# Patient Record
Sex: FEMALE | Race: WHITE | NOT HISPANIC OR LATINO | Employment: OTHER | URBAN - METROPOLITAN AREA
[De-identification: names, ages, dates, MRNs, and addresses within clinical notes are randomized per-mention and may not be internally consistent; named-entity substitution may affect disease eponyms.]

---

## 2017-02-20 ENCOUNTER — ALLSCRIPTS OFFICE VISIT (OUTPATIENT)
Dept: OTHER | Facility: OTHER | Age: 82
End: 2017-02-20

## 2017-02-20 DIAGNOSIS — Z13.820 ENCOUNTER FOR SCREENING FOR OSTEOPOROSIS: ICD-10-CM

## 2017-05-05 ENCOUNTER — GENERIC CONVERSION - ENCOUNTER (OUTPATIENT)
Dept: OTHER | Facility: OTHER | Age: 82
End: 2017-05-05

## 2017-05-17 ENCOUNTER — GENERIC CONVERSION - ENCOUNTER (OUTPATIENT)
Dept: OTHER | Facility: OTHER | Age: 82
End: 2017-05-17

## 2017-05-24 ENCOUNTER — GENERIC CONVERSION - ENCOUNTER (OUTPATIENT)
Dept: OTHER | Facility: OTHER | Age: 82
End: 2017-05-24

## 2017-06-21 ENCOUNTER — GENERIC CONVERSION - ENCOUNTER (OUTPATIENT)
Dept: OTHER | Facility: OTHER | Age: 82
End: 2017-06-21

## 2017-06-28 ENCOUNTER — ALLSCRIPTS OFFICE VISIT (OUTPATIENT)
Dept: OTHER | Facility: OTHER | Age: 82
End: 2017-06-28

## 2017-06-28 DIAGNOSIS — Z13.820 ENCOUNTER FOR SCREENING FOR OSTEOPOROSIS: ICD-10-CM

## 2017-07-07 ENCOUNTER — GENERIC CONVERSION - ENCOUNTER (OUTPATIENT)
Dept: OTHER | Facility: OTHER | Age: 82
End: 2017-07-07

## 2017-08-24 ENCOUNTER — GENERIC CONVERSION - ENCOUNTER (OUTPATIENT)
Dept: OTHER | Facility: OTHER | Age: 82
End: 2017-08-24

## 2017-10-05 ENCOUNTER — GENERIC CONVERSION - ENCOUNTER (OUTPATIENT)
Dept: OTHER | Facility: OTHER | Age: 82
End: 2017-10-05

## 2017-10-24 ENCOUNTER — HOSPITAL ENCOUNTER (EMERGENCY)
Facility: HOSPITAL | Age: 82
Discharge: HOME | End: 2017-10-24
Attending: EMERGENCY MEDICINE
Payer: MEDICARE

## 2017-10-24 VITALS
RESPIRATION RATE: 14 BRPM | BODY MASS INDEX: 28.35 KG/M2 | TEMPERATURE: 98.7 F | HEART RATE: 78 BPM | WEIGHT: 160 LBS | SYSTOLIC BLOOD PRESSURE: 199 MMHG | DIASTOLIC BLOOD PRESSURE: 83 MMHG | OXYGEN SATURATION: 95 % | HEIGHT: 63 IN

## 2017-10-24 DIAGNOSIS — R10.2 PELVIC PAIN: ICD-10-CM

## 2017-10-24 DIAGNOSIS — N30.00 ACUTE CYSTITIS: Primary | ICD-10-CM

## 2017-10-24 LAB
BACTERIA UR QL AUTO: ABNORMAL /HPF
BILIRUB UR QL STRIP: NEGATIVE
CLARITY UR: ABNORMAL
CLARITY, POC: ABNORMAL
COLOR UR: YELLOW
COLOR, POC: YELLOW
EXT BILIRUBIN, UA: ABNORMAL
EXT BLOOD URINE: ABNORMAL
EXT GLUCOSE, UA: ABNORMAL
EXT KETONES: ABNORMAL
EXT NITRITE, UA: ABNORMAL
EXT PH, UA: 6
EXT PROTEIN, UA: 100
EXT SPECIFIC GRAVITY, UA: 1.02
EXT UROBILINOGEN: 0.2
GLUCOSE UR STRIP-MCNC: NEGATIVE MG/DL
HGB UR QL STRIP.AUTO: ABNORMAL
KETONES UR STRIP-MCNC: NEGATIVE MG/DL
LEUKOCYTE ESTERASE UR QL STRIP: ABNORMAL
NITRITE UR QL STRIP: NEGATIVE
NON-SQ EPI CELLS URNS QL MICRO: ABNORMAL /HPF
PH UR STRIP.AUTO: 5.5 [PH] (ref 4.5–8)
PROT UR STRIP-MCNC: ABNORMAL MG/DL
RBC #/AREA URNS AUTO: ABNORMAL /HPF
SP GR UR STRIP.AUTO: 1.02 (ref 1–1.03)
UROBILINOGEN UR QL STRIP.AUTO: 0.2 E.U./DL
WBC # BLD EST: ABNORMAL 10*3/UL
WBC #/AREA URNS AUTO: ABNORMAL /HPF

## 2017-10-24 PROCEDURE — 99283 EMERGENCY DEPT VISIT LOW MDM: CPT

## 2017-10-24 PROCEDURE — 81001 URINALYSIS AUTO W/SCOPE: CPT | Performed by: EMERGENCY MEDICINE

## 2017-10-24 PROCEDURE — 81002 URINALYSIS NONAUTO W/O SCOPE: CPT | Performed by: EMERGENCY MEDICINE

## 2017-10-24 RX ORDER — PHENAZOPYRIDINE HYDROCHLORIDE 200 MG/1
200 TABLET, FILM COATED ORAL 3 TIMES DAILY
Qty: 6 TABLET | Refills: 0 | Status: SHIPPED | OUTPATIENT
Start: 2017-10-24 | End: 2018-05-21 | Stop reason: ALTCHOICE

## 2017-10-24 RX ORDER — OMEPRAZOLE 20 MG/1
20 CAPSULE, DELAYED RELEASE ORAL DAILY
COMMUNITY
End: 2018-03-05 | Stop reason: SDUPTHER

## 2017-10-24 RX ORDER — PHENAZOPYRIDINE HYDROCHLORIDE 100 MG/1
200 TABLET, FILM COATED ORAL ONCE
Status: COMPLETED | OUTPATIENT
Start: 2017-10-24 | End: 2017-10-24

## 2017-10-24 RX ORDER — HYDROCHLOROTHIAZIDE 12.5 MG/1
12.5 TABLET ORAL DAILY
COMMUNITY
End: 2018-03-05 | Stop reason: SDUPTHER

## 2017-10-24 RX ORDER — BIOTIN 1 MG
1000 TABLET ORAL DAILY
COMMUNITY

## 2017-10-24 RX ORDER — GABAPENTIN 800 MG/1
800 TABLET ORAL 3 TIMES DAILY
COMMUNITY
End: 2018-03-05 | Stop reason: SDUPTHER

## 2017-10-24 RX ORDER — PRAVASTATIN SODIUM 20 MG
20 TABLET ORAL DAILY
COMMUNITY
End: 2018-03-05 | Stop reason: SDUPTHER

## 2017-10-24 RX ORDER — CIPROFLOXACIN 500 MG/1
500 TABLET, FILM COATED ORAL 2 TIMES DAILY
Qty: 12 TABLET | Refills: 0 | Status: SHIPPED | OUTPATIENT
Start: 2017-10-24 | End: 2017-10-30

## 2017-10-24 RX ADMIN — PHENAZOPYRIDINE HYDROCHLORIDE 200 MG: 100 TABLET ORAL at 05:11

## 2017-10-24 NOTE — DISCHARGE INSTRUCTIONS
Pelvic Pain   WHAT YOU NEED TO KNOW:   Pelvic pain may be caused by a number of conditions, such as irritable bowel syndrome, appendicitis, or constipation  A urinary tract infection, prostate inflammation, menstrual cramps, or kidney stones can also cause pelvic pain  You may have pain on one or both sides of your pelvis  Pelvic pain can develop if you have trauma to your pelvis or if you sit or stand for a long time  DISCHARGE INSTRUCTIONS:   Medicines: You may need any of the following:  · NSAIDs , such as ibuprofen, help decrease swelling, pain, and fever  This medicine is available with or without a doctor's order  NSAIDs can cause stomach bleeding or kidney problems in certain people  If you take blood thinner medicine, always ask your healthcare provider if NSAIDs are safe for you  Always read the medicine label and follow directions  · Prescription pain medicine  may be given  Ask how to take this medicine safely  · Birth control medicines  may help decrease pain in women  · Take your medicine as directed  Contact your healthcare provider if you think your medicine is not helping or if you have side effects  Tell him or her if you are allergic to any medicine  Keep a list of the medicines, vitamins, and herbs you take  Include the amounts, and when and why you take them  Bring the list or the pill bottles to follow-up visits  Carry your medicine list with you in case of an emergency  Call 911 for any of the following:   · You have severe chest pain and sudden trouble breathing  Contact your healthcare provider if:   · You have a fever  · You have nausea, vomiting, or diarrhea  · Your pain does not go away, or it gets worse, even after treatment  · You have numbness in your legs or toes  · You have heavy or unusual vaginal bleeding  · You have questions or concerns about your condition or care  Manage your pelvic pain:   · Keep a pain record    Write down when your pain happens and how severe it is  Include any other symptoms you have with your pain  A record will help you keep track of pain cycles  Bring the record with you to your follow-up visits  It may also help your healthcare provider find out what is causing your pain  · Learn ways to relax  Deep breathing, meditation, and relaxation techniques can help decrease your pain  When you are tense, your pain may increase  · Treat or prevent constipation  Drink liquids as directed  You may need to drink more liquid than usual  Ask your healthcare provider how much liquid to drink each day  Eat high-fiber foods  High-fiber foods include fruit, vegetables, whole-grain breads and cereals, and beans  You may need to change the foods you eat if you have irritable bowel syndrome  Follow up with your healthcare provider as directed: You may need physical therapy  You may need to see an orthopedic specialist  Write down your questions so you remember to ask them during your visits  © 2017 [address] Information is for End User's use only and may not be sold, redistributed or otherwise used for commercial purposes  All illustrations and images included in CareNotes® are the copyrighted property of [employer]  or [employer]rence [employer]  The above information is an  only  It is not intended as medical advice for individual conditions or treatments  Talk to your doctor, nurse or pharmacist before following any medical regimen to see if it is safe and effective for you  Urinary Traction Infection in Older Adults   WHAT YOU NEED TO KNOW:   A urinary tract infection (UTI) is caused by bacteria that get inside your urinary tract  Your urinary tract includes your kidneys, ureters, bladder, and urethra  Urine is made in your kidneys, and it flows from the ureters to the bladder  Urine leaves the bladder through the urethra   A UTI is more common in your lower urinary tract, which includes your bladder and urethra  DISCHARGE INSTRUCTIONS:   Return to the emergency department if:   · You are urinating very little or not at all  · You are vomiting  · You have a high fever with shaking chills  · You have side or back pain that gets worse  Contact your healthcare provider if:   · You have a fever  · You are a woman and you have increased white or yellow discharge from your vagina  · You do not feel better after 2 days of taking antibiotics  · You have questions or concerns about your condition or care  Medicines:   · Medicines  help treat the bacterial infection or decrease pain and burning when you urinate  You may also need medicines to decrease the urge to urinate often  Your healthcare provider may recommend cranberry juice or cranberry supplements to help decrease your symptoms  · Take your medicine as directed  Contact your healthcare provider if you think your medicine is not helping or if you have side effects  Tell him or her if you are allergic to any medicine  Keep a list of the medicines, vitamins, and herbs you take  Include the amounts, and when and why you take them  Bring the list or the pill bottles to follow-up visits  Carry your medicine list with you in case of an emergency  Self-care:   · Urinate when you feel the urge  Do not hold your urine because bacteria can grow in the bladder if urine stays in the bladder too long  It may be helpful to urinate at least every 3 to 4 hours  · Drink liquids as directed  Liquids can help flush bacteria from your urinary tract  Ask how much liquid to drink each day and which liquids are best for you  You may need to drink more liquids than usual to help flush out the bacteria  Do not drink alcohol, caffeine, and citrus juices  These can irritate your bladder and increase your symptoms  · Apply heat  on your abdomen for 20 to 30 minutes every 2 hours for as many days as directed   Heat helps decrease discomfort and pressure in your bladder  Prevent a UTI:   · Women should wipe front to back  after urinating or having a bowel movement  This may prevent germs from getting into the urinary tract  · Urinate after you have sex  to flush away bacteria that can enter your urinary tract during sex  · Wear cotton underwear and clothes that fit loose  Tight pants and nylon underwear can trap moisture and cause bacteria to grow  Follow up with your healthcare provider as directed:  Write down your questions so you remember to ask them during your visits  © 2017 [facility] Information is for End User's use only and may not be sold, redistributed or otherwise used for commercial purposes  All illustrations and images included in CareNotes® are the copyrighted property of [employer]  or [employer] Sant[employer]  The above information is an  only  It is not intended as medical advice for individual conditions or treatments  Talk to your doctor, nurse or pharmacist before following any medical regimen to see if it is safe and effective for you

## 2017-10-24 NOTE — ED PROVIDER NOTES
History  Chief Complaint   Patient presents with    Pain     Pt to ED with increasing pain in her vaginal/perineum since yesterday  Burning sensation during urination, history of frequent urinary tract infections  This 59-year-old female who is status post remote total abdominal hysterectomy complains of pelvic pain  She states this started approximately 2 days ago and has progressively worsened  She also has dysuria but this does not feel like her typical UTI  Patient denies abnormal vaginal bleeding or discharge  She denies recent fever illness or injury  She has been having normal bowel movements  Prior to Admission Medications   Prescriptions Last Dose Informant Patient Reported? Taking? cholecalciferol (VITAMIN D3) 400 units tablet   Yes Yes   Sig: Take 400 Units by mouth daily   cyanocobalamin 100 MCG tablet   Yes Yes   Sig: Take 100 mcg by mouth daily   gabapentin (NEURONTIN) 800 mg tablet   Yes Yes   Sig: Take 800 mg by mouth 3 (three) times a day   hydrochlorothiazide (HYDRODIURIL) 12 5 mg tablet   Yes Yes   Sig: Take 12 5 mg by mouth daily   omeprazole (PriLOSEC) 20 mg delayed release capsule   Yes Yes   Sig: Take 20 mg by mouth daily   pravastatin (PRAVACHOL) 20 mg tablet   Yes Yes   Sig: Take 20 mg by mouth daily      Facility-Administered Medications: None       Past Medical History:   Diagnosis Date    Balance disorder     Hard of hearing     Tinnitus        Past Surgical History:   Procedure Laterality Date    EYE SURGERY      HYSTERECTOMY         History reviewed  No pertinent family history  I have reviewed and agree with the history as documented  Social History   Substance Use Topics    Smoking status: Never Smoker    Smokeless tobacco: Never Used    Alcohol use Yes      Comment: rarely        Review of Systems   Constitutional: Negative  HENT: Negative  Eyes: Negative  Respiratory: Negative  Cardiovascular: Negative      Gastrointestinal: Negative  Endocrine: Negative  Genitourinary: Positive for dysuria and pelvic pain  Negative for decreased urine volume, difficulty urinating, flank pain, hematuria, menstrual problem, urgency, vaginal bleeding and vaginal discharge  Musculoskeletal: Negative  Skin: Negative  Allergic/Immunologic: Negative  Neurological: Negative  Hematological: Negative  Psychiatric/Behavioral: Negative  All other systems reviewed and are negative  Physical Exam  ED Triage Vitals [10/24/17 0414]   Temperature Pulse Respirations Blood Pressure SpO2   98 7 °F (37 1 °C) 78 14 (!) 199/83 95 %      Temp Source Heart Rate Source Patient Position - Orthostatic VS BP Location FiO2 (%)   Tympanic Monitor Lying Left arm --      Pain Score       --           Physical Exam   Constitutional: She is oriented to person, place, and time  She appears well-developed and well-nourished  No distress  HENT:   Head: Normocephalic and atraumatic  Eyes: Conjunctivae and EOM are normal  Pupils are equal, round, and reactive to light  Neck: Normal range of motion  Neck supple  Cardiovascular: Normal rate and regular rhythm  No murmur heard  Pulmonary/Chest: Effort normal and breath sounds normal    Abdominal: Soft  Bowel sounds are normal    Genitourinary: No vaginal discharge found  Genitourinary Comments: There is no external vaginal lesion  No internal vaginal lesion  No cervix noted  Tenderness in vagina diffusely  No edema  No bleeding or signs of trauma  No drainage  Rectal exam was unremarkable  Musculoskeletal: Normal range of motion  She exhibits no edema  No pelvic bone tenderness or instability   Neurological: She is alert and oriented to person, place, and time  She has normal reflexes  No cranial nerve deficit  Coordination normal    Skin: Skin is warm and dry  No rash noted  She is not diaphoretic  Psychiatric: She has a normal mood and affect     Nursing note and vitals reviewed        ED Medications  Medications   phenazopyridine (PYRIDIUM) tablet 200 mg (200 mg Oral Given 10/24/17 0511)       Diagnostic Studies  Labs Reviewed   URINALYSIS WITH REFLEX TO MICROSCOPIC - Abnormal        Result Value Ref Range Status    Leukocytes, UA Large (*) Negative Final    Protein, UA 30 (1+) (*) Negative mg/dl Final    Blood, UA Moderate (*) Negative Final    Color, UA Yellow   Final    Clarity, UA Turbid   Final    Specific Gravity, UA 1 020  1 003 - 1 030 Final    pH, UA 5 5  4 5 - 8 0 Final    Nitrite, UA Negative  Negative Final    Glucose, UA Negative  Negative mg/dl Final    Ketones, UA Negative  Negative mg/dl Final    Urobilinogen, UA 0 2  0 2, 1 0 E U /dl E U /dl Final    Bilirubin, UA Negative  Negative Final   URINE MICROSCOPIC - Abnormal     RBC, UA Field obscured, unable to enumerate (*) None Seen, 0-5 /hpf Final    WBC, UA Innumerable (*) None Seen, 0-5, 5-55, 5-65 /hpf Final    Epithelial Cells Field obscured, unable to enumerate (*) None Seen, Occasional /hpf Final    Bacteria, UA Field obscured, unable to enumerate (*) None Seen, Occasional /hpf Final   POCT URINALYSIS DIPSTICK - Abnormal     Color, UA yellow   Final    Clarity, UA turbid   Final    EXT Glucose, UA neg   Final    EXT Bilirubin, UA (Ref: Negative) neg   Final    EXT Ketones, UA (Ref: Negative) neg   Final    EXT Spec Grav, UA 1 020   Final    EXT Blood, UA (Ref: Negative) moderate   Final    EXT pH, UA 6 0   Final    EXT Protein, UA (Ref: Negative) 100   Final    EXT Urobilinogen, UA (Ref: 0 2- 1 0) 0 2   Final    EXT Leukocytes, UA (Ref: Negative) moderate   Final    EXT Nitrite, UA (Ref: Negative) neg   Final       US pelvis complete non OB    (Results Pending)       Procedures  Procedures      Phone Contacts  ED Phone Contact    ED Course  ED Course                                MDM  Number of Diagnoses or Management Options  Acute cystitis: new and requires workup  Pelvic pain: new and requires workup Amount and/or Complexity of Data Reviewed  Clinical lab tests: ordered and reviewed      CritCare Time    Disposition  Final diagnoses:   Acute cystitis   Pelvic pain     ED Disposition     ED Disposition Condition Comment    Discharge  Pranay Meyer discharge to home/self care  Condition at discharge: Stable        Follow-up Information     Follow up With Specialties Details Why Contact Isiah Keene MD Obstetrics and Gynecology Call today to evaluate your pelvic pain [address]  [phone]          Discharge Medication List as of 10/24/2017  7:12 AM      START taking these medications    Details   ciprofloxacin (CIPRO) 500 mg tablet Take 1 tablet by mouth 2 (two) times a day for 6 days, Starting Tue 10/24/2017, Until Mon 10/30/2017, Print      phenazopyridine (PYRIDIUM) 200 mg tablet Take 1 tablet by mouth 3 (three) times a day, Starting Tue 10/24/2017, Print         CONTINUE these medications which have NOT CHANGED    Details   cholecalciferol (VITAMIN D3) 400 units tablet Take 400 Units by mouth daily, Historical Med      cyanocobalamin 100 MCG tablet Take 100 mcg by mouth daily, Historical Med      gabapentin (NEURONTIN) 800 mg tablet Take 800 mg by mouth 3 (three) times a day, Historical Med      hydrochlorothiazide (HYDRODIURIL) 12 5 mg tablet Take 12 5 mg by mouth daily, Historical Med      omeprazole (PriLOSEC) 20 mg delayed release capsule Take 20 mg by mouth daily, Historical Med      pravastatin (PRAVACHOL) 20 mg tablet Take 20 mg by mouth daily, Historical Med             Outpatient Discharge Orders  US pelvis complete non OB   Standing Status: Future  Standing Exp   Date: 10/24/21         ED Provider  Electronically Signed by       Raffy Granados DO  10/24/17 1361

## 2017-11-11 ENCOUNTER — HOSPITAL ENCOUNTER (OUTPATIENT)
Dept: ULTRASOUND IMAGING | Facility: HOSPITAL | Age: 82
Discharge: HOME | End: 2017-11-11
Attending: EMERGENCY MEDICINE
Payer: MEDICARE

## 2017-11-11 DIAGNOSIS — R10.2 PELVIC PAIN: ICD-10-CM

## 2017-11-11 PROCEDURE — 76856 US EXAM PELVIC COMPLETE: CPT

## 2017-11-11 PROCEDURE — 76830 TRANSVAGINAL US NON-OB: CPT

## 2018-01-13 NOTE — RESULT NOTES
Verified Results  (1) COMPREHENSIVE METABOLIC PANEL [id number] 27:15QK [employer]     Test Name Result Flag Reference   GLUCOSE 90 mg/dL  65-99   Fasting reference interval   UREA NITROGEN (BUN) 16 mg/dL  7-25   CREATININE 0 91 mg/dL H 0 60-0 88   For patients >52years of age, the reference limit  for Creatinine is approximately 13% higher for people  identified as -American  eGFR NON-AFR  AMERICAN 57 mL/min/1 73m2 L > OR = 60   eGFR AFRICAN AMERICAN 66 mL/min/1 73m2  > OR = 60   BUN/CREATININE RATIO 18 (calc)  6-22   SODIUM 142 mmol/L  135-146   POTASSIUM 3 7 mmol/L  3 5-5 3   CHLORIDE 100 mmol/L     CARBON DIOXIDE 32 mmol/L H 20-31   CALCIUM 9 8 mg/dL  8 6-10 4   PROTEIN, TOTAL 6 9 g/dL  6 1-8 1   ALBUMIN 4 2 g/dL  3 6-5 1   GLOBULIN 2 7 g/dL (calc)  1 9-3 7   ALBUMIN/GLOBULIN RATIO 1 6 (calc)  1 0-2 5   BILIRUBIN, TOTAL 0 4 mg/dL  0 2-1 2   ALKALINE PHOSPHATASE 56 U/L     AST 15 U/L  10-35   ALT 10 U/L  6-29     (1) VITAMIN B12 [id number] 10:35AM [employer]   REPORT COMMENT:  FASTING:YES     Test Name Result Flag Reference   VITAMIN B12 984 pg/mL  200-1100     (Q) LIPID PANEL WITH REFLEX TO DIRECT LDL [id number] 10:35AM [employer]     Test Name Result Flag Reference   CHOLESTEROL, TOTAL 193 mg/dL  125-200   HDL CHOLESTEROL 56 mg/dL  > OR = 46   TRIGLICERIDES 153 mg/dL H <150   LDL-CHOLESTEROL 99 mg/dL (calc)  <130   Desirable range <100 mg/dL for patients with CHD or  diabetes and <70 mg/dL for diabetic patients with  known heart disease  CHOL/HDLC RATIO 3 4 (calc)  < OR = 5 0   NON HDL CHOLESTEROL 137 mg/dL (calc)     Target for non-HDL cholesterol is 30 mg/dL higher than   LDL cholesterol target

## 2018-01-14 VITALS
WEIGHT: 165 LBS | DIASTOLIC BLOOD PRESSURE: 70 MMHG | OXYGEN SATURATION: 95 % | RESPIRATION RATE: 18 BRPM | BODY MASS INDEX: 28.17 KG/M2 | HEART RATE: 74 BPM | SYSTOLIC BLOOD PRESSURE: 140 MMHG | HEIGHT: 64 IN

## 2018-01-14 VITALS
DIASTOLIC BLOOD PRESSURE: 82 MMHG | BODY MASS INDEX: 27.66 KG/M2 | WEIGHT: 162 LBS | SYSTOLIC BLOOD PRESSURE: 126 MMHG | HEIGHT: 64 IN | RESPIRATION RATE: 16 BRPM | OXYGEN SATURATION: 97 % | HEART RATE: 77 BPM

## 2018-03-05 DIAGNOSIS — E78.2 MIXED HYPERLIPIDEMIA: ICD-10-CM

## 2018-03-05 DIAGNOSIS — M12.9 ARTHROPATHY: Primary | ICD-10-CM

## 2018-03-05 DIAGNOSIS — K21.9 GASTROESOPHAGEAL REFLUX DISEASE, ESOPHAGITIS PRESENCE NOT SPECIFIED: ICD-10-CM

## 2018-03-05 DIAGNOSIS — I10 HYPERTENSION, UNSPECIFIED TYPE: ICD-10-CM

## 2018-03-05 RX ORDER — OMEPRAZOLE 20 MG/1
20 CAPSULE, DELAYED RELEASE ORAL DAILY
Qty: 90 CAPSULE | Refills: 1 | Status: SHIPPED | OUTPATIENT
Start: 2018-03-05 | End: 2018-06-27 | Stop reason: SDUPTHER

## 2018-03-05 RX ORDER — GABAPENTIN 800 MG/1
800 TABLET ORAL 2 TIMES DAILY
Qty: 180 TABLET | Refills: 1 | Status: SHIPPED | OUTPATIENT
Start: 2018-03-05 | End: 2018-06-27 | Stop reason: SDUPTHER

## 2018-03-05 RX ORDER — PRAVASTATIN SODIUM 20 MG
20 TABLET ORAL DAILY
Qty: 90 TABLET | Refills: 1 | Status: SHIPPED | OUTPATIENT
Start: 2018-03-05 | End: 2018-06-27 | Stop reason: SDUPTHER

## 2018-03-05 RX ORDER — HYDROCHLOROTHIAZIDE 12.5 MG/1
12.5 TABLET ORAL DAILY
Qty: 90 TABLET | Refills: 1 | Status: SHIPPED | OUTPATIENT
Start: 2018-03-05 | End: 2018-06-27 | Stop reason: SDUPTHER

## 2018-05-21 ENCOUNTER — APPOINTMENT (EMERGENCY)
Dept: RADIOLOGY | Facility: HOSPITAL | Age: 83
DRG: 871 | End: 2018-05-21
Payer: MEDICARE

## 2018-05-21 ENCOUNTER — APPOINTMENT (OUTPATIENT)
Dept: CT IMAGING | Facility: HOSPITAL | Age: 83
DRG: 871 | End: 2018-05-21
Payer: MEDICARE

## 2018-05-21 ENCOUNTER — HOSPITAL ENCOUNTER (INPATIENT)
Facility: HOSPITAL | Age: 83
LOS: 5 days | Discharge: NURSING, REHAB OR LONG-TERM CARE | DRG: 871 | End: 2018-05-27
Attending: EMERGENCY MEDICINE | Admitting: INTERNAL MEDICINE
Payer: MEDICARE

## 2018-05-21 ENCOUNTER — HOSPITAL ENCOUNTER (EMERGENCY)
Facility: HOSPITAL | Age: 83
Discharge: HOME | DRG: 871 | End: 2018-05-21
Attending: EMERGENCY MEDICINE | Admitting: EMERGENCY MEDICINE
Payer: MEDICARE

## 2018-05-21 VITALS
HEART RATE: 104 BPM | OXYGEN SATURATION: 91 % | SYSTOLIC BLOOD PRESSURE: 151 MMHG | WEIGHT: 165 LBS | HEIGHT: 63 IN | DIASTOLIC BLOOD PRESSURE: 66 MMHG | BODY MASS INDEX: 29.23 KG/M2 | TEMPERATURE: 100.2 F

## 2018-05-21 DIAGNOSIS — I10 ESSENTIAL HYPERTENSION: ICD-10-CM

## 2018-05-21 DIAGNOSIS — J20.9 ACUTE BRONCHITIS: Primary | ICD-10-CM

## 2018-05-21 DIAGNOSIS — R41.82 ALTERED MENTAL STATUS: ICD-10-CM

## 2018-05-21 DIAGNOSIS — J18.9 LINGULAR PNEUMONIA: ICD-10-CM

## 2018-05-21 DIAGNOSIS — A41.9 SEPSIS (HCC): ICD-10-CM

## 2018-05-21 DIAGNOSIS — J40 BRONCHITIS: Primary | ICD-10-CM

## 2018-05-21 DIAGNOSIS — H60.91 RIGHT OTITIS EXTERNA: ICD-10-CM

## 2018-05-21 LAB
ALBUMIN SERPL BCP-MCNC: 3.5 G/DL (ref 3.5–5)
ALBUMIN SERPL BCP-MCNC: 3.5 G/DL (ref 3.5–5)
ALP SERPL-CCNC: 51 U/L (ref 46–116)
ALP SERPL-CCNC: 59 U/L (ref 46–116)
ALT SERPL W P-5'-P-CCNC: 16 U/L (ref 12–78)
ALT SERPL W P-5'-P-CCNC: 17 U/L (ref 12–78)
ANION GAP SERPL CALCULATED.3IONS-SCNC: 6 MMOL/L (ref 4–13)
ANION GAP SERPL CALCULATED.3IONS-SCNC: 9 MMOL/L (ref 4–13)
APTT PPP: 43 SECONDS (ref 24–36)
APTT PPP: 46 SECONDS (ref 24–36)
AST SERPL W P-5'-P-CCNC: 17 U/L (ref 5–45)
AST SERPL W P-5'-P-CCNC: 22 U/L (ref 5–45)
BACTERIA UR QL AUTO: ABNORMAL /HPF
BASOPHILS # BLD AUTO: 0.01 THOUSANDS/ΜL (ref 0–0.1)
BASOPHILS # BLD AUTO: 0.01 THOUSANDS/ΜL (ref 0–0.1)
BASOPHILS NFR BLD AUTO: 0 % (ref 0–1)
BASOPHILS NFR BLD AUTO: 0 % (ref 0–1)
BILIRUB SERPL-MCNC: 0.3 MG/DL (ref 0.2–1)
BILIRUB SERPL-MCNC: 0.6 MG/DL (ref 0.2–1)
BILIRUB UR QL STRIP: NEGATIVE
BUN SERPL-MCNC: 16 MG/DL (ref 5–25)
BUN SERPL-MCNC: 16 MG/DL (ref 5–25)
CALCIUM SERPL-MCNC: 9.5 MG/DL (ref 8.3–10.1)
CALCIUM SERPL-MCNC: 9.6 MG/DL (ref 8.3–10.1)
CHLORIDE SERPL-SCNC: 100 MMOL/L (ref 100–108)
CHLORIDE SERPL-SCNC: 99 MMOL/L (ref 100–108)
CLARITY UR: CLEAR
CO2 SERPL-SCNC: 31 MMOL/L (ref 21–32)
CO2 SERPL-SCNC: 34 MMOL/L (ref 21–32)
COLOR UR: YELLOW
CREAT SERPL-MCNC: 0.75 MG/DL (ref 0.6–1.3)
CREAT SERPL-MCNC: 0.91 MG/DL (ref 0.6–1.3)
EOSINOPHIL # BLD AUTO: 0.01 THOUSAND/ΜL (ref 0–0.61)
EOSINOPHIL # BLD AUTO: 0.07 THOUSAND/ΜL (ref 0–0.61)
EOSINOPHIL NFR BLD AUTO: 0 % (ref 0–6)
EOSINOPHIL NFR BLD AUTO: 1 % (ref 0–6)
ERYTHROCYTE [DISTWIDTH] IN BLOOD BY AUTOMATED COUNT: 14.1 % (ref 11.6–15.1)
ERYTHROCYTE [DISTWIDTH] IN BLOOD BY AUTOMATED COUNT: 14.3 % (ref 11.6–15.1)
GFR SERPL CREATININE-BSD FRML MDRD: 57 ML/MIN/1.73SQ M
GFR SERPL CREATININE-BSD FRML MDRD: 71 ML/MIN/1.73SQ M
GLUCOSE SERPL-MCNC: 138 MG/DL (ref 65–140)
GLUCOSE SERPL-MCNC: 97 MG/DL (ref 65–140)
GLUCOSE UR STRIP-MCNC: NEGATIVE MG/DL
HCT VFR BLD AUTO: 39.2 % (ref 34.8–46.1)
HCT VFR BLD AUTO: 39.4 % (ref 34.8–46.1)
HGB BLD-MCNC: 12.5 G/DL (ref 11.5–15.4)
HGB BLD-MCNC: 12.7 G/DL (ref 11.5–15.4)
HGB UR QL STRIP.AUTO: ABNORMAL
INR PPP: 1.12 (ref 0.86–1.17)
INR PPP: 1.23 (ref 0.86–1.17)
KETONES UR STRIP-MCNC: NEGATIVE MG/DL
LACTATE SERPL-SCNC: 1.4 MMOL/L (ref 0.5–2)
LACTATE SERPL-SCNC: 1.4 MMOL/L (ref 0.5–2)
LEUKOCYTE ESTERASE UR QL STRIP: NEGATIVE
LYMPHOCYTES # BLD AUTO: 1 THOUSANDS/ΜL (ref 0.6–4.47)
LYMPHOCYTES # BLD AUTO: 1.91 THOUSANDS/ΜL (ref 0.6–4.47)
LYMPHOCYTES NFR BLD AUTO: 13 % (ref 14–44)
LYMPHOCYTES NFR BLD AUTO: 6 % (ref 14–44)
MCH RBC QN AUTO: 28.2 PG (ref 26.8–34.3)
MCH RBC QN AUTO: 28.7 PG (ref 26.8–34.3)
MCHC RBC AUTO-ENTMCNC: 31.9 G/DL (ref 31.4–37.4)
MCHC RBC AUTO-ENTMCNC: 32.2 G/DL (ref 31.4–37.4)
MCV RBC AUTO: 88 FL (ref 82–98)
MCV RBC AUTO: 89 FL (ref 82–98)
MONOCYTES # BLD AUTO: 1.27 THOUSAND/ΜL (ref 0.17–1.22)
MONOCYTES # BLD AUTO: 1.51 THOUSAND/ΜL (ref 0.17–1.22)
MONOCYTES NFR BLD AUTO: 11 % (ref 4–12)
MONOCYTES NFR BLD AUTO: 8 % (ref 4–12)
MUCOUS THREADS UR QL AUTO: ABNORMAL
NEUTROPHILS # BLD AUTO: 10.74 THOUSANDS/ΜL (ref 1.85–7.62)
NEUTROPHILS # BLD AUTO: 14.42 THOUSANDS/ΜL (ref 1.85–7.62)
NEUTS SEG NFR BLD AUTO: 75 % (ref 43–75)
NEUTS SEG NFR BLD AUTO: 86 % (ref 43–75)
NITRITE UR QL STRIP: NEGATIVE
NON-SQ EPI CELLS URNS QL MICRO: ABNORMAL /HPF
NT-PROBNP SERPL-MCNC: 321 PG/ML
PH UR STRIP.AUTO: 6 [PH] (ref 4.5–8)
PLATELET # BLD AUTO: 217 THOUSANDS/UL (ref 149–390)
PLATELET # BLD AUTO: 236 THOUSANDS/UL (ref 149–390)
PMV BLD AUTO: 9.2 FL (ref 8.9–12.7)
PMV BLD AUTO: 9.5 FL (ref 8.9–12.7)
POTASSIUM SERPL-SCNC: 3.8 MMOL/L (ref 3.5–5.3)
POTASSIUM SERPL-SCNC: 3.8 MMOL/L (ref 3.5–5.3)
PROT SERPL-MCNC: 7.7 G/DL (ref 6.4–8.2)
PROT SERPL-MCNC: 7.9 G/DL (ref 6.4–8.2)
PROT UR STRIP-MCNC: NEGATIVE MG/DL
PROTHROMBIN TIME: 13.8 SECONDS (ref 11.8–14.2)
PROTHROMBIN TIME: 14.8 SECONDS (ref 11.8–14.2)
RBC # BLD AUTO: 4.42 MILLION/UL (ref 3.81–5.12)
RBC # BLD AUTO: 4.44 MILLION/UL (ref 3.81–5.12)
RBC #/AREA URNS AUTO: ABNORMAL /HPF
SODIUM SERPL-SCNC: 139 MMOL/L (ref 136–145)
SODIUM SERPL-SCNC: 140 MMOL/L (ref 136–145)
SP GR UR STRIP.AUTO: 1.02 (ref 1–1.03)
TROPONIN I SERPL-MCNC: <0.02 NG/ML
UROBILINOGEN UR QL STRIP.AUTO: 0.2 E.U./DL
WBC # BLD AUTO: 14.24 THOUSAND/UL (ref 4.31–10.16)
WBC # BLD AUTO: 16.71 THOUSAND/UL (ref 4.31–10.16)
WBC #/AREA URNS AUTO: ABNORMAL /HPF

## 2018-05-21 PROCEDURE — 94640 AIRWAY INHALATION TREATMENT: CPT

## 2018-05-21 PROCEDURE — 85610 PROTHROMBIN TIME: CPT | Performed by: EMERGENCY MEDICINE

## 2018-05-21 PROCEDURE — 85025 COMPLETE CBC W/AUTO DIFF WBC: CPT | Performed by: EMERGENCY MEDICINE

## 2018-05-21 PROCEDURE — 85730 THROMBOPLASTIN TIME PARTIAL: CPT | Performed by: EMERGENCY MEDICINE

## 2018-05-21 PROCEDURE — 83605 ASSAY OF LACTIC ACID: CPT | Performed by: EMERGENCY MEDICINE

## 2018-05-21 PROCEDURE — 83880 ASSAY OF NATRIURETIC PEPTIDE: CPT | Performed by: EMERGENCY MEDICINE

## 2018-05-21 PROCEDURE — 81001 URINALYSIS AUTO W/SCOPE: CPT | Performed by: EMERGENCY MEDICINE

## 2018-05-21 PROCEDURE — 36415 COLL VENOUS BLD VENIPUNCTURE: CPT | Performed by: EMERGENCY MEDICINE

## 2018-05-21 PROCEDURE — 80053 COMPREHEN METABOLIC PANEL: CPT | Performed by: EMERGENCY MEDICINE

## 2018-05-21 PROCEDURE — 87040 BLOOD CULTURE FOR BACTERIA: CPT | Performed by: EMERGENCY MEDICINE

## 2018-05-21 PROCEDURE — 84484 ASSAY OF TROPONIN QUANT: CPT | Performed by: EMERGENCY MEDICINE

## 2018-05-21 PROCEDURE — 70450 CT HEAD/BRAIN W/O DYE: CPT

## 2018-05-21 PROCEDURE — 99285 EMERGENCY DEPT VISIT HI MDM: CPT

## 2018-05-21 PROCEDURE — 96360 HYDRATION IV INFUSION INIT: CPT

## 2018-05-21 PROCEDURE — 71046 X-RAY EXAM CHEST 2 VIEWS: CPT

## 2018-05-21 PROCEDURE — 93005 ELECTROCARDIOGRAM TRACING: CPT

## 2018-05-21 PROCEDURE — 87449 NOS EACH ORGANISM AG IA: CPT | Performed by: PHYSICIAN ASSISTANT

## 2018-05-21 PROCEDURE — 99219 PR INITIAL OBSERVATION CARE/DAY 50 MINUTES: CPT | Performed by: PHYSICIAN ASSISTANT

## 2018-05-21 RX ORDER — AZITHROMYCIN 250 MG/1
TABLET, FILM COATED ORAL
Qty: 6 TABLET | Refills: 0 | Status: SHIPPED | OUTPATIENT
Start: 2018-05-21 | End: 2018-05-27 | Stop reason: HOSPADM

## 2018-05-21 RX ORDER — AZITHROMYCIN 200 MG/5ML
500 POWDER, FOR SUSPENSION ORAL ONCE
Status: COMPLETED | OUTPATIENT
Start: 2018-05-21 | End: 2018-05-21

## 2018-05-21 RX ORDER — IPRATROPIUM BROMIDE AND ALBUTEROL SULFATE 2.5; .5 MG/3ML; MG/3ML
3 SOLUTION RESPIRATORY (INHALATION) ONCE
Status: COMPLETED | OUTPATIENT
Start: 2018-05-21 | End: 2018-05-21

## 2018-05-21 RX ORDER — ACETAMINOPHEN 325 MG/1
650 TABLET ORAL ONCE
Status: COMPLETED | OUTPATIENT
Start: 2018-05-21 | End: 2018-05-21

## 2018-05-21 RX ADMIN — AZITHROMYCIN 500 MG: 200 POWDER, FOR SUSPENSION ORAL at 15:46

## 2018-05-21 RX ADMIN — SODIUM CHLORIDE 1000 ML: 0.9 INJECTION, SOLUTION INTRAVENOUS at 14:06

## 2018-05-21 RX ADMIN — IPRATROPIUM BROMIDE AND ALBUTEROL SULFATE 3 ML: 2.5; .5 SOLUTION RESPIRATORY (INHALATION) at 14:02

## 2018-05-21 RX ADMIN — SODIUM CHLORIDE 500 ML: 0.9 INJECTION, SOLUTION INTRAVENOUS at 23:23

## 2018-05-21 RX ADMIN — SODIUM CHLORIDE 500 ML: 0.9 INJECTION, SOLUTION INTRAVENOUS at 21:12

## 2018-05-21 RX ADMIN — ACETAMINOPHEN 650 MG: 325 TABLET, FILM COATED ORAL at 22:03

## 2018-05-21 NOTE — DISCHARGE INSTRUCTIONS
Acute Bronchitis   WHAT YOU NEED TO KNOW:   Acute bronchitis is swelling and irritation in the air passages of your lungs  This irritation may cause you to cough or have other breathing problems  Acute bronchitis often starts because of another illness, such as a cold or the flu  The illness spreads from your nose and throat to your windpipe and airways  Bronchitis is often called a chest cold  Acute bronchitis lasts about 3 to 6 weeks and is usually not a serious illness  Your cough can last for several weeks  DISCHARGE INSTRUCTIONS:   Return to the emergency department if:   · You cough up blood  · Your lips or fingernails turn blue  · You feel like you are not getting enough air when you breathe  Contact your healthcare provider if:   · You have a fever  · Your breathing problems do not go away or get worse  · Your cough does not get better within 4 weeks  · You have questions or concerns about your condition or care  Self-care:   · Get more rest   Rest helps your body to heal  Slowly start to do more each day  Rest when you feel it is needed  · Avoid irritants in the air  Avoid chemicals, fumes, and dust  Wear a face mask if you must work around dust or fumes  Stay inside on days when air pollution levels are high  If you have allergies, stay inside when pollen counts are high  Do not use aerosol products, such as spray-on deodorant, bug spray, and hair spray  · Do not smoke or be around others who smoke  Nicotine and other chemicals in cigarettes and cigars damages the cilia that move mucus out of your lungs  Ask your healthcare provider for information if you currently smoke and need help to quit  E-cigarettes or smokeless tobacco still contain nicotine  Talk to your healthcare provider before you use these products  · Drink liquids as directed  Liquids help keep your air passages moist and help you cough up mucus   You may need to drink more liquids when you have acute bronchitis  Ask how much liquid to drink each day and which liquids are best for you  · Use a humidifier or vaporizer  Use a cool mist humidifier or a vaporizer to increase air moisture in your home  This may make it easier for you to breathe and help decrease your cough  Decrease risk for acute bronchitis:   · Get the vaccinations you need  Ask your healthcare provider if you should get vaccinated against the flu or pneumonia  · Prevent the spread of germs  You can decrease your risk of acute bronchitis and other illnesses by doing the following:     [facility] your hands often with soap and water  Carry germ-killing hand lotion or gel with you  You can use the lotion or gel to clean your hands when soap and water are not available  ¨ Do not touch your eyes, nose, or mouth unless you have washed your hands first     ¨ Always cover your mouth when you cough to prevent the spread of germs  It is best to cough into a tissue or your shirt sleeve instead of into your hand  Ask those around you cover their mouths when they cough  ¨ Try to avoid people who have a cold or the flu  If you are sick, stay away from others as much as possible  Medicines: Your healthcare provider may  give you any of the following:  · Ibuprofen or acetaminophen  are medicines that help lower your fever  They are available without a doctor's order  Ask your healthcare provider which medicine is right for you  Ask how much to take and how often to take it  Follow directions  These medicines can cause stomach bleeding if not taken correctly  Ibuprofen can cause kidney damage  Do not take ibuprofen if you have kidney disease, an ulcer, or allergies to aspirin  Acetaminophen can cause liver damage  Do not take more than 4,000 milligrams in 24 hours  · Decongestants  help loosen mucus in your lungs and make it easier to cough up  This can help you breathe easier  · Cough suppressants  decrease your urge to cough   If your cough produces mucus, do not take a cough suppressant unless your healthcare provider tells you to  Your healthcare provider may suggest that you take a cough suppressant at night so you can rest     · Inhalers  may be given  Your healthcare provider may give you one or more inhalers to help you breathe easier and cough less  An inhaler gives your medicine to open your airways  Ask your healthcare provider to show you how to use your inhaler correctly  · Take your medicine as directed  Contact your healthcare provider if you think your medicine is not helping or if you have side effects  Tell him of her if you are allergic to any medicine  Keep a list of the medicines, vitamins, and herbs you take  Include the amounts, and when and why you take them  Bring the list or the pill bottles to follow-up visits  Carry your medicine list with you in case of an emergency  Follow up with your healthcare provider as directed:  Write down questions you have so you will remember to ask them during your follow-up visits  © 2017 [facility] Information is for End User's use only and may not be sold, redistributed or otherwise used for commercial purposes  All illustrations and images included in CareNotes® are the copyrighted property of [employer]  or Terrence Sant[employer]  The above information is an  only  It is not intended as medical advice for individual conditions or treatments  Talk to your doctor, nurse or pharmacist before following any medical regimen to see if it is safe and effective for you  Otitis Externa   AMBULATORY CARE:   Otitis externa , or swimmer's ear, is an infection in the outer ear canal  This canal goes from the outside of the ear to the eardrum          Common signs and symptoms include the following:   · Ear pain    · Outer ear canal is red and swollen    · Clear fluid or pus is leaking out of your ear    · Outer ear canal is itchy and you see a rash    · Trouble hearing because your ear is plugged    · Feel a bump in your ear canal, called a polyp    · Flakes of skin fall from your ear  Seek care immediately if:   · You have severe ear pain  · You are suddenly unable to hear at all  · You have new swelling in your face, behind your ears, or in your neck  · You suddenly cannot move part of your face  · Your face suddenly feels numb  Contact your healthcare provider if:   · You have a fever  · Your signs and symptoms do not get better after 2 days of treatment  · Your signs and symptoms go away for a time, but then come back  · You have questions or concerns about your condition or care  Treatment for otitis externa  may include any of the following:  · NSAIDs , such as ibuprofen, help decrease swelling, pain, and fever  This medicine is available with or without a doctor's order  NSAIDs can cause stomach bleeding or kidney problems in certain people  If you take blood thinner medicine, always ask if NSAIDs are safe for you  Always read the medicine label and follow directions  Do not give these medicines to children under 10months of age without direction from your child's healthcare provider  · Acetaminophen  decreases pain and fever  It is available without a doctor's order  Ask how much to take and how often to take it  Follow directions  Acetaminophen can cause liver damage if not taken correctly  · Ear drops  that contain an antibiotic may be given  The antibiotic helps treat a bacterial infection  You may also be given steroid medicine  The steroid helps decrease redness, swelling, and pain  · Ear wicking  removes fluid or wax from your outer ear canal  Healthcare providers may insert a small tube, called a wick, into your ear to help drain fluid  A wick also may be used to put medicine into your ear canal if the canal is blocked    Follow the steps below to use eardrops:   · Lie down on your side with your infected ear facing up  · Carefully drip the correct number of eardrops into your ear  Have another person help you if possible  · Gently move the outside part of your ear back and forth to help the medicine reach your ear canal      · Stay lying down in the same position (with your ear facing up) for 3 to 5 minutes  Prevent otitis externa:   · Do not put cotton swabs or foreign objects in your ears  · Wrap a clean moist washcloth around your finger, and use it to clean your outer ear and remove extra ear wax  · Use ear plugs when you swim  Dry your outer ears completely after you swim or bathe  Follow up with your healthcare provider as directed:  Write down your questions so you remember to ask them during your visits  © 2017 [address] [employer] Information is for End User's use only and may not be sold, redistributed or otherwise used for commercial purposes  All illustrations and images included in CareNotes® are the copyrighted property of [employer]  or Terrence Santana  The above information is an  only  It is not intended as medical advice for individual conditions or treatments  Talk to your doctor, nurse or pharmacist before following any medical regimen to see if it is safe and effective for you

## 2018-05-22 ENCOUNTER — APPOINTMENT (INPATIENT)
Dept: RADIOLOGY | Facility: HOSPITAL | Age: 83
DRG: 871 | End: 2018-05-22
Payer: MEDICARE

## 2018-05-22 PROBLEM — G93.41 ACUTE METABOLIC ENCEPHALOPATHY: Status: ACTIVE | Noted: 2018-05-22

## 2018-05-22 PROBLEM — A41.9 SEPSIS (HCC): Status: ACTIVE | Noted: 2018-05-22

## 2018-05-22 PROBLEM — J40 BRONCHITIS: Status: ACTIVE | Noted: 2018-05-22

## 2018-05-22 LAB
AMMONIA PLAS-SCNC: <10 UMOL/L (ref 11–35)
ANION GAP SERPL CALCULATED.3IONS-SCNC: 12 MMOL/L (ref 4–13)
ATRIAL RATE: 90 BPM
BASOPHILS # BLD AUTO: 0.01 THOUSANDS/ΜL (ref 0–0.1)
BASOPHILS NFR BLD AUTO: 0 % (ref 0–1)
BUN SERPL-MCNC: 16 MG/DL (ref 5–25)
CALCIUM SERPL-MCNC: 8.8 MG/DL (ref 8.3–10.1)
CHLORIDE SERPL-SCNC: 99 MMOL/L (ref 100–108)
CO2 SERPL-SCNC: 28 MMOL/L (ref 21–32)
CREAT SERPL-MCNC: 0.91 MG/DL (ref 0.6–1.3)
EOSINOPHIL # BLD AUTO: 0 THOUSAND/ΜL (ref 0–0.61)
EOSINOPHIL NFR BLD AUTO: 0 % (ref 0–6)
ERYTHROCYTE [DISTWIDTH] IN BLOOD BY AUTOMATED COUNT: 14.3 % (ref 11.6–15.1)
GFR SERPL CREATININE-BSD FRML MDRD: 57 ML/MIN/1.73SQ M
GLUCOSE P FAST SERPL-MCNC: 117 MG/DL (ref 65–99)
GLUCOSE SERPL-MCNC: 117 MG/DL (ref 65–140)
HCT VFR BLD AUTO: 37.4 % (ref 34.8–46.1)
HGB BLD-MCNC: 12.1 G/DL (ref 11.5–15.4)
L PNEUMO1 AG UR QL IA.RAPID: NEGATIVE
LYMPHOCYTES # BLD AUTO: 1.81 THOUSANDS/ΜL (ref 0.6–4.47)
LYMPHOCYTES NFR BLD AUTO: 8 % (ref 14–44)
MCH RBC QN AUTO: 28.4 PG (ref 26.8–34.3)
MCHC RBC AUTO-ENTMCNC: 32.4 G/DL (ref 31.4–37.4)
MCV RBC AUTO: 88 FL (ref 82–98)
MONOCYTES # BLD AUTO: 1.91 THOUSAND/ΜL (ref 0.17–1.22)
MONOCYTES NFR BLD AUTO: 9 % (ref 4–12)
NEUTROPHILS # BLD AUTO: 18.17 THOUSANDS/ΜL (ref 1.85–7.62)
NEUTS SEG NFR BLD AUTO: 83 % (ref 43–75)
P AXIS: 59 DEGREES
PLATELET # BLD AUTO: 246 THOUSANDS/UL (ref 149–390)
PMV BLD AUTO: 9.5 FL (ref 8.9–12.7)
POTASSIUM SERPL-SCNC: 3.9 MMOL/L (ref 3.5–5.3)
PR INTERVAL: 176 MS
PROCALCITONIN SERPL-MCNC: 0.36 NG/ML
QRS AXIS: 69 DEGREES
QRSD INTERVAL: 134 MS
QT INTERVAL: 370 MS
QTC INTERVAL: 452 MS
RBC # BLD AUTO: 4.26 MILLION/UL (ref 3.81–5.12)
S PNEUM AG UR QL: NEGATIVE
SODIUM SERPL-SCNC: 139 MMOL/L (ref 136–145)
T WAVE AXIS: -46 DEGREES
VENTRICULAR RATE: 90 BPM
WBC # BLD AUTO: 21.9 THOUSAND/UL (ref 4.31–10.16)

## 2018-05-22 PROCEDURE — 99285 EMERGENCY DEPT VISIT HI MDM: CPT

## 2018-05-22 PROCEDURE — 71046 X-RAY EXAM CHEST 2 VIEWS: CPT

## 2018-05-22 PROCEDURE — 94760 N-INVAS EAR/PLS OXIMETRY 1: CPT

## 2018-05-22 PROCEDURE — 80048 BASIC METABOLIC PNL TOTAL CA: CPT | Performed by: PHYSICIAN ASSISTANT

## 2018-05-22 PROCEDURE — 84145 PROCALCITONIN (PCT): CPT | Performed by: PHYSICIAN ASSISTANT

## 2018-05-22 PROCEDURE — 85025 COMPLETE CBC W/AUTO DIFF WBC: CPT | Performed by: PHYSICIAN ASSISTANT

## 2018-05-22 PROCEDURE — 99233 SBSQ HOSP IP/OBS HIGH 50: CPT | Performed by: INTERNAL MEDICINE

## 2018-05-22 PROCEDURE — 82140 ASSAY OF AMMONIA: CPT | Performed by: PHYSICIAN ASSISTANT

## 2018-05-22 PROCEDURE — 93010 ELECTROCARDIOGRAM REPORT: CPT | Performed by: INTERNAL MEDICINE

## 2018-05-22 RX ORDER — VANCOMYCIN HYDROCHLORIDE 1 G/200ML
1000 INJECTION, SOLUTION INTRAVENOUS EVERY 24 HOURS
Status: DISCONTINUED | OUTPATIENT
Start: 2018-05-22 | End: 2018-05-23

## 2018-05-22 RX ORDER — GABAPENTIN 400 MG/1
800 CAPSULE ORAL 2 TIMES DAILY
Status: DISCONTINUED | OUTPATIENT
Start: 2018-05-22 | End: 2018-05-27 | Stop reason: HOSPADM

## 2018-05-22 RX ORDER — ONDANSETRON 2 MG/ML
4 INJECTION INTRAMUSCULAR; INTRAVENOUS EVERY 6 HOURS PRN
Status: DISCONTINUED | OUTPATIENT
Start: 2018-05-22 | End: 2018-05-27 | Stop reason: HOSPADM

## 2018-05-22 RX ORDER — ACETAMINOPHEN 325 MG/1
650 TABLET ORAL EVERY 6 HOURS PRN
Status: DISCONTINUED | OUTPATIENT
Start: 2018-05-22 | End: 2018-05-27 | Stop reason: HOSPADM

## 2018-05-22 RX ORDER — AZITHROMYCIN 250 MG/1
500 TABLET, FILM COATED ORAL EVERY 24 HOURS
Status: DISCONTINUED | OUTPATIENT
Start: 2018-05-23 | End: 2018-05-22

## 2018-05-22 RX ORDER — HYDROCHLOROTHIAZIDE 25 MG/1
12.5 TABLET ORAL DAILY
Status: DISCONTINUED | OUTPATIENT
Start: 2018-05-22 | End: 2018-05-22

## 2018-05-22 RX ORDER — ALBUTEROL SULFATE 2.5 MG/3ML
2.5 SOLUTION RESPIRATORY (INHALATION) EVERY 4 HOURS PRN
Status: DISCONTINUED | OUTPATIENT
Start: 2018-05-22 | End: 2018-05-27 | Stop reason: HOSPADM

## 2018-05-22 RX ORDER — PRAVASTATIN SODIUM 20 MG
20 TABLET ORAL
Status: DISCONTINUED | OUTPATIENT
Start: 2018-05-22 | End: 2018-05-27 | Stop reason: HOSPADM

## 2018-05-22 RX ORDER — SODIUM CHLORIDE 9 MG/ML
75 INJECTION, SOLUTION INTRAVENOUS CONTINUOUS
Status: DISCONTINUED | OUTPATIENT
Start: 2018-05-22 | End: 2018-05-23

## 2018-05-22 RX ORDER — OMEGA-3S/DHA/EPA/FISH OIL/D3 300MG-1000
400 CAPSULE ORAL DAILY
Status: DISCONTINUED | OUTPATIENT
Start: 2018-05-22 | End: 2018-05-27 | Stop reason: HOSPADM

## 2018-05-22 RX ORDER — SENNOSIDES 8.6 MG
1 TABLET ORAL DAILY
Status: DISCONTINUED | OUTPATIENT
Start: 2018-05-22 | End: 2018-05-27 | Stop reason: HOSPADM

## 2018-05-22 RX ORDER — CIPROFLOXACIN AND DEXAMETHASONE 3; 1 MG/ML; MG/ML
4 SUSPENSION/ DROPS AURICULAR (OTIC) 2 TIMES DAILY
Status: DISCONTINUED | OUTPATIENT
Start: 2018-05-22 | End: 2018-05-25 | Stop reason: SDUPTHER

## 2018-05-22 RX ORDER — PANTOPRAZOLE SODIUM 40 MG/1
40 TABLET, DELAYED RELEASE ORAL
Status: DISCONTINUED | OUTPATIENT
Start: 2018-05-22 | End: 2018-05-27 | Stop reason: HOSPADM

## 2018-05-22 RX ORDER — AZITHROMYCIN 250 MG/1
500 TABLET, FILM COATED ORAL EVERY 24 HOURS
Status: DISCONTINUED | OUTPATIENT
Start: 2018-05-23 | End: 2018-05-23

## 2018-05-22 RX ORDER — UBIDECARENONE 75 MG
100 CAPSULE ORAL DAILY
Status: DISCONTINUED | OUTPATIENT
Start: 2018-05-22 | End: 2018-05-27 | Stop reason: HOSPADM

## 2018-05-22 RX ADMIN — VITAMIN B12 0.1 MG ORAL TABLET 100 MCG: 0.1 TABLET ORAL at 08:22

## 2018-05-22 RX ADMIN — SODIUM CHLORIDE 75 ML/HR: 0.9 INJECTION, SOLUTION INTRAVENOUS at 11:51

## 2018-05-22 RX ADMIN — SENNOSIDES 8.6 MG: 8.6 TABLET, FILM COATED ORAL at 08:22

## 2018-05-22 RX ADMIN — CIPROFLOXACIN AND DEXAMETHASONE 4 DROP: 3; 1 SUSPENSION/ DROPS AURICULAR (OTIC) at 18:40

## 2018-05-22 RX ADMIN — CHOLECALCIFEROL TAB 10 MCG (400 UNIT) 400 UNITS: 10 TAB at 08:22

## 2018-05-22 RX ADMIN — ACETAMINOPHEN 650 MG: 325 TABLET ORAL at 16:49

## 2018-05-22 RX ADMIN — ACETAMINOPHEN 650 MG: 325 TABLET ORAL at 05:27

## 2018-05-22 RX ADMIN — ONDANSETRON 4 MG: 2 INJECTION INTRAMUSCULAR; INTRAVENOUS at 05:26

## 2018-05-22 RX ADMIN — PRAVASTATIN SODIUM 20 MG: 20 TABLET ORAL at 18:40

## 2018-05-22 RX ADMIN — CEFTRIAXONE 1000 MG: 1 INJECTION, SOLUTION INTRAVENOUS at 01:48

## 2018-05-22 RX ADMIN — HYDROCHLOROTHIAZIDE 12.5 MG: 25 TABLET ORAL at 08:22

## 2018-05-22 RX ADMIN — PANTOPRAZOLE SODIUM 40 MG: 40 TABLET, DELAYED RELEASE ORAL at 05:31

## 2018-05-22 RX ADMIN — GABAPENTIN 800 MG: 400 CAPSULE ORAL at 18:40

## 2018-05-22 RX ADMIN — CEFTRIAXONE 2000 MG: 2 INJECTION, SOLUTION INTRAVENOUS at 15:02

## 2018-05-22 RX ADMIN — CIPROFLOXACIN AND DEXAMETHASONE 4 DROP: 3; 1 SUSPENSION/ DROPS AURICULAR (OTIC) at 08:22

## 2018-05-22 RX ADMIN — GABAPENTIN 800 MG: 400 CAPSULE ORAL at 08:22

## 2018-05-22 RX ADMIN — VANCOMYCIN HYDROCHLORIDE 1000 MG: 1 INJECTION, SOLUTION INTRAVENOUS at 13:28

## 2018-05-22 NOTE — ED NOTES
Daughter at the bedside   Daughter and pt updated on plan of care     Hershel Gilford, RN  05/21/18 1596

## 2018-05-22 NOTE — ASSESSMENT & PLAN NOTE
· Patient diagnosed bronchitis today in ED and discharged on azithromycin  · Will continue azithromycin and add Rocephin, empirically treat for CAP  · Azithromycin 500 mg p o , day 1  · Rocephin 1000 mg IV, day 1  · Sputum culture Gram stain  · Strep pneumonia urine, Legionella urine antigen  · Procalcitonin

## 2018-05-22 NOTE — ASSESSMENT & PLAN NOTE
This sounds more like encephalopathy due to infection    Doubt meningitis or encephalitis or seizure

## 2018-05-22 NOTE — ASSESSMENT & PLAN NOTE
Patient is a cough for a week with phlegm production    Will repeat chest x-ray tomorrow to see if any new infiltrates appear

## 2018-05-22 NOTE — ASSESSMENT & PLAN NOTE
Patient meets criteria for sepsis as evidenced by temperature 1 more than 102, heart rate more than 100, leukocytosis of 16,000 with left shift  I suspect a pulmonary source as patient has increasing cough for a week  I did not see otitis externa on physical examination is patient has ear wax in the external canal   I doubt meningitis or encephalitis  Patient has no UTI no abdominal complaints  Patient will require more than 2 midnights hospital stay I am switching her to inpatient status  Will continue IV Rocephin and azithromycin for presumed respiratory infection  Will continue hydration with normal saline solution    Will check patient's neurological status every 4 hours and will ask ID to see the patient    The case was discussed in detail with patient's daughter at the bedside

## 2018-05-22 NOTE — CASE MANAGEMENT
Initial Clinical Review  Admission: Date/Time/Statement:   OBSERVATION 5/21/18 @ 2222, CONVERTED TO INPATIENT ADMISSION 5/22/18 @ 451 [facility]  05/22/18 1119 Release Ace Patel MD (auto-released) From Order: [id number]   05/22/18 1119 Complete Ace Patel MD    Inpatient Admission   Admitting Physician PAOLA CELESTIN    Level of Care Med Surg    Estimated length of stay More than 2 Midnights    Certification I certify that inpatient services are medically necessary for this patient for a duration of greater than two midnights  See H&P and MD Progress Notes for additional information about the patient's course of treatment  ED: Date/Time/Mode of Arrival:   ED Arrival Information     Expected Arrival Acuity Means of Arrival Escorted By Service Admission Type    - 5/21/2018 20:26 Emergent [facility]ulance [facility] 380 Emergency    Arrival Complaint    altered mental status      Chief Complaint:   Chief Complaint   Patient presents with    Altered Mental Status     Patient presents to ED via EMS with confusion since being discharged earlier from ED with acute bronchitis  Patient daughter reports, "shes just so confused she forgot how to walk shes not answering right"  History of Illness:   63-year-old old female has had a cough for approximately 1 week  Earlier today she also complained of right ear pain and seemed more short of breath  She was evaluated here and started on Cipro and topical ear drops for diagnosis of acute bronchitis and otitis externa  Daughter states that over last few hours she has progressively become more confused and weak  She is unable to get up from a chair and ambulate  She was very disoriented  She had dry heaves but did not vomit  Currently the patient is in no acute distress and answers some questions appropriately although when asked other questions such as who is the president she is unsure    When asked where she is she verbalized letters of the alphabet  She denies headache chest pain palpitations abdominal pain and focal neurologic changes  Right external auditory canal is edematous and has white exudate   Scattered expiratory rhonchi, more pronounced on the left side   ED Vital Signs:   ED Triage Vitals   Temperature Pulse Respirations Blood Pressure SpO2   05/21/18 2040 05/21/18 2040 05/21/18 2040 05/21/18 2040 05/21/18 2040   (!) 103 3 °F (39 6 °C) 105 20 150/67 93 %      Temp Source Heart Rate Source Patient Position - Orthostatic VS BP Location FiO2 (%)   05/21/18 2040 05/21/18 2040 05/21/18 2040 05/21/18 2040 --   Tympanic Monitor Lying Right arm       Pain Score       05/22/18 0441       2        Wt Readings from Last 1 Encounters:   05/22/18 78 8 kg (173 lb 11 6 oz)   Vital Signs (abnormal):   T 102 9  Abnormal Labs/Diagnostic Test Results:   WBC 21 90 GLUC 117 GFR 57 AMMONIA <10  U/A+BLOOD  CXR=No acute cardiopulmonary disease  CT HEAD=No acute intracranial abnormality  ED Treatment:   Medication Administration from 05/21/2018 2026 to 05/22/2018 0026       Date/Time Order Dose Route Action Action by Comments     05/21/2018 2203 sodium chloride 0 9 % bolus 500 mL 0 mL Intravenous Stopped Vbiha Hernandez RN      05/21/2018 2112 sodium chloride 0 9 % bolus 500 mL 500 mL Intravenous New Bag Vibha Hernandez RN      05/21/2018 2203 acetaminophen (TYLENOL) tablet 650 mg 650 mg Oral Given Vibha Hernandez RN given for fever     05/21/2018 2323 sodium chloride 0 9 % bolus 500 mL 500 mL Intravenous New Bag Vibha Hernandez RN       Past Medical/Surgical History:    Active Ambulatory Problems     Diagnosis Date Noted    Idiopathic peripheral neuropathy 09/09/2016    Esophageal reflux 11/06/2015    Vitamin B 12 deficiency 09/09/2016     Resolved Ambulatory Problems     Diagnosis Date Noted    No Resolved Ambulatory Problems     Past Medical History:   Diagnosis Date    Balance disorder     GERD (gastroesophageal reflux disease)     Hard of hearing     Hyperlipidemia     Hypertension     Neuropathy     Tinnitus    Admitting Diagnosis: Acute bronchitis [J20 9]  Altered mental status [R41 82]  [facility] [A41 9]  Age/Sex: 80 y o  female  Assessment/Plan:   Altered mental status   Assessment & Plan     · CT:  Negative for intracranial abnormalities  · UA:  Negative, patient has reported history of altered mental status with UTIs per daughter  · Lactic acid normal, glucose WNL  · Check ammonia level  · WBC:  16 71  · Possibly secondary to fever? ? Tylenol  · Will continue to monitor for improvement        Bronchitis   Assessment & Plan     · Patient diagnosed bronchitis today in ED and discharged on azithromycin  · Will continue azithromycin and add Rocephin, empirically treat for CAP  ? Azithromycin 500 mg p o , day 1  ? Rocephin 1000 mg IV, day 1  · Sputum culture Gram stain  · Strep pneumonia urine, Legionella urine antigen  · Procalcitonin       Hypertension   Assessment & Plan     · BP stable, continue to monitor  · Continue home medications:  Hydrochlorothiazide 12 5 p o  daily   Admission Orders:  MED SURG  ASPIRATION PRECAUTIONS  ELEVATE HOB 30 DEGREES  INCENTIVE SPIROMETRY    PER MD NOTE 5/22  Sepsis [facility])   Assessment & Plan     Patient meets criteria for sepsis as evidenced by temperature 1 more than 102, heart rate more than 100, leukocytosis of 16,000 with left shift  I suspect a pulmonary source as patient has increasing cough for a week  I did not see otitis externa on physical examination is patient has ear wax in the external canal   I doubt meningitis or encephalitis  Patient has no UTI no abdominal complaints      Patient will require more than 2 midnights hospital stay I am switching her to inpatient status      Will continue IV Rocephin and azithromycin for presumed respiratory infection  Will continue hydration with normal saline solution    Will check patient's neurological status every 4 hours and will ask ID to see the patient       Scheduled Meds:   Current Facility-Administered Medications:  acetaminophen 650 mg Oral Q6H PRN Keya Matthew PA-C    albuterol 2 5 mg Nebulization Q4H PRN Adonis Jimenez MD    cefTRIAXone 1,000 mg Intravenous Q24H Keya Matthew PA-C Last Rate: 1,000 mg (05/22/18 0148)   And        [START ON 5/23/2018] azithromycin 500 mg Oral Q24H Keya Matthew PA-C    cholecalciferol 400 Units Oral Daily Keya Matthew PA-C    ciprofloxacin-dexamethasone 4 drop Right Ear BID Keya Matthew PA-C    cyanocobalamin 100 mcg Oral Daily Keya Matthew PA-C    enoxaparin 40 mg Subcutaneous Daily Keya Matthew PA-C    gabapentin 800 mg Oral BID Keya Matthew PA-C    hydrochlorothiazide 12 5 mg Oral Daily Keya Matthew PA-C    influenza vaccine 0 5 mL Intramuscular Prior to discharge Keya Matthew PA-C    ondansetron 4 mg Intravenous Q6H PRN Keya Matthew PA-C    pantoprazole 40 mg Oral Early Morning Keya Matthew PA-C    pneumococcal 13-valent conjugate vaccine 0 5 mL Intramuscular Prior to discharge Keya Matthew PA-C    pravastatin 20 mg Oral After Bobby Owusu PA-C    senna 1 tablet Oral Daily Keya Matthew PA-C      Continuous Infusions:    PRN Meds:   acetaminophen    albuterol    influenza vaccine    ondansetron    pneumococcal 13-valent conjugate vaccine

## 2018-05-22 NOTE — RESPIRATORY THERAPY NOTE
RT Protocol Note  Ajay Flores 80 y o  female MRN: [id number]  Unit/Bed#: [address] [place] [address] [id number] Encounter: [id number]    Assessment    Principal Problem:    Altered mental status  Active Problems:    Hypertension    Bronchitis      Home Pulmonary Medications:  None       Past Medical History:   Diagnosis Date    Balance disorder     GERD (gastroesophageal reflux disease)     Hard of hearing     Hyperlipidemia     Hypertension     Neuropathy     Tinnitus      Social History     Social History    Marital status:      Spouse name: N/A    Number of children: N/A    Years of education: N/A     Social History Main Topics    Smoking status: Never Smoker    Smokeless tobacco: Never Used    Alcohol use Yes      Comment: rarely    Drug use: No    Sexual activity: Not Asked     Other Topics Concern    None     Social History Narrative    None       Subjective         Objective    Physical Exam:   Assessment Type: (P) Assess only  General Appearance: (P) Awake, Drowsy  Respiratory Pattern: (P) Normal  Chest Assessment: (P) Chest expansion symmetrical, Trachea midline  Bilateral Breath Sounds: (P) Scattered, Rhonchi  Cough: (P) Productive    Vitals:  Blood pressure 129/59, pulse (P) 101, temperature 98 3 °F (36 8 °C), temperature source Oral, resp  rate (P) 18, height 5' 3" (1 6 m), weight 78 8 kg (173 lb 11 6 oz), SpO2 (P) 92 %  Imaging and other studies: I have personally reviewed pertinent reports  Plan    Respiratory Plan: (P) No distress/Pulmonary history  Pt diagnosed earlier in day with bronchitis and started on antibiotics  Scattered rhonchi; cough occasionally productive for tan sputum  Ordered prn nebs with Albuterol

## 2018-05-22 NOTE — SOCIAL WORK
Met with patient  Explained role of care management  Patient lives in a two story home with her daughter and son  She is independent adls', uses a cane/walker to ambulate, provides own meals, son transports  DME - RW, cane  Past services - [facility], [facility]  Pharmacy of choice is [facility] in [facility]  She plans on returning home at discharge and is agreeable to [facility]  Given freedom of choice  Referral via [facility] to [facility]  Will follow

## 2018-05-22 NOTE — ED NOTES
Omar at the bedside  Repositioned pt   Provided hygiene care and changed pt brief     Jorden Cavazos RN  05/21/18 1544

## 2018-05-22 NOTE — ED PROVIDER NOTES
History  Chief Complaint   Patient presents with    Altered Mental Status     Patient presents to ED via EMS with confusion since being discharged earlier from ED with acute bronchitis  Patient daughter reports, "shes just so confused she forgot how to walk shes not answering right"  This 59-year-old old female has had a cough for approximately 1 week  Earlier today she also complained of right ear pain and seemed more short of breath  She was evaluated here and started on Cipro and topical ear drops for diagnosis of acute bronchitis and otitis externa  Daughter states that over last few hours she has progressively become more confused and weak  She is unable to get up from a chair and ambulate  She was very disoriented  She had dry heaves but did not vomit  Currently the patient is in no acute distress and answers some questions appropriately although when asked other questions such as who is the president she is unsure  When asked where she is she verbalized letters of the alphabet  She denies headache chest pain palpitations abdominal pain and focal neurologic changes            Prior to Admission Medications   Prescriptions Last Dose Informant Patient Reported? Taking?    azithromycin (ZITHROMAX) 250 mg tablet   No No   Sig: Take 2 tablets today then 1 tablet daily x 4 days   cholecalciferol (VITAMIN D3) 400 units tablet   Yes No   Sig: Take 400 Units by mouth daily   ciprofloxacin-hydrocortisone (CIPRO HC OTIC) otic suspension   No No   Sig: Administer 3 drops to the right ear 2 (two) times a day for 7 days   cyanocobalamin 100 MCG tablet   Yes No   Sig: Take 100 mcg by mouth daily   gabapentin (NEURONTIN) 800 mg tablet   No No   Sig: Take 1 tablet (800 mg total) by mouth 2 (two) times a day   hydrochlorothiazide (HYDRODIURIL) 12 5 mg tablet   No No   Sig: Take 1 tablet (12 5 mg total) by mouth daily   omeprazole (PriLOSEC) 20 mg delayed release capsule   No No   Sig: Take 1 capsule (20 mg total) by mouth daily   pravastatin (PRAVACHOL) 20 mg tablet   No No   Sig: Take 1 tablet (20 mg total) by mouth daily      Facility-Administered Medications: None       Past Medical History:   Diagnosis Date    Balance disorder     GERD (gastroesophageal reflux disease)     Hard of hearing     Hyperlipidemia     Hypertension     Neuropathy     Tinnitus        Past Surgical History:   Procedure Laterality Date    EYE SURGERY      HYSTERECTOMY         History reviewed  No pertinent family history  I have reviewed and agree with the history as documented  Social History   Substance Use Topics    Smoking status: Never Smoker    Smokeless tobacco: Never Used    Alcohol use Yes      Comment: rarely        Review of Systems   Unable to perform ROS: Mental status change   Constitutional: Positive for fatigue and fever  HENT: Positive for ear pain  Respiratory: Positive for cough and shortness of breath  Physical Exam  Physical Exam   Constitutional: She is oriented to person, place, and time  She appears well-developed and well-nourished  No distress  HENT:   Head: Normocephalic and atraumatic  Left Ear: External ear normal    Right external auditory canal is edematous and has white exudate   Eyes: Conjunctivae and EOM are normal  Pupils are equal, round, and reactive to light  Neck: Normal range of motion  Neck supple  Cardiovascular: Normal rate and regular rhythm  No murmur heard  Pulmonary/Chest: Effort normal    Scattered expiratory rhonchi, more pronounced on the left side   Abdominal: Soft  Bowel sounds are normal  She exhibits no distension and no mass  There is no tenderness  There is no guarding  Musculoskeletal: Normal range of motion  She exhibits no edema or tenderness  Neurological: She is alert and oriented to person, place, and time  She has normal reflexes  No cranial nerve deficit  Coordination normal    Skin: Skin is warm and dry   Capillary refill takes less than 2 seconds  No rash noted  She is not diaphoretic  Psychiatric: She has a normal mood and affect  Nursing note and vitals reviewed        Vital Signs  ED Triage Vitals [05/21/18 2040]   Temperature Pulse Respirations Blood Pressure SpO2   (!) 103 3 °F (39 6 °C) 105 20 150/67 93 %      Temp Source Heart Rate Source Patient Position - Orthostatic VS BP Location FiO2 (%)   Tympanic Monitor Lying Right arm --      Pain Score       --           Vitals:    05/21/18 2230 05/21/18 2245 05/21/18 2300 05/21/18 2330   BP: 142/66 143/66 131/58 116/55   Pulse: (!) 108 (!) 109 101 99   Patient Position - Orthostatic VS:           Visual Acuity  Visual Acuity      Most Recent Value   L Pupil Size (mm)  3   R Pupil Size (mm)  3          ED Medications  Medications   sodium chloride 0 9 % bolus 500 mL (500 mL Intravenous New Bag 5/21/18 2323)   sodium chloride 0 9 % bolus 500 mL (0 mL Intravenous Stopped 5/21/18 2203)   acetaminophen (TYLENOL) tablet 650 mg (650 mg Oral Given 5/21/18 2203)       Diagnostic Studies  Results Reviewed     Procedure Component Value Units Date/Time    Urine Microscopic [[id number]]  (Abnormal) Collected:  05/21/18 2237    Lab Status:  Final result Specimen:  Urine from Urine, Straight Cath Updated:  05/21/18 2308     RBC, UA 2-4 (A) /hpf      WBC, UA 0-1 (A) /hpf      Epithelial Cells Occasional /hpf      Bacteria, UA Occasional /hpf      MUCOUS THREADS Occasional (A)    UA w Reflex to Microscopic w Reflex to Culture [[id number]]  (Abnormal) Collected:  05/21/18 2237    Lab Status:  Final result Specimen:  Urine from Urine, Straight Cath Updated:  05/21/18 2300     Color, UA Yellow     Clarity, UA Clear     Specific [place], UA 1 020     pH, UA 6 0     Leukocytes, UA Negative     Nitrite, UA Negative     Protein, UA Negative mg/dl      Glucose, UA Negative mg/dl      Ketones, UA Negative mg/dl      Urobilinogen, UA 0 2 E U /dl      Bilirubin, UA Negative     Blood, UA Trace-Intact (A)    Lactic Acid x2 [[id number]]  (Normal) Collected:  05/21/18 2115    Lab Status:  Final result Specimen:  Blood from Arm, Left Updated:  05/21/18 2201     LACTIC ACID 1 4 mmol/L     Narrative:         Result may be elevated if tourniquet was used during collection  Comprehensive metabolic panel [[id number]]  (Abnormal) Collected:  05/21/18 2116    Lab Status:  Final result Specimen:  Blood from Arm, Right Updated:  05/21/18 2200     Sodium 139 mmol/L      Potassium 3 8 mmol/L      Chloride 99 (L) mmol/L      CO2 31 mmol/L      Anion Gap 9 mmol/L      BUN 16 mg/dL      Creatinine 0 91 mg/dL      Glucose 138 mg/dL      Calcium 9 5 mg/dL      AST 17 U/L      ALT 16 U/L      Alkaline Phosphatase 59 U/L      Total Protein 7 7 g/dL      Albumin 3 5 g/dL      Total Bilirubin 0 60 mg/dL      eGFR 57 ml/min/1 73sq m     Narrative:         National Kidney Disease Education Program recommendations are as follows:  GFR calculation is accurate only with a steady state creatinine  Chronic Kidney disease less than 60 ml/min/1 73 sq  meters  Kidney failure less than 15 ml/min/1 73 sq  meters      Protime-INR [[id number]]  (Abnormal) Collected:  05/21/18 2116    Lab Status:  Final result Specimen:  Blood from Arm, Right Updated:  05/21/18 2158     Protime 14 8 (H) seconds      INR 1 23 (H)    APTT [[id number]]  (Abnormal) Collected:  05/21/18 2116    Lab Status:  Final result Specimen:  Blood from Arm, Right Updated:  05/21/18 2158     PTT 46 (H) seconds     CBC and differential [[id number]]  (Abnormal) Collected:  05/21/18 2115    Lab Status:  Final result Specimen:  Blood from Arm, Right Updated:  05/21/18 2143     WBC 16 71 (H) Thousand/uL      RBC 4 42 Million/uL      Hemoglobin 12 7 g/dL      Hematocrit 39 4 %      MCV 89 fL      MCH 28 7 pg      MCHC 32 2 g/dL      RDW 14 3 %      MPV 9 5 fL      Platelets 841 Thousands/uL      Neutrophils Relative 86 (H) %      Lymphocytes Relative 6 (L) %      Monocytes Relative 8 %      Eosinophils Relative 0 % Basophils Relative 0 %      Neutrophils Absolute 14 42 (H) Thousands/µL      Lymphocytes Absolute 1 00 Thousands/µL      Monocytes Absolute 1 27 (H) Thousand/µL      Eosinophils Absolute 0 01 Thousand/µL      Basophils Absolute 0 01 Thousands/µL     Blood culture #2 [[id number]] Collected:  05/21/18 2115    Lab Status: In process Specimen:  Blood from Arm, Left Updated:  05/21/18 2139    Blood culture #1 [[id number]] Collected:  05/21/18 2116    Lab Status: In process Specimen:  Blood from Arm, Right Updated:  05/21/18 2139                 CT head without contrast   Final Result by Felix Jeffries MD (05/21 2328)      No acute intracranial abnormality  Workstation performed: [id number]                    Procedures  Procedures       Phone Contacts  ED Phone Contact    ED Course  ED Course as of May 21 2336   Mon May 21, 2018   2212   Patient is still confused and weak  She is unsure if she was a urine specimen  She is to weak to ambulate  Will do a straight cath to ensure a good specimen    2304  Patient still having some difficulty with orientation and with speech  We will do head CT  Initially this was felt to be secondary to fever and new antibiotic    We will do CT for completeness    2304 Color, UA: Yellow                               MDM  Number of Diagnoses or Management Options  Acute bronchitis: established and worsening  Altered mental status: new and requires workup  Sepsis [facility]): new and requires workup     Amount and/or Complexity of Data Reviewed  Clinical lab tests: ordered and reviewed  Tests in the radiology section of CPT®: reviewed  Review and summarize past medical records: yes  Discuss the patient with other providers: yes  Independent visualization of images, tracings, or specimens: yes      CritCare Time    Disposition  Final diagnoses:   Acute bronchitis   Sepsis ([facility] [address] )   Altered mental status     Time reflects when diagnosis was documented in both MDM as applicable and the Disposition within this note     Time User Action Codes Description Comment    5/21/2018 10:19 PM Terri Ewings Add [J20 9] Acute bronchitis     5/21/2018 10:20 PM Terri Ewings Add [A41 9] Sepsis ([address] )     5/21/2018 10:20 PM Terri Ewings Add [R41 82] Altered mental status       ED Disposition     ED Disposition Condition Comment    Admit  Case was discussed with PA and the patient's admission status was agreed to be Admission Status: observation status to the service of Dr Peggy Matthew   Follow-up Information    None         Patient's Medications   Discharge Prescriptions    No medications on file     No discharge procedures on file      ED Provider  Electronically Signed by           Keeley Devries, DO  05/21/18 [address], DO  05/21/18 2819

## 2018-05-22 NOTE — PROGRESS NOTES
Progress Note - Ajay Flores 1/28/1930, 80 y o  female MRN: [id number]    Unit/Bed#: [address] [place] [address] Encounter: [id number]    Primary Care Provider: Chiqui Russell MD   Date and time admitted to hospital: 5/21/2018  8:43 PM        Sepsis [facility])   Assessment & Plan    Patient meets criteria for sepsis as evidenced by temperature 1 more than 102, heart rate more than 100, leukocytosis of 16,000 with left shift  I suspect a pulmonary source as patient has increasing cough for a week  I did not see otitis externa on physical examination is patient has ear wax in the external canal   I doubt meningitis or encephalitis  Patient has no UTI no abdominal complaints  Patient will require more than 2 midnights hospital stay I am switching her to inpatient status  Will continue IV Rocephin and azithromycin for presumed respiratory infection  Will continue hydration with normal saline solution  Will check patient's neurological status every 4 hours and will ask ID to see the patient    The case was discussed in detail with patient's daughter at the bedside        Acute metabolic encephalopathy   Assessment & Plan    This sounds more like encephalopathy due to infection  Doubt meningitis or encephalitis or seizure        Bronchitis   Assessment & Plan    Patient is a cough for a week with phlegm production  Will repeat chest x-ray tomorrow to see if any new infiltrates appear        Essential hypertension   Assessment & Plan    I will hold patient's hydrochlorothiazide during hydration            VTE Prophylaxis: in place    Patient Centered Rounds: I rounded with patient's nurse    Current Length of Stay: 0 day(s)    Current Patient Status: Inpatient    Certification Statement: Pt requires additional inpatient hospital stay due to: see assessment and plan        Subjective:   Patient has daughter reports that patient has had a cough for about a week with some phlegm production    She started complaining of right ear pain yesterday that is when they brought her to the ER where she was diagnosed with otitis externa and was given a drop  When going home patient developed a confusion was weaned but had no loss of consciousness no seizure activity  She was subsequently brought back to the ER  Patient denies any current headache, photophobia, sore throat, chest pain, pleurisy, shortness of breath, nausea, abdominal pain, rash, joint swelling, joint stiffness or rash  She does not recall why she is in the hospital does not recall any past medical history or past medications that she takes    All other ROS are negative    Objective:     Vitals:   Temp (24hrs), Av 2 °F (38 4 °C), Min:98 2 °F (36 8 °C), Max:103 5 °F (39 7 °C)    HR:  [] 80  Resp:  [0-23] 18  BP: (116-199)/(55-83) 141/61  SpO2:  [90 %-100 %] 92 %  Body mass index is 31 24 kg/m²  Input and Output Summary (last 24 hours): Intake/Output Summary (Last 24 hours) at 18 1125  Last data filed at 18 0400   Gross per 24 hour   Intake             1500 ml   Output              250 ml   Net             1250 ml       Physical Exam:     Physical Exam   Constitutional: She appears well-developed  No distress  HENT:   Head: Normocephalic  Mouth/Throat: Oropharynx is clear and moist    Cerumen is seeing on the right external canal   There is no parotid gland enlargement there is no cervical or submandibular lymphadenopathy that I could palpate   Eyes: Conjunctivae are normal    Neck: Neck supple  Cardiovascular: Normal rate and regular rhythm  Pulmonary/Chest: Effort normal  No respiratory distress  She has no wheezes  She has no rales  Scant rhonchi are heard bilaterally   Abdominal: Soft  Bowel sounds are normal  She exhibits no distension  There is no tenderness  Musculoskeletal: She exhibits no tenderness  Lymphadenopathy:     She has no cervical adenopathy  Neurological: She is alert  No cranial nerve deficit     Pleasantly confused   Skin: Skin is warm and dry  No rash noted  Psychiatric: She has a normal mood and affect  Vitals reviewed  I personally reviewed labs and imaging reports for today  Last 24 Hours Medication List:     Current Facility-Administered Medications:  acetaminophen 650 mg Oral Q6H PRN Jacob Hernandez PA-C    albuterol 2 5 mg Nebulization Q4H PRN Jacinto Salinas MD    cefTRIAXone 1,000 mg Intravenous Q24H Jacob Hernandez PA-C Last Rate: 1,000 mg (05/22/18 0148)   And        [START ON 5/23/2018] azithromycin 500 mg Oral Q24H Jacob Hernandez PA-C    cholecalciferol 400 Units Oral Daily Jacob Hernandez PA-C    ciprofloxacin-dexamethasone 4 drop Right Ear [facility]ID [facility]    cyanocobalamin 100 mcg Oral Daily [facility]    gabapentin 800 mg Oral BID Jacob Hernandez PA-C    influenza vaccine 0 5 mL Intramuscular Prior to discharge Jacob Hernandez PA-C    ondansetron 4 mg Intravenous Q6H PRN Jacob Hernandez PA-C    pantoprazole 40 mg Oral Early Morning MARIA A [facility]    pneumococcal 13-valent conjugate vaccine 0 5 mL Intramuscular Prior to discharge Jacob Hernandez PA-C    pravastatin 20 mg Oral After Alexandra Zimmerman PA-C    senna 1 tablet Oral Daily Jacob Hernandez PA-C    sodium chloride 75 mL/hr Intravenous Continuous Jacinto Salinas MD           Today, Patient Was Seen By: Jacinto Salinas MD    ** Please Note: Dictation voice to text software may have been used in the creation of this document   **

## 2018-05-22 NOTE — ASSESSMENT & PLAN NOTE
· CT:  Negative for intracranial abnormalities  · UA:  Negative, patient has reported history of altered mental status with UTIs per daughter  · Lactic acid normal, glucose WNL  · Check ammonia level  · WBC:  16 71  · Possibly secondary to fever?   · Tylenol  · Will continue to monitor for improvement

## 2018-05-22 NOTE — ED NOTES
Reviewed blood work with pt and daughter at the [address] [place] [address], [facility]  05/21/18 4337

## 2018-05-22 NOTE — PROGRESS NOTES
I discussed the case with Dr Pallavi Turcios, ID:  Will ask IR for lumbar puncture to evaluate for meningitis causing patient's fever and encephalopathy

## 2018-05-23 ENCOUNTER — APPOINTMENT (INPATIENT)
Dept: RADIOLOGY | Facility: HOSPITAL | Age: 83
DRG: 871 | End: 2018-05-23
Attending: INTERNAL MEDICINE
Payer: MEDICARE

## 2018-05-23 PROBLEM — J18.9 LINGULAR PNEUMONIA: Status: ACTIVE | Noted: 2018-05-23

## 2018-05-23 PROBLEM — A41.9 SEPSIS (HCC): Status: RESOLVED | Noted: 2018-05-22 | Resolved: 2018-05-23

## 2018-05-23 PROBLEM — J40 BRONCHITIS: Status: RESOLVED | Noted: 2018-05-22 | Resolved: 2018-05-23

## 2018-05-23 PROBLEM — G93.41 ACUTE METABOLIC ENCEPHALOPATHY: Status: RESOLVED | Noted: 2018-05-22 | Resolved: 2018-05-23

## 2018-05-23 LAB
ANION GAP SERPL CALCULATED.3IONS-SCNC: 7 MMOL/L (ref 4–13)
APPEARANCE CSF: CLEAR
BASOPHILS # BLD AUTO: 0.01 THOUSANDS/ΜL (ref 0–0.1)
BASOPHILS NFR BLD AUTO: 0 % (ref 0–1)
BUN SERPL-MCNC: 18 MG/DL (ref 5–25)
CALCIUM SERPL-MCNC: 8.9 MG/DL (ref 8.3–10.1)
CHLORIDE SERPL-SCNC: 102 MMOL/L (ref 100–108)
CO2 SERPL-SCNC: 31 MMOL/L (ref 21–32)
CREAT SERPL-MCNC: 0.86 MG/DL (ref 0.6–1.3)
EOSINOPHIL # BLD AUTO: 0.02 THOUSAND/ΜL (ref 0–0.61)
EOSINOPHIL NFR BLD AUTO: 0 % (ref 0–6)
ERYTHROCYTE [DISTWIDTH] IN BLOOD BY AUTOMATED COUNT: 14.5 % (ref 11.6–15.1)
GFR SERPL CREATININE-BSD FRML MDRD: 61 ML/MIN/1.73SQ M
GLUCOSE CSF-MCNC: 59 MG/DL (ref 50–80)
GLUCOSE SERPL-MCNC: 104 MG/DL (ref 65–140)
GRAM STN SPEC: NORMAL
GRAM STN SPEC: NORMAL
HCT VFR BLD AUTO: 33.6 % (ref 34.8–46.1)
HGB BLD-MCNC: 10.6 G/DL (ref 11.5–15.4)
LYMPHOCYTES # BLD AUTO: 1.83 THOUSANDS/ΜL (ref 0.6–4.47)
LYMPHOCYTES NFR BLD AUTO: 10 % (ref 14–44)
LYMPHOCYTES NFR CSF MANUAL: 38 %
MCH RBC QN AUTO: 28.1 PG (ref 26.8–34.3)
MCHC RBC AUTO-ENTMCNC: 31.5 G/DL (ref 31.4–37.4)
MCV RBC AUTO: 89 FL (ref 82–98)
MONOCYTES # BLD AUTO: 1.42 THOUSAND/ΜL (ref 0.17–1.22)
MONOCYTES NFR BLD AUTO: 8 % (ref 4–12)
MONOS+MACROS CSF MANUAL: 63 %
NEUTROPHILS # BLD AUTO: 15.7 THOUSANDS/ΜL (ref 1.85–7.62)
NEUTS SEG NFR BLD AUTO: 82 % (ref 43–75)
PLATELET # BLD AUTO: 197 THOUSANDS/UL (ref 149–390)
PMV BLD AUTO: 9.4 FL (ref 8.9–12.7)
POTASSIUM SERPL-SCNC: 4 MMOL/L (ref 3.5–5.3)
PROCALCITONIN SERPL-MCNC: 0.67 NG/ML
PROT CSF-MCNC: 53 MG/DL (ref 15–45)
RBC # BLD AUTO: 3.77 MILLION/UL (ref 3.81–5.12)
SODIUM SERPL-SCNC: 140 MMOL/L (ref 136–145)
TOTAL CELLS COUNTED BLD: NO
TOTAL CELLS COUNTED SPEC: 8
TUBE # CSF: 4
WBC # BLD AUTO: 18.98 THOUSAND/UL (ref 4.31–10.16)
WBC # CSF AUTO: 1 /UL (ref 0–5)

## 2018-05-23 PROCEDURE — 97163 PT EVAL HIGH COMPLEX 45 MIN: CPT

## 2018-05-23 PROCEDURE — G8979 MOBILITY GOAL STATUS: HCPCS

## 2018-05-23 PROCEDURE — 009U3ZX DRAINAGE OF SPINAL CANAL, PERCUTANEOUS APPROACH, DIAGNOSTIC: ICD-10-PCS | Performed by: RADIOLOGY

## 2018-05-23 PROCEDURE — 85025 COMPLETE CBC W/AUTO DIFF WBC: CPT | Performed by: INTERNAL MEDICINE

## 2018-05-23 PROCEDURE — 77003 FLUOROGUIDE FOR SPINE INJECT: CPT

## 2018-05-23 PROCEDURE — 87070 CULTURE OTHR SPECIMN AEROBIC: CPT | Performed by: INTERNAL MEDICINE

## 2018-05-23 PROCEDURE — 87070 CULTURE OTHR SPECIMN AEROBIC: CPT | Performed by: PHYSICIAN ASSISTANT

## 2018-05-23 PROCEDURE — 80048 BASIC METABOLIC PNL TOTAL CA: CPT | Performed by: INTERNAL MEDICINE

## 2018-05-23 PROCEDURE — 87529 HSV DNA AMP PROBE: CPT | Performed by: INTERNAL MEDICINE

## 2018-05-23 PROCEDURE — 87205 SMEAR GRAM STAIN: CPT | Performed by: PHYSICIAN ASSISTANT

## 2018-05-23 PROCEDURE — 99232 SBSQ HOSP IP/OBS MODERATE 35: CPT | Performed by: INTERNAL MEDICINE

## 2018-05-23 PROCEDURE — 87077 CULTURE AEROBIC IDENTIFY: CPT | Performed by: PHYSICIAN ASSISTANT

## 2018-05-23 PROCEDURE — 82945 GLUCOSE OTHER FLUID: CPT | Performed by: INTERNAL MEDICINE

## 2018-05-23 PROCEDURE — 77003 FLUOROGUIDE FOR SPINE INJECT: CPT | Performed by: RADIOLOGY

## 2018-05-23 PROCEDURE — 87186 SC STD MICRODIL/AGAR DIL: CPT | Performed by: PHYSICIAN ASSISTANT

## 2018-05-23 PROCEDURE — 89051 BODY FLUID CELL COUNT: CPT | Performed by: INTERNAL MEDICINE

## 2018-05-23 PROCEDURE — 62270 DX LMBR SPI PNXR: CPT

## 2018-05-23 PROCEDURE — 84157 ASSAY OF PROTEIN OTHER: CPT | Performed by: INTERNAL MEDICINE

## 2018-05-23 PROCEDURE — 62270 DX LMBR SPI PNXR: CPT | Performed by: RADIOLOGY

## 2018-05-23 PROCEDURE — 84145 PROCALCITONIN (PCT): CPT | Performed by: INTERNAL MEDICINE

## 2018-05-23 PROCEDURE — G8978 MOBILITY CURRENT STATUS: HCPCS

## 2018-05-23 RX ORDER — LIDOCAINE HYDROCHLORIDE 10 MG/ML
10 INJECTION, SOLUTION EPIDURAL; INFILTRATION; INTRACAUDAL; PERINEURAL ONCE
Status: COMPLETED | OUTPATIENT
Start: 2018-05-23 | End: 2018-05-23

## 2018-05-23 RX ORDER — AZITHROMYCIN 250 MG/1
500 TABLET, FILM COATED ORAL EVERY 24 HOURS
Status: DISCONTINUED | OUTPATIENT
Start: 2018-05-23 | End: 2018-05-24

## 2018-05-23 RX ADMIN — LIDOCAINE HYDROCHLORIDE 10 ML: 10 INJECTION, SOLUTION EPIDURAL; INFILTRATION; INTRACAUDAL; PERINEURAL at 09:00

## 2018-05-23 RX ADMIN — PRAVASTATIN SODIUM 20 MG: 20 TABLET ORAL at 18:24

## 2018-05-23 RX ADMIN — CEFTRIAXONE 2000 MG: 2 INJECTION, SOLUTION INTRAVENOUS at 01:57

## 2018-05-23 RX ADMIN — CIPROFLOXACIN AND DEXAMETHASONE 4 DROP: 3; 1 SUSPENSION/ DROPS AURICULAR (OTIC) at 08:25

## 2018-05-23 RX ADMIN — SENNOSIDES 8.6 MG: 8.6 TABLET, FILM COATED ORAL at 08:25

## 2018-05-23 RX ADMIN — ACETAMINOPHEN 650 MG: 325 TABLET ORAL at 01:56

## 2018-05-23 RX ADMIN — GABAPENTIN 800 MG: 400 CAPSULE ORAL at 08:25

## 2018-05-23 RX ADMIN — SODIUM CHLORIDE 75 ML/HR: 0.9 INJECTION, SOLUTION INTRAVENOUS at 01:58

## 2018-05-23 RX ADMIN — AZITHROMYCIN 500 MG: 250 TABLET, FILM COATED ORAL at 14:21

## 2018-05-23 RX ADMIN — GABAPENTIN 800 MG: 400 CAPSULE ORAL at 18:24

## 2018-05-23 RX ADMIN — VITAMIN B12 0.1 MG ORAL TABLET 100 MCG: 0.1 TABLET ORAL at 08:25

## 2018-05-23 RX ADMIN — CHOLECALCIFEROL TAB 10 MCG (400 UNIT) 400 UNITS: 10 TAB at 08:25

## 2018-05-23 RX ADMIN — CIPROFLOXACIN AND DEXAMETHASONE 4 DROP: 3; 1 SUSPENSION/ DROPS AURICULAR (OTIC) at 18:27

## 2018-05-23 RX ADMIN — PANTOPRAZOLE SODIUM 40 MG: 40 TABLET, DELAYED RELEASE ORAL at 05:19

## 2018-05-23 RX ADMIN — ACETAMINOPHEN 650 MG: 325 TABLET ORAL at 15:09

## 2018-05-23 RX ADMIN — VANCOMYCIN HYDROCHLORIDE 1000 MG: 1 INJECTION, SOLUTION INTRAVENOUS at 14:17

## 2018-05-23 NOTE — PROGRESS NOTES
Progress Note - Kenney Tsang 1/28/1930, 80 y o  female MRN: [id number]    Unit/Bed#: [address] [place], [place] [id number] Encounter: [id number]    Primary Care Provider: Taco Egan MD   Date and time admitted to hospital: 5/21/2018  8:43 PM        Essential hypertension   Assessment & Plan    I will hold patient's hydrochlorothiazide during hydration        * Lingular pneumonia   Assessment & Plan    Continue antibiotics I think we can stop vancomycin when CSF white count is back          Her encephalopathy appears to have resolved   I discussed with her daughter  Subjective:   Patient tolerated the lumbar puncture  Objective:     Vitals: Blood pressure 122/58, pulse 72, temperature 98 1 °F (36 7 °C), temperature source Oral, resp  rate 13, height 5' 3" (1 6 m), weight 81 5 kg (179 lb 10 8 oz), SpO2 91 %  ,Body mass index is 31 83 kg/m²  Intake/Output Summary (Last 24 hours) at 05/23/18 1049  Last data filed at 05/23/18 0830   Gross per 24 hour   Intake          1378 75 ml   Output              600 ml   Net           778 75 ml       Physical Exam:    Alert and awake in no acute distress  Head normocephalic, oral membranes are moist   Neck supple, no lymphadenopathy  Lungs few coarse breath sounds bilaterally  Heart regular rate and rythm, normal heart sounds  Extremities: no cyanosis, clubbing or edema  Neuro: alert, no facial asymmetry,  strength 5/5 bilateral and equal, speech is normal no nuchal rigidity        Invasive Devices     Peripheral Intravenous Line            Peripheral IV 05/22/18 Left Antecubital less than 1 day                            Lab, Imaging and other studies: I have personally reviewed pertinent reports         Results from last 7 days  Lab Units 05/23/18  0528 05/22/18  0527 05/21/18  2115   WBC Thousand/uL 18 98* 21 90* 16 71*   HEMOGLOBIN g/dL 10 6* 12 1 12 7   HEMATOCRIT % 33 6* 37 4 39 4   PLATELETS Thousands/uL 197 246 217   NEUTROS PCT % 82* 83* 86*   LYMPHS PCT % 10* 8* 6*   MONOS PCT % 8 9 8   EOS PCT % 0 0 0       Results from last 7 days  Lab Units 05/23/18  0528 05/22/18  0527 05/21/18 2116 05/21/18  1353   SODIUM mmol/L 140 139 139 140   POTASSIUM mmol/L 4 0 3 9 3 8 3 8   CHLORIDE mmol/L 102 99* 99* 100   CO2 mmol/L 31 28 31 34*   BUN mg/dL 18 16 16 16   CREATININE mg/dL 0 86 0 91 0 91 0 75   CALCIUM mg/dL 8 9 8 8 9 5 9 6   TOTAL PROTEIN g/dL  --   --  7 7 7 9   BILIRUBIN TOTAL mg/dL  --   --  0 60 0 30   ALK PHOS U/L  --   --  59 51   ALT U/L  --   --  16 17   AST U/L  --   --  17 22   GLUCOSE RANDOM mg/dL 104 117 138 97     Lab Results   Component Value Date    TROPONINI <0 02 05/21/2018    CKTOTAL 112 01/26/2014       Results from last 7 days  Lab Units 05/21/18 2116 05/21/18  1353   INR  1 23* 1 12     Lab Results   Component Value Date    BLOODCX No Growth at 24 hrs  05/21/2018    BLOODCX No Growth at 24 hrs  05/21/2018    BLOODCX No Growth at 24 hrs  05/21/2018    URINECX >100,000 cfu/ml Escherichia coli 07/28/2016       Imaging:    Results for orders placed during the hospital encounter of 05/21/18   XR chest pa & lateral    Narrative CHEST     INDICATION:   SOB     COMPARISON:  Chest x-ray performed the previous day  EXAM PERFORMED/VIEWS:  XR CHEST PA & LATERAL  The frontal view was performed utilizing dual energy radiographic technique  FINDINGS:    Cardiomediastinal silhouette appears unremarkable  Interval development of a left lingular consolidation extending into the hilum  This may represent evolving pneumonia versus atelectasis  Osseous structures appear within normal limits for patient age  Impression New lingular consolidation        Workstation performed: [id number]         PATIENT CENTERED ROUNDS: I have performed rounds with the nursing staff  This note has been constructed using a voice recognition system      Tania Carroll MD

## 2018-05-23 NOTE — CONSULTS
Consultation - Infectious Disease   Rosemary Ha 80 y o  female MRN: [id number]  Unit/Bed#: [address] [place] [address] [id number] Encounter: [id number]      Assessment/Plan   1  Pneumonia/Fever/Leukocytosis/Encephalopathy: Pt presented with cough, SOB, and confusion  She had fever and elevated WBC count  Initial CXR did not show any pulmonary infilts  She was started on Rocephin and Azithromycin for tx of possible pneumonia  Vanco was added on 5/22/18 until meningitis was excluded as the etiology for her fever and confusion  Repeat CXR showed development of lingular infiltrate c/w pneumonia  Urine Legionella Ag and urine Pneumococcal Ag are neg  Pt underwent LP today and CSF studies are not c/w meningitis  WBC count is starting to decrease  No evidence of UTI  Her MS is much better today  A  Cont  Rocephin and Azithromycin for tx of community acquired pneumonia  B  Can stop Vanco since LP results are not c/w bacterial meningitis - Pt can come out of isolation  C  Awaiting final cx results  D  Will follow WBC count    History of Present Illness   Physician Requesting Consult: Anastasiia Gore MD  Reason for Consult / Principal Problem: Pneumonia    HPI: Rosemary Ha is a 80y o  year old female with H/O GERD, [place], HTN, and neuropathy who was admitted on 5/21/18 with c/o cough and increasing SOB x 1 week and right ear pain x 1 day  Pt was initially tx'd in the ER with Cipro otic drops for presumed OE but became progressively weak with confusion and was admitted for IV abx tx  She was placed on Rocephin and Azithromycin for possible pneumonia although intial CXR was neg  She continued to have fever and WBC count increased to 5/22/18  Vanco was added and IR was consulted for LP  Repeat CXR showed development of lingular consolidation c/w pneumonia  Pt underwent LP today and cell count showed only 1 WBC with protein of 53 and glucose of 59 which was not c/w meningitis  WBC count has decreased to 18K today and MS is better   Bld cx's are neg so far  No evidence of UTI  Urine Legionella Ag and Strep pneumo Ag were neg  Pt denied H/A, CP, N/V/D, abd pain, or dysuria    Inpatient consult to Infectious Diseases  Consult performed by: ZULEMA ORNELAS  Consult ordered by: Kristen Frye          ROS: 12 systems reviewed, remainder is neg      Historical Information   Past Medical History:   Diagnosis Date    Balance disorder     GERD (gastroesophageal reflux disease)     Hard of hearing     Hyperlipidemia     Hypertension     Neuropathy     Tinnitus      Past Surgical History:   Procedure Laterality Date    EYE SURGERY      HYSTERECTOMY       Social History   History   Alcohol Use    Yes     Comment: rarely     History   Drug Use No     History   Smoking Status    Never Smoker   Smokeless Tobacco    Never Used     Family History   Problem Relation Age of Onset    Stroke Mother     Stroke Father     Heart disease Daughter        Meds/Allergies   MEDS:  Vanco: #2  Rocephin: #3  Azithromycin: #3      Current Facility-Administered Medications:     acetaminophen (TYLENOL) tablet 650 mg, 650 mg, Oral, Q6H PRN, Terri Remedies, PA-C, 650 mg at 05/23/18 0156    albuterol inhalation solution 2 5 mg, 2 5 mg, Nebulization, Q4H PRN, MD Aminah Mary  [START ON 5/24/2018] cefTRIAXone (ROCEPHIN) IVPB (premix) 1,000 mg, 1,000 mg, Intravenous, Q24H **AND** azithromycin (ZITHROMAX) tablet 500 mg, 500 mg, Oral, Q24H, Blue Lora MD    cholecalciferol (VITAMIN D3) tablet 400 Units, 400 Units, Oral, Daily, Terri Remedies, PA-C, 400 Units at 05/23/18 0825    ciprofloxacin-dexamethasone (CIPRODEX) 0 3-0 1 % otic suspension 4 drop, 4 drop, Right Ear, BID, Terri Remedies, PA-C, 4 drop at 05/23/18 0825    cyanocobalamin (VITAMIN B-12) tablet 100 mcg, 100 mcg, Oral, Daily, Terri Remedies, PA-C, 100 mcg at 05/23/18 0825    gabapentin (NEURONTIN) capsule 800 mg, 800 mg, Oral, BID, Terri Remedies, PA-C, 800 mg at 05/23/18 0825   influenza inactivated quadrivalent vaccine (FLULAVAL) IM injection 0 5 mL, 0 5 mL, Intramuscular, Prior to discharge, MARIA A Arrieta-C    ondansetron [facility]) injection 4 mg, 4 mg, Intravenous, Q6H PRN, Eduarda Barbour, PA-C, 4 mg at 05/22/18 0526    pantoprazole (PROTONIX) EC tablet 40 mg, 40 mg, Oral, Early Morning, Eduarda Angle, PA-C, 40 mg at 05/23/18 4085    pneumococcal 13-valent conjugate vaccine (PREVNAR-13) IM injection 0 5 mL, 0 5 mL, Intramuscular, Prior to discharge, Eduarda Barbour PA-C    pravastatin (PRAVACHOL) tablet 20 mg, 20 mg, Oral, After Dinner, Eduarda Angle, PA-C, 20 mg at 05/22/18 1840    senna (SENOKOT) tablet 8 6 mg, 1 tablet, Oral, Daily, MARIA A Arrieta-C, 8 6 mg at 05/23/18 0825    vancomycin (VANCOCIN) IVPB (premix) 1,000 mg, 1,000 mg, Intravenous, Q24H, Patricia Jackson MD, Last Rate: 200 mL/hr at 05/22/18 1328, 1,000 mg at 05/22/18 1328    No Known Allergies      Intake/Output Summary (Last 24 hours) at 05/23/18 1209  Last data filed at 05/23/18 0830   Gross per 24 hour   Intake          1378 75 ml   Output              600 ml   Net           778 75 ml       PE:  WD, WN, WF in NAD  VSS, Tmax: 98 7  HEENT:  No scleral icterus, pharynx clear  NECK: Supple  CARDIAC:  RRR, nml S1, S2  LUNGS:  +Crackles at the left base  ABDOMEN: +BS, soft, nontender  EXTREMITIES:  No calf tenderness  SKIN: No rash  NEURO: Grossly nonfocal    Invasive Devices:   Peripheral IV 05/22/18 Left Antecubital (Active)   Site Assessment Clean;Dry; Intact 5/23/2018  8:00 AM   Dressing Type Transparent 5/23/2018  8:00 AM   Line Status Infusing 5/23/2018  8:00 AM   Dressing Status Clean;Dry; Intact 5/23/2018  8:00 AM           Lab Results:   Admission on 05/21/2018   Component Date Value    WBC 05/21/2018 16 71*    RBC 05/21/2018 4 42     Hemoglobin 05/21/2018 12 7     Hematocrit 05/21/2018 39 4     MCV 05/21/2018 89     MCH 05/21/2018 28 7     MCHC 05/21/2018 32 2  RDW 05/21/2018 14 3     MPV 05/21/2018 9 5     Platelets 73/26/7351 217     Neutrophils Relative 05/21/2018 86*    Lymphocytes Relative 05/21/2018 6*    Monocytes Relative 05/21/2018 8     Eosinophils Relative 05/21/2018 0     Basophils Relative 05/21/2018 0     Neutrophils Absolute 05/21/2018 14 42*    Lymphocytes Absolute 05/21/2018 1 00     Monocytes Absolute 05/21/2018 1 27*    Eosinophils Absolute 05/21/2018 0 01     Basophils Absolute 05/21/2018 0 01     Sodium 05/21/2018 139     Potassium 05/21/2018 3 8     Chloride 05/21/2018 99*    CO2 05/21/2018 31     Anion Gap 05/21/2018 9     BUN 05/21/2018 16     Creatinine 05/21/2018 0 91     Glucose 05/21/2018 138     Calcium 05/21/2018 9 5     AST 05/21/2018 17     ALT 05/21/2018 16     Alkaline Phosphatase 05/21/2018 59     Total Protein 05/21/2018 7 7     Albumin 05/21/2018 3 5     Total Bilirubin 05/21/2018 0 60     eGFR 05/21/2018 57     LACTIC ACID 05/21/2018 1 4     Protime 05/21/2018 14 8*    INR 05/21/2018 1 23*    PTT 05/21/2018 46*    Color, UA 05/21/2018 Yellow     Clarity, UA 05/21/2018 Clear     Specific Gravity, UA 05/21/2018 1 020     pH, UA 05/21/2018 6 0     Leukocytes, UA 05/21/2018 Negative     Nitrite, UA 05/21/2018 Negative     Protein, UA 05/21/2018 Negative     Glucose, UA 05/21/2018 Negative     Ketones, UA 05/21/2018 Negative     Urobilinogen, UA 05/21/2018 0 2     Bilirubin, UA 05/21/2018 Negative     Blood, UA 05/21/2018 Trace-Intact*    Blood Culture 05/21/2018 No Growth at 24 hrs   Blood Culture 05/21/2018 No Growth at 24 hrs       RBC, UA 05/21/2018 2-4*    WBC, UA 05/21/2018 0-1*    Epithelial Cells 05/21/2018 Occasional     Bacteria, UA 05/21/2018 Occasional     MUCOUS THREADS 05/21/2018 Occasional*    Strep pneumoniae antigen* 05/21/2018 Negative     Legionella Urinary Antig* 05/21/2018 Negative     Procalcitonin 05/22/2018 0 36*    Ammonia 05/22/2018 <10*    Sodium 05/22/2018 139     Potassium 05/22/2018 3 9     Chloride 05/22/2018 99*    CO2 05/22/2018 28     Anion Gap 05/22/2018 12     BUN 05/22/2018 16     Creatinine 05/22/2018 0 91     Glucose 05/22/2018 117     Glucose, Fasting 05/22/2018 117*    Calcium 05/22/2018 8 8     eGFR 05/22/2018 57     WBC 05/22/2018 21 90*    RBC 05/22/2018 4 26     Hemoglobin 05/22/2018 12 1     Hematocrit 05/22/2018 37 4     MCV 05/22/2018 88     MCH 05/22/2018 28 4     MCHC 05/22/2018 32 4     RDW 05/22/2018 14 3     MPV 05/22/2018 9 5     Platelets 66/49/5561 246     Neutrophils Relative 05/22/2018 83*    Lymphocytes Relative 05/22/2018 8*    Monocytes Relative 05/22/2018 9     Eosinophils Relative 05/22/2018 0     Basophils Relative 05/22/2018 0     Neutrophils Absolute 05/22/2018 18 17*    Lymphocytes Absolute 05/22/2018 1 81     Monocytes Absolute 05/22/2018 1 91*    Eosinophils Absolute 05/22/2018 0 00     Basophils Absolute 05/22/2018 0 01     Appearance, CSF 05/23/2018 clear     Tube Number, CSF 05/23/2018 4     WBC, CSF 05/23/2018 1     Xanthochromia 05/23/2018 No     Glucose, CSF 05/23/2018 59     Protein, CSF 05/23/2018 53*    WBC 05/23/2018 18 98*    RBC 05/23/2018 3 77*    Hemoglobin 05/23/2018 10 6*    Hematocrit 05/23/2018 33 6*    MCV 05/23/2018 89     MCH 05/23/2018 28 1     MCHC 05/23/2018 31 5     RDW 05/23/2018 14 5     MPV 05/23/2018 9 4     Platelets 73/49/8456 197     Neutrophils Relative 05/23/2018 82*    Lymphocytes Relative 05/23/2018 10*    Monocytes Relative 05/23/2018 8     Eosinophils Relative 05/23/2018 0     Basophils Relative 05/23/2018 0     Neutrophils Absolute 05/23/2018 15 70*    Lymphocytes Absolute 05/23/2018 1 83     Monocytes Absolute 05/23/2018 1 42*    Eosinophils Absolute 05/23/2018 0 02     Basophils Absolute 05/23/2018 0 01     Sodium 05/23/2018 140     Potassium 05/23/2018 4 0     Chloride 05/23/2018 102     CO2 05/23/2018 31     Anion Gap 05/23/2018 7     BUN 05/23/2018 18     Creatinine 05/23/2018 0 86     Glucose 05/23/2018 104     Calcium 05/23/2018 8 9     eGFR 05/23/2018 61     Procalcitonin 05/23/2018 0 67*    Gram Stain Result 05/23/2018 Rare Mononuclear Cells     Gram Stain Result 05/23/2018 No Polys or Bacteria seen     Total Counted 05/23/2018 8     Lymphs % CSF 05/23/2018 38     Monocytes % (CSF) 05/23/2018 63      Imaging Studies: I have personally reviewed pertinent reports  EKG, Pathology, and Other Studies: I have personally reviewed pertinent reports        Culture  Lab Results   Component Value Date    BLOODCX No Growth at 24 hrs  05/21/2018    BLOODCX No Growth at 24 hrs  05/21/2018    BLOODCX No Growth at 24 hrs  05/21/2018    BLOODCX No Growth at 24 hrs  05/21/2018     No results found for: [facility]   Lab Results   Component Value Date    URINECX >100,000 cfu/ml Escherichia coli 07/28/2016     No results found for: SPUTUMCULTUR    Principal Problem:    Lingular pneumonia  Active Problems:    Essential hypertension

## 2018-05-23 NOTE — SEDATION DOCUMENTATION
LP completed in IR by Dr Vannessa Ruiz without complication  Opening pressure 18   16ml clear, colorless CSF removed and sent for labs as ordered

## 2018-05-23 NOTE — PHYSICAL THERAPY NOTE
PT eval   05/23/18 8502   Note Type   Note type Eval only   Pain Assessment   Pain Assessment No/denies pain   Pain Score No Pain   Pain Orientation (just very tired and weak)   Home Living   [address];Walker  (mainly uses cane)   Additional Comments Does light housework and some cooking   Prior Function   Level of [place] Independent with ADLs and functional mobility   [facility]   ADL Assistance Independent   IADLs Needs assistance   Falls in the last 6 months 0   Vocational Retired   Restrictions/Precautions   [place] Bearing Precautions Per Order No   General   Additional Pertinent History adm with weakness fever, cough, dx incl lingular pneumonia, Further testing incl lumbar puncture(-)   Cognition   Overall Cognitive Status WFL   Arousal/Participation Responsive   Orientation Level Oriented X4   Memory Within functional limits   Following Commands Follows one step commands without difficulty   Comments sleepy/tired   RUE Assessment   RUE Assessment (RPOM WFL strength 2-/5 shldrs others 2/5)   LUE Assessment   LUE Assessment (shoudler 2-/5 others 2+/5)   RLE Assessment   RLE Assessment (arom wfl stregnth 3/5)   LLE Assessment   LLE Assessment (arom wfl strength 3/5)   Coordination   Movements are Fluid and Coordinated (no decreased effort)   Bed Mobility   Rolling R 6  Modified independent   Additional items Bedrails;HOB elevated   Rolling L 6  Modified independent   Additional items HOB elevated; Bedrails   Supine to Sit 5  Supervision   Sit to Supine 5  Supervision   Transfers   Sit to Stand 4  Minimal assistance   Additional items Assist x 1   Stand to Sit 4  Minimal assistance   Additional items Assist x 1;Bedrails; Increased time required;Verbal cues   Stand pivot 4  Minimal assistance   Additional items Assist x 1;HOB elevated; Bedrails; Increased time required  (rw)   Ambulation/Elevation   Gait pattern (few steps along edge of bed able to clear B feet)   Gait Assistance 4  Minimal assist   Additional items Assist x 1   Assistive Device Rolling walker   Balance   Static Sitting Good   Dynamic Sitting Fair +   Static Standing Fair   Dynamic Standing Fair   Ambulatory Fair -   Endurance Deficit   Endurance Deficit Yes   Endurance Deficit Description very fatigued   Activity Tolerance   Activity Tolerance Patient limited by fatigue   Nurse Made Aware yes   Assessment   Prognosis Good   Problem List Decreased strength;Decreased endurance; Impaired balance;Decreased mobility; Decreased coordination   Assessment PT presetns with generalized weakness adn impaired activity tolerance  Still undergoing some w/u adn testing but can benefit from skilled PT servcesto improve strength balacne, transfers and gait PT below normal level of function , on 02 and requires assist for mobility  Will follow see goals  Hope to progress to home d/c    Barriers to Discharge Decreased caregiver support   Goals   Patient Goals get better   STG Expiration Date 06/02/18   Short Term Goal #1 1)improve strength 1/2 grade 2) safe ind trnasfers 3) safe ind amb with rw vs cane 150' levle adn up/down 5 steps   Plan   Treatment/Interventions ADL retraining;Functional transfer training;LE strengthening/ROM; Elevations; Therapeutic exercise; Endurance training;Patient/family training;Equipment eval/education; Bed mobility;Gait training;Spoke to nursing   PT Frequency 5x/wk   Recommendation   Recommendation Home PT; Home with family support   Equipment Recommended Walker   PT - OK to Discharge No   Modified [place] Scale   Modified [place] Scale 4   Barthel Index   Feeding 10   Bathing 0   Grooming Score 5   Dressing Score 5   Bladder Score 10   Bowels Score 5   Toilet Use Score 10   Transfers (Bed/Chair) Score 10   Mobility (Level Surface) Score 0   Stairs Score 0   Barthel Index Score 55   Radha Logan, PT

## 2018-05-23 NOTE — BRIEF OP NOTE (RAD/CATH)
Lumbar Puncture Procedure Note    PATIENT NAME: Debbie Mtz  : 1930  MRN: [id number]     Pre-op Diagnosis:   1  Acute bronchitis    2  Sepsis ([facility] )    3  Altered mental status      Post-op Diagnosis:   1  Acute bronchitis    2  Sepsis ([facility] )    3  Altered mental status        Surgeon:   Jacquie Bruce DO  Assistants:     No qualified resident was available  Estimated Blood Loss: None  Findings: Opening pressure of 18 cmH20  Clear CSF  L4-5 space targeted, needle advanced below surgical scar defect on midline back  Specimens: 16 mL clear CSF  Complications:  None      Anesthesia: Local    Jacquie Bruce DO     Date: 2018  Time: 9:52 AM

## 2018-05-23 NOTE — PHYSICIAN ADVISOR
Current patient class: Inpatient  The patient is currently on Hospital Day: 2    This patient was originally admitted to the hospital under observation status  After admission the patient was reevaluated and determined to require further hospitalization  The patient is now documented to require at least a 2nd midnight in the hospital  As such the patient is now expected to satisfy the 2 midnight benchmark and is therefore eligible for inpatient admission  After review of the relevant documentation, labs, vital signs and test results, the patient is appropriate for INPATIENT ADMISSION  Admission to the hospital as an inpatient is a complex decision making process which requires the practitioner to consider the patients presenting complaint, history and physical examination and all relevant testing  With this in mind, in this case, the patient was deemed appropriate for INPATIENT ADMISSION  After review of the documentation and testing available at the time of the admission I concur with this clinical determination of medical necessity  Rationale is as follows:    Patient is an 49-year-old female presented to the emergency room with complaints of a cough and low-grade fevers yesterday  She was diagnosed with bronchitis and started on oral antibiotics  The same day she developed confusion and was admitted to the hospital for observation  She was found to have leukocytosis with white blood cell count of 63 54 with uncertain etiology  She was started on intravenous antibiotics  Patient developed fevers with maximal temperature of 103 5  Her white blood cell count natividad to over 20,000  Interventional radiology's consulted for lumbar puncture  Intravenous vancomycin was added to intravenous Rocephin regimen  Patient is also receiving intravenous fluids  Inpatient hospitalization is warranted for this patient and she is expected to remain hospitalized for over 2 midnights      The patients vitals on arrival were ED Triage Vitals   Temperature Pulse Respirations Blood Pressure SpO2   05/21/18 2040 05/21/18 2040 05/21/18 2040 05/21/18 2040 05/21/18 2040   (!) 103 3 °F (39 6 °C) 105 20 150/67 93 %      Temp Source Heart Rate Source Patient Position - Orthostatic VS BP Location FiO2 (%)   05/21/18 2040 05/21/18 2040 05/21/18 2040 05/21/18 2040 --   Tympanic Monitor Lying Right arm       Pain Score       05/22/18 0441       2           Past Medical History:   Diagnosis Date    Balance disorder     GERD (gastroesophageal reflux disease)     Hard of hearing     Hyperlipidemia     Hypertension     Neuropathy     Tinnitus      Past Surgical History:   Procedure Laterality Date    EYE SURGERY      HYSTERECTOMY         The patient was admitted to the hospital at 1119 on 5/22/18 for the following diagnosis:  Acute bronchitis [J20 9]  Altered mental status [R41 82]  Sepsis ([facility] ) [A41 9]    Consults have been placed to:   IP CONSULT TO INFECTIOUS DISEASES    Vitals:    05/22/18 0520 05/22/18 0844 05/22/18 0946 05/22/18 1447   BP:  141/61  119/57   BP Location:  Right arm  Left arm   Pulse:  80  75   Resp:  18  20   Temp: (!) 102 9 °F (39 4 °C) 98 2 °F (36 8 °C)  97 7 °F (36 5 °C)   TempSrc: Oral Oral  Oral   SpO2:  92% 92% 96%   Weight:  80 kg (176 lb 5 9 oz)     Height:           Most recent labs:    Recent Labs      05/21/18   1353   05/21/18   2116  05/22/18   0527   WBC  14 24*   < >   --   21 90*   HGB  12 5   < >   --   12 1   HCT  39 2   < >   --   37 4   PLT  236   < >   --   246   K  3 8   --   3 8  3 9   NA  140   --   139  139   CALCIUM  9 6   --   9 5  8 8   BUN  16   --   16  16   CREATININE  0 75   --   0 91  0 91   INR  1 12   --   1 23*   --    TROPONINI  <0 02   --    --    --    AST  22   --   17   --    ALT  17   --   16   --    ALKPHOS  51   --   59   --    BILITOT  0 30   --   0 60   --     < > = values in this interval not displayed         Scheduled Meds:  Current Facility-Administered Medications:  acetaminophen 650 mg Oral Q6H PRN Elvie Minion, IJEOMA    albuterol 2 5 mg Nebulization Q4H PRN Black Meyer MD    cefTRIAXone 2,000 mg Intravenous Q12H Conor Nixon MD Last Rate: 2,000 mg (05/22/18 1502)   And        [START ON 5/23/2018] azithromycin 500 mg Oral Q24H Conor Nixon MD    cholecalciferol 400 Units Oral Daily Elvie Minion, PA-UMA    ciprofloxacin-dexamethasone 4 drop Right Ear BID Elvie Minion, PA-C    cyanocobalamin 100 mcg Oral Daily Elvie Minion, PA-C    gabapentin 800 mg Oral BID Elvie Minion, PA-C    influenza vaccine 0 5 mL Intramuscular Prior to discharge Elvie Minion, PA-UMA    ondansetron 4 mg Intravenous Q6H PRN Elvie Minion, PA-C    pantoprazole 40 mg Oral Early Morning Elvie Minion, PA-C    pneumococcal 13-valent conjugate vaccine 0 5 mL Intramuscular Prior to discharge Elvie Minion, PA-C    pravastatin 20 mg Oral After Raquel Debi, PA-C    senna 1 tablet Oral Daily Elvie Minion, PA-C    sodium chloride 75 mL/hr Intravenous Continuous Black Meyer MD Last Rate: 75 mL/hr (05/22/18 1151)   vancomycin 1,000 mg Intravenous Q24H Conor Nixon MD Last Rate: 1,000 mg (05/22/18 1328)     Continuous Infusions:  sodium chloride 75 mL/hr Last Rate: 75 mL/hr (05/22/18 1151)     PRN Meds:   acetaminophen    albuterol    influenza vaccine    ondansetron    pneumococcal 13-valent conjugate vaccine    Surgical procedures (if appropriate):

## 2018-05-23 NOTE — PLAN OF CARE
Problem: PHYSICAL THERAPY ADULT  Goal: Performs mobility at highest level of function for planned discharge setting  See evaluation for individualized goals  Treatment/Interventions: ADL retraining, Functional transfer training, LE strengthening/ROM, Elevations, Therapeutic exercise, Endurance training, Patient/family training, Equipment eval/education, Bed mobility, Gait training, Spoke to nursing  Equipment Recommended: Olena Redder       See flowsheet documentation for full assessment, interventions and recommendations  Outcome: Progressing  Prognosis: Good  Problem List: Decreased strength, Decreased endurance, Impaired balance, Decreased mobility, Decreased coordination  Assessment: PT presetns with generalized weakness adn impaired activity tolerance  Still undergoing some w/u adn testing but can benefit from skilled PT servcesto improve strength balacne, transfers and gait PT below normal level of function , on 02 and requires assist for mobility  Will follow see goals  Hope to progress to home d/c   Barriers to Discharge: Decreased caregiver support     Recommendation: Home PT, Home with family support     PT - OK to Discharge: No    See flowsheet documentation for full assessment

## 2018-05-24 ENCOUNTER — APPOINTMENT (INPATIENT)
Dept: RADIOLOGY | Facility: HOSPITAL | Age: 83
DRG: 871 | End: 2018-05-24
Payer: MEDICARE

## 2018-05-24 LAB
BASOPHILS # BLD AUTO: 0.02 THOUSANDS/ΜL (ref 0–0.1)
BASOPHILS NFR BLD AUTO: 0 % (ref 0–1)
EOSINOPHIL # BLD AUTO: 0.04 THOUSAND/ΜL (ref 0–0.61)
EOSINOPHIL NFR BLD AUTO: 0 % (ref 0–6)
ERYTHROCYTE [DISTWIDTH] IN BLOOD BY AUTOMATED COUNT: 14.4 % (ref 11.6–15.1)
HCT VFR BLD AUTO: 31.5 % (ref 34.8–46.1)
HGB BLD-MCNC: 9.7 G/DL (ref 11.5–15.4)
LYMPHOCYTES # BLD AUTO: 1.68 THOUSANDS/ΜL (ref 0.6–4.47)
LYMPHOCYTES NFR BLD AUTO: 10 % (ref 14–44)
MCH RBC QN AUTO: 27.6 PG (ref 26.8–34.3)
MCHC RBC AUTO-ENTMCNC: 30.8 G/DL (ref 31.4–37.4)
MCV RBC AUTO: 90 FL (ref 82–98)
MONOCYTES # BLD AUTO: 1.31 THOUSAND/ΜL (ref 0.17–1.22)
MONOCYTES NFR BLD AUTO: 8 % (ref 4–12)
NEUTROPHILS # BLD AUTO: 13.43 THOUSANDS/ΜL (ref 1.85–7.62)
NEUTS SEG NFR BLD AUTO: 82 % (ref 43–75)
PLATELET # BLD AUTO: 189 THOUSANDS/UL (ref 149–390)
PMV BLD AUTO: 9.7 FL (ref 8.9–12.7)
RBC # BLD AUTO: 3.51 MILLION/UL (ref 3.81–5.12)
WBC # BLD AUTO: 16.48 THOUSAND/UL (ref 4.31–10.16)

## 2018-05-24 PROCEDURE — 97530 THERAPEUTIC ACTIVITIES: CPT

## 2018-05-24 PROCEDURE — 71045 X-RAY EXAM CHEST 1 VIEW: CPT

## 2018-05-24 PROCEDURE — 99232 SBSQ HOSP IP/OBS MODERATE 35: CPT | Performed by: INTERNAL MEDICINE

## 2018-05-24 PROCEDURE — 94640 AIRWAY INHALATION TREATMENT: CPT

## 2018-05-24 PROCEDURE — 97116 GAIT TRAINING THERAPY: CPT

## 2018-05-24 PROCEDURE — 85025 COMPLETE CBC W/AUTO DIFF WBC: CPT | Performed by: INTERNAL MEDICINE

## 2018-05-24 PROCEDURE — 94760 N-INVAS EAR/PLS OXIMETRY 1: CPT

## 2018-05-24 PROCEDURE — 97110 THERAPEUTIC EXERCISES: CPT

## 2018-05-24 RX ORDER — GUAIFENESIN/DEXTROMETHORPHAN 100-10MG/5
SYRUP ORAL
Status: COMPLETED
Start: 2018-05-24 | End: 2018-05-24

## 2018-05-24 RX ORDER — AZITHROMYCIN 250 MG/1
500 TABLET, FILM COATED ORAL ONCE
Status: COMPLETED | OUTPATIENT
Start: 2018-05-24 | End: 2018-05-24

## 2018-05-24 RX ORDER — GUAIFENESIN/DEXTROMETHORPHAN 100-10MG/5
10 SYRUP ORAL EVERY 4 HOURS PRN
Status: DISCONTINUED | OUTPATIENT
Start: 2018-05-24 | End: 2018-05-27 | Stop reason: HOSPADM

## 2018-05-24 RX ORDER — AZITHROMYCIN 250 MG/1
250 TABLET, FILM COATED ORAL EVERY 24 HOURS
Status: DISCONTINUED | OUTPATIENT
Start: 2018-05-24 | End: 2018-05-24

## 2018-05-24 RX ADMIN — CIPROFLOXACIN AND DEXAMETHASONE 4 DROP: 3; 1 SUSPENSION/ DROPS AURICULAR (OTIC) at 08:54

## 2018-05-24 RX ADMIN — ALBUTEROL SULFATE 2.5 MG: 2.5 SOLUTION RESPIRATORY (INHALATION) at 21:04

## 2018-05-24 RX ADMIN — GUAIFENESIN AND DEXTROMETHORPHAN 10 ML: 100; 10 SYRUP ORAL at 05:57

## 2018-05-24 RX ADMIN — CEFTRIAXONE 1000 MG: 1 INJECTION, SOLUTION INTRAVENOUS at 01:00

## 2018-05-24 RX ADMIN — GABAPENTIN 800 MG: 400 CAPSULE ORAL at 08:54

## 2018-05-24 RX ADMIN — SENNOSIDES 8.6 MG: 8.6 TABLET, FILM COATED ORAL at 08:54

## 2018-05-24 RX ADMIN — ACETAMINOPHEN 650 MG: 325 TABLET ORAL at 11:01

## 2018-05-24 RX ADMIN — VITAMIN B12 0.1 MG ORAL TABLET 100 MCG: 0.1 TABLET ORAL at 09:05

## 2018-05-24 RX ADMIN — PRAVASTATIN SODIUM 20 MG: 20 TABLET ORAL at 18:56

## 2018-05-24 RX ADMIN — PANTOPRAZOLE SODIUM 40 MG: 40 TABLET, DELAYED RELEASE ORAL at 05:55

## 2018-05-24 RX ADMIN — GUAIFENESIN AND DEXTROMETHORPHAN 5 ML: 100; 10 SYRUP ORAL at 01:00

## 2018-05-24 RX ADMIN — ALBUTEROL SULFATE 2.5 MG: 2.5 SOLUTION RESPIRATORY (INHALATION) at 06:20

## 2018-05-24 RX ADMIN — GUAIFENESIN AND DEXTROMETHORPHAN 10 ML: 100; 10 SYRUP ORAL at 20:37

## 2018-05-24 RX ADMIN — ACETAMINOPHEN 650 MG: 325 TABLET ORAL at 00:24

## 2018-05-24 RX ADMIN — CHOLECALCIFEROL TAB 10 MCG (400 UNIT) 400 UNITS: 10 TAB at 08:54

## 2018-05-24 RX ADMIN — GUAIFENESIN AND DEXTROMETHORPHAN 10 ML: 100; 10 SYRUP ORAL at 11:01

## 2018-05-24 RX ADMIN — ACETAMINOPHEN 650 MG: 325 TABLET ORAL at 20:35

## 2018-05-24 RX ADMIN — AZITHROMYCIN 500 MG: 250 TABLET, FILM COATED ORAL at 13:26

## 2018-05-24 RX ADMIN — ENOXAPARIN SODIUM 40 MG: 40 INJECTION SUBCUTANEOUS at 08:54

## 2018-05-24 RX ADMIN — GABAPENTIN 800 MG: 400 CAPSULE ORAL at 18:56

## 2018-05-24 NOTE — SOCIAL WORK
Continuing to follow patient  Patient seen by PT and felt she could return home to her 2 story home with her son and daughter  She will be followed by Al BOUDREAUX

## 2018-05-24 NOTE — PHYSICAL THERAPY NOTE
PHYSICAL THERAPY TREATMENT     05/24/18 1884   Pain Assessment   Pain Assessment No/denies pain   Pain Score No Pain   Restrictions/Precautions   Weight Bearing Precautions Per Order No   Other Precautions Visual impairment;Hard of hearing; Fall Risk;O2;Telemetry;Multiple lines;Aspiration; Bed Alarm; Chair Alarm;Cognitive   General   Chart Reviewed Yes   Additional Pertinent History daughter reporting increased confusion and weakness    Response to Previous Treatment Patient unable to report, no changes reported from family or staff  (pt more confused- needign constant cues and direction )   Family/Caregiver Present Yes  (son/ daughter )   Cognition   Overall Cognitive Status Impaired   Arousal/Participation Alert;Poorly responsive   Attention Attends with cues to redirect   Orientation Level Oriented to person;Disoriented to situation;Disoriented to time   Memory Decreased recall of precautions;Decreased recall of recent events;Decreased short term memory;Decreased recall of biographical information   Following Commands Follows one step commands with increased time or repetition   Comments lethargic; confused-    Subjective   Subjective "am I in the bed?; Where am I?"    Bed Mobility   Supine to Sit 3  Moderate assistance   Additional items Increased time required;Verbal cues;LE management   Sit to Supine 3  Moderate assistance   Additional items HOB elevated; Bedrails; Increased time required;Verbal cues   Transfers   Sit to Stand 3  Moderate assistance   Additional items Increased time required;Verbal cues   Stand to Sit 3  Moderate assistance   Additional items Increased time required;Verbal cues   Ambulation/Elevation   Gait pattern Short stride; Foward flexed; Improper Weight shift;R Knee Ermelinda;L Knee Ermelinda;Decreased R stance;Decreased L stance;Decreased foot clearance; Forward Flexion   Gait Assistance 3  Moderate assist   Additional items Assist x 1  (RECMMEND CHAIR FOLLOW AX2 FOR PROGRESSION )   Assistive Device Rolling walker   Distance weightshift in standing at EOB x 10 reps w/ manual facilitation; then <2' sidestepping to [facility] w/ modA and A for RW negotiaion (+) mild  knee buckling on completion 2 fatigue-    Balance   Static Sitting Poor +   Dynamic Sitting Poor +   Static Standing Poor   Dynamic Standing Poor   Ambulatory Poor -   Endurance Deficit   Endurance Deficit Yes   Endurance Deficit Description significant fatigue and weakness -    Activity Tolerance   Activity Tolerance Patient limited by fatigue   Nurse Made Aware yes- updated    Exercises   Heelslides Supine;15 reps;AAROM;AROM; Right;Left   Hip Abduction Supine;15 reps;AAROM;AROM; Right;Left   Knee AROM Long Arc Quad Sitting;10 reps;AROM; Right;Left  (EOB)   Ankle Pumps Supine;25 reps;AAROM;AROM; Right;Left   Balance training  standing weightshift/ pre-gait; balance activities    Assessment   Prognosis Fair   Problem List Decreased strength;Decreased range of motion;Decreased endurance; Impaired balance;Decreased mobility; Decreased coordination;Decreased cognition; Impaired judgement;Decreased safety awareness; Impaired vision; Impaired hearing;Obesity   Assessment Pt was seen for skilled PT session w/ son and daughter present  Pt remains very confused and per daughter is "getting worse" in comparison to yesterday  Pt required increased time and repeated verbal as well as tactile instructions to perform bed level therex prior to mobilization; pt then required modA for supine to sit w/ poor initiation of movement ; seated balance at EOB fluctuated from modA>SBA once positioned and pt repeatedly reported "so weak" throughout seated therex 9see flowsheet) - pt was agreeable to attempt standing at EOB and required modA w/ max cues for forward weight shifting as well as hand placement on bed- pt was able to tolerate standing weight shift x 10 reps; seated rest then an additional stand w/ sidesteps to [facility] w/ modA; and A for RW negotiation   Pt was unable to progress to functional forward ambulation 2* knee buckling as well as ongoing weakness and confusion  Pt was returned to bed w/ modA for LE management  and placed w/ bed in chair position for upright posture  Pt w/ intermittent wet sounding coughing through PT session- but cough was nonproductive  Bed alarm activated post session- all needs in reach and daughter and son at bedside  PT had discussion w/ family who both report pt is far from her functional baseline and they will be unable to care for her at [address] [place] [address] in agreement w/ same and d/c recommendation based on decline in function is changed to inpt rehab- to maximize function and safety prior to d/c home  Will cont to follow  Barriers to Discharge Decreased caregiver support   Goals   Patient Goals pt unable to express on tx 2* cognition   STG Expiration Date 06/02/18   Treatment Day 1   Plan   Treatment/Interventions Functional transfer training;LE strengthening/ROM; Therapeutic exercise; Endurance training;Cognitive reorientation;Patient/family training;Equipment eval/education; Bed mobility;Gait training;Spoke to nursing;Family   Progress Slow progress, decreased activity tolerance   PT Frequency (3-5x/week)   Recommendation   Recommendation Short-term skilled PT  (changed from IE 2* decline in status and discussion w/ famil)   Equipment Recommended Shlomo Osorio; Wheelchair   PT - OK to Discharge No       Wendy Escalante PT

## 2018-05-24 NOTE — PLAN OF CARE
Problem: PHYSICAL THERAPY ADULT  Goal: Performs mobility at highest level of function for planned discharge setting  See evaluation for individualized goals  Treatment/Interventions: ADL retraining, Functional transfer training, LE strengthening/ROM, Elevations, Therapeutic exercise, Endurance training, Patient/family training, Equipment eval/education, Bed mobility, Gait training, Spoke to nursing  Equipment Recommended: Caryl Salomon       See flowsheet documentation for full assessment, interventions and recommendations  Outcome: Not Progressing  Prognosis: Fair  Problem List: Decreased strength, Decreased range of motion, Decreased endurance, Impaired balance, Decreased mobility, Decreased coordination, Decreased cognition, Impaired judgement, Decreased safety awareness, Impaired vision, Impaired hearing, Obesity  Assessment: Pt was seen for skilled PT session w/ son and daughter present  Pt remains very confused and per daughter is "getting worse" in comparison to yesterday  Pt required increased time and repeated verbal as well as tactile instructions to perform bed level therex prior to mobilization; pt then required modA for supine to sit w/ poor initiation of movement ; seated balance at EOB fluctuated from modA>SBA once positioned and pt repeatedly reported "so weak" throughout seated therex 9see flowsheet) - pt was agreeable to attempt standing at EOB and required modA w/ max cues for forward weight shifting as well as hand placement on bed- pt was able to tolerate standing weight shift x 10 reps; seated rest then an additional stand w/ sidesteps to [facility] w/ modA; and A for RW negotiation  Pt was unable to progress to functional forward ambulation 2* knee buckling as well as ongoing weakness and confusion  Pt was returned to bed w/ modA for LE management  and placed w/ bed in chair position for upright posture  Pt w/ intermittent wet sounding coughing through PT session- but cough was nonproductive   Bed alarm activated post session- all needs in reach and daughter and son at bedside  PT had discussion w/ family who both report pt is far from her functional baseline and they will be unable to care for her at [address] in agreement w/ same and d/c recommendation based on decline in function is changed to inpt rehab- to maximize function and safety prior to d/c home  Will cont to follow  Barriers to Discharge: Decreased caregiver support     Recommendation: (S) Short-term skilled PT (changed from IE 2* decline in status and discussion w/ famil)     PT - OK to Discharge: No    See flowsheet documentation for full assessment

## 2018-05-24 NOTE — ASSESSMENT & PLAN NOTE
Patient still feels weak and had more coughing and wheezing this morning  Her oxygen saturations 91 92% on room air  She has been afebrile for 24 hr   Leukocytosis is decreasing  Will continue IV Rocephin and azithromycin for treatment of left-sided pneumonia  Will continue Physical Occupational therapy and ambulation to get her stronger before discharging home in the next 24-48hrs    I discussed the case with patient's daughter on the phone in detail      Chest x-ray shows no CHF this morning

## 2018-05-24 NOTE — PROGRESS NOTES
Progress Note - Cale Basilio 1930, 80 y o  female MRN: [id number]    Unit/Bed#: [address] [place] [address] Encounter: [id number]    Primary Care Provider: Hermes Powers MD   Date and time admitted to hospital: 2018  8:43 PM        * Lingular pneumonia   Assessment & Plan    Patient still feels weak and had more coughing and wheezing this morning  Her oxygen saturations 91 92% on room air  She has been afebrile for 24 hr   Leukocytosis is decreasing  Will continue IV Rocephin and azithromycin for treatment of left-sided pneumonia  Will continue Physical Occupational therapy and ambulation to get her stronger before discharging home in the next 24-48hrs    I discussed the case with patient's daughter on the phone in detail  Chest x-ray shows no CHF this morning        Sepsis [facility])   Assessment & Plan    Patient meets criteria for sepsis as evidenced by temperature 1 more than 102, heart rate more than 100, leukocytosis of 16,000 with left shift  This is due to left-sided pneumonia  Blood culture showing no growth  Sepsis resolved         Acute metabolic encephalopathy   Assessment & Plan    Patient's acute metabolic encephalopathy is due to the pneumonia  CSF studies shows no meningitis            VTE Prophylaxis: in place    Patient Centered Rounds: I rounded with patient's nurse    Current Length of Stay: 2 day(s)    Current Patient Status: Inpatient    Certification Statement: Pt requires additional inpatient hospital stay due to: see assessment and plan        Subjective:   Patient's nurse reports that patient was weak and wheezy when she returned from bathroom this morning  Patient denies any chest pain, nausea, abdominal pain  Complains of cough with phlegm production    Denies diarrhea    All other ROS are negative    Objective:     Vitals:   Temp (24hrs), Av 7 °F (37 1 °C), Min:97 7 °F (36 5 °C), Max:100 2 °F (37 9 °C)    HR:  [68-93] 83  Resp:  [13-26] 18  BP: (122-156)/(58-85) 134/61  SpO2: [91 %-98 %] 91 %  Body mass index is 31 01 kg/m²  Input and Output Summary (last 24 hours): Intake/Output Summary (Last 24 hours) at 05/24/18 0827  Last data filed at 05/24/18 0044   Gross per 24 hour   Intake              840 ml   Output              525 ml   Net              315 ml       Physical Exam:     Physical Exam   Constitutional: She appears well-developed  No distress  HENT:   Head: Normocephalic  Mouth/Throat: Oropharynx is clear and moist    Eyes: Conjunctivae are normal    Neck: Neck supple  Cardiovascular: Normal rate and regular rhythm  Pulmonary/Chest: Effort normal  No respiratory distress  She has no wheezes  She has no rales  I heard no crackles wheezing or rhonchi on physical examination this morning  She did received a nebulizer treatment earlier this morning   Abdominal: Soft  Bowel sounds are normal  She exhibits no distension  There is no tenderness  Musculoskeletal: She exhibits no tenderness  Lymphadenopathy:     She has no cervical adenopathy  Neurological: She is alert  No cranial nerve deficit  Skin: Skin is warm and dry  No rash noted  Psychiatric: She has a normal mood and affect  Vitals reviewed  I personally reviewed labs and imaging reports for today        Last 24 Hours Medication List:     Current Facility-Administered Medications:  acetaminophen 650 mg Oral Q6H PRN Jorgito Mace PA-C    albuterol 2 5 mg Nebulization Q4H PRN Elias Grove MD    cefTRIAXone 1,000 mg Intravenous Q24H Benita Fiore MD Last Rate: 1,000 mg (05/24/18 0100)   And        azithromycin 500 mg Oral Q24H Benita Fiore MD    cholecalciferol 400 Units Oral Daily Jorgito Mace PA-C    ciprofloxacin-dexamethasone 4 drop Right Ear BID Jorgito Mace PA-C    cyanocobalamin 100 mcg Oral Daily Jorgito Mace PA-C    dextromethorphan-guaiFENesin 10 mL Oral Q4H PRN REAL Ricks    enoxaparin 40 mg Subcutaneous Q24H 1000 Lisa Dempsey MD gabapentin 800 mg Oral BID Elvie Briscoe PA-C    influenza vaccine 0 5 mL Intramuscular Prior to discharge Elvie Briscoe PA-C    ondansetron 4 mg Intravenous Q6H PRN Elvie Briscoe PA-C    pantoprazole 40 mg Oral Early Morning Elvie Briscoe PA-C    pneumococcal 13-valent conjugate vaccine 0 5 mL Intramuscular Prior to discharge Elvie Briscoe PA-C    pravastatin 20 [facility]    senna 1 tablet Oral Daily Elvie Briscoe PA-C           Today, Patient Was Seen By: Black Meyer MD    ** Please Note: Dictation voice to text software may have been used in the creation of this document   **

## 2018-05-24 NOTE — ASSESSMENT & PLAN NOTE
Patient meets criteria for sepsis as evidenced by temperature 1 more than 102, heart rate more than 100, leukocytosis of 16,000 with left shift  This is due to left-sided pneumonia  Blood culture showing no growth    Sepsis resolved

## 2018-05-24 NOTE — PROGRESS NOTES
Tutu Farrar  80 y o   female  1/28/1930  mrn [id number]    Assessment/Plan:    1  Pneumonia/Fever/Leukocytosis/Encephalopathy: Still has low grade fever but temps have decreased since her admission  WBC count is down to 16K  She still remains weak  Today's CXR showed increased lingular consolidation but I think her CXR findings are probably lagging behind her clinical response to abx tx  Urine Legionella Ag and urine Pneumococcal Ag are neg  Bld cx's are neg  Sputum cx is pending  Pt presented with cough, SOB, and confusion  She had fever and elevated WBC count  Initial CXR did not show any pulmonary infilts  She was started on Rocephin and Azithromycin for tx of possible pneumonia  Pt had repeat CXR which showed the development of left lingular consolidation c/w pneumonia  Vanco was added on 5/22/18 until meningitis was excluded as the etiology for her fever and confusion  Pt underwent LP  and CSF studies were not c/w meningitis so Vanco was discontinued  No evidence of UTI  Her MS has improved      A  Cont  Rocephin and Azithromycin for tx of community acquired pneumonia - Pt will receive last dose of Azithromycin today  B  Awaiting final cx results  C  Will follow WBC count     Subjective: Still doesn't feel well  Remains weak with occas   cough    Objective:  Tmax: 100 2  Lungs: +Crackles at the left base  Abd: +BS, soft, nontender    Labs:  CBC w/diff  Recent Labs      05/24/18   0520   WBC  16 48*   HGB  9 7*   HCT  31 5*   PLT  189   NEUTOPHILPCT  82*   LYMPHOPCT  10*   MONOPCT  8   EOSPCT  0     BMP  Recent Labs      05/23/18   0528   NA  140   K  4 0   CL  102   CO2  31   BUN  18   CREATININE  0 86   GLUCOSE  104   CALCIUM  8 9     CMP  Recent Labs      05/21/18   2116   05/23/18   0528   NA  139   < >  140   K  3 8   < >  4 0   CL  99*   < >  102   CO2  31   < >  31   BUN  16   < >  18   CREATININE  0 91   < >  0 86   CALCIUM  9 5   < >  8 9   PROT  7 7   --    --    BILITOT  0 60   --    --    [facility] 59   --    --    ALT  16   --    --    AST  17   --    --    GLUCOSE  138   < >  104    < > = values in this interval not displayed  [facility]    Cultures:  Lab Results   Component Value Date    BLOODCX No Growth at 48 hrs  05/21/2018    BLOODCX No Growth at 48 hrs  05/21/2018    BLOODCX No Growth at 48 hrs  05/21/2018    BLOODCX No Growth at 48 hrs  05/21/2018     No results found for: [facility]   Lab Results   Component Value Date    URINECX >100,000 cfu/ml Escherichia coli 07/28/2016     No results found for: SPUTUMCULTUR    MED:  Rocephin: #4  Azithromycin: #3      Current Facility-Administered Medications:     acetaminophen (TYLENOL) tablet 650 mg, 650 mg, Oral, Q6H PRN, Calixto Collado PA-C, 650 mg at 05/24/18 1101    albuterol inhalation solution 2 5 mg, 2 5 mg, Nebulization, Q4H PRN, Liudmila Kurtz MD, 2 5 mg at 05/24/18 0620    [START ON 5/25/2018] cefTRIAXone (ROCEPHIN) IVPB (premix) 1,000 mg, 1,000 mg, Intravenous, Q24H **AND** azithromycin (ZITHROMAX) tablet 500 mg, 500 mg, Oral, Once, Marisa Chavez MD    cholecalciferol (VITAMIN D3) tablet 400 Units, 400 Units, Oral, Daily, Calixto Collado PA-C, 400 Units at 05/24/18 0854    ciprofloxacin-dexamethasone (CIPRODEX) 0 3-0 1 % otic suspension 4 drop, 4 drop, Right Ear, BID, Calixto Collado PA-C, 4 drop at 05/24/18 8690    cyanocobalamin (VITAMIN B-12) tablet 100 mcg, 100 mcg, Oral, Daily, Calixto Collado PA-C, 100 mcg at 05/24/18 0905    dextromethorphan-guaiFENesin (ROBITUSSIN DM)  mg/5 mL oral syrup 10 mL, 10 mL, Oral, Q4H PRN, REAL James, 10 mL at 05/24/18 1101    enoxaparin (LOVENOX) subcutaneous injection 40 mg, 40 mg, Subcutaneous, Q24H [facility], Liudmila Kurtz MD, 40 mg at 05/24/18 0854    gabapentin (NEURONTIN) capsule 800 mg, 800 mg, Oral, BID, Calixto Collado PA-C, 800 mg at 05/24/18 5966    influenza inactivated quadrivalent vaccine (FLULAVAL) IM injection 0 5 mL, 0 5 mL, Intramuscular, Prior to discharge, Diogo Oscar PA-C    ondansetron [facility]) injection 4 mg, 4 mg, Intravenous, Q6H PRN, Diogo Oscar PA-C, 4 mg at 05/22/18 5924    pantoprazole (PROTONIX) EC tablet 40 mg, 40 mg, Oral, Early Morning, Diogo Oscar PA-C, 40 mg at 05/24/18 0555    pneumococcal 13-valent conjugate vaccine (PREVNAR-13) IM injection 0 5 mL, 0 5 mL, Intramuscular, Prior to discharge, Diogo Oscar PA-C    pravastatin (PRAVACHOL) tablet 20 mg, 20 mg, Oral, After Dinner, Diogo Oscar PA-C, 20 mg at 05/23/18 1824    senna (SENOKOT) tablet 8 6 mg, 1 tablet, Oral, Daily, Diogo Oscar PA-C, 8 6 mg at 05/24/18 9701    Principal Problem:    Lingular pneumonia  Active Problems:    Essential hypertension    Sepsis ([address] )    Acute metabolic encephalopathy      Jose Roberto Guerrero MD

## 2018-05-25 PROBLEM — D64.9 ANEMIA: Status: ACTIVE | Noted: 2018-05-25

## 2018-05-25 LAB
ANION GAP SERPL CALCULATED.3IONS-SCNC: 7 MMOL/L (ref 4–13)
BUN SERPL-MCNC: 11 MG/DL (ref 5–25)
CALCIUM SERPL-MCNC: 8.7 MG/DL (ref 8.3–10.1)
CHLORIDE SERPL-SCNC: 103 MMOL/L (ref 100–108)
CO2 SERPL-SCNC: 32 MMOL/L (ref 21–32)
CREAT SERPL-MCNC: 0.73 MG/DL (ref 0.6–1.3)
ERYTHROCYTE [DISTWIDTH] IN BLOOD BY AUTOMATED COUNT: 14.4 % (ref 11.6–15.1)
GFR SERPL CREATININE-BSD FRML MDRD: 74 ML/MIN/1.73SQ M
GLUCOSE SERPL-MCNC: 107 MG/DL (ref 65–140)
HCT VFR BLD AUTO: 32.3 % (ref 34.8–46.1)
HGB BLD-MCNC: 9.9 G/DL (ref 11.5–15.4)
IRON SATN MFR SERPL: 8 %
IRON SERPL-MCNC: 19 UG/DL (ref 50–170)
MCH RBC QN AUTO: 27.3 PG (ref 26.8–34.3)
MCHC RBC AUTO-ENTMCNC: 30.7 G/DL (ref 31.4–37.4)
MCV RBC AUTO: 89 FL (ref 82–98)
PLATELET # BLD AUTO: 223 THOUSANDS/UL (ref 149–390)
PMV BLD AUTO: 9.5 FL (ref 8.9–12.7)
POTASSIUM SERPL-SCNC: 3.5 MMOL/L (ref 3.5–5.3)
PROCALCITONIN SERPL-MCNC: 0.33 NG/ML
RBC # BLD AUTO: 3.62 MILLION/UL (ref 3.81–5.12)
SODIUM SERPL-SCNC: 142 MMOL/L (ref 136–145)
TIBC SERPL-MCNC: 226 UG/DL (ref 250–450)
VIT B12 SERPL-MCNC: 838 PG/ML (ref 100–900)
WBC # BLD AUTO: 13.52 THOUSAND/UL (ref 4.31–10.16)

## 2018-05-25 PROCEDURE — 99232 SBSQ HOSP IP/OBS MODERATE 35: CPT | Performed by: INTERNAL MEDICINE

## 2018-05-25 PROCEDURE — G8987 SELF CARE CURRENT STATUS: HCPCS

## 2018-05-25 PROCEDURE — 82607 VITAMIN B-12: CPT | Performed by: INTERNAL MEDICINE

## 2018-05-25 PROCEDURE — 94760 N-INVAS EAR/PLS OXIMETRY 1: CPT

## 2018-05-25 PROCEDURE — 80048 BASIC METABOLIC PNL TOTAL CA: CPT | Performed by: INTERNAL MEDICINE

## 2018-05-25 PROCEDURE — 97166 OT EVAL MOD COMPLEX 45 MIN: CPT

## 2018-05-25 PROCEDURE — 84165 PROTEIN E-PHORESIS SERUM: CPT | Performed by: INTERNAL MEDICINE

## 2018-05-25 PROCEDURE — 84165 PROTEIN E-PHORESIS SERUM: CPT | Performed by: PATHOLOGY

## 2018-05-25 PROCEDURE — 85027 COMPLETE CBC AUTOMATED: CPT | Performed by: INTERNAL MEDICINE

## 2018-05-25 PROCEDURE — 83540 ASSAY OF IRON: CPT | Performed by: INTERNAL MEDICINE

## 2018-05-25 PROCEDURE — G8988 SELF CARE GOAL STATUS: HCPCS

## 2018-05-25 PROCEDURE — 84145 PROCALCITONIN (PCT): CPT | Performed by: INTERNAL MEDICINE

## 2018-05-25 PROCEDURE — 83550 IRON BINDING TEST: CPT | Performed by: INTERNAL MEDICINE

## 2018-05-25 RX ORDER — CIPROFLOXACIN AND DEXAMETHASONE 3; 1 MG/ML; MG/ML
4 SUSPENSION/ DROPS AURICULAR (OTIC) 2 TIMES DAILY
Status: DISCONTINUED | OUTPATIENT
Start: 2018-05-25 | End: 2018-05-27 | Stop reason: HOSPADM

## 2018-05-25 RX ORDER — PREDNISONE 20 MG/1
20 TABLET ORAL 2 TIMES DAILY WITH MEALS
Status: DISCONTINUED | OUTPATIENT
Start: 2018-05-25 | End: 2018-05-27 | Stop reason: HOSPADM

## 2018-05-25 RX ADMIN — CEFTRIAXONE 1000 MG: 1 INJECTION, SOLUTION INTRAVENOUS at 03:11

## 2018-05-25 RX ADMIN — GUAIFENESIN AND DEXTROMETHORPHAN 10 ML: 100; 10 SYRUP ORAL at 22:13

## 2018-05-25 RX ADMIN — CHOLECALCIFEROL TAB 10 MCG (400 UNIT) 400 UNITS: 10 TAB at 08:37

## 2018-05-25 RX ADMIN — CIPROFLOXACIN AND DEXAMETHASONE 4 DROP: 3; 1 SUSPENSION/ DROPS AURICULAR (OTIC) at 17:22

## 2018-05-25 RX ADMIN — PREDNISONE 20 MG: 20 TABLET ORAL at 09:10

## 2018-05-25 RX ADMIN — GABAPENTIN 800 MG: 400 CAPSULE ORAL at 21:56

## 2018-05-25 RX ADMIN — ENOXAPARIN SODIUM 40 MG: 40 INJECTION SUBCUTANEOUS at 08:37

## 2018-05-25 RX ADMIN — VITAMIN B12 0.1 MG ORAL TABLET 100 MCG: 0.1 TABLET ORAL at 08:37

## 2018-05-25 RX ADMIN — GABAPENTIN 800 MG: 400 CAPSULE ORAL at 08:37

## 2018-05-25 RX ADMIN — PRAVASTATIN SODIUM 20 MG: 20 TABLET ORAL at 17:21

## 2018-05-25 RX ADMIN — SENNOSIDES 8.6 MG: 8.6 TABLET, FILM COATED ORAL at 08:37

## 2018-05-25 RX ADMIN — PREDNISONE 20 MG: 20 TABLET ORAL at 17:21

## 2018-05-25 RX ADMIN — CEFEPIME HYDROCHLORIDE 1000 MG: 1 INJECTION, SOLUTION INTRAVENOUS at 13:39

## 2018-05-25 RX ADMIN — PANTOPRAZOLE SODIUM 40 MG: 40 TABLET, DELAYED RELEASE ORAL at 05:26

## 2018-05-25 RX ADMIN — ACETAMINOPHEN 650 MG: 325 TABLET ORAL at 22:01

## 2018-05-25 NOTE — OCCUPATIONAL THERAPY NOTE
Occupational Therapy Evaluation      Pasha Burnett    5/25/2018    Patient Active Problem List   Diagnosis    Essential hypertension    Idiopathic peripheral neuropathy    Esophageal reflux    Vitamin B 12 deficiency    Sepsis ([address] )    Acute metabolic encephalopathy    Lingular pneumonia    Anemia       Past Medical History:   Diagnosis Date    Balance disorder     GERD (gastroesophageal reflux disease)     Hard of hearing     Hyperlipidemia     Hypertension     Neuropathy     Tinnitus        Past Surgical History:   Procedure Laterality Date    EYE SURGERY      HYSTERECTOMY        05/25/18 7295   Note Type   Note type Eval only   Restrictions/Precautions   Other Precautions Fall Risk   Pain Assessment   Pain Assessment No/denies pain   Pain Score No Pain   Home Living   Type of Home House   Home Layout Multi-level   Bathroom Shower/Tub Tub/shower unit   Home Equipment Cane;Walker   Prior Function   Level of [place] Independent with ADLs and functional mobility   Lives With Daughter   Receives Help From Family   ADL Assistance Independent   IADLs Independent   Falls in the last 6 months 0   Vocational Retired   Comments resides with daughter but is fully I with ADLs and IADLs PTA, does not drive  Lifestyle   Autonomy Patient resides with daughter who works during the day  Patient is home alone during the day with a dog, reports sometims her grandson is home    Patient reports she helps around the house, does dishes, makes meals, does lt homemaking    Reciprocal Relationships Patient resides with daughter who is supportive    Service to Others Retired, however still likes to help her daughter around the house    Intrinsic Gratification enjoys spending time with the dog during the day, likes TV, likes to do [employer]    Psychosocial   Psychosocial (WDL) WDL   Subjective   Subjective "I did everything before this, I was cleaning the house and making supper"   ADL   Eating Assistance 7 Independent   Eating Deficit Setup   Grooming Assistance 5  Supervision/Setup   Grooming Deficit Setup  (seated in chair )   UB Bathing Assistance 4  Minimal Assistance   UB Bathing Deficit Setup  (seated in chair, fatigues easily )   LB Bathing Assistance 4  Minimal Assistance   LB Bathing Deficit Right lower leg including foot; Left lower leg including foot   UB Dressing Assistance 4  Minimal Assistance   LB Dressing Assistance 3  Moderate Assistance   LB Dressing Deficit Thread LLE into pants; Thread RLE into pants;Pull up over hips  (difficulty with reaching lower legs and feet, dec balance )   Toileting Assistance  4  Minimal Assistance   Toileting Deficit Clothing management down;Clothing management up;Perineal hygiene   Transfers   Sit to Stand 4  Minimal assistance   Additional items Assist x 1   Stand to Sit 4  Minimal assistance   Additional items Assist x 1   Stand pivot 4  Minimal assistance   Functional Mobility   Functional Mobility 4  Minimal assistance   Additional items Rolling walker   Balance   Static Sitting Good   Dynamic Sitting Fair +   Static Standing Fair   Dynamic Standing Fair -   Activity Tolerance   Activity Tolerance Patient tolerated treatment well   Medical Staff Made Aware OT spoke with Yonatan Holley, PT    RUE Assessment   RUE Assessment WFL  (shoulder 3/5, elbow 3+/5 , wrist 3+/5, grasp 3/5 )   LUE Assessment   LUE Assessment WFL  (shoulder 3/5, elbow 3+/5 , wrist 3+/5, grasp 3/5 )   Hand Function   Gross Motor Coordination Functional   Fine Motor Coordination Functional   Sensation   Light Touch No apparent deficits   Proprioception   Proprioception No apparent deficits   Vision - Complex Assessment   Ocular Range of Motion WFL   Cognition   Overall Cognitive Status WFL   Arousal/Participation Alert; Responsive   Attention Within functional limits   Orientation Level Oriented X4   Memory Within functional limits   Following Commands Follows all commands and directions without difficulty Assessment   Limitation Decreased ADL status; Decreased UE strength   Prognosis Good   Assessment Patient is an 79 y/o female admitted with c/o productive cough, low grade fever and SOB x 1 week  Patient diagnosed with Lingular Pneumonia and Acute Metabolic Encephalopathy, referred to OT for functional assessment of ADL and IADL ability for discharge recommendatons  Patient is alert and semi-oriented this AM however became more orientated with conversation and engagement in activity  Patient currently presents with decreased activity tolerance, decreased standing balance and gross muscle weakness impacting occupational peformance in areas of bathing, dressing, toileing, ADL transfers and functional mobility  From OT standpoint, do not feel patient is functionng at level where she is safe to be home alone while daughter works, strongly recommend in-patient rehab prior to discharge home  Will continue to follow for acute care OT services to progress ADL skills to highest level of indepence and safety with ADLS with least amount of caregiver assistance via rehabilitation strategies to improve strength, balance and endurance for ADL/IADL function  Goals   Patient Goals " I think I need rehab, I am very weak and washed out"   LTG Time Frame 3-7   Long Term Goal #1 1  Patient will increase activity tolerance for grooming and UB bathing at sink x 10 min ; 2   Patient will increase LB bathing and dressing skills to SBA level with F+ dynamic balance for bending/reaching LB, pulling up pants and managing clothing; 3  Patient will increase all aspects of toileting to Mod I level 4  Patient will increase ADL transfers to SBA with G safety awareness    Plan   Treatment Interventions ADL retraining;Functional transfer training;UE strengthening/ROM; Endurance training;Patient/family training   Goal Expiration Date 06/01/18   OT Frequency 3-5x/wk   Recommendation   Recommendation PT consult   OT Discharge Recommendation Short Term Rehab   OT - OK to Discharge Yes  (When medically stable )   Barthel Index   Feeding 5   Bathing 0   Grooming Score 0   Dressing Score 5   Bladder Score 10   Bowels Score 10   Toilet Use Score 5   Transfers (Bed/Chair) Score 10   Mobility (Level Surface) Score 10   Stairs Score 0   Barthel Index Score 55   Modified Capri Scale   Modified Arroyo Scale 4   Malika Max, OT

## 2018-05-25 NOTE — SOCIAL WORK
Continuing to follow patient,  Patient is approved for admission at [facility], family's first choice and a bed is available  As per Dr Ash Servant patient is a tentative discharge for Saturday  Spoke with patient's daughter Lulu Smith and she is requesting a WC van to transport  [facility] to transport at 1 pm   Notified patient, her daughter , Fay Salcedo of [facility] and Campos Fernandes, her nurse

## 2018-05-25 NOTE — PLAN OF CARE
Problem: OCCUPATIONAL THERAPY ADULT  Goal: Performs self-care activities at highest level of function for planned discharge setting  See evaluation for individualized goals  Treatment Interventions: ADL retraining, Functional transfer training, UE strengthening/ROM, Endurance training, Patient/family training          See flowsheet documentation for full assessment, interventions and recommendations  Limitation: Decreased ADL status, Decreased UE strength  Prognosis: Good  Assessment: Patient is an 79 y/o female admitted with c/o productive cough, low grade fever and SOB x 1 week  Patient diagnosed with Lingular Pneumonia and Acute Metabolic Encephalopathy, referred to OT for functional assessment of ADL and IADL ability for discharge recommendatons  Patient is alert and semi-oriented this AM however became more orientated with conversation and engagement in activity  Patient currently presents with decreased activity tolerance, decreased standing balance and gross muscle weakness impacting occupational peformance in areas of bathing, dressing, toileing, ADL transfers and functional mobility  From OT standpoint, do not feel patient is functionng at level where she is safe to be home alone while daughter works, strongly recommend in-patient rehab prior to discharge home  Will continue to follow for acute care OT services to progress ADL skills to highest level of indepence and safety with ADLS with least amount of caregiver assistance via rehabilitation strategies to improve strength, balance and endurance for ADL/IADL function      Recommendation: PT consult  OT Discharge Recommendation: Short Term Rehab  OT - OK to Discharge: Yes (When medically stable )

## 2018-05-25 NOTE — PROGRESS NOTES
Progress Note - Kenney Tsang 80 y o  female MRN: [id number]    Unit/Bed#: [address] [place] [address] [id number] Encounter: [id number]      Assessment:    Principal Problem:    Lingular pneumonia  Active Problems:    Essential hypertension    Sepsis ([address] )    Acute metabolic encephalopathy    Anemia  Resolved Problems:    * No resolved hospital problems  *      Plan:  Evaluate anemia  This is more than likely a chronic process  Add low-dose steroids to improve outcome of her pneumonia  Patient is going to be evaluated for possible short-term rehab placement as per PT yesterday  Check O2 sat room air  Patient is done with azithromycin    Subjective:   Patient very slow to get around still coughing somewhat lethargic    Objective:     Vitals: Blood pressure 161/67, pulse 70, temperature 98 °F (36 7 °C), temperature source Oral, resp  rate 18, height 5' 3" (1 6 m), weight 79 4 kg (175 lb 0 7 oz), SpO2 97 %  ,Body mass index is 31 01 kg/m²  Intake/Output Summary (Last 24 hours) at 05/25/18 0852  Last data filed at 05/25/18 5939   Gross per 24 hour   Intake              100 ml   Output              500 ml   Net             -400 ml       Physical Exam:    Alert and awake in no acute distress  Lungs rales left lung field  Heart regular rate and rythm, normal heart sounds  Abdomen soft, active bowel sounds, non-tender, non-distended  Extremities: no cyanosis, clubbing or edema          Invasive Devices     Peripheral Intravenous Line            Peripheral IV 05/22/18 Left Antecubital 2 days                            Lab, Imaging and other studies: I have personally reviewed pertinent reports         Results from last 7 days  Lab Units 05/25/18  0513 05/24/18  0520 05/23/18  0528 05/22/18  0527   WBC Thousand/uL 13 52* 16 48* 18 98* 21 90*   HEMOGLOBIN g/dL 9 9* 9 7* 10 6* 12 1   HEMATOCRIT % 32 3* 31 5* 33 6* 37 4   PLATELETS Thousands/uL 223 189 197 246   NEUTROS PCT %  --  82* 82* 83*   LYMPHS PCT %  --  10* 10* 8*   MONOS PCT %  -- 8 8 9   EOS PCT %  --  0 0 0       Results from last 7 days  Lab Units 05/25/18  0513 05/23/18  0528 05/22/18  0527 05/21/18 2116 05/21/18  1353   SODIUM mmol/L 142 140 139 139 140   POTASSIUM mmol/L 3 5 4 0 3 9 3 8 3 8   CHLORIDE mmol/L 103 102 99* 99* 100   CO2 mmol/L 32 31 28 31 34*   BUN mg/dL 11 18 16 16 16   CREATININE mg/dL 0 73 0 86 0 91 0 91 0 75   CALCIUM mg/dL 8 7 8 9 8 8 9 5 9 6   TOTAL PROTEIN g/dL  --   --   --  7 7 7 9   BILIRUBIN TOTAL mg/dL  --   --   --  0 60 0 30   ALK PHOS U/L  --   --   --  59 51   ALT U/L  --   --   --  16 17   AST U/L  --   --   --  17 22   GLUCOSE RANDOM mg/dL 107 104 117 138 97     Lab Results   Component Value Date    TROPONINI <0 02 05/21/2018    CKTOTAL 112 01/26/2014       Results from last 7 days  Lab Units 05/21/18 2116 05/21/18  1353   INR  1 23* 1 12     Lab Results   Component Value Date    BLOODCX No Growth at 72 hrs  05/21/2018    BLOODCX No Growth at 72 hrs  05/21/2018    BLOODCX No Growth at 72 hrs  05/21/2018    URINECX >100,000 cfu/ml Escherichia coli 07/28/2016    SPUTUMCULTUR Culture results to follow  05/23/2018       Imaging:  Results for orders placed during the hospital encounter of 05/21/18   XR chest portable    Narrative CHEST     INDICATION:   Shortness of breath  COMPARISON:  May 22, 2018  EXAM PERFORMED/VIEWS:  XR CHEST PORTABLE      FINDINGS:    Heart shadow is enlarged but unchanged from prior exam     Increased size and area of dense airspace opacity in the left perihilar region consistent with pneumonia  Right lung is clear  No pneumothorax or pleural effusion  Scoliotic deformity and multilevel thoracic degenerative change  Impression Worsening left perihilar pneumonia  Workstation performed: [id number]       Results for orders placed during the hospital encounter of 05/21/18   XR chest pa & lateral    Narrative CHEST     INDICATION:   SOB     COMPARISON:  Chest x-ray performed the previous day      EXAM PERFORMED/VIEWS:  XR CHEST PA & LATERAL  The frontal view was performed utilizing dual energy radiographic technique  FINDINGS:    Cardiomediastinal silhouette appears unremarkable  Interval development of a left lingular consolidation extending into the hilum  This may represent evolving pneumonia versus atelectasis  Osseous structures appear within normal limits for patient age  Impression New lingular consolidation        Workstation performed: [id number]         PATIENT CENTERED ROUNDS: I have performed rounds with the nursing staff  This note has been constructed using a voice recognition system      Salud Kraft MD

## 2018-05-25 NOTE — PROGRESS NOTES
Family asking about her chest xray results  I explained that I am not able to give that information but will contact the nurse practitioner  Family was polite and understanding and in agreement  Yuli Stoll was asked to inform family regarding results  Vic Sauer spoke with the family

## 2018-05-25 NOTE — SOCIAL WORK
As per Fransisco Lala, PT patient has had a decline and now could benefit from SNF rehab  Spoke with Jai Osman, patient's daughter and she is aware that patient is weaker and is agreeable to SNF rehab  [place] [facility] given and she is requesting referral to [facility] via [place]  Await bed availability

## 2018-05-26 PROBLEM — A41.9 SEPSIS (HCC): Status: RESOLVED | Noted: 2018-05-22 | Resolved: 2018-05-26

## 2018-05-26 LAB
ANION GAP SERPL CALCULATED.3IONS-SCNC: 6 MMOL/L (ref 4–13)
BACTERIA BLD CULT: NORMAL
BACTERIA BLD CULT: NORMAL
BACTERIA CSF CULT: NO GROWTH
BACTERIA SPT RESP CULT: ABNORMAL
BACTERIA SPT RESP CULT: ABNORMAL
BASOPHILS # BLD AUTO: 0.01 THOUSANDS/ΜL (ref 0–0.1)
BASOPHILS NFR BLD AUTO: 0 % (ref 0–1)
BUN SERPL-MCNC: 16 MG/DL (ref 5–25)
CALCIUM SERPL-MCNC: 9.6 MG/DL (ref 8.3–10.1)
CHLORIDE SERPL-SCNC: 103 MMOL/L (ref 100–108)
CO2 SERPL-SCNC: 30 MMOL/L (ref 21–32)
CREAT SERPL-MCNC: 0.59 MG/DL (ref 0.6–1.3)
EOSINOPHIL # BLD AUTO: 0 THOUSAND/ΜL (ref 0–0.61)
EOSINOPHIL NFR BLD AUTO: 0 % (ref 0–6)
ERYTHROCYTE [DISTWIDTH] IN BLOOD BY AUTOMATED COUNT: 13.9 % (ref 11.6–15.1)
GFR SERPL CREATININE-BSD FRML MDRD: 82 ML/MIN/1.73SQ M
GLUCOSE SERPL-MCNC: 122 MG/DL (ref 65–140)
GRAM STN SPEC: ABNORMAL
HCT VFR BLD AUTO: 34.6 % (ref 34.8–46.1)
HGB BLD-MCNC: 10.9 G/DL (ref 11.5–15.4)
HSV1 DNA SPEC QL NAA+PROBE: NEGATIVE
HSV2 DNA SPEC QL NAA+PROBE: NEGATIVE
LYMPHOCYTES # BLD AUTO: 1.11 THOUSANDS/ΜL (ref 0.6–4.47)
LYMPHOCYTES NFR BLD AUTO: 8 % (ref 14–44)
MCH RBC QN AUTO: 27.9 PG (ref 26.8–34.3)
MCHC RBC AUTO-ENTMCNC: 31.5 G/DL (ref 31.4–37.4)
MCV RBC AUTO: 89 FL (ref 82–98)
MONOCYTES # BLD AUTO: 0.81 THOUSAND/ΜL (ref 0.17–1.22)
MONOCYTES NFR BLD AUTO: 6 % (ref 4–12)
NEUTROPHILS # BLD AUTO: 12.75 THOUSANDS/ΜL (ref 1.85–7.62)
NEUTS SEG NFR BLD AUTO: 86 % (ref 43–75)
PLATELET # BLD AUTO: 259 THOUSANDS/UL (ref 149–390)
PMV BLD AUTO: 9.7 FL (ref 8.9–12.7)
POTASSIUM SERPL-SCNC: 4 MMOL/L (ref 3.5–5.3)
POTASSIUM SERPL-SCNC: 5.7 MMOL/L (ref 3.5–5.3)
RBC # BLD AUTO: 3.91 MILLION/UL (ref 3.81–5.12)
SODIUM SERPL-SCNC: 139 MMOL/L (ref 136–145)
WBC # BLD AUTO: 14.68 THOUSAND/UL (ref 4.31–10.16)

## 2018-05-26 PROCEDURE — 84132 ASSAY OF SERUM POTASSIUM: CPT | Performed by: INTERNAL MEDICINE

## 2018-05-26 PROCEDURE — 94760 N-INVAS EAR/PLS OXIMETRY 1: CPT

## 2018-05-26 PROCEDURE — 80048 BASIC METABOLIC PNL TOTAL CA: CPT | Performed by: INTERNAL MEDICINE

## 2018-05-26 PROCEDURE — 85025 COMPLETE CBC W/AUTO DIFF WBC: CPT | Performed by: INTERNAL MEDICINE

## 2018-05-26 PROCEDURE — 99233 SBSQ HOSP IP/OBS HIGH 50: CPT | Performed by: INTERNAL MEDICINE

## 2018-05-26 RX ORDER — AMLODIPINE BESYLATE 5 MG/1
5 TABLET ORAL DAILY
Status: DISCONTINUED | OUTPATIENT
Start: 2018-05-26 | End: 2018-05-27 | Stop reason: HOSPADM

## 2018-05-26 RX ADMIN — CHOLECALCIFEROL TAB 10 MCG (400 UNIT) 400 UNITS: 10 TAB at 08:57

## 2018-05-26 RX ADMIN — ENOXAPARIN SODIUM 40 MG: 40 INJECTION SUBCUTANEOUS at 08:56

## 2018-05-26 RX ADMIN — PREDNISONE 20 MG: 20 TABLET ORAL at 08:57

## 2018-05-26 RX ADMIN — PREDNISONE 20 MG: 20 TABLET ORAL at 17:21

## 2018-05-26 RX ADMIN — VITAMIN B12 0.1 MG ORAL TABLET 100 MCG: 0.1 TABLET ORAL at 08:58

## 2018-05-26 RX ADMIN — AMLODIPINE BESYLATE 5 MG: 5 TABLET ORAL at 14:27

## 2018-05-26 RX ADMIN — SENNOSIDES 8.6 MG: 8.6 TABLET, FILM COATED ORAL at 08:57

## 2018-05-26 RX ADMIN — PRAVASTATIN SODIUM 20 MG: 20 TABLET ORAL at 17:21

## 2018-05-26 RX ADMIN — GABAPENTIN 800 MG: 400 CAPSULE ORAL at 08:56

## 2018-05-26 RX ADMIN — CIPROFLOXACIN AND DEXAMETHASONE 4 DROP: 3; 1 SUSPENSION/ DROPS AURICULAR (OTIC) at 17:21

## 2018-05-26 RX ADMIN — CEFEPIME HYDROCHLORIDE 1000 MG: 1 INJECTION, SOLUTION INTRAVENOUS at 14:27

## 2018-05-26 RX ADMIN — PANTOPRAZOLE SODIUM 40 MG: 40 TABLET, DELAYED RELEASE ORAL at 05:50

## 2018-05-26 RX ADMIN — CIPROFLOXACIN AND DEXAMETHASONE 4 DROP: 3; 1 SUSPENSION/ DROPS AURICULAR (OTIC) at 08:58

## 2018-05-26 RX ADMIN — GABAPENTIN 800 MG: 400 CAPSULE ORAL at 21:17

## 2018-05-26 RX ADMIN — CEFEPIME HYDROCHLORIDE 1000 MG: 1 INJECTION, SOLUTION INTRAVENOUS at 03:49

## 2018-05-26 RX ADMIN — ACETAMINOPHEN 650 MG: 325 TABLET ORAL at 21:21

## 2018-05-26 NOTE — PROGRESS NOTES
Yo Otto  80 y o   female  1/28/1930  mrn [id number]    Assessment/Plan:  1  Pneumonia/Fever/Leukocytosis/Encephalopathy: No further fever bu  WBC count increased slightly to 14K - probably due to steroids that were started yesterday  She still remains weak  Last CXR showed increased lingular consolidation but I think her CXR findings were probably lagging behind her clinical response to abx tx since her WBC count was decreasing and her temps had decreased  Urine Legionella Ag and urine Pneumococcal Ag were neg  Bld cx's are neg  Sputum cx was + for Pseudomonas so Rocephin was changed to Cefepime  No evidence of CNS infxn - CSF studies are neg      Pt presented with cough, SOB, and confusion  She had fever and elevated WBC count  Initial CXR did not show any pulmonary infilts  She was started on Rocephin and Azithromycin for tx of possible pneumonia  Repeat CXR showed development of left lingular consolidation c/w pneumonia  Vanco was added on 5/22/18 until meningitis was excluded as the etiology for her fever and confusion  Pt underwent LP and CSF studies were not c/w meningitis so Vanco was discontinued  No evidence of UTI  Her MS has improved      A  Cont  Cefepime for tx of pneumonia Pt has completed 3 day course of  Azithromycin  B  Will repeat CXR in AM to re-assess her pulmonary infilts  C  Will follow WBC count but may stay elevated due to steroid tx    Subjective: Remains weak   Pt c/o cough but breathing maybe better    Objective:  Tmax: 98  Lungs: +Few rhonchi anteriorly  Abd: +BS, soft, nontender    Labs:  CBC w/diff  Recent Labs      05/26/18   0547   WBC  14 68*   HGB  10 9*   HCT  34 6*   PLT  259   NEUTOPHILPCT  86*   LYMPHOPCT  8*   MONOPCT  6   EOSPCT  0     BMP  Recent Labs      05/26/18   0547   NA  139   K  5 7*   CL  103   CO2  30   BUN  16   CREATININE  0 59*   GLUCOSE  122   CALCIUM  9 6     CMP  Recent Labs      05/26/18   0547   NA  139   K  5 7*   CL  103   CO2  30   BUN  16 CREATININE  0 59*   CALCIUM  9 6   GLUCOSE  122         labrc    Cultures:  Lab Results   Component Value Date    BLOODCX No Growth After 4 Days  05/21/2018    BLOODCX No Growth After 4 Days  05/21/2018    BLOODCX No Growth After 4 Days  05/21/2018    BLOODCX No Growth After 4 Days   05/21/2018     No results found for: [facility]   Lab Results   Component Value Date    URINECX >100,000 cfu/ml Escherichia coli 07/28/2016     Lab Results   Component Value Date    SPUTUMCULTUR 1+ Growth of Pseudomonas aeruginosa (A) 05/23/2018    SPUTUMCULTUR 1+ Growth of  05/23/2018       MED:  Cefepime: #2  Azithromycin: x 3 doses  Abx: #6         Current Facility-Administered Medications:     acetaminophen (TYLENOL) tablet 650 mg, 650 mg, Oral, Q6H PRN, Eduarda Barbour PA-C, 650 mg at 05/25/18 2201    albuterol inhalation solution 2 5 mg, 2 5 mg, Nebulization, Q4H PRN, Otilia Ellis MD, 2 5 mg at 05/24/18 2104    cefepime (MAXIPIME) IVPB (premix) 1,000 mg, 1,000 mg, Intravenous, Q12H, Patricia Jackson MD, Last Rate: 100 mL/hr at 05/26/18 0349, 1,000 mg at 05/26/18 0349    cholecalciferol (VITAMIN D3) tablet 400 Units, 400 Units, Oral, Daily, Eduarda Barbour PA-C, 400 Units at 05/26/18 0857    ciprofloxacin-dexamethasone (CIPRODEX) 0 3-0 1 % otic suspension 4 drop, 4 drop, Right Ear, BID, Eduarda Barbour PA-C, 4 drop at 05/26/18 3887    cyanocobalamin (VITAMIN B-12) tablet 100 mcg, 100 mcg, Oral, Daily, Eduarda Barbour PA-C, 100 mcg at 05/26/18 0858    dextromethorphan-guaiFENesin (ROBITUSSIN DM)  mg/5 mL oral syrup 10 mL, 10 mL, Oral, Q4H PRN, REAL De Jesus, 10 mL at 05/25/18 2213    enoxaparin (LOVENOX) subcutaneous injection 40 mg, 40 mg, Subcutaneous, Q24H [facility], Otilia Ellis MD, 40 mg at 05/26/18 0856    gabapentin (NEURONTIN) capsule 800 mg, 800 mg, Oral, BID, Eduarda Barbour PA-C, 800 mg at 05/26/18 7276    influenza inactivated quadrivalent vaccine (FLULAVAL) IM injection 0 5 mL, 0 5 mL, Intramuscular, Prior to discharge, Rosalinda Torres PA-C    ondansetron [facility]) injection 4 mg, 4 mg, Intravenous, Q6H PRN, Rosalinda Torres PA-C, 4 mg at 05/22/18 6933    pantoprazole (PROTONIX) EC tablet 40 mg, 40 mg, Oral, Early Morning, Rosalinda Torres PA-C, 40 mg at 05/26/18 0550    pneumococcal 13-valent conjugate vaccine (PREVNAR-13) IM injection 0 5 mL, 0 5 mL, Intramuscular, Prior to discharge, Rosalinda Torres PA-C    pravastatin (PRAVACHOL) tablet 20 mg, 20 mg, Oral, After Dinner, Rosalinda Torres PA-C, 20 mg at 05/25/18 1721    predniSONE tablet 20 mg, 20 mg, Oral, BID With Meals, Tania Carroll MD, 20 mg at 05/26/18 0857    senna (SENOKOT) tablet 8 6 mg, 1 tablet, Oral, Daily, Rosalinda Torres PA-C, 8 6 mg at 05/26/18 1523    Principal Problem:    Lingular pneumonia  Active Problems:    Essential hypertension    Sepsis ([facility] [address] )    Acute metabolic encephalopathy    Anemia      Jared Louis MD

## 2018-05-26 NOTE — CASE MANAGEMENT
Continued Stay Review    Date: 05/26/2018    Vital Signs: BP (!) 171/71 (BP Location: Right arm)   Pulse 64   Temp (!) 97 3 °F (36 3 °C) (Oral)   Resp 16   Ht 5' 3" (1 6 m)   Wt 79 2 kg (174 lb 9 7 oz)   SpO2 97%   BMI 30 93 kg/m²     Medications:   Scheduled Meds:   Current Facility-Administered Medications:  acetaminophen 650 mg Oral Q6H PRN MARIA A Sousa-UMA    albuterol 2 5 mg Nebulization Q4H PRN Liana Conde MD    amLODIPine 5 mg Oral Daily Rohan Gallo MD    cefepime 1,000 mg Intravenous Q12H Kavin Diaz MD Last Rate: 1,000 mg (05/26/18 1427)   cholecalciferol 400 Units Oral Daily Sunita Matthews, PA-UMA    ciprofloxacin-dexamethasone 4 drop Right Ear BID Sunita Matthews, PA-C    cyanocobalamin 100 mcg Oral Daily Sunita Matthews, PA-C    dextromethorphan-guaiFENesin 10 mL Oral Q4H PRN REAL Tamayo    enoxaparin 40 mg Subcutaneous Q24H [facility] 12, MD    gabapentin 800 mg Oral BID Sunita Matthews, PA-UMA    influenza vaccine 0 5 mL Intramuscular Prior to discharge MARIA A Sousa-UMA    ondansetron 4 mg Intravenous Q6H PRN Sunita Matthews, PA-C    pantoprazole 40 mg Oral Early Morning Sunita Matthews, PA-C    pneumococcal 13-valent conjugate vaccine 0 5 mL Intramuscular Prior to discharge MARIA A Sousa-UMA    pravastatin 20 mg Oral After Doneldlico Kob, PA-C    predniSONE 20 mg Oral BID With Meals Rohan Gallo MD    senna 1 tablet Oral Daily Sunita Matthews PA-UMA        Abnormal Labs/Diagnostic Results:     Age/Sex: 80 y o  female     Assessment/Plan:   Lingular pneumonia  Active Problems:    Essential hypertension    Acute metabolic encephalopathy    Anemia  Resolved Problems:    Sepsis ([facility] [address] )        Plan:  As per Infectious Disease, antibiotics have been changed  Continue steroids    Patient has slowed improved        Repeat serum potassium I think this is spurious     Check repeat chest x-ray tomorrow     Blood pressure needs work    Discharge Plan: [facility]

## 2018-05-26 NOTE — PROGRESS NOTES
Progress Note - Ajay Flores 80 y o  female MRN: [id number]    Unit/Bed#: [address] [place] [address] [id number] Encounter: [id number]      Assessment:    Principal Problem:    Lingular pneumonia  Active Problems:    Essential hypertension    Acute metabolic encephalopathy    Anemia  Resolved Problems:    Sepsis ([address] )      Plan:  As per Infectious Disease, antibiotics have been changed  Continue steroids  Patient has slowed improved  Repeat serum potassium I think this is spurious    Check repeat chest x-ray tomorrow    Blood pressure needs work    Subjective:   Frail but less confused    Objective:     Vitals: Blood pressure (!) 171/71, pulse 64, temperature (!) 97 3 °F (36 3 °C), temperature source Oral, resp  rate 16, height 5' 3" (1 6 m), weight 79 2 kg (174 lb 9 7 oz), SpO2 97 %  ,Body mass index is 30 93 kg/m²  Intake/Output Summary (Last 24 hours) at 05/26/18 1222  Last data filed at 05/26/18 0327   Gross per 24 hour   Intake              120 ml   Output              300 ml   Net             -180 ml       Physical Exam:    Alert and awake in no acute distress  Lungs few rhonchi and wheezes  Heart regular rate and rythm, normal heart sounds  Abdomen soft, active bowel sounds, non-tender, non-distended  Extremities: no cyanosis, clubbing or edema  Skin:  Some erythema around the upper chest which I need to keep an eye on  Neuro: alert,   Discussed with daughter at bedside    Invasive Devices     Peripheral Intravenous Line            Peripheral IV 05/26/18 Right Forearm less than 1 day                            Lab, Imaging and other studies: I have personally reviewed pertinent reports         Results from last 7 days  Lab Units 05/26/18  0547 05/25/18  0513 05/24/18  0520 05/23/18  0528   WBC Thousand/uL 14 68* 13 52* 16 48* 18 98*   HEMOGLOBIN g/dL 10 9* 9 9* 9 7* 10 6*   HEMATOCRIT % 34 6* 32 3* 31 5* 33 6*   PLATELETS Thousands/uL 259 223 189 197   NEUTROS PCT % 86*  --  82* 82*   LYMPHS PCT % 8*  --  10* 10*   MONOS PCT % 6  --  8 8   EOS PCT % 0  --  0 0       Results from last 7 days  Lab Units 05/26/18  0547 05/25/18  0513 05/23/18  0528  05/21/18 2116 05/21/18  1353   SODIUM mmol/L 139 142 140  < > 139 140   POTASSIUM mmol/L 5 7* 3 5 4 0  < > 3 8 3 8   CHLORIDE mmol/L 103 103 102  < > 99* 100   CO2 mmol/L 30 32 31  < > 31 34*   BUN mg/dL 16 11 18  < > 16 16   CREATININE mg/dL 0 59* 0 73 0 86  < > 0 91 0 75   CALCIUM mg/dL 9 6 8 7 8 9  < > 9 5 9 6   TOTAL PROTEIN g/dL  --   --   --   --  7 7 7 9   BILIRUBIN TOTAL mg/dL  --   --   --   --  0 60 0 30   ALK PHOS U/L  --   --   --   --  59 51   ALT U/L  --   --   --   --  16 17   AST U/L  --   --   --   --  17 22   GLUCOSE RANDOM mg/dL 122 107 104  < > 138 97   < > = values in this interval not displayed  Lab Results   Component Value Date    TROPONINI <0 02 05/21/2018    CKTOTAL 112 01/26/2014       Results from last 7 days  Lab Units 05/21/18 2116 05/21/18  1353   INR  1 23* 1 12     Lab Results   Component Value Date    BLOODCX No Growth After 4 Days  05/21/2018    BLOODCX No Growth After 4 Days  05/21/2018    BLOODCX No Growth After 4 Days  05/21/2018    URINECX >100,000 cfu/ml Escherichia coli 07/28/2016    SPUTUMCULTUR 1+ Growth of Pseudomonas aeruginosa (A) 05/23/2018    SPUTUMCULTUR 1+ Growth of  05/23/2018       Imaging:  Results for orders placed during the hospital encounter of 05/21/18   XR chest portable    Narrative CHEST     INDICATION:   Shortness of breath  COMPARISON:  May 22, 2018  EXAM PERFORMED/VIEWS:  XR CHEST PORTABLE      FINDINGS:    Heart shadow is enlarged but unchanged from prior exam     Increased size and area of dense airspace opacity in the left perihilar region consistent with pneumonia  Right lung is clear  No pneumothorax or pleural effusion  Scoliotic deformity and multilevel thoracic degenerative change  Impression Worsening left perihilar pneumonia          Workstation performed: [id number]       Results for orders placed during the hospital encounter of 05/21/18   XR chest pa & lateral    Narrative CHEST     INDICATION:   SOB     COMPARISON:  Chest x-ray performed the previous day  EXAM PERFORMED/VIEWS:  XR CHEST PA & LATERAL  The frontal view was performed utilizing dual energy radiographic technique  FINDINGS:    Cardiomediastinal silhouette appears unremarkable  Interval development of a left lingular consolidation extending into the hilum  This may represent evolving pneumonia versus atelectasis  Osseous structures appear within normal limits for patient age  Impression New lingular consolidation        Workstation performed: [id number]         PATIENT CENTERED ROUNDS: I have performed rounds with the nursing staff  This note has been constructed using a voice recognition system      Caridad Carpenter MD

## 2018-05-27 ENCOUNTER — APPOINTMENT (INPATIENT)
Dept: RADIOLOGY | Facility: HOSPITAL | Age: 83
DRG: 871 | End: 2018-05-27
Payer: MEDICARE

## 2018-05-27 VITALS
TEMPERATURE: 98 F | RESPIRATION RATE: 18 BRPM | DIASTOLIC BLOOD PRESSURE: 74 MMHG | OXYGEN SATURATION: 97 % | WEIGHT: 174.38 LBS | HEART RATE: 60 BPM | BODY MASS INDEX: 30.9 KG/M2 | SYSTOLIC BLOOD PRESSURE: 173 MMHG | HEIGHT: 63 IN

## 2018-05-27 PROBLEM — D64.9 ANEMIA: Status: RESOLVED | Noted: 2018-05-25 | Resolved: 2018-05-27

## 2018-05-27 LAB
ANION GAP SERPL CALCULATED.3IONS-SCNC: 6 MMOL/L (ref 4–13)
BACTERIA BLD CULT: NORMAL
BACTERIA BLD CULT: NORMAL
BASOPHILS # BLD MANUAL: 0 THOUSAND/UL (ref 0–0.1)
BASOPHILS NFR MAR MANUAL: 0 % (ref 0–1)
BUN SERPL-MCNC: 23 MG/DL (ref 5–25)
CALCIUM SERPL-MCNC: 9.5 MG/DL (ref 8.3–10.1)
CHLORIDE SERPL-SCNC: 102 MMOL/L (ref 100–108)
CO2 SERPL-SCNC: 33 MMOL/L (ref 21–32)
CREAT SERPL-MCNC: 0.79 MG/DL (ref 0.6–1.3)
EOSINOPHIL # BLD MANUAL: 0 THOUSAND/UL (ref 0–0.4)
EOSINOPHIL NFR BLD MANUAL: 0 % (ref 0–6)
ERYTHROCYTE [DISTWIDTH] IN BLOOD BY AUTOMATED COUNT: 14.2 % (ref 11.6–15.1)
GFR SERPL CREATININE-BSD FRML MDRD: 67 ML/MIN/1.73SQ M
GLUCOSE SERPL-MCNC: 130 MG/DL (ref 65–140)
HCT VFR BLD AUTO: 38.8 % (ref 34.8–46.1)
HGB BLD-MCNC: 12.1 G/DL (ref 11.5–15.4)
LYMPHOCYTES # BLD AUTO: 1 THOUSAND/UL (ref 0.6–4.47)
LYMPHOCYTES # BLD AUTO: 6 % (ref 14–44)
MCH RBC QN AUTO: 27.7 PG (ref 26.8–34.3)
MCHC RBC AUTO-ENTMCNC: 31.2 G/DL (ref 31.4–37.4)
MCV RBC AUTO: 89 FL (ref 82–98)
MONOCYTES # BLD AUTO: 0.84 THOUSAND/UL (ref 0–1.22)
MONOCYTES NFR BLD: 5 % (ref 4–12)
NEUTROPHILS # BLD MANUAL: 14.86 THOUSAND/UL (ref 1.85–7.62)
NEUTS SEG NFR BLD AUTO: 89 % (ref 43–75)
PLATELET # BLD AUTO: 311 THOUSANDS/UL (ref 149–390)
PLATELET BLD QL SMEAR: ADEQUATE
PMV BLD AUTO: 9.3 FL (ref 8.9–12.7)
POTASSIUM SERPL-SCNC: 4.1 MMOL/L (ref 3.5–5.3)
RBC # BLD AUTO: 4.37 MILLION/UL (ref 3.81–5.12)
RBC MORPH BLD: NORMAL
SODIUM SERPL-SCNC: 141 MMOL/L (ref 136–145)
TOTAL CELLS COUNTED SPEC: 100
WBC # BLD AUTO: 16.7 THOUSAND/UL (ref 4.31–10.16)

## 2018-05-27 PROCEDURE — 85007 BL SMEAR W/DIFF WBC COUNT: CPT | Performed by: INTERNAL MEDICINE

## 2018-05-27 PROCEDURE — 85027 COMPLETE CBC AUTOMATED: CPT | Performed by: INTERNAL MEDICINE

## 2018-05-27 PROCEDURE — 94760 N-INVAS EAR/PLS OXIMETRY 1: CPT

## 2018-05-27 PROCEDURE — 80048 BASIC METABOLIC PNL TOTAL CA: CPT | Performed by: PHYSICIAN ASSISTANT

## 2018-05-27 PROCEDURE — 99238 HOSP IP/OBS DSCHRG MGMT 30/<: CPT | Performed by: INTERNAL MEDICINE

## 2018-05-27 PROCEDURE — 71046 X-RAY EXAM CHEST 2 VIEWS: CPT

## 2018-05-27 RX ORDER — CEFUROXIME AXETIL 250 MG/1
250 TABLET ORAL EVERY 12 HOURS SCHEDULED
Qty: 4 TABLET | Refills: 0 | Status: SHIPPED | OUTPATIENT
Start: 2018-05-27 | End: 2018-05-29

## 2018-05-27 RX ORDER — PREDNISONE 20 MG/1
10 TABLET ORAL 2 TIMES DAILY WITH MEALS
Refills: 0 | Status: ON HOLD
Start: 2018-05-27 | End: 2018-11-26 | Stop reason: ALTCHOICE

## 2018-05-27 RX ORDER — AMLODIPINE BESYLATE 5 MG/1
5 TABLET ORAL DAILY
Refills: 0
Start: 2018-05-28 | End: 2018-06-27 | Stop reason: SDUPTHER

## 2018-05-27 RX ORDER — ALBUTEROL SULFATE 2.5 MG/3ML
2.5 SOLUTION RESPIRATORY (INHALATION) EVERY 4 HOURS PRN
Qty: 75 ML | Refills: 0 | Status: SHIPPED | OUTPATIENT
Start: 2018-05-27 | End: 2018-06-27

## 2018-05-27 RX ORDER — GUAIFENESIN/DEXTROMETHORPHAN 100-10MG/5
10 SYRUP ORAL EVERY 4 HOURS PRN
Qty: 118 ML | Refills: 0 | Status: SHIPPED | OUTPATIENT
Start: 2018-05-27 | End: 2018-06-27

## 2018-05-27 RX ADMIN — GABAPENTIN 800 MG: 400 CAPSULE ORAL at 08:42

## 2018-05-27 RX ADMIN — CEFEPIME HYDROCHLORIDE 1000 MG: 1 INJECTION, SOLUTION INTRAVENOUS at 01:37

## 2018-05-27 RX ADMIN — SENNOSIDES 8.6 MG: 8.6 TABLET, FILM COATED ORAL at 08:42

## 2018-05-27 RX ADMIN — CIPROFLOXACIN AND DEXAMETHASONE 4 DROP: 3; 1 SUSPENSION/ DROPS AURICULAR (OTIC) at 08:44

## 2018-05-27 RX ADMIN — PREDNISONE 20 MG: 20 TABLET ORAL at 08:42

## 2018-05-27 RX ADMIN — ENOXAPARIN SODIUM 40 MG: 40 INJECTION SUBCUTANEOUS at 08:43

## 2018-05-27 RX ADMIN — VITAMIN B12 0.1 MG ORAL TABLET 100 MCG: 0.1 TABLET ORAL at 08:44

## 2018-05-27 RX ADMIN — CHOLECALCIFEROL TAB 10 MCG (400 UNIT) 400 UNITS: 10 TAB at 08:42

## 2018-05-27 RX ADMIN — PANTOPRAZOLE SODIUM 40 MG: 40 TABLET, DELAYED RELEASE ORAL at 05:16

## 2018-05-27 RX ADMIN — AMLODIPINE BESYLATE 5 MG: 5 TABLET ORAL at 08:42

## 2018-05-27 NOTE — DISCHARGE SUMMARY
Discharge Summary  Debbie Mtz 80 y o  female MRN: [id number]  Unit/Bed#: [address] [place], [place] [id number] Encounter: [id number]    Admission Date:   Admission Orders     Ordered        05/22/18 1119  Inpatient Admission  Once         05/21/18 2222  Place in Observation (expected length of stay for this patient is less than two midnights)  Once               Discharge Date: 05/27/18    Admitting Diagnosis: Acute bronchitis [J20 9]  Altered mental status [R41 82]  Sepsis ([facility] [address] ) [A41 9]    HPI:  See history and physical    Procedures Performed:   Orders Placed This Encounter   Procedures    Straight cath for urine       Hospital Course:  Patient admitted the hospital with pneumonia  This was in the lingula  She had a finding of sepsis initially  Patient was very slow to improve and fact required oxygen even at the time of discharge  She was placed on some low-dose steroids but did improve over the next 48 hours  She will have 2 more days of oral antibiotics she needs PT and OT  Of note because of her change in mental status she actually underwent a lumbar puncture which was unremarkable  Significant Findings, Care, Treatment and Services Provided:  None    Discharge Diagnosis: Principal Problem:    Lingular pneumonia  Active Problems:    Essential hypertension    Acute metabolic encephalopathy  Resolved Problems:    Sepsis ([facility] )    Anemia        Condition at Discharge: stable     Discharge instructions/Information to patient and family:   See after visit summary for information provided to patient and family  Provisions for Follow-Up Care:  See after visit summary for information related to follow-up care and any pertinent home health orders  Disposition: Short-term rehab at [facility]    Discharge Medications:  See after visit summary for reconciled discharge medications provided to patient and family  This note has been constructed using a voice recognition system         Maynor Armas MD

## 2018-05-29 NOTE — PLAN OF CARE

## 2018-05-30 LAB
ALBUMIN SERPL ELPH-MCNC: 3.02 G/DL (ref 3.5–5)
ALBUMIN SERPL ELPH-MCNC: 48 % (ref 52–65)
ALPHA1 GLOB SERPL ELPH-MCNC: 0.6 G/DL (ref 0.1–0.4)
ALPHA1 GLOB SERPL ELPH-MCNC: 9.6 % (ref 2.5–5)
ALPHA2 GLOB SERPL ELPH-MCNC: 1.17 G/DL (ref 0.4–1.2)
ALPHA2 GLOB SERPL ELPH-MCNC: 18.6 % (ref 7–13)
BETA GLOB ABNORMAL SERPL ELPH-MCNC: 0.44 G/DL (ref 0.4–0.8)
BETA1 GLOB SERPL ELPH-MCNC: 7 % (ref 5–13)
BETA2 GLOB SERPL ELPH-MCNC: 7.5 % (ref 2–8)
BETA2+GAMMA GLOB SERPL ELPH-MCNC: 0.47 G/DL (ref 0.2–0.5)
GAMMA GLOB ABNORMAL SERPL ELPH-MCNC: 0.59 G/DL (ref 0.5–1.6)
GAMMA GLOB SERPL ELPH-MCNC: 9.3 % (ref 12–22)
IGG/ALB SER: 0.92 {RATIO} (ref 1.1–1.8)
PROT PATTERN SERPL ELPH-IMP: ABNORMAL
PROT SERPL-MCNC: 6.3 G/DL (ref 6.4–8.2)

## 2018-06-20 ENCOUNTER — TRANSITIONAL CARE MANAGEMENT (OUTPATIENT)
Dept: FAMILY MEDICINE CLINIC | Facility: CLINIC | Age: 83
End: 2018-06-20

## 2018-06-20 ENCOUNTER — TELEPHONE (OUTPATIENT)
Dept: FAMILY MEDICINE CLINIC | Facility: CLINIC | Age: 83
End: 2018-06-20

## 2018-06-20 NOTE — TELEPHONE ENCOUNTER
JULIÁN FROM [facility] CALLED [phone]    PT IS TAKING ASPRIN 81 MG EVERYDAY IS THAT OK

## 2018-06-27 ENCOUNTER — OFFICE VISIT (OUTPATIENT)
Dept: FAMILY MEDICINE CLINIC | Facility: CLINIC | Age: 83
End: 2018-06-27
Payer: MEDICARE

## 2018-06-27 VITALS
OXYGEN SATURATION: 97 % | HEART RATE: 78 BPM | HEIGHT: 63 IN | WEIGHT: 156 LBS | DIASTOLIC BLOOD PRESSURE: 82 MMHG | SYSTOLIC BLOOD PRESSURE: 124 MMHG | BODY MASS INDEX: 27.64 KG/M2

## 2018-06-27 DIAGNOSIS — J18.9 PNEUMONIA DUE TO INFECTIOUS ORGANISM, UNSPECIFIED LATERALITY, UNSPECIFIED PART OF LUNG: ICD-10-CM

## 2018-06-27 DIAGNOSIS — M12.9 ARTHROPATHY: ICD-10-CM

## 2018-06-27 DIAGNOSIS — R30.0 DYSURIA: ICD-10-CM

## 2018-06-27 DIAGNOSIS — R30.0 DYSURIA: Primary | ICD-10-CM

## 2018-06-27 DIAGNOSIS — G60.9 IDIOPATHIC PERIPHERAL NEUROPATHY: ICD-10-CM

## 2018-06-27 DIAGNOSIS — I10 HYPERTENSION, UNSPECIFIED TYPE: ICD-10-CM

## 2018-06-27 DIAGNOSIS — E78.2 MIXED HYPERLIPIDEMIA: ICD-10-CM

## 2018-06-27 DIAGNOSIS — E53.8 VITAMIN B 12 DEFICIENCY: ICD-10-CM

## 2018-06-27 DIAGNOSIS — Z00.00 MEDICARE ANNUAL WELLNESS VISIT, SUBSEQUENT: ICD-10-CM

## 2018-06-27 DIAGNOSIS — I10 ESSENTIAL HYPERTENSION: Primary | ICD-10-CM

## 2018-06-27 DIAGNOSIS — K21.9 GASTROESOPHAGEAL REFLUX DISEASE WITHOUT ESOPHAGITIS: ICD-10-CM

## 2018-06-27 DIAGNOSIS — K21.9 GASTROESOPHAGEAL REFLUX DISEASE, ESOPHAGITIS PRESENCE NOT SPECIFIED: ICD-10-CM

## 2018-06-27 PROBLEM — G93.41 ACUTE METABOLIC ENCEPHALOPATHY: Status: RESOLVED | Noted: 2018-05-22 | Resolved: 2018-06-27

## 2018-06-27 LAB
SL AMB  POCT GLUCOSE, UA: ABNORMAL
SL AMB LEUKOCYTE ESTERASE,UA: ABNORMAL
SL AMB POCT BILIRUBIN,UA: ABNORMAL
SL AMB POCT BLOOD,UA: ABNORMAL
SL AMB POCT CLARITY,UA: ABNORMAL
SL AMB POCT COLOR,UA: YELLOW
SL AMB POCT KETONES,UA: ABNORMAL
SL AMB POCT NITRITE,UA: ABNORMAL
SL AMB POCT PH,UA: 7
SL AMB POCT SPECIFIC GRAVITY,UA: 1.01
SL AMB POCT URINE PROTEIN: ABNORMAL
SL AMB POCT UROBILINOGEN: 0.2

## 2018-06-27 PROCEDURE — 81002 URINALYSIS NONAUTO W/O SCOPE: CPT

## 2018-06-27 PROCEDURE — 99495 TRANSJ CARE MGMT MOD F2F 14D: CPT | Performed by: FAMILY MEDICINE

## 2018-06-27 PROCEDURE — G0439 PPPS, SUBSEQ VISIT: HCPCS | Performed by: FAMILY MEDICINE

## 2018-06-27 RX ORDER — GABAPENTIN 800 MG/1
800 TABLET ORAL 2 TIMES DAILY
Qty: 180 TABLET | Refills: 1 | Status: SHIPPED | OUTPATIENT
Start: 2018-06-27 | End: 2018-07-05 | Stop reason: SDUPTHER

## 2018-06-27 RX ORDER — SULFAMETHOXAZOLE AND TRIMETHOPRIM 800; 160 MG/1; MG/1
1 TABLET ORAL EVERY 12 HOURS SCHEDULED
Qty: 10 TABLET | Refills: 0 | Status: SHIPPED | OUTPATIENT
Start: 2018-06-27 | End: 2018-07-02

## 2018-06-27 RX ORDER — OMEPRAZOLE 20 MG/1
20 CAPSULE, DELAYED RELEASE ORAL DAILY
Qty: 90 CAPSULE | Refills: 1 | Status: SHIPPED | OUTPATIENT
Start: 2018-06-27 | End: 2018-12-11 | Stop reason: SDUPTHER

## 2018-06-27 RX ORDER — PRAVASTATIN SODIUM 20 MG
20 TABLET ORAL DAILY
Qty: 90 TABLET | Refills: 1 | Status: SHIPPED | OUTPATIENT
Start: 2018-06-27 | End: 2018-12-11 | Stop reason: SDUPTHER

## 2018-06-27 RX ORDER — HYDROCHLOROTHIAZIDE 25 MG/1
12.5 TABLET ORAL DAILY
Qty: 90 TABLET | Refills: 1 | Status: ON HOLD | OUTPATIENT
Start: 2018-06-27 | End: 2018-11-26

## 2018-06-27 RX ORDER — AMLODIPINE BESYLATE 5 MG/1
5 TABLET ORAL DAILY
Qty: 90 TABLET | Refills: 1 | Status: ON HOLD
Start: 2018-06-27 | End: 2018-11-26 | Stop reason: ALTCHOICE

## 2018-06-27 NOTE — PATIENT INSTRUCTIONS
Medical management would continue current medications  Should recheck in approximately 3 months  Right ear there appears to be some small amount of fluid present  This should resolve over the next 2-3 weeks  You may also use over-the-counter Flonase 2 sprays to the right nostril once a day, this may help open up the ear  I would encourage you to use your walker until your strength is back up and you are steady or on her feet  We wish to avoid falls  Health maintenance-advanced directives discussed forms were provided  Other screens are current  Vaccines are up-to-date as far as pneumonia vaccines, you could consider getting the new shingles vaccine at the pharmacy  Health maintenance-advanced directives discussed forms were provided  Other screens are current  Vaccines are up-to-date as far as pneumonia vaccines, you could consider getting the new shingles vaccine at the pharmacy

## 2018-06-27 NOTE — PROGRESS NOTES
Assessment and Plan:  Problem List Items Addressed This Visit     Essential hypertension - Primary    Relevant Medications    amLODIPine (NORVASC) 5 mg tablet    hydrochlorothiazide (HYDRODIURIL) 25 mg tablet    Idiopathic peripheral neuropathy    Relevant Medications    gabapentin (NEURONTIN) 800 mg tablet    Esophageal reflux    Relevant Medications    omeprazole (PriLOSEC) 20 mg delayed release capsule    Vitamin B 12 deficiency      Other Visit Diagnoses     Arthropathy        Hypertension, unspecified type        Relevant Medications    amLODIPine (NORVASC) 5 mg tablet    hydrochlorothiazide (HYDRODIURIL) 25 mg tablet    Mixed hyperlipidemia        Relevant Medications    pravastatin (PRAVACHOL) 20 mg tablet    Dysuria        Pneumonia due to infectious organism, unspecified laterality, unspecified part of lung            Health Maintenance Due   Topic Date Due    Depression Screening PHQ-9  01/28/1930    DTaP,Tdap,and Td Vaccines (1 - Tdap) 01/28/1951    Fall Risk  01/28/1995    Urinary Incontinence Screening  01/28/1995    GLAUCOMA SCREENING 67+ YR  01/28/1997         HPI:  Ajay Flores is a 80 y o  female here for her Subsequent Wellness Visit      Patient Active Problem List   Diagnosis    Essential hypertension    Idiopathic peripheral neuropathy    Esophageal reflux    Vitamin B 12 deficiency     Past Medical History:   Diagnosis Date    Balance disorder     GERD (gastroesophageal reflux disease)     Hard of hearing     Hyperlipidemia     Hypertension     Neuropathy     Tinnitus      Past Surgical History:   Procedure Laterality Date    EYE SURGERY      HYSTERECTOMY       Family History   Problem Relation Age of Onset    Stroke Mother     Stroke Father     Heart disease Daughter      History   Smoking Status    Never Smoker   Smokeless Tobacco    Never Used     History   Alcohol Use    Yes     Comment: rarely      History   Drug Use No         Current Outpatient Prescriptions Medication Sig Dispense Refill    cholecalciferol (VITAMIN D3) 400 units tablet Take 400 Units by mouth daily      cyanocobalamin 100 MCG tablet Take 100 mcg by mouth daily      predniSONE 20 mg tablet Take 0 5 tablets (10 mg total) by mouth 2 (two) times a day with meals  0    amLODIPine (NORVASC) 5 mg tablet Take 1 tablet (5 mg total) by mouth daily 90 tablet 1    gabapentin (NEURONTIN) 800 mg tablet Take 1 tablet (800 mg total) by mouth 2 (two) times a day 180 tablet 1    hydrochlorothiazide (HYDRODIURIL) 25 mg tablet Take 0 5 tablets (12 5 mg total) by mouth daily 90 tablet 1    omeprazole (PriLOSEC) 20 mg delayed release capsule Take 1 capsule (20 mg total) by mouth daily 90 capsule 1    pravastatin (PRAVACHOL) 20 mg tablet Take 1 tablet (20 mg total) by mouth daily 90 tablet 1     No current facility-administered medications for this visit        No Known Allergies  Immunization History   Administered Date(s) Administered    Influenza Split High Dose Preservative Free IM 09/09/2016    Pneumococcal Conjugate 13-Valent 09/09/2016    Pneumococcal Polysaccharide PPV23 04/23/2013       Patient Care Team:  Williams Lobato MD as PCP - General    Medicare Screening Tests and Risk Assessments:  AWV Clinical     ISAR:       Once in a Lifetime Medicare Screening:       Medicare Screening Tests and Risk Assessment:   AAA Risk Assessment    Osteoporosis Risk Assessment    HIV Risk Assessment        Drug and Alcohol Use:   Tobacco use    Tobacco use duration    Tobacco Cessation Readiness    Alcohol use    Alcohol Treatment Readiness   Illicit Drug Use        Diet & Exercise:   Diet   How many servings a day of the following:   Exercise        Cognitive Impairment Screening:   Cognitive Impairment Screening        Functional Ability/Level of Safety:   Hearing    Hearing Impairment Assessment    Current Activities    Help needed with the folllowing:    ADL    Fall Risk   Injury History       Home Safety:   Home Safety Risk Factors       Advanced Directives:   Advanced Directives    Patient's End of Life Decisions        Urinary Incontinence:       Glaucoma:            Provider Screening    No data filed        No exam data present    Physical Exam :  Respiratory ROS: positive for - cough  Physical Exam- see TCM note    Health maintenance-advanced directives discussed forms were provided  Other screens are current  Vaccines are up-to-date as far as pneumonia vaccines, you could consider getting the new shingles vaccine at the pharmacy

## 2018-06-27 NOTE — PROGRESS NOTES
Assessment/Plan:    No problem-specific Assessment & Plan notes found for this encounter  Diagnoses and all orders for this visit:    Essential hypertension  -     amLODIPine (NORVASC) 5 mg tablet; Take 1 tablet (5 mg total) by mouth daily  -     hydrochlorothiazide (HYDRODIURIL) 25 mg tablet; Take 0 5 tablets (12 5 mg total) by mouth daily    Idiopathic peripheral neuropathy  -     gabapentin (NEURONTIN) 800 mg tablet; Take 1 tablet (800 mg total) by mouth 2 (two) times a day    Vitamin B 12 deficiency    Gastroesophageal reflux disease without esophagitis    Arthropathy    Hypertension, unspecified type    Gastroesophageal reflux disease, esophagitis presence not specified  -     omeprazole (PriLOSEC) 20 mg delayed release capsule; Take 1 capsule (20 mg total) by mouth daily    Mixed hyperlipidemia  -     pravastatin (PRAVACHOL) 20 mg tablet; Take 1 tablet (20 mg total) by mouth daily    Dysuria    Pneumonia due to infectious organism, unspecified laterality, unspecified part of lung        Medical management would continue current medications  Should recheck in approximately 3 months  Right ear there appears to be some small amount of fluid present  This should resolve over the next 2-3 weeks  You may also use over-the-counter Flonase 2 sprays to the right nostril once a day, this may help open up the ear  I would encourage you to use your walker until your strength is back up and you are steady or on her feet  We wish to avoid falls  patient returned with urine sample which did show white cells and blood  Will treat for probable UTI  Urine culture sent to laboratory  Rx written for Bactrim DS 1 tablet twice daily for 5 days  Subjective:   Date and time hospital follow up call was made:  6/20/2018  9:14 AM  Patient was hopsitalized at:   Other (comment)  Date of admission:  5/27/18  Date of discharge:  6/16/18  Diagnosis:  pneumonia   Disposition:  Home  Were the patients medicaitons reviewed and updated:  No  Current symptoms:  Weakness  Weakness severity:  Mild  Post hospital issues:  None  Should patient be enrolled in anticoag monitoring?:  No  Scheduled for follow up?:  Yes  Referrals needed:  no  Living Arrangements:  Family members  Support System:  Children, Home care staff  The type of support provided:  Physical, Emotional, Financial  Are you recieving outpatient services:  Yes  What type(s) of services:  visiting nurse            Patient ID: Carlita Calvin is a 80 y o  female  Patient returns after hospitalization for pneumonia  She then spent 3 weeks out at [facility] for rehabilitation  Medications reviewed and reconciled  She is feeling well today, although still has a mild cough  She does have a mild popping sensation in her right ear  New symptom is mild burning with urination over the last 24 hr  Unable to give sample here  The following portions of the patient's history were reviewed and updated as appropriate: allergies, current medications, past family history, past medical history, past social history, past surgical history and problem list     Review of Systems   Constitutional: Negative for activity change, appetite change, diaphoresis and fatigue  Respiratory: Positive for cough  Negative for chest tightness, shortness of breath and wheezing  Cardiovascular: Positive for leg swelling  Negative for chest pain and palpitations  Fast or slow heart rate   Gastrointestinal: Negative for abdominal pain, blood in stool, constipation, diarrhea, nausea and vomiting  Genitourinary: Positive for dysuria  Negative for difficulty urinating, frequency, hematuria and urgency  Musculoskeletal: Negative for arthralgias, gait problem, joint swelling and myalgias  Neurological: Negative for dizziness, weakness, light-headedness, numbness and headaches  Psychiatric/Behavioral: Negative for agitation, dysphoric mood and sleep disturbance   The patient is not nervous/anxious  Objective:      /82   Pulse 78   Ht 5' 3" (1 6 m)   Wt 70 8 kg (156 lb)   SpO2 97%   BMI 27 63 kg/m²          Physical Exam   Constitutional: She is oriented to person, place, and time  She appears well-developed and well-nourished  No distress  HENT:   Head: Normocephalic and atraumatic  Right Ear: Tympanic membrane, external ear and ear canal normal    Left Ear: Tympanic membrane, external ear and ear canal normal    Nose: No mucosal edema or rhinorrhea  Right sinus exhibits no maxillary sinus tenderness  Left sinus exhibits no maxillary sinus tenderness  Mouth/Throat: Uvula is midline  Mucous membranes are not pale and not dry  Normal dentition  No oropharyngeal exudate  Eyes: EOM are normal  Pupils are equal, round, and reactive to light  Right eye exhibits no discharge  Left eye exhibits no discharge  Neck: Normal range of motion  Neck supple  No thyromegaly present  Cardiovascular: Normal rate, regular rhythm, normal heart sounds and intact distal pulses  No murmur heard  Pulmonary/Chest: Effort normal and breath sounds normal  No respiratory distress  She has no wheezes  She has no rales  Musculoskeletal: Normal range of motion  She exhibits no edema or tenderness  Lymphadenopathy:     She has no cervical adenopathy  Neurological: She is alert and oriented to person, place, and time  No cranial nerve deficit  Skin: She is not diaphoretic  Psychiatric: She has a normal mood and affect   Her behavior is normal

## 2018-07-01 LAB
APPEARANCE UR: ABNORMAL
BACTERIA UR CULT: NORMAL
BACTERIA UR QL AUTO: ABNORMAL /HPF
BILIRUB UR QL STRIP: NEGATIVE
CASTS #/AREA URNS LPF: ABNORMAL /LPF
COLOR UR: YELLOW
GLUCOSE UR QL STRIP: NEGATIVE
HGB UR QL STRIP: ABNORMAL
HYALINE CASTS #/AREA URNS LPF: ABNORMAL /LPF
KETONES UR QL STRIP: NEGATIVE
LEUKOCYTE ESTERASE UR QL STRIP: ABNORMAL
NITRITE UR QL STRIP: NEGATIVE
PH UR STRIP: 6.5 [PH] (ref 5–8)
PROT UR QL STRIP: ABNORMAL
RBC #/AREA URNS HPF: ABNORMAL /HPF
SERVICE CMNT-IMP: ABNORMAL
SP GR UR STRIP: 1.02 (ref 1–1.03)
SQUAMOUS #/AREA URNS HPF: ABNORMAL /HPF
WBC #/AREA URNS HPF: ABNORMAL /HPF

## 2018-07-02 NOTE — PROGRESS NOTES
Call patient with lab result-Culure psoitive- R to [facility]pro and bactrim- RX nitrofurantoin 100mg BID X 7 days

## 2018-07-03 DIAGNOSIS — R31.9 URINARY TRACT INFECTION WITH HEMATURIA, SITE UNSPECIFIED: Primary | ICD-10-CM

## 2018-07-03 DIAGNOSIS — N39.0 URINARY TRACT INFECTION WITH HEMATURIA, SITE UNSPECIFIED: Primary | ICD-10-CM

## 2018-07-03 RX ORDER — NITROFURANTOIN 25; 75 MG/1; MG/1
100 CAPSULE ORAL 2 TIMES DAILY
Qty: 14 CAPSULE | Refills: 0 | Status: SHIPPED | OUTPATIENT
Start: 2018-07-03 | End: 2018-07-10 | Stop reason: ALTCHOICE

## 2018-07-03 RX ORDER — NITROFURANTOIN MACROCRYSTALS 100 MG/1
100 CAPSULE ORAL 2 TIMES DAILY
Qty: 14 CAPSULE | Refills: 0 | Status: CANCELLED | OUTPATIENT
Start: 2018-07-03

## 2018-07-03 NOTE — TELEPHONE ENCOUNTER
Message left for daughter that abx needs to be changed and will order at [facility]  Requested that she call office to confirm that she got this message        ----- Message from Guero Henley MD sent at 7/2/2018  8:39 AM EDT -----  Call patient with lab result-Culure positive- R to Cpro and bactrim- RX nitrofurantoin 100mg BID X 7 days      +++ while I was working on this chart [facility] ordered the Rx +++

## 2018-07-05 DIAGNOSIS — G60.9 IDIOPATHIC PERIPHERAL NEUROPATHY: ICD-10-CM

## 2018-07-05 RX ORDER — GABAPENTIN 800 MG/1
800 TABLET ORAL 2 TIMES DAILY
Qty: 180 TABLET | Refills: 0 | Status: SHIPPED | OUTPATIENT
Start: 2018-07-05 | End: 2018-10-17 | Stop reason: SDUPTHER

## 2018-07-05 NOTE — TELEPHONE ENCOUNTER
Patient called and stated that Dr Brigida Wilder did NOT fill her Gabapentin last week when she was in for her office visit  She states she went to [facility] and other 3 RX were there  Review of chart appears that Dr Brigida Wilder printed out the Gabapentin Rx, but, she states she did NOT get an Rx  There is a receipt by pharmacy for:  Omeprazole, Pravastatin, HCTZ    Amlodipine also shows it was Not Sent or printed  She did not request an Rx for this

## 2018-07-16 ENCOUNTER — TELEPHONE (OUTPATIENT)
Dept: FAMILY MEDICINE CLINIC | Facility: CLINIC | Age: 83
End: 2018-07-16

## 2018-10-17 DIAGNOSIS — G60.9 IDIOPATHIC PERIPHERAL NEUROPATHY: ICD-10-CM

## 2018-10-17 RX ORDER — GABAPENTIN 800 MG/1
800 TABLET ORAL 2 TIMES DAILY
Qty: 180 TABLET | Refills: 0 | Status: ON HOLD | OUTPATIENT
Start: 2018-10-17 | End: 2018-11-26

## 2018-11-26 ENCOUNTER — APPOINTMENT (EMERGENCY)
Dept: CT IMAGING | Facility: HOSPITAL | Age: 83
DRG: 871 | End: 2018-11-26
Payer: MEDICARE

## 2018-11-26 ENCOUNTER — HOSPITAL ENCOUNTER (INPATIENT)
Facility: HOSPITAL | Age: 83
LOS: 4 days | Discharge: HOME HEALTH | DRG: 871 | End: 2018-11-30
Attending: EMERGENCY MEDICINE | Admitting: HOSPITALIST
Payer: MEDICARE

## 2018-11-26 ENCOUNTER — APPOINTMENT (EMERGENCY)
Dept: RADIOLOGY | Facility: HOSPITAL | Age: 83
DRG: 871 | End: 2018-11-26
Payer: MEDICARE

## 2018-11-26 ENCOUNTER — APPOINTMENT (EMERGENCY)
Dept: NON INVASIVE DIAGNOSTICS | Facility: HOSPITAL | Age: 83
DRG: 871 | End: 2018-11-26
Payer: MEDICARE
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
## 2018-11-26 DIAGNOSIS — B96.29 UTI DUE TO EXTENDED-SPECTRUM BETA LACTAMASE (ESBL) PRODUCING ESCHERICHIA COLI: ICD-10-CM

## 2018-11-26 DIAGNOSIS — J18.9 MULTIFOCAL PNEUMONIA: ICD-10-CM

## 2018-11-26 DIAGNOSIS — R65.20 SEVERE SEPSIS (HCC): ICD-10-CM

## 2018-11-26 DIAGNOSIS — Z16.12 UTI DUE TO EXTENDED-SPECTRUM BETA LACTAMASE (ESBL) PRODUCING ESCHERICHIA COLI: ICD-10-CM

## 2018-11-26 DIAGNOSIS — A41.9 SEVERE SEPSIS (HCC): ICD-10-CM

## 2018-11-26 DIAGNOSIS — N39.0 UTI DUE TO EXTENDED-SPECTRUM BETA LACTAMASE (ESBL) PRODUCING ESCHERICHIA COLI: ICD-10-CM

## 2018-11-26 DIAGNOSIS — M79.609 PAIN IN LIMB: ICD-10-CM

## 2018-11-26 LAB
ALBUMIN SERPL BCP-MCNC: 3.6 G/DL (ref 3.5–5)
ALP SERPL-CCNC: 56 U/L (ref 46–116)
ALT SERPL W P-5'-P-CCNC: 20 U/L (ref 12–78)
ANION GAP SERPL CALCULATED.3IONS-SCNC: 10 MMOL/L (ref 4–13)
APTT PPP: 37 SECONDS (ref 26–38)
AST SERPL W P-5'-P-CCNC: 18 U/L (ref 5–45)
ATRIAL RATE: 107 BPM
BACTERIA UR QL AUTO: ABNORMAL /HPF
BASOPHILS # BLD AUTO: 0.02 THOUSANDS/ΜL (ref 0–0.1)
BASOPHILS NFR BLD AUTO: 0 % (ref 0–1)
BILIRUB SERPL-MCNC: 0.4 MG/DL (ref 0.2–1)
BILIRUB UR QL STRIP: NEGATIVE
BUN SERPL-MCNC: 22 MG/DL (ref 5–25)
CALCIUM SERPL-MCNC: 9.6 MG/DL (ref 8.3–10.1)
CHLORIDE SERPL-SCNC: 102 MMOL/L (ref 100–108)
CLARITY UR: ABNORMAL
CO2 SERPL-SCNC: 33 MMOL/L (ref 21–32)
COLOR UR: YELLOW
CREAT SERPL-MCNC: 1.1 MG/DL (ref 0.6–1.3)
EOSINOPHIL # BLD AUTO: 0.09 THOUSAND/ΜL (ref 0–0.61)
EOSINOPHIL NFR BLD AUTO: 1 % (ref 0–6)
ERYTHROCYTE [DISTWIDTH] IN BLOOD BY AUTOMATED COUNT: 14.8 % (ref 11.6–15.1)
GFR SERPL CREATININE-BSD FRML MDRD: 45 ML/MIN/1.73SQ M
GLUCOSE SERPL-MCNC: 112 MG/DL (ref 65–140)
GLUCOSE UR STRIP-MCNC: NEGATIVE MG/DL
HCT VFR BLD AUTO: 40.4 % (ref 34.8–46.1)
HGB BLD-MCNC: 12.5 G/DL (ref 11.5–15.4)
HGB UR QL STRIP.AUTO: ABNORMAL
IMM GRANULOCYTES # BLD AUTO: 0.07 THOUSAND/UL (ref 0–0.2)
IMM GRANULOCYTES NFR BLD AUTO: 1 % (ref 0–2)
INR PPP: 1.15 (ref 0.86–1.17)
KETONES UR STRIP-MCNC: NEGATIVE MG/DL
L PNEUMO1 AG UR QL IA.RAPID: NEGATIVE
LACTATE SERPL-SCNC: 2 MMOL/L (ref 0.5–2)
LACTATE SERPL-SCNC: 2.8 MMOL/L (ref 0.5–2)
LEUKOCYTE ESTERASE UR QL STRIP: ABNORMAL
LYMPHOCYTES # BLD AUTO: 0.85 THOUSANDS/ΜL (ref 0.6–4.47)
LYMPHOCYTES NFR BLD AUTO: 7 % (ref 14–44)
MCH RBC QN AUTO: 27.7 PG (ref 26.8–34.3)
MCHC RBC AUTO-ENTMCNC: 30.9 G/DL (ref 31.4–37.4)
MCV RBC AUTO: 90 FL (ref 82–98)
MONOCYTES # BLD AUTO: 0.39 THOUSAND/ΜL (ref 0.17–1.22)
MONOCYTES NFR BLD AUTO: 3 % (ref 4–12)
NEUTROPHILS # BLD AUTO: 11.44 THOUSANDS/ΜL (ref 1.85–7.62)
NEUTS SEG NFR BLD AUTO: 88 % (ref 43–75)
NITRITE UR QL STRIP: NEGATIVE
NON-SQ EPI CELLS URNS QL MICRO: ABNORMAL /HPF
NRBC BLD AUTO-RTO: 0 /100 WBCS
P AXIS: 64 DEGREES
PH UR STRIP.AUTO: 6 [PH] (ref 4.5–8)
PLATELET # BLD AUTO: 207 THOUSANDS/UL (ref 149–390)
PMV BLD AUTO: 9.3 FL (ref 8.9–12.7)
POTASSIUM SERPL-SCNC: 3.5 MMOL/L (ref 3.5–5.3)
PR INTERVAL: 184 MS
PROCALCITONIN SERPL-MCNC: 3.66 NG/ML
PROT SERPL-MCNC: 7.2 G/DL (ref 6.4–8.2)
PROT UR STRIP-MCNC: NEGATIVE MG/DL
PROTHROMBIN TIME: 14 SECONDS (ref 11.8–14.2)
QRS AXIS: 54 DEGREES
QRSD INTERVAL: 128 MS
QT INTERVAL: 364 MS
QTC INTERVAL: 485 MS
RBC # BLD AUTO: 4.51 MILLION/UL (ref 3.81–5.12)
RBC #/AREA URNS AUTO: ABNORMAL /HPF
S PNEUM AG UR QL: NEGATIVE
SODIUM SERPL-SCNC: 145 MMOL/L (ref 136–145)
SP GR UR STRIP.AUTO: 1.02 (ref 1–1.03)
T WAVE AXIS: 239 DEGREES
TROPONIN I SERPL-MCNC: 0.02 NG/ML
UROBILINOGEN UR QL STRIP.AUTO: 0.2 E.U./DL
VENTRICULAR RATE: 107 BPM
WBC # BLD AUTO: 12.86 THOUSAND/UL (ref 4.31–10.16)
WBC #/AREA URNS AUTO: ABNORMAL /HPF

## 2018-11-26 PROCEDURE — 87449 NOS EACH ORGANISM AG IA: CPT | Performed by: INTERNAL MEDICINE

## 2018-11-26 PROCEDURE — 96374 THER/PROPH/DIAG INJ IV PUSH: CPT

## 2018-11-26 PROCEDURE — 84484 ASSAY OF TROPONIN QUANT: CPT | Performed by: EMERGENCY MEDICINE

## 2018-11-26 PROCEDURE — 93005 ELECTROCARDIOGRAM TRACING: CPT

## 2018-11-26 PROCEDURE — 93010 ELECTROCARDIOGRAM REPORT: CPT | Performed by: INTERNAL MEDICINE

## 2018-11-26 PROCEDURE — 85610 PROTHROMBIN TIME: CPT | Performed by: EMERGENCY MEDICINE

## 2018-11-26 PROCEDURE — 87186 SC STD MICRODIL/AGAR DIL: CPT | Performed by: EMERGENCY MEDICINE

## 2018-11-26 PROCEDURE — 87086 URINE CULTURE/COLONY COUNT: CPT | Performed by: EMERGENCY MEDICINE

## 2018-11-26 PROCEDURE — 96361 HYDRATE IV INFUSION ADD-ON: CPT

## 2018-11-26 PROCEDURE — 94664 DEMO&/EVAL PT USE INHALER: CPT

## 2018-11-26 PROCEDURE — 71260 CT THORAX DX C+: CPT

## 2018-11-26 PROCEDURE — 87081 CULTURE SCREEN ONLY: CPT | Performed by: INTERNAL MEDICINE

## 2018-11-26 PROCEDURE — 80053 COMPREHEN METABOLIC PANEL: CPT | Performed by: EMERGENCY MEDICINE

## 2018-11-26 PROCEDURE — 99285 EMERGENCY DEPT VISIT HI MDM: CPT

## 2018-11-26 PROCEDURE — 93971 EXTREMITY STUDY: CPT | Performed by: SURGERY

## 2018-11-26 PROCEDURE — 83605 ASSAY OF LACTIC ACID: CPT | Performed by: EMERGENCY MEDICINE

## 2018-11-26 PROCEDURE — 85025 COMPLETE CBC W/AUTO DIFF WBC: CPT | Performed by: EMERGENCY MEDICINE

## 2018-11-26 PROCEDURE — 99223 1ST HOSP IP/OBS HIGH 75: CPT | Performed by: INTERNAL MEDICINE

## 2018-11-26 PROCEDURE — 36415 COLL VENOUS BLD VENIPUNCTURE: CPT | Performed by: EMERGENCY MEDICINE

## 2018-11-26 PROCEDURE — 84145 PROCALCITONIN (PCT): CPT | Performed by: EMERGENCY MEDICINE

## 2018-11-26 PROCEDURE — 81001 URINALYSIS AUTO W/SCOPE: CPT | Performed by: EMERGENCY MEDICINE

## 2018-11-26 PROCEDURE — 85730 THROMBOPLASTIN TIME PARTIAL: CPT | Performed by: EMERGENCY MEDICINE

## 2018-11-26 PROCEDURE — 93971 EXTREMITY STUDY: CPT

## 2018-11-26 PROCEDURE — 74177 CT ABD & PELVIS W/CONTRAST: CPT

## 2018-11-26 PROCEDURE — 70450 CT HEAD/BRAIN W/O DYE: CPT

## 2018-11-26 PROCEDURE — 73564 X-RAY EXAM KNEE 4 OR MORE: CPT

## 2018-11-26 PROCEDURE — 1124F ACP DISCUSS-NO DSCNMKR DOCD: CPT | Performed by: INTERNAL MEDICINE

## 2018-11-26 PROCEDURE — 87077 CULTURE AEROBIC IDENTIFY: CPT | Performed by: EMERGENCY MEDICINE

## 2018-11-26 PROCEDURE — 71045 X-RAY EXAM CHEST 1 VIEW: CPT

## 2018-11-26 PROCEDURE — 87040 BLOOD CULTURE FOR BACTERIA: CPT | Performed by: EMERGENCY MEDICINE

## 2018-11-26 PROCEDURE — 72125 CT NECK SPINE W/O DYE: CPT

## 2018-11-26 RX ORDER — ONDANSETRON 2 MG/ML
4 INJECTION INTRAMUSCULAR; INTRAVENOUS ONCE
Status: COMPLETED | OUTPATIENT
Start: 2018-11-26 | End: 2018-11-26

## 2018-11-26 RX ORDER — ACETAMINOPHEN 325 MG/1
650 TABLET ORAL EVERY 6 HOURS PRN
Status: DISCONTINUED | OUTPATIENT
Start: 2018-11-26 | End: 2018-11-28

## 2018-11-26 RX ORDER — HYDROCHLOROTHIAZIDE 12.5 MG/1
12.5 TABLET ORAL DAILY
COMMUNITY
End: 2018-12-11 | Stop reason: SDUPTHER

## 2018-11-26 RX ORDER — HEPARIN SODIUM 5000 [USP'U]/ML
5000 INJECTION, SOLUTION INTRAVENOUS; SUBCUTANEOUS EVERY 8 HOURS SCHEDULED
Status: DISCONTINUED | OUTPATIENT
Start: 2018-11-26 | End: 2018-11-30 | Stop reason: HOSPADM

## 2018-11-26 RX ORDER — GABAPENTIN 600 MG/1
300 TABLET ORAL 3 TIMES DAILY
COMMUNITY
End: 2018-12-11 | Stop reason: SDUPTHER

## 2018-11-26 RX ORDER — GABAPENTIN 300 MG/1
300 CAPSULE ORAL 2 TIMES DAILY
Status: DISCONTINUED | OUTPATIENT
Start: 2018-11-26 | End: 2018-11-28

## 2018-11-26 RX ORDER — OMEGA-3S/DHA/EPA/FISH OIL/D3 300MG-1000
400 CAPSULE ORAL DAILY
Status: DISCONTINUED | OUTPATIENT
Start: 2018-11-26 | End: 2018-11-30 | Stop reason: HOSPADM

## 2018-11-26 RX ORDER — UBIDECARENONE 75 MG
100 CAPSULE ORAL DAILY
Status: DISCONTINUED | OUTPATIENT
Start: 2018-11-26 | End: 2018-11-30 | Stop reason: HOSPADM

## 2018-11-26 RX ORDER — ACETAMINOPHEN 325 MG/1
975 TABLET ORAL ONCE
Status: COMPLETED | OUTPATIENT
Start: 2018-11-26 | End: 2018-11-26

## 2018-11-26 RX ORDER — ONDANSETRON 2 MG/ML
4 INJECTION INTRAMUSCULAR; INTRAVENOUS EVERY 6 HOURS PRN
Status: DISCONTINUED | OUTPATIENT
Start: 2018-11-26 | End: 2018-11-30 | Stop reason: HOSPADM

## 2018-11-26 RX ORDER — ALBUTEROL SULFATE 2.5 MG/3ML
2.5 SOLUTION RESPIRATORY (INHALATION) EVERY 4 HOURS PRN
Status: DISCONTINUED | OUTPATIENT
Start: 2018-11-26 | End: 2018-11-30 | Stop reason: HOSPADM

## 2018-11-26 RX ORDER — 0.9 % SODIUM CHLORIDE 0.9 %
3 VIAL (ML) INJECTION AS NEEDED
Status: DISCONTINUED | OUTPATIENT
Start: 2018-11-26 | End: 2018-11-30 | Stop reason: HOSPADM

## 2018-11-26 RX ORDER — PRAVASTATIN SODIUM 20 MG
20 TABLET ORAL DAILY
Status: DISCONTINUED | OUTPATIENT
Start: 2018-11-26 | End: 2018-11-30 | Stop reason: HOSPADM

## 2018-11-26 RX ORDER — PANTOPRAZOLE SODIUM 40 MG/1
40 TABLET, DELAYED RELEASE ORAL
Status: DISCONTINUED | OUTPATIENT
Start: 2018-11-27 | End: 2018-11-30 | Stop reason: HOSPADM

## 2018-11-26 RX ORDER — HYDROCHLOROTHIAZIDE 25 MG/1
12.5 TABLET ORAL DAILY
Status: DISCONTINUED | OUTPATIENT
Start: 2018-11-26 | End: 2018-11-30 | Stop reason: HOSPADM

## 2018-11-26 RX ORDER — SODIUM CHLORIDE 9 MG/ML
75 INJECTION, SOLUTION INTRAVENOUS CONTINUOUS
Status: DISCONTINUED | OUTPATIENT
Start: 2018-11-26 | End: 2018-11-27

## 2018-11-26 RX ADMIN — SODIUM CHLORIDE 75 ML/HR: 0.9 INJECTION, SOLUTION INTRAVENOUS at 15:50

## 2018-11-26 RX ADMIN — IOHEXOL 100 ML: 350 INJECTION, SOLUTION INTRAVENOUS at 08:06

## 2018-11-26 RX ADMIN — HYDROCHLOROTHIAZIDE 12.5 MG: 25 TABLET ORAL at 15:48

## 2018-11-26 RX ADMIN — SODIUM CHLORIDE 1000 ML: 0.9 INJECTION, SOLUTION INTRAVENOUS at 08:41

## 2018-11-26 RX ADMIN — CHOLECALCIFEROL TAB 10 MCG (400 UNIT) 400 UNITS: 10 TAB at 15:49

## 2018-11-26 RX ADMIN — CEFEPIME HYDROCHLORIDE 1000 MG: 1 INJECTION, SOLUTION INTRAVENOUS at 22:15

## 2018-11-26 RX ADMIN — PRAVASTATIN SODIUM 20 MG: 20 TABLET ORAL at 15:49

## 2018-11-26 RX ADMIN — HEPARIN SODIUM 5000 UNITS: 5000 INJECTION INTRAVENOUS; SUBCUTANEOUS at 22:15

## 2018-11-26 RX ADMIN — AZITHROMYCIN MONOHYDRATE 500 MG: 500 INJECTION, POWDER, LYOPHILIZED, FOR SOLUTION INTRAVENOUS at 16:06

## 2018-11-26 RX ADMIN — ACETAMINOPHEN 650 MG: 325 TABLET, FILM COATED ORAL at 22:21

## 2018-11-26 RX ADMIN — VITAMIN B12 0.1 MG ORAL TABLET 100 MCG: 0.1 TABLET ORAL at 16:06

## 2018-11-26 RX ADMIN — HEPARIN SODIUM 5000 UNITS: 5000 INJECTION INTRAVENOUS; SUBCUTANEOUS at 15:49

## 2018-11-26 RX ADMIN — SODIUM CHLORIDE 1000 ML: 0.9 INJECTION, SOLUTION INTRAVENOUS at 09:03

## 2018-11-26 RX ADMIN — ONDANSETRON 4 MG: 2 INJECTION, SOLUTION INTRAMUSCULAR; INTRAVENOUS at 08:47

## 2018-11-26 RX ADMIN — GABAPENTIN 300 MG: 300 CAPSULE ORAL at 17:37

## 2018-11-26 RX ADMIN — CEFEPIME HYDROCHLORIDE 2000 MG: 2 INJECTION, SOLUTION INTRAVENOUS at 09:22

## 2018-11-26 RX ADMIN — ACETAMINOPHEN 975 MG: 325 TABLET, FILM COATED ORAL at 08:46

## 2018-11-26 NOTE — ED PROVIDER NOTES
History  Chief Complaint   Patient presents with    Syncope     brought in via EMS, slid off toilet, increased confusion, weakness  no LOC, did not hit head  hx of UTI     Patient brought in by ambulance for slip off bathroom toilet  Daughter heard a bang and found her wedged between toilet and bathtub  Patient just remembers feeling nausea and weak, balance off last couple of days  She admits to bilateral knee pain/ stiff last couple days  Daughter states that she is more confused than normal             Prior to Admission Medications   Prescriptions Last Dose Informant Patient Reported? Taking? cholecalciferol (VITAMIN D3) 400 units tablet   Yes No   Sig: Take 400 Units by mouth daily   cyanocobalamin 100 MCG tablet   Yes No   Sig: Take 100 mcg by mouth daily   gabapentin (NEURONTIN) 600 MG tablet   Yes Yes   Sig: Take 300 mg by mouth 3 (three) times a day   hydrochlorothiazide (HYDRODIURIL) 12 5 mg tablet   Yes Yes   Sig: Take 12 5 mg by mouth daily   omeprazole (PriLOSEC) 20 mg delayed release capsule   No No   Sig: Take 1 capsule (20 mg total) by mouth daily   pravastatin (PRAVACHOL) 20 mg tablet   No No   Sig: Take 1 tablet (20 mg total) by mouth daily      Facility-Administered Medications: None       Past Medical History:   Diagnosis Date    Balance disorder     GERD (gastroesophageal reflux disease)     Hard of hearing     Hyperlipidemia     Hypertension     Neuropathy     Tinnitus     UTI (urinary tract infection)        Past Surgical History:   Procedure Laterality Date    EYE SURGERY      HYSTERECTOMY         Family History   Problem Relation Age of Onset    Stroke Mother     Stroke Father     Heart disease Daughter      I have reviewed and agree with the history as documented      Social History   Substance Use Topics    Smoking status: Never Smoker    Smokeless tobacco: Never Used    Alcohol use Yes      Comment: rarely        Review of Systems   Unable to perform ROS: Acuity of condition   Constitutional: Negative for chills and fever  HENT: Negative for rhinorrhea and sore throat  Respiratory: Negative for shortness of breath  Cardiovascular: Negative for chest pain  Gastrointestinal: Negative for constipation, diarrhea, nausea and vomiting  Genitourinary: Negative for dysuria and frequency  Skin: Negative for rash  Neurological: Positive for weakness  All other systems reviewed and are negative  Physical Exam  Physical Exam   Constitutional: She appears well-developed  HENT:   Mouth/Throat: Mucous membranes are dry  Eyes: Pupils are equal, round, and reactive to light  Conjunctivae and EOM are normal    Neck: Normal range of motion  Neck supple  No spinous process tenderness present  Cardiovascular: Normal rate, regular rhythm, normal heart sounds and intact distal pulses  Pulmonary/Chest: Effort normal  No respiratory distress  She has decreased breath sounds  She has no wheezes  Abdominal: Soft  Bowel sounds are normal  She exhibits no distension  There is no tenderness  Musculoskeletal: Normal range of motion  She exhibits no tenderness  Legs:  Generalized weakness   Neurological: She is alert  She has normal strength  No sensory deficit  GCS eye subscore is 4  GCS verbal subscore is 5  GCS motor subscore is 6  Skin: Skin is warm  No rash noted  She is diaphoretic  Psychiatric: She has a normal mood and affect  Nursing note and vitals reviewed        Vital Signs  ED Triage Vitals   Temperature Pulse Respirations Blood Pressure SpO2   11/26/18 0718 11/26/18 0718 11/26/18 0718 11/26/18 0718 11/26/18 0845   (!) 101 8 °F (38 8 °C) (!) 109 20 114/56 99 %      Temp Source Heart Rate Source Patient Position - Orthostatic VS BP Location FiO2 (%)   11/26/18 1257 11/26/18 1524 11/26/18 1257 11/26/18 1257 11/26/18 0730   Oral Monitor Lying Left arm 92      Pain Score       11/26/18 0846       6           Vitals:    11/26/18 1130 11/26/18 1200 11/26/18 1257 11/26/18 1524   BP: (!) 96/47 102/50 102/51 120/56   Pulse: 78 75 83 78   Patient Position - Orthostatic VS:   Lying Lying       Visual Acuity  Visual Acuity      Most Recent Value   L Pupil Size (mm)  2   R Pupil Size (mm)  2          ED Medications  Medications   sodium chloride (PF) 0 9 % injection 3 mL (not administered)   influenza vaccine, high-dose (FLUZONE HIGH-DOSE) IM injection FELIBERTO 0 5 mL (not administered)   cholecalciferol (VITAMIN D3) tablet 400 Units (400 Units Oral Given 11/26/18 1549)   cyanocobalamin (VITAMIN B-12) tablet 100 mcg (100 mcg Oral Given 11/26/18 1606)   gabapentin (NEURONTIN) capsule 300 mg (300 mg Oral Given 11/26/18 1737)   hydrochlorothiazide (HYDRODIURIL) tablet 12 5 mg (12 5 mg Oral Given 11/26/18 1548)   pantoprazole (PROTONIX) EC tablet 40 mg (not administered)   pravastatin (PRAVACHOL) tablet 20 mg (20 mg Oral Given 11/26/18 1549)   sodium chloride 0 9 % infusion (75 mL/hr Intravenous New Bag 11/26/18 1550)   ondansetron (ZOFRAN) injection 4 mg (not administered)   heparin (porcine) subcutaneous injection 5,000 Units (5,000 Units Subcutaneous Given 11/26/18 1549)   acetaminophen (TYLENOL) tablet 650 mg (not administered)   cefepime (MAXIPIME) 1 g/50 mL dextrose IVPB (not administered)   azithromycin (ZITHROMAX) 500 mg in sodium chloride 0 9 % 250 mL IVPB (500 mg Intravenous New Bag 11/26/18 1606)   albuterol inhalation solution 2 5 mg (not administered)   sodium chloride 0 9 % bolus 1,000 mL (0 mL Intravenous Stopped 11/26/18 1104)   acetaminophen (TYLENOL) tablet 975 mg (975 mg Oral Given 11/26/18 0846)   ondansetron (ZOFRAN) injection 4 mg (4 mg Intravenous Given 11/26/18 0847)   iohexol (OMNIPAQUE) 350 MG/ML injection (MULTI-DOSE) 100 mL (100 mL Intravenous Given 11/26/18 0806)   sodium chloride 0 9 % bolus 1,100 mL (1,000 mL Intravenous New Bag 11/26/18 5633)   cefepime (MAXIPIME) 2 g/50 mL dextrose IVPB (0 mg Intravenous Stopped 11/26/18 1002) Diagnostic Studies  Results Reviewed     Procedure Component Value Units Date/Time    Strep Pneumoniae, Urine [[id number]] Collected:  11/26/18 0901    Lab Status: In process Specimen:  Urine from Urine, Clean Catch Updated:  11/26/18 1501    Legionella antigen, urine [[id number]] Collected:  11/26/18 0901    Lab Status: In process Specimen:  Urine Updated:  11/26/18 1455    Procalcitonin [[id number]]  (Abnormal) Collected:  11/26/18 0733    Lab Status:  Final result Specimen:  Blood from Arm, Right Updated:  11/26/18 1055     Procalcitonin 3 66 (H) ng/ml     Lactic Acid x2 [[id number]]  (Normal) Collected:  11/26/18 0920    Lab Status:  Final result Specimen:  Blood from Arm, Left Updated:  11/26/18 0951     LACTIC ACID 2 0 mmol/L     Narrative:         Result may be elevated if tourniquet was used during collection  Blood culture #1 [[id number]] Collected:  11/26/18 0920    Lab Status: In process Specimen:  Blood from Arm, Left Updated:  11/26/18 0930    Urine Microscopic [[id number]]  (Abnormal) Collected:  11/26/18 0901    Lab Status:  Final result Specimen:  Urine from Urine, Straight Cath Updated:  11/26/18 0928     RBC, UA 4-10 (A) /hpf      WBC, UA Innumerable (A) /hpf      Epithelial Cells None Seen /hpf      Bacteria, UA Innumerable (A) /hpf     Urine culture [[id number]] Collected:  11/26/18 0901    Lab Status:   In process Specimen:  Urine from Urine, Straight Cath Updated:  11/26/18 0928    UA w Reflex to Microscopic w Reflex to Culture [[id number]]  (Abnormal) Collected:  11/26/18 0901    Lab Status:  Final result Specimen:  Urine from Urine, Straight Cath Updated:  11/26/18 0917     Color, UA Yellow     Clarity, UA Cloudy     Specific Gravity, UA 1 025     pH, UA 6 0     Leukocytes, UA Large (A)     Nitrite, UA Negative     Protein, UA Negative mg/dl      Glucose, UA Negative mg/dl      Ketones, UA Negative mg/dl      Urobilinogen, UA 0 2 E U /dl      Bilirubin, UA Negative     Blood, UA Small (A) Lactic Acid x2 [[id number]]  (Abnormal) Collected:  11/26/18 0733    Lab Status:  Final result Specimen:  Blood from Arm, Right Updated:  11/26/18 0808     LACTIC ACID 2 8 (HH) mmol/L     Narrative:         Result may be elevated if tourniquet was used during collection  Troponin I [[id number]]  (Normal) Collected:  11/26/18 0733    Lab Status:  Final result Specimen:  Blood from Arm, Right Updated:  11/26/18 0806     Troponin I 0 02 ng/mL     Comprehensive metabolic panel [[id number]]  (Abnormal) Collected:  11/26/18 0733    Lab Status:  Final result Specimen:  Blood from Arm, Right Updated:  11/26/18 0804     Sodium 145 mmol/L      Potassium 3 5 mmol/L      Chloride 102 mmol/L      CO2 33 (H) mmol/L      ANION GAP 10 mmol/L      BUN 22 mg/dL      Creatinine 1 10 mg/dL      Glucose 112 mg/dL      Calcium 9 6 mg/dL      AST 18 U/L      ALT 20 U/L      Alkaline Phosphatase 56 U/L      Total Protein 7 2 g/dL      Albumin 3 6 g/dL      Total Bilirubin 0 40 mg/dL      eGFR 45 ml/min/1 73sq m     Narrative:         National Kid[employer] Disease Education Program recommendations are as follows:  GFR calculation is accurate only with a steady state creatinine  Chronic Kidney disease less than 60 ml/min/1 73 sq  meters  Kidney failure less than 15 ml/min/1 73 sq  meters      Protime-INR [[id number]]  (Normal) Collected:  11/26/18 0733    Lab Status:  Final result Specimen:  Blood from Arm, Right Updated:  11/26/18 0758     Protime 14 0 seconds      INR 1 15    APTT [[id number]]  (Normal) Collected:  11/26/18 0733    Lab Status:  Final result Specimen:  Blood from Arm, Right Updated:  11/26/18 0758     PTT 37 seconds     CBC and differential [[id number]]  (Abnormal) Collected:  11/26/18 0733    Lab Status:  Final result Specimen:  Blood from Arm, Right Updated:  11/26/18 0746     WBC 12 86 (H) Thousand/uL      RBC 4 51 Million/uL      Hemoglobin 12 5 g/dL      Hematocrit 40 4 %      MCV 90 fL      MCH 27 7 pg      MCHC 30 9 (L) g/dL      RDW 14 8 %      MPV 9 3 fL      Platelets 112 Thousands/uL      nRBC 0 /100 WBCs      Neutrophils Relative 88 (H) %      Immat GRANS % 1 %      Lymphocytes Relative 7 (L) %      Monocytes Relative 3 (L) %      Eosinophils Relative 1 %      Basophils Relative 0 %      Neutrophils Absolute 11 44 (H) Thousands/µL      Immature Grans Absolute 0 07 Thousand/uL      Lymphocytes Absolute 0 85 Thousands/µL      Monocytes Absolute 0 39 Thousand/µL      Eosinophils Absolute 0 09 Thousand/µL      Basophils Absolute 0 02 Thousands/µL     Blood culture #2 [[id number]] Collected:  11/26/18 0733    Lab Status: In process Specimen:  Blood from Arm, Right Updated:  11/26/18 0744                 VAS lower limb venous duplex study, unilateral/limited   Final Result by Sandra Bassett DO (11/26 1013)      XR chest 1 view portable   ED Interpretation by Leilani Molina DO (11/26 1708)   Multifocal opacities bilateral      Final Result by Margoth Hayward MD (11/26 0839)      Left multi lobar pneumonia  Workstation performed: [id number]         XR knee 4+ vw right injury   ED Interpretation by Leilani Molina DO (11/26 0847)   arthritis      Final Result by Margoth Hayward MD (11/26 6144)      No acute osseous abnormality  Mild osteoarthritis  Workstation performed: [id number]         CT chest abdomen pelvis w contrast   Final Result by Hallie Sanchez MD (11/26 4945)      1  Scattered pulmonary consolidations and nodular opacities consistent with multifocal pneumonia, greatest in the lingula and left lower lobe  2   No acute abdominopelvic findings  3   Chronic abdominopelvic findings including large hiatal hernia and colonic diverticulosis  Workstation performed: [id number]         CT spine cervical without contrast   Final Result by Hallie Sanchez MD (11/26 5330)      No cervical spine fracture or traumatic malalignment                     Workstation performed: [id number] CT head without contrast   Final Result by Merlyn Buchanan MD (11/26 2055)      No acute intracranial abnormality or significant change from prior                    Workstation performed: [id number]                    Procedures  ECG 12 Lead Documentation  Date/Time: 11/26/2018 7:34 AM  Performed by: Shawn Masters by: Waleska Valerio     Indications / Diagnosis:  Confusion  ECG reviewed by me, the ED Provider: yes    Patient location:  ED  Previous ECG:     Previous ECG:  Compared to current    Comparison ECG info:  5-18    Similarity:  Changes noted  Interpretation:     Interpretation: abnormal    Rate:     ECG rate:  107    ECG rate assessment: tachycardic    Rhythm:     Rhythm: sinus tachycardia    Ectopy:     Ectopy: none    QRS:     QRS axis:  Normal    QRS intervals:  Normal  Conduction:     Conduction: abnormal      Abnormal conduction: complete LBBB    ST segments:     ST segments:  Normal  T waves:     T waves: normal      CriticalCare Time  Performed by: Shawn Masters by: Waleska Valerio     Critical care provider statement:     Critical care time (minutes):  45    Critical care time was exclusive of:  Separately billable procedures and treating other patients and teaching time    Critical care was necessary to treat or prevent imminent or life-threatening deterioration of the following conditions:  Sepsis    Critical care was time spent personally by me on the following activities:  Obtaining history from patient or surrogate, development of treatment plan with patient or surrogate, discussions with consultants, evaluation of patient's response to treatment, examination of patient, review of old charts, re-evaluation of patient's condition, ordering and review of radiographic studies, ordering and review of laboratory studies and ordering and performing treatments and interventions           Phone Contacts  ED Phone Contact    ED Course  ED Course as of Nov 26 2136 Mon Nov 26, 2018   9431 Sent tiger text to Adi Mendoza    0910 Left message on cell phone for Dr Bhagat Wallarmer tech checking to confirm if Adi Mendoza is on call                          Initial Sepsis Screening     [address] Name 11/26/18 1259                Is the patient's history suggestive of a new or worsening infection? (!)  Yes (Proceed)  -MEGHAN        Suspected source of infection pneumonia  -MEGHAN        Are two or more of the following signs & symptoms of infection both present and new to the patient? (!)  Yes (Proceed)  -MEGHAN        Indicate SIRS criteria Altered mental status; Tachycardia > 90 bpm  -MEGHAN        If the answer is yes to both questions, suspicion of sepsis is present          If severe sepsis is present AND tissue hypoperfusion perists in the hour after fluid resuscitation or lactate > 4, the patient meets criteria for SEPTIC SHOCK          Are any of the following organ dysfunction criteria present within 6 hours of suspected infection and SIRS criteria that are NOT considered to be chronic conditions? (!)  Yes  -MEGHAN        Organ dysfunction Lactate > 2 0 mmol/L  -MEGHAN        Date of presentation of severe sepsis 11/26/18  -MEGHAN        Time of presentation of severe sepsis 0918  -MEGHAN        Tissue hypoperfusion persists in the hour after crystalloid fluid administration, evidenced, by either:          Was hypotension present within one hour of the conclusion of crystalloid fluid administration?  No  -MEGHAN        Date of presentation of septic shock          Time of presentation of septic shock            User Key  (r) = Recorded By, (t) = Taken By, (c) = Cosigned By    Initials Name Provider Type    [address], DO Physician           Default Flowsheet Data (last 720 hours)      Sepsis Reassess     Row Name 11/26/18 0919                   Repeat Volume Status and Tissue Perfusion Assessment Performed    Repeat Volume Status and Tissue Perfusion Assessment Performed Yes  -MEGHAN           Volume Status and Tissue Perfusion Post Fluid Resuscitation- Must Document 5 of the Following 7:    Vital Signs Reviewed (HR, RR, BP, T) Yes  -MEGHAN        Arterial Oxygen Saturation Reviewed (POx, SaO2 or SpO2) Yes (comment %)   98% 2 liters  -MEGHAN        Cardio (!)  Normal S1/S2; Tachycardia   hear rate 100  -MEGHAN        Pulmonary Normal effort  -MEGHAN        Capillary Refill Brisk  -MEGHAN        Peripheral Pulses Radial  -MEGHAN        Peripheral Pulse +2  -MEGHAN        Skin Warm  -MEGHAN        Urine output assessed Adequate  -MEGHAN           *OR*   Intensive Monitoring- Must Document One of the Following Four *:    Vital Signs Reviewed          * Central Venous Pressure (CVP or RAP)          * Central Venous Oxygen (SVO2, ScvO2 or Oxygen saturation via central catheter)          * Bedside Cardiovascular US in IVC diameter and % collapse          * Passive Leg Raise OR Crystalloid Challenge            User Key  (r) = Recorded By, (t) = Taken By, (c) = Cosigned By    Initials Name Provider Type    [address], DO Physician                MDM  Number of Diagnoses or Management Options  Fall, initial encounter: new and requires workup  Multifocal pneumonia: new and requires workup  Severe sepsis [facility]): new and requires workup     Amount and/or Complexity of Data Reviewed  Clinical lab tests: ordered and reviewed  Tests in the radiology section of CPT®: ordered and reviewed  Obtain history from someone other than the patient: yes  Discuss the patient with other providers: yes    Patient Progress  Patient progress: improved    The patient presented with a condition in which there was a high probability of imminent or life-threatening deterioration, and critical care services (excluding separately billable procedures) totalled 30-74 minutes          Disposition  Final diagnoses:   Fall, initial encounter   Severe sepsis ([facility] [address] )   Multifocal pneumonia - acute with hypoxia     Time reflects when diagnosis was documented in both MDM as applicable and the Disposition within this note     Time User Action Codes Description Comment    11/26/2018  8:50 AM Ciara Venessa Add [Z47  HQIX] Fall, initial encounter     11/26/2018  8:52 AM Ciara Dudleye Add [A41 9,  R65 20] Severe sepsis ([facility] [address] )     11/26/2018  8:52 AM Ciara Fare Add [J18 9] Multifocal pneumonia     11/26/2018  8:52 AM Ciara Dudleye Modify [J18 9] Multifocal pneumonia acute with hypoxia    11/26/2018 10:00 AM Lisy Connelly Add [M79 609] Pain in limb     11/26/2018  2:31 PM [facility] Modify [N34  ILXR] Fall, initial encounter     11/26/2018  2:31 PM [address] Modify [A41 9,  R65 20] Severe sepsis ([facility] [address] )     11/26/2018  2:31 PM [address] Modify [J18 9] Multifocal pneumonia acute with hypoxia      ED Disposition     ED Disposition Condition Comment    Admit  Case was discussed with Esperanza Orantes and the patient's admission status was agreed to be Admission Status: inpatient status to the service of Dr Merton Gitelman   Follow-up Information    None         Current Discharge Medication List      CONTINUE these medications which have NOT CHANGED    Details   gabapentin (NEURONTIN) 600 MG tablet Take 300 mg by mouth 3 (three) times a day      hydrochlorothiazide (HYDRODIURIL) 12 5 mg tablet Take 12 5 mg by mouth daily      cholecalciferol (VITAMIN D3) 400 units tablet Take 400 Units by mouth daily      cyanocobalamin 100 MCG tablet Take 100 mcg by mouth daily      omeprazole (PriLOSEC) 20 mg delayed release capsule Take 1 capsule (20 mg total) by mouth daily  Qty: 90 capsule, Refills: 1    Associated Diagnoses: Gastroesophageal reflux disease, esophagitis presence not specified      pravastatin (PRAVACHOL) 20 mg tablet Take 1 tablet (20 mg total) by mouth daily  Qty: 90 tablet, Refills: 1    Associated Diagnoses: Mixed hyperlipidemia           No discharge procedures on file      ED Provider  Electronically Signed by           Precious Jarquin DO  11/26/18 0794

## 2018-11-26 NOTE — ED NOTES
Patient resting in stretcher  Family at bedside  Denies pain/ discomfort   Water given per request       Raghu Gonsalves RN  11/26/18 0046

## 2018-11-26 NOTE — SEPSIS NOTE
Sepsis Note   Yaniv Marlow 80 y o  female MRN: [id number]  Unit/Bed#: ED 05 Encounter: [id number]            Initial Sepsis Screening     Row Name 11/26/18 7076                Is the patient's history suggestive of a new or worsening infection? (!)  Yes (Proceed)  -MEGHAN        Suspected source of infection pneumonia  -MEGHAN        Are two or more of the following signs & symptoms of infection both present and new to the patient? (!)  Yes (Proceed)  -MEGHAN        Indicate SIRS criteria Altered mental status; Tachycardia > 90 bpm  -MEGHAN        If the answer is yes to both questions, suspicion of sepsis is present          If severe sepsis is present AND tissue hypoperfusion perists in the hour after fluid resuscitation or lactate > 4, the patient meets criteria for SEPTIC SHOCK          Are any of the following organ dysfunction criteria present within 6 hours of suspected infection and SIRS criteria that are NOT considered to be chronic conditions? (!)  Yes  -MEGHAN        Organ dysfunction Lactate > 2 0 mmol/L  -MEGHAN        Date of presentation of severe sepsis 11/26/18  -MEGHAN        Time of presentation of severe sepsis 0918  -MEGHAN        Tissue hypoperfusion persists in the hour after crystalloid fluid administration, evidenced, by either:          Was hypotension present within one hour of the conclusion of crystalloid fluid administration?  No  -MEGHAN        Date of presentation of septic shock          Time of presentation of septic shock            User Key  (r) = Recorded By, (t) = Taken By, (c) = Cosigned By    [address] Name Provider Type    Kristin Manzano DO Physician               Default Flowsheet Data (last 720 hours)      Sepsis Reassess     Row Name 11/26/18 0919                   Repeat Volume Status and Tissue Perfusion Assessment Performed    Repeat Volume Status and Tissue Perfusion Assessment Performed Yes  -MEGHAN           Volume Status and Tissue Perfusion Post Fluid Resuscitation- Must Document 5 of the Following 7:    Vital Signs Reviewed (HR, RR, BP, T) Yes  -MEGHAN        Arterial Oxygen Saturation Reviewed (POx, SaO2 or SpO2) Yes (comment %)   98% 2 liters  -MEGHAN        Cardio (!)  Normal S1/S2; Tachycardia   hear rate 100  -MEGHAN        Pulmonary Normal effort  -MEGHAN        Capillary Refill Brisk  -MEGHAN        Peripheral Pulses Radial  -MEGHAN        Peripheral Pulse +2  -MEGHAN        Skin Warm  -MEGHAN        Urine output assessed Adequate  -MEGHAN           *OR*   Intensive Monitoring- Must Document One of the Following Four *:    Vital Signs Reviewed          * Central Venous Pressure (CVP or RAP)          * Central Venous Oxygen (SVO2, ScvO2 or Oxygen saturation via central catheter)          * Bedside Cardiovascular US in IVC diameter and % collapse          * Passive Leg Raise OR Crystalloid Challenge            User Key  (r) = Recorded By, (t) = Taken By, (c) = Cosigned By    Initials Name Provider Type    [address], DO Physician

## 2018-11-26 NOTE — H&P
History and Physical  Abiel Johnson 80 y o  female MRN: [id number]  Unit/Bed#: [address] [place] [address] 201-01 Encounter: [id number]    Admitting Diagnosis:   Principal Problem:    Severe sepsis ([address] )  Active Problems:    Hypertension    GERD (gastroesophageal reflux disease)    Multifocal pneumonia    Fall    UTI (urinary tract infection)  Resolved Problems:    * No resolved hospital problems  *      Plan:  Admit to [facility] surgical floor as inpatient status  · Severe sepsis secondary to multifocal pneumonia/UTI  Continue on cefepime and Zithromax  Oxygen supplementation and bronchodilators p r n  Follow-up blood and urine culture results  Will order sputum culture, urine for Legionella and Streptococcus  Pulmonary consult  · Mechanical fall secondary to above  No obvious injuries  CT and X-rays are negative  · Hypertension-continue on hydrochlorothiazide  · Hyperlipidemia-on statin  · GERD-on Protonix  · DVT prophylaxis  · Discussed with patient and daughter in detail  Anticipated length of stay greater than 2 midnights  Chief Complaint   Patient presents with    Syncope     brought in via EMS, slid off toilet, increased confusion, weakness  no LOC, did not hit head  hx of UTI        HPI:  Abiel Johnson is a 80 y o  female who presented to the emergency department after she fell in bathroom  Patient is accompanied to the ER with her daughter  As per daughter she heard a loud Bang this morning and found her mother in the bathroom wedged between toilet and bathtub  As per patient she has been having generalized weakness for last couple of days  She has nonproductive cough  She also complains of having some urinary frequency  She has been experiencing right knee pain for past couple of days which is 6/10 intensity, sharp, constant, nonradiating and worse with movement  She denies noticing any fever, chills, chest pain, dizziness, palpitations prior to the fall    No obvious injuries/head injuries/abrasions or lacerations noted  Daughter did state that she noticed her mom was more confused than normal in morning when she found her in the bathroom  In ER patient was found to have pneumonia and UTI and was started on IV antibiotics  Historical Information   Past Medical History:   Diagnosis Date    Balance disorder     GERD (gastroesophageal reflux disease)     Hard of hearing     Hyperlipidemia     Hypertension     Neuropathy     Tinnitus     UTI (urinary tract infection)      Past Surgical History:   Procedure Laterality Date    EYE SURGERY      HYSTERECTOMY       Social History   History   Alcohol Use    Yes     Comment: rarely     History   Drug Use No     History   Smoking Status    Never Smoker   Smokeless Tobacco    Never Used     Family History   Problem Relation Age of Onset    Stroke Mother     Stroke Father     Heart disease Daughter        Meds/Allergies   No Known Allergies    Meds:    Current Facility-Administered Medications:     influenza vaccine, high-dose (FLUZONE HIGH-DOSE) IM injection FELIBERTO 0 5 mL, 0 5 mL, Intramuscular, Prior to discharge, Tiffanie Hutchison MD    sodium chloride (PF) 0 9 % injection 3 mL, 3 mL, Intravenous, PRN, Claribel Slipper, DO    Prescriptions Prior to Admission   Medication    gabapentin (NEURONTIN) 600 MG tablet    hydrochlorothiazide (HYDRODIURIL) 12 5 mg tablet    cholecalciferol (VITAMIN D3) 400 units tablet    cyanocobalamin 100 MCG tablet    omeprazole (PriLOSEC) 20 mg delayed release capsule    pravastatin (PRAVACHOL) 20 mg tablet         Review of Systems   Constitutional: Positive for activity change and fever  HENT: Negative  Eyes: Negative  Respiratory: Positive for cough and shortness of breath  Cardiovascular: Negative  Gastrointestinal: Positive for nausea  Endocrine: Negative  Genitourinary: Positive for frequency  Musculoskeletal: Negative  Skin: Negative  Allergic/Immunologic: Negative  Neurological: Negative  Hematological: Negative  Psychiatric/Behavioral: Negative  Current Vitals:   Blood Pressure: 102/51 (11/26/18 1257)  Pulse: 83 (11/26/18 1257)  Temperature: 98 1 °F (36 7 °C) (11/26/18 1257)  Temp Source: Oral (11/26/18 1257)  Respirations: 18 (11/26/18 1257)  Height: 5' (152 4 cm) (11/26/18 1257)  Weight - Scale: 76 2 kg (167 lb 15 9 oz) (11/26/18 1257)  SpO2: 94 % (11/26/18 1325)  SPO2 RA Rest      ED to Hosp-Admission (Current) from 11/26/2018 in [facility] Surg Unit   SpO2  94 %   SpO2 Activity  At Rest   O2 Device  Nasal cannula   O2 Flow Rate  2 L/min        No intake or output data in the 24 hours ending 11/26/18 1429  Body mass index is 32 81 kg/m²  Physical Exam   Constitutional: She is oriented to person, place, and time  She appears well-developed and well-nourished  No distress  HENT:   Head: Normocephalic and atraumatic  Nose: Nose normal    Mouth/Throat: Oropharynx is clear and moist    Eyes: Pupils are equal, round, and reactive to light  Conjunctivae and EOM are normal    Neck: Normal range of motion  Neck supple  No JVD present  No tracheal deviation present  Cardiovascular: Normal rate, regular rhythm, normal heart sounds and intact distal pulses  Pulmonary/Chest: Effort normal and breath sounds normal  No respiratory distress  She has no wheezes  She has no rales  Bibasilar rhonchi present   Abdominal: Soft  Bowel sounds are normal  There is no tenderness  There is no rebound and no guarding  Musculoskeletal: Normal range of motion  She exhibits no edema, tenderness or deformity  Neurological: She is alert and oriented to person, place, and time  No cranial nerve deficit  No focal deficits   Skin: Skin is warm  No rash noted  No erythema  Psychiatric: She has a normal mood and affect  Judgment and thought content normal    Nursing note and vitals reviewed        Lab Results:   CBC:   Lab Results   Component Value Date    WBC 12 86 (H) 11/26/2018    HGB 12 5 11/26/2018    HCT 40 4 11/26/2018    MCV 90 11/26/2018     11/26/2018    MCH 27 7 11/26/2018    MCHC 30 9 (L) 11/26/2018    RDW 14 8 11/26/2018    MPV 9 3 11/26/2018    NRBC 0 11/26/2018     CMP:  Lab Results   Component Value Date     12/20/2016     11/26/2018     12/20/2016    CO2 33 (H) 11/26/2018    CO2 32 (H) 12/20/2016    ANIONGAP 7 05/18/2015    BUN 22 11/26/2018    BUN 16 12/20/2016    CREATININE 1 10 11/26/2018    CREATININE 0 91 (H) 12/20/2016    GLUCOSE 93 05/18/2015    CALCIUM 9 6 11/26/2018    CALCIUM 9 8 12/20/2016    AST 18 11/26/2018    AST 15 12/20/2016    ALT 20 11/26/2018    ALT 10 12/20/2016    ALKPHOS 56 11/26/2018    ALKPHOS 56 12/20/2016    PROT 6 9 12/20/2016    BILITOT 0 4 12/20/2016    EGFR 45 11/26/2018     Lab Results   Component Value Date    TROPONINI 0 02 11/26/2018    CKTOTAL 112 01/26/2014     Coagulation:   Lab Results   Component Value Date    INR 1 15 11/26/2018    Urinalysis:  Lab Results   Component Value Date    COLORU Yellow 11/26/2018    CLARITYU Cloudy 11/26/2018    SPECGRAV 1 025 11/26/2018    PHUR 6 0 11/26/2018    LEUKOCYTESUR Large (A) 11/26/2018    NITRITE Negative 11/26/2018    GLUCOSEU Negative 11/26/2018    KETONESU Negative 11/26/2018    BILIRUBINUR Negative 11/26/2018    BLOODU Small (A) 11/26/2018      Amylase: No results found for: AMYLASE  Lipase: No results found for: LIPASE     Imaging: Xr Chest 1 View Portable    Result Date: 11/26/2018  Narrative: CHEST INDICATION:   sepsis  COMPARISON:  5/27/2018  EXAM PERFORMED/VIEWS:  XR CHEST PORTABLE FINDINGS: Cardiomediastinal silhouette appears unremarkable  There is consolidation in the left upper and lower lobes compatible with pneumonia  No pneumothorax or pleural effusion  Osseous structures appear within normal limits for patient age  Impression: Left multi lobar pneumonia   Workstation performed: [id number]     Xr Knee 4+ Vw Right Injury    Result Date: 11/26/2018  Narrative: RIGHT KNEE INDICATION:   Right knee pain and swelling status post fall  COMPARISON:  None VIEWS:  XR KNEE 4+ VW RIGHT INJURY FINDINGS: There is no acute fracture or dislocation  There is no joint effusion  Mild osteoarthritis with narrowing of the medial tibiofemoral joint and small osteophytes seen  No lytic or blastic lesions are seen  Soft tissues are unremarkable  Impression: No acute osseous abnormality  Mild osteoarthritis  Workstation performed: [id number]     Ct Head Without Contrast    Result Date: 11/26/2018  Narrative: CT BRAIN - WITHOUT CONTRAST INDICATION:   Head trauma, mental status change  COMPARISON:  None  TECHNIQUE:  CT examination of the brain was performed  In addition to axial images, coronal 2D reformatted images were created and submitted for interpretation  Radiation dose length product (DLP) for this visit:  885 mGy-cm   This examination, like all CT scans performed in the [place], was performed utilizing techniques to minimize radiation dose exposure, including the use of iterative reconstruction and automated exposure control  IMAGE QUALITY:  Diagnostic  FINDINGS: PARENCHYMA:  Unchanged small, chronic hypodensity in the right external capsule  Background of mild periventricular and subcortical microangiopathic white matter changes  No acute infarct, parenchymal hemorrhage, or mass  VENTRICLES AND EXTRA-AXIAL SPACES:  Normal for the patient's age  VISUALIZED ORBITS AND PARANASAL SINUSES:  Unremarkable  CALVARIUM AND EXTRACRANIAL SOFT TISSUES:  Normal      Impression: No acute intracranial abnormality or significant change from prior  Workstation performed: [id number]     Ct Spine Cervical Without Contrast    Result Date: 11/26/2018  Narrative: CT CERVICAL SPINE - WITHOUT CONTRAST INDICATION:   C-spine trauma, NEXUS/CCR positive, low risk  COMPARISON:  None   TECHNIQUE:  CT examination of the cervical spine was performed without intravenous contrast   Contiguous axial images were obtained  Sagittal and coronal reconstructions were performed  Radiation dose length product (DLP) for this visit:  459 mGy-cm   This examination, like all CT scans performed in the [facility], was performed utilizing techniques to minimize radiation dose exposure, including the use of iterative reconstruction and automated exposure control  IMAGE QUALITY:  Diagnostic  FINDINGS: ALIGNMENT:  Sub-2 mm anterior subluxation of C3 on C4 and C4 on C5  Ankylosis across the facet joints suggests that this is a fixed alignment  No acute subluxation  Normal atlantooccipital alignment  VERTEBRAL BODIES:  Severe rheumatoid erosions at the dens  No acute fracture or focal lytic/blastic lesions  DEGENERATIVE CHANGES:  Mild facet-predominant multilevel cervical degenerative changes are noted without critical central canal stenosis  PREVERTEBRAL AND PARASPINAL SOFT TISSUES:  Unremarkable  THORACIC INLET:  Normal      Impression: No cervical spine fracture or traumatic malalignment  Workstation performed: [id number]     Ct Chest Abdomen Pelvis W Contrast    Result Date: 11/26/2018  Narrative: CT CHEST, ABDOMEN AND PELVIS WITH IV CONTRAST INDICATION:   Sepsis  COMPARISON:  None  TECHNIQUE: CT examination of the chest, abdomen and pelvis was performed  Axial, sagittal, and coronal 2D reformatted images were created from the source data and submitted for interpretation  Radiation dose length product (DLP) for this visit:  449 mGy-cm   This examination, like all CT scans performed in the [facility], was performed utilizing techniques to minimize radiation dose exposure, including the use of iterative reconstruction and automated exposure control  IV Contrast:  100 mL of iohexol (OMNIPAQUE) Enteric Contrast: Enteric contrast was administered   Note: Image numbers reported for findings (if any) are taken from axial series 2 and 3 unless otherwise indicated  FINDINGS: CHEST LUNGS:  Fluffy consolidations throughout the lingula and left lower lobe  No right-sided consolidation  No interstitial thickening  No endobronchial lesions  Nodular left upper lobe opacity on image 18 measuring 4 mm  Right middle lobe nodules measuring up to 6 mm on image 30  Right lower lobe nodules measuring up to 4 mm on image 28  PLEURA:  Unremarkable  HEART/GREAT VESSELS:  Mild anterior mass effect in the heart from patient's hiatal hernia  Heart size is normal   Coronary calcifications  Aortic arch atherosclerosis without aneurysm  No pericardial effusion  MEDIASTINUM AND ROYAL:  See discussion of hiatal hernia below  No lymphadenopathy or soft tissue mass  CHEST WALL AND LOWER NECK:   Small hypoenhancing thyroid nodules  None are large left to warrant dedicated imaging  ABDOMEN LIVER/BILIARY TREE:  Unremarkable  GALLBLADDER:  No calcified gallstones  No pericholecystic inflammatory change  SPLEEN:  14 mm fluid density rounded lesion in the superior spleen most likely representing epidermoid cyst  PANCREAS:  Fatty involution without mass or pancreatic ductal dilatation  ADRENAL GLANDS:  Unremarkable  KIDNEYS/URETERS:  Unremarkable  No hydronephrosis  STOMACH AND BOWEL:  Large hiatal hernia containing the majority of stomach  No mesenteroaxial or organoaxial volvulus  No gastric wall thickening when accounting for level distention and distortion from hernia  No perigastric fat stranding  No dilated or inflamed loops of bowel  Colonic diverticulosis without diverticulitis  APPENDIX:  A normal appendix was visualized  ABDOMINOPELVIC CAVITY:  No ascites or free intraperitoneal air  No lymphadenopathy  VESSELS:  Unremarkable for patient's age  PELVIS REPRODUCTIVE ORGANS:  Patient is status post hysterectomy  URINARY BLADDER:  Unremarkable  ABDOMINAL WALL/INGUINAL REGIONS:  Postsurgical changes from prior laparotomy  No incisional hernia   OSSEOUS STRUCTURES: Grade 1 anterolisthesis of L4 on L5 secondary to bulky facet osteophytes  No acute fracture or destructive osseous lesion  Impression: 1  Scattered pulmonary consolidations and nodular opacities consistent with multifocal pneumonia, greatest in the lingula and left lower lobe  2   No acute abdominopelvic findings  3   Chronic abdominopelvic findings including large hiatal hernia and colonic diverticulosis  Workstation performed: [id number]     Vas Lower Limb Venous Duplex Study, Unilateral/limited    Result Date: 11/26/2018  Narrative:  [facility] REPORT CLINICAL: Indications: Patient presents with right lower extremity pain, mostly behind the knee, x 2 days  FINDINGS:  Right    Impression       CFV      Normal (Patent)     CONCLUSION:  Impression: RIGHT LOWER LIMB No evidence of acute or chronic deep vein thrombosis  No evidence of superficial thrombophlebitis noted  Doppler evaluation shows a normal response to augmentation maneuvers  Popliteal, posterior tibial and anterior tibial arterial Doppler waveforms are triphasic/biphasic  LEFT LOWER LIMB LIMITED Evaluation shows no evidence of thrombus in the common femoral vein  Doppler evaluation shows a normal response to augmentation maneuvers  SIGNATURE: Electronically Signed by: Alexander Dhillon on 2018-11-26 10:13:21 AM    EKG, Pathology, and Other Studies: I have personally reviewed the results  VTE Pharmacologic Prophylaxis: Heparin  VTE Mechanical Prophylaxis: sequential compression device    Code Status: Level 3 - DNAR and DNI    Counseling / Coordination of Care  Total floor / unit time spent today 72 minutes  Greater than 50% of total time was spent with the patient and / or family counseling and / or coordination of care       "This note has been constructed using a voice recognition system"      Margret Leone MD  11/26/2018, 2:29 PM

## 2018-11-27 LAB
ALBUMIN SERPL BCP-MCNC: 2.8 G/DL (ref 3.5–5)
ALP SERPL-CCNC: 46 U/L (ref 46–116)
ALT SERPL W P-5'-P-CCNC: 25 U/L (ref 12–78)
ANION GAP SERPL CALCULATED.3IONS-SCNC: 6 MMOL/L (ref 4–13)
AST SERPL W P-5'-P-CCNC: 44 U/L (ref 5–45)
BASOPHILS # BLD AUTO: 0.04 THOUSANDS/ΜL (ref 0–0.1)
BASOPHILS NFR BLD AUTO: 0 % (ref 0–1)
BILIRUB SERPL-MCNC: 0.5 MG/DL (ref 0.2–1)
BUN SERPL-MCNC: 24 MG/DL (ref 5–25)
CALCIUM SERPL-MCNC: 8.5 MG/DL (ref 8.3–10.1)
CHLORIDE SERPL-SCNC: 106 MMOL/L (ref 100–108)
CO2 SERPL-SCNC: 31 MMOL/L (ref 21–32)
CREAT SERPL-MCNC: 0.93 MG/DL (ref 0.6–1.3)
EOSINOPHIL # BLD AUTO: 0.12 THOUSAND/ΜL (ref 0–0.61)
EOSINOPHIL NFR BLD AUTO: 1 % (ref 0–6)
ERYTHROCYTE [DISTWIDTH] IN BLOOD BY AUTOMATED COUNT: 15.6 % (ref 11.6–15.1)
GFR SERPL CREATININE-BSD FRML MDRD: 55 ML/MIN/1.73SQ M
GLUCOSE SERPL-MCNC: 94 MG/DL (ref 65–140)
HCT VFR BLD AUTO: 33.7 % (ref 34.8–46.1)
HGB BLD-MCNC: 10.4 G/DL (ref 11.5–15.4)
IMM GRANULOCYTES # BLD AUTO: 0.24 THOUSAND/UL (ref 0–0.2)
IMM GRANULOCYTES NFR BLD AUTO: 1 % (ref 0–2)
LYMPHOCYTES # BLD AUTO: 1.95 THOUSANDS/ΜL (ref 0.6–4.47)
LYMPHOCYTES NFR BLD AUTO: 10 % (ref 14–44)
MAGNESIUM SERPL-MCNC: 1.6 MG/DL (ref 1.6–2.6)
MCH RBC QN AUTO: 28.1 PG (ref 26.8–34.3)
MCHC RBC AUTO-ENTMCNC: 30.9 G/DL (ref 31.4–37.4)
MCV RBC AUTO: 91 FL (ref 82–98)
MONOCYTES # BLD AUTO: 1.28 THOUSAND/ΜL (ref 0.17–1.22)
MONOCYTES NFR BLD AUTO: 7 % (ref 4–12)
NEUTROPHILS # BLD AUTO: 15.44 THOUSANDS/ΜL (ref 1.85–7.62)
NEUTS SEG NFR BLD AUTO: 81 % (ref 43–75)
NRBC BLD AUTO-RTO: 0 /100 WBCS
PHOSPHATE SERPL-MCNC: 2.8 MG/DL (ref 2.3–4.1)
PLATELET # BLD AUTO: 179 THOUSANDS/UL (ref 149–390)
PMV BLD AUTO: 9.9 FL (ref 8.9–12.7)
POTASSIUM SERPL-SCNC: 3.8 MMOL/L (ref 3.5–5.3)
PROT SERPL-MCNC: 6.2 G/DL (ref 6.4–8.2)
RBC # BLD AUTO: 3.7 MILLION/UL (ref 3.81–5.12)
SODIUM SERPL-SCNC: 143 MMOL/L (ref 136–145)
WBC # BLD AUTO: 19.07 THOUSAND/UL (ref 4.31–10.16)

## 2018-11-27 PROCEDURE — 85025 COMPLETE CBC W/AUTO DIFF WBC: CPT | Performed by: INTERNAL MEDICINE

## 2018-11-27 PROCEDURE — G8979 MOBILITY GOAL STATUS: HCPCS

## 2018-11-27 PROCEDURE — G8996 SWALLOW CURRENT STATUS: HCPCS

## 2018-11-27 PROCEDURE — 80053 COMPREHEN METABOLIC PANEL: CPT | Performed by: INTERNAL MEDICINE

## 2018-11-27 PROCEDURE — G8998 SWALLOW D/C STATUS: HCPCS

## 2018-11-27 PROCEDURE — G0008 ADMIN INFLUENZA VIRUS VAC: HCPCS | Performed by: HOSPITALIST

## 2018-11-27 PROCEDURE — 83735 ASSAY OF MAGNESIUM: CPT | Performed by: INTERNAL MEDICINE

## 2018-11-27 PROCEDURE — G8997 SWALLOW GOAL STATUS: HCPCS

## 2018-11-27 PROCEDURE — 99232 SBSQ HOSP IP/OBS MODERATE 35: CPT | Performed by: INTERNAL MEDICINE

## 2018-11-27 PROCEDURE — G8978 MOBILITY CURRENT STATUS: HCPCS

## 2018-11-27 PROCEDURE — 90662 IIV NO PRSV INCREASED AG IM: CPT | Performed by: HOSPITALIST

## 2018-11-27 PROCEDURE — 97163 PT EVAL HIGH COMPLEX 45 MIN: CPT

## 2018-11-27 PROCEDURE — 92610 EVALUATE SWALLOWING FUNCTION: CPT

## 2018-11-27 PROCEDURE — 84100 ASSAY OF PHOSPHORUS: CPT | Performed by: INTERNAL MEDICINE

## 2018-11-27 PROCEDURE — 99222 1ST HOSP IP/OBS MODERATE 55: CPT | Performed by: INTERNAL MEDICINE

## 2018-11-27 RX ADMIN — GABAPENTIN 300 MG: 300 CAPSULE ORAL at 08:22

## 2018-11-27 RX ADMIN — HEPARIN SODIUM 5000 UNITS: 5000 INJECTION INTRAVENOUS; SUBCUTANEOUS at 21:04

## 2018-11-27 RX ADMIN — ACETAMINOPHEN 650 MG: 325 TABLET, FILM COATED ORAL at 21:05

## 2018-11-27 RX ADMIN — HEPARIN SODIUM 5000 UNITS: 5000 INJECTION INTRAVENOUS; SUBCUTANEOUS at 13:41

## 2018-11-27 RX ADMIN — GABAPENTIN 300 MG: 300 CAPSULE ORAL at 17:17

## 2018-11-27 RX ADMIN — HEPARIN SODIUM 5000 UNITS: 5000 INJECTION INTRAVENOUS; SUBCUTANEOUS at 05:00

## 2018-11-27 RX ADMIN — CHOLECALCIFEROL TAB 10 MCG (400 UNIT) 400 UNITS: 10 TAB at 08:22

## 2018-11-27 RX ADMIN — SODIUM CHLORIDE 1.5 G: 900 INJECTION INTRAVENOUS at 13:41

## 2018-11-27 RX ADMIN — SODIUM CHLORIDE 1.5 G: 900 INJECTION INTRAVENOUS at 18:27

## 2018-11-27 RX ADMIN — PRAVASTATIN SODIUM 20 MG: 20 TABLET ORAL at 17:17

## 2018-11-27 RX ADMIN — INFLUENZA A VIRUS A/MICHIGAN/45/2015 X-275 (H1N1) ANTIGEN (FORMALDEHYDE INACTIVATED), INFLUENZA A VIRUS A/SINGAPORE/INFIMH-16-0019/2016 IVR-186 (H3N2) ANTIGEN (FORMALDEHYDE INACTIVATED), AND INFLUENZA B VIRUS B/MARYLAND/15/2016 BX-69A (A B/COLORADO/6/2017-LIKE VIRUS) ANTIGEN (FORMALDEHYDE INACTIVATED) 0.5 ML: 60; 60; 60 INJECTION, SUSPENSION INTRAMUSCULAR at 08:41

## 2018-11-27 RX ADMIN — HYDROCHLOROTHIAZIDE 12.5 MG: 25 TABLET ORAL at 08:22

## 2018-11-27 RX ADMIN — VITAMIN B12 0.1 MG ORAL TABLET 100 MCG: 0.1 TABLET ORAL at 08:24

## 2018-11-27 RX ADMIN — AZITHROMYCIN MONOHYDRATE 500 MG: 500 INJECTION, POWDER, LYOPHILIZED, FOR SOLUTION INTRAVENOUS at 14:27

## 2018-11-27 RX ADMIN — CEFEPIME HYDROCHLORIDE 1000 MG: 1 INJECTION, SOLUTION INTRAVENOUS at 08:20

## 2018-11-27 RX ADMIN — PANTOPRAZOLE SODIUM 40 MG: 40 TABLET, DELAYED RELEASE ORAL at 05:00

## 2018-11-27 NOTE — SPEECH THERAPY NOTE
Speech-Language Pathology Bedside Swallow Evaluation      Patient Name: Raf Jacobs    [id number] Date: 11/27/2018     Problem List  Patient Active Problem List   Diagnosis    Hypertension    Idiopathic peripheral neuropathy    GERD (gastroesophageal reflux disease)    Vitamin B 12 deficiency    Severe sepsis (HCC)    Multifocal pneumonia    Fall    UTI (urinary tract infection)       Past Medical History  Past Medical History:   Diagnosis Date    Balance disorder     GERD (gastroesophageal reflux disease)     Hard of hearing     Hyperlipidemia     Hypertension     Neuropathy     Tinnitus     UTI (urinary tract infection)        Past Surgical History  Past Surgical History:   Procedure Laterality Date    EYE SURGERY      HYSTERECTOMY         Summary   Pt presented with functional appearing oral and pharyngeal stage swallowing skills with materials administered today  Risk for Aspiration: Absent     Recommendations: regular diet and thin liquids     Recommended Form of Meds: whole with liquid     Aspiration precautions and compensatory swallowing strategies: upright posture      Current Medical Status  Pt is a 80 y o  female who presented to [facility] s/p fall at home  Patient has been having generalized weakness for a few days with a non productive cough and some urinary frequency  Patient diagnosed with pneumonia and UTI  Past medical history:  HTN and GERD  Please see H&P for details    Special Studies:  Chest xray 11/26: Left multi lobar pneumonia      Social/Education/Vocational Hx:  Pt lives with family      Swallow Information   Current Risks for Dysphagia & Aspiration: AMS     Current Symptoms/Concerns: none     Current Diet: regular diet and thin liquids      Baseline Diet: regular diet and thin liquids      Baseline Assessment   Behavior/Cognition: alert    Speech/Language Status: able to participate in conversation and able to follow commands    Patient Positioning: upright in chair    Pain Status/Interventions/Response to Interventions: No report of or nonverbal indications of pain  Swallow Mechanism Exam   Oral-motor structures and function are WNL for symmetry, strength, ROM & coordination  Facial: symmetrical  Labial: WFL  Lingual: WFL  Velum: symmetrical  Mandible: adequate ROM  Dentition: adequate  Vocal quality:clear/adequate     Consistencies Assessed and Performance   Consistencies Administered: thin liquids and hard solids  Specific materials administered included miguel cracker and thin liquids     Oral Stage: WFL  Mastication was adequate with the materials administered today  Bolus formation and transfer were functional with no significant oral residue noted  No overt s/s reduced oral control  Pharyngeal Stage: WFL    Swallow Mechanics:  Swallowing initiation appeared prompt  Laryngeal rise was palpated and judged to be within functional limits  No coughing, throat clearing, change in vocal quality or respiratory status noted today  Esophageal Concerns: none reported    Summary and Recommendations (see above)    Results Reviewed with: patient     Treatment Recommended: No dysphagia therapy appears warranted at this time  Please re-consult with concerns

## 2018-11-27 NOTE — ASSESSMENT & PLAN NOTE
· Gram-negative ori present in urine culture    · Will follow up on final as well as sensitivities  · Continue cefepime for now

## 2018-11-27 NOTE — ASSESSMENT & PLAN NOTE
· Present on admission and likely secondary to multi focal pneumonia as well as gram-negative ori UTI  · Clinically improving  · Hemodynamically stable  · Follow up on blood cultures  · Continue antibiotics as outlined  · Follow up on procalcitonin

## 2018-11-27 NOTE — PHYSICAL THERAPY NOTE
PT eval   11/27/18 1455   Pain Assessment   Pain Assessment No/denies pain   Pain Score No Pain   Home Living   Type of [address]   Prior Function   Level of [place] Independent with ADLs and functional mobility   Lives With Daughter   Receives Help From Family   ADL Assistance Independent   IADLs Needs assistance   Falls in the last 6 months 1 to 4   Vocational Retired   Comments fell in [employer] adn wedged between commode and tub   Restrictions/Precautions   [place] Bearing Precautions Per Order No   Other Precautions Contact/isolation   General   Additional Pertinent History adm with sepsis  pneumonia uti   Family/Caregiver Present Yes   Cognition   Overall Cognitive Status WFL   Arousal/Participation Alert   Orientation Level Oriented to person;Oriented to place   Following Commands Follows one step commands with increased time or repetition   Comments requests to go back to bed after using commode   RUE Assessment   RUE Assessment WFL   LUE Assessment   LUE Assessment WFL   RLE Assessment   RLE Assessment WFL  (strength 3+/5)   LLE Assessment   LLE Assessment WFL  (srength 3+/5)   Coordination   Movements are Fluid and Coordinated 1   Bed Mobility   Rolling R 7  Independent   Rolling L 7  Independent   Sit to Supine 4  Minimal assistance   Additional items Assist x 1   Transfers   Sit to Stand 4  Minimal assistance   Additional items Assist x 1   Stand to Sit 4  Minimal assistance   Additional items Assist x 1;Verbal cues; Increased time required   Stand pivot 4  Minimal assistance   Additional items Assist x 1   Ambulation/Elevation   Gait pattern Forward Flexion;Narrow TITA;Shuffling; Inconsistent audrey   Gait Assistance 4  Minimal assist   Additional items Assist x 1;Verbal cues   Assistive Device (armhold assist)   Distance 5'x2   Balance   Static Sitting Good   Dynamic Sitting Fair +   Static Standing Fair   Dynamic Standing Fair -   Ambulatory Fair -   Endurance Deficit Endurance Deficit Yes   Endurance Deficit Description tires quickly up in chair but request BTB,02 sat room air 91%   Activity Tolerance   Activity Tolerance Patient limited by fatigue   Nurse Made Aware yes   Assessment   Prognosis Good   Problem List Decreased strength;Decreased endurance; Impaired balance;Decreased mobility; Decreased safety awareness   Assessment Pt presents s/p fall at home with pneumonia and uti  Feels better today but requires asssit for all mobility  Will benefit from skilled TP servicse ot regain safe idn funcitonal mobility with RW  May require in-pt rehab dependent on progress  Will follow see goals   Barriers to Discharge (medical status)   Goals   Patient Goals get better   STG Expiration Date 12/05/18   Short Term Goal #1 1) improve strength 1/2 grade 2) safe ind tnrasfers with rw 3) safe ind abm wih rw ' level no sob 4) up/down 9 steps with rail   Plan   Treatment/Interventions ADL retraining;Functional transfer training;LE strengthening/ROM; Elevations; Therapeutic exercise; Endurance training;Patient/family training;Equipment eval/education;Gait training;Spoke to nursing;Spoke to case management   PT Frequency 5x/wk   Recommendation   Recommendation Short-term skilled PT; Home with family support;Home PT  (dep on progress)   Equipment Recommended Walker   PT - OK to Discharge No   Barthel Index   Feeding 10   Bathing 0   Grooming Score 5   Dressing Score 5   Bladder Score 10   Bowels Score 10   Toilet Use Score 5   Transfers (Bed/Chair) Score 10   Mobility (Level Surface) Score 0   Stairs Score 0   Barthel Index Score 55   Radha Logan, PT

## 2018-11-27 NOTE — PROGRESS NOTES
Progress Note - Stef April 1/28/1930, 80 y o  female MRN: [id number]    Unit/Bed#: [address] [place] [address] [id number] Encounter: [id number]    Primary Care Provider: Titi Gonsalez MD   Date and time admitted to hospital: 11/26/2018  7:12 AM        * Severe sepsis [facility])   Assessment & Plan    · Present on admission and likely secondary to multi focal pneumonia as well as gram-negative ori UTI  · Clinically improving  · Hemodynamically stable  · Follow up on blood cultures  · Continue antibiotics as outlined  · Follow up on procalcitonin     Multifocal pneumonia   Assessment & Plan    · Encourage out of bed  · Day 2 of IV antibiotics  · Will need of repeat chest x-ray to ensure resolution in 4 weeks  · Incentive spirometry     UTI (urinary tract infection)   Assessment & Plan    · Gram-negative ori present in urine culture  · Will follow up on final as well as sensitivities  · Continue cefepime for now         VTE Pharmacologic Prophylaxis:   Pharmacologic: Heparin  Mechanical VTE Prophylaxis in Place: Yes    Patient Centered Rounds: I have performed bedside rounds with nursing staff today  Discussions with Specialists or Other Care Team Provider:  Primary RN    Education and Discussions with Family / Patient:  Left message for patient's daughter Manjinder Doran     Time Spent for Care: 30 minutes  More than 50% of total time spent on counseling and coordination of care as described above  Current Length of Stay: 1 day(s)    Current Patient Status: Inpatient   Certification Statement: The patient will continue to require additional inpatient hospital stay due to Sepsis secondary to pneumonia  Discharge Plan / Estimated Discharge Date:  Not medically stable for discharge at today      Code Status: Level 3 - DNAR and DNI      Subjective:   Feels much better today  Does not really remember yesterday  Does not get anxious over too many things  No cough today  No fever or chills  No nausea vomiting diarrhea constipation      Objective: Vitals:   Temp (24hrs), Av °F (36 7 °C), Min:97 5 °F (36 4 °C), Max:98 6 °F (37 °C)    Temp:  [97 5 °F (36 4 °C)-98 6 °F (37 °C)] 98 6 °F (37 °C)  HR:  [62-83] 70  Resp:  [16-19] 18  BP: ()/(50-61) 124/61  SpO2:  [93 %-97 %] 93 %  Body mass index is 29 76 kg/m²  Input and Output Summary (last 24 hours): Intake/Output Summary (Last 24 hours) at 18 1156  Last data filed at 18 0820   Gross per 24 hour   Intake                0 ml   Output              650 ml   Net             -650 ml       Physical Exam:     Physical Exam   Constitutional: She is oriented to person, place, and time  She appears well-nourished  No distress  HENT:   Head: Normocephalic  Eyes: Pupils are equal, round, and reactive to light  Neck: Normal range of motion  Neck supple  Cardiovascular: Normal rate, regular rhythm and normal heart sounds  Pulmonary/Chest: Effort normal and breath sounds normal    Left lobe crackles   Abdominal: Soft  Bowel sounds are normal  She exhibits no distension  Musculoskeletal: Normal range of motion  Neurological: She is alert and oriented to person, place, and time  Skin: Skin is warm and dry  Psychiatric: She has a normal mood and affect  Her behavior is normal  Thought content normal    Nursing note and vitals reviewed  Additional Data:     Labs:      Results from last 7 days  Lab Units 18  0448   WBC Thousand/uL 19 07*   HEMOGLOBIN g/dL 10 4*   HEMATOCRIT % 33 7*   PLATELETS Thousands/uL 179   NEUTROS PCT % 81*   LYMPHS PCT % 10*   MONOS PCT % 7   EOS PCT % 1       Results from last 7 days  Lab Units 18  0448   POTASSIUM mmol/L 3 8   CHLORIDE mmol/L 106   CO2 mmol/L 31   BUN mg/dL 24   CREATININE mg/dL 0 93   CALCIUM mg/dL 8 5   ALK PHOS U/L 46   ALT U/L 25   AST U/L 44       Results from last 7 days  Lab Units 18  0733   INR  1 15       * I Have Reviewed All Lab Data Listed Above        Imaging:    Imaging Reports Reviewed Today Include: left multilobar pneumonia       Recent Cultures (last 7 days):       Results from last 7 days  Lab Units 11/26/18  0901 11/26/18  0733   BLOOD CULTURE   --  No Growth at 24 hrs  URINE CULTURE  >100,000 cfu/ml Gram Negative Jesus*  --    LEGIONELLA URINARY ANTIGEN  Negative  --        Last 24 Hours Medication List:     Current Facility-Administered Medications:  acetaminophen 650 mg Oral Q6H PRN Kristina Alvarez MD    albuterol 2 5 mg Nebulization Q4H PRN Roseannegaston Cannon MD    azithromycin 500 mg Intravenous Q24H Kristina Alvarez MD Last Rate: 500 mg (11/26/18 1606)   cefepime 1,000 mg Intravenous Q12H Kristina Alvarez MD Last Rate: 1,000 mg (11/27/18 0820)   cholecalciferol 400 Units Oral Daily Kristina Alvarez MD    cyanocobalamin 100 mcg Oral Daily Kristina Alvarez MD    gabapentin 300 mg Oral BID Kristina Alvarez MD    heparin (porcine) 5,000 Units Subcutaneous Q8H [address] Kristina Alvarez MD    hydrochlorothiazide 12 5 mg Oral Daily Kristina Alvarez MD    ondansetron 4 mg Intravenous Q6H PRN Kristina Alvarez MD    pantoprazole 40 mg Oral Early Morning Kristina Alvarez MD    pravastatin 20 mg Oral Daily Kristina Alvarez MD    sodium chloride (PF) 3 mL Intravenous PRN Kei Damico DO    sodium chloride 75 mL/hr Intravenous Continuous Kristina Alvarez MD Last Rate: 75 mL/hr (11/26/18 1550)        Today, Patient Was Seen By: REAL Lan    ** Please Note: Dragon 360 Dictation voice to text software may have been used in the creation of this document   **

## 2018-11-27 NOTE — ASSESSMENT & PLAN NOTE
· Encourage out of bed  · Day 2 of IV antibiotics  · Will need of repeat chest x-ray to ensure resolution in 4 weeks  · Incentive spirometry

## 2018-11-27 NOTE — PLAN OF CARE
Problem: PHYSICAL THERAPY ADULT  Goal: Performs mobility at highest level of function for planned discharge setting  See evaluation for individualized goals  Treatment/Interventions: ADL retraining, Functional transfer training, LE strengthening/ROM, Elevations, Therapeutic exercise, Endurance training, Patient/family training, Equipment eval/education, Gait training, Spoke to nursing, Spoke to case management  Equipment Recommended: Evita Lassiter       See flowsheet documentation for full assessment, interventions and recommendations  Outcome: Progressing  Prognosis: Good  Problem List: Decreased strength, Decreased endurance, Impaired balance, Decreased mobility, Decreased safety awareness  Assessment: Pt presents s/p fall at home with pneumonia and uti  Feels better today but requires asssit for all mobility  Will benefit from skilled TP servicse ot regain safe idn funcitonal mobility with RW  May require in-pt rehab dependent on progress  Will follow see goals  Barriers to Discharge:  (medical status)     Recommendation: Short-term skilled PT, Home with family support, Home PT (dep on progress)     PT - OK to Discharge: No    See flowsheet documentation for full assessment

## 2018-11-27 NOTE — SPEECH THERAPY NOTE
Consult received  Records briefly reviewed  CT of chest/abd/pelvis pf 11/26 revealed:   1  Scattered pulmonary consolidations and nodular opacities consistent with multifocal pneumonia, greatest in the lingula and left lower lobe  2   No acute abdominopelvic findings  3   Chronic abdominopelvic findings including large hiatal hernia and colonic diverticulosis  Per RN report, pt c no gross overt s/s "top-down"/anterograde aspiration with limited materials taken since admitted  Plan:  Evaluate swallowing within 24 hrs

## 2018-11-27 NOTE — CONSULTS
Pulmonary Consultation   Marycruz Velázquez 80 y o  female MRN: [id number]  Unit/Bed#: [address] [place] [address] [id number] Encounter: [id number]      Reason for consultation:  Pneumonia    Requesting physician:  Dr Crooks Sersusan    Impressions/Plans:    1  Left lung multi lobar, patchy infiltrate, asymptomatic, may represtent aspiration pneumonitis/pneumonia that occurred after fall in bathroom while patient states she was nauseous and vomiting  She does have an element of elevated procalcitonin however in light of what appears to be a UTI, I am unsure of the significance  · Will change antibiotics to unasyn and azithromycin to cover for anaerobic bacteria that may be a result of aspiration  · Repeat chest x-ray 11/28/2018  · Will need repeat chest x-ray in 4-6 weeks to document complete resolution of infiltrates still present on tomorrow's x-ray    2  Large hiatal hernia with likely reflux/aspiration  · Would likely benefit from eating smaller meals in an upright position to avoid reflux    3  E-coli UTI  · Continue empiric antibiotics and follow-up cultures      History of Present Illness   HPI:  Marycruz Velázquez is a 80 y o  female who presents after falling in the bathroom  She tells me that over that previous couple days she had been having some weakness/pain in her knees  She got up to go to the bathroom because she felt that she needed to vomit  While in the [place] she fell  She is unsure exactly what happened but she is assuming that she tried to stand up and got dizzy  She is unsure if she actually vomited  Her daughter had to help her up as she was on her side next to the toilet  Upon evaluation the emergency room she was found to have a left sided infiltrate  She denies any cough, chest pain, wheezing or shortness of breath  She denied any fevers or chills  She intermittently has some abdominal pain with gas but denies any suprapubic pain, urinary frequency or urinary burning      At baseline she is a lifelong nonsmoker without asthma or  D  She had bronchitis in May but states other than that she has no significant respiratory issues  She tells me that she felt much worse in May with bronchitis than she does on this admission  She has otherwise been living at home with her daughter over the past 6 months  She denies any lower extremity edema  She has bilateral lower extremity neuropathy  Review of systems:  12 point review of systems was completed and was otherwise negative except as listed in HPI  Historical Information   Past Medical History:   Diagnosis Date    Balance disorder     GERD (gastroesophageal reflux disease)     Hard of hearing     Hyperlipidemia     Hypertension     Neuropathy     Tinnitus     UTI (urinary tract infection)      Past Surgical History:   Procedure Laterality Date    EYE SURGERY      HYSTERECTOMY       Family History   Problem Relation Age of Onset    Stroke Mother     Stroke Father     Heart disease Daughter        Occupational History:   Worked as a  in till age 72    Social History:  History   Smoking Status    Never Smoker   Smokeless Tobacco    Never Used     History   Alcohol Use    Yes     Comment: rarely     History   Drug Use No     Marital Status:        Meds/Allergies   Current Facility-Administered Medications   Medication Dose Route Frequency    acetaminophen (TYLENOL) tablet 650 mg  650 mg Oral Q6H PRN    albuterol inhalation solution 2 5 mg  2 5 mg Nebulization Q4H PRN    azithromycin (ZITHROMAX) 500 mg in sodium chloride 0 9 % 250 mL IVPB  500 mg Intravenous Q24H    cefepime (MAXIPIME) 1 g/50 mL dextrose IVPB  1,000 mg Intravenous Q12H    cholecalciferol (VITAMIN D3) tablet 400 Units  400 Units Oral Daily    cyanocobalamin (VITAMIN B-12) tablet 100 mcg  100 mcg Oral Daily    gabapentin (NEURONTIN) capsule 300 mg  300 mg Oral BID    heparin (porcine) subcutaneous injection 5,000 Units  5,000 Units Subcutaneous Q8H [facility]    hydrochlorothiazide (HYDRODIURIL) tablet 12 5 mg  12 5 mg Oral Daily    ondansetron (ZOFRAN) injection 4 mg  4 mg Intravenous Q6H PRN    pantoprazole (PROTONIX) EC tablet 40 mg  40 mg Oral Early Morning    pravastatin (PRAVACHOL) tablet 20 mg  20 mg Oral Daily    sodium chloride (PF) 0 9 % injection 3 mL  3 mL Intravenous PRN    sodium chloride 0 9 % infusion  75 mL/hr Intravenous Continuous     Prescriptions Prior to Admission   Medication    gabapentin (NEURONTIN) 600 MG tablet    hydrochlorothiazide (HYDRODIURIL) 12 5 mg tablet    cholecalciferol (VITAMIN D3) 400 units tablet    cyanocobalamin 100 MCG tablet    omeprazole (PriLOSEC) 20 mg delayed release capsule    pravastatin (PRAVACHOL) 20 mg tablet     No Known Allergies    Vitals: Blood pressure 124/61, pulse 70, temperature 98 6 °F (37 °C), temperature source Oral, resp  rate 18, height 5' 3" (1 6 m), weight 76 2 kg (167 lb 15 9 oz), SpO2 93 % , room air, Body mass index is 29 76 kg/m²        Intake/Output Summary (Last 24 hours) at 11/27/18 1111  Last data filed at 11/27/18 0820   Gross per 24 hour   Intake                0 ml   Output              650 ml   Net             -650 ml       Physical exam:    General Appearance:    Alert, cooperative, no conversational dyspnea or accessory     muscle use       Head/eyes:    Normocephalic, without obvious abnormality, atraumatic,         PERRL, sclera non-injected, nonicteric    Nose:   Nares normal, mucosa normal, no drainage or sinus tenderness   Mouth:   Moist mucous membranes, no thrush, normal dentition   Neck:   Supple, trachea midline, no adenopathy; JVD not elevated   Lungs:     Clear to auscultation bilateral without wheeze, rales or rhonchi   Chest Wall:    No tenderness or deformity    Heart:    Regular rate and rhythm, S1 and S2 normal, no murmur, rub   or gallop   Abdomen:     Soft, non-tender, bowel sounds active all four quadrants,     no masses, no organomegaly   Extremities: Extremities normal, atraumatic, no cyanosis or edema   Skin:   Warm, dry, turgor normal, no rashes or lesions   Lymph nodes:   No Cervical or supraclavicular lymphadenopathy   Neurologic:   CNII-XII grossly intact, normal strength, non-focal         Labs:   CBC:   Lab Results   Component Value Date    WBC 19 07 (H) 11/27/2018    HGB 10 4 (L) 11/27/2018    HCT 33 7 (L) 11/27/2018    MCV 91 11/27/2018     11/27/2018    MCH 28 1 11/27/2018    MCHC 30 9 (L) 11/27/2018    RDW 15 6 (H) 11/27/2018    MPV 9 9 11/27/2018    NRBC 0 11/27/2018   , CMP:   Lab Results   Component Value Date    SODIUM 143 11/27/2018    K 3 8 11/27/2018     11/27/2018    CO2 31 11/27/2018    BUN 24 11/27/2018    CREATININE 0 93 11/27/2018    CALCIUM 8 5 11/27/2018    AST 44 11/27/2018    ALT 25 11/27/2018    ALKPHOS 46 11/27/2018    EGFR 55 11/27/2018       Imaging and other studies: I have personally reviewed pertinent films in PACS  Chest x-ray 11/26/2018:  Left lung infiltrate  CT chest 11/26/2018:  Patchy left lung infiltrate in both upper and lower lobe    Pulmonary function testing:   None available      Seferino Narayan DO      Portions of the record may have been created with voice recognition software  Occasional wrong word or "sound a like" substitutions may have occurred due to the inherent limitations of voice recognition software  Read the chart carefully and recognize, using context, where substitutions have occurred

## 2018-11-27 NOTE — UTILIZATION REVIEW
Initial Clinical Review    Admission: Date/Time/Statement: 11/26/18 @ 0918     Orders Placed This Encounter   Procedures    Inpatient Admission (expected length of stay for this patient is greater than two midnights)     Standing Status:   Standing     Number of Occurrences:   1     Order Specific Question:   Admitting Physician     Answer:   Vicki Gore [[id number]]     Order Specific Question:   Level of Care     Answer:   Med Surg [[id number]]     Order Specific Question:   Estimated length of stay     Answer:   More than 2 Midnights     Order Specific Question:   Certification     Answer:   I certify that inpatient services are medically necessary for this patient for a duration of greater than two midnights  See H&P and MD Progress Notes for additional information about the patient's course of treatment  ED: Date/Time/Mode of Arrival:   ED Arrival Information     Expected Arrival Acuity Means of Arrival Escorted By Service Admission Type    - 11/26/2018 07:11 [facility]gent [facility]) General Medicine Urgent    Arrival Complaint    fall          Chief Complaint:   Chief Complaint   Patient presents with    Syncope     brought in via EMS, slid off toilet, increased confusion, weakness  no LOC, did not hit head  hx of UTI       History of Illness: 80 y o  female who presented to the emergency department after she fell in bathroom  As per daughter she heard a loud Bang this morning and found her mother in the bathroom wedged between toilet and bathtub  As per patient she has been having generalized weakness for last couple of days  She has nonproductive cough  She also complains of having some urinary frequency  She has been experiencing right knee pain for past couple of days which is 6/10 intensity, sharp, constant, nonradiating and worse with movement        ED Vital Signs:   ED Triage Vitals   Temperature Pulse Respirations Blood Pressure SpO2   11/26/18 0718 11/26/18 0718 11/26/18 0718 11/26/18 7303 11/26/18 0845   (!) 101 8 °F (38 8 °C) (!) 109 20 114/56 99 %      Temp Source Heart Rate Source Patient Position - Orthostatic VS BP Location FiO2 (%)   11/26/18 1257 11/26/18 1524 11/26/18 1257 11/26/18 1257 11/26/18 0730   Oral Monitor Lying Left arm 92      Pain Score       11/26/18 0846       6        Wt Readings from Last 1 Encounters:   11/26/18 76 2 kg (167 lb 15 9 oz)       Vital Signs (abnormal):   Date and Time Temp Pulse SpO2 Resp BP   11/26/18 1200 -- 75 96 % 19 102/50   11/26/18 1130 -- 78 97 % 19  96/47   11/26/18 1100 -- 85 97 % 20 102/50   11/26/18 1015 -- 87 97 % 19  98/47   11/26/18 1000 -- 93 96 % 18  97/47   11/26/18 0900 -- 103 98 % -- 126/60       Abnormal Labs/Diagnostic Test Results: Procalcitonin 3 66, Lactic Acid 2 8, WBC 12 86, Neutro 88%    CXR: Left multi lobar pneumonia     CT of Chest/Abd/Pelvis:   1  Scattered pulmonary consolidations and nodular opacities consistent with multifocal pneumonia, greatest in the lingula and left lower lobe  2   No acute abdominopelvic findings      3   Chronic abdominopelvic findings including large hiatal hernia and colonic diverticulosis     EKG: Sinus Tachycardia, LBBB    UA w Reflex to Microscopic w Reflex to Culture [[id number]] (Abnormal)   Lab Status: Final result   11/26/2018 Specimen: Urine from Urine, Straight Cath    Color, UA   Yellow    Clarity, UA  Cloudy    Specific [place], UA 1 025 1 003 - 1 030    pH, UA 6 0 4 5 - 8 0    Leukocytes, UA Large (A) Negative    Nitrite, UA Negative Negative    Protein, UA Negative Negative mg/dl    Glucose, UA Negative Negative mg/dl    Ketones, UA Negative Negative mg/dl    Urobilinogen, UA 0 2 0 2, 1 0 E U /dl E U /dl    Bilirubin, UA Negative Negative    Blood, UA Small (A) Negative   Urine Microscopic [[id number]] (Abnormal)   Lab Status: Final result Specimen: Urine from Urine, Straight Cath    RBC, UA 4-10  None Seen, 0-5 /hpf    WBC, UA Innumerable  None Seen, 0-5, 5-55, 5-65 /hpf    Epithelial Cells None Seen None Seen, Occasional /hpf    Bacteria, UA Innumerable None Seen         ED Treatment:   Medication Administration from 11/26/2018 0711 to 11/26/2018 1246       Date/Time Order Dose Route Action Action by Comments     11/26/2018 0841 sodium chloride 0 9 % bolus 1,000 mL 1,000 mL Intravenous [id number] [facility]      11/26/2018 6514 acetaminophen (TYLENOL) tablet 975 mg 975 mg Oral Given Kristi Tabares RN      11/26/2018 0847 ondansetron (ZOFRAN) injection 4 mg 4 mg Intravenous Given Kristi Tabares RN      11/26/2018 6393 iohexol (OMNIPAQUE) 350 MG/ML injection (MULTI-DOSE) 100 mL 100 mL Intravenous Given Carolyne Parker      11/26/2018 0903 sodium chloride 0 9 % bolus 1,100 mL 1,000 mL Intravenous [id number] [facility]      11/26/2018 7956 cefepime (MAXIPIME) 2 g/50 mL dextrose IVPB 2,000 mg Intravenous New Bag Kristi Tabares RN           Past Medical/Surgical History:   Diagnosis    Balance disorder    GERD (gastroesophageal reflux disease)    Hard of hearing    Hyperlipidemia    Hypertension    Neuropathy    Tinnitus    UTI (urinary tract infection)       Admitting Diagnosis: Pain in limb [M79 609]  Syncope [R55]  Severe sepsis ([address] ) [A41 9, R65 20]  Multifocal pneumonia [J18 9]    Age/Sex: 80 y o  female    Assessment/Plan:     Principal Problem:    Severe sepsis ([facility] )  Active Problems:    Hypertension    GERD (gastroesophageal reflux disease)    Multifocal pneumonia    Fall    UTI (urinary tract infection)    Plan:  Admit to med surgical floor as inpatient status  · Severe sepsis secondary to multifocal pneumonia/UTI  Continue on cefepime and Zithromax  Oxygen supplementation and bronchodilators p r n  Follow-up blood and urine culture results  Will order sputum culture, urine for Legionella and Streptococcus  Pulmonary consult  · Mechanical fall secondary to above  No obvious injuries    CT and X-rays are negative  · Hypertension-continue on hydrochlorothiazide  · Hyperlipidemia-on statin  · GERD-on Protonix  · DVT prophylaxis  · Discussed with patient and daughter in detail    Anticipated length of stay greater than 2 midnights        Admission Orders:  Inpatient/MedSurg  Consult Pulmonary r/e multifocal pneumonia, severe sepsis  Bilateral Sequential Compression Device  OT/PT Evaluations  Blood/Sputum/Urine Cultures Pending  Dysphagia Evaluation  Nasal O2 @ 2L  NSS @ 75    Scheduled Meds:   Current Facility-Administered Medications:  azithromycin 500 mg Intravenous Q24H   cefepime 1,000 mg Intravenous Q12H   cholecalciferol 400 Units Oral Daily   cyanocobalamin 100 mcg Oral Daily   gabapentin 300 mg Oral BID   heparin (porcine) 5,000 Units Subcutaneous Q8H [address]   hydrochlorothiazide 12 5 mg Oral Daily   pantoprazole 40 mg Oral Early Morning   pravastatin 20 mg Oral Daily     PRN Meds:   acetaminophen    albuterol    ondansetron    sodium chloride (PF)

## 2018-11-28 ENCOUNTER — APPOINTMENT (INPATIENT)
Dept: RADIOLOGY | Facility: HOSPITAL | Age: 83
DRG: 871 | End: 2018-11-28
Payer: MEDICARE

## 2018-11-28 LAB
ANION GAP SERPL CALCULATED.3IONS-SCNC: 7 MMOL/L (ref 4–13)
BACTERIA UR CULT: ABNORMAL
BUN SERPL-MCNC: 16 MG/DL (ref 5–25)
CALCIUM SERPL-MCNC: 8.4 MG/DL (ref 8.3–10.1)
CHLORIDE SERPL-SCNC: 103 MMOL/L (ref 100–108)
CO2 SERPL-SCNC: 31 MMOL/L (ref 21–32)
CREAT SERPL-MCNC: 0.87 MG/DL (ref 0.6–1.3)
ERYTHROCYTE [DISTWIDTH] IN BLOOD BY AUTOMATED COUNT: 15.2 % (ref 11.6–15.1)
GFR SERPL CREATININE-BSD FRML MDRD: 60 ML/MIN/1.73SQ M
GLUCOSE SERPL-MCNC: 92 MG/DL (ref 65–140)
HCT VFR BLD AUTO: 31.2 % (ref 34.8–46.1)
HGB BLD-MCNC: 9.6 G/DL (ref 11.5–15.4)
MCH RBC QN AUTO: 27.7 PG (ref 26.8–34.3)
MCHC RBC AUTO-ENTMCNC: 30.8 G/DL (ref 31.4–37.4)
MCV RBC AUTO: 90 FL (ref 82–98)
MRSA NOSE QL CULT: NORMAL
PLATELET # BLD AUTO: 190 THOUSANDS/UL (ref 149–390)
PMV BLD AUTO: 9.6 FL (ref 8.9–12.7)
POTASSIUM SERPL-SCNC: 3.7 MMOL/L (ref 3.5–5.3)
PROCALCITONIN SERPL-MCNC: 24.82 NG/ML
RBC # BLD AUTO: 3.46 MILLION/UL (ref 3.81–5.12)
SODIUM SERPL-SCNC: 141 MMOL/L (ref 136–145)
WBC # BLD AUTO: 14.41 THOUSAND/UL (ref 4.31–10.16)

## 2018-11-28 PROCEDURE — 80048 BASIC METABOLIC PNL TOTAL CA: CPT | Performed by: NURSE PRACTITIONER

## 2018-11-28 PROCEDURE — 97535 SELF CARE MNGMENT TRAINING: CPT

## 2018-11-28 PROCEDURE — G8987 SELF CARE CURRENT STATUS: HCPCS

## 2018-11-28 PROCEDURE — 99232 SBSQ HOSP IP/OBS MODERATE 35: CPT | Performed by: INTERNAL MEDICINE

## 2018-11-28 PROCEDURE — 97166 OT EVAL MOD COMPLEX 45 MIN: CPT

## 2018-11-28 PROCEDURE — 97530 THERAPEUTIC ACTIVITIES: CPT

## 2018-11-28 PROCEDURE — 97116 GAIT TRAINING THERAPY: CPT

## 2018-11-28 PROCEDURE — 71046 X-RAY EXAM CHEST 2 VIEWS: CPT

## 2018-11-28 PROCEDURE — 85027 COMPLETE CBC AUTOMATED: CPT | Performed by: NURSE PRACTITIONER

## 2018-11-28 PROCEDURE — G8988 SELF CARE GOAL STATUS: HCPCS

## 2018-11-28 PROCEDURE — 84145 PROCALCITONIN (PCT): CPT | Performed by: NURSE PRACTITIONER

## 2018-11-28 RX ORDER — GABAPENTIN 400 MG/1
800 CAPSULE ORAL 2 TIMES DAILY
Status: DISCONTINUED | OUTPATIENT
Start: 2018-11-28 | End: 2018-11-30 | Stop reason: HOSPADM

## 2018-11-28 RX ORDER — GABAPENTIN 400 MG/1
400 CAPSULE ORAL ONCE
Status: COMPLETED | OUTPATIENT
Start: 2018-11-28 | End: 2018-11-28

## 2018-11-28 RX ORDER — GABAPENTIN 400 MG/1
400 CAPSULE ORAL 2 TIMES DAILY
Status: DISCONTINUED | OUTPATIENT
Start: 2018-11-28 | End: 2018-11-28

## 2018-11-28 RX ORDER — ACETAMINOPHEN 325 MG/1
650 TABLET ORAL EVERY 6 HOURS PRN
Status: DISCONTINUED | OUTPATIENT
Start: 2018-11-28 | End: 2018-11-30 | Stop reason: HOSPADM

## 2018-11-28 RX ADMIN — GABAPENTIN 800 MG: 400 CAPSULE ORAL at 21:23

## 2018-11-28 RX ADMIN — HYDROCHLOROTHIAZIDE 12.5 MG: 25 TABLET ORAL at 09:53

## 2018-11-28 RX ADMIN — AZITHROMYCIN MONOHYDRATE 500 MG: 500 INJECTION, POWDER, LYOPHILIZED, FOR SOLUTION INTRAVENOUS at 14:25

## 2018-11-28 RX ADMIN — ACETAMINOPHEN 650 MG: 325 TABLET, FILM COATED ORAL at 21:24

## 2018-11-28 RX ADMIN — CHOLECALCIFEROL TAB 10 MCG (400 UNIT) 400 UNITS: 10 TAB at 09:53

## 2018-11-28 RX ADMIN — SODIUM CHLORIDE 1.5 G: 900 INJECTION INTRAVENOUS at 00:08

## 2018-11-28 RX ADMIN — PANTOPRAZOLE SODIUM 40 MG: 40 TABLET, DELAYED RELEASE ORAL at 05:08

## 2018-11-28 RX ADMIN — PRAVASTATIN SODIUM 20 MG: 20 TABLET ORAL at 17:02

## 2018-11-28 RX ADMIN — HEPARIN SODIUM 5000 UNITS: 5000 INJECTION INTRAVENOUS; SUBCUTANEOUS at 05:08

## 2018-11-28 RX ADMIN — VITAMIN B12 0.1 MG ORAL TABLET 100 MCG: 0.1 TABLET ORAL at 09:53

## 2018-11-28 RX ADMIN — HEPARIN SODIUM 5000 UNITS: 5000 INJECTION INTRAVENOUS; SUBCUTANEOUS at 21:23

## 2018-11-28 RX ADMIN — SODIUM CHLORIDE 1.5 G: 900 INJECTION INTRAVENOUS at 06:11

## 2018-11-28 RX ADMIN — GABAPENTIN 400 MG: 400 CAPSULE ORAL at 00:12

## 2018-11-28 RX ADMIN — HEPARIN SODIUM 5000 UNITS: 5000 INJECTION INTRAVENOUS; SUBCUTANEOUS at 14:25

## 2018-11-28 RX ADMIN — GABAPENTIN 800 MG: 400 CAPSULE ORAL at 09:52

## 2018-11-28 RX ADMIN — ERTAPENEM SODIUM 1000 MG: 1 INJECTION, POWDER, LYOPHILIZED, FOR SOLUTION INTRAMUSCULAR; INTRAVENOUS at 11:45

## 2018-11-28 NOTE — SOCIAL WORK
Continuing to follow patient, as per Carlota Feliz of Physical therapy, patient is stronger than yesterday and now will be able to return home with home Care  Met with patient and discussed her need for Home Care and she is agreeable, Freedom of Choice given and she wants [facility] to follow as they had followed her in the past   Referral to [facility] made via [place]

## 2018-11-28 NOTE — PROGRESS NOTES
Pulmonary Progress Note  Latricia Serrato 80 y o  female MRN: [id number]  Unit/Bed#: [address] [place] [address] [id number] Encounter: [id number]      Assesment and Recommendations:    1  Left lung multi lobar, probable aspiration pneumonia/pneumonitis after vomiting with fall in bathroom  She has had significant radiographic improvement in 24 hours  · Zosyn for UTI will cover community-acquired aspiration pneumonia  · Repeat chest x-ray in 4-6 weeks to document complete resolution  Can follow up with primary care physician for repeat x-ray      2  Large hiatal hernia with likely reflux/aspiration  · Small meals to avoid reflux     3  E-coli ESBL UTI  · High-dose Zosyn per primary team    No further pulmonary recommendations at this time  Will sign off, please call with questions    Chief Complaint:  Overall doing well    Subjective: The patient states that she feels she is back to her baseline  She has been up walking around her room without any significant shortness of breath, cough, wheezing or chest pain  Vitals: Blood pressure 161/69, pulse 74, temperature 98 2 °F (36 8 °C), temperature source Oral, resp  rate 18, height 5' 3" (1 6 m), weight 77 4 kg (170 lb 10 2 oz), SpO2 92 % , room air, Body mass index is 30 23 kg/m²        Intake/Output Summary (Last 24 hours) at 11/28/18 1023  Last data filed at 11/28/18 0514   Gross per 24 hour   Intake                0 ml   Output              700 ml   Net             -700 ml       Physical exam:  General:  Patient is awake, alert, non-toxic and in no acute respiratory distress  Neck: No JVD  CV:  Regular, +S1 and S2, No murmurs, gallops or rubs appreciated  Lungs:   Scattered rhonchi left base, no wheezes appreciated  Abdomen: Soft, +BS, Non-tender, non-distended  Extremities: No clubbing, cyanosis or edema  Neuro: No focal deficits    Labs:   CBC:   Lab Results   Component Value Date    WBC 14 41 (H) 11/28/2018    HGB 9 6 (L) 11/28/2018    HCT 31 2 (L) 11/28/2018    MCV 90 11/28/2018  11/28/2018    MCH 27 7 11/28/2018    MCHC 30 8 (L) 11/28/2018    RDW 15 2 (H) 11/28/2018    MPV 9 6 11/28/2018   , CMP:   Lab Results   Component Value Date    SODIUM 141 11/28/2018    K 3 7 11/28/2018     11/28/2018    CO2 31 11/28/2018    BUN 16 11/28/2018    CREATININE 0 87 11/28/2018    CALCIUM 8 4 11/28/2018    EGFR 60 11/28/2018       Imaging and other studies: I have personally reviewed pertinent films in PACS  Chest x-ray 11/28:  Improved left lung infiltrate        Kimberlyn ,       Portions of the record may have been created with voice recognition software  Occasional wrong word or "sound a like" substitutions may have occurred due to the inherent limitations of voice recognition software  Read the chart carefully and recognize, using context, where substitutions have occurred

## 2018-11-28 NOTE — PROGRESS NOTES
Progress Note - Epifanio Hickey 80 y o  female MRN: [id number]  Unit/Bed#: [address] [place] [address] [id number] Encounter: [id number]    Assessment:  Principal Problem:    Severe sepsis ([address] )  Active Problems:    Hypertension    GERD (gastroesophageal reflux disease)    Multifocal pneumonia    Fall    UTI (urinary tract infection)  Resolved Problems:    * No resolved hospital problems  *      Plan:  · Severe sepsis secondary to multifocal pneumonia and UTI  Patient urine culture is growing ESBL E coli  Will switch antibiotics to Zosyn and Zithromax  Follow-up culture results  Pulmonology on board  Oxygen supplementation and bronchodilators p r n  Out of bed to chair  P T /OT  · Hypertension-hydrochlorothiazide  · Hyperlipidemia-on statin  · DVT prophylaxis  · Discussed with patient in detail  Subjective:   Patient is seen and examined at bedside  Denies any new complaints  Feels better  Afebrile  All other ROS are negative  Objective:   Vitals: Blood pressure 161/69, pulse 74, temperature 98 2 °F (36 8 °C), temperature source Oral, resp  rate 18, height 5' 3" (1 6 m), weight 77 4 kg (170 lb 10 2 oz), SpO2 92 %  ,Body mass index is 30 23 kg/m²  SPO2 RA Rest      ED to Hosp-Admission (Current) from 11/26/2018 in [facility]   SpO2  92 %   SpO2 Activity  At Rest   O2 Device  None (Room air)   O2 Flow Rate  2 L/min        I&O:   Intake/Output Summary (Last 24 hours) at 11/28/18 1019  Last data filed at 11/28/18 0514   Gross per 24 hour   Intake                0 ml   Output              700 ml   Net             -700 ml       Physical Exam:    General- Alert, lying comfortably in bed  Not in any acute distress  HEENT- NIKOLAI, EOM intact  Neck- Supple, No JVD  CVS- regular, S1 and S2 normal   Chest- Bilateral Air entry, No rhochi, crackles or wheezing present    Abdomen- soft, nontender, not distended, no guarding or rigidity, BS+  Extremities-  No pedal edema, No calf tenderness  CNS-   No focal deficits present      Invasive Devices     Peripheral Intravenous Line            Peripheral IV 11/27/18 Left Arm 1 day                      Social History  reviewed  Family History   Problem Relation Age of Onset    Stroke Mother     Stroke Father     Heart disease Daughter     reviewed    Meds:  Current Facility-Administered Medications   Medication Dose Route Frequency Provider Last Rate Last Dose    acetaminophen (TYLENOL) tablet 650 mg  650 mg Oral Q6H PRN Faraz Hughes MD   650 mg at 11/27/18 2105    albuterol inhalation solution 2 5 mg  2 5 mg Nebulization Q4H PRN Pio Zaragoza MD        ampicillin-sulbactam (UNASYN) 1 5 g in sodium chloride 0 9 % 50 mL IVPB  1 5 g Intravenous Q6H Samina Been  mL/hr at 11/28/18 0611 1 5 g at 11/28/18 0611    azithromycin (ZITHROMAX) 500 mg in sodium chloride 0 9 % 250 mL IVPB  500 mg Intravenous Q24H Faraz Hughes  mL/hr at 11/27/18 1427 500 mg at 11/27/18 1427    cholecalciferol (VITAMIN D3) tablet 400 Units  400 Units Oral Daily Faraz Hughes MD   400 Units at 11/28/18 0953    cyanocobalamin (VITAMIN B-12) tablet 100 mcg  100 mcg Oral Daily Faraz Hughes MD   100 mcg at 11/28/18 0953    gabapentin (NEURONTIN) capsule 800 mg  800 mg Oral BID REAL Rodriguez   800 mg at 11/28/18 1723    heparin (porcine) subcutaneous injection 5,000 Units  5,000 Units Subcutaneous [facility] Faraz Hughes MD   5,000 Units at 11/28/18 0508    hydrochlorothiazide (HYDRODIURIL) tablet 12 5 mg  12 5 mg Oral Daily Faraz Hughes MD   12 5 mg at 11/28/18 0953    ondansetron (ZOFRAN) injection 4 mg  4 mg Intravenous Q6H PRN Faraz Hughes MD        pantoprazole (PROTONIX) EC tablet 40 mg  40 mg Oral Early Morning Faraz Hughes MD   40 mg at 11/28/18 0508    pravastatin (PRAVACHOL) tablet 20 mg  20 mg Oral Daily Faraz Hughes MD   20 mg at 11/27/18 1717    sodium chloride (PF) 0 9 % injection 3 mL  3 mL Intravenous PRN Denisha Gómez Joe Route, DO          Prescriptions Prior to Admission   Medication    gabapentin (NEURONTIN) 600 MG tablet    hydrochlorothiazide (HYDRODIURIL) 12 5 mg tablet    cholecalciferol (VITAMIN D3) 400 units tablet    cyanocobalamin 100 MCG tablet    omeprazole (PriLOSEC) 20 mg delayed release capsule    pravastatin (PRAVACHOL) 20 mg tablet       Labs:    Results from last 7 days  Lab Units 11/28/18  0502 11/27/18  0448 11/26/18  0733   WBC Thousand/uL 14 41* 19 07* 12 86*   HEMOGLOBIN g/dL 9 6* 10 4* 12 5   HEMATOCRIT % 31 2* 33 7* 40 4   PLATELETS Thousands/uL 190 179 207   NEUTROS PCT %  --  81* 88*   LYMPHS PCT %  --  10* 7*   MONOS PCT %  --  7 3*   EOS PCT %  --  1 1       Results from last 7 days  Lab Units 11/28/18  0502 11/27/18  0448 11/26/18  0733   POTASSIUM mmol/L 3 7 3 8 3 5   CHLORIDE mmol/L 103 106 102   CO2 mmol/L 31 31 33*   BUN mg/dL 16 24 22   CREATININE mg/dL 0 87 0 93 1 10   CALCIUM mg/dL 8 4 8 5 9 6   ALK PHOS U/L  --  46 56   ALT U/L  --  25 20   AST U/L  --  44 18     Lab Results   Component Value Date    TROPONINI 0 02 11/26/2018    TROPONINI <0 02 05/21/2018    CKTOTAL 112 01/26/2014       Results from last 7 days  Lab Units 11/26/18  0733   INR  1 15     Lab Results   Component Value Date    BLOODCX No Growth at 24 hrs  11/26/2018    BLOODCX No Growth at 24 hrs  11/26/2018    BLOODCX No Growth After 5 Days  05/21/2018    URINECX >100,000 cfu/ml Escherichia coli ESBL (A) 11/26/2018    URINECX >100,000 cfu/ml Escherichia coli 07/28/2016    SPUTUMCULTUR 1+ Growth of Pseudomonas aeruginosa (A) 05/23/2018    SPUTUMCULTUR 1+ Growth of  05/23/2018         Imaging:  Results for orders placed during the hospital encounter of 11/26/18   XR chest 1 view portable    Narrative CHEST     INDICATION:   sepsis  COMPARISON:  5/27/2018  EXAM PERFORMED/VIEWS:  XR CHEST PORTABLE      FINDINGS:    Cardiomediastinal silhouette appears unremarkable      There is consolidation in the left upper and lower lobes compatible with pneumonia  No pneumothorax or pleural effusion  Osseous structures appear within normal limits for patient age  Impression Left multi lobar pneumonia  Workstation performed: [id number]       Results for orders placed during the hospital encounter of 05/21/18   XR chest pa & lateral    Narrative CHEST     INDICATION:   Follow-up pneumonia  COMPARISON:  5/22/2018    EXAM PERFORMED/VIEWS:  XR CHEST PA & LATERAL      FINDINGS:    Cardiomediastinal silhouette is stable, with a retrocardiac vital hernia again identified  Left perihilar consolidation is slightly decreased  No new infiltrates  There has been interval development of a small left pleural effusion  Osseous structures appear within normal limits for patient age  Impression Improved left lung consolidation  Recommend continued follow-up to ensure resolution  New small left pleural effusion  Workstation performed: [id number]         Labs & Imaging: I have personally reviewed pertinent reports        VTE Pharmacologic Prophylaxis: Heparin  VTE Mechanical Prophylaxis: sequential compression device    Code Status:   Level 3 - DNAR and DNI      "This note has been constructed using a voice recognition system"      Cesar Crowder MD  11/28/2018,10:19 AM

## 2018-11-28 NOTE — OCCUPATIONAL THERAPY NOTE
[address]  Evaluation     Patient Name: Timmy Schmidt  ZEXTG'T Date: 11/28/2018  Problem List  Patient Active Problem List   Diagnosis    Hypertension    Idiopathic peripheral neuropathy    GERD (gastroesophageal reflux disease)    Vitamin B 12 deficiency    Severe sepsis (HCC)    Multifocal pneumonia    Fall    UTI (urinary tract infection)     Past Medical History  Past Medical History:   Diagnosis Date    Balance disorder     GERD (gastroesophageal reflux disease)     Hard of hearing     Hyperlipidemia     Hypertension     Neuropathy     Tinnitus     UTI (urinary tract infection)      Past Surgical History  Past Surgical History:   Procedure Laterality Date    EYE SURGERY      HYSTERECTOMY        11/28/18 0910   Note Type   Note type Eval/Treat   Restrictions/Precautions   Weight Bearing Precautions Per Order No   Other Precautions Contact/isolation; Fall Risk   Pain Assessment   Pain Assessment 0-10   Pain Score No Pain   Home Living   Type of Home House   Home Layout Two level;1/2 bath on main level;Bed/bath upstairs;Stairs to enter with rails   Bathroom Shower/Tub Tub/shower unit  (+ shower stall with grab bars, no seat)   Bathroom Toilet Standard   Bathroom Equipment Grab bars in [address]   Additional Comments Pt uses shower in tub but has option to use dtrs shower stall  Prior Function   Level of [place] Independent with ADLs and functional mobility; Needs assistance with IADLs   Lives With Daughter   Receives Help From Family   ADL Assistance Independent   IADLs Needs assistance   Falls in the last 6 months 1 to 4   Vocational Retired   Comments Pt fell while in bathroom, does not remember circumstances prior   Lifestyle   Autonomy Independent in ADLs and functional mobility with a RW   Reciprocal Relationships Lives with daughter and son who assist with IADLs   Intrinsic Gratification Likes to cook, read on brandy, do word puzzles   Psychosocial   Psychosocial (WDL) WDL   Patient Behaviors/Mood Cooperative   Subjective   Subjective "So are you the ones who get to decide whether or no I can go home?"    ADL   Where Assessed Standing at sink   Eating Assistance 7  Independent   Grooming Assistance 5  Supervision/Setup   Grooming Deficit Setup;Steadying;Supervision/safety; Wash/dry hands; Wash/dry face; Teeth care   UB Bathing Assistance 5  Supervision/Setup   UB Bathing Deficit Chest;Right arm;Left arm;Perineal area   LB Bathing Assistance 4  Minimal Assistance   LB Bathing Deficit Steadying   UB Dressing Assistance 4  Minimal Assistance   UB Dressing Deficit Thread RUE; Thread LUE;Setup   LB Dressing Assistance 4  Minimal Assistance   LB Dressing Deficit Thread RLE into pants; Thread LLE into pants   Toileting Assistance  5  Supervision/Setup   Toileting Deficit Supervison/safety   Bed Mobility   Rolling R 7  Independent   Rolling L 7  Independent   Sit to Supine 4  Minimal assistance   Additional items Assist x 1   Transfers   Sit to Stand 4  Minimal assistance   Additional items Assist x 1   Stand to Sit 4  Minimal assistance   Additional items Assist x 1   Stand pivot 5  Supervision   Balance   Static Sitting Good   Dynamic Sitting Fair +   Static Standing Fair   Dynamic Standing Fair -   Ambulatory Fair -   Activity Tolerance   Activity Tolerance Patient limited by fatigue   Nurse [address] [place] [address] to see per RN   RUE Assessment   RUE Assessment WFL   LUE Assessment   LUE Assessment WFL   Hand Function   Gross Motor Coordination Functional   Fine Motor Coordination Functional   Cognition   Overall Cognitive Status WFL   Arousal/Participation Alert; Cooperative   Attention Within functional limits   Orientation Level Oriented X4   Following Commands Follows one step commands with increased time or repetition   Comments Difficulty using incentive spirometer after repeated instruction      Assessment   Limitation Decreased ADL status; Decreased endurance;Decreased high-level ADLs; Decreased self-care trans   Prognosis Good   Assessment Pt is a 80 y o  female seen for OT evaluation s/p admit to [facility] on 11/26/2018 w/ Severe sepsis ([address] )  Comorbidities affecting pt's functional performance at time of assessment include: hypertension, idiopathic peripheral neuropathy, GERD, pneumonia, fall, UTI  Personal factors affecting pt at time of IE include:steps to enter environment, difficulty performing ADLS, difficulty performing IADLS  and health management   Prior to admission, pt was independent in ADLs, assisted with ADLs (including driving) and modified independent in functional mobility with the use of a RW  Upon evaluation: Pt requires supervision to min A for ADLs and functional mobility 2* the following deficits impacting occupational performance: weakness, decreased strength and decreased tolerance  Pt to benefit from continued skilled OT tx while in the hospital to address deficits as defined above and maximize level of functional independence w ADL's and functional mobility  Occupational Performance areas to address include: grooming, bathing/shower, toilet hygiene, dressing, functional mobility and community mobility  From OT standpoint, recommendation at time of d/c would be home with family support vs Home OT  Goals   Patient Goals To go home   Long Term Goal See below   Plan   Treatment Interventions ADL retraining;Functional transfer training; Endurance training; Compensatory technique education; Energy conservation   Goal Expiration Date 12/08/18   Treatment Day 1   Additional Treatment Session   Start Time 0855   End Time 0910   Treatment Assessment Pt completes toileting with min A, ambulates to sink with rolling walker and washes up at sink with supervision and set up  Pt requires min A to doff/don gown and CGA at times to maintain standing balance while washing perineal area  Pt transfers back to B/S chair with min A   Pt dons brief and pants with min A once in chair, and is provided with education in energy conservation and breathing techniques      Recommendation   OT Discharge Recommendation Home with family support   Barthel Index   Feeding 10   Bathing 0   Grooming Score 5   Dressing Score 5   Bladder Score 10   Bowels Score 10   Toilet Use Score 5   Transfers (Bed/Chair) Score 10   Mobility (Level Surface) Score 0   Stairs Score 0   Barthel Index Score 55   Modified Sharkey Scale   Modified Sharkey Scale 3       GOALS    1) Pt will improve activity tolerance to G for min 30 min txment sessions    2) Pt will complete UB/LB dressing/self care w/ mod I using adaptive device and DME as needed    3) Pt will complete bathing w/ Mod I w/ use of AE and DME as needed    4) Pt will complete toileting w/ mod I w/ G hygiene/thoroughness using DME as needed    5) Pt will improve functional transfers to Mod I on/off all surfaces using DME as needed w/ G balance/safety     6) Pt will improve functional mobility during ADL/IADL/leisure tasks to Mod I using DME as needed w/ G balance/safety     7) Pt will participate in simulated IADL management task to increase independence to Mod I w/ G safety and endurance    8) Pt will demonstrate G attention for 10 minutes during ongoing cognitive assessment to assist w/ safe d/c planning/recommendations    9) Pt will demonstrate G carryover of pt/caregiver education and training as appropriate w/ mod I w/o cues w/ good tolerance    10) Pt will demonstrate 100% carryover of energy conservation techniques w/ mod I t/o functional I/ADL/leisure tasks w/o cues s/p skilled education       Isa, MOT, OTR/L

## 2018-11-28 NOTE — PLAN OF CARE
Problem: OCCUPATIONAL THERAPY ADULT  Goal: Performs self-care activities at highest level of function for planned discharge setting  See evaluation for individualized goals  Treatment Interventions: ADL retraining, Functional transfer training, Endurance training, Compensatory technique education, Energy conservation          See flowsheet documentation for full assessment, interventions and recommendations  Outcome: Progressing  Limitation: Decreased ADL status, Decreased endurance, Decreased high-level ADLs, Decreased self-care trans  Prognosis: Good  Assessment: Pt is a 80 y o  female seen for OT evaluation s/p admit to [facility] on 11/26/2018 w/ Severe sepsis ([address] )  Comorbidities affecting pt's functional performance at time of assessment include: hypertension, idiopathic peripheral neuropathy, GERD, pneumonia, fall, UTI  Personal factors affecting pt at time of IE include:steps to enter environment, difficulty performing ADLS, difficulty performing IADLS  and health management   Prior to admission, pt was independent in ADLs, assisted with ADLs (including driving) and modified independent in functional mobility with the use of a RW  Upon evaluation: Pt requires supervision to min A for ADLs and functional mobility 2* the following deficits impacting occupational performance: weakness, decreased strength and decreased tolerance  Pt to benefit from continued skilled OT tx while in the hospital to address deficits as defined above and maximize level of functional independence w ADL's and functional mobility  Occupational Performance areas to address include: grooming, bathing/shower, toilet hygiene, dressing, functional mobility and community mobility  From OT standpoint, recommendation at time of d/c would be home with family support vs Home OT         OT Discharge Recommendation: Home with family support

## 2018-11-28 NOTE — PHYSICAL THERAPY NOTE
PT tx     11/28/18 0917   Pain Assessment   Pain Assessment No/denies pain   Pain Score No Pain   Pain Orientation (neuropathy B feet)   Restrictions/Precautions   Weight Bearing Precautions Per Order No   Other Precautions Contact/isolation   General   Chart Reviewed Yes   Additional Pertinent History + for esbl urine   Cognition   Overall Cognitive Status WFL   Arousal/Participation Alert   Attention Within functional limits   Orientation Level Oriented X4   Memory Within functional limits   Following Commands Follows all commands and directions without difficulty   Subjective   Subjective feeks ok anxious to move around   Transfers   Sit to Stand 6  Modified independent   Stand to Sit 6  Modified independent   Stand pivot 6  Modified independent   Ambulation/Elevation   Gait pattern WNL   Gait Assistance 5  Supervision   Additional items Verbal cues   Assistive Device Rolling walker   Distance 60'x2   Stair Management Assistance 5  Supervision   Additional items Verbal cues   Stair Management Technique One rail R;Foreward; Alternating pattern   Number of Stairs 2   Balance   Static Sitting Normal   Dynamic Sitting Normal   Static Standing Good   Dynamic Standing Good   Ambulatory Good   Endurance Deficit   Endurance Deficit Yes   Endurance Deficit Description slight sob after session 02 sat on room air 88-91%   Activity Tolerance   Activity Tolerance Patient tolerated treatment well   Assessment   Prognosis Good   Problem List Decreased endurance   Assessment Pt does well with rw for mobility  instr in IS and deep breathing ti reciver after amb  Encouraged to walk with staff during the day and perform IS hourly  should be able to progress to home d/c once medically cleared  Con't  as ferdinand   Barriers to Discharge (medical status)   Goals   Patient Goals get better  go home   Plan   Treatment/Interventions ADL retraining;Functional transfer training; Endurance training;Patient/family training;Equipment eval/education;Gait training;Spoke to case management;OT   Progress Progressing toward goals   PT Frequency 5x/wk   Recommendation   Recommendation Home with family support;Home PT   Equipment Recommended Walker  (has at home)   Penny Escudero, PT

## 2018-11-29 LAB
BASOPHILS # BLD AUTO: 0.04 THOUSANDS/ΜL (ref 0–0.1)
BASOPHILS NFR BLD AUTO: 0 % (ref 0–1)
EOSINOPHIL # BLD AUTO: 0.32 THOUSAND/ΜL (ref 0–0.61)
EOSINOPHIL NFR BLD AUTO: 3 % (ref 0–6)
ERYTHROCYTE [DISTWIDTH] IN BLOOD BY AUTOMATED COUNT: 15.1 % (ref 11.6–15.1)
HCT VFR BLD AUTO: 31.3 % (ref 34.8–46.1)
HGB BLD-MCNC: 9.8 G/DL (ref 11.5–15.4)
IMM GRANULOCYTES # BLD AUTO: 0.11 THOUSAND/UL (ref 0–0.2)
IMM GRANULOCYTES NFR BLD AUTO: 1 % (ref 0–2)
LYMPHOCYTES # BLD AUTO: 1.55 THOUSANDS/ΜL (ref 0.6–4.47)
LYMPHOCYTES NFR BLD AUTO: 15 % (ref 14–44)
MCH RBC QN AUTO: 28 PG (ref 26.8–34.3)
MCHC RBC AUTO-ENTMCNC: 31.3 G/DL (ref 31.4–37.4)
MCV RBC AUTO: 89 FL (ref 82–98)
MONOCYTES # BLD AUTO: 0.87 THOUSAND/ΜL (ref 0.17–1.22)
MONOCYTES NFR BLD AUTO: 8 % (ref 4–12)
NEUTROPHILS # BLD AUTO: 7.78 THOUSANDS/ΜL (ref 1.85–7.62)
NEUTS SEG NFR BLD AUTO: 73 % (ref 43–75)
NRBC BLD AUTO-RTO: 0 /100 WBCS
PLATELET # BLD AUTO: 212 THOUSANDS/UL (ref 149–390)
PMV BLD AUTO: 9.9 FL (ref 8.9–12.7)
RBC # BLD AUTO: 3.5 MILLION/UL (ref 3.81–5.12)
WBC # BLD AUTO: 10.67 THOUSAND/UL (ref 4.31–10.16)

## 2018-11-29 PROCEDURE — 99232 SBSQ HOSP IP/OBS MODERATE 35: CPT | Performed by: INTERNAL MEDICINE

## 2018-11-29 PROCEDURE — 85025 COMPLETE CBC W/AUTO DIFF WBC: CPT | Performed by: INTERNAL MEDICINE

## 2018-11-29 RX ADMIN — PRAVASTATIN SODIUM 20 MG: 20 TABLET ORAL at 18:01

## 2018-11-29 RX ADMIN — HYDROCHLOROTHIAZIDE 12.5 MG: 25 TABLET ORAL at 09:41

## 2018-11-29 RX ADMIN — PANTOPRAZOLE SODIUM 40 MG: 40 TABLET, DELAYED RELEASE ORAL at 06:24

## 2018-11-29 RX ADMIN — HEPARIN SODIUM 5000 UNITS: 5000 INJECTION INTRAVENOUS; SUBCUTANEOUS at 06:24

## 2018-11-29 RX ADMIN — ACETAMINOPHEN 650 MG: 325 TABLET, FILM COATED ORAL at 21:40

## 2018-11-29 RX ADMIN — GABAPENTIN 800 MG: 400 CAPSULE ORAL at 09:40

## 2018-11-29 RX ADMIN — VITAMIN B12 0.1 MG ORAL TABLET 100 MCG: 0.1 TABLET ORAL at 09:40

## 2018-11-29 RX ADMIN — GABAPENTIN 800 MG: 400 CAPSULE ORAL at 21:34

## 2018-11-29 RX ADMIN — HEPARIN SODIUM 5000 UNITS: 5000 INJECTION INTRAVENOUS; SUBCUTANEOUS at 15:03

## 2018-11-29 RX ADMIN — CHOLECALCIFEROL TAB 10 MCG (400 UNIT) 400 UNITS: 10 TAB at 09:40

## 2018-11-29 RX ADMIN — ERTAPENEM SODIUM 1000 MG: 1 INJECTION, POWDER, LYOPHILIZED, FOR SOLUTION INTRAMUSCULAR; INTRAVENOUS at 12:35

## 2018-11-29 RX ADMIN — HEPARIN SODIUM 5000 UNITS: 5000 INJECTION INTRAVENOUS; SUBCUTANEOUS at 21:40

## 2018-11-29 NOTE — CONSULTS
Consultation - Infectious Disease   Dallas Reddy 80 y o  female MRN: [id number]  Unit/Bed#: [address] [place] [address] [id number] Encounter: [id number]      Assessment/Plan   1  KASIA/LLL pneumonia/ESBL+E coli UTI/Fever/Leukocytosis/Encephalopathy: Pt presented after she fell off the toilet  She had fever and elevated WBC count  She had some confusion c/w encephalopathy  CXR and Chest showed presence of KASIA and LLL infilts c/w pneumonia  UA showed pyuria and urine cx is positive for ESBL+ E coli - ?presence of UTI  Pt was improving prior to the start of Zosyn or Ertapenem with decrease in her WBC count  Pt does not give H/O significant urinary sx's except for urinary frequency Bld cx's are neg  Urine Legionella and Strep pneumo Ag's are neg  Abd CT was neg for hydro  A  Cont Ertapenem - planning to give 3 doses for possibility of cystitis - this abx will also tx community acquired pneumonia  B  Pt does not need any further doses of Azithromycin - she received 3 doses  C  If Pt conts to improve, could D/C Pt tomorrow, after she receives her dose of Ertapenem, on Augmentin 875 mg po BID and cont through 12/3/18    History of Present Illness   Physician Requesting Consult: Ramsey Pardo MD  Reason for Consult / Principal Problem: Pneumonia and ? UTI    HPI: Dallas Reddy is a 80y o  year old female with H/O GERD, Qagan Tayagungin, HTN, and neuropathy who was admitted on 11/26/18 after falling off the toilet  Pt states that she had been weak with some nausea for several days  On admission, she was febrile and had elevated WBC count  She had some confusion according to her daughter  CXR showed consolidation in the KASIA and LLL  Chest CT was neg for PE but showed fluffy consolidations in the lingula and LLL  UA showed pyuria  Pt reports urinary frequency but no dysuria  Pt was placed on Cefepime and Azithromycin for tx of pneumonia and possible UTI  Abx regimen was changed to Unasyn and Azithromycin by Pulmonary o 11/27/18  Bld cx's are neg   Urine cx is positive for ESBL+ E coli and abx regimen was changed to Zosyn and Azithromyin in the AM of 11/28/18 but Zosyn was changed to Ertapenem later last night  WBC count has already started to decrease before Zosyn or Ertapenem were started  Pt states that she is feeling better and WBC count has decreased to 10K  Repeat CXR shows some improvement of her pulmonary infilts  Urine for Legionella and Strep pneumo Ag's were neg  Abd CT is neg for hydro  Pt noted occas cough but denied SOB, CP, diarrhea, or abd pain  Inpatient consult to Infectious Diseases  Consult performed by: Man Coronel ordered by: Cherie Pulliam          ROS: 12 systems reviewed, remainder is neg      Historical Information   Past Medical History:   Diagnosis Date    Balance disorder     GERD (gastroesophageal reflux disease)     Hard of hearing     Hyperlipidemia     Hypertension     Neuropathy     Tinnitus     UTI (urinary tract infection)      Past Surgical History:   Procedure Laterality Date    EYE SURGERY      HYSTERECTOMY       Social History   History   Alcohol Use    Yes     Comment: rarely     History   Drug Use No     History   Smoking Status    Never Smoker   Smokeless Tobacco    Never Used     Family History   Problem Relation Age of Onset    Stroke Mother     Stroke Father     Heart disease Daughter        Meds/Allergies   MEDS:  Ertapenem: #2  Azithromycin x doses  Abx: #4      Current Facility-Administered Medications:     acetaminophen (TYLENOL) tablet 650 mg, 650 mg, Oral, Q6H PRN, Lacy Deleon PA-C, 650 mg at 11/28/18 2124    albuterol inhalation solution 2 5 mg, 2 5 mg, Nebulization, Q4H PRN, Fran Roque MD    cholecalciferol (VITAMIN D3) tablet 400 Units, 400 Units, Oral, Daily, Elijah Harrell MD, 400 Units at 11/29/18 0940    cyanocobalamin (VITAMIN B-12) tablet 100 mcg, 100 mcg, Oral, Daily, Elijah Harrell MD, 100 mcg at 11/29/18 0940    ertapenem (INVanz) 1,000 mg in sodium chloride 0 9 % 50 mL IVPB, 1,000 mg, Intravenous, Q24H, Oscar Mays MD, Last Rate: 100 mL/hr at 11/29/18 1235, 1,000 mg at 11/29/18 1235    gabapentin (NEURONTIN) capsule 800 mg, 800 mg, Oral, BID, REAL Rodriguez, 800 mg at 11/29/18 0940    heparin (porcine) subcutaneous injection 5,000 Units, 5,000 Units, Subcutaneous, Q8H [address], 5,000 Units at 11/29/18 0624 **AND** [CANCELED] Platelet count, , , Once, Kristina Alvarez MD    hydrochlorothiazide (HYDRODIURIL) tablet 12 5 mg, 12 5 mg, Oral, Daily, Kristina Alvarez MD, 12 5 mg at 11/29/18 0941    ondansetron (ZOFRAN) injection 4 mg, 4 mg, Intravenous, Q6H PRN, Kristina Alvarez MD    pantoprazole (PROTONIX) EC tablet 40 mg, 40 mg, Oral, Early Morning, Kristina Alvarez MD, 40 mg at 11/29/18 4651    pravastatin (PRAVACHOL) tablet 20 mg, 20 mg, Oral, Daily, Kristina Alvarez MD, 20 mg at 11/28/18 1702    sodium chloride (PF) 0 9 % injection 3 mL, 3 mL, Intravenous, PRN, Kei Damico DO    No Known Allergies      Intake/Output Summary (Last 24 hours) at 11/29/18 1314  Last data filed at 11/29/18 0258   Gross per 24 hour   Intake                0 ml   Output              450 ml   Net             -450 ml       PE:  WD, WN, WF in NAD   VSS, Tmax: 99  HEENT:  No scleral icterus, pharynx clear  NECK: Supple  CARDIAC:  RRR, nml S1, S2  LUNGS:  +Few crackles at the left base  ABDOMEN: +BS, soft, nontender  EXTREMITIES:  No calf tenderness  SKIN: No rash  NEURO: Grossly nonfocal    Invasive Devices:   Peripheral IV 11/27/18 Left Arm (Active)   Site Assessment Clean; Intact;Dry 11/29/2018  3:18 AM   Dressing Type Transparent;Securing device 11/29/2018  3:18 AM   Line Status Flushed;Capped 11/29/2018  3:18 AM   Dressing Status Clean;Dry; Intact 11/29/2018  3:18 AM           Lab Results:   Admission on 11/26/2018   Component Date Value    WBC 11/26/2018 12 86*    RBC 11/26/2018 4 51     Hemoglobin 11/26/2018 12 5     Hematocrit 11/26/2018 40 4     MCV 11/26/2018 90     MCH 11/26/2018 27 7     MCHC 11/26/2018 30 9*    RDW 11/26/2018 14 8     MPV 11/26/2018 9 3     Platelets 99/95/4841 207     nRBC 11/26/2018 0     Neutrophils Relative 11/26/2018 88*    Immat GRANS % 11/26/2018 1     Lymphocytes Relative 11/26/2018 7*    Monocytes Relative 11/26/2018 3*    Eosinophils Relative 11/26/2018 1     Basophils Relative 11/26/2018 0     Neutrophils Absolute 11/26/2018 11 44*    Immature Grans Absolute 11/26/2018 0 07     Lymphocytes Absolute 11/26/2018 0 85     Monocytes Absolute 11/26/2018 0 39     Eosinophils Absolute 11/26/2018 0 09     Basophils Absolute 11/26/2018 0 02     Sodium 11/26/2018 145     Potassium 11/26/2018 3 5     Chloride 11/26/2018 102     CO2 11/26/2018 33*    ANION GAP 11/26/2018 10     BUN 11/26/2018 22     Creatinine 11/26/2018 1 10     Glucose 11/26/2018 112     Calcium 11/26/2018 9 6     AST 11/26/2018 18     ALT 11/26/2018 20     Alkaline Phosphatase 11/26/2018 56     Total Protein 11/26/2018 7 2     Albumin 11/26/2018 3 6     Total Bilirubin 11/26/2018 0 40     eGFR 11/26/2018 45     LACTIC ACID 11/26/2018 2 0     LACTIC ACID 11/26/2018 2 8*    Protime 11/26/2018 14 0     INR 11/26/2018 1 15     PTT 11/26/2018 37     Procalcitonin 11/26/2018 3 66*    Color, UA 11/26/2018 Yellow     Clarity, UA 11/26/2018 Cloudy     Specific Gravity, UA 11/26/2018 1 025     pH, UA 11/26/2018 6 0     Leukocytes, UA 11/26/2018 Large*    Nitrite, UA 11/26/2018 Negative     Protein, UA 11/26/2018 Negative     Glucose, UA 11/26/2018 Negative     Ketones, UA 11/26/2018 Negative     Urobilinogen, UA 11/26/2018 0 2     Bilirubin, UA 11/26/2018 Negative     Blood, UA 11/26/2018 Small*    Blood Culture 11/26/2018 No Growth at 72 hrs   Blood Culture 11/26/2018 No Growth at 72 hrs       Troponin I 11/26/2018 0 02     RBC, UA 11/26/2018 4-10*    WBC, UA 11/26/2018 Innumerable*    Epithelial Cells 11/26/2018 None Seen  Bacteria, UA 11/26/2018 Innumerable*    Urine Culture 11/26/2018 >100,000 cfu/ml Escherichia coli ESBL*    Ventricular Rate 11/26/2018 107     Atrial Rate 11/26/2018 107     CO Interval 11/26/2018 184     QRSD Interval 11/26/2018 128     QT Interval 11/26/2018 364     QTC Interval 11/26/2018 485     P Axis 11/26/2018 64     QRS Axis 11/26/2018 54     T Wave Axis 11/26/2018 239     Strep pneumoniae antigen* 11/26/2018 Negative     Legionella Urinary Antig* 11/26/2018 Negative     MRSA Culture Only 11/26/2018 No Methicillin Resistant Staphlyococcus aureus (MRSA) isolated     Sodium 11/27/2018 143     Potassium 11/27/2018 3 8     Chloride 11/27/2018 106     CO2 11/27/2018 31     ANION GAP 11/27/2018 6     BUN 11/27/2018 24     Creatinine 11/27/2018 0 93     Glucose 11/27/2018 94     Calcium 11/27/2018 8 5     AST 11/27/2018 44     ALT 11/27/2018 25     Alkaline Phosphatase 11/27/2018 46     Total Protein 11/27/2018 6 2*    Albumin 11/27/2018 2 8*    Total Bilirubin 11/27/2018 0 50     eGFR 11/27/2018 55     Magnesium 11/27/2018 1 6     Phosphorus 11/27/2018 2 8     WBC 11/27/2018 19 07*    RBC 11/27/2018 3 70*    Hemoglobin 11/27/2018 10 4*    Hematocrit 11/27/2018 33 7*    MCV 11/27/2018 91     MCH 11/27/2018 28 1     MCHC 11/27/2018 30 9*    RDW 11/27/2018 15 6*    MPV 11/27/2018 9 9     Platelets 10/87/2188 179     nRBC 11/27/2018 0     Neutrophils Relative 11/27/2018 81*    Immat GRANS % 11/27/2018 1     Lymphocytes Relative 11/27/2018 10*    Monocytes Relative 11/27/2018 7     Eosinophils Relative 11/27/2018 1     Basophils Relative 11/27/2018 0     Neutrophils Absolute 11/27/2018 15 44*    Immature Grans Absolute 11/27/2018 0 24*    Lymphocytes Absolute 11/27/2018 1 95     Monocytes Absolute 11/27/2018 1 28*    Eosinophils Absolute 11/27/2018 0 12     Basophils Absolute 11/27/2018 0 04     WBC 11/28/2018 14 41*    RBC 11/28/2018 3 46*    Hemoglobin 11/28/2018 9 6*    Hematocrit 11/28/2018 31 2*    MCV 11/28/2018 90     MCH 11/28/2018 27 7     MCHC 11/28/2018 30 8*    RDW 11/28/2018 15 2*    Platelets 17/75/7195 190     MPV 11/28/2018 9 6     Procalcitonin 11/28/2018 24 82*    Sodium 11/28/2018 141     Potassium 11/28/2018 3 7     Chloride 11/28/2018 103     CO2 11/28/2018 31     ANION GAP 11/28/2018 7     BUN 11/28/2018 16     Creatinine 11/28/2018 0 87     Glucose 11/28/2018 92     Calcium 11/28/2018 8 4     eGFR 11/28/2018 60     WBC 11/29/2018 10 67*    RBC 11/29/2018 3 50*    Hemoglobin 11/29/2018 9 8*    Hematocrit 11/29/2018 31 3*    MCV 11/29/2018 89     MCH 11/29/2018 28 0     MCHC 11/29/2018 31 3*    RDW 11/29/2018 15 1     MPV 11/29/2018 9 9     Platelets 64/38/1236 212     nRBC 11/29/2018 0     Neutrophils Relative 11/29/2018 73     Immat GRANS % 11/29/2018 1     Lymphocytes Relative 11/29/2018 15     Monocytes Relative 11/29/2018 8     Eosinophils Relative 11/29/2018 3     Basophils Relative 11/29/2018 0     Neutrophils Absolute 11/29/2018 7 78*    Immature Grans Absolute 11/29/2018 0 11     Lymphocytes Absolute 11/29/2018 1 55     Monocytes Absolute 11/29/2018 0 87     Eosinophils Absolute 11/29/2018 0 32     Basophils Absolute 11/29/2018 0 04      Imaging Studies: I have personally reviewed pertinent reports  EKG, Pathology, and Other Studies: I have personally reviewed pertinent reports  Culture  Lab Results   Component Value Date    BLOODCX No Growth at 72 hrs  11/26/2018    BLOODCX No Growth at 72 hrs  11/26/2018    BLOODCX No Growth After 5 Days  05/21/2018    BLOODCX No Growth After 5 Days  05/21/2018    BLOODCX No Growth After 5 Days  05/21/2018    BLOODCX No Growth After 5 Days   05/21/2018     No results found for: [facility]   Lab Results   Component Value Date    URINECX >100,000 cfu/ml Escherichia coli ESBL (A) 11/26/2018    URINECX >100,000 cfu/ml Escherichia coli 07/28/2016     Lab Results   Component Value Date    SPUTUMCULTUR 1+ Growth of Pseudomonas aeruginosa (A) 05/23/2018    SPUTUMCULTUR 1+ Growth of  05/23/2018       Principal Problem:    Severe sepsis [facility])  Active Problems:    Hypertension    GERD (gastroesophageal reflux disease)    Multifocal pneumonia    Fall    UTI (urinary tract infection)

## 2018-11-29 NOTE — PROGRESS NOTES
Progress Note - Raf Jacobs 80 y o  female MRN: [id number]  Unit/Bed#: [address] [place] [address] [id number] Encounter: [id number]    Assessment:  Principal Problem:    Severe sepsis ([address] )  Active Problems:    Hypertension    GERD (gastroesophageal reflux disease)    Multifocal pneumonia    Fall    UTI (urinary tract infection)  Resolved Problems:    * No resolved hospital problems  *      Plan:  · Severe sepsis secondary to multifocal pneumonia and ESBL E coli UTI  Continue on Invanz and Zithromax  Follow-up culture results  Oxygen supplementation and bronchodilator p r n  Infectious Disease input  · Hypertension-on hydrochlorothiazide  · Hyperlipidemia-on statin  · DVT prophylaxis  · Discussed with patient in detail  Subjective:   Patient is seen and examined at bedside  Afebrile  No new complaints  All other ROS are negative  Objective:   Vitals: Blood pressure 143/64, pulse 77, temperature 98 2 °F (36 8 °C), temperature source Oral, resp  rate 18, height 5' 3" (1 6 m), weight 77 4 kg (170 lb 10 2 oz), SpO2 90 %  ,Body mass index is 30 23 kg/m²  SPO2 RA Rest      ED to Hosp-Admission (Current) from 11/26/2018 in [facility]   SpO2  90 %   SpO2 Activity  At Rest   O2 Device  None (Room air)   O2 Flow Rate  2 L/min        I&O:   Intake/Output Summary (Last 24 hours) at 11/29/18 0750  Last data filed at 11/29/18 0258   Gross per 24 hour   Intake                0 ml   Output              450 ml   Net             -450 ml       Physical Exam:    General- Alert, lying comfortably in bed  Not in any acute distress  HEENT- NIKOLAI, EOM intact  Neck- Supple, No JVD  CVS- regular, S1 and S2 normal   Chest- Bilateral Air entry, scattered rhonchi in left base  Abdomen- soft, nontender, not distended, no guarding or rigidity, BS+  Extremities-  No pedal edema, No calf tenderness  CNS-    No focal deficits present      Invasive Devices     Peripheral Intravenous Line            Peripheral IV 11/27/18 Left Arm 1 day                      Social History  reviewed  Family History   Problem Relation Age of Onset    Stroke Mother     Stroke Father     Heart disease Daughter     reviewed    Meds:  Current Facility-Administered Medications   Medication Dose Route Frequency Provider Last Rate Last Dose    acetaminophen (TYLENOL) tablet 650 mg  650 mg Oral Q6H PRN Sonia Barbosa PA-C   650 mg at 11/28/18 2124    albuterol inhalation solution 2 5 mg  2 5 mg Nebulization Q4H PRN Elodia Cornell MD        cholecalciferol (VITAMIN D3) tablet 400 Units  400 Units Oral Daily Daquan Vega MD   400 Units at 11/28/18 7640    cyanocobalamin (VITAMIN B-12) tablet 100 mcg  100 mcg Oral Daily Daquan Vega MD   100 mcg at 11/28/18 0953    ertapenem (INVanz) 1,000 mg in sodium chloride 0 9 % 50 mL IVPB  1,000 mg Intravenous Q24H Chucho Randhawa  mL/hr at 11/28/18 1145 1,000 mg at 11/28/18 1145    gabapentin (NEURONTIN) capsule 800 mg  800 mg Oral BID REAL Rodriguez   800 mg at 11/28/18 2123    heparin (porcine) subcutaneous injection 5,000 Units  5,000 Units Subcutaneous [facility] Daquan Vega MD   5,000 Units at 11/29/18 0624    hydrochlorothiazide (HYDRODIURIL) tablet 12 5 mg  12 5 mg Oral Daily Daquan Vega MD   12 5 mg at 11/28/18 0953    ondansetron (ZOFRAN) injection 4 mg  4 mg Intravenous Q6H PRN Daquan Vega MD        pantoprazole (PROTONIX) EC tablet 40 mg  40 mg Oral Early Morning Daquan Vega MD   40 mg at 11/29/18 9096    pravastatin (PRAVACHOL) tablet 20 mg  20 mg Oral Daily Daquan Vega MD   20 mg at 11/28/18 1702    sodium chloride (PF) 0 9 % injection 3 mL  3 mL Intravenous PRN Jackie Cerda DO          Prescriptions Prior to Admission   Medication    gabapentin (NEURONTIN) 600 MG tablet    hydrochlorothiazide (HYDRODIURIL) 12 5 mg tablet    cholecalciferol (VITAMIN D3) 400 units tablet    cyanocobalamin 100 MCG tablet    omeprazole (PriLOSEC) 20 mg delayed release capsule    pravastatin (PRAVACHOL) 20 mg tablet       Labs:    Results from last 7 days  Lab Units 11/29/18  0511 11/28/18  0502 11/27/18  0448 11/26/18  0733   WBC Thousand/uL 10 67* 14 41* 19 07* 12 86*   HEMOGLOBIN g/dL 9 8* 9 6* 10 4* 12 5   HEMATOCRIT % 31 3* 31 2* 33 7* 40 4   PLATELETS Thousands/uL 212 190 179 207   NEUTROS PCT % 73  --  81* 88*   LYMPHS PCT % 15  --  10* 7*   MONOS PCT % 8  --  7 3*   EOS PCT % 3  --  1 1       Results from last 7 days  Lab Units 11/28/18  0502 11/27/18  0448 11/26/18  0733   POTASSIUM mmol/L 3 7 3 8 3 5   CHLORIDE mmol/L 103 106 102   CO2 mmol/L 31 31 33*   BUN mg/dL 16 24 22   CREATININE mg/dL 0 87 0 93 1 10   CALCIUM mg/dL 8 4 8 5 9 6   ALK PHOS U/L  --  46 56   ALT U/L  --  25 20   AST U/L  --  44 18     Lab Results   Component Value Date    TROPONINI 0 02 11/26/2018    TROPONINI <0 02 05/21/2018    CKTOTAL 112 01/26/2014       Results from last 7 days  Lab Units 11/26/18  0733   INR  1 15     Lab Results   Component Value Date    BLOODCX No Growth at 48 hrs  11/26/2018    BLOODCX No Growth at 48 hrs  11/26/2018    BLOODCX No Growth After 5 Days  05/21/2018    URINECX >100,000 cfu/ml Escherichia coli ESBL (A) 11/26/2018    URINECX >100,000 cfu/ml Escherichia coli 07/28/2016    SPUTUMCULTUR 1+ Growth of Pseudomonas aeruginosa (A) 05/23/2018    SPUTUMCULTUR 1+ Growth of  05/23/2018         Imaging:  Results for orders placed during the hospital encounter of 11/26/18   XR chest 1 view portable    Narrative CHEST     INDICATION:   sepsis  COMPARISON:  5/27/2018  EXAM PERFORMED/VIEWS:  XR CHEST PORTABLE      FINDINGS:    Cardiomediastinal silhouette appears unremarkable  There is consolidation in the left upper and lower lobes compatible with pneumonia  No pneumothorax or pleural effusion  Osseous structures appear within normal limits for patient age  Impression Left multi lobar pneumonia          Workstation performed: [id number] Results for orders placed during the hospital encounter of 11/26/18   XR chest pa & lateral    Narrative CHEST     INDICATION:   f/up pneumonia  COMPARISON:  11/26/2018    EXAM PERFORMED/VIEWS:  XR CHEST PA & LATERAL  The frontal view was performed utilizing dual energy radiographic technique  FINDINGS:    Cardiomediastinal silhouette appears unremarkable  Again seen are patchy opacities in the left lower lung, improved  No pneumothorax  No large pleural effusion  Osseous structures appear within normal limits for patient age  Impression Improving left airspace opacities        Workstation performed: [id number]         Labs & Imaging: I have personally reviewed pertinent reports        VTE Pharmacologic Prophylaxis: Heparin  VTE Mechanical Prophylaxis: sequential compression device    Code Status:   Level 3 - DNAR and DNI      "This note has been constructed using a voice recognition system"      Bishop Ximena MD  11/29/2018,7:50 AM

## 2018-11-29 NOTE — NURSING NOTE
Patient seen at the bedside  Patient noted AOX4  Currently on room air without signs of SOB  Family visiting and currently at the bedside  Denies any pain and/or complaints  Bedside table and call bell within reach  Reviewed and discussed need to utilize call bell system to request staff assistance to promote safety  Patient verbalized understanding  Will continue to monitor   SD 11/28/2018 2020

## 2018-11-30 ENCOUNTER — TRANSITIONAL CARE MANAGEMENT (OUTPATIENT)
Dept: FAMILY MEDICINE CLINIC | Facility: CLINIC | Age: 83
End: 2018-11-30

## 2018-11-30 VITALS
HEART RATE: 70 BPM | BODY MASS INDEX: 30.7 KG/M2 | WEIGHT: 173.28 LBS | SYSTOLIC BLOOD PRESSURE: 160 MMHG | HEIGHT: 63 IN | OXYGEN SATURATION: 95 % | DIASTOLIC BLOOD PRESSURE: 70 MMHG | RESPIRATION RATE: 18 BRPM | TEMPERATURE: 97.6 F

## 2018-11-30 PROBLEM — R65.20 SEVERE SEPSIS (HCC): Status: RESOLVED | Noted: 2018-05-22 | Resolved: 2018-11-30

## 2018-11-30 PROBLEM — J18.9 MULTIFOCAL PNEUMONIA: Status: RESOLVED | Noted: 2018-11-26 | Resolved: 2018-11-30

## 2018-11-30 PROBLEM — A41.9 SEVERE SEPSIS (HCC): Status: RESOLVED | Noted: 2018-05-22 | Resolved: 2018-11-30

## 2018-11-30 LAB
BASOPHILS # BLD AUTO: 0.01 THOUSANDS/ΜL (ref 0–0.1)
BASOPHILS NFR BLD AUTO: 0 % (ref 0–1)
EOSINOPHIL # BLD AUTO: 0.38 THOUSAND/ΜL (ref 0–0.61)
EOSINOPHIL NFR BLD AUTO: 4 % (ref 0–6)
ERYTHROCYTE [DISTWIDTH] IN BLOOD BY AUTOMATED COUNT: 15.5 % (ref 11.6–15.1)
HCT VFR BLD AUTO: 34.4 % (ref 34.8–46.1)
HGB BLD-MCNC: 10.8 G/DL (ref 11.5–15.4)
IMM GRANULOCYTES # BLD AUTO: 0.09 THOUSAND/UL (ref 0–0.2)
IMM GRANULOCYTES NFR BLD AUTO: 1 % (ref 0–2)
LYMPHOCYTES # BLD AUTO: 1.84 THOUSANDS/ΜL (ref 0.6–4.47)
LYMPHOCYTES NFR BLD AUTO: 20 % (ref 14–44)
MCH RBC QN AUTO: 27.8 PG (ref 26.8–34.3)
MCHC RBC AUTO-ENTMCNC: 31.4 G/DL (ref 31.4–37.4)
MCV RBC AUTO: 89 FL (ref 82–98)
MONOCYTES # BLD AUTO: 0.92 THOUSAND/ΜL (ref 0.17–1.22)
MONOCYTES NFR BLD AUTO: 10 % (ref 4–12)
NEUTROPHILS # BLD AUTO: 6.09 THOUSANDS/ΜL (ref 1.85–7.62)
NEUTS SEG NFR BLD AUTO: 65 % (ref 43–75)
PLATELET # BLD AUTO: 246 THOUSANDS/UL (ref 149–390)
PMV BLD AUTO: 9.5 FL (ref 8.9–12.7)
RBC # BLD AUTO: 3.88 MILLION/UL (ref 3.81–5.12)
WBC # BLD AUTO: 9.33 THOUSAND/UL (ref 4.31–10.16)

## 2018-11-30 PROCEDURE — 99239 HOSP IP/OBS DSCHRG MGMT >30: CPT | Performed by: INTERNAL MEDICINE

## 2018-11-30 PROCEDURE — 85025 COMPLETE CBC W/AUTO DIFF WBC: CPT | Performed by: INTERNAL MEDICINE

## 2018-11-30 RX ORDER — AMOXICILLIN AND CLAVULANATE POTASSIUM 875; 125 MG/1; MG/1
1 TABLET, FILM COATED ORAL 2 TIMES DAILY
Qty: 6 TABLET | Refills: 0 | Status: SHIPPED | OUTPATIENT
Start: 2018-11-30 | End: 2018-12-03

## 2018-11-30 RX ADMIN — HEPARIN SODIUM 5000 UNITS: 5000 INJECTION INTRAVENOUS; SUBCUTANEOUS at 05:35

## 2018-11-30 RX ADMIN — GABAPENTIN 800 MG: 400 CAPSULE ORAL at 08:41

## 2018-11-30 RX ADMIN — ERTAPENEM SODIUM 1000 MG: 1 INJECTION, POWDER, LYOPHILIZED, FOR SOLUTION INTRAMUSCULAR; INTRAVENOUS at 11:29

## 2018-11-30 RX ADMIN — VITAMIN B12 0.1 MG ORAL TABLET 100 MCG: 0.1 TABLET ORAL at 08:42

## 2018-11-30 RX ADMIN — PANTOPRAZOLE SODIUM 40 MG: 40 TABLET, DELAYED RELEASE ORAL at 05:35

## 2018-11-30 RX ADMIN — HYDROCHLOROTHIAZIDE 12.5 MG: 25 TABLET ORAL at 08:42

## 2018-11-30 RX ADMIN — CHOLECALCIFEROL TAB 10 MCG (400 UNIT) 400 UNITS: 10 TAB at 08:42

## 2018-11-30 NOTE — DISCHARGE INSTRUCTIONS
Follow-up with PCP in 1 week  Complete the course of antibiotic as instructed  Repeat chest x-ray in 4-6 weeks  Return to ER with any worsening fever, chills, shortness of breath or any other alarming symptoms

## 2018-11-30 NOTE — DISCHARGE SUMMARY
Discharge Summary - Allie Bardales 80 y o  female MRN: [id number]  Unit/Bed#: [address] [place] [address] Encounter: [id number]    Admission Date:    11/26/2018   Discharge Date:   11/30/18   Admitting Diagnosis:   Pain in limb [M79 609]  Syncope [R55]  Severe sepsis ([address] ) [A41 9, R65 20]  Multifocal pneumonia [J18 9]  Admitting Provider:   Og Jennings MD  Discharge Provider:   Jaswinder Rowell MD     Primary Care Physician at Discharge:   CHELSEY Enriquez,[phone]    HPI:   51-year-old female who presented to emergency department after she fell in bathroom  Patient complained of nonproductive cough and increased urinary frequency  For a detailed HPI please refer to the admission note  Procedures Performed:   Orders Placed This Encounter   Procedures   [address] ED ECG Documentation Only       Hospital Course:   Patient was admitted with severe sepsis secondary to multifocal pneumonia/UTI  Patient was started on cefepime and Zithromax  Patient was also found to have UTI which grew E coli  Patient was seen by pulmonologist who switched antibiotics to Unasyn and Zithromax recommended repeat chest x-ray  Patient urine culture grew ESBL E coli and antibiotics which was switched to Invanz  Infectious disease physician saw the patient  Patient continued to improve significantly  Pulmonology recommended to complete the course of antibiotic for possible aspiration pneumonia  The also recommended repeat chest x-ray in 4-6 weeks to document complete resolution  Infectious Disease recommended that patient completed 3 doses of Invanz and suggested to discharge the patient on Augmentin to complete through December 3, 2018  Patient is hemodynamically stable and feels back to baseline  Patient was seen by Physical therapy and  and will be discharged with home care services  Follow-up with PCP in 1 week  Complete the course of antibiotic as instructed  Repeat chest x-ray in 4-6 weeks    Return to ER with any worsening fever, chills, shortness of breath or any other alarming symptoms  Consulting Providers   Pulmonary and Infectious Disease    Complications:  None    Labs:   Lab Results   Component Value Date    WBC 9 33 11/30/2018    WBC 11 50 (H) 01/26/2014    RBC 3 88 11/30/2018    RBC 4 31 01/26/2014    HGB 10 8 (L) 11/30/2018    HGB 12 8 01/26/2014    HCT 34 4 (L) 11/30/2018    HCT 38 6 01/26/2014    MCV 89 11/30/2018    MCV 90 01/26/2014    MCH 27 8 11/30/2018    MCH 29 7 01/26/2014    RDW 15 5 (H) 11/30/2018    RDW 12 3 01/26/2014     11/30/2018     01/26/2014     Lab Results   Component Value Date    CREATININE 0 87 11/28/2018    CREATININE 0 91 (H) 12/20/2016    BUN 16 11/28/2018    BUN 16 12/20/2016     12/20/2016    K 3 7 11/28/2018    K 3 7 12/20/2016     11/28/2018     12/20/2016    CO2 31 11/28/2018    CO2 32 (H) 12/20/2016    GLUCOSE 93 05/18/2015    PROT 6 9 12/20/2016    ALKPHOS 46 11/27/2018    ALKPHOS 56 12/20/2016    ALT 25 11/27/2018    ALT 10 12/20/2016    AST 44 11/27/2018    AST 15 12/20/2016       Treatments:  Unasyn, Zithromax, Invanz, vitamin D3, vitamin B12, Neurontin, hydrochlorothiazide, Protonix and statin  Discharge Diagnosis:   Principal Problem:    Severe sepsis (HCC)  Active Problems:    Hypertension    GERD (gastroesophageal reflux disease)    Multifocal pneumonia    Fall    UTI (urinary tract infection)  Resolved Problems:    * No resolved hospital problems  *      Condition at Discharge:   Good     Code Status: Level 3 - DNAR and DNI  Advance Directive and Living Will: <no information>  Power of :    POLST:      Discharge instructions/Information to patient and family:   See after visit summary for information provided to patient and family  Provisions for Follow-Up Care:  See after visit summary for information related to follow-up care and any pertinent home health orders        Disposition:   Home with home care services    Planned Readmission:   No    Discharge Statement   I spent 35 minutes discharging the patient  This time was spent on the day of discharge  I had direct contact with the patient on the day of discharge  Greater than 50% of the total time was spent examining patient, answering all patient questions, arranging and discussing plan of care with patient as well as directly providing post-discharge instructions  Additional time then spent on discharge activities  Discharge Medications:  See after visit summary for reconciled discharge medications provided to patient and family        "This note has been constructed using a voice recognition system"    Donaldo Villa MD  11/30/2018,12:16 PM

## 2018-11-30 NOTE — SOCIAL WORK
Patient is for discharge to home today  She is agreeable to [facility]  Referral was sent to [facility]  Son will transport home

## 2018-12-01 LAB
BACTERIA BLD CULT: NORMAL
BACTERIA BLD CULT: NORMAL

## 2018-12-03 ENCOUNTER — PATIENT OUTREACH (OUTPATIENT)
Dept: OTHER | Facility: HOSPITAL | Age: 83
End: 2018-12-03

## 2018-12-11 ENCOUNTER — OFFICE VISIT (OUTPATIENT)
Dept: FAMILY MEDICINE CLINIC | Facility: CLINIC | Age: 83
End: 2018-12-11
Payer: MEDICARE

## 2018-12-11 VITALS
BODY MASS INDEX: 28.88 KG/M2 | HEART RATE: 66 BPM | DIASTOLIC BLOOD PRESSURE: 86 MMHG | HEIGHT: 63 IN | SYSTOLIC BLOOD PRESSURE: 126 MMHG | WEIGHT: 163 LBS | OXYGEN SATURATION: 98 %

## 2018-12-11 DIAGNOSIS — J18.9 COMMUNITY ACQUIRED PNEUMONIA OF LEFT LOWER LOBE OF LUNG: Primary | ICD-10-CM

## 2018-12-11 DIAGNOSIS — K21.9 GASTROESOPHAGEAL REFLUX DISEASE, ESOPHAGITIS PRESENCE NOT SPECIFIED: ICD-10-CM

## 2018-12-11 DIAGNOSIS — I10 ESSENTIAL HYPERTENSION: ICD-10-CM

## 2018-12-11 DIAGNOSIS — G60.9 IDIOPATHIC PERIPHERAL NEUROPATHY: ICD-10-CM

## 2018-12-11 DIAGNOSIS — E78.2 MIXED HYPERLIPIDEMIA: ICD-10-CM

## 2018-12-11 DIAGNOSIS — N30.00 ACUTE CYSTITIS WITHOUT HEMATURIA: ICD-10-CM

## 2018-12-11 PROCEDURE — 99495 TRANSJ CARE MGMT MOD F2F 14D: CPT | Performed by: FAMILY MEDICINE

## 2018-12-11 RX ORDER — PRAVASTATIN SODIUM 20 MG
20 TABLET ORAL DAILY
Qty: 90 TABLET | Refills: 1 | Status: SHIPPED | OUTPATIENT
Start: 2018-12-11 | End: 2019-07-01 | Stop reason: SDUPTHER

## 2018-12-11 RX ORDER — HYDROCHLOROTHIAZIDE 12.5 MG/1
6.25 TABLET ORAL DAILY
Qty: 45 TABLET | Refills: 1 | Status: SHIPPED | OUTPATIENT
Start: 2018-12-11 | End: 2019-07-01 | Stop reason: SDUPTHER

## 2018-12-11 RX ORDER — GABAPENTIN 800 MG/1
800 TABLET ORAL 2 TIMES DAILY
Qty: 180 TABLET | Refills: 1 | Status: SHIPPED | OUTPATIENT
Start: 2018-12-11 | End: 2019-07-01 | Stop reason: SDUPTHER

## 2018-12-11 RX ORDER — OMEPRAZOLE 20 MG/1
20 CAPSULE, DELAYED RELEASE ORAL DAILY
Qty: 90 CAPSULE | Refills: 1 | Status: SHIPPED | OUTPATIENT
Start: 2018-12-11 | End: 2019-07-01 | Stop reason: SDUPTHER

## 2018-12-11 RX ORDER — ASPIRIN 81 MG/1
81 TABLET ORAL DAILY
Refills: 0 | COMMUNITY
Start: 2018-12-11

## 2018-12-11 NOTE — PATIENT INSTRUCTIONS
Repeat chest x-ray another 10-12 days  This is to further document clearance of her pneumonia  You may use over-the-counter Robitussin or Mucinex as an expectorant  As far as her urinary tract infections, generally increase her fluids to promote more dilute and larger volume of urine  Empty the bladder frequently  You could also try cranberry extract which may help prevent urinary infections

## 2018-12-19 DIAGNOSIS — N39.0 URINARY TRACT INFECTION WITHOUT HEMATURIA, SITE UNSPECIFIED: Primary | ICD-10-CM

## 2018-12-19 NOTE — TELEPHONE ENCOUNTER
pc pt states burning with urination started yesterday--was in hospital in November for one & was told to call pcp as soon possible when she has sxs  Not here Thursday --please send to office pool   No allergies

## 2018-12-20 ENCOUNTER — PATIENT OUTREACH (OUTPATIENT)
Dept: OTHER | Facility: HOSPITAL | Age: 83
End: 2018-12-20

## 2018-12-20 RX ORDER — SULFAMETHOXAZOLE AND TRIMETHOPRIM 800; 160 MG/1; MG/1
1 TABLET ORAL EVERY 12 HOURS SCHEDULED
Qty: 10 TABLET | Refills: 0 | Status: SHIPPED | OUTPATIENT
Start: 2018-12-20 | End: 2018-12-25

## 2018-12-20 NOTE — TELEPHONE ENCOUNTER
As per Dr Sea Mejia, chart reviewed -- recent labs 11/28/18, no Kidney issues and chart is marked as NKA which patients son also reported yesterday      Message left for patients son that Rx is being ordered

## 2018-12-20 NOTE — PROGRESS NOTES
Per Gerhard cui, from [facility], patient was discharged from [facility] services on 12/18/18  Will hand off to Outpatient Care Manager to follow up with patient

## 2018-12-26 ENCOUNTER — PATIENT OUTREACH (OUTPATIENT)
Dept: FAMILY MEDICINE CLINIC | Facility: CLINIC | Age: 83
End: 2018-12-26

## 2018-12-26 NOTE — PROGRESS NOTES
Outpatient Care Management Note:    Care manager called to introduce myself and the [address] program   Voice mail message left, with my contact information, requesting a call back  Care manager called to introduce myself and the [address] program   Voice mail message left, with my contact information, requesting a call back  (2nd attempt)    Care manager called to introduce myself and [facility] program   Voice mail message left on cell phone, with my contact information, requesting a call back  (3rd attempt)  Unable to reach letter mailed to patient

## 2018-12-26 NOTE — LETTER
Date: 01/02/19    Dear Alton Laboy,   My name is Hannah Monzon  I am a registered nurse care manager working with Dr Mariana [facility] office  I have not been able to reach you and would like to set a time that I can talk with you over the phone  My work is to help patients that have complex medical conditions get the care they need  This includes patients who may have been in the hospital or emergency room  I have enclosed my business card for you  Please call me with any questions you may have  I look forward to speaking with you    Sincerely,  [facility]  [phone]

## 2019-02-15 DIAGNOSIS — R39.9 UTI SYMPTOMS: Primary | ICD-10-CM

## 2019-02-15 RX ORDER — SULFAMETHOXAZOLE AND TRIMETHOPRIM 800; 160 MG/1; MG/1
1 TABLET ORAL EVERY 12 HOURS SCHEDULED
Qty: 14 TABLET | Refills: 0 | Status: SHIPPED | OUTPATIENT
Start: 2019-02-15 | End: 2019-02-22

## 2019-02-27 ENCOUNTER — PATIENT OUTREACH (OUTPATIENT)
Dept: FAMILY MEDICINE CLINIC | Facility: CLINIC | Age: 84
End: 2019-02-27

## 2019-03-04 ENCOUNTER — OFFICE VISIT (OUTPATIENT)
Dept: FAMILY MEDICINE CLINIC | Facility: CLINIC | Age: 84
End: 2019-03-04
Payer: MEDICARE

## 2019-03-04 VITALS
OXYGEN SATURATION: 92 % | HEIGHT: 63 IN | DIASTOLIC BLOOD PRESSURE: 82 MMHG | BODY MASS INDEX: 28.7 KG/M2 | RESPIRATION RATE: 16 BRPM | TEMPERATURE: 97.4 F | SYSTOLIC BLOOD PRESSURE: 120 MMHG | HEART RATE: 70 BPM | WEIGHT: 162 LBS

## 2019-03-04 DIAGNOSIS — R39.9 UTI SYMPTOMS: ICD-10-CM

## 2019-03-04 DIAGNOSIS — N30.01 ACUTE CYSTITIS WITH HEMATURIA: Primary | ICD-10-CM

## 2019-03-04 PROBLEM — H43.11 VITREOUS HEMORRHAGE, RIGHT (HCC): Status: ACTIVE | Noted: 2017-06-28

## 2019-03-04 PROBLEM — H43.11 VITREOUS HEMORRHAGE, RIGHT (HCC): Status: RESOLVED | Noted: 2017-06-28 | Resolved: 2019-03-04

## 2019-03-04 LAB
SL AMB  POCT GLUCOSE, UA: ABNORMAL
SL AMB LEUKOCYTE ESTERASE,UA: ABNORMAL
SL AMB POCT BILIRUBIN,UA: ABNORMAL
SL AMB POCT BLOOD,UA: ABNORMAL
SL AMB POCT KETONES,UA: ABNORMAL
SL AMB POCT SPECIFIC GRAVITY,UA: 1.02
SL AMB POCT UROBILINOGEN: 0.2

## 2019-03-04 PROCEDURE — 99213 OFFICE O/P EST LOW 20 MIN: CPT | Performed by: FAMILY MEDICINE

## 2019-03-04 PROCEDURE — 87077 CULTURE AEROBIC IDENTIFY: CPT | Performed by: FAMILY MEDICINE

## 2019-03-04 PROCEDURE — 87086 URINE CULTURE/COLONY COUNT: CPT | Performed by: FAMILY MEDICINE

## 2019-03-04 PROCEDURE — 81002 URINALYSIS NONAUTO W/O SCOPE: CPT | Performed by: FAMILY MEDICINE

## 2019-03-04 PROCEDURE — 87186 SC STD MICRODIL/AGAR DIL: CPT | Performed by: FAMILY MEDICINE

## 2019-03-04 RX ORDER — NITROFURANTOIN 25; 75 MG/1; MG/1
100 CAPSULE ORAL 2 TIMES DAILY
Qty: 14 CAPSULE | Refills: 0 | Status: SHIPPED | OUTPATIENT
Start: 2019-03-04 | End: 2019-04-08

## 2019-03-04 NOTE — PATIENT INSTRUCTIONS
Macrodantin twice daily for 7 days  Send a urine culture as last visit there was lack of effect with Bactrim  Due to blood in the urine I would check a repeat urinalysis 7-10 days after completing treatment  You may wish to consider urologic consultation due to repeated bladder infections and recent loss of bladder control

## 2019-03-04 NOTE — PROGRESS NOTES
[address]        NAME: Calderon Randolph is a 80 y o  female  : 1930    MRN: [id number]  DATE: 2019  TIME: 11:48 AM    Assessment and Plan   UTI symptoms [R39 9]  1  UTI symptoms  POCT urine dip       No problem-specific Assessment & Plan notes found for this encounter  Patient Instructions     There are no Patient Instructions on file for this visit  Chief Complaint     Chief Complaint   Patient presents with    Urinary Tract Infection     uti symp         History of Present Illness       UTI symptoms over the last 2-3 days ago  Had taking peridium however symptoms have persisted  She has pressure, urgency, and increased frequency  She has not noted any blood  There are no fevers or back pain  This is now the 3rd UTI over the last 3-4 months  Review of Systems   Review of Systems   Respiratory: Negative for cough, chest tightness and shortness of breath  Cardiovascular: Negative for chest pain, palpitations and leg swelling  Gastrointestinal: Negative for abdominal pain, blood in stool, diarrhea and nausea  Genitourinary: Positive for dysuria, frequency and urgency  Negative for difficulty urinating, flank pain, hematuria, vaginal bleeding and vaginal discharge           Current Medications       Current Outpatient Medications:     aspirin (ECOTRIN LOW STRENGTH) 81 mg EC tablet, Take 1 tablet (81 mg total) by mouth daily, Disp: , Rfl: 0    cholecalciferol (VITAMIN D3) 400 units tablet, Take 400 Units by mouth daily, Disp: , Rfl:     cyanocobalamin 100 MCG tablet, Take 100 mcg by mouth daily, Disp: , Rfl:     gabapentin (NEURONTIN) 800 mg tablet, Take 1 tablet (800 mg total) by mouth 2 (two) times a day, Disp: 180 tablet, Rfl: 1    hydrochlorothiazide (HYDRODIURIL) 12 5 mg tablet, Take 0 5 tablets (6 25 mg total) by mouth daily, Disp: 45 tablet, Rfl: 1    omeprazole (PriLOSEC) 20 mg delayed release capsule, Take 1 capsule (20 mg total) by mouth daily, Disp: 90 capsule, Rfl: 1    pravastatin (PRAVACHOL) 20 mg tablet, Take 1 tablet (20 mg total) by mouth daily, Disp: 90 tablet, Rfl: 1    Current Allergies     Allergies as of 03/04/2019    (No Known Allergies)            The following portions of the patient's history were reviewed and updated as appropriate: allergies, current medications, past family history, past medical history, past social history, past surgical history and problem list      Past Medical History:   Diagnosis Date    Balance disorder     GERD (gastroesophageal reflux disease)     Hard of hearing     Hyperlipidemia     Hypertension     Neuropathy     Pneumonia     Tinnitus     UTI (urinary tract infection)        Past Surgical History:   Procedure Laterality Date    EYE SURGERY      HYSTERECTOMY         Family History   Problem Relation Age of Onset    Stroke Mother     Stroke Father     Heart disease Daughter     Substance Abuse Neg Hx     Mental illness Neg Hx          Medications have been verified  Objective   /82 (BP Location: Right arm, Patient Position: Sitting, Cuff Size: Standard)   Pulse 70   Temp (!) 97 4 °F (36 3 °C) (Oral)   Resp 16   Ht 5' 3" (1 6 m)   Wt 73 5 kg (162 lb)   SpO2 92%   BMI 28 70 kg/m²        Physical Exam     Physical Exam   Constitutional: She appears well-developed and well-nourished  She does not appear ill  No distress  Eyes: Pupils are equal, round, and reactive to light  Conjunctivae and EOM are normal    Neck: Normal range of motion  Neck supple  No thyromegaly present  Cardiovascular: Normal rate, regular rhythm and normal heart sounds  No murmur heard  Pulmonary/Chest: Effort normal and breath sounds normal  No respiratory distress  Abdominal: Soft  Bowel sounds are normal  She exhibits no distension and no mass  There is no splenomegaly  There is no tenderness  There is no guarding and no CVA tenderness

## 2019-03-06 ENCOUNTER — TELEPHONE (OUTPATIENT)
Dept: FAMILY MEDICINE CLINIC | Facility: CLINIC | Age: 84
End: 2019-03-06

## 2019-03-06 LAB — BACTERIA UR CULT: ABNORMAL

## 2019-03-06 NOTE — TELEPHONE ENCOUNTER
Spoke to patient and advised as per Dr Sheryn Libman  To call office if any recurrence of symptoms        ----- Message from Isi Meneses MD sent at 3/6/2019  1:37 PM EST -----  Call patient with lab result-finish full course Macrodantin, should be sensitive

## 2019-03-26 ENCOUNTER — TELEPHONE (OUTPATIENT)
Dept: FAMILY MEDICINE CLINIC | Facility: CLINIC | Age: 84
End: 2019-03-26

## 2019-03-26 DIAGNOSIS — N30.01 ACUTE CYSTITIS WITH HEMATURIA: Primary | ICD-10-CM

## 2019-03-26 LAB
SL AMB  POCT GLUCOSE, UA: NORMAL
SL AMB LEUKOCYTE ESTERASE,UA: NORMAL
SL AMB POCT BILIRUBIN,UA: NORMAL
SL AMB POCT BLOOD,UA: NORMAL
SL AMB POCT CLARITY,UA: NORMAL
SL AMB POCT COLOR,UA: CLEAR
SL AMB POCT KETONES,UA: NORMAL
SL AMB POCT NITRITE,UA: NORMAL
SL AMB POCT PH,UA: 5
SL AMB POCT SPECIFIC GRAVITY,UA: 1
SL AMB POCT URINE PROTEIN: NORMAL
SL AMB POCT UROBILINOGEN: 0.2

## 2019-03-26 PROCEDURE — 81002 URINALYSIS NONAUTO W/O SCOPE: CPT

## 2019-03-26 NOTE — TELEPHONE ENCOUNTER
No blood seen on dip, this was my main concern  Patient is asymptomatic probably more vaginal contamination  Would not do anything further  Please record in chart

## 2019-03-26 NOTE — TELEPHONE ENCOUNTER
PT'S SON "PARKER" [phone] STOPPED BY WITH PT'S URINE TO BE CHECKED AFTER FINISHING HER ABX SINCE 3/4/19 OV-   PT C/O NO SXS, YET URINE STILL POSITIVE FOR LUEKS AND NITRATES  TASK TO PM TO ADVISE

## 2019-03-26 NOTE — PROGRESS NOTES
potc urine dip recorded- msg left for son- no concerns since pt has no sxs- any problems/issues to cb- let son know that PM's main concern was blood in urine - no further treatment needed

## 2019-03-27 ENCOUNTER — TELEPHONE (OUTPATIENT)
Dept: FAMILY MEDICINE CLINIC | Facility: CLINIC | Age: 84
End: 2019-03-27

## 2019-03-27 DIAGNOSIS — R39.9 UTI SYMPTOMS: Primary | ICD-10-CM

## 2019-03-27 RX ORDER — AMOXICILLIN AND CLAVULANATE POTASSIUM 875; 125 MG/1; MG/1
1 TABLET, FILM COATED ORAL EVERY 12 HOURS SCHEDULED
Qty: 14 TABLET | Refills: 0 | Status: SHIPPED | OUTPATIENT
Start: 2019-03-27 | End: 2019-04-03

## 2019-03-27 NOTE — TELEPHONE ENCOUNTER
PT C/O NOW FREQUENT URINATION, BURNING/PRESSURE, FEELS WEAK-- WANTS ANTIBIOTIC    BRYN FERNANDEZ    TASK TO PM

## 2019-03-29 ENCOUNTER — OFFICE VISIT (OUTPATIENT)
Dept: URGENT CARE | Facility: CLINIC | Age: 84
End: 2019-03-29
Payer: MEDICARE

## 2019-03-29 ENCOUNTER — TELEPHONE (OUTPATIENT)
Dept: OTHER | Facility: OTHER | Age: 84
End: 2019-03-29

## 2019-03-29 VITALS
DIASTOLIC BLOOD PRESSURE: 69 MMHG | RESPIRATION RATE: 20 BRPM | SYSTOLIC BLOOD PRESSURE: 140 MMHG | BODY MASS INDEX: 28.35 KG/M2 | HEART RATE: 88 BPM | TEMPERATURE: 99.7 F | WEIGHT: 160 LBS | HEIGHT: 63 IN

## 2019-03-29 DIAGNOSIS — T50.905A MEDICATION REACTION, INITIAL ENCOUNTER: Primary | ICD-10-CM

## 2019-03-29 DIAGNOSIS — N39.0 URINARY TRACT INFECTION WITHOUT HEMATURIA, SITE UNSPECIFIED: ICD-10-CM

## 2019-03-29 PROCEDURE — G0463 HOSPITAL OUTPT CLINIC VISIT: HCPCS | Performed by: NURSE PRACTITIONER

## 2019-03-29 PROCEDURE — 99213 OFFICE O/P EST LOW 20 MIN: CPT | Performed by: NURSE PRACTITIONER

## 2019-03-29 RX ORDER — SULFAMETHOXAZOLE AND TRIMETHOPRIM 800; 160 MG/1; MG/1
1 TABLET ORAL EVERY 12 HOURS SCHEDULED
Qty: 10 TABLET | Refills: 0 | Status: SHIPPED | OUTPATIENT
Start: 2019-03-29 | End: 2019-04-03

## 2019-03-29 NOTE — TELEPHONE ENCOUNTER
Health Call  Standard Call Report  Health Call  Patient Name: Calderon Randolph  Gender: Female  : 1930  Age: 80 Y 2 M 1 D  Return Phone  Number: [phone] (Cell)  Address: [address] [place] [address]  City/State/Zip: [facility] [address]  Practice Name: [address]  Practice Charged:  Physician:  Caller Name: Luis Miguel Stewart  Relationship To  Patient: Son  Return Phone Number: [phone] (Cell)  Presenting Problem: "My mother has been having diarrhea,  vomiting and stomach craps "  Service Type: Triage  Charged Service 1: N/A  Pharmacy Name and  Number:  Nurse Assessment  Nurse: Lilli Poole RN, Samantha Watt Date/Time: 3/29/2019 5:14:31 PM  Type of assessment required:  ---General (Adult or Child)  Duration of Current S/S  ---24 hours  Location/Radiation  ---GI  Temperature (F) and route:  ---"She feels warm to the touch " No thermometer available  Symptom Specific Meds (Dose/Time):  ---Augmentin 875mg LD 0800  Other S/S  ---Vomit x 10 episodes, diarrhea x 8 episodes  Denies hematoschezia or hematemesis  Intermittent abdominal pain  Denies dizziness, weakness or headache  Pain Scale on scale of 1-10, 10 being the worst:  ---0  Symptom progression:  ---worse  Intake and Output  ---Intake - decreased solids and fluids, less than 1 5 c fluids today UO at [address] 1930    Call Id: [id number]  Nurse Assessment  Last Exam/Treatment:  ---see record  Protocols  Protocol Title Nurse Date/Time  Diarrhea TimeKATY chris, Samantha Watt 3/29/2019 5:21:20 PM  Question Caller Affirmed  Disp  Time Disposition Final User  3/29/2019 5:23:53 PM See Physician within 4 Hours (or PCP  triage)  Lilli Poole RN, Suszanne Europe  3/29/2019 5:26:04 PM RN Triaged Yes KATY Bernal, [facility] Advice Given Per Protocol  SEE PHYSICIAN WITHIN 4 HOURS (or PCP triage): * IF OFFICE WILL BE OPEN: You need to be seen within the next 3 or 4  hours  Call your doctor's office now or as soon as it opens  CLEAR FLUIDS: * Drink more fluids   * Sip water or a half-strength sports  drink (Gatorade or Powerade; mix half and half with water) * Other options: oral rehydration solution (Pedialyte or Rehydralyte)   CALL  BACK IF: * You become worse  CARE ADVICE given per Diarrhea (Adult) guideline    Caller Understands: Yes  Caller Disagree/Comply: Comply  PreDisposition: Unsure

## 2019-03-29 NOTE — PROGRESS NOTES
Assessment/Plan    Medication reaction, initial encounter [F13 159K]  1  Medication reaction, initial encounter     2  Urinary tract infection without hematuria, site unspecified  sulfamethoxazole-trimethoprim (BACTRIM DS) 800-160 mg per tablet         Subjective:     Patient ID: Asif Arevalo is a 80 y o  female  Reason For Visit / Chief Complaint  Chief Complaint   Patient presents with    Medication Reaction     Pt states she started taking  Augmentin for UTI  She states that after the 2nd day of medication she vomitted and today has had diarrhea  Pt continues to take the medication  Pt also states she feel weak  This is a 51-year-old female who presents to the urgent care today  Patient was placed on Augmentin per her family physician for UTI  Patient vomited x5 last night and patient had 5 episodes of diarrhea today  Her family doctor asked her to come to the Urgent Care to get checked  Her vomiting has since resolved  Patient's most recent diarrhea was this afternoon prior to arrival   Patient denies fever or chills  Patient denies abdominal pain, chest pain or shortness of breath  She has no known allergies to medications  Patient was treated earlier this month for UTI as well with Macrobid  Past Medical History:   Diagnosis Date    Balance disorder     GERD (gastroesophageal reflux disease)     Hard of hearing     Hyperlipidemia     Hypertension     Neuropathy     Pneumonia     Tinnitus     UTI (urinary tract infection)        Past Surgical History:   Procedure Laterality Date    EYE SURGERY      HYSTERECTOMY         Family History   Problem Relation Age of Onset    Stroke Mother     Stroke Father     Heart disease Daughter     Substance Abuse Neg Hx     Mental illness Neg Hx        Review of Systems   Constitutional: Negative for chills and fever  Respiratory: Negative for shortness of breath  Cardiovascular: Negative for chest pain     Gastrointestinal: Positive for diarrhea and vomiting  Negative for abdominal pain and nausea  Neurological: Negative for dizziness, weakness, light-headedness and headaches  Objective:    /69 (BP Location: Left arm, Patient Position: Sitting, Cuff Size: Standard)   Pulse 88   Temp 99 7 °F (37 6 °C) (Tympanic)   Resp 20   Ht 5' 3" (1 6 m)   Wt 72 6 kg (160 lb)   BMI 28 34 kg/m²     Physical Exam   Constitutional: Vital signs are normal  She appears well-developed and well-nourished  HENT:   Head: Normocephalic and atraumatic  Mouth/Throat: Uvula is midline and oropharynx is clear and moist    Cardiovascular: Normal rate, regular rhythm and normal heart sounds  Exam reveals no gallop and no friction rub  No murmur heard  Pulmonary/Chest: Effort normal and breath sounds normal  No stridor  No respiratory distress  She has no decreased breath sounds  She has no wheezes  She has no rhonchi  She has no rales  She exhibits no tenderness  Abdominal: Soft  Bowel sounds are normal  She exhibits no distension and no mass  There is no tenderness  Musculoskeletal: Normal range of motion  Skin: Skin is warm and dry  Capillary refill takes less than 2 seconds  Psychiatric: She has a normal mood and affect

## 2019-03-29 NOTE — PATIENT INSTRUCTIONS
Discontinue amoxicillin  Drink plenty of fluids  If you are unable to eat or take in fluids, go to the ER  Take new antibiotic as prescribed and until completed

## 2019-04-08 ENCOUNTER — HOSPITAL ENCOUNTER (EMERGENCY)
Facility: HOSPITAL | Age: 84
Discharge: HOME | End: 2019-04-08
Attending: EMERGENCY MEDICINE | Admitting: EMERGENCY MEDICINE
Payer: MEDICARE

## 2019-04-08 ENCOUNTER — TELEPHONE (OUTPATIENT)
Dept: FAMILY MEDICINE CLINIC | Facility: CLINIC | Age: 84
End: 2019-04-08

## 2019-04-08 VITALS
HEIGHT: 64 IN | WEIGHT: 170 LBS | OXYGEN SATURATION: 94 % | BODY MASS INDEX: 29.02 KG/M2 | TEMPERATURE: 98.1 F | RESPIRATION RATE: 18 BRPM | HEART RATE: 77 BPM | SYSTOLIC BLOOD PRESSURE: 169 MMHG | DIASTOLIC BLOOD PRESSURE: 79 MMHG

## 2019-04-08 DIAGNOSIS — N39.0 UTI (URINARY TRACT INFECTION): Primary | ICD-10-CM

## 2019-04-08 LAB
ANION GAP BLD CALC-SCNC: 14 MMOL/L (ref 4–13)
BACTERIA UR QL AUTO: ABNORMAL /HPF
BILIRUB UR QL STRIP: NEGATIVE
BUN BLD-MCNC: 17 MG/DL (ref 5–25)
CA-I BLD-SCNC: 1.18 MMOL/L (ref 1.12–1.32)
CHLORIDE BLD-SCNC: 102 MMOL/L (ref 100–108)
CLARITY UR: ABNORMAL
COLOR UR: ABNORMAL
CREAT BLD-MCNC: 0.8 MG/DL (ref 0.6–1.3)
GFR SERPL CREATININE-BSD FRML MDRD: 66 ML/MIN/1.73SQ M
GLUCOSE SERPL-MCNC: 95 MG/DL (ref 65–140)
GLUCOSE UR STRIP-MCNC: NEGATIVE MG/DL
HCT VFR BLD CALC: 38 % (ref 34.8–46.1)
HGB BLDA-MCNC: 12.9 G/DL (ref 11.5–15.4)
HGB UR QL STRIP.AUTO: ABNORMAL
KETONES UR STRIP-MCNC: NEGATIVE MG/DL
LEUKOCYTE ESTERASE UR QL STRIP: ABNORMAL
NITRITE UR QL STRIP: POSITIVE
NON-SQ EPI CELLS URNS QL MICRO: ABNORMAL /HPF
OTHER STN SPEC: ABNORMAL
PCO2 BLD: 31 MMOL/L (ref 21–32)
PH UR STRIP.AUTO: 6 [PH]
POTASSIUM BLD-SCNC: 4 MMOL/L (ref 3.5–5.3)
PROT UR STRIP-MCNC: ABNORMAL MG/DL
RBC #/AREA URNS AUTO: ABNORMAL /HPF
SODIUM BLD-SCNC: 142 MMOL/L (ref 136–145)
SP GR UR STRIP.AUTO: 1.02 (ref 1–1.03)
SPECIMEN SOURCE: ABNORMAL
URINE COMMENT: ABNORMAL
UROBILINOGEN UR QL STRIP.AUTO: 0.2 E.U./DL
WBC #/AREA URNS AUTO: ABNORMAL /HPF

## 2019-04-08 PROCEDURE — 87186 SC STD MICRODIL/AGAR DIL: CPT | Performed by: EMERGENCY MEDICINE

## 2019-04-08 PROCEDURE — 87077 CULTURE AEROBIC IDENTIFY: CPT | Performed by: EMERGENCY MEDICINE

## 2019-04-08 PROCEDURE — 85014 HEMATOCRIT: CPT

## 2019-04-08 PROCEDURE — 81001 URINALYSIS AUTO W/SCOPE: CPT | Performed by: EMERGENCY MEDICINE

## 2019-04-08 PROCEDURE — 99283 EMERGENCY DEPT VISIT LOW MDM: CPT

## 2019-04-08 PROCEDURE — 99283 EMERGENCY DEPT VISIT LOW MDM: CPT | Performed by: PHYSICIAN ASSISTANT

## 2019-04-08 PROCEDURE — 80047 BASIC METABLC PNL IONIZED CA: CPT

## 2019-04-08 PROCEDURE — 87086 URINE CULTURE/COLONY COUNT: CPT | Performed by: EMERGENCY MEDICINE

## 2019-04-08 RX ORDER — NITROFURANTOIN 25; 75 MG/1; MG/1
100 CAPSULE ORAL 2 TIMES DAILY
Qty: 20 CAPSULE | Refills: 0 | Status: SHIPPED | OUTPATIENT
Start: 2019-04-08 | End: 2019-04-15 | Stop reason: SDUPTHER

## 2019-04-10 LAB
BACTERIA UR CULT: ABNORMAL
BACTERIA UR CULT: ABNORMAL

## 2019-04-11 ENCOUNTER — VBI (OUTPATIENT)
Dept: ADMINISTRATIVE | Facility: OTHER | Age: 84
End: 2019-04-11

## 2019-04-15 ENCOUNTER — OFFICE VISIT (OUTPATIENT)
Dept: FAMILY MEDICINE CLINIC | Facility: CLINIC | Age: 84
End: 2019-04-15
Payer: MEDICARE

## 2019-04-15 VITALS
BODY MASS INDEX: 27.66 KG/M2 | OXYGEN SATURATION: 98 % | WEIGHT: 162 LBS | SYSTOLIC BLOOD PRESSURE: 126 MMHG | DIASTOLIC BLOOD PRESSURE: 82 MMHG | HEIGHT: 64 IN | HEART RATE: 75 BPM

## 2019-04-15 DIAGNOSIS — N39.0 RECURRENT UTI (URINARY TRACT INFECTION): ICD-10-CM

## 2019-04-15 DIAGNOSIS — N39.0 UTI (URINARY TRACT INFECTION): Primary | ICD-10-CM

## 2019-04-15 PROCEDURE — 99213 OFFICE O/P EST LOW 20 MIN: CPT | Performed by: FAMILY MEDICINE

## 2019-04-15 RX ORDER — NITROFURANTOIN 25; 75 MG/1; MG/1
100 CAPSULE ORAL DAILY
Qty: 10 CAPSULE | Refills: 0 | Status: SHIPPED | OUTPATIENT
Start: 2019-04-15 | End: 2019-04-25 | Stop reason: ALTCHOICE

## 2019-04-16 ENCOUNTER — TELEPHONE (OUTPATIENT)
Dept: FAMILY MEDICINE CLINIC | Facility: CLINIC | Age: 84
End: 2019-04-16

## 2019-04-17 ENCOUNTER — OFFICE VISIT (OUTPATIENT)
Dept: UROLOGY | Facility: HOSPITAL | Age: 84
End: 2019-04-17
Payer: MEDICARE

## 2019-04-17 VITALS
BODY MASS INDEX: 28 KG/M2 | HEART RATE: 70 BPM | DIASTOLIC BLOOD PRESSURE: 76 MMHG | SYSTOLIC BLOOD PRESSURE: 114 MMHG | WEIGHT: 164 LBS | HEIGHT: 64 IN

## 2019-04-17 DIAGNOSIS — N39.0 RECURRENT UTI (URINARY TRACT INFECTION): ICD-10-CM

## 2019-04-17 DIAGNOSIS — N95.2 VAGINAL ATROPHY: Primary | ICD-10-CM

## 2019-04-17 DIAGNOSIS — N39.0 UTI (URINARY TRACT INFECTION): ICD-10-CM

## 2019-04-17 PROCEDURE — 99214 OFFICE O/P EST MOD 30 MIN: CPT | Performed by: NURSE PRACTITIONER

## 2019-04-17 RX ORDER — ESTRADIOL 0.1 MG/G
1 CREAM VAGINAL 3 TIMES WEEKLY
Qty: 42.5 G | Refills: 1 | Status: SHIPPED | OUTPATIENT
Start: 2019-04-17 | End: 2019-07-01

## 2019-04-19 ENCOUNTER — TELEPHONE (OUTPATIENT)
Dept: UROLOGY | Facility: MEDICAL CENTER | Age: 84
End: 2019-04-19

## 2019-04-22 ENCOUNTER — HOSPITAL ENCOUNTER (OUTPATIENT)
Dept: ULTRASOUND IMAGING | Facility: HOSPITAL | Age: 84
Discharge: HOME | End: 2019-04-22
Payer: MEDICARE

## 2019-04-22 DIAGNOSIS — N39.0 RECURRENT UTI (URINARY TRACT INFECTION): ICD-10-CM

## 2019-04-22 PROCEDURE — 76770 US EXAM ABDO BACK WALL COMP: CPT

## 2019-04-26 ENCOUNTER — TELEPHONE (OUTPATIENT)
Dept: INFECTIOUS DISEASES | Facility: CLINIC | Age: 84
End: 2019-04-26

## 2019-04-26 ENCOUNTER — TELEPHONE (OUTPATIENT)
Dept: UROLOGY | Facility: MEDICAL CENTER | Age: 84
End: 2019-04-26

## 2019-05-29 ENCOUNTER — OFFICE VISIT (OUTPATIENT)
Dept: UROLOGY | Facility: HOSPITAL | Age: 84
End: 2019-05-29
Payer: MEDICARE

## 2019-05-29 VITALS
SYSTOLIC BLOOD PRESSURE: 146 MMHG | HEIGHT: 64 IN | DIASTOLIC BLOOD PRESSURE: 70 MMHG | HEART RATE: 72 BPM | BODY MASS INDEX: 27.83 KG/M2 | WEIGHT: 163 LBS

## 2019-05-29 DIAGNOSIS — N39.0 RECURRENT UTI: Primary | ICD-10-CM

## 2019-05-29 PROCEDURE — 99213 OFFICE O/P EST LOW 20 MIN: CPT | Performed by: NURSE PRACTITIONER

## 2019-06-15 ENCOUNTER — APPOINTMENT (EMERGENCY)
Dept: RADIOLOGY | Facility: HOSPITAL | Age: 84
End: 2019-06-15
Payer: MEDICARE

## 2019-06-15 ENCOUNTER — HOSPITAL ENCOUNTER (EMERGENCY)
Facility: HOSPITAL | Age: 84
Discharge: HOME | End: 2019-06-15
Attending: EMERGENCY MEDICINE | Admitting: EMERGENCY MEDICINE
Payer: MEDICARE

## 2019-06-15 ENCOUNTER — APPOINTMENT (EMERGENCY)
Dept: CT IMAGING | Facility: HOSPITAL | Age: 84
End: 2019-06-15
Payer: MEDICARE

## 2019-06-15 VITALS
RESPIRATION RATE: 20 BRPM | BODY MASS INDEX: 30.12 KG/M2 | TEMPERATURE: 97.5 F | SYSTOLIC BLOOD PRESSURE: 165 MMHG | HEART RATE: 66 BPM | WEIGHT: 170 LBS | OXYGEN SATURATION: 97 % | HEIGHT: 63 IN | DIASTOLIC BLOOD PRESSURE: 72 MMHG

## 2019-06-15 DIAGNOSIS — F41.9 ANXIETY: ICD-10-CM

## 2019-06-15 DIAGNOSIS — N30.00 ACUTE CYSTITIS WITHOUT HEMATURIA: Primary | ICD-10-CM

## 2019-06-15 LAB
ALBUMIN SERPL BCP-MCNC: 4 G/DL (ref 3.5–5)
ALP SERPL-CCNC: 62 U/L (ref 46–116)
ALT SERPL W P-5'-P-CCNC: 21 U/L (ref 12–78)
ANION GAP SERPL CALCULATED.3IONS-SCNC: 6 MMOL/L (ref 4–13)
AST SERPL W P-5'-P-CCNC: 17 U/L (ref 5–45)
BACTERIA UR QL AUTO: ABNORMAL /HPF
BASOPHILS # BLD AUTO: 0.04 THOUSANDS/ΜL (ref 0–0.1)
BASOPHILS NFR BLD AUTO: 0 % (ref 0–1)
BILIRUB SERPL-MCNC: 0.3 MG/DL (ref 0.2–1)
BILIRUB UR QL STRIP: NEGATIVE
BUN SERPL-MCNC: 16 MG/DL (ref 5–25)
CALCIUM SERPL-MCNC: 9.8 MG/DL (ref 8.3–10.1)
CHLORIDE SERPL-SCNC: 102 MMOL/L (ref 100–108)
CLARITY UR: ABNORMAL
CO2 SERPL-SCNC: 35 MMOL/L (ref 21–32)
COLOR UR: YELLOW
CREAT SERPL-MCNC: 0.76 MG/DL (ref 0.6–1.3)
EOSINOPHIL # BLD AUTO: 0.17 THOUSAND/ΜL (ref 0–0.61)
EOSINOPHIL NFR BLD AUTO: 2 % (ref 0–6)
ERYTHROCYTE [DISTWIDTH] IN BLOOD BY AUTOMATED COUNT: 12.6 % (ref 11.6–15.1)
GFR SERPL CREATININE-BSD FRML MDRD: 70 ML/MIN/1.73SQ M
GLUCOSE SERPL-MCNC: 91 MG/DL (ref 65–140)
GLUCOSE UR STRIP-MCNC: NEGATIVE MG/DL
HCT VFR BLD AUTO: 43.3 % (ref 34.8–46.1)
HGB BLD-MCNC: 13.5 G/DL (ref 11.5–15.4)
HGB UR QL STRIP.AUTO: ABNORMAL
IMM GRANULOCYTES # BLD AUTO: 0.02 THOUSAND/UL (ref 0–0.2)
IMM GRANULOCYTES NFR BLD AUTO: 0 % (ref 0–2)
KETONES UR STRIP-MCNC: NEGATIVE MG/DL
LEUKOCYTE ESTERASE UR QL STRIP: ABNORMAL
LYMPHOCYTES # BLD AUTO: 2.39 THOUSANDS/ΜL (ref 0.6–4.47)
LYMPHOCYTES NFR BLD AUTO: 27 % (ref 14–44)
MCH RBC QN AUTO: 28 PG (ref 26.8–34.3)
MCHC RBC AUTO-ENTMCNC: 31.2 G/DL (ref 31.4–37.4)
MCV RBC AUTO: 90 FL (ref 82–98)
MONOCYTES # BLD AUTO: 0.61 THOUSAND/ΜL (ref 0.17–1.22)
MONOCYTES NFR BLD AUTO: 7 % (ref 4–12)
NEUTROPHILS # BLD AUTO: 5.78 THOUSANDS/ΜL (ref 1.85–7.62)
NEUTS SEG NFR BLD AUTO: 64 % (ref 43–75)
NITRITE UR QL STRIP: NEGATIVE
NON-SQ EPI CELLS URNS QL MICRO: ABNORMAL /HPF
NRBC BLD AUTO-RTO: 0 /100 WBCS
PH UR STRIP.AUTO: 6.5 [PH]
PLATELET # BLD AUTO: 201 THOUSANDS/UL (ref 149–390)
PMV BLD AUTO: 9.7 FL (ref 8.9–12.7)
POTASSIUM SERPL-SCNC: 4.5 MMOL/L (ref 3.5–5.3)
PROT SERPL-MCNC: 8 G/DL (ref 6.4–8.2)
PROT UR STRIP-MCNC: NEGATIVE MG/DL
RBC # BLD AUTO: 4.83 MILLION/UL (ref 3.81–5.12)
RBC #/AREA URNS AUTO: ABNORMAL /HPF
SODIUM SERPL-SCNC: 143 MMOL/L (ref 136–145)
SP GR UR STRIP.AUTO: 1.01 (ref 1–1.03)
TROPONIN I SERPL-MCNC: 0.02 NG/ML
UROBILINOGEN UR QL STRIP.AUTO: 0.2 E.U./DL
WBC # BLD AUTO: 9.01 THOUSAND/UL (ref 4.31–10.16)
WBC #/AREA URNS AUTO: ABNORMAL /HPF

## 2019-06-15 PROCEDURE — 84484 ASSAY OF TROPONIN QUANT: CPT | Performed by: PHYSICIAN ASSISTANT

## 2019-06-15 PROCEDURE — 80053 COMPREHEN METABOLIC PANEL: CPT | Performed by: PHYSICIAN ASSISTANT

## 2019-06-15 PROCEDURE — 81001 URINALYSIS AUTO W/SCOPE: CPT | Performed by: PHYSICIAN ASSISTANT

## 2019-06-15 PROCEDURE — 85025 COMPLETE CBC W/AUTO DIFF WBC: CPT | Performed by: PHYSICIAN ASSISTANT

## 2019-06-15 PROCEDURE — 99283 EMERGENCY DEPT VISIT LOW MDM: CPT | Performed by: PHYSICIAN ASSISTANT

## 2019-06-15 PROCEDURE — 87186 SC STD MICRODIL/AGAR DIL: CPT | Performed by: PHYSICIAN ASSISTANT

## 2019-06-15 PROCEDURE — 87086 URINE CULTURE/COLONY COUNT: CPT | Performed by: PHYSICIAN ASSISTANT

## 2019-06-15 PROCEDURE — 71046 X-RAY EXAM CHEST 2 VIEWS: CPT

## 2019-06-15 PROCEDURE — 99285 EMERGENCY DEPT VISIT HI MDM: CPT

## 2019-06-15 PROCEDURE — 70450 CT HEAD/BRAIN W/O DYE: CPT

## 2019-06-15 PROCEDURE — 93005 ELECTROCARDIOGRAM TRACING: CPT

## 2019-06-15 PROCEDURE — 36415 COLL VENOUS BLD VENIPUNCTURE: CPT | Performed by: PHYSICIAN ASSISTANT

## 2019-06-15 PROCEDURE — 87077 CULTURE AEROBIC IDENTIFY: CPT | Performed by: PHYSICIAN ASSISTANT

## 2019-06-15 RX ORDER — NITROFURANTOIN 25; 75 MG/1; MG/1
100 CAPSULE ORAL 2 TIMES DAILY
Qty: 10 CAPSULE | Refills: 0 | Status: SHIPPED | OUTPATIENT
Start: 2019-06-15 | End: 2019-06-20 | Stop reason: ALTCHOICE

## 2019-06-17 LAB
ATRIAL RATE: 74 BPM
BACTERIA UR CULT: ABNORMAL
BACTERIA UR CULT: ABNORMAL
P AXIS: 81 DEGREES
PR INTERVAL: 190 MS
QRS AXIS: 70 DEGREES
QRSD INTERVAL: 132 MS
QT INTERVAL: 418 MS
QTC INTERVAL: 463 MS
T WAVE AXIS: -42 DEGREES
VENTRICULAR RATE: 74 BPM

## 2019-06-17 PROCEDURE — 93010 ELECTROCARDIOGRAM REPORT: CPT | Performed by: INTERNAL MEDICINE

## 2019-06-20 ENCOUNTER — VBI (OUTPATIENT)
Dept: ADMINISTRATIVE | Facility: OTHER | Age: 84
End: 2019-06-20

## 2019-07-01 ENCOUNTER — OFFICE VISIT (OUTPATIENT)
Dept: FAMILY MEDICINE CLINIC | Facility: CLINIC | Age: 84
End: 2019-07-01
Payer: MEDICARE

## 2019-07-01 VITALS
HEART RATE: 77 BPM | TEMPERATURE: 98.7 F | WEIGHT: 164 LBS | HEIGHT: 63 IN | SYSTOLIC BLOOD PRESSURE: 126 MMHG | OXYGEN SATURATION: 98 % | DIASTOLIC BLOOD PRESSURE: 84 MMHG | BODY MASS INDEX: 29.06 KG/M2

## 2019-07-01 DIAGNOSIS — Z00.00 MEDICARE ANNUAL WELLNESS VISIT, SUBSEQUENT: Primary | ICD-10-CM

## 2019-07-01 DIAGNOSIS — G60.9 IDIOPATHIC PERIPHERAL NEUROPATHY: ICD-10-CM

## 2019-07-01 DIAGNOSIS — Z13.31 POSITIVE DEPRESSION SCREENING: ICD-10-CM

## 2019-07-01 DIAGNOSIS — I87.2 VENOUS INSUFFICIENCY: ICD-10-CM

## 2019-07-01 DIAGNOSIS — N30.00 ACUTE CYSTITIS WITHOUT HEMATURIA: ICD-10-CM

## 2019-07-01 DIAGNOSIS — I10 ESSENTIAL HYPERTENSION: ICD-10-CM

## 2019-07-01 DIAGNOSIS — R30.0 BURNING WITH URINATION: ICD-10-CM

## 2019-07-01 DIAGNOSIS — K21.9 GASTROESOPHAGEAL REFLUX DISEASE, ESOPHAGITIS PRESENCE NOT SPECIFIED: ICD-10-CM

## 2019-07-01 DIAGNOSIS — E78.2 MIXED HYPERLIPIDEMIA: ICD-10-CM

## 2019-07-01 PROBLEM — H35.3132 INTERMEDIATE STAGE NONEXUDATIVE AGE-RELATED MACULAR DEGENERATION OF BOTH EYES: Status: ACTIVE | Noted: 2019-07-01

## 2019-07-01 PROBLEM — H35.3211 EXUDATIVE AGE-RELATED MACULAR DEGENERATION OF RIGHT EYE WITH ACTIVE CHOROIDAL NEOVASCULARIZATION (HCC): Status: ACTIVE | Noted: 2019-07-01

## 2019-07-01 LAB
SL AMB  POCT GLUCOSE, UA: NORMAL
SL AMB LEUKOCYTE ESTERASE,UA: NORMAL
SL AMB POCT BILIRUBIN,UA: NORMAL
SL AMB POCT BLOOD,UA: NORMAL
SL AMB POCT COLOR,UA: YELLOW
SL AMB POCT KETONES,UA: NORMAL
SL AMB POCT NITRITE,UA: NORMAL
SL AMB POCT PH,UA: 6
SL AMB POCT SPECIFIC GRAVITY,UA: 1.01
SL AMB POCT URINE PROTEIN: NORMAL
SL AMB POCT UROBILINOGEN: NORMAL

## 2019-07-01 PROCEDURE — G0439 PPPS, SUBSEQ VISIT: HCPCS | Performed by: FAMILY MEDICINE

## 2019-07-01 PROCEDURE — 99214 OFFICE O/P EST MOD 30 MIN: CPT | Performed by: FAMILY MEDICINE

## 2019-07-01 RX ORDER — SERTRALINE HYDROCHLORIDE 25 MG/1
25 TABLET, FILM COATED ORAL DAILY
Qty: 30 TABLET | Refills: 5 | Status: SHIPPED | OUTPATIENT
Start: 2019-07-01 | End: 2019-08-05

## 2019-07-01 RX ORDER — OMEPRAZOLE 20 MG/1
20 CAPSULE, DELAYED RELEASE ORAL DAILY
Qty: 90 CAPSULE | Refills: 1 | Status: SHIPPED | OUTPATIENT
Start: 2019-07-01 | End: 2019-12-30 | Stop reason: SDUPTHER

## 2019-07-01 RX ORDER — GABAPENTIN 600 MG/1
600 TABLET ORAL 2 TIMES DAILY
Qty: 180 TABLET | Refills: 1 | Status: SHIPPED | OUTPATIENT
Start: 2019-07-01 | End: 2019-12-30 | Stop reason: SDUPTHER

## 2019-07-01 RX ORDER — PRAVASTATIN SODIUM 20 MG
20 TABLET ORAL DAILY
Qty: 90 TABLET | Refills: 1 | Status: SHIPPED | OUTPATIENT
Start: 2019-07-01 | End: 2019-12-30 | Stop reason: SDUPTHER

## 2019-07-01 RX ORDER — HYDROCHLOROTHIAZIDE 12.5 MG/1
12.5 TABLET ORAL DAILY
Qty: 90 TABLET | Refills: 1 | Status: SHIPPED | OUTPATIENT
Start: 2019-07-01 | End: 2019-12-30 | Stop reason: SDUPTHER

## 2019-07-01 NOTE — PROGRESS NOTES
Assessment and Plan:     Problem List Items Addressed This Visit     None      Visit Diagnoses     Medicare annual wellness visit, subsequent    -  Primary         History of Present Illness:     Patient presents for Medicare Annual Wellness visit    Patient Care Team:  Marika Bailey MD as PCP - General     Problem List:     Patient Active Problem List   Diagnosis    Essential hypertension    Idiopathic peripheral neuropathy    GERD (gastroesophageal reflux disease)    Vitamin B 12 deficiency    Fall    UTI (urinary tract infection)    Arthropathy    Mixed hyperlipidemia    Venous insufficiency      Past Medical and Surgical History:     Past Medical History:   Diagnosis Date    Balance disorder     GERD (gastroesophageal reflux disease)     Hard of hearing     Hyperlipidemia     Hypertension     Neuropathy     Pneumonia     Tinnitus     UTI (urinary tract infection)      Past Surgical History:   Procedure Laterality Date    EYE SURGERY      HYSTERECTOMY        Family History:     Family History   Problem Relation Age of Onset    Stroke Mother     Stroke Father     Heart disease Daughter     Substance Abuse Neg Hx     Mental illness Neg Hx       Social History:     Social History     Tobacco Use   Smoking Status Never Smoker   Smokeless Tobacco Never Used     Social History     Substance and Sexual Activity   Alcohol Use Yes    Comment: rarely     Social History     Substance and Sexual Activity   Drug Use No      Medications and Allergies:     Current Outpatient Medications   Medication Sig Dispense Refill    aspirin (ECOTRIN LOW STRENGTH) 81 mg EC tablet Take 1 tablet (81 mg total) by mouth daily  0    Cholecalciferol (VITAMIN D3) 1000 units CAPS Take 1,000 Units by mouth daily       Cyanocobalamin (VITAMIN B12 PO) Take by mouth      estradiol (ESTRACE) 0 1 mg/g vaginal cream Insert 1 g into the vagina 3 (three) times a week 42 5 g 1    gabapentin (NEURONTIN) 800 mg tablet Take 1 tablet (800 mg total) by mouth 2 (two) times a day 180 tablet 1    hydrochlorothiazide (HYDRODIURIL) 12 5 mg tablet Take 0 5 tablets (6 25 mg total) by mouth daily 45 tablet 1    Multiple Vitamins-Minerals (PRESERVISION AREDS PO) Take by mouth 2 (two) times a day      omeprazole (PriLOSEC) 20 mg delayed release capsule Take 1 capsule (20 mg total) by mouth daily 90 capsule 1    pravastatin (PRAVACHOL) 20 mg tablet Take 1 tablet (20 mg total) by mouth daily 90 tablet 1     No current facility-administered medications for this visit  Allergies   Allergen Reactions    Augmentin [Amoxicillin-Pot Clavulanate] GI Intolerance      Immunizations:     Immunization History   Administered Date(s) Administered    Influenza Split High Dose Preservative Free IM 09/09/2016    Influenza, high dose seasonal 0 5 mL 11/27/2018    Pneumococcal Conjugate 13-Valent 09/09/2016    Pneumococcal Polysaccharide PPV23 04/23/2013      Medicare Screening Tests and Risk Assessments:     Thaddeus Macdonald is here for her Subsequent Wellness visit  Last Medicare Wellness visit information reviewed, patient interviewed and updates made to the record today  Preventative Screening/Counseling:      Cardiovascular:      General: Screening Current          Diabetes:      General: Screening Current          Colorectal Cancer:      General: Screening Not Indicated          Breast Cancer:      General: Screening Not Indicated          Cervical Cancer:      General: Screening Not Indicated          Osteoporosis:      General: Risks and Benefits Discussed and Patient Declines          AAA:      General: Screening Current          Glaucoma:      General: Screening Current      Comments: Due exam        Hepatitis C:      General: Screening Not Indicated        Advanced Directives:   Patient has no living will for healthcare, does not have durable POA for healthcare, patient does not have an advanced directive  5 wishes given       Immunizations: Influenza: Influenza UTD This Year and Influenza Recommended Annually      Pneumococcal: Lifetime Vaccine Completed      Shingrix: Risks & Benefits Discussed      TDAP: Risks & Benefits Discussed      Other Preventative Counseling (Non-Medicare):   Fall Prevention, Car/seat belt/driving safety reviewed and Weight reduction discussed      Referrals:  Referral(s) to: Urology

## 2019-07-01 NOTE — PATIENT INSTRUCTIONS
PHQ-9 score is 10  patient willing to give trial of low-dose antidepressant  Within see you back in 4-6 weeks  Other laboratory studies are basically normal    Urinalysis normal    Continue other medications  Obesity   AMBULATORY CARE:   Obesity  is when your body mass index (BMI) is greater than 30  Your healthcare provider will use your height and weight to measure your BMI  The risks of obesity include  many health problems, such as injuries or physical disability  You may need tests to check for the following:  · Diabetes     · High blood pressure or high cholesterol     · Heart disease     · Gallbladder or liver disease     · Cancer of the colon, breast, prostate, liver, or kidney     · Sleep apnea     · Arthritis or gout  Seek care immediately if:   · You have a severe headache, confusion, or difficulty speaking  · You have weakness on one side of your body  · You have chest pain, sweating, or shortness of breath  Contact your healthcare provider if:   · You have symptoms of gallbladder or liver disease, such as pain in your upper abdomen  · You have knee or hip pain and discomfort while walking  · You have symptoms of diabetes, such as intense hunger and thirst, and frequent urination  · You have symptoms of sleep apnea, such as snoring or daytime sleepiness  · You have questions or concerns about your condition or care  Treatment for obesity  focuses on helping you lose weight to improve your health  Even a small decrease in BMI can reduce the risk for many health problems  Your healthcare provider will help you set a weight-loss goal   · Lifestyle changes  are the first step in treating obesity  These include making healthy food choices and getting regular physical activity  Your healthcare provider may suggest a weight-loss program that involves coaching, education, and therapy  · Medicine  may help you lose weight when it is used with a healthy diet and physical activity  · Surgery  can help you lose weight if you are very obese and have other health problems  There are several types of weight-loss surgery  Ask your healthcare provider for more information  Be successful losing weight:   · Set small, realistic goals  An example of a small goal is to walk for 20 minutes 5 days a week  Anther goal is to lose 5% of your body weight  · Tell friends, family members, and coworkers about your goals  and ask for their support  Ask a friend to lose weight with you, or join a weight-loss support group  · Identify foods or triggers that may cause you to overeat , and find ways to avoid them  Remove tempting high-calorie foods from your home and workplace  Place a bowl of fresh fruit on your kitchen counter  If stress causes you to eat, then find other ways to cope with stress  · Keep a diary to track what you eat and drink  Also write down how many minutes of physical activity you do each day  Weigh yourself once a week and record it in your diary  Eating changes: You will need to eat 500 to 1,000 fewer calories each day than you currently eat to lose 1 to 2 pounds a week  The following changes will help you cut calories:  · Eat smaller portions  Use small plates, no larger than 9 inches in diameter  Fill your plate half full of fruits and vegetables  Measure your food using measuring cups until you know what a serving size looks like  · Eat 3 meals and 1 or 2 snacks each day  Plan your meals in advance  [place] and eat at home most of the time  Eat slowly  · Eat fruits and vegetables at every meal   They are low in calories and high in fiber, which makes you feel full  Do not add butter, margarine, or cream sauce to vegetables  Use herbs to season steamed vegetables  · Eat less fat and fewer fried foods  Eat more baked or grilled chicken and fish  These protein sources are lower in calories and fat than red meat  Limit fast food   Dress your salads with olive oil and vinegar instead of bottled dressing  · Limit the amount of sugar you eat  Do not drink sugary beverages  Limit alcohol  Activity changes:  Physical activity is good for your body in many ways  It helps you burn calories and build strong muscles  It decreases stress and depression, and improves your mood  It can also help you sleep better  Talk to your healthcare provider before you begin an exercise program   · Exercise for at least 30 minutes 5 days a week  Start slowly  Set aside time each day for physical activity that you enjoy and that is convenient for you  It is best to do both weight training and an activity that increases your heart rate, such as walking, bicycling, or swimming  · Find ways to be more active  Do yard work and housecleaning  Walk up the stairs instead of using elevators  Spend your leisure time going to events that require walking, such as outdoor festivals or fairs  This extra physical activity can help you lose weight and keep it off  Follow up with your healthcare provider as directed: You may need to meet with a dietitian  Write down your questions so you remember to ask them during your visits  © 2017 [facility] Information is for End User's use only and may not be sold, redistributed or otherwise used for commercial purposes  All illustrations and images included in CareNotes® are the copyrighted property of A D A M [employer]  or Terrence Santana  The above information is an  only  It is not intended as medical advice for individual conditions or treatments  Talk to your doctor, nurse or pharmacist before following any medical regimen to see if it is safe and effective for you  Urinary Incontinence   WHAT YOU NEED TO KNOW:   What is urinary incontinence? Urinary incontinence (UI) is when you lose control of your bladder  What causes UI? UI occurs because your bladder cannot store or empty urine properly   The following are the most common types of UI:  · Stress incontinence  is when you leak urine due to increased bladder pressure  This may happen when you cough, sneeze, or exercise  · Urge incontinence  is when you feel the need to urinate right away and leak urine accidentally  · Mixed incontinence  is when you have both stress and urge UI  What are the signs and symptoms of UI?   · You feel like your bladder does not empty completely when you urinate  · You urinate often and need to urinate immediately  · You leak urine when you sleep, or you wake up with the urge to urinate  · You leak urine when you cough, sneeze, exercise, or laugh  How is UI diagnosed? Your healthcare provider will ask how often you leak urine and whether you have stress or urge symptoms  Tell him which medicines you take, how often you urinate, and how much liquid you drink each day  You may need any of the following tests:  · Urine tests  may show infection or kidney function  · A pelvic exam  may be done to check for blockages  A pelvic exam will also show if your bladder, uterus, or other organs have moved out of place  · An x-ray, ultrasound, or CT  may show problems with parts of your urinary system  You may be given contrast liquid to help your organs show up better in the pictures  Tell the healthcare provider if you have ever had an allergic reaction to contrast liquid  Do not enter the MRI room with anything metal  Metal can cause serious injury  Tell the healthcare provider if you have any metal in or on your body  · A bladder scan  will show how much urine is left in your bladder after you urinate  You will be asked to urinate and then healthcare providers will use a small ultrasound machine to check the urine left in your bladder  · Cystometry  is used to check the function of your urinary system  Your healthcare provider checks the pressure in your bladder while filling it with fluid   Your bladder pressure may also be tested when your bladder is full and while you urinate  How is UI treated? · Medicines  can help strengthen your bladder control  · Electrical stimulation  is used to send a small amount of electrical energy to your pelvic floor muscles  This helps control your bladder function  Electrodes may be placed outside your body or in your rectum  For women, the electrodes may be placed in the vagina  · A bulking agent  may be injected into the wall of your urethra to make it thicker  This helps keep your urethra closed and decreases urine leakage  · Devices  such as a clamp, pessary, or tampon may help stop urine leaks  Ask your healthcare provider for more information about these and other devices  · Surgery  may be needed if other treatments do not work  Several types of surgery can help improve your bladder control  Ask your healthcare provider for more information about the surgery you may need  How can I manage my symptoms? · Do pelvic muscle exercises often  Your pelvic muscles help you stop urinating  Squeeze these muscles tight for 5 seconds, then relax for 5 seconds  Gradually work up to squeezing for 10 seconds  Do 3 sets of 15 repetitions a day, or as directed  This will help strengthen your pelvic muscles and improve bladder control  · A catheter  may be used to help empty your bladder  A catheter is a tiny, plastic tube that is put into your bladder to drain your urine  Your healthcare provider may tell you to use a catheter to prevent your bladder from getting too full and leaking urine  · Keep a UI record  Write down how often you leak urine and how much you leak  Make a note of what you were doing when you leaked urine  · Train your bladder  Go to the bathroom at set times, such as every 2 hours, even if you do not feel the urge to go  You can also try to hold your urine when you feel the urge to go  For example, hold your urine for 5 minutes when you feel the urge to go  As that becomes easier, hold your urine for 10 minutes  · Drink liquids as directed  Ask your healthcare provider how much liquid to drink each day and which liquids are best for you  You may need to limit the amount of liquid you drink to help control your urine leakage  Limit or do not have drinks that contain caffeine or alcohol  Do not drink any liquid right before you go to bed  · Prevent constipation  Eat a variety of high-fiber foods  Good examples are high-fiber cereals, beans, vegetables, and whole-grain breads  Prune juice may help make your bowel movement softer  Walking is the best way to trigger your intestines to have a bowel movement  · Exercise regularly and maintain a healthy weight  Ask your healthcare provider how much you should weigh and about the best exercise plan for you  Weight loss and exercise will decrease pressure on your bladder and help you control your leakage  Ask him to help you create a weight loss plan if you are overweight  When should I seek immediate care? · You have severe pain  · You are confused or cannot think clearly  When should I contact my healthcare provider? · You have a fever  · You see blood in your urine  · You have pain when you urinate  · You have new or worse pain, even after treatment  · Your mouth feels dry or you have vision changes  · Your urine is cloudy or smells bad  · You have questions or concerns about your condition or care  CARE AGREEMENT:   You have the right to help plan your care  Learn about your health condition and how it may be treated  Discuss treatment options with your caregivers to decide what care you want to receive  You always have the right to refuse treatment  The above information is an  only  It is not intended as medical advice for individual conditions or treatments   Talk to your doctor, nurse or pharmacist before following any medical regimen to see if it is safe and effective for you  © 2017 [address] [employer]  Information is for End User's use only and may not be sold, redistributed or otherwise used for commercial purposes  All illustrations and images included in CareNotes® are the copyrighted property of A D A M , Inc  or Terrence Santana  Cigarette Smoking and Your Health   AMBULATORY CARE:   Risks to your health if you smoke:  Nicotine and other chemicals found in tobacco damage every cell in your body  Even if you are a light smoker, you have an increased risk for cancer, heart disease, and lung disease  If you are pregnant or have diabetes, smoking increases your risk for complications  Benefits to your health if you stop smoking:   · You decrease respiratory symptoms such as coughing, wheezing, and shortness of breath  · You reduce your risk for cancers of the lung, mouth, throat, kidney, bladder, pancreas, stomach, and cervix  If you already have cancer, you increase the benefits of chemotherapy  You also reduce your risk for cancer returning or a second cancer from developing  · You reduce your risk for heart disease, blood clots, heart attack, and stroke  · You reduce your risk for lung infections, and diseases such as pneumonia, asthma, chronic bronchitis, and emphysema  · Your circulation improves  More oxygen can be delivered to your body  If you have diabetes, you lower your risk for complications, such as kidney, artery, and eye diseases  You also lower your risk for nerve damage  Nerve damage can lead to amputations, poor vision, and blindness  · You improve your body's ability to heal and to fight infections  Benefits to the health of others if you stop smoking:  Tobacco is harmful to nonsmokers who breathe in your secondhand smoke  The following are ways the health of others around you may improve when you stop smoking:  · You lower the risks for lung cancer and heart disease in nonsmoking adults       · If you are pregnant, you lower the risk for miscarriage, early delivery, low birth weight, and stillbirth  You also lower your baby's risk for SIDS, obesity, developmental delay, and neurobehavioral problems, such as ADHD  · If you have children, you lower their risk for ear infections, colds, pneumonia, bronchitis, and asthma  For more information and support to stop smoking:   · Smokefree  gov  Phone: 3- 611 - 161-7280  Web Address: [employer]  Follow up with your healthcare provider as directed:  Write down your questions so you remember to ask them during your visits  © 2017 [facility] Information is for End User's use only and may not be sold, redistributed or otherwise used for commercial purposes  All illustrations and images included in CareNotes® are the copyrighted property of A [employer] A M , [employer]  or Terrence Santana  The above information is an  only  It is not intended as medical advice for individual conditions or treatments  Talk to your doctor, nurse or pharmacist before following any medical regimen to see if it is safe and effective for you  Fall Prevention   AMBULATORY CARE:   Fall prevention  includes ways to make your home and other areas safer  It also includes ways you can move more carefully to prevent a fall  Health conditions that cause changes in your blood pressure, vision, or muscle strength and coordination may increase your risk for falls  Medicines may also increase your risk for falls if they make you dizzy, weak, or sleepy  Call 911 or have someone else call if:   · You have fallen and are unconscious  · You have fallen and cannot move part of your body  Contact your healthcare provider if:   · You have fallen and have pain or a headache  · You have questions or concerns about your condition or care  Fall prevention tips:   · Stand or sit up slowly  This may help you keep your balance and prevent falls      · Use assistive devices as directed  Your healthcare provider may suggest that you use a cane or walker to help you keep your balance  You may need to have grab bars put in your bathroom near the toilet or in the shower  · Wear shoes that fit well and have soles that   Wear shoes both inside and outside  Use slippers with good   Do not wear shoes with high heels  · Wear a personal alarm  This is a device that allows you to call 911 if you fall and need help  Ask your healthcare provider for more information  · Stay active  Exercise can help strengthen your muscles and improve your balance  Your healthcare provider may recommend water aerobics or walking  He or she may also recommend physical therapy to improve your coordination  Never start an exercise program without talking to your healthcare provider first      · Manage your medical conditions  Keep all appointments with your healthcare providers  Visit your eye doctor as directed  Home safety tips:   · Add items to prevent falls in the bathroom  Put nonslip strips on your bath or shower floor to prevent you from slipping  Use a bath mat if you do not have carpet in the bathroom  This will prevent you from falling when you step out of the bath or shower  Use a shower seat so you do not need to stand while you shower  Sit on the toilet or a chair in your bathroom to dry yourself and put on clothing  This will prevent you from losing your balance from drying or dressing yourself while you are standing  · Keep paths clear  Remove books, shoes, and other objects from walkways and stairs  Place cords for telephones and lamps out of the way so that you do not need to walk over them  Tape them down if you cannot move them  Remove small rugs  If you cannot remove a rug, secure it with double-sided tape  This will prevent you from tripping  · Install bright lights in your home  Use night lights to help light paths to the bathroom or kitchen   Always turn on the light before you start walking  · Keep items you use often on shelves within reach  Do not use a step stool to help you reach an item  · Paint or place reflective tape on the edges of your stairs  This will help you see the stairs better  Follow up with your healthcare provider as directed:  Write down your questions so you remember to ask them during your visits  © 2017 [address] [employer] [place] Information is for End User's use only and may not be sold, redistributed or otherwise used for commercial purposes  All illustrations and images included in CareNotes® are the copyrighted property of A [employer] A M , Inc  or Terrence Santana  The above information is an  only  It is not intended as medical advice for individual conditions or treatments  Talk to your doctor, nurse or pharmacist before following any medical regimen to see if it is safe and effective for you  Advance Directives   WHAT YOU NEED TO KNOW:   What are advance directives? Advance directives are legal documents that state your wishes and plans for medical care  These plans are made ahead of time in case you lose your ability to make decisions for yourself  Advance directives can apply to any medical decision, such as the treatments you want, and if you want to donate organs  What are the types of advance directives? There are many types of advance directives, and each state has rules about how to use them  You may choose a combination of any of the following:  · Living will: This is a written record of the treatment you want  You can also choose which treatments you do not want, which to limit, and which to stop at a certain time  This includes surgery, medicine, IV fluid, and tube feedings  · Durable power of  for healthcare [facility]): This is a written record that states who you want to make healthcare choices for you when you are unable to make them for yourself   This person, called a proxy, is usually a family member or a friend  You may choose more than 1 proxy  · Do not resuscitate (DNR) order:  A DNR order is used in case your heart stops beating or you stop breathing  It is a request not to have certain forms of treatment, such as CPR  A DNR order may be included in other types of advance directives  · Medical directive: This covers the care that you want if you are in a coma, near death, or unable to make decisions for yourself  You can list the treatments you want for each condition  Treatment may include pain medicine, surgery, blood transfusions, dialysis, IV or tube feedings, and a ventilator (breathing machine)  · Values history: This document has questions about your views, beliefs, and how you feel and think about life  This information can help others choose the care that you would choose  Why are advance directives important? An advance directive helps you control your care  Although spoken wishes may be used, it is better to have your wishes written down  Spoken wishes can be misunderstood, or not followed  Treatments may be given even if you do not want them  An advance directive may make it easier for your family to make difficult choices about your care  How do I decide what to put in my advance directives? · Make informed decisions:  Make sure you fully understand treatments or care you may receive  Think about the benefits and problems your decisions could cause for you or your family  Talk to healthcare providers if you have concerns or questions before you write down your wishes  You may also want to talk with your Jain or , or a   Check your state laws to make sure that what you put in your advance directive is legal      · Sign all forms:  Sign and date your advance directive when you have finished  You may also need 2 witnesses to sign the forms   Witnesses cannot be your doctor or his staff, your spouse, heirs or beneficiaries, people you owe money to, or your chosen proxy  Talk to your family, proxy, and healthcare providers about your advance directive  Give each person a copy, and keep one for yourself in a place you can get to easily  Do not keep it hidden or locked away  · Review and revise your plans: You can revise your advance directive at any time, as long as you are able to make decisions  Review your plan every year, and when there are changes in your life, or your health  When you make changes, let your family, proxy, and healthcare providers know  Give each a new copy  Where can I find more information? · [employer]  [address] [place] , [address]  Phone: 7- [phone] - [phone]  Phone: 9- 241 - [phone]  Web Address: http://www  aafp org  · [address] [facility])  [address] [address] [facility], [address]  Phone: [phone] [phone]  Phone: [phone]  Web Address: Nacho gonzales  CARE AGREEMENT:   You have the right to help plan your care  To help with this plan, you must learn about your health condition and treatment options  You must also learn about advance directives and how they are used  Work with your healthcare providers to decide what care will be used to treat you  You always have the right to refuse treatment  The above information is an  only  It is not intended as medical advice for individual conditions or treatments  Talk to your doctor, nurse or pharmacist before following any medical regimen to see if it is safe and effective for you  © 2017 [employer]  Information is for End User's use only and may not be sold, redistributed or otherwise used for commercial purposes  All illustrations and images included in CareNotes® are the copyrighted property of A D A M , [employer]  or Terrence Santana

## 2019-07-01 NOTE — PROGRESS NOTES
[id number]         NAME: Norman Hernández is a 80 y o  female  : 1930    MRN: [id number]  DATE: 2019  TIME: 7:28 PM    Assessment and Plan   Medicare annual wellness visit, subsequent [Z00 00]  1  Medicare annual wellness visit, subsequent     2  Acute cystitis without hematuria     3  Essential hypertension  hydrochlorothiazide (HYDRODIURIL) 12 5 mg tablet   4  Idiopathic peripheral neuropathy  gabapentin (NEURONTIN) 600 MG tablet   5  Venous insufficiency     6  Positive depression screening  sertraline (ZOLOFT) 25 mg tablet   7  Mixed hyperlipidemia  pravastatin (PRAVACHOL) 20 mg tablet   8  Gastroesophageal reflux disease, esophagitis presence not specified  omeprazole (PriLOSEC) 20 mg delayed release capsule   9  Burning with urination  POCT urine dip       No problem-specific Assessment & Plan notes found for this encounter  Patient Instructions     Patient Instructions     PHQ-9 score is 10  patient willing to give trial of low-dose antidepressant  Within see you back in 4-6 weeks  Other laboratory studies are basically normal    Urinalysis normal    Continue other medications  Obesity   AMBULATORY CARE:   Obesity  is when your body mass index (BMI) is greater than 30  Your healthcare provider will use your height and weight to measure your BMI  The risks of obesity include  many health problems, such as injuries or physical disability  You may need tests to check for the following:  · Diabetes     · High blood pressure or high cholesterol     · Heart disease     · Gallbladder or liver disease     · Cancer of the colon, breast, prostate, liver, or kidney     · Sleep apnea     · Arthritis or gout  Seek care immediately if:   · You have a severe headache, confusion, or difficulty speaking  · You have weakness on one side of your body  · You have chest pain, sweating, or shortness of breath    Contact your healthcare provider if:   · You have symptoms of gallbladder or liver disease, such as pain in your upper abdomen  · You have knee or hip pain and discomfort while walking  · You have symptoms of diabetes, such as intense hunger and thirst, and frequent urination  · You have symptoms of sleep apnea, such as snoring or daytime sleepiness  · You have questions or concerns about your condition or care  Treatment for obesity  focuses on helping you lose weight to improve your health  Even a small decrease in BMI can reduce the risk for many health problems  Your healthcare provider will help you set a weight-loss goal   · Lifestyle changes  are the first step in treating obesity  These include making healthy food choices and getting regular physical activity  Your healthcare provider may suggest a weight-loss program that involves coaching, education, and therapy  · Medicine  may help you lose weight when it is used with a healthy diet and physical activity  · Surgery  can help you lose weight if you are very obese and have other health problems  There are several types of weight-loss surgery  Ask your healthcare provider for more information  Be successful losing weight:   · Set small, realistic goals  An example of a small goal is to walk for 20 minutes 5 days a week  Anther goal is to lose 5% of your body weight  · Tell friends, family members, and coworkers about your goals  and ask for their support  Ask a friend to lose weight with you, or join a weight-loss support group  · Identify foods or triggers that may cause you to overeat , and find ways to avoid them  Remove tempting high-calorie foods from your home and workplace  Place a bowl of fresh fruit on your kitchen counter  If stress causes you to eat, then find other ways to cope with stress  · Keep a diary to track what you eat and drink  Also write down how many minutes of physical activity you do each day   Weigh yourself once a week and record it in your diary  Eating changes: You will need to eat 500 to 1,000 fewer calories each day than you currently eat to lose 1 to 2 pounds a week  The following changes will help you cut calories:  · Eat smaller portions  Use small plates, no larger than 9 inches in diameter  Fill your plate half full of fruits and vegetables  Measure your food using measuring cups until you know what a serving size looks like  · Eat 3 meals and 1 or 2 snacks each day  Plan your meals in advance  [place] and eat at home most of the time  Eat slowly  · Eat fruits and vegetables at every meal   They are low in calories and high in fiber, which makes you feel full  Do not add butter, margarine, or cream sauce to vegetables  Use herbs to season steamed vegetables  · Eat less fat and fewer fried foods  Eat more baked or grilled chicken and fish  These protein sources are lower in calories and fat than red meat  Limit fast food  Dress your salads with olive oil and vinegar instead of bottled dressing  · Limit the amount of sugar you eat  Do not drink sugary beverages  Limit alcohol  Activity changes:  Physical activity is good for your body in many ways  It helps you burn calories and build strong muscles  It decreases stress and depression, and improves your mood  It can also help you sleep better  Talk to your healthcare provider before you begin an exercise program   · Exercise for at least 30 minutes 5 days a week  Start slowly  Set aside time each day for physical activity that you enjoy and that is convenient for you  It is best to do both weight training and an activity that increases your heart rate, such as walking, bicycling, or swimming  · Find ways to be more active  Do yard work and housecleaning  Walk up the stairs instead of using elevators  Spend your leisure time going to events that require walking, such as outdoor festivals or fairs   This extra physical activity can help you lose weight and keep it off   Follow up with your healthcare provider as directed: You may need to meet with a dietitian  Write down your questions so you remember to ask them during your visits  © 2017 [address]  Information is for End User's use only and may not be sold, redistributed or otherwise used for commercial purposes  All illustrations and images included in CareNotes® are the copyrighted property of A D A M , Inc  or Terrence Santana  The above information is an  only  It is not intended as medical advice for individual conditions or treatments  Talk to your doctor, nurse or pharmacist before following any medical regimen to see if it is safe and effective for you  Urinary Incontinence   WHAT YOU NEED TO KNOW:   What is urinary incontinence? Urinary incontinence (UI) is when you lose control of your bladder  What causes UI? UI occurs because your bladder cannot store or empty urine properly  The following are the most common types of UI:  · Stress incontinence  is when you leak urine due to increased bladder pressure  This may happen when you cough, sneeze, or exercise  · Urge incontinence  is when you feel the need to urinate right away and leak urine accidentally  · Mixed incontinence  is when you have both stress and urge UI  What are the signs and symptoms of UI?   · You feel like your bladder does not empty completely when you urinate  · You urinate often and need to urinate immediately  · You leak urine when you sleep, or you wake up with the urge to urinate  · You leak urine when you cough, sneeze, exercise, or laugh  How is UI diagnosed? Your healthcare provider will ask how often you leak urine and whether you have stress or urge symptoms  Tell him which medicines you take, how often you urinate, and how much liquid you drink each day  You may need any of the following tests:  · Urine tests  may show infection or kidney function      · A pelvic exam may be done to check for blockages  A pelvic exam will also show if your bladder, uterus, or other organs have moved out of place  · An x-ray, ultrasound, or CT  may show problems with parts of your urinary system  You may be given contrast liquid to help your organs show up better in the pictures  Tell the healthcare provider if you have ever had an allergic reaction to contrast liquid  Do not enter the MRI room with anything metal  Metal can cause serious injury  Tell the healthcare provider if you have any metal in or on your body  · A bladder scan  will show how much urine is left in your bladder after you urinate  You will be asked to urinate and then healthcare providers will use a small ultrasound machine to check the urine left in your bladder  · Cystometry  is used to check the function of your urinary system  Your healthcare provider checks the pressure in your bladder while filling it with fluid  Your bladder pressure may also be tested when your bladder is full and while you urinate  How is UI treated? · Medicines  can help strengthen your bladder control  · Electrical stimulation  is used to send a small amount of electrical energy to your pelvic floor muscles  This helps control your bladder function  Electrodes may be placed outside your body or in your rectum  For women, the electrodes may be placed in the vagina  · A bulking agent  may be injected into the wall of your urethra to make it thicker  This helps keep your urethra closed and decreases urine leakage  · Devices  such as a clamp, pessary, or tampon may help stop urine leaks  Ask your healthcare provider for more information about these and other devices  · Surgery  may be needed if other treatments do not work  Several types of surgery can help improve your bladder control  Ask your healthcare provider for more information about the surgery you may need  How can I manage my symptoms?    · Do pelvic muscle exercises often  Your pelvic muscles help you stop urinating  Squeeze these muscles tight for 5 seconds, then relax for 5 seconds  Gradually work up to squeezing for 10 seconds  Do 3 sets of 15 repetitions a day, or as directed  This will help strengthen your pelvic muscles and improve bladder control  · A catheter  may be used to help empty your bladder  A catheter is a tiny, plastic tube that is put into your bladder to drain your urine  Your healthcare provider may tell you to use a catheter to prevent your bladder from getting too full and leaking urine  · Keep a UI record  Write down how often you leak urine and how much you leak  Make a note of what you were doing when you leaked urine  · Train your bladder  Go to the bathroom at set times, such as every 2 hours, even if you do not feel the urge to go  You can also try to hold your urine when you feel the urge to go  For example, hold your urine for 5 minutes when you feel the urge to go  As that becomes easier, hold your urine for 10 minutes  · Drink liquids as directed  Ask your healthcare provider how much liquid to drink each day and which liquids are best for you  You may need to limit the amount of liquid you drink to help control your urine leakage  Limit or do not have drinks that contain caffeine or alcohol  Do not drink any liquid right before you go to bed  · Prevent constipation  Eat a variety of high-fiber foods  Good examples are high-fiber cereals, beans, vegetables, and whole-grain breads  Prune juice may help make your bowel movement softer  Walking is the best way to trigger your intestines to have a bowel movement  · Exercise regularly and maintain a healthy weight  Ask your healthcare provider how much you should weigh and about the best exercise plan for you  Weight loss and exercise will decrease pressure on your bladder and help you control your leakage   Ask him to help you create a weight loss plan if you are overweight  When should I seek immediate care? · You have severe pain  · You are confused or cannot think clearly  When should I contact my healthcare provider? · You have a fever  · You see blood in your urine  · You have pain when you urinate  · You have new or worse pain, even after treatment  · Your mouth feels dry or you have vision changes  · Your urine is cloudy or smells bad  · You have questions or concerns about your condition or care  CARE AGREEMENT:   You have the right to help plan your care  Learn about your health condition and how it may be treated  Discuss treatment options with your caregivers to decide what care you want to receive  You always have the right to refuse treatment  The above information is an  only  It is not intended as medical advice for individual conditions or treatments  Talk to your doctor, nurse or pharmacist before following any medical regimen to see if it is safe and effective for you  © 2017 [address] [employer] Information is for End User's use only and may not be sold, redistributed or otherwise used for commercial purposes  All illustrations and images included in CareNotes® are the copyrighted property of A D A M , Inc  or Terrence Esther  Cigarette Smoking and Your Health   AMBULATORY CARE:   Risks to your health if you smoke:  Nicotine and other chemicals found in tobacco damage every cell in your body  Even if you are a light smoker, you have an increased risk for cancer, heart disease, and lung disease  If you are pregnant or have diabetes, smoking increases your risk for complications  Benefits to your health if you stop smoking:   · You decrease respiratory symptoms such as coughing, wheezing, and shortness of breath  · You reduce your risk for cancers of the lung, mouth, throat, kidney, bladder, pancreas, stomach, and cervix  If you already have cancer, you increase the benefits of chemotherapy  You also reduce your risk for cancer returning or a second cancer from developing  · You reduce your risk for heart disease, blood clots, heart attack, and stroke  · You reduce your risk for lung infections, and diseases such as pneumonia, asthma, chronic bronchitis, and emphysema  · Your circulation improves  More oxygen can be delivered to your body  If you have diabetes, you lower your risk for complications, such as kidney, artery, and eye diseases  You also lower your risk for nerve damage  Nerve damage can lead to amputations, poor vision, and blindness  · You improve your body's ability to heal and to fight infections  Benefits to the health of others if you stop smoking:  Tobacco is harmful to nonsmokers who breathe in your secondhand smoke  The following are ways the health of others around you may improve when you stop smoking:  · You lower the risks for lung cancer and heart disease in nonsmoking adults  · If you are pregnant, you lower the risk for miscarriage, early delivery, low birth weight, and stillbirth  You also lower your baby's risk for SIDS, obesity, developmental delay, and neurobehavioral problems, such as ADHD  · If you have children, you lower their risk for ear infections, colds, pneumonia, bronchitis, and asthma  For more information and support to stop smoking:   · Smokefree  gov  Phone: 6- 941 - 441-0686  Web Address: www [employer]  Follow up with your healthcare provider as directed:  Write down your questions so you remember to ask them during your visits  © 2017 [address] [place] Information is for End User's use only and may not be sold, redistributed or otherwise used for commercial purposes  All illustrations and images included in CareNotes® are the copyrighted property of A D A [employer]  or Terrence Santana  The above information is an  only   It is not intended as medical advice for individual conditions or treatments  Talk to your doctor, nurse or pharmacist before following any medical regimen to see if it is safe and effective for you  Fall Prevention   AMBULATORY CARE:   Fall prevention  includes ways to make your home and other areas safer  It also includes ways you can move more carefully to prevent a fall  Health conditions that cause changes in your blood pressure, vision, or muscle strength and coordination may increase your risk for falls  Medicines may also increase your risk for falls if they make you dizzy, weak, or sleepy  Call 911 or have someone else call if:   · You have fallen and are unconscious  · You have fallen and cannot move part of your body  Contact your healthcare provider if:   · You have fallen and have pain or a headache  · You have questions or concerns about your condition or care  Fall prevention tips:   · Stand or sit up slowly  This may help you keep your balance and prevent falls  · Use assistive devices as directed  Your healthcare provider may suggest that you use a cane or walker to help you keep your balance  You may need to have grab bars put in your bathroom near the toilet or in the shower  · Wear shoes that fit well and have soles that   Wear shoes both inside and outside  Use slippers with good   Do not wear shoes with high heels  · Wear a personal alarm  This is a device that allows you to call 911 if you fall and need help  Ask your healthcare provider for more information  · Stay active  Exercise can help strengthen your muscles and improve your balance  Your healthcare provider may recommend water aerobics or walking  He or she may also recommend physical therapy to improve your coordination  Never start an exercise program without talking to your healthcare provider first      · Manage your medical conditions  Keep all appointments with your healthcare providers  Visit your eye doctor as directed         Home safety tips:   · Add items to prevent falls in the bathroom  Put nonslip strips on your bath or shower floor to prevent you from slipping  Use a bath mat if you do not have carpet in the bathroom  This will prevent you from falling when you step out of the bath or shower  Use a shower seat so you do not need to stand while you shower  Sit on the toilet or a chair in your bathroom to dry yourself and put on clothing  This will prevent you from losing your balance from drying or dressing yourself while you are standing  · Keep paths clear  Remove books, shoes, and other objects from walkways and stairs  Place cords for telephones and lamps out of the way so that you do not need to walk over them  Tape them down if you cannot move them  Remove small rugs  If you cannot remove a rug, secure it with double-sided tape  This will prevent you from tripping  · Install bright lights in your home  Use night lights to help light paths to the bathroom or kitchen  Always turn on the light before you start walking  · Keep items you use often on shelves within reach  Do not use a step stool to help you reach an item  · Paint or place reflective tape on the edges of your stairs  This will help you see the stairs better  Follow up with your healthcare provider as directed:  Write down your questions so you remember to ask them during your visits  © 2017 [address] [employer] Information is for End User's use only and may not be sold, redistributed or otherwise used for commercial purposes  All illustrations and images included in CareNotes® are the copyrighted property of A D A M , Inc  or Terrence Santana  The above information is an  only  It is not intended as medical advice for individual conditions or treatments  Talk to your doctor, nurse or pharmacist before following any medical regimen to see if it is safe and effective for you    Advance Directives   WHAT YOU NEED TO KNOW:   What are advance directives? Advance directives are legal documents that state your wishes and plans for medical care  These plans are made ahead of time in case you lose your ability to make decisions for yourself  Advance directives can apply to any medical decision, such as the treatments you want, and if you want to donate organs  What are the types of advance directives? There are many types of advance directives, and each state has rules about how to use them  You may choose a combination of any of the following:  · Living will: This is a written record of the treatment you want  You can also choose which treatments you do not want, which to limit, and which to stop at a certain time  This includes surgery, medicine, IV fluid, and tube feedings  · Durable power of  for healthcare [facility]): This is a written record that states who you want to make healthcare choices for you when you are unable to make them for yourself  This person, called a proxy, is usually a family member or a friend  You may choose more than 1 proxy  · Do not resuscitate (DNR) order:  A DNR order is used in case your heart stops beating or you stop breathing  It is a request not to have certain forms of treatment, such as CPR  A DNR order may be included in other types of advance directives  · Medical directive: This covers the care that you want if you are in a coma, near death, or unable to make decisions for yourself  You can list the treatments you want for each condition  Treatment may include pain medicine, surgery, blood transfusions, dialysis, IV or tube feedings, and a ventilator (breathing machine)  · Values history: This document has questions about your views, beliefs, and how you feel and think about life  This information can help others choose the care that you would choose  Why are advance directives important? An advance directive helps you control your care   Although spoken wishes may be used, it is better to have your wishes written down  Spoken wishes can be misunderstood, or not followed  Treatments may be given even if you do not want them  An advance directive may make it easier for your family to make difficult choices about your care  How do I decide what to put in my advance directives? · Make informed decisions:  Make sure you fully understand treatments or care you may receive  Think about the benefits and problems your decisions could cause for you or your family  Talk to healthcare providers if you have concerns or questions before you write down your wishes  You may also want to talk with your Confucianist or , or a   Check your state laws to make sure that what you put in your advance directive is legal      · Sign all forms:  Sign and date your advance directive when you have finished  You may also need 2 witnesses to sign the forms  Witnesses cannot be your doctor or his staff, your spouse, heirs or beneficiaries, people you owe money to, or your chosen proxy  Talk to your family, proxy, and healthcare providers about your advance directive  Give each person a copy, and keep one for yourself in a place you can get to easily  Do not keep it hidden or locked away  · Review and revise your plans: You can revise your advance directive at any time, as long as you are able to make decisions  Review your plan every year, and when there are changes in your life, or your health  When you make changes, let your family, proxy, and healthcare providers know  Give each a new copy  Where can I find more information? · [employer]  [address] , [address]  Phone: 9- 065 - [phone]  Phone: 4- 756 - [phone]  Web Address: http://www  aafp org  · [address] [facility])  [address] [address] [facility], [address] [place] [facility] , [address] [place] [address]  Phone: [phone]  Phone: [phone]  Web Address: [address]   CARE AGREEMENT:   You have the right to help plan your care  To help with this plan, you must learn about your health condition and treatment options  You must also learn about advance directives and how they are used  Work with your healthcare providers to decide what care will be used to treat you  You always have the right to refuse treatment  The above information is an  only  It is not intended as medical advice for individual conditions or treatments  Talk to your doctor, nurse or pharmacist before following any medical regimen to see if it is safe and effective for you  © 2017 [address] [facility] Information is for End User's use only and may not be sold, redistributed or otherwise used for commercial purposes  All illustrations and images included in CareNotes® are the copyrighted property of [employer]  or Terrence Santana  Chief Complaint     Chief Complaint   Patient presents with    Medicare Wellness Visit     Subs    Follow-up     ER f/u          History of Present Illness       Patient here to follow-up from emergency room visit  Diagnosed with urinary tract infection by culture  Had taken 5 days of Macrodantin  Urinary track infection symptoms have resolved  Still having some mild confusion  Also has unsteadiness which has been getting worse  She did see Urology for evaluation and, did not think there much help  Will check urine sample today  Hypertension-good control current meds  Peripheral neuropathy-this has been slowly worsening  I am sure this is adding to some of her gait disturbance since  Venous insufficiency-very minimal edema today  Macular degeneration-this is age related sees ophthalmologist       Review of Systems   Review of Systems   Respiratory: Negative for cough, chest tightness and shortness of breath  Cardiovascular: Negative for chest pain, palpitations and leg swelling     Gastrointestinal: Negative for abdominal pain, blood in stool, diarrhea and nausea  Genitourinary: Negative for difficulty urinating, dysuria, flank pain, frequency, hematuria and urgency  Psychiatric/Behavioral: Positive for confusion  Negative for behavioral problems and dysphoric mood  The patient is not nervous/anxious            Current Medications       Current Outpatient Medications:     aspirin (ECOTRIN LOW STRENGTH) 81 mg EC tablet, Take 1 tablet (81 mg total) by mouth daily, Disp: , Rfl: 0    Cholecalciferol (VITAMIN D3) 1000 units CAPS, Take 1,000 Units by mouth daily , Disp: , Rfl:     Cyanocobalamin (VITAMIN B12 PO), Take by mouth, Disp: , Rfl:     gabapentin (NEURONTIN) 600 MG tablet, Take 1 tablet (600 mg total) by mouth 2 (two) times a day, Disp: 180 tablet, Rfl: 1    hydrochlorothiazide (HYDRODIURIL) 12 5 mg tablet, Take 1 tablet (12 5 mg total) by mouth daily, Disp: 90 tablet, Rfl: 1    Multiple Vitamins-Minerals (PRESERVISION AREDS PO), Take by mouth 2 (two) times a day, Disp: , Rfl:     omeprazole (PriLOSEC) 20 mg delayed release capsule, Take 1 capsule (20 mg total) by mouth daily, Disp: 90 capsule, Rfl: 1    pravastatin (PRAVACHOL) 20 mg tablet, Take 1 tablet (20 mg total) by mouth daily, Disp: 90 tablet, Rfl: 1    sertraline (ZOLOFT) 25 mg tablet, Take 1 tablet (25 mg total) by mouth daily, Disp: 30 tablet, Rfl: 5    Current Allergies     Allergies as of 07/01/2019 - Reviewed 07/01/2019   Allergen Reaction Noted    Augmentin [amoxicillin-pot clavulanate] GI Intolerance 04/15/2019            The following portions of the patient's history were reviewed and updated as appropriate: allergies, current medications, past family history, past medical history, past social history, past surgical history and problem list      Past Medical History:   Diagnosis Date    Balance disorder     GERD (gastroesophageal reflux disease)     Hard of hearing     Hyperlipidemia     Hypertension     Neuropathy     Pneumonia  Tinnitus     UTI (urinary tract infection)        Past Surgical History:   Procedure Laterality Date    EYE SURGERY      HYSTERECTOMY         Family History   Problem Relation Age of Onset    Stroke Mother     Stroke Father     Heart disease Daughter     Substance Abuse Neg Hx     Mental illness Neg Hx          Medications have been verified  Objective   /84   Pulse 77   Temp 98 7 °F (37 1 °C)   Ht 5' 3" (1 6 m)   Wt 74 4 kg (164 lb)   SpO2 98%   BMI 29 05 kg/m²        Physical Exam     Physical Exam   Constitutional: She appears well-developed and well-nourished  No distress  HENT:   Head: Normocephalic and atraumatic  Right Ear: Tympanic membrane and external ear normal  No drainage  Left Ear: Tympanic membrane normal  No drainage  Mouth/Throat: No oropharyngeal exudate  Eyes: Conjunctivae and EOM are normal  Right eye exhibits no discharge  Left eye exhibits no discharge  Neck: Normal range of motion  Neck supple  No thyromegaly present  Cardiovascular: Normal rate, regular rhythm and normal heart sounds  Pulmonary/Chest: Effort normal  No respiratory distress  She has no wheezes  She has no rales  Abdominal: Soft  She exhibits no distension  There is no tenderness  Lymphadenopathy:     She has no cervical adenopathy  Skin: Skin is warm and dry  Psychiatric: She has a normal mood and affect   Her behavior is normal

## 2019-07-01 NOTE — PATIENT INSTRUCTIONS
Obesity   AMBULATORY CARE:   Obesity  is when your body mass index (BMI) is greater than 30  Your healthcare provider will use your height and weight to measure your BMI  The risks of obesity include  many health problems, such as injuries or physical disability  You may need tests to check for the following:  · Diabetes     · High blood pressure or high cholesterol     · Heart disease     · Gallbladder or liver disease     · Cancer of the colon, breast, prostate, liver, or kidney     · Sleep apnea     · Arthritis or gout  Seek care immediately if:   · You have a severe headache, confusion, or difficulty speaking  · You have weakness on one side of your body  · You have chest pain, sweating, or shortness of breath  Contact your healthcare provider if:   · You have symptoms of gallbladder or liver disease, such as pain in your upper abdomen  · You have knee or hip pain and discomfort while walking  · You have symptoms of diabetes, such as intense hunger and thirst, and frequent urination  · You have symptoms of sleep apnea, such as snoring or daytime sleepiness  · You have questions or concerns about your condition or care  Treatment for obesity  focuses on helping you lose weight to improve your health  Even a small decrease in BMI can reduce the risk for many health problems  Your healthcare provider will help you set a weight-loss goal   · Lifestyle changes  are the first step in treating obesity  These include making healthy food choices and getting regular physical activity  Your healthcare provider may suggest a weight-loss program that involves coaching, education, and therapy  · Medicine  may help you lose weight when it is used with a healthy diet and physical activity  · Surgery  can help you lose weight if you are very obese and have other health problems  There are several types of weight-loss surgery  Ask your healthcare provider for more information    Be successful losing weight:   · Set small, realistic goals  An example of a small goal is to walk for 20 minutes 5 days a week  Anther goal is to lose 5% of your body weight  · Tell friends, family members, and coworkers about your goals  and ask for their support  Ask a friend to lose weight with you, or join a weight-loss support group  · Identify foods or triggers that may cause you to overeat , and find ways to avoid them  Remove tempting high-calorie foods from your home and workplace  Place a bowl of fresh fruit on your kitchen counter  If stress causes you to eat, then find other ways to cope with stress  · Keep a diary to track what you eat and drink  Also write down how many minutes of physical activity you do each day  Weigh yourself once a week and record it in your diary  Eating changes: You will need to eat 500 to 1,000 fewer calories each day than you currently eat to lose 1 to 2 pounds a week  The following changes will help you cut calories:  · Eat smaller portions  Use small plates, no larger than 9 inches in diameter  Fill your plate half full of fruits and vegetables  Measure your food using measuring cups until you know what a serving size looks like  · Eat 3 meals and 1 or 2 snacks each day  Plan your meals in advance  [facility] and eat at home most of the time  Eat slowly  · Eat fruits and vegetables at every meal   They are low in calories and high in fiber, which makes you feel full  Do not add butter, margarine, or cream sauce to vegetables  Use herbs to season steamed vegetables  · Eat less fat and fewer fried foods  Eat more baked or grilled chicken and fish  These protein sources are lower in calories and fat than red meat  Limit fast food  Dress your salads with olive oil and vinegar instead of bottled dressing  · Limit the amount of sugar you eat  Do not drink sugary beverages  Limit alcohol  Activity changes:  Physical activity is good for your body in many ways   It helps you burn calories and build strong muscles  It decreases stress and depression, and improves your mood  It can also help you sleep better  Talk to your healthcare provider before you begin an exercise program   · Exercise for at least 30 minutes 5 days a week  Start slowly  Set aside time each day for physical activity that you enjoy and that is convenient for you  It is best to do both weight training and an activity that increases your heart rate, such as walking, bicycling, or swimming  · Find ways to be more active  Do yard work and housecleaning  Walk up the stairs instead of using elevators  Spend your leisure time going to events that require walking, such as outdoor festivals or fairs  This extra physical activity can help you lose weight and keep it off  Follow up with your healthcare provider as directed: You may need to meet with a dietitian  Write down your questions so you remember to ask them during your visits  © 2017 [address] [employer] Information is for End User's use only and may not be sold, redistributed or otherwise used for commercial purposes  All illustrations and images included in CareNotes® are the copyrighted property of [employer]  or Terrence Santana  The above information is an  only  It is not intended as medical advice for individual conditions or treatments  Talk to your doctor, nurse or pharmacist before following any medical regimen to see if it is safe and effective for you  Urinary Incontinence   WHAT YOU NEED TO KNOW:   What is urinary incontinence? Urinary incontinence (UI) is when you lose control of your bladder  What causes UI? UI occurs because your bladder cannot store or empty urine properly  The following are the most common types of UI:  · Stress incontinence  is when you leak urine due to increased bladder pressure  This may happen when you cough, sneeze, or exercise       · Urge incontinence  is when you feel the need to urinate right away and leak urine accidentally  · Mixed incontinence  is when you have both stress and urge UI  What are the signs and symptoms of UI?   · You feel like your bladder does not empty completely when you urinate  · You urinate often and need to urinate immediately  · You leak urine when you sleep, or you wake up with the urge to urinate  · You leak urine when you cough, sneeze, exercise, or laugh  How is UI diagnosed? Your healthcare provider will ask how often you leak urine and whether you have stress or urge symptoms  Tell him which medicines you take, how often you urinate, and how much liquid you drink each day  You may need any of the following tests:  · Urine tests  may show infection or kidney function  · A pelvic exam  may be done to check for blockages  A pelvic exam will also show if your bladder, uterus, or other organs have moved out of place  · An x-ray, ultrasound, or CT  may show problems with parts of your urinary system  You may be given contrast liquid to help your organs show up better in the pictures  Tell the healthcare provider if you have ever had an allergic reaction to contrast liquid  Do not enter the MRI room with anything metal  Metal can cause serious injury  Tell the healthcare provider if you have any metal in or on your body  · A bladder scan  will show how much urine is left in your bladder after you urinate  You will be asked to urinate and then healthcare providers will use a small ultrasound machine to check the urine left in your bladder  · Cystometry  is used to check the function of your urinary system  Your healthcare provider checks the pressure in your bladder while filling it with fluid  Your bladder pressure may also be tested when your bladder is full and while you urinate  How is UI treated? · Medicines  can help strengthen your bladder control      · Electrical stimulation  is used to send a small amount of electrical energy to your pelvic floor muscles  This helps control your bladder function  Electrodes may be placed outside your body or in your rectum  For women, the electrodes may be placed in the vagina  · A bulking agent  may be injected into the wall of your urethra to make it thicker  This helps keep your urethra closed and decreases urine leakage  · Devices  such as a clamp, pessary, or tampon may help stop urine leaks  Ask your healthcare provider for more information about these and other devices  · Surgery  may be needed if other treatments do not work  Several types of surgery can help improve your bladder control  Ask your healthcare provider for more information about the surgery you may need  How can I manage my symptoms? · Do pelvic muscle exercises often  Your pelvic muscles help you stop urinating  Squeeze these muscles tight for 5 seconds, then relax for 5 seconds  Gradually work up to squeezing for 10 seconds  Do 3 sets of 15 repetitions a day, or as directed  This will help strengthen your pelvic muscles and improve bladder control  · A catheter  may be used to help empty your bladder  A catheter is a tiny, plastic tube that is put into your bladder to drain your urine  Your healthcare provider may tell you to use a catheter to prevent your bladder from getting too full and leaking urine  · Keep a UI record  Write down how often you leak urine and how much you leak  Make a note of what you were doing when you leaked urine  · Train your bladder  Go to the bathroom at set times, such as every 2 hours, even if you do not feel the urge to go  You can also try to hold your urine when you feel the urge to go  For example, hold your urine for 5 minutes when you feel the urge to go  As that becomes easier, hold your urine for 10 minutes  · Drink liquids as directed  Ask your healthcare provider how much liquid to drink each day and which liquids are best for you   You may need to limit the amount of liquid you drink to help control your urine leakage  Limit or do not have drinks that contain caffeine or alcohol  Do not drink any liquid right before you go to bed  · Prevent constipation  Eat a variety of high-fiber foods  Good examples are high-fiber cereals, beans, vegetables, and whole-grain breads  Prune juice may help make your bowel movement softer  Walking is the best way to trigger your intestines to have a bowel movement  · Exercise regularly and maintain a healthy weight  Ask your healthcare provider how much you should weigh and about the best exercise plan for you  Weight loss and exercise will decrease pressure on your bladder and help you control your leakage  Ask him to help you create a weight loss plan if you are overweight  When should I seek immediate care? · You have severe pain  · You are confused or cannot think clearly  When should I contact my healthcare provider? · You have a fever  · You see blood in your urine  · You have pain when you urinate  · You have new or worse pain, even after treatment  · Your mouth feels dry or you have vision changes  · Your urine is cloudy or smells bad  · You have questions or concerns about your condition or care  CARE AGREEMENT:   You have the right to help plan your care  Learn about your health condition and how it may be treated  Discuss treatment options with your caregivers to decide what care you want to receive  You always have the right to refuse treatment  The above information is an  only  It is not intended as medical advice for individual conditions or treatments  Talk to your doctor, nurse or pharmacist before following any medical regimen to see if it is safe and effective for you  © 2017 [address] Information is for End User's use only and may not be sold, redistributed or otherwise used for commercial purposes   All illustrations and images included in CareNotes® are the copyrighted property of [employer]  or Terrence Santana  Cigarette Smoking and Your Health   AMBULATORY CARE:   Risks to your health if you smoke:  Nicotine and other chemicals found in tobacco damage every cell in your body  Even if you are a light smoker, you have an increased risk for cancer, heart disease, and lung disease  If you are pregnant or have diabetes, smoking increases your risk for complications  Benefits to your health if you stop smoking:   · You decrease respiratory symptoms such as coughing, wheezing, and shortness of breath  · You reduce your risk for cancers of the lung, mouth, throat, kidney, bladder, pancreas, stomach, and cervix  If you already have cancer, you increase the benefits of chemotherapy  You also reduce your risk for cancer returning or a second cancer from developing  · You reduce your risk for heart disease, blood clots, heart attack, and stroke  · You reduce your risk for lung infections, and diseases such as pneumonia, asthma, chronic bronchitis, and emphysema  · Your circulation improves  More oxygen can be delivered to your body  If you have diabetes, you lower your risk for complications, such as kidney, artery, and eye diseases  You also lower your risk for nerve damage  Nerve damage can lead to amputations, poor vision, and blindness  · You improve your body's ability to heal and to fight infections  Benefits to the health of others if you stop smoking:  Tobacco is harmful to nonsmokers who breathe in your secondhand smoke  The following are ways the health of others around you may improve when you stop smoking:  · You lower the risks for lung cancer and heart disease in nonsmoking adults  · If you are pregnant, you lower the risk for miscarriage, early delivery, low birth weight, and stillbirth  You also lower your baby's risk for SIDS, obesity, developmental delay, and neurobehavioral problems, such as ADHD  · If you have children, you lower their risk for ear infections, colds, pneumonia, bronchitis, and asthma  For more information and support to stop smoking:   · [employer]  [employer]  Phone: 2- 246 - 783-0616  Web Address: www smokefree  gov  Follow up with your healthcare provider as directed:  Write down your questions so you remember to ask them during your visits  © 2017 [address] [employer] Information is for End User's use only and may not be sold, redistributed or otherwise used for commercial purposes  All illustrations and images included in CareNotes® are the copyrighted property of [employer]  or Terrence Santana  The above information is an  only  It is not intended as medical advice for individual conditions or treatments  Talk to your doctor, nurse or pharmacist before following any medical regimen to see if it is safe and effective for you  Fall Prevention   AMBULATORY CARE:   Fall prevention  includes ways to make your home and other areas safer  It also includes ways you can move more carefully to prevent a fall  Health conditions that cause changes in your blood pressure, vision, or muscle strength and coordination may increase your risk for falls  Medicines may also increase your risk for falls if they make you dizzy, weak, or sleepy  Call 911 or have someone else call if:   · You have fallen and are unconscious  · You have fallen and cannot move part of your body  Contact your healthcare provider if:   · You have fallen and have pain or a headache  · You have questions or concerns about your condition or care  Fall prevention tips:   · Stand or sit up slowly  This may help you keep your balance and prevent falls  · Use assistive devices as directed  Your healthcare provider may suggest that you use a cane or walker to help you keep your balance  You may need to have grab bars put in your bathroom near the toilet or in the shower      · Wear shoes that fit well and have soles that   Wear shoes both inside and outside  Use slippers with good   Do not wear shoes with high heels  · Wear a personal alarm  This is a device that allows you to call 911 if you fall and need help  Ask your healthcare provider for more information  · Stay active  Exercise can help strengthen your muscles and improve your balance  Your healthcare provider may recommend water aerobics or walking  He or she may also recommend physical therapy to improve your coordination  Never start an exercise program without talking to your healthcare provider first      · Manage your medical conditions  Keep all appointments with your healthcare providers  Visit your eye doctor as directed  Home safety tips:   · Add items to prevent falls in the bathroom  Put nonslip strips on your bath or shower floor to prevent you from slipping  Use a bath mat if you do not have carpet in the bathroom  This will prevent you from falling when you step out of the bath or shower  Use a shower seat so you do not need to stand while you shower  Sit on the toilet or a chair in your bathroom to dry yourself and put on clothing  This will prevent you from losing your balance from drying or dressing yourself while you are standing  · Keep paths clear  Remove books, shoes, and other objects from walkways and stairs  Place cords for telephones and lamps out of the way so that you do not need to walk over them  Tape them down if you cannot move them  Remove small rugs  If you cannot remove a rug, secure it with double-sided tape  This will prevent you from tripping  · Install bright lights in your home  Use night lights to help light paths to the bathroom or kitchen  Always turn on the light before you start walking  · Keep items you use often on shelves within reach  Do not use a step stool to help you reach an item  · Paint or place reflective tape on the edges of your stairs    This will help you see the stairs better  Follow up with your healthcare provider as directed:  Write down your questions so you remember to ask them during your visits  © 2017 [address] [employer] Information is for End User's use only and may not be sold, redistributed or otherwise used for commercial purposes  All illustrations and images included in CareNotes® are the copyrighted property of [employer]  or Terrence Santana  The above information is an  only  It is not intended as medical advice for individual conditions or treatments  Talk to your doctor, nurse or pharmacist before following any medical regimen to see if it is safe and effective for you  Advance Directives   WHAT YOU NEED TO KNOW:   What are advance directives? Advance directives are legal documents that state your wishes and plans for medical care  These plans are made ahead of time in case you lose your ability to make decisions for yourself  Advance directives can apply to any medical decision, such as the treatments you want, and if you want to donate organs  What are the types of advance directives? There are many types of advance directives, and each state has rules about how to use them  You may choose a combination of any of the following:  · Living will: This is a written record of the treatment you want  You can also choose which treatments you do not want, which to limit, and which to stop at a certain time  This includes surgery, medicine, IV fluid, and tube feedings  · Durable power of  for healthcare [facility]): This is a written record that states who you want to make healthcare choices for you when you are unable to make them for yourself  This person, called a proxy, is usually a family member or a friend  You may choose more than 1 proxy  · Do not resuscitate (DNR) order:  A DNR order is used in case your heart stops beating or you stop breathing   It is a request not to have certain forms of treatment, such as CPR  A DNR order may be included in other types of advance directives  · Medical directive: This covers the care that you want if you are in a coma, near death, or unable to make decisions for yourself  You can list the treatments you want for each condition  Treatment may include pain medicine, surgery, blood transfusions, dialysis, IV or tube feedings, and a ventilator (breathing machine)  · Values history: This document has questions about your views, beliefs, and how you feel and think about life  This information can help others choose the care that you would choose  Why are advance directives important? An advance directive helps you control your care  Although spoken wishes may be used, it is better to have your wishes written down  Spoken wishes can be misunderstood, or not followed  Treatments may be given even if you do not want them  An advance directive may make it easier for your family to make difficult choices about your care  How do I decide what to put in my advance directives? · Make informed decisions:  Make sure you fully understand treatments or care you may receive  Think about the benefits and problems your decisions could cause for you or your family  Talk to healthcare providers if you have concerns or questions before you write down your wishes  You may also want to talk with your Protestant or , or a   Check your state laws to make sure that what you put in your advance directive is legal      · Sign all forms:  Sign and date your advance directive when you have finished  You may also need 2 witnesses to sign the forms  Witnesses cannot be your doctor or his staff, your spouse, heirs or beneficiaries, people you owe money to, or your chosen proxy  Talk to your family, proxy, and healthcare providers about your advance directive  Give each person a copy, and keep one for yourself in a place you can get to easily   Do not keep it hidden or locked away  · Review and revise your plans: You can revise your advance directive at any time, as long as you are able to make decisions  Review your plan every year, and when there are changes in your life, or your health  When you make changes, let your family, proxy, and healthcare providers know  Give each a new copy  Where can I find more information? · [employer]  [address] [place] , [address]  Phone: 5- 371 - [phone]  Phone: 0- 151 - [phone]  Web Address: http://www  aafp org  · [address] [facility])  [address] [address] [facility], [address] [facility] , [address] [place] [address]  Phone: [phone]  Phone: [phone]  Web Address: Nacho gonzales  CARE AGREEMENT:   You have the right to help plan your care  To help with this plan, you must learn about your health condition and treatment options  You must also learn about advance directives and how they are used  Work with your healthcare providers to decide what care will be used to treat you  You always have the right to refuse treatment  The above information is an  only  It is not intended as medical advice for individual conditions or treatments  Talk to your doctor, nurse or pharmacist before following any medical regimen to see if it is safe and effective for you  © 2017 [address] Information is for End User's use only and may not be sold, redistributed or otherwise used for commercial purposes  All illustrations and images included in CareNotes® are the copyrighted property of [employer]  or Terrence Santana

## 2019-07-01 NOTE — PROGRESS NOTES
Assessment and Plan:     Problem List Items Addressed This Visit     None         History of Present Illness:     Patient presents for Medicare Annual Wellness visit    Patient Care Team:  Shawn Kamara MD as PCP - General     Problem List:     Patient Active Problem List   Diagnosis    Essential hypertension    Idiopathic peripheral neuropathy    GERD (gastroesophageal reflux disease)    Vitamin B 12 deficiency    Fall    UTI (urinary tract infection)    Arthropathy    Mixed hyperlipidemia    Venous insufficiency      Past Medical and Surgical History:     Past Medical History:   Diagnosis Date    Balance disorder     GERD (gastroesophageal reflux disease)     Hard of hearing     Hyperlipidemia     Hypertension     Neuropathy     Pneumonia     Tinnitus     UTI (urinary tract infection)      Past Surgical History:   Procedure Laterality Date    EYE SURGERY      HYSTERECTOMY        Family History:     Family History   Problem Relation Age of Onset    Stroke Mother     Stroke Father     Heart disease Daughter     Substance Abuse Neg Hx     Mental illness Neg Hx       Social History:     Social History     Tobacco Use   Smoking Status Never Smoker   Smokeless Tobacco Never Used     Social History     Substance and Sexual Activity   Alcohol Use Yes    Comment: rarely     Social History     Substance and Sexual Activity   Drug Use No      Medications and Allergies:     Current Outpatient Medications   Medication Sig Dispense Refill    aspirin (ECOTRIN LOW STRENGTH) 81 mg EC tablet Take 1 tablet (81 mg total) by mouth daily  0    Cholecalciferol (VITAMIN D3) 1000 units CAPS Take 1,000 Units by mouth daily       Cyanocobalamin (VITAMIN B12 PO) Take by mouth      estradiol (ESTRACE) 0 1 mg/g vaginal cream Insert 1 g into the vagina 3 (three) times a week 42 5 g 1    gabapentin (NEURONTIN) 800 mg tablet Take 1 tablet (800 mg total) by mouth 2 (two) times a day 180 tablet 1    hydrochlorothiazide (HYDRODIURIL) 12 5 mg tablet Take 0 5 tablets (6 25 mg total) by mouth daily 45 tablet 1    Multiple Vitamins-Minerals (PRESERVISION AREDS PO) Take by mouth 2 (two) times a day      omeprazole (PriLOSEC) 20 mg delayed release capsule Take 1 capsule (20 mg total) by mouth daily 90 capsule 1    pravastatin (PRAVACHOL) 20 mg tablet Take 1 tablet (20 mg total) by mouth daily 90 tablet 1     No current facility-administered medications for this visit  Allergies   Allergen Reactions    Augmentin [Amoxicillin-Pot Clavulanate] GI Intolerance      Immunizations:     Immunization History   Administered Date(s) Administered    Influenza Split High Dose Preservative Free IM 09/09/2016    Influenza, high dose seasonal 0 5 mL 11/27/2018    Pneumococcal Conjugate 13-Valent 09/09/2016    Pneumococcal Polysaccharide PPV23 04/23/2013      Medicare Screening Tests and Risk Assessments:     Lee Heller is here for her Subsequent Wellness visit  Health Risk Assessment:  Patient rates overall health as fair  Patient feels that their physical health rating is Much worse  Eyesight was rated as Slightly worse  Hearing was rated as Slightly worse  Patient feels that their emotional and mental health rating is Slightly worse  Pain experienced by patient in the last 7 days has been Some  Patient's pain rating has been 7/10  Patient states that she has experienced no weight loss or gain in last 6 months  Emotional/Mental Health:  Patient has been feeling nervous/anxious  PHQ-9 Depression Screening:    Frequency of the following problems over the past two weeks:      1  Little interest or pleasure in doing things: 1 - several days      2  Feeling down, depressed, or hopeless: 1 - several days  PHQ-2 Score: 2          Broken Bones/Falls:     Fall Risk Assessment:    In the past year, patient has experienced: No history of falling in past year          Bladder/Bowel:  Patient has leaked urine accidently in the last six months  Patient reports no loss of bowel control  Immunizations:  Patient has had a flu vaccination within the last year  Patient has received a pneumonia shot  Patient has not received a shingles shot  Patient has not received tetanus/diphtheria shot  Home Safety:  Patient has trouble with stairs inside or outside of their home  Patient currently reports that there are no safety hazards present in home, working smoke alarms, working carbon monoxide detectors  Nutrition:  Current diet: Regular and No Added Salt with servings of the following:    Medications:  Patient is currently taking over-the-counter supplements  Patient is able to manage medications  Lifestyle Choices:  Patient reports no tobacco use  Patient has not smoked or used tobacco in the past   Patient does not drive a vehicle  Patient wears seat belt  Activities of Daily Living:  Can get out of bed by his or her self, able to dress self, able to make own meals, unable to do own shopping, able to bathe self, can do own laundry/housekeeping, can manage own money, pay bills and track expenses    Previous Hospitalizations:  Hospitalization or ED visit in past 12 months  Number of hospitalizations within the last year: 1-2        Advanced Directives:  Patient has decided on a power of   Patient has spoken to designated power of   Patient has not completed advanced directive

## 2019-07-02 DIAGNOSIS — E78.2 MIXED HYPERLIPIDEMIA: ICD-10-CM

## 2019-07-02 DIAGNOSIS — K21.9 GASTROESOPHAGEAL REFLUX DISEASE, ESOPHAGITIS PRESENCE NOT SPECIFIED: ICD-10-CM

## 2019-07-02 RX ORDER — OMEPRAZOLE 20 MG/1
20 CAPSULE, DELAYED RELEASE ORAL DAILY
Qty: 90 CAPSULE | Refills: 1 | Status: CANCELLED | OUTPATIENT
Start: 2019-07-02

## 2019-07-02 RX ORDER — PRAVASTATIN SODIUM 20 MG
20 TABLET ORAL DAILY
Qty: 90 TABLET | Refills: 1 | Status: CANCELLED | OUTPATIENT
Start: 2019-07-02

## 2019-07-05 ENCOUNTER — TELEPHONE (OUTPATIENT)
Dept: FAMILY MEDICINE CLINIC | Facility: CLINIC | Age: 84
End: 2019-07-05

## 2019-07-05 ENCOUNTER — APPOINTMENT (OUTPATIENT)
Dept: LAB | Facility: HOSPITAL | Age: 84
End: 2019-07-05
Payer: MEDICARE

## 2019-07-05 DIAGNOSIS — N39.0 RECURRENT UTI (URINARY TRACT INFECTION): Primary | ICD-10-CM

## 2019-07-05 DIAGNOSIS — N30.01 ACUTE CYSTITIS WITH HEMATURIA: Primary | ICD-10-CM

## 2019-07-05 LAB
BACTERIA UR QL AUTO: ABNORMAL /HPF
BILIRUB UR QL STRIP: ABNORMAL
CLARITY UR: ABNORMAL
COLOR UR: ABNORMAL
GLUCOSE UR STRIP-MCNC: ABNORMAL MG/DL
HGB UR QL STRIP.AUTO: ABNORMAL
KETONES UR STRIP-MCNC: ABNORMAL MG/DL
LEUKOCYTE ESTERASE UR QL STRIP: ABNORMAL
NITRITE UR QL STRIP: POSITIVE
NON-SQ EPI CELLS URNS QL MICRO: ABNORMAL /HPF
OTHER STN SPEC: ABNORMAL
PH UR STRIP.AUTO: 6.5 [PH]
PROT UR STRIP-MCNC: >=300 MG/DL
RBC #/AREA URNS AUTO: ABNORMAL /HPF
SP GR UR STRIP.AUTO: 1.01 (ref 1–1.03)
UROBILINOGEN UR QL STRIP.AUTO: >=8 E.U./DL
WBC #/AREA URNS AUTO: ABNORMAL /HPF

## 2019-07-05 PROCEDURE — 87086 URINE CULTURE/COLONY COUNT: CPT

## 2019-07-05 PROCEDURE — 81001 URINALYSIS AUTO W/SCOPE: CPT

## 2019-07-05 PROCEDURE — 87077 CULTURE AEROBIC IDENTIFY: CPT

## 2019-07-05 PROCEDURE — 87186 SC STD MICRODIL/AGAR DIL: CPT

## 2019-07-05 RX ORDER — NITROFURANTOIN 25; 75 MG/1; MG/1
100 CAPSULE ORAL 2 TIMES DAILY
Qty: 14 CAPSULE | Refills: 0 | Status: SHIPPED | OUTPATIENT
Start: 2019-07-05 | End: 2019-07-12 | Stop reason: ALTCHOICE

## 2019-07-05 NOTE — TELEPHONE ENCOUNTER
C/O BURNING WHEN URINATING BEGAN LAST NIGHT WANTS MACROBID CALLED TO [id number]        ALLERGY TO AUGMENTIN

## 2019-07-06 ENCOUNTER — TELEPHONE (OUTPATIENT)
Dept: OTHER | Facility: OTHER | Age: 84
End: 2019-07-06

## 2019-07-07 LAB — BACTERIA UR CULT: ABNORMAL

## 2019-07-08 ENCOUNTER — TELEPHONE (OUTPATIENT)
Dept: FAMILY MEDICINE CLINIC | Facility: CLINIC | Age: 84
End: 2019-07-08

## 2019-07-08 NOTE — TELEPHONE ENCOUNTER
----- Message from Toro Das MD sent at 7/8/2019  9:10 AM EDT -----  Call patient with lab result-I had called in Macrodantin/nitrofurantoin on 07/05  Be sure she picked that up, should be sensitive to that drug  She should be feeling better by now

## 2019-07-08 NOTE — RESULT ENCOUNTER NOTE
Call patient with lab result-I had called in Macrodantin/nitrofurantoin on 07/05  Be sure she picked that up, should be sensitive to that drug  She should be feeling better by now

## 2019-08-05 ENCOUNTER — OFFICE VISIT (OUTPATIENT)
Dept: FAMILY MEDICINE CLINIC | Facility: CLINIC | Age: 84
End: 2019-08-05
Payer: MEDICARE

## 2019-08-05 VITALS
OXYGEN SATURATION: 98 % | TEMPERATURE: 97.8 F | SYSTOLIC BLOOD PRESSURE: 132 MMHG | HEART RATE: 63 BPM | BODY MASS INDEX: 28.53 KG/M2 | HEIGHT: 63 IN | DIASTOLIC BLOOD PRESSURE: 86 MMHG | WEIGHT: 161 LBS

## 2019-08-05 DIAGNOSIS — H35.3211 EXUDATIVE AGE-RELATED MACULAR DEGENERATION OF RIGHT EYE WITH ACTIVE CHOROIDAL NEOVASCULARIZATION (HCC): ICD-10-CM

## 2019-08-05 DIAGNOSIS — R30.0 DYSURIA: ICD-10-CM

## 2019-08-05 DIAGNOSIS — Z13.31 POSITIVE DEPRESSION SCREENING: ICD-10-CM

## 2019-08-05 DIAGNOSIS — G60.9 IDIOPATHIC PERIPHERAL NEUROPATHY: Primary | ICD-10-CM

## 2019-08-05 DIAGNOSIS — E78.2 MIXED HYPERLIPIDEMIA: ICD-10-CM

## 2019-08-05 DIAGNOSIS — I10 ESSENTIAL HYPERTENSION: ICD-10-CM

## 2019-08-05 PROCEDURE — 99214 OFFICE O/P EST MOD 30 MIN: CPT | Performed by: FAMILY MEDICINE

## 2019-08-05 PROCEDURE — 1124F ACP DISCUSS-NO DSCNMKR DOCD: CPT | Performed by: FAMILY MEDICINE

## 2019-08-05 NOTE — PROGRESS NOTES
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 [facility])  Patient followed by Ophthalmology  Continues with drops  Idiopathic peripheral neuropathy  After decreasing Neurontin-patient feels somewhat better  Is still having some mild symptoms in the bottom of her feet  Patient Instructions     Patient Instructions     Heart Healthy Diet   AMBULATORY CARE:   A heart healthy diet  is an eating plan low in total fat, unhealthy fats, and sodium (salt)  A heart healthy diet helps decrease your risk for heart disease and stroke  Limit the amount of fat you eat to 25% to 35% of your total daily calories  Limit sodium to less than 2,300 mg each day  Healthy fats:  Healthy fats can help improve cholesterol levels  The risk for heart disease is decreased when cholesterol levels are normal  Choose healthy fats, such as the following:  · Unsaturated fat  is found in foods such as soybean, canola, olive, corn, and safflower oils  It is also found in soft tub margarine that is made with liquid vegetable oil  · Omega-3 fat  is found in certain fish, such as salmon, tuna, and trout, and in walnuts and flaxseed  Unhealthy fats:  Unhealthy fats can cause unhealthy cholesterol levels in your blood and increase your risk of heart disease   Limit unhealthy fats, such as the following:  · Cholesterol  is found in animal foods, such as eggs and lobster, and in dairy products made from whole milk  Limit cholesterol to less than 300 milligrams (mg) each day  You may need to limit cholesterol to 200 mg each day if you have heart disease  · Saturated fat  is found in meats, such as gutierrez and hamburger  It is also found in chicken or turkey skin, whole milk, and butter  Limit saturated fat to less than 7% of your total daily calories  Limit saturated fat to less than 6% if you have heart disease or are at increased risk for it  · Trans fat  is found in packaged foods, such as potato chips and cookies  It is also in hard margarine, some fried foods, and shortening  Avoid trans fats as much as possible    Heart healthy foods and drinks to include:  Ask your dietitian or healthcare provider how many servings to have from each of the following food groups:  · Grains:      ¨ Whole-wheat breads, cereals, and pastas, and brown rice    ¨ Low-fat, low-sodium crackers and chips    · Vegetables:      ¨ Broccoli, green beans, green peas, and spinach    ¨ Collards, kale, and lima beans    ¨ Carrots, sweet potatoes, tomatoes, and peppers    ¨ Canned vegetables with no salt added    · Fruits:      ¨ Bananas, peaches, pears, and pineapple    ¨ Grapes, raisins, and dates    ¨ Oranges, tangerines, grapefruit, orange juice, and grapefruit juice    ¨ Apricots, mangoes, melons, and papaya    ¨ Raspberries and strawberries    ¨ Canned fruit with no added sugar    · Low-fat dairy products:      ¨ Nonfat (skim) milk, 1% milk, and low-fat almond, cashew, or soy milks fortified with calcium    ¨ Low-fat cheese, regular or frozen yogurt, and cottage cheese    · Meats and proteins , such as lean cuts of beef and pork (loin, leg, round), skinless chicken and turkey, legumes, soy products, egg whites, and nuts  Foods and drinks to limit or avoid:  Ask your dietitian or healthcare provider about these and other foods that are high in unhealthy fat, sodium, and sugar:  · Snack or packaged foods , such as frozen dinners, cookies, macaroni and cheese, and cereals with more than 300 mg of sodium per serving    · Canned or dry mixes  for cakes, soups, sauces, or gravies    · Vegetables with added sodium , such as instant potatoes, vegetables with added sauces, or regular canned vegetables    · Other foods high in sodium , such as ketchup, barbecue sauce, salad dressing, pickles, olives, soy sauce, and miso    · High-fat dairy foods  such as whole or 2% milk, cream cheese, or sour cream, and cheeses     · High-fat protein foods  such as high-fat cuts of beef (T-bone steaks, ribs), chicken or turkey with skin, and organ meats, such as liver    · Cured or smoked meats , such as hot dogs, gutierrez, and sausage    · Unhealthy fats and oils , such as butter, stick margarine, shortening, and cooking oils such as coconut or palm oil    · Food and drinks high in sugar , such as soft drinks (soda), sports drinks, sweetened tea, candy, cake, cookies, pies, and doughnuts  Other diet guidelines to follow:   · Eat more foods containing omega-3 fats  Eat fish high in omega-3 fats at least 2 times a week  · Limit alcohol  Too much alcohol can damage your heart and raise your blood pressure  Women should limit alcohol to 1 drink a day  Men should limit alcohol to 2 drinks a day  A drink of alcohol is 12 ounces of beer, 5 ounces of wine, or 1½ ounces of liquor  · Choose low-sodium foods  High-sodium foods can lead to high blood pressure  Add little or no salt to food you prepare  Use herbs and spices in place of salt  · Eat more fiber  to help lower cholesterol levels  Eat at least 5 servings of fruits and vegetables each day  Eat 3 ounces of whole-grain foods each day  Legumes (beans) are also a good source of fiber  Lifestyle guidelines:   · Do not smoke  Nicotine and other chemicals in cigarettes and cigars can cause lung and heart damage   Ask your healthcare provider for information if you currently smoke and need help to quit  E-cigarettes or smokeless tobacco still contain nicotine  Talk to your healthcare provider before you use these products  · Exercise regularly  to help you maintain a healthy weight and improve your blood pressure and cholesterol levels  Ask your healthcare provider about the best exercise plan for you  Do not start an exercise program without asking your healthcare provider  Follow up with your healthcare provider as directed:  Write down your questions so you remember to ask them during your visits  © 2017 [facility] Information is for End User's use only and may not be sold, redistributed or otherwise used for commercial purposes  All illustrations and images included in CareNotes® are the copyrighted property of [employer]  or Terrence Santana  The above information is an  only  It is not intended as medical advice for individual conditions or treatments  Talk to your doctor, nurse or pharmacist before following any medical regimen to see if it is safe and effective for you  Treat the urinary burning symptoms as we discussed with the over-the-counter bladder anesthetic, cranberry extract, and increase her fluids at that time  Continue other medications  Flu shot when the becomes available later on this year  Chief Complaint     Chief Complaint   Patient presents with    Follow-up     4 week check          History of Present Illness       Follow-up on previous positive depression screen  Had taken Zoloft 25 mg without much affect  Major issues are not really related to depression but more related to her lack of mobility, her dizziness, and repeated urinary issues  Dysuria-had seen Urology residual volumes were good  Urology advise symptomatic treatment for her dysuria    She still has not to take Estrace vaginal cream   Hypertension-good control with current medications  Hyperlipidemia-patient due for monitoring  No recent testing  Is on a statin  Macular degeneration-this is followed by Ophthalmology  Review of Systems   Review of Systems   Constitutional: Negative for appetite change, chills, diaphoresis and fever  HENT: Negative for ear pain, rhinorrhea, sinus pressure and sore throat  Eyes: Negative for discharge, redness and itching  Respiratory: Negative for cough, shortness of breath and wheezing  Cardiovascular: Negative for chest pain and palpitations  Rapid or slow heart rate   Gastrointestinal: Negative for abdominal pain, diarrhea, nausea and vomiting  Genitourinary: Positive for difficulty urinating and dysuria  Negative for hematuria and vaginal bleeding  Psychiatric/Behavioral: Negative for confusion, decreased concentration, dysphoric mood and sleep disturbance  The patient is not nervous/anxious            Current Medications       Current Outpatient Medications:     aspirin (ECOTRIN LOW STRENGTH) 81 mg EC tablet, Take 1 tablet (81 mg total) by mouth daily, Disp: , Rfl: 0    Cholecalciferol (VITAMIN D3) 1000 units CAPS, Take 1,000 Units by mouth daily , Disp: , Rfl:     Cyanocobalamin (VITAMIN B12 PO), Take by mouth, Disp: , Rfl:     gabapentin (NEURONTIN) 600 MG tablet, Take 1 tablet (600 mg total) by mouth 2 (two) times a day, Disp: 180 tablet, Rfl: 1    hydrochlorothiazide (HYDRODIURIL) 12 5 mg tablet, Take 1 tablet (12 5 mg total) by mouth daily, Disp: 90 tablet, Rfl: 1    Multiple Vitamins-Minerals (PRESERVISION AREDS PO), Take by mouth 2 (two) times a day, Disp: , Rfl:     omeprazole (PriLOSEC) 20 mg delayed release capsule, Take 1 capsule (20 mg total) by mouth daily, Disp: 90 capsule, Rfl: 1    pravastatin (PRAVACHOL) 20 mg tablet, Take 1 tablet (20 mg total) by mouth daily, Disp: 90 tablet, Rfl: 1    Current Allergies     Allergies as of 08/05/2019 - Reviewed 08/05/2019   Allergen Reaction Noted    Augmentin [amoxicillin-pot clavulanate] GI Intolerance 04/15/2019            The following portions of the patient's history were reviewed and updated as appropriate: allergies, current medications, past family history, past medical history, past social history, past surgical history and problem list      Past Medical History:   Diagnosis Date    Balance disorder     GERD (gastroesophageal reflux disease)     Hard of hearing     Hyperlipidemia     Hypertension     Neuropathy     Pneumonia     Tinnitus     UTI (urinary tract infection)        Past Surgical History:   Procedure Laterality Date    EYE SURGERY      HYSTERECTOMY         Family History   Problem Relation Age of Onset    Stroke Mother     Stroke Father     Heart disease Daughter     Substance Abuse Neg Hx     Mental illness Neg Hx          Medications have been verified  Objective   /86   Pulse 63   Temp 97 8 °F (36 6 °C)   Ht 5' 3" (1 6 m)   Wt 73 kg (161 lb)   SpO2 98%   BMI 28 52 kg/m²        Physical Exam     Physical Exam   Constitutional: She is oriented to person, place, and time  She appears well-developed and well-nourished  No distress  HENT:   Head: Normocephalic and atraumatic  Right Ear: Tympanic membrane and external ear normal  No drainage  Left Ear: Tympanic membrane normal  No drainage  Mouth/Throat: No oropharyngeal exudate  Eyes: Conjunctivae and EOM are normal  Right eye exhibits no discharge  Left eye exhibits no discharge  Neck: Normal range of motion  Neck supple  No thyromegaly present  Cardiovascular: Normal rate, regular rhythm and normal heart sounds  Pulmonary/Chest: Effort normal  No respiratory distress  She has no wheezes  She has no rales  Lymphadenopathy:     She has no cervical adenopathy  Neurological: She is alert and oriented to person, place, and time  Skin: Skin is dry  Psychiatric: She has a normal mood and affect   Her behavior is normal                BMI Counseling: Body mass index is 28 52 kg/m²  Discussed the patient's BMI with her  The BMI is above average  BMI counseling and education was provided to the patient  Nutrition recommendations include reducing portion sizes, decreasing overall calorie intake and moderation in carbohydrate intake

## 2019-08-05 NOTE — PATIENT INSTRUCTIONS
Heart Healthy Diet   AMBULATORY CARE:   A heart healthy diet  is an eating plan low in total fat, unhealthy fats, and sodium (salt)  A heart healthy diet helps decrease your risk for heart disease and stroke  Limit the amount of fat you eat to 25% to 35% of your total daily calories  Limit sodium to less than 2,300 mg each day  Healthy fats:  Healthy fats can help improve cholesterol levels  The risk for heart disease is decreased when cholesterol levels are normal  Choose healthy fats, such as the following:  · Unsaturated fat  is found in foods such as soybean, canola, olive, corn, and safflower oils  It is also found in soft tub margarine that is made with liquid vegetable oil  · Omega-3 fat  is found in certain fish, such as salmon, tuna, and trout, and in walnuts and flaxseed  Unhealthy fats:  Unhealthy fats can cause unhealthy cholesterol levels in your blood and increase your risk of heart disease  Limit unhealthy fats, such as the following:  · Cholesterol  is found in animal foods, such as eggs and lobster, and in dairy products made from whole milk  Limit cholesterol to less than 300 milligrams (mg) each day  You may need to limit cholesterol to 200 mg each day if you have heart disease  · Saturated fat  is found in meats, such as gutierrez and hamburger  It is also found in chicken or turkey skin, whole milk, and butter  Limit saturated fat to less than 7% of your total daily calories  Limit saturated fat to less than 6% if you have heart disease or are at increased risk for it  · Trans fat  is found in packaged foods, such as potato chips and cookies  It is also in hard margarine, some fried foods, and shortening  Avoid trans fats as much as possible    Heart healthy foods and drinks to include:  Ask your dietitian or healthcare provider how many servings to have from each of the following food groups:  · Grains:      ¨ Whole-wheat breads, cereals, and pastas, and brown rice    ¨ Low-fat, low-sodium crackers and chips    · Vegetables:      ¨ Broccoli, green beans, green peas, and spinach    ¨ Collards, kale, and lima beans    ¨ Carrots, sweet potatoes, tomatoes, and peppers    ¨ Canned vegetables with no salt added    · Fruits:      ¨ Bananas, peaches, pears, and pineapple    ¨ Grapes, raisins, and dates    ¨ Oranges, tangerines, grapefruit, orange juice, and grapefruit juice    ¨ Apricots, mangoes, melons, and papaya    ¨ Raspberries and strawberries    ¨ Canned fruit with no added sugar    · Low-fat dairy products:      ¨ Nonfat (skim) milk, 1% milk, and low-fat almond, cashew, or soy milks fortified with calcium    ¨ Low-fat cheese, regular or frozen yogurt, and cottage cheese    · Meats and proteins , such as lean cuts of beef and pork (loin, leg, round), skinless chicken and turkey, legumes, soy products, egg whites, and nuts  Foods and drinks to limit or avoid:  Ask your dietitian or healthcare provider about these and other foods that are high in unhealthy fat, sodium, and sugar:  · Snack or packaged foods , such as frozen dinners, cookies, macaroni and cheese, and cereals with more than 300 mg of sodium per serving    · Canned or dry mixes  for cakes, soups, sauces, or gravies    · Vegetables with added sodium , such as instant potatoes, vegetables with added sauces, or regular canned vegetables    · Other foods high in sodium , such as ketchup, barbecue sauce, salad dressing, pickles, olives, soy sauce, and miso    · High-fat dairy foods  such as whole or 2% milk, cream cheese, or sour cream, and cheeses     · High-fat protein foods  such as high-fat cuts of beef (T-bone steaks, ribs), chicken or turkey with skin, and organ meats, such as liver    · Cured or smoked meats , such as hot dogs, gutierrez, and sausage    · Unhealthy fats and oils , such as butter, stick margarine, shortening, and cooking oils such as coconut or palm oil    · Food and drinks high in sugar , such as soft drinks (soda), sports drinks, sweetened tea, candy, cake, cookies, pies, and doughnuts  Other diet guidelines to follow:   · Eat more foods containing omega-3 fats  Eat fish high in omega-3 fats at least 2 times a week  · Limit alcohol  Too much alcohol can damage your heart and raise your blood pressure  Women should limit alcohol to 1 drink a day  Men should limit alcohol to 2 drinks a day  A drink of alcohol is 12 ounces of beer, 5 ounces of wine, or 1½ ounces of liquor  · Choose low-sodium foods  High-sodium foods can lead to high blood pressure  Add little or no salt to food you prepare  Use herbs and spices in place of salt  · Eat more fiber  to help lower cholesterol levels  Eat at least 5 servings of fruits and vegetables each day  Eat 3 ounces of whole-grain foods each day  Legumes (beans) are also a good source of fiber  Lifestyle guidelines:   · Do not smoke  Nicotine and other chemicals in cigarettes and cigars can cause lung and heart damage  Ask your healthcare provider for information if you currently smoke and need help to quit  E-cigarettes or smokeless tobacco still contain nicotine  Talk to your healthcare provider before you use these products  · Exercise regularly  to help you maintain a healthy weight and improve your blood pressure and cholesterol levels  Ask your healthcare provider about the best exercise plan for you  Do not start an exercise program without asking your healthcare provider  Follow up with your healthcare provider as directed:  Write down your questions so you remember to ask them during your visits  © 2017 [address] [employer] Information is for End User's use only and may not be sold, redistributed or otherwise used for commercial purposes  All illustrations and images included in CareNotes® are the copyrighted property of [employer]  or Terrence Santana  The above information is an  only   It is not intended as medical advice for individual conditions or treatments  Talk to your doctor, nurse or pharmacist before following any medical regimen to see if it is safe and effective for you  Treat the urinary burning symptoms as we discussed with the over-the-counter bladder anesthetic, cranberry extract, and increase her fluids at that time  Continue other medications  Flu shot when the becomes available later on this year

## 2019-08-05 NOTE — ASSESSMENT & PLAN NOTE
After decreasing Neurontin-patient feels somewhat better  Is still having some mild symptoms in the bottom of her feet

## 2019-08-22 ENCOUNTER — TELEPHONE (OUTPATIENT)
Dept: FAMILY MEDICINE CLINIC | Facility: CLINIC | Age: 84
End: 2019-08-22

## 2019-08-22 NOTE — TELEPHONE ENCOUNTER
We discussed this last visit  She had seen Urology, they advise treating the symptoms with something like peridium  Unless she has fever ,chills or sees blood in the urine  Has she talked to Urology?

## 2019-08-29 ENCOUNTER — TELEPHONE (OUTPATIENT)
Dept: UROLOGY | Facility: MEDICAL CENTER | Age: 84
End: 2019-08-29

## 2019-08-29 NOTE — TELEPHONE ENCOUNTER
Patient had called PCP and they wanted her to follow up with us she has had burning for a week  States she has been using AZO for 2 days without relief  She last saw Bryant Culver in April who referred her to infectious disease and she never made  She states she cannot give a urine sample as her son drives her and he is in the hospital now  Suggestions

## 2019-08-29 NOTE — TELEPHONE ENCOUNTER
Patient of Dr Roya Shepard seen in [facility]  Patient states she is having burning now for a week  Her primary advised to call office  Patient requesting script to be sent to [facility]

## 2019-08-30 DIAGNOSIS — N39.0 RECURRENT UTI: Primary | ICD-10-CM

## 2019-08-30 RX ORDER — SULFAMETHOXAZOLE AND TRIMETHOPRIM 800; 160 MG/1; MG/1
1 TABLET ORAL 2 TIMES DAILY
Qty: 10 TABLET | Refills: 0 | Status: SHIPPED | OUTPATIENT
Start: 2019-08-30 | End: 2019-09-04

## 2019-08-30 NOTE — TELEPHONE ENCOUNTER
Bactrim sent to pharmacy  If symptoms persist after completion then will need to obtain repeat urine culture  Thank you

## 2019-08-30 NOTE — TELEPHONE ENCOUNTER
Notified patient that medication was sent to pharmacy and if she still has symptoms she needs to damon and get an urine sample done

## 2019-10-21 ENCOUNTER — TELEPHONE (OUTPATIENT)
Dept: UROLOGY | Facility: AMBULATORY SURGERY CENTER | Age: 84
End: 2019-10-21

## 2019-10-21 DIAGNOSIS — R30.0 BURNING WITH URINATION: Primary | ICD-10-CM

## 2019-10-21 NOTE — TELEPHONE ENCOUNTER
Patient of [facility] office, history recurrent UTI  Last seen May 2019  Call placed to son, left detailed message per communication consent form for patient to obtain urine testing  Order in epic  Office to call with results in 2-3 days  Encouraged increased water intake  Advised son to call office in meantime with hematuria, fever, chills, worsening pain  Office number provided on voicemail

## 2019-10-21 NOTE — TELEPHONE ENCOUNTER
[phone] Zofia Ramos, son of patient is calling to say she is having burning and pain with urination  Stated she feels she has recurring infection  Requesting antibiotics  Please advise

## 2019-10-22 ENCOUNTER — HOSPITAL ENCOUNTER (EMERGENCY)
Facility: HOSPITAL | Age: 84
Discharge: HOME | End: 2019-10-22
Attending: EMERGENCY MEDICINE
Payer: MEDICARE

## 2019-10-22 VITALS
TEMPERATURE: 98 F | HEIGHT: 64 IN | OXYGEN SATURATION: 95 % | DIASTOLIC BLOOD PRESSURE: 76 MMHG | WEIGHT: 164.56 LBS | HEART RATE: 64 BPM | RESPIRATION RATE: 18 BRPM | SYSTOLIC BLOOD PRESSURE: 173 MMHG | BODY MASS INDEX: 28.09 KG/M2

## 2019-10-22 DIAGNOSIS — N39.0 ACUTE URINARY TRACT INFECTION: Primary | ICD-10-CM

## 2019-10-22 DIAGNOSIS — N30.00 ACUTE CYSTITIS WITHOUT HEMATURIA: Primary | ICD-10-CM

## 2019-10-22 LAB
ANION GAP SERPL CALCULATED.3IONS-SCNC: 7 MMOL/L (ref 4–13)
BACTERIA UR QL AUTO: ABNORMAL /HPF
BASOPHILS # BLD AUTO: 0.03 THOUSANDS/ΜL (ref 0–0.1)
BASOPHILS NFR BLD AUTO: 0 % (ref 0–1)
BILIRUB UR QL STRIP: NEGATIVE
BUN SERPL-MCNC: 18 MG/DL (ref 5–25)
CALCIUM SERPL-MCNC: 9.4 MG/DL (ref 8.3–10.1)
CHLORIDE SERPL-SCNC: 101 MMOL/L (ref 100–108)
CLARITY UR: ABNORMAL
CO2 SERPL-SCNC: 32 MMOL/L (ref 21–32)
COLOR UR: YELLOW
CREAT SERPL-MCNC: 0.89 MG/DL (ref 0.6–1.3)
EOSINOPHIL # BLD AUTO: 0.13 THOUSAND/ΜL (ref 0–0.61)
EOSINOPHIL NFR BLD AUTO: 1 % (ref 0–6)
ERYTHROCYTE [DISTWIDTH] IN BLOOD BY AUTOMATED COUNT: 13.2 % (ref 11.6–15.1)
GFR SERPL CREATININE-BSD FRML MDRD: 58 ML/MIN/1.73SQ M
GLUCOSE SERPL-MCNC: 99 MG/DL (ref 65–140)
GLUCOSE UR STRIP-MCNC: NEGATIVE MG/DL
HCT VFR BLD AUTO: 40.2 % (ref 34.8–46.1)
HGB BLD-MCNC: 12.6 G/DL (ref 11.5–15.4)
HGB UR QL STRIP.AUTO: ABNORMAL
IMM GRANULOCYTES # BLD AUTO: 0.02 THOUSAND/UL (ref 0–0.2)
IMM GRANULOCYTES NFR BLD AUTO: 0 % (ref 0–2)
KETONES UR STRIP-MCNC: NEGATIVE MG/DL
LEUKOCYTE ESTERASE UR QL STRIP: ABNORMAL
LYMPHOCYTES # BLD AUTO: 2.4 THOUSANDS/ΜL (ref 0.6–4.47)
LYMPHOCYTES NFR BLD AUTO: 24 % (ref 14–44)
MCH RBC QN AUTO: 29 PG (ref 26.8–34.3)
MCHC RBC AUTO-ENTMCNC: 31.3 G/DL (ref 31.4–37.4)
MCV RBC AUTO: 92 FL (ref 82–98)
MONOCYTES # BLD AUTO: 0.78 THOUSAND/ΜL (ref 0.17–1.22)
MONOCYTES NFR BLD AUTO: 8 % (ref 4–12)
NEUTROPHILS # BLD AUTO: 6.81 THOUSANDS/ΜL (ref 1.85–7.62)
NEUTS SEG NFR BLD AUTO: 67 % (ref 43–75)
NITRITE UR QL STRIP: POSITIVE
NON-SQ EPI CELLS URNS QL MICRO: ABNORMAL /HPF
NRBC BLD AUTO-RTO: 0 /100 WBCS
OTHER STN SPEC: ABNORMAL
PH UR STRIP.AUTO: 6 [PH]
PLATELET # BLD AUTO: 187 THOUSANDS/UL (ref 149–390)
PMV BLD AUTO: 9.7 FL (ref 8.9–12.7)
POTASSIUM SERPL-SCNC: 3.9 MMOL/L (ref 3.5–5.3)
PROT UR STRIP-MCNC: NEGATIVE MG/DL
RBC # BLD AUTO: 4.35 MILLION/UL (ref 3.81–5.12)
RBC #/AREA URNS AUTO: ABNORMAL /HPF
SODIUM SERPL-SCNC: 140 MMOL/L (ref 136–145)
SP GR UR STRIP.AUTO: 1.01 (ref 1–1.03)
UROBILINOGEN UR QL STRIP.AUTO: 0.2 E.U./DL
WBC # BLD AUTO: 10.17 THOUSAND/UL (ref 4.31–10.16)
WBC #/AREA URNS AUTO: ABNORMAL /HPF

## 2019-10-22 PROCEDURE — 87086 URINE CULTURE/COLONY COUNT: CPT | Performed by: EMERGENCY MEDICINE

## 2019-10-22 PROCEDURE — 36415 COLL VENOUS BLD VENIPUNCTURE: CPT | Performed by: EMERGENCY MEDICINE

## 2019-10-22 PROCEDURE — 87186 SC STD MICRODIL/AGAR DIL: CPT | Performed by: EMERGENCY MEDICINE

## 2019-10-22 PROCEDURE — 80048 BASIC METABOLIC PNL TOTAL CA: CPT | Performed by: EMERGENCY MEDICINE

## 2019-10-22 PROCEDURE — 96360 HYDRATION IV INFUSION INIT: CPT

## 2019-10-22 PROCEDURE — 99283 EMERGENCY DEPT VISIT LOW MDM: CPT

## 2019-10-22 PROCEDURE — 99284 EMERGENCY DEPT VISIT MOD MDM: CPT | Performed by: EMERGENCY MEDICINE

## 2019-10-22 PROCEDURE — 85025 COMPLETE CBC W/AUTO DIFF WBC: CPT | Performed by: EMERGENCY MEDICINE

## 2019-10-22 PROCEDURE — 81001 URINALYSIS AUTO W/SCOPE: CPT | Performed by: EMERGENCY MEDICINE

## 2019-10-22 PROCEDURE — 87077 CULTURE AEROBIC IDENTIFY: CPT | Performed by: EMERGENCY MEDICINE

## 2019-10-22 RX ORDER — NITROFURANTOIN 25; 75 MG/1; MG/1
100 CAPSULE ORAL 2 TIMES DAILY
Qty: 10 CAPSULE | Refills: 0 | Status: SHIPPED | OUTPATIENT
Start: 2019-10-22 | End: 2019-10-27 | Stop reason: ALTCHOICE

## 2019-10-22 RX ORDER — CEPHALEXIN 250 MG/1
500 CAPSULE ORAL ONCE
Status: COMPLETED | OUTPATIENT
Start: 2019-10-22 | End: 2019-10-22

## 2019-10-22 RX ORDER — CEPHALEXIN 500 MG/1
500 CAPSULE ORAL EVERY 12 HOURS SCHEDULED
Qty: 14 CAPSULE | Refills: 0 | Status: SHIPPED | OUTPATIENT
Start: 2019-10-22 | End: 2019-10-29

## 2019-10-22 RX ADMIN — CEPHALEXIN 500 MG: 250 CAPSULE ORAL at 12:31

## 2019-10-22 RX ADMIN — SODIUM CHLORIDE 1000 ML: 0.9 INJECTION, SOLUTION INTRAVENOUS at 11:33

## 2019-10-22 NOTE — ED PROVIDER NOTES
History  Chief Complaint   Patient presents with    Possible UTI     Patient presents from home due to dysuria since this weekend  called urologist yesterday but unable to obtain appointment  presents here for urinary frequency and pain  taking AZO for pain   Foot Swelling     Patient reports new swelling bilateral feet  Denies CP, SOB, n/v/d, fevers     UTI symptoms for a few day  Hx of frequent utis and feels like previous  Follows w/ urology, but unable to get in and Azo not controlling symptoms enough  Also noted b/l ankle and foot swelling  occas has swelling, but usually not like this - notes it's more persistent  Feet/legs feel heavy when she walks  Gets occas leg cramps, otherwise no pain      Denies f/c/s, no cp/pressure, no sob/fuller, no abd pain, normal appetite, no n/v/d, +urinary frequency and dysuria  History provided by:  Patient and relative   used: No    Urinary Frequency   Location:  Dysuria and frequency since the weekend  Quality:  Burning  Severity:  Moderate  Onset quality:  Gradual  Timing:  Constant  Progression:  Unchanged  Chronicity:  Recurrent  Context:  Recurrent UTI  Relieved by:  Nothing  Worsened by:  Nothing  Ineffective treatments:  Otc azo  Associated symptoms: no abdominal pain, no chest pain, no cough, no fever, no myalgias, no nausea, no shortness of breath, no sore throat and no vomiting        Prior to Admission Medications   Prescriptions Last Dose Informant Patient Reported? Taking?    Cholecalciferol (VITAMIN D3) 1000 units CAPS  Self Yes No   Sig: Take 1,000 Units by mouth daily    Cyanocobalamin (VITAMIN B12 PO)  Self Yes No   Sig: Take by mouth   Multiple Vitamins-Minerals (PRESERVISION AREDS PO)  Self Yes No   Sig: Take by mouth 2 (two) times a day   aspirin (ECOTRIN LOW STRENGTH) 81 mg EC tablet  Self No No   Sig: Take 1 tablet (81 mg total) by mouth daily   gabapentin (NEURONTIN) 600 MG tablet   No No   Sig: Take 1 tablet (600 mg total) by mouth 2 (two) times a day   hydrochlorothiazide (HYDRODIURIL) 12 5 mg tablet   No No   Sig: Take 1 tablet (12 5 mg total) by mouth daily   nitrofurantoin (MACROBID) 100 mg capsule   No No   Sig: Take 1 capsule (100 mg total) by mouth 2 (two) times a day for 5 days   omeprazole (PriLOSEC) 20 mg delayed release capsule   No No   Sig: Take 1 capsule (20 mg total) by mouth daily   pravastatin (PRAVACHOL) 20 mg tablet   No No   Sig: Take 1 tablet (20 mg total) by mouth daily      Facility-Administered Medications: None       Past Medical History:   Diagnosis Date    Balance disorder     Chronic pain     GERD (gastroesophageal reflux disease)     Hard of hearing     Hyperlipidemia     Hypertension     Neuropathy     Pneumonia     Tinnitus     UTI (urinary tract infection)        Past Surgical History:   Procedure Laterality Date    EYE SURGERY      HYSTERECTOMY      TONSILLECTOMY         Family History   Problem Relation Age of Onset    Stroke Mother     Stroke Father     Heart disease Daughter     Substance Abuse Neg Hx     Mental illness Neg Hx      I have reviewed and agree with the history as documented  Social History     Tobacco Use    Smoking status: Never Smoker    Smokeless tobacco: Never Used   Substance Use Topics    Alcohol use: Yes     Comment: rarely    Drug use: No        Review of Systems   Constitutional: Negative for fever  HENT: Negative for sore throat  Respiratory: Negative for cough and shortness of breath  Cardiovascular: Negative for chest pain  Gastrointestinal: Negative for abdominal pain, nausea and vomiting  Genitourinary: Positive for frequency  Musculoskeletal: Negative for myalgias  All other systems reviewed and are negative  Physical Exam  Physical Exam   Constitutional: She appears well-developed and well-nourished     HENT:   Nose: Nose normal    Mouth/Throat: Oropharynx is clear and moist    Eyes: Conjunctivae are normal    Neck: Neck supple  Cardiovascular: Normal rate and regular rhythm  Pulmonary/Chest: Effort normal and breath sounds normal    Abdominal: Soft  There is no tenderness  Musculoskeletal: She exhibits edema (trace b/l)  She exhibits no deformity  Neurological: She is alert  Skin: Skin is warm  Psychiatric: She has a normal mood and affect  Nursing note and vitals reviewed        Vital Signs  ED Triage Vitals [10/22/19 1115]   Temperature Pulse Respirations Blood Pressure SpO2   98 °F (36 7 °C) 82 18 (!) 173/76 98 %      Temp Source Heart Rate Source Patient Position - Orthostatic VS BP Location FiO2 (%)   Temporal Monitor Sitting Right arm --      Pain Score       Worst Possible Pain           Vitals:    10/22/19 1115 10/22/19 1230   BP: (!) 173/76    Pulse: 82 64   Patient Position - Orthostatic VS: Sitting          Visual Acuity      ED Medications  Medications   sodium chloride 0 9 % bolus 1,000 mL (0 mL Intravenous Stopped 10/22/19 1236)   cephalexin (KEFLEX) capsule 500 mg (500 mg Oral Given 10/22/19 1231)       Diagnostic Studies  Results Reviewed     Procedure Component Value Units Date/Time    Basic metabolic panel [[id number]] Collected:  10/22/19 1128    Lab Status:  Final result Specimen:  Blood from Arm, Left Updated:  10/22/19 1152     Sodium 140 mmol/L      Potassium 3 9 mmol/L      Chloride 101 mmol/L      CO2 32 mmol/L      ANION GAP 7 mmol/L      BUN 18 mg/dL      Creatinine 0 89 mg/dL      Glucose 99 mg/dL      Calcium 9 4 mg/dL      eGFR 58 ml/min/1 73sq m     Narrative:       [place] guidelines for Chronic Kidney Disease (CKD):     Stage 1 with normal or high GFR (GFR > 90 mL/min/1 73 square meters)    Stage 2 Mild CKD (GFR = 60-89 mL/min/1 73 square meters)    Stage 3A Moderate CKD (GFR = 45-59 mL/min/1 73 square meters)    Stage 3B Moderate CKD (GFR = 30-44 mL/min/1 73 square meters)    Stage 4 Severe CKD (GFR = 15-29 mL/min/1 73 square meters)    Stage 5 End Stage CKD (GFR <15 mL/min/1 73 square meters)  Note: GFR calculation is accurate only with a steady state creatinine    Urine Microscopic [[id number]]  (Abnormal) Collected:  10/22/19 1128    Lab Status:  Final result Specimen:  Urine, Clean Catch Updated:  10/22/19 1152     RBC, UA       Field obscured, unable to enumerate     /hpf     WBC, UA Innumerable /hpf      Epithelial Cells       Field obscured, unable to enumerate     /hpf     Bacteria, UA Innumerable /hpf      OTHER OBSERVATIONS WBCs Clumped    Urine culture [[id number]] Collected:  10/22/19 1128    Lab Status:   In process Specimen:  Urine, Clean Catch Updated:  10/22/19 1152    UA w Reflex to Microscopic w Reflex to Culture [[id number]]  (Abnormal) Collected:  10/22/19 1128    Lab Status:  Final result Specimen:  Urine, Clean Catch Updated:  10/22/19 1144     Color, UA Yellow     Clarity, UA Cloudy     Specific Imboden, UA 1 010     pH, UA 6 0     Leukocytes, UA Large     Nitrite, UA Positive     Protein, UA Negative mg/dl      Glucose, UA Negative mg/dl      Ketones, UA Negative mg/dl      Urobilinogen, UA 0 2 E U /dl      Bilirubin, UA Negative     Blood, UA Small    CBC and differential [[id number]]  (Abnormal) Collected:  10/22/19 1128    Lab Status:  Final result Specimen:  Blood from Arm, Left Updated:  10/22/19 1140     WBC 10 17 Thousand/uL      RBC 4 35 Million/uL      Hemoglobin 12 6 g/dL      Hematocrit 40 2 %      MCV 92 fL      MCH 29 0 pg      MCHC 31 3 g/dL      RDW 13 2 %      MPV 9 7 fL      Platelets 895 Thousands/uL      nRBC 0 /100 WBCs      Neutrophils Relative 67 %      Immat GRANS % 0 %      Lymphocytes Relative 24 %      Monocytes Relative 8 %      Eosinophils Relative 1 %      Basophils Relative 0 %      Neutrophils Absolute 6 81 Thousands/µL      Immature Grans Absolute 0 02 Thousand/uL      Lymphocytes Absolute 2 40 Thousands/µL      Monocytes Absolute 0 78 Thousand/µL      Eosinophils Absolute 0 13 Thousand/µL      Basophils Absolute 0 03 Thousands/µL                  No orders to display              Procedures  Procedures       ED Course                               MDM  Number of Diagnoses or Management Options  Acute urinary tract infection: new and requires workup     Amount and/or Complexity of Data Reviewed  Clinical lab tests: reviewed and ordered  Decide to obtain previous medical records or to obtain history from someone other than the patient: yes  Obtain history from someone other than the patient: yes        Disposition  Final diagnoses:   Acute urinary tract infection     Time reflects when diagnosis was documented in both MDM as applicable and the Disposition within this note     Time User Action Codes Description Comment    10/22/2019 12:26 PM Trixie Lin Add [N39 0] Acute urinary tract infection       ED Disposition     ED Disposition Condition Date/Time Comment    Discharge Stable Tue Oct 22, 2019 12:26 PM Chano Del Angel discharge to home/self care              Follow-up Information     Follow up With Specialties Details Why Contact Info    Gwendalyn Bosworth, MD Family Medicine Schedule an appointment as soon as possible for a visit  If symptoms worsen [address]  [address]  [facility] [address]  [phone]            Discharge Medication List as of 10/22/2019 12:29 PM      START taking these medications    Details   cephalexin (KEFLEX) 500 mg capsule Take 1 capsule (500 mg total) by mouth every 12 (twelve) hours for 7 days, Starting Tue 10/22/2019, Until Tue 10/29/2019, Normal      nitrofurantoin (MACROBID) 100 mg capsule Take 1 capsule (100 mg total) by mouth 2 (two) times a day for 5 days, Starting Tue 10/22/2019, Until Sun 10/27/2019, Normal         CONTINUE these medications which have NOT CHANGED    Details   aspirin (ECOTRIN LOW STRENGTH) 81 mg EC tablet Take 1 tablet (81 mg total) by mouth daily, Starting Tue 12/11/2018, OTC      Cholecalciferol (VITAMIN D3) 1000 units CAPS Take 1,000 Units by mouth daily , Historical Med      Cyanocobalamin (VITAMIN B12 PO) Take by mouth, Historical Med      gabapentin (NEURONTIN) 600 MG tablet Take 1 tablet (600 mg total) by mouth 2 (two) times a day, Starting Mon 7/1/2019, Normal      hydrochlorothiazide (HYDRODIURIL) 12 5 mg tablet Take 1 tablet (12 5 mg total) by mouth daily, Starting Mon 7/1/2019, Normal      Multiple Vitamins-Minerals (PRESERVISION AREDS PO) Take by mouth 2 (two) times a day, Historical Med      omeprazole (PriLOSEC) 20 mg delayed release capsule Take 1 capsule (20 mg total) by mouth daily, Starting Mon 7/1/2019, Normal      pravastatin (PRAVACHOL) 20 mg tablet Take 1 tablet (20 mg total) by mouth daily, Starting Mon 7/1/2019, Normal           No discharge procedures on file      ED Provider  Electronically Signed by           Russ Nick DO  10/22/19 5143

## 2019-10-22 NOTE — TELEPHONE ENCOUNTER
Called PARKER and CARLOS reiterating that Amadeo Marcelino stated she could continue Keflex and when sensitivities come back if we need to change ABX we will call

## 2019-10-22 NOTE — TELEPHONE ENCOUNTER
Call placed to son, left message to return call to discuss  Office number provided on voicemail  Of note, patient currently in ED  Treated with Keflex inpatient and given Keflex at discharge  Will route to provider for plan

## 2019-10-27 LAB — BACTERIA UR CULT: ABNORMAL

## 2019-12-03 NOTE — PROGRESS NOTES
12/4/2019    [facility]   1/28/1930  [id number]        Assessment  -Recurrent UTI    Discussion/Plan  Tasha Grier is a 80 y o  female being managed by our office    -We discussed patient's recurring urinary tract infections  Daughter expresses concern as primary symptom includes change in mental status and deconditioned physical state  She has had numerous UTIs in the last 1 year  I encouraged patient to try topical estrogen  Prescription refill for generic form was sent to her pharmacy  Reviewed mechanism of action and administration instructions  We will also try low-dose prophylactic antibiotic  Prescription for Macrobid 100 mg daily was sent to her pharmacy  Review dietary and behavioral modifications  She will return in 2 months for re-evaluation   -All questions answered, patient and daughter agree with plan     History of Present Illness  80 y o  female with a history of recurrent UTI presents today for follow up  She presents today accompanied by her daughter  Last urinary tract infection was 10/22/19 for E  Coli organism  She has had 5 urinary tract infections in the last one year  She had previously been prescribed topical estrogen, but states medication was cost prohibitive  Daughter states that primary symptoms include dysuria, change in mental status, and deconditioned state  She denies any gross hematuria  Previous renal ultrasound performed in April 2019 was unremarkable  Patient is currently asymptomatic at this time  Review of Systems  Review of Systems   Constitutional: Negative  HENT: Negative  Respiratory: Negative  Cardiovascular: Negative  Gastrointestinal: Negative  Genitourinary: Negative for decreased urine volume, difficulty urinating, dysuria, flank pain, frequency, hematuria and urgency  Musculoskeletal: Negative  Skin: Negative  Neurological: Negative  Psychiatric/Behavioral: Negative          Past Medical History  Past Medical History: Diagnosis Date    Balance disorder     Chronic pain     GERD (gastroesophageal reflux disease)     Hard of hearing     Hyperlipidemia     Hypertension     Neuropathy     Pneumonia     Tinnitus     UTI (urinary tract infection)        Past Social History  Past Surgical History:   Procedure Laterality Date    EYE SURGERY      HYSTERECTOMY      TONSILLECTOMY         Past Family History  Family History   Problem Relation Age of Onset    Stroke Mother     Stroke Father     Heart disease Daughter     Substance Abuse Neg Hx     Mental illness Neg Hx        Past Social history  Social History     Socioeconomic History    Marital status:       Spouse name: Not on file    Number of children: Not on file    Years of education: Not on file    Highest education level: Not on file   Occupational History    Not on file   Social Needs    Financial resource strain: Not on file    Food insecurity:     Worry: Not on file     Inability: Not on file    Transportation needs:     Medical: Not on file     Non-medical: Not on file   Tobacco Use    Smoking status: Never Smoker    Smokeless tobacco: Never Used   Substance and Sexual Activity    Alcohol use: Yes     Comment: rarely    Drug use: No    Sexual activity: Not on file   Lifestyle    Physical activity:     Days per week: Not on file     Minutes per session: Not on file    Stress: Not on file   Relationships    Social connections:     Talks on phone: Not on file     Gets together: Not on file     Attends Restoration service: Not on file     Active member of club or organization: Not on file     Attends meetings of clubs or organizations: Not on file     Relationship status: Not on file    Intimate partner violence:     Fear of current or ex partner: Not on file     Emotionally abused: Not on file     Physically abused: Not on file     Forced sexual activity: Not on file   Other Topics Concern    Not on file   Social History Narrative    Not on file       Current Medications  Current Outpatient Medications   Medication Sig Dispense Refill    aspirin (ECOTRIN LOW STRENGTH) 81 mg EC tablet Take 1 tablet (81 mg total) by mouth daily  0    Cholecalciferol (VITAMIN D3) 1000 units CAPS Take 1,000 Units by mouth daily       Cyanocobalamin (VITAMIN B12 PO) Take by mouth      gabapentin (NEURONTIN) 600 MG tablet Take 1 tablet (600 mg total) by mouth 2 (two) times a day 180 tablet 1    hydrochlorothiazide (HYDRODIURIL) 12 5 mg tablet Take 1 tablet (12 5 mg total) by mouth daily 90 tablet 1    Multiple Vitamins-Minerals (PRESERVISION AREDS PO) Take by mouth 2 (two) times a day      omeprazole (PriLOSEC) 20 mg delayed release capsule Take 1 capsule (20 mg total) by mouth daily 90 capsule 1    pravastatin (PRAVACHOL) 20 mg tablet Take 1 tablet (20 mg total) by mouth daily 90 tablet 1     No current facility-administered medications for this visit  Allergies  Allergies   Allergen Reactions    Augmentin [Amoxicillin-Pot Clavulanate] GI Intolerance       Past medical history, social history, family history, medications and allergies were reviewed  Vitals  There were no vitals filed for this visit  Physical Exam  Physical Exam   Constitutional: She is oriented to person, place, and time  She appears well-developed and well-nourished  HENT:   Head: Normocephalic  Eyes: Pupils are equal, round, and reactive to light  Neck: Normal range of motion  Cardiovascular: Normal rate and regular rhythm  Pulmonary/Chest: Effort normal    Abdominal: Soft  Normal appearance  She exhibits no distension  There is no tenderness  There is no CVA tenderness  Genitourinary:   Genitourinary Comments: No suprapubic discomfort or distention  Negative CVA tenderness   Musculoskeletal: Normal range of motion  Neurological: She is alert and oriented to person, place, and time  Skin: Skin is warm and dry  Psychiatric: She has a normal mood and affect   Her behavior is normal  Judgment and thought content normal        Results    I have personally reviewed all pertinent lab results and reviewed with patient  Lab Results   Component Value Date    GLUCOSE 95 04/08/2019    CALCIUM 9 4 10/22/2019     12/20/2016    K 3 9 10/22/2019    CO2 32 10/22/2019     10/22/2019    BUN 18 10/22/2019    CREATININE 0 89 10/22/2019     Lab Results   Component Value Date    WBC 10 17 (H) 10/22/2019    HGB 12 6 10/22/2019    HCT 40 2 10/22/2019    MCV 92 10/22/2019     10/22/2019     No results found for this or any previous visit (from the past 1 hour(s))

## 2019-12-04 ENCOUNTER — OFFICE VISIT (OUTPATIENT)
Dept: UROLOGY | Facility: HOSPITAL | Age: 84
End: 2019-12-04
Payer: MEDICARE

## 2019-12-04 VITALS
DIASTOLIC BLOOD PRESSURE: 62 MMHG | SYSTOLIC BLOOD PRESSURE: 120 MMHG | HEART RATE: 65 BPM | BODY MASS INDEX: 29.06 KG/M2 | HEIGHT: 63 IN | WEIGHT: 164 LBS

## 2019-12-04 DIAGNOSIS — N39.0 RECURRENT UTI: Primary | ICD-10-CM

## 2019-12-04 PROCEDURE — 99214 OFFICE O/P EST MOD 30 MIN: CPT | Performed by: NURSE PRACTITIONER

## 2019-12-04 RX ORDER — NITROFURANTOIN 25; 75 MG/1; MG/1
100 CAPSULE ORAL DAILY
Qty: 30 CAPSULE | Refills: 3 | Status: SHIPPED | OUTPATIENT
Start: 2019-12-04 | End: 2020-02-05 | Stop reason: SDUPTHER

## 2019-12-05 DIAGNOSIS — N39.0 RECURRENT UTI: ICD-10-CM

## 2019-12-05 NOTE — TELEPHONE ENCOUNTER
The pharmacist from [address] in [facility] calling  He left a message on the Medication Refill voice mail line stating that recently prescribed Premarin vaginal cream is NOT covered by her insurance  Alternative medications are:  Yuvifem or generic Estradiol vaginal cream   Please resubmit a new prescription

## 2019-12-07 RX ORDER — ESTRADIOL 0.1 MG/G
CREAM VAGINAL
Qty: 42.5 G | Refills: 3 | Status: SHIPPED | OUTPATIENT
Start: 2019-12-07 | End: 2020-08-28 | Stop reason: SDUPTHER

## 2019-12-07 NOTE — TELEPHONE ENCOUNTER
Equivalent medication substitution was queued as requested    Script forwarded to the Advanced Practitioner covering the [facility] for approval

## 2019-12-30 DIAGNOSIS — E78.2 MIXED HYPERLIPIDEMIA: ICD-10-CM

## 2019-12-30 DIAGNOSIS — G60.9 IDIOPATHIC PERIPHERAL NEUROPATHY: ICD-10-CM

## 2019-12-30 DIAGNOSIS — I10 ESSENTIAL HYPERTENSION: ICD-10-CM

## 2019-12-30 DIAGNOSIS — K21.9 GASTROESOPHAGEAL REFLUX DISEASE, ESOPHAGITIS PRESENCE NOT SPECIFIED: ICD-10-CM

## 2019-12-30 RX ORDER — HYDROCHLOROTHIAZIDE 12.5 MG/1
12.5 TABLET ORAL DAILY
Qty: 90 TABLET | Refills: 0 | Status: SHIPPED | OUTPATIENT
Start: 2019-12-30 | End: 2020-03-27 | Stop reason: SDUPTHER

## 2019-12-30 RX ORDER — GABAPENTIN 600 MG/1
600 TABLET ORAL 2 TIMES DAILY
Qty: 180 TABLET | Refills: 0 | Status: SHIPPED | OUTPATIENT
Start: 2019-12-30 | End: 2020-03-27 | Stop reason: SDUPTHER

## 2019-12-30 RX ORDER — PRAVASTATIN SODIUM 20 MG
20 TABLET ORAL DAILY
Qty: 90 TABLET | Refills: 0 | Status: SHIPPED | OUTPATIENT
Start: 2019-12-30 | End: 2020-03-27 | Stop reason: SDUPTHER

## 2019-12-30 RX ORDER — OMEPRAZOLE 20 MG/1
20 CAPSULE, DELAYED RELEASE ORAL DAILY
Qty: 90 CAPSULE | Refills: 0 | Status: SHIPPED | OUTPATIENT
Start: 2019-12-30 | End: 2020-03-27 | Stop reason: SDUPTHER

## 2020-02-04 NOTE — PROGRESS NOTES
2/5/2020    Susanna Craig  1/28/1930  [id number]        Assessment  -Recurrent urinary tract infection  -Vaginal atrophy     Discussion/Plan  Ирина Gauthier is a 80 y o  female being managed by Dr Michael John       -patient has been doing well without any recurrence of urinary tract infections since starting low-dose prophylactic daily Macrobid  She wishes to continue  Prescription refill was electronically sent to her pharmacy  Patient will also continue topical estrogen 3 times weekly for symptoms of vaginal atrophy  We reviewed administration instructions  Also discussed dietary and behavioral recommendations  She will follow up in 1 year for re-evaluation, or sooner if needed  Patient was instructed to call with any symptoms of urinary tract infection   -All questions answered, patient and son agree with plan     History of Present Illness  80 y o  female with a history of recurrent UTI and vaginal atrophy presents today for follow up  Patient continues to use topical Estrogen and daily low dose prophylactic Macrobid  Her last urinary tract infection was 10/22/19 for E  Coli organism  Previous cultures have been positive for E coli organism  Patient has had no recurrence of UTI since that time  She continues to report episodes of dysuria and vaginal irritation  Patient denies any lower urinary tract symptoms or gross hematuria  No additional changes to her overall health  Review of Systems  Review of Systems   Constitutional: Negative  HENT: Negative  Respiratory: Negative  Cardiovascular: Negative  Gastrointestinal: Negative  Genitourinary: Negative for decreased urine volume, difficulty urinating, dysuria, flank pain, frequency, hematuria and urgency  Musculoskeletal: Negative  Skin: Negative  Neurological: Negative  Psychiatric/Behavioral: Negative          Past Medical History  Past Medical History:   Diagnosis Date    Balance disorder     Chronic pain     GERD (gastroesophageal reflux disease)     Hard of hearing     Hyperlipidemia     Hypertension     Neuropathy     Pneumonia     Tinnitus     UTI (urinary tract infection)        Past Social History  Past Surgical History:   Procedure Laterality Date    EYE SURGERY      HYSTERECTOMY      TONSILLECTOMY         Past Family History  Family History   Problem Relation Age of Onset    Stroke Mother     Stroke Father     Heart disease Daughter     Substance Abuse Neg Hx     Mental illness Neg Hx        Past Social history  Social History     Socioeconomic History    Marital status:       Spouse name: Not on file    Number of children: Not on file    Years of education: Not on file    Highest education level: Not on file   Occupational History    Not on file   Social Needs    Financial resource strain: Not on file    Food insecurity:     Worry: Not on file     Inability: Not on file    Transportation needs:     Medical: Not on file     Non-medical: Not on file   Tobacco Use    Smoking status: Never Smoker    Smokeless tobacco: Never Used   Substance and Sexual Activity    Alcohol use: Yes     Comment: rarely    Drug use: No    Sexual activity: Not on file   Lifestyle    Physical activity:     Days per week: Not on file     Minutes per session: Not on file    Stress: Not on file   Relationships    Social connections:     Talks on phone: Not on file     Gets together: Not on file     Attends Gnosticism service: Not on file     Active member of club or organization: Not on file     Attends meetings of clubs or organizations: Not on file     Relationship status: Not on file    Intimate partner violence:     Fear of current or ex partner: Not on file     Emotionally abused: Not on file     Physically abused: Not on file     Forced sexual activity: Not on file   Other Topics Concern    Not on file   Social History Narrative    Not on file       Current Medications  Current Outpatient Medications Medication Sig Dispense Refill    aspirin (ECOTRIN LOW STRENGTH) 81 mg EC tablet Take 1 tablet (81 mg total) by mouth daily  0    Cholecalciferol (VITAMIN D3) 1000 units CAPS Take 1,000 Units by mouth daily       Cyanocobalamin (VITAMIN B12 PO) Take by mouth      estradiol (ESTRACE) 0 1 mg/g vaginal cream Insert 1g into the vagina three (3) time a week  42 5 g 3    gabapentin (NEURONTIN) 600 MG tablet Take 1 tablet (600 mg total) by mouth 2 (two) times a day 180 tablet 0    hydrochlorothiazide (HYDRODIURIL) 12 5 mg tablet Take 1 tablet (12 5 mg total) by mouth daily 90 tablet 0    Multiple Vitamins-Minerals (PRESERVISION AREDS PO) Take by mouth 2 (two) times a day      nitrofurantoin (MACROBID) 100 mg capsule Take 1 capsule (100 mg total) by mouth daily 30 capsule 3    omeprazole (PriLOSEC) 20 mg delayed release capsule Take 1 capsule (20 mg total) by mouth daily 90 capsule 0    pravastatin (PRAVACHOL) 20 mg tablet Take 1 tablet (20 mg total) by mouth daily 90 tablet 0     No current facility-administered medications for this visit  Allergies  Allergies   Allergen Reactions    Augmentin [Amoxicillin-Pot Clavulanate] GI Intolerance       Past medical history, social history, family history, medications and allergies were reviewed  Vitals  There were no vitals filed for this visit  Physical Exam  Physical Exam   Constitutional: She is oriented to person, place, and time  She appears well-developed and well-nourished  HENT:   Head: Normocephalic  Eyes: Pupils are equal, round, and reactive to light  Neck: Normal range of motion  Cardiovascular: Normal rate and regular rhythm  Pulmonary/Chest: Effort normal    Abdominal: Soft  Normal appearance  She exhibits no distension  There is no tenderness  There is no CVA tenderness  Musculoskeletal: Normal range of motion  Neurological: She is alert and oriented to person, place, and time  Skin: Skin is warm and dry     Psychiatric: She has a normal mood and affect  Her behavior is normal  Judgment and thought content normal        Results    I have personally reviewed all pertinent lab results and reviewed with patient  Lab Results   Component Value Date    GLUCOSE 95 04/08/2019    CALCIUM 9 4 10/22/2019     12/20/2016    K 3 9 10/22/2019    CO2 32 10/22/2019     10/22/2019    BUN 18 10/22/2019    CREATININE 0 89 10/22/2019     Lab Results   Component Value Date    WBC 10 17 (H) 10/22/2019    HGB 12 6 10/22/2019    HCT 40 2 10/22/2019    MCV 92 10/22/2019     10/22/2019     No results found for this or any previous visit (from the past 1 hour(s))

## 2020-02-05 ENCOUNTER — OFFICE VISIT (OUTPATIENT)
Dept: UROLOGY | Facility: HOSPITAL | Age: 85
End: 2020-02-05
Payer: MEDICARE

## 2020-02-05 VITALS
HEART RATE: 82 BPM | HEIGHT: 63 IN | BODY MASS INDEX: 29.06 KG/M2 | DIASTOLIC BLOOD PRESSURE: 68 MMHG | SYSTOLIC BLOOD PRESSURE: 138 MMHG | WEIGHT: 164 LBS

## 2020-02-05 DIAGNOSIS — N95.2 VAGINAL ATROPHY: Primary | ICD-10-CM

## 2020-02-05 DIAGNOSIS — N39.0 RECURRENT UTI: ICD-10-CM

## 2020-02-05 PROCEDURE — 3008F BODY MASS INDEX DOCD: CPT | Performed by: NURSE PRACTITIONER

## 2020-02-05 PROCEDURE — 3078F DIAST BP <80 MM HG: CPT | Performed by: NURSE PRACTITIONER

## 2020-02-05 PROCEDURE — 99214 OFFICE O/P EST MOD 30 MIN: CPT | Performed by: NURSE PRACTITIONER

## 2020-02-05 PROCEDURE — 3075F SYST BP GE 130 - 139MM HG: CPT | Performed by: NURSE PRACTITIONER

## 2020-02-05 PROCEDURE — 4040F PNEUMOC VAC/ADMIN/RCVD: CPT | Performed by: NURSE PRACTITIONER

## 2020-02-05 PROCEDURE — 1160F RVW MEDS BY RX/DR IN RCRD: CPT | Performed by: NURSE PRACTITIONER

## 2020-02-05 PROCEDURE — 1036F TOBACCO NON-USER: CPT | Performed by: NURSE PRACTITIONER

## 2020-02-05 RX ORDER — NITROFURANTOIN 25; 75 MG/1; MG/1
100 CAPSULE ORAL DAILY
Qty: 90 CAPSULE | Refills: 3 | Status: SHIPPED | OUTPATIENT
Start: 2020-02-05 | End: 2020-08-28 | Stop reason: SDUPTHER

## 2020-03-27 ENCOUNTER — TELEPHONE (OUTPATIENT)
Dept: UROLOGY | Facility: MEDICAL CENTER | Age: 85
End: 2020-03-27

## 2020-03-27 DIAGNOSIS — K21.9 GASTROESOPHAGEAL REFLUX DISEASE, ESOPHAGITIS PRESENCE NOT SPECIFIED: ICD-10-CM

## 2020-03-27 DIAGNOSIS — E78.2 MIXED HYPERLIPIDEMIA: ICD-10-CM

## 2020-03-27 DIAGNOSIS — G60.9 IDIOPATHIC PERIPHERAL NEUROPATHY: ICD-10-CM

## 2020-03-27 DIAGNOSIS — I10 ESSENTIAL HYPERTENSION: ICD-10-CM

## 2020-03-27 NOTE — TELEPHONE ENCOUNTER
Medication Refill Request  Savage Chang    Patient calling to request a refill of Macrobid 100mg for 3 months   to be sent to  [address] [facility]  [place] [address]  Phone number [phone]    Patient can be reached at: [phone]

## 2020-03-27 NOTE — TELEPHONE ENCOUNTER
Call placed to patient, left detailed message per communication consent form to advise that medication was sent electronically to [facility] on 2/5/2020 (receipt confirmed by pharmacy) for 90 day supply with 3 refills  Advised patient to call pharmacy to check status and to call our office back with any issues  Office number provided on voicemail

## 2020-03-29 RX ORDER — OMEPRAZOLE 20 MG/1
20 CAPSULE, DELAYED RELEASE ORAL DAILY
Qty: 90 CAPSULE | Refills: 0 | Status: SHIPPED | OUTPATIENT
Start: 2020-03-29 | End: 2020-07-06 | Stop reason: SDUPTHER

## 2020-03-29 RX ORDER — HYDROCHLOROTHIAZIDE 12.5 MG/1
12.5 TABLET ORAL DAILY
Qty: 90 TABLET | Refills: 0 | Status: SHIPPED | OUTPATIENT
Start: 2020-03-29 | End: 2020-07-06 | Stop reason: SDUPTHER

## 2020-03-29 RX ORDER — GABAPENTIN 600 MG/1
600 TABLET ORAL 2 TIMES DAILY
Qty: 180 TABLET | Refills: 0 | Status: SHIPPED | OUTPATIENT
Start: 2020-03-29 | End: 2020-07-06 | Stop reason: SDUPTHER

## 2020-03-29 RX ORDER — PRAVASTATIN SODIUM 20 MG
20 TABLET ORAL DAILY
Qty: 90 TABLET | Refills: 0 | Status: SHIPPED | OUTPATIENT
Start: 2020-03-29 | End: 2020-07-06 | Stop reason: SDUPTHER

## 2020-07-06 DIAGNOSIS — I10 ESSENTIAL HYPERTENSION: ICD-10-CM

## 2020-07-06 DIAGNOSIS — E78.2 MIXED HYPERLIPIDEMIA: ICD-10-CM

## 2020-07-06 DIAGNOSIS — K21.9 GASTROESOPHAGEAL REFLUX DISEASE, ESOPHAGITIS PRESENCE NOT SPECIFIED: ICD-10-CM

## 2020-07-06 DIAGNOSIS — G60.9 IDIOPATHIC PERIPHERAL NEUROPATHY: ICD-10-CM

## 2020-07-06 RX ORDER — OMEPRAZOLE 20 MG/1
20 CAPSULE, DELAYED RELEASE ORAL DAILY
Qty: 90 CAPSULE | Refills: 0 | Status: SHIPPED | OUTPATIENT
Start: 2020-07-06 | End: 2020-09-11 | Stop reason: SDUPTHER

## 2020-07-06 RX ORDER — PRAVASTATIN SODIUM 20 MG
20 TABLET ORAL DAILY
Qty: 90 TABLET | Refills: 0 | Status: SHIPPED | OUTPATIENT
Start: 2020-07-06 | End: 2020-09-11 | Stop reason: SDUPTHER

## 2020-07-06 RX ORDER — GABAPENTIN 600 MG/1
600 TABLET ORAL 2 TIMES DAILY
Qty: 180 TABLET | Refills: 0 | Status: SHIPPED | OUTPATIENT
Start: 2020-07-06 | End: 2020-09-11 | Stop reason: SDUPTHER

## 2020-07-06 RX ORDER — HYDROCHLOROTHIAZIDE 12.5 MG/1
12.5 TABLET ORAL DAILY
Qty: 90 TABLET | Refills: 0 | Status: SHIPPED | OUTPATIENT
Start: 2020-07-06 | End: 2020-09-11 | Stop reason: SDUPTHER

## 2020-08-14 ENCOUNTER — TELEPHONE (OUTPATIENT)
Dept: FAMILY MEDICINE CLINIC | Facility: CLINIC | Age: 85
End: 2020-08-14

## 2020-08-14 NOTE — TELEPHONE ENCOUNTER
----- Message from Jaylin Castillo MD sent at 7/19/2020  1:37 PM EDT -----  Regarding: MCW-  Due ANAMIKA and DIPIKA OV

## 2020-08-18 ENCOUNTER — TELEPHONE (OUTPATIENT)
Dept: FAMILY MEDICINE CLINIC | Facility: CLINIC | Age: 85
End: 2020-08-18

## 2020-08-18 NOTE — TELEPHONE ENCOUNTER
----- Message from Jose Armando Marcum MD sent at 8/15/2020  9:36 AM EDT -----  Regarding: ANAMIKA  Due ANAMIKA

## 2020-08-28 DIAGNOSIS — N39.0 RECURRENT UTI: Primary | ICD-10-CM

## 2020-08-28 RX ORDER — ESTRADIOL 0.1 MG/G
CREAM VAGINAL
Qty: 42.5 G | Refills: 3 | Status: ON HOLD | OUTPATIENT
Start: 2020-08-28 | End: 2022-05-08

## 2020-08-28 NOTE — TELEPHONE ENCOUNTER
Patient left a message on the Medication Refill voice mail line requesting a new prescription for Nitrofurantoin 100mg and Estradiol (ESTRACE) vaginal cream to [address] in [place], [place]  Quantity was not specified

## 2020-08-28 NOTE — TELEPHONE ENCOUNTER
The patient has an upcoming office visit scheduled for 2/10/21 with Chino Rivers in the [facility] location but will run out of medication until then    Request for same, 90 day supply with 2 refills was queued and forwarded to the Advanced Practitioner covering the [facility] location for approval

## 2020-08-31 RX ORDER — NITROFURANTOIN 25; 75 MG/1; MG/1
100 CAPSULE ORAL DAILY
Qty: 90 CAPSULE | Refills: 2 | Status: SHIPPED | OUTPATIENT
Start: 2020-08-31 | End: 2021-04-29 | Stop reason: SDUPTHER

## 2020-09-11 ENCOUNTER — OFFICE VISIT (OUTPATIENT)
Dept: FAMILY MEDICINE CLINIC | Facility: CLINIC | Age: 85
End: 2020-09-11
Payer: MEDICARE

## 2020-09-11 VITALS
BODY MASS INDEX: 29.06 KG/M2 | HEIGHT: 60 IN | HEART RATE: 71 BPM | OXYGEN SATURATION: 95 % | WEIGHT: 148 LBS | SYSTOLIC BLOOD PRESSURE: 151 MMHG | TEMPERATURE: 97.5 F | DIASTOLIC BLOOD PRESSURE: 66 MMHG

## 2020-09-11 DIAGNOSIS — G60.9 IDIOPATHIC PERIPHERAL NEUROPATHY: ICD-10-CM

## 2020-09-11 DIAGNOSIS — Z00.00 MEDICARE ANNUAL WELLNESS VISIT, SUBSEQUENT: Primary | ICD-10-CM

## 2020-09-11 DIAGNOSIS — H35.3211 EXUDATIVE AGE-RELATED MACULAR DEGENERATION OF RIGHT EYE WITH ACTIVE CHOROIDAL NEOVASCULARIZATION (HCC): ICD-10-CM

## 2020-09-11 DIAGNOSIS — I87.2 VENOUS INSUFFICIENCY: ICD-10-CM

## 2020-09-11 DIAGNOSIS — K21.9 GASTROESOPHAGEAL REFLUX DISEASE, ESOPHAGITIS PRESENCE NOT SPECIFIED: ICD-10-CM

## 2020-09-11 DIAGNOSIS — Z23 NEED FOR INFLUENZA VACCINATION: ICD-10-CM

## 2020-09-11 DIAGNOSIS — E78.2 MIXED HYPERLIPIDEMIA: ICD-10-CM

## 2020-09-11 DIAGNOSIS — I10 ESSENTIAL HYPERTENSION: ICD-10-CM

## 2020-09-11 PROCEDURE — 90662 IIV NO PRSV INCREASED AG IM: CPT

## 2020-09-11 PROCEDURE — 99214 OFFICE O/P EST MOD 30 MIN: CPT | Performed by: FAMILY MEDICINE

## 2020-09-11 PROCEDURE — G0439 PPPS, SUBSEQ VISIT: HCPCS | Performed by: FAMILY MEDICINE

## 2020-09-11 PROCEDURE — G0008 ADMIN INFLUENZA VIRUS VAC: HCPCS

## 2020-09-11 RX ORDER — PRAVASTATIN SODIUM 20 MG
20 TABLET ORAL DAILY
Qty: 90 TABLET | Refills: 0 | Status: SHIPPED | OUTPATIENT
Start: 2020-09-11 | End: 2021-01-11 | Stop reason: SDUPTHER

## 2020-09-11 RX ORDER — GABAPENTIN 600 MG/1
600 TABLET ORAL 2 TIMES DAILY
Qty: 180 TABLET | Refills: 0 | Status: SHIPPED | OUTPATIENT
Start: 2020-09-11 | End: 2021-01-11 | Stop reason: SDUPTHER

## 2020-09-11 RX ORDER — HYDROCHLOROTHIAZIDE 12.5 MG/1
12.5 TABLET ORAL DAILY
Qty: 90 TABLET | Refills: 0 | Status: SHIPPED | OUTPATIENT
Start: 2020-09-11 | End: 2021-01-11 | Stop reason: SDUPTHER

## 2020-09-11 RX ORDER — OMEPRAZOLE 20 MG/1
20 CAPSULE, DELAYED RELEASE ORAL DAILY
Qty: 90 CAPSULE | Refills: 0 | Status: SHIPPED | OUTPATIENT
Start: 2020-09-11 | End: 2021-01-11 | Stop reason: SDUPTHER

## 2020-09-11 NOTE — PROGRESS NOTES
Assessment and Plan:     Problem List Items Addressed This Visit     None        BMI Counseling: Body mass index is 28 9 kg/m²  The BMI is above normal  Nutrition recommendations include decreasing portion sizes and moderation in carbohydrate intake  Exercise recommendations include exercising 3-5 times per week  No pharmacotherapy was ordered  Patient referred to PCP due to patient being overweight  Preventive health issues were discussed with patient, and age appropriate screening tests were ordered as noted in patient's After Visit Summary  Personalized health advice and appropriate referrals for health education or preventive services given if needed, as noted in patient's After Visit Summary       History of Present Illness:     Patient presents for Medicare Annual Wellness visit    Patient Care Team:  Salome Fong MD as PCP - General     Problem List:     Patient Active Problem List   Diagnosis    Essential hypertension    Idiopathic peripheral neuropathy    GERD (gastroesophageal reflux disease)    Vitamin B 12 deficiency    Fall    UTI (urinary tract infection)    Arthropathy    Mixed hyperlipidemia    Venous insufficiency    Intermediate stage nonexudative age-related macular degeneration of both eyes    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 eye with active choroidal neovascularization [facility])      Past Medical and Surgical History:     Past Medical History:   Diagnosis Date    Balance disorder     Chronic pain     GERD (gastroesophageal reflux disease)     Hard of hearing     Hyperlipidemia     Hypertension     Neuropathy     Pneumonia     Tinnitus     UTI (urinary tract infection)      Past Surgical History:   Procedure Laterality Date    EYE SURGERY      HYSTERECTOMY      TONSILLECTOMY        Family History:     Family History   Problem Relation Age of Onset    Stroke Mother     Stroke Father     Heart disease Daughter     Substance Abuse Neg Hx     Mental illness Neg Hx       Social History:        Social History     Socioeconomic History    Marital status:      Spouse name: None    Number of children: None    Years of education: None    Highest education level: None   Occupational History    None   Social Needs    Financial resource strain: None    Food insecurity     Worry: None     Inability: None    Transportation needs     Medical: None     Non-medical: None   Tobacco Use    Smoking status: Never Smoker    Smokeless tobacco: Never Used   Substance and Sexual Activity    Alcohol use: Yes     Comment: rarely    Drug use: No    Sexual activity: None   Lifestyle    Physical activity     Days per week: None     Minutes per session: None    Stress: None   Relationships    Social connections     Talks on phone: None     Gets together: None     Attends Baptist service: None     Active member of club or organization: None     Attends meetings of clubs or organizations: None     Relationship status: None    Intimate partner violence     Fear of current or ex partner: None     Emotionally abused: None     Physically abused: None     Forced sexual activity: None   Other Topics Concern    None   Social History Narrative    None      Medications and Allergies:     Current Outpatient Medications   Medication Sig Dispense Refill    aspirin (ECOTRIN LOW STRENGTH) 81 mg EC tablet Take 1 tablet (81 mg total) by mouth daily  0    Cholecalciferol (VITAMIN D3) 1000 units CAPS Take 1,000 Units by mouth daily       Cyanocobalamin (VITAMIN B12 PO) Take by mouth      estradiol (ESTRACE) 0 1 mg/g vaginal cream Insert 1g into the vagina three (3) time a week   42 5 g 3    gabapentin (NEURONTIN) 600 MG tablet Take 1 tablet (600 mg total) by mouth 2 (two) times a day 180 tablet 0    hydrochlorothiazide (HYDRODIURIL) 12 5 mg tablet Take 1 tablet (12 5 mg total) by mouth daily 90 tablet 0    Multiple Vitamins-Minerals (PRESERVISION AREDS PO) Take by mouth 2 (two) times a day      nitrofurantoin (MACROBID) 100 mg capsule Take 1 capsule (100 mg total) by mouth daily 90 capsule 2    omeprazole (PriLOSEC) 20 mg delayed release capsule Take 1 capsule (20 mg total) by mouth daily 90 capsule 0    pravastatin (PRAVACHOL) 20 mg tablet Take 1 tablet (20 mg total) by mouth daily 90 tablet 0     No current facility-administered medications for this visit  Allergies   Allergen Reactions    Augmentin [Amoxicillin-Pot Clavulanate] GI Intolerance      Immunizations:     Immunization History   Administered Date(s) Administered    Influenza Split High Dose Preservative Free IM 09/09/2016    Influenza, high dose seasonal 0 7 mL 11/27/2018    Pneumococcal Conjugate 13-Valent 09/09/2016    Pneumococcal Polysaccharide PPV23 04/23/2013      Health Maintenance: There are no preventive care reminders to display for this patient  Topic Date Due    DTaP,Tdap,and Td Vaccines (1 - Tdap) 01/28/1951    Influenza Vaccine  07/01/2020      Medicare Health Risk Assessment:     /66 (BP Location: Right arm, Patient Position: Sitting, Cuff Size: Standard)   Pulse 71   Temp 97 5 °F (36 4 °C) (Tympanic)   Ht 5' (1 524 m)   Wt 67 1 kg (148 lb)   SpO2 95%   BMI 28 90 kg/m²      Shaeye Gains is here for her Subsequent Wellness visit  Last Medicare Wellness visit information reviewed, patient interviewed and updates made to the record today  Health Risk Assessment:   Patient rates overall health as good  Patient feels that their physical health rating is same  Eyesight was rated as slightly worse  Hearing was rated as same  Patient feels that their emotional and mental health rating is same  Pain experienced in the last 7 days has been some  Patient's pain rating has been 7/10  Patient states that she has experienced weight loss or gain in last 6 months  Depression Screening:   PHQ-2 Score: 0      Fall Risk Screening:    In the past year, patient has experienced: history of falling in past year    Number of falls: 2 or more  Injured during fall?: No    Feels unsteady when standing or walking?: Yes    Worried about falling?: Yes      Urinary Incontinence Screening:   Patient has leaked urine accidently in the last six months  Home Safety:  Patient has trouble with stairs inside or outside of their home  Patient has working smoke alarms and has working carbon monoxide detector  Nutrition:   Current diet is Regular  Medications:   Patient is currently taking over-the-counter supplements  OTC medications include: see medication list  Patient is able to manage medications  Activities of Daily Living (ADLs)/Instrumental Activities of Daily Living (IADLs):   Walk and transfer into and out of bed and chair?: Yes  Dress and groom yourself?: Yes    Bathe or shower yourself?: Yes    Feed yourself? Yes  Do your laundry/housekeeping?: Yes  Manage your money, pay your bills and track your expenses?: Yes  Make your own meals?: Yes    Do your own shopping?: Yes    Previous Hospitalizations:   Any hospitalizations or ED visits within the last 12 months?: No      Advance Care Planning:   Living will: Yes    Durable POA for healthcare:  Yes    Advanced directive: Yes    Provider agrees with end of life decisions: Yes      Cognitive Screening:   Provider or family/friend/caregiver concerned regarding cognition?: No    PREVENTIVE SCREENINGS      Cardiovascular Screening:    General: Screening Not Indicated and History Lipid Disorder      Diabetes Screening:     General: Screening Current      Colorectal Cancer Screening:     General: Screening Not Indicated      Breast Cancer Screening:     General: Screening Not Indicated      Cervical Cancer Screening:    General: Screening Not Indicated      Osteoporosis Screening:    General: Screening Not Indicated      Abdominal Aortic Aneurysm (AAA) Screening:        General: Screening Not Indicated      Lung Cancer Screening:     General: Screening Not Indicated      Hepatitis C Screening:    General: Screening Not Indicated    Other Counseling Topics:   Car/seat belt/driving safety and calcium and vitamin D intake         Janice Valdovinos MD

## 2020-09-11 NOTE — PROGRESS NOTES
Subjective:   Chief Complaint   Patient presents with    Medicare Wellness Visit        Patient ID: Jose Alejandro James is a 80 y o  female  Medical management-  1) hypertension-good control current medications  2 neuropathy-partial relief with gabapentin  3) hyperlipidemia-due to get levels checked  Tolerating pravastatin  4) GERD-stable current medications  5) macular degeneration-sees ophthalmologist   6) venous insufficiency-has been much improved recently  No significant swelling today  The following portions of the patient's history were reviewed and updated as appropriate: allergies, current medications, past family history, past medical history, past social history, past surgical history and problem list     Review of Systems   Constitutional: Negative for activity change, appetite change, diaphoresis and fatigue  Respiratory: Negative for cough, chest tightness, shortness of breath and wheezing  Cardiovascular: Negative for chest pain, palpitations and leg swelling  Fast or slow heart rate   Gastrointestinal: Negative for abdominal pain, blood in stool, constipation, diarrhea, nausea and vomiting  Genitourinary: Negative for difficulty urinating, dysuria, frequency, hematuria and vaginal bleeding  Musculoskeletal: Negative for arthralgias, gait problem, joint swelling and myalgias  Neurological: Positive for numbness  Negative for dizziness, light-headedness and headaches  Psychiatric/Behavioral: Negative for agitation, confusion, dysphoric mood and sleep disturbance  The patient is not nervous/anxious  Objective:  Vitals:    09/11/20 1142   BP: 151/66   BP Location: Right arm   Patient Position: Sitting   Cuff Size: Standard   Pulse: 71   Temp: 97 5 °F (36 4 °C)   TempSrc: Tympanic   SpO2: 95%   Weight: 67 1 kg (148 lb)   Height: 5' (1 524 m)      Physical Exam  Vitals signs reviewed  Constitutional:       General: She is not in acute distress  Appearance: She is well-developed  HENT:      Head: Normocephalic and atraumatic  Right Ear: Tympanic membrane and external ear normal  No drainage  Left Ear: Tympanic membrane normal  No drainage  Mouth/Throat:      Pharynx: No oropharyngeal exudate  Eyes:      General:         Right eye: No discharge  Left eye: No discharge  Conjunctiva/sclera: Conjunctivae normal    Neck:      Musculoskeletal: Normal range of motion and neck supple  Thyroid: No thyromegaly  Cardiovascular:      Rate and Rhythm: Normal rate and regular rhythm  Heart sounds: Normal heart sounds  Pulmonary:      Effort: Pulmonary effort is normal  No respiratory distress  Breath sounds: No wheezing or rales  Musculoskeletal:         General: No tenderness  Right lower leg: No edema  Left lower leg: No edema  Lymphadenopathy:      Cervical: No cervical adenopathy  Skin:     General: Skin is warm and dry  Psychiatric:         Mood and Affect: Mood normal          Behavior: Behavior normal            Assessment/Plan:    No problem-specific Assessment & Plan notes found for this encounter  Diagnoses and all orders for this visit:    Medicare annual wellness visit, subsequent    Mixed hyperlipidemia  -     pravastatin (PRAVACHOL) 20 mg tablet; Take 1 tablet (20 mg total) by mouth daily  -     Comprehensive metabolic panel; Future  -     Lipid Panel with Direct LDL reflex; Future  -     Comprehensive metabolic panel  -     Lipid Panel with Direct LDL reflex    Gastroesophageal reflux disease, esophagitis presence not specified  -     omeprazole (PriLOSEC) 20 mg delayed release capsule; Take 1 capsule (20 mg total) by mouth daily    Essential hypertension  -     hydrochlorothiazide (HYDRODIURIL) 12 5 mg tablet; Take 1 tablet (12 5 mg total) by mouth daily  -     CBC and differential; Future  -     TSH, 3rd generation;  Future  -     CBC and differential  -     TSH, 3rd generation    Idiopathic peripheral neuropathy  -     gabapentin (NEURONTIN) 600 MG tablet; Take 1 tablet (600 mg total) by mouth 2 (two) times a day    Venous insufficiency  -     CBC and differential; Future  -     CBC and differential    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 eye with active choroidal neovascularization [facility])        Medical management-continue current medications  Continue appropriate diet  Avoid salt to help with ankle swelling  Flu shot given today    Get labs as ordered to include CMP, lipid panel, CBC

## 2020-09-11 NOTE — PATIENT INSTRUCTIONS
Medical management-continue current medications  Continue appropriate diet  Avoid salt to help with ankle swelling  Flu shot given today  Get labs as ordered to include CMP, lipid panel, CBC  Medicare Preventive Visit Patient Instructions  Thank you for completing your Welcome to Medicare Visit or Medicare Annual Wellness Visit today  Your next wellness visit will be due in one year (9/11/2021)  The screening/preventive services that you may require over the next 5-10 years are detailed below  Some tests may not apply to you based off risk factors and/or age  Screening tests ordered at today's visit but not completed yet may show as past due  Also, please note that scanned in results may not display below  Preventive Screenings:  Service Recommendations Previous Testing/Comments   Colorectal Cancer Screening  * Colonoscopy    * Fecal Occult Blood Test (FOBT)/Fecal Immunochemical Test (FIT)  * Fecal DNA/Cologuard Test  * Flexible Sigmoidoscopy Age: 54-65 years old   Colonoscopy: every 10 years (may be performed more frequently if at higher risk)  OR  FOBT/FIT: every 1 year  OR  Cologuard: every 3 years  OR  Sigmoidoscopy: every 5 years  Screening may be recommended earlier than age 48 if at higher risk for colorectal cancer  Also, an individualized decision between you and your healthcare provider will decide whether screening between the ages of 74-80 would be appropriate  Colonoscopy: Not on file  FOBT/FIT: Not on file  Cologuard: Not on file  Sigmoidoscopy: Not on file    Screening Not Indicated     Breast Cancer Screening Age: 36 years old  Frequency: every 1-2 years  Not required if history of left and right mastectomy Mammogram: Not on file       Cervical Cancer Screening Between the ages of 21-29, pap smear recommended once every 3 years  Between the ages of 33-67, can perform pap smear with HPV co-testing every 5 years     Recommendations may differ for women with a history of total hysterectomy, cervical cancer, or abnormal pap smears in past  Pap Smear: Not on file    Screening Not Indicated   Hepatitis C Screening Once for adults born between 1945 and 1965  More frequently in patients at high risk for Hepatitis C Hep C Antibody: Not on file       Diabetes Screening 1-2 times per year if you're at risk for diabetes or have pre-diabetes Fasting glucose: 117 mg/dL   A1C: No results in last 5 years    Screening Current   Cholesterol Screening Once every 5 years if you don't have a lipid disorder  May order more often based on risk factors  Lipid panel: 12/20/2016    Screening Not Indicated  History Lipid Disorder     Other Preventive Screenings Covered by Medicare:  1  Abdominal Aortic Aneurysm (AAA) Screening: covered once if your at risk  You're considered to be at risk if you have a family history of AAA  2  Lung Cancer Screening: covers low dose CT scan once per year if you meet all of the following conditions: (1) Age 50-69; (2) No signs or symptoms of lung cancer; (3) Current smoker or have quit smoking within the last 15 years; (4) You have a tobacco smoking history of at least 30 pack years (packs per day multiplied by number of years you smoked); (5) You get a written order from a healthcare provider  3  Glaucoma Screening: covered annually if you're considered high risk: (1) You have diabetes OR (2) Family history of glaucoma OR (3)  aged 48 and older OR (3)  American aged 72 and older  3  Osteoporosis Screening: covered every 2 years if you meet one of the following conditions: (1) You're estrogen deficient and at risk for osteoporosis based off medical history and other findings; (2) Have a vertebral abnormality; (3) On glucocorticoid therapy for more than 3 months; (4) Have primary hyperparathyroidism; (5) On osteoporosis medications and need to assess response to drug therapy  · Last bone density test (DXA Scan): Not on file    5  HIV Screening: covered annually if you're between the age of 12-76  Also covered annually if you are younger than 13 and older than 72 with risk factors for HIV infection  For pregnant patients, it is covered up to 3 times per pregnancy  Immunizations:  Immunization Recommendations   Influenza Vaccine Annual influenza vaccination during flu season is recommended for all persons aged >= 6 months who do not have contraindications   Pneumococcal Vaccine (Prevnar and Pneumovax)  * Prevnar = PCV13  * Pneumovax = PPSV23   Adults 25-60 years old: 1-3 doses may be recommended based on certain risk factors  Adults 72 years old: Prevnar (PCV13) vaccine recommended followed by Pneumovax (PPSV23) vaccine  If already received PPSV23 since turning 65, then PCV13 recommended at least one year after PPSV23 dose  Hepatitis B Vaccine 3 dose series if at intermediate or high risk (ex: diabetes, end stage renal disease, liver disease)   Tetanus (Td) Vaccine - COST NOT COVERED BY MEDICARE PART B Following completion of primary series, a booster dose should be given every 10 years to maintain immunity against tetanus  Td may also be given as tetanus wound prophylaxis  Tdap Vaccine - COST NOT COVERED BY MEDICARE PART B Recommended at least once for all adults  For pregnant patients, recommended with each pregnancy  Shingles Vaccine (Shingrix) - COST NOT COVERED BY MEDICARE PART B  2 shot series recommended in those aged 48 and above     Health Maintenance Due:  There are no preventive care reminders to display for this patient  Immunizations Due:      Topic Date Due    DTaP,Tdap,and Td Vaccines (1 - Tdap) 01/28/1951    Influenza Vaccine  07/01/2020     Advance Directives   What are advance directives? Advance directives are legal documents that state your wishes and plans for medical care  These plans are made ahead of time in case you lose your ability to make decisions for yourself   Advance directives can apply to any medical decision, such as the treatments you want, and if you want to donate organs  What are the types of advance directives? There are many types of advance directives, and each state has rules about how to use them  You may choose a combination of any of the following:  · Living will: This is a written record of the treatment you want  You can also choose which treatments you do not want, which to limit, and which to stop at a certain time  This includes surgery, medicine, IV fluid, and tube feedings  · Durable power of  for healthcare [facility]): This is a written record that states who you want to make healthcare choices for you when you are unable to make them for yourself  This person, called a proxy, is usually a family member or a friend  You may choose more than 1 proxy  · Do not resuscitate (DNR) order:  A DNR order is used in case your heart stops beating or you stop breathing  It is a request not to have certain forms of treatment, such as CPR  A DNR order may be included in other types of advance directives  · Medical directive: This covers the care that you want if you are in a coma, near death, or unable to make decisions for yourself  You can list the treatments you want for each condition  Treatment may include pain medicine, surgery, blood transfusions, dialysis, IV or tube feedings, and a ventilator (breathing machine)  · Values history: This document has questions about your views, beliefs, and how you feel and think about life  This information can help others choose the care that you would choose  Why are advance directives important? An advance directive helps you control your care  Although spoken wishes may be used, it is better to have your wishes written down  Spoken wishes can be misunderstood, or not followed  Treatments may be given even if you do not want them  An advance directive may make it easier for your family to make difficult choices about your care     Fall Prevention    Fall prevention includes ways to make your home and other areas safer  It also includes ways you can move more carefully to prevent a fall  Health conditions that cause changes in your blood pressure, vision, or muscle strength and coordination may increase your risk for falls  Medicines may also increase your risk for falls if they make you dizzy, weak, or sleepy  Fall prevention tips:   · Stand or sit up slowly  · Use assistive devices as directed  · Wear shoes that fit well and have soles that   · Wear a personal alarm  · Stay active  · Manage your medical conditions  Home Safety Tips:  · Add items to prevent falls in the bathroom  · Keep paths clear  · Install bright lights in your home  · Keep items you use often on shelves within reach  · Paint or place reflective tape on the edges of your stairs  Urinary Incontinence   Urinary incontinence (UI)  is when you lose control of your bladder  UI develops because your bladder cannot store or empty urine properly  The 3 most common types of UI are stress incontinence, urge incontinence, or both  Medicines:   · May be given to help strengthen your bladder control  Report any side effects of medication to your healthcare provider  Do pelvic muscle exercises often:  Your pelvic muscles help you stop urinating  Squeeze these muscles tight for 5 seconds, then relax for 5 seconds  Gradually work up to squeezing for 10 seconds  Do 3 sets of 15 repetitions a day, or as directed  This will help strengthen your pelvic muscles and improve bladder control  Train your bladder:  Go to the bathroom at set times, such as every 2 hours, even if you do not feel the urge to go  You can also try to hold your urine when you feel the urge to go  For example, hold your urine for 5 minutes when you feel the urge to go  As that becomes easier, hold your urine for 10 minutes  Self-care:   · Keep a UI record    Write down how often you leak urine and how much you leak  Make a note of what you were doing when you leaked urine  · Drink liquids as directed  You may need to limit the amount of liquid you drink to help control your urine leakage  Do not drink any liquid right before you go to bed  Limit or do not have drinks that contain caffeine or alcohol  · Prevent constipation  Eat a variety of high-fiber foods  Good examples are high-fiber cereals, beans, vegetables, and whole-grain breads  Walking is the best way to trigger your intestines to have a bowel movement  · Exercise regularly and maintain a healthy weight  Weight loss and exercise will decrease pressure on your bladder and help you control your leakage  · Use a catheter as directed  to help empty your bladder  A catheter is a tiny, plastic tube that is put into your bladder to drain your urine  · Go to behavior therapy as directed  Behavior therapy may be used to help you learn to control your urge to urinate  Weight Management   Why it is important to manage your weight:  Being overweight increases your risk of health conditions such as heart disease, high blood pressure, type 2 diabetes, and certain types of cancer  It can also increase your risk for osteoarthritis, sleep apnea, and other respiratory problems  Aim for a slow, steady weight loss  Even a small amount of weight loss can lower your risk of health problems  How to lose weight safely:  A safe and healthy way to lose weight is to eat fewer calories and get regular exercise  You can lose up about 1 pound a week by decreasing the number of calories you eat by 500 calories each day  Healthy meal plan for weight management:  A healthy meal plan includes a variety of foods, contains fewer calories, and helps you stay healthy  A healthy meal plan includes the following:  · Eat whole-grain foods more often  A healthy meal plan should contain fiber  Fiber is the part of grains, fruits, and vegetables that is not broken down by your body  Whole-grain foods are healthy and provide extra fiber in your diet  Some examples of whole-grain foods are whole-wheat breads and pastas, oatmeal, brown rice, and bulgur  · Eat a variety of vegetables every day  Include dark, leafy greens such as spinach, kale, fidel greens, and mustard greens  Eat yellow and orange vegetables such as carrots, sweet potatoes, and winter squash  · Eat a variety of fruits every day  Choose fresh or canned fruit (canned in its own juice or light syrup) instead of juice  Fruit juice has very little or no fiber  · Eat low-fat dairy foods  Drink fat-free (skim) milk or 1% milk  Eat fat-free yogurt and low-fat cottage cheese  Try low-fat cheeses such as mozzarella and other reduced-fat cheeses  · Choose meat and other protein foods that are low in fat  Choose beans or other legumes such as split peas or lentils  Choose fish, skinless poultry (chicken or turkey), or lean cuts of red meat (beef or pork)  Before you cook meat or poultry, cut off any visible fat  · Use less fat and oil  Try baking foods instead of frying them  Add less fat, such as margarine, sour cream, regular salad dressing and mayonnaise to foods  Eat fewer high-fat foods  Some examples of high-fat foods include french fries, doughnuts, ice cream, and cakes  · Eat fewer sweets  Limit foods and drinks that are high in sugar  This includes candy, cookies, regular soda, and sweetened drinks  Exercise:  Exercise at least 30 minutes per day on most days of the week  Some examples of exercise include walking, biking, dancing, and swimming  You can also fit in more physical activity by taking the stairs instead of the elevator or parking farther away from stores  Ask your healthcare provider about the best exercise plan for you  © Copyright [employer] 2018 Information is for End User's use only and may not be sold, redistributed or otherwise used for commercial purposes   All illustrations and images included in [employer] are the copyrighted property of [employer]  or [facility]

## 2020-09-17 ENCOUNTER — TELEPHONE (OUTPATIENT)
Dept: FAMILY MEDICINE CLINIC | Facility: CLINIC | Age: 85
End: 2020-09-17

## 2020-09-17 LAB
ALBUMIN SERPL-MCNC: 3.8 G/DL (ref 3.6–5.1)
ALBUMIN/GLOB SERPL: 1.1 (CALC) (ref 1–2.5)
ALP SERPL-CCNC: 53 U/L (ref 37–153)
ALT SERPL-CCNC: 12 U/L (ref 6–29)
AST SERPL-CCNC: 17 U/L (ref 10–35)
BASOPHILS # BLD AUTO: 30 CELLS/UL (ref 0–200)
BASOPHILS NFR BLD AUTO: 0.4 %
BILIRUB SERPL-MCNC: 0.4 MG/DL (ref 0.2–1.2)
BUN SERPL-MCNC: 12 MG/DL (ref 7–25)
BUN/CREAT SERPL: ABNORMAL (CALC) (ref 6–22)
CALCIUM SERPL-MCNC: 9.3 MG/DL (ref 8.6–10.4)
CHLORIDE SERPL-SCNC: 95 MMOL/L (ref 98–110)
CHOLEST SERPL-MCNC: 177 MG/DL
CHOLEST/HDLC SERPL: 4.5 (CALC)
CO2 SERPL-SCNC: 33 MMOL/L (ref 20–32)
CREAT SERPL-MCNC: 0.73 MG/DL (ref 0.6–0.88)
EOSINOPHIL # BLD AUTO: 167 CELLS/UL (ref 15–500)
EOSINOPHIL NFR BLD AUTO: 2.2 %
ERYTHROCYTE [DISTWIDTH] IN BLOOD BY AUTOMATED COUNT: 12.9 % (ref 11–15)
GLOBULIN SER CALC-MCNC: 3.5 G/DL (CALC) (ref 1.9–3.7)
GLUCOSE SERPL-MCNC: 85 MG/DL (ref 65–99)
HCT VFR BLD AUTO: 40 % (ref 35–45)
HDLC SERPL-MCNC: 39 MG/DL
HGB BLD-MCNC: 12.6 G/DL (ref 11.7–15.5)
LDLC SERPL CALC-MCNC: 105 MG/DL (CALC)
LYMPHOCYTES # BLD AUTO: 2280 CELLS/UL (ref 850–3900)
LYMPHOCYTES NFR BLD AUTO: 30 %
MCH RBC QN AUTO: 27.3 PG (ref 27–33)
MCHC RBC AUTO-ENTMCNC: 31.5 G/DL (ref 32–36)
MCV RBC AUTO: 86.8 FL (ref 80–100)
MONOCYTES # BLD AUTO: 661 CELLS/UL (ref 200–950)
MONOCYTES NFR BLD AUTO: 8.7 %
NEUTROPHILS # BLD AUTO: 4461 CELLS/UL (ref 1500–7800)
NEUTROPHILS NFR BLD AUTO: 58.7 %
NONHDLC SERPL-MCNC: 138 MG/DL (CALC)
PLATELET # BLD AUTO: 213 THOUSAND/UL (ref 140–400)
PMV BLD REES-ECKER: 9.9 FL (ref 7.5–12.5)
POTASSIUM SERPL-SCNC: 4 MMOL/L (ref 3.5–5.3)
PROT SERPL-MCNC: 7.3 G/DL (ref 6.1–8.1)
RBC # BLD AUTO: 4.61 MILLION/UL (ref 3.8–5.1)
SL AMB EGFR AFRICAN AMERICAN: 84 ML/MIN/1.73M2
SL AMB EGFR NON AFRICAN AMERICAN: 73 ML/MIN/1.73M2
SODIUM SERPL-SCNC: 134 MMOL/L (ref 135–146)
TRIGL SERPL-MCNC: 211 MG/DL
TSH SERPL-ACNC: 1.43 MIU/L (ref 0.4–4.5)
WBC # BLD AUTO: 7.6 THOUSAND/UL (ref 3.8–10.8)

## 2020-09-17 NOTE — TELEPHONE ENCOUNTER
----- Message from Jenifer Boyd MD sent at 9/17/2020 12:56 PM EDT -----  Call patient with lab result-labs all look good, continue current medication

## 2020-10-16 ENCOUNTER — EPISODE CHANGES (OUTPATIENT)
Dept: CASE MANAGEMENT | Facility: HOSPITAL | Age: 85
End: 2020-10-16

## 2020-11-01 ENCOUNTER — HOSPITAL ENCOUNTER (OUTPATIENT)
Facility: HOSPITAL | Age: 85
Setting detail: OBSERVATION
Discharge: HOME HEALTH | End: 2020-11-02
Attending: EMERGENCY MEDICINE | Admitting: INTERNAL MEDICINE
Payer: MEDICARE

## 2020-11-01 ENCOUNTER — APPOINTMENT (EMERGENCY)
Dept: RADIOLOGY | Facility: HOSPITAL | Age: 85
End: 2020-11-01
Payer: MEDICARE

## 2020-11-01 ENCOUNTER — APPOINTMENT (EMERGENCY)
Dept: CT IMAGING | Facility: HOSPITAL | Age: 85
End: 2020-11-01
Payer: MEDICARE

## 2020-11-01 DIAGNOSIS — M77.9 TENDINITIS: ICD-10-CM

## 2020-11-01 DIAGNOSIS — R07.9 CHEST PAIN: Primary | ICD-10-CM

## 2020-11-01 DIAGNOSIS — M25.512 ACUTE PAIN OF LEFT SHOULDER: ICD-10-CM

## 2020-11-01 PROBLEM — Z87.440 HX: UTI (URINARY TRACT INFECTION): Status: ACTIVE | Noted: 2020-11-01

## 2020-11-01 PROBLEM — R07.89 OTHER CHEST PAIN: Status: ACTIVE | Noted: 2020-11-01

## 2020-11-01 LAB
ALBUMIN SERPL BCP-MCNC: 3.5 G/DL (ref 3.5–5)
ALP SERPL-CCNC: 53 U/L (ref 46–116)
ALT SERPL W P-5'-P-CCNC: 17 U/L (ref 12–78)
ANION GAP SERPL CALCULATED.3IONS-SCNC: 6 MMOL/L (ref 4–13)
APTT PPP: 40 SECONDS (ref 23–37)
AST SERPL W P-5'-P-CCNC: 20 U/L (ref 5–45)
BACTERIA UR QL AUTO: ABNORMAL /HPF
BASOPHILS # BLD AUTO: 0.03 THOUSANDS/ΜL (ref 0–0.1)
BASOPHILS NFR BLD AUTO: 0 % (ref 0–1)
BILIRUB SERPL-MCNC: 0.2 MG/DL (ref 0.2–1)
BILIRUB UR QL STRIP: NEGATIVE
BUN SERPL-MCNC: 16 MG/DL (ref 5–25)
CALCIUM SERPL-MCNC: 9.1 MG/DL (ref 8.3–10.1)
CHLORIDE SERPL-SCNC: 100 MMOL/L (ref 100–108)
CLARITY UR: CLEAR
CO2 SERPL-SCNC: 32 MMOL/L (ref 21–32)
COLOR UR: YELLOW
CREAT SERPL-MCNC: 0.83 MG/DL (ref 0.6–1.3)
EOSINOPHIL # BLD AUTO: 0.16 THOUSAND/ΜL (ref 0–0.61)
EOSINOPHIL NFR BLD AUTO: 2 % (ref 0–6)
ERYTHROCYTE [DISTWIDTH] IN BLOOD BY AUTOMATED COUNT: 13.9 % (ref 11.6–15.1)
GFR SERPL CREATININE-BSD FRML MDRD: 62 ML/MIN/1.73SQ M
GLUCOSE SERPL-MCNC: 108 MG/DL (ref 65–140)
GLUCOSE UR STRIP-MCNC: NEGATIVE MG/DL
HCT VFR BLD AUTO: 41.4 % (ref 34.8–46.1)
HGB BLD-MCNC: 12.9 G/DL (ref 11.5–15.4)
HGB UR QL STRIP.AUTO: ABNORMAL
HYALINE CASTS #/AREA URNS LPF: ABNORMAL /LPF
IMM GRANULOCYTES # BLD AUTO: 0.02 THOUSAND/UL (ref 0–0.2)
IMM GRANULOCYTES NFR BLD AUTO: 0 % (ref 0–2)
INR PPP: 1.12 (ref 0.84–1.19)
KETONES UR STRIP-MCNC: NEGATIVE MG/DL
LEUKOCYTE ESTERASE UR QL STRIP: NEGATIVE
LYMPHOCYTES # BLD AUTO: 2.15 THOUSANDS/ΜL (ref 0.6–4.47)
LYMPHOCYTES NFR BLD AUTO: 28 % (ref 14–44)
MCH RBC QN AUTO: 27.9 PG (ref 26.8–34.3)
MCHC RBC AUTO-ENTMCNC: 31.2 G/DL (ref 31.4–37.4)
MCV RBC AUTO: 89 FL (ref 82–98)
MONOCYTES # BLD AUTO: 0.67 THOUSAND/ΜL (ref 0.17–1.22)
MONOCYTES NFR BLD AUTO: 9 % (ref 4–12)
MUCOUS THREADS UR QL AUTO: ABNORMAL
NEUTROPHILS # BLD AUTO: 4.66 THOUSANDS/ΜL (ref 1.85–7.62)
NEUTS SEG NFR BLD AUTO: 61 % (ref 43–75)
NITRITE UR QL STRIP: NEGATIVE
NON-SQ EPI CELLS URNS QL MICRO: ABNORMAL /HPF
NRBC BLD AUTO-RTO: 0 /100 WBCS
NT-PROBNP SERPL-MCNC: 175 PG/ML
PH UR STRIP.AUTO: 6 [PH]
PLATELET # BLD AUTO: 197 THOUSANDS/UL (ref 149–390)
PMV BLD AUTO: 9.6 FL (ref 8.9–12.7)
POTASSIUM SERPL-SCNC: 3.9 MMOL/L (ref 3.5–5.3)
PROT SERPL-MCNC: 7.9 G/DL (ref 6.4–8.2)
PROT UR STRIP-MCNC: NEGATIVE MG/DL
PROTHROMBIN TIME: 14.4 SECONDS (ref 11.6–14.5)
RBC # BLD AUTO: 4.63 MILLION/UL (ref 3.81–5.12)
RBC #/AREA URNS AUTO: ABNORMAL /HPF
SODIUM SERPL-SCNC: 138 MMOL/L (ref 136–145)
SP GR UR STRIP.AUTO: 1.02 (ref 1–1.03)
TROPONIN I SERPL-MCNC: 0.02 NG/ML
UROBILINOGEN UR QL STRIP.AUTO: 0.2 E.U./DL
WBC # BLD AUTO: 7.69 THOUSAND/UL (ref 4.31–10.16)
WBC #/AREA URNS AUTO: ABNORMAL /HPF

## 2020-11-01 PROCEDURE — 93005 ELECTROCARDIOGRAM TRACING: CPT

## 2020-11-01 PROCEDURE — 70450 CT HEAD/BRAIN W/O DYE: CPT

## 2020-11-01 PROCEDURE — 99285 EMERGENCY DEPT VISIT HI MDM: CPT

## 2020-11-01 PROCEDURE — 85730 THROMBOPLASTIN TIME PARTIAL: CPT | Performed by: EMERGENCY MEDICINE

## 2020-11-01 PROCEDURE — 84484 ASSAY OF TROPONIN QUANT: CPT | Performed by: EMERGENCY MEDICINE

## 2020-11-01 PROCEDURE — 85025 COMPLETE CBC W/AUTO DIFF WBC: CPT | Performed by: EMERGENCY MEDICINE

## 2020-11-01 PROCEDURE — 1124F ACP DISCUSS-NO DSCNMKR DOCD: CPT | Performed by: EMERGENCY MEDICINE

## 2020-11-01 PROCEDURE — 81001 URINALYSIS AUTO W/SCOPE: CPT | Performed by: EMERGENCY MEDICINE

## 2020-11-01 PROCEDURE — 80053 COMPREHEN METABOLIC PANEL: CPT | Performed by: EMERGENCY MEDICINE

## 2020-11-01 PROCEDURE — G1004 CDSM NDSC: HCPCS

## 2020-11-01 PROCEDURE — 99285 EMERGENCY DEPT VISIT HI MDM: CPT | Performed by: EMERGENCY MEDICINE

## 2020-11-01 PROCEDURE — 99220 PR INITIAL OBSERVATION CARE/DAY 70 MINUTES: CPT | Performed by: NURSE PRACTITIONER

## 2020-11-01 PROCEDURE — 83880 ASSAY OF NATRIURETIC PEPTIDE: CPT | Performed by: EMERGENCY MEDICINE

## 2020-11-01 PROCEDURE — 36415 COLL VENOUS BLD VENIPUNCTURE: CPT | Performed by: EMERGENCY MEDICINE

## 2020-11-01 PROCEDURE — 85610 PROTHROMBIN TIME: CPT | Performed by: EMERGENCY MEDICINE

## 2020-11-01 PROCEDURE — 71045 X-RAY EXAM CHEST 1 VIEW: CPT

## 2020-11-01 RX ORDER — ONDANSETRON 2 MG/ML
4 INJECTION INTRAMUSCULAR; INTRAVENOUS EVERY 6 HOURS PRN
Status: DISCONTINUED | OUTPATIENT
Start: 2020-11-01 | End: 2020-11-02 | Stop reason: HOSPADM

## 2020-11-01 RX ORDER — ACETAMINOPHEN 325 MG/1
650 TABLET ORAL EVERY 4 HOURS PRN
Status: DISCONTINUED | OUTPATIENT
Start: 2020-11-01 | End: 2020-11-02 | Stop reason: HOSPADM

## 2020-11-01 RX ORDER — NITROFURANTOIN 25; 75 MG/1; MG/1
100 CAPSULE ORAL DAILY
Status: DISCONTINUED | OUTPATIENT
Start: 2020-11-02 | End: 2020-11-02 | Stop reason: HOSPADM

## 2020-11-02 ENCOUNTER — APPOINTMENT (OUTPATIENT)
Dept: RADIOLOGY | Facility: HOSPITAL | Age: 85
End: 2020-11-02
Payer: MEDICARE

## 2020-11-02 VITALS
BODY MASS INDEX: 29.86 KG/M2 | WEIGHT: 152.12 LBS | SYSTOLIC BLOOD PRESSURE: 139 MMHG | TEMPERATURE: 97.6 F | HEIGHT: 60 IN | RESPIRATION RATE: 17 BRPM | HEART RATE: 90 BPM | DIASTOLIC BLOOD PRESSURE: 66 MMHG | OXYGEN SATURATION: 95 %

## 2020-11-02 PROBLEM — G89.29 CHRONIC LEFT SHOULDER PAIN: Status: ACTIVE | Noted: 2020-11-01

## 2020-11-02 PROBLEM — M25.512 CHRONIC LEFT SHOULDER PAIN: Status: ACTIVE | Noted: 2020-11-01

## 2020-11-02 LAB
ATRIAL RATE: 75 BPM
ATRIAL RATE: 90 BPM
CHOLEST SERPL-MCNC: 183 MG/DL (ref 50–200)
HDLC SERPL-MCNC: 46 MG/DL
LDLC SERPL CALC-MCNC: 105 MG/DL (ref 0–100)
NONHDLC SERPL-MCNC: 137 MG/DL
P AXIS: 45 DEGREES
P AXIS: 59 DEGREES
PR INTERVAL: 214 MS
PR INTERVAL: 220 MS
QRS AXIS: 17 DEGREES
QRS AXIS: 23 DEGREES
QRSD INTERVAL: 138 MS
QRSD INTERVAL: 142 MS
QT INTERVAL: 396 MS
QT INTERVAL: 436 MS
QTC INTERVAL: 484 MS
QTC INTERVAL: 486 MS
T WAVE AXIS: 204 DEGREES
T WAVE AXIS: 233 DEGREES
TRIGL SERPL-MCNC: 162 MG/DL
TROPONIN I SERPL-MCNC: 0.02 NG/ML
TROPONIN I SERPL-MCNC: 0.03 NG/ML
VENTRICULAR RATE: 75 BPM
VENTRICULAR RATE: 90 BPM

## 2020-11-02 PROCEDURE — 93010 ELECTROCARDIOGRAM REPORT: CPT | Performed by: INTERNAL MEDICINE

## 2020-11-02 PROCEDURE — 99217 PR OBSERVATION CARE DISCHARGE MANAGEMENT: CPT | Performed by: INTERNAL MEDICINE

## 2020-11-02 PROCEDURE — 84484 ASSAY OF TROPONIN QUANT: CPT | Performed by: NURSE PRACTITIONER

## 2020-11-02 PROCEDURE — 73030 X-RAY EXAM OF SHOULDER: CPT

## 2020-11-02 PROCEDURE — 93005 ELECTROCARDIOGRAM TRACING: CPT

## 2020-11-02 PROCEDURE — 80061 LIPID PANEL: CPT | Performed by: INTERNAL MEDICINE

## 2020-11-02 RX ORDER — MULTIVITAMIN/IRON/FOLIC ACID 18MG-0.4MG
1 TABLET ORAL 2 TIMES DAILY
Status: DISCONTINUED | OUTPATIENT
Start: 2020-11-02 | End: 2020-11-02 | Stop reason: HOSPADM

## 2020-11-02 RX ORDER — GABAPENTIN 300 MG/1
600 CAPSULE ORAL 2 TIMES DAILY
Status: DISCONTINUED | OUTPATIENT
Start: 2020-11-02 | End: 2020-11-02 | Stop reason: HOSPADM

## 2020-11-02 RX ORDER — PRAVASTATIN SODIUM 20 MG
20 TABLET ORAL DAILY
Status: DISCONTINUED | OUTPATIENT
Start: 2020-11-02 | End: 2020-11-02 | Stop reason: HOSPADM

## 2020-11-02 RX ORDER — ASPIRIN 81 MG/1
81 TABLET ORAL DAILY
Status: DISCONTINUED | OUTPATIENT
Start: 2020-11-02 | End: 2020-11-02 | Stop reason: HOSPADM

## 2020-11-02 RX ORDER — MELATONIN
1000 DAILY
Status: DISCONTINUED | OUTPATIENT
Start: 2020-11-02 | End: 2020-11-02 | Stop reason: HOSPADM

## 2020-11-02 RX ORDER — HYDROCHLOROTHIAZIDE 12.5 MG/1
12.5 TABLET ORAL DAILY
Status: DISCONTINUED | OUTPATIENT
Start: 2020-11-02 | End: 2020-11-02 | Stop reason: HOSPADM

## 2020-11-02 RX ADMIN — GABAPENTIN 600 MG: 300 CAPSULE ORAL at 09:20

## 2020-11-02 RX ADMIN — ENOXAPARIN SODIUM 40 MG: 100 INJECTION SUBCUTANEOUS at 09:20

## 2020-11-02 RX ADMIN — ONDANSETRON 4 MG: 2 INJECTION INTRAMUSCULAR; INTRAVENOUS at 09:16

## 2020-11-02 RX ADMIN — ASPIRIN 81 MG: 81 TABLET, COATED ORAL at 09:21

## 2020-11-02 RX ADMIN — PRAVASTATIN SODIUM 20 MG: 20 TABLET ORAL at 09:20

## 2020-11-02 RX ADMIN — Medication 1 TABLET: at 09:21

## 2020-11-02 RX ADMIN — VITAMIN D, TAB 1000IU (100/BT) 1000 UNITS: 25 TAB at 09:21

## 2020-11-02 RX ADMIN — HYDROCHLOROTHIAZIDE 12.5 MG: 12.5 TABLET ORAL at 09:21

## 2020-11-02 RX ADMIN — NITROFURANTOIN (MONOHYDRATE/MACROCRYSTALS) 100 MG: 75; 25 CAPSULE ORAL at 09:21

## 2020-11-03 ENCOUNTER — TRANSITIONAL CARE MANAGEMENT (OUTPATIENT)
Dept: FAMILY MEDICINE CLINIC | Facility: CLINIC | Age: 85
End: 2020-11-03

## 2020-12-05 ENCOUNTER — HOSPITAL ENCOUNTER (INPATIENT)
Facility: HOSPITAL | Age: 85
LOS: 5 days | Discharge: HOME HEALTH | DRG: 177 | End: 2020-12-10
Attending: EMERGENCY MEDICINE | Admitting: INTERNAL MEDICINE
Payer: MEDICARE

## 2020-12-05 ENCOUNTER — APPOINTMENT (EMERGENCY)
Dept: RADIOLOGY | Facility: HOSPITAL | Age: 85
DRG: 177 | End: 2020-12-05
Payer: MEDICARE

## 2020-12-05 DIAGNOSIS — E87.1 HYPONATREMIA: ICD-10-CM

## 2020-12-05 DIAGNOSIS — U07.1 PNEUMONIA DUE TO COVID-19 VIRUS: Primary | ICD-10-CM

## 2020-12-05 DIAGNOSIS — J12.82 PNEUMONIA DUE TO COVID-19 VIRUS: Primary | ICD-10-CM

## 2020-12-05 DIAGNOSIS — E27.40 LOW SERUM CORTISOL LEVEL (HCC): ICD-10-CM

## 2020-12-05 LAB
ABO GROUP BLD: NORMAL
ALBUMIN SERPL BCP-MCNC: 3.6 G/DL (ref 3.5–5)
ALP SERPL-CCNC: 57 U/L (ref 46–116)
ALT SERPL W P-5'-P-CCNC: 18 U/L (ref 12–78)
ANION GAP SERPL CALCULATED.3IONS-SCNC: 8 MMOL/L (ref 4–13)
APTT PPP: 43 SECONDS (ref 23–37)
AST SERPL W P-5'-P-CCNC: 24 U/L (ref 5–45)
BASOPHILS # BLD AUTO: 0.01 THOUSANDS/ΜL (ref 0–0.1)
BASOPHILS NFR BLD AUTO: 0 % (ref 0–1)
BILIRUB SERPL-MCNC: 0.4 MG/DL (ref 0.2–1)
BUN SERPL-MCNC: 14 MG/DL (ref 5–25)
CALCIUM SERPL-MCNC: 8.6 MG/DL (ref 8.3–10.1)
CHLORIDE SERPL-SCNC: 95 MMOL/L (ref 100–108)
CK SERPL-CCNC: 46 U/L (ref 26–192)
CO2 SERPL-SCNC: 29 MMOL/L (ref 21–32)
CREAT SERPL-MCNC: 0.7 MG/DL (ref 0.6–1.3)
CRP SERPL QL: 1.5 MG/L
D DIMER PPP FEU-MCNC: 0.96 UG/ML FEU
EOSINOPHIL # BLD AUTO: 0.01 THOUSAND/ΜL (ref 0–0.61)
EOSINOPHIL NFR BLD AUTO: 0 % (ref 0–6)
ERYTHROCYTE [DISTWIDTH] IN BLOOD BY AUTOMATED COUNT: 13.5 % (ref 11.6–15.1)
FERRITIN SERPL-MCNC: 92 NG/ML (ref 8–388)
FLUAV RNA RESP QL NAA+PROBE: NEGATIVE
FLUBV RNA RESP QL NAA+PROBE: NEGATIVE
GFR SERPL CREATININE-BSD FRML MDRD: 77 ML/MIN/1.73SQ M
GLUCOSE SERPL-MCNC: 91 MG/DL (ref 65–140)
HCT VFR BLD AUTO: 37.7 % (ref 34.8–46.1)
HGB BLD-MCNC: 12.2 G/DL (ref 11.5–15.4)
IMM GRANULOCYTES # BLD AUTO: 0.01 THOUSAND/UL (ref 0–0.2)
IMM GRANULOCYTES NFR BLD AUTO: 0 % (ref 0–2)
INR PPP: 1.11 (ref 0.84–1.19)
LYMPHOCYTES # BLD AUTO: 1.74 THOUSANDS/ΜL (ref 0.6–4.47)
LYMPHOCYTES NFR BLD AUTO: 26 % (ref 14–44)
MCH RBC QN AUTO: 28.3 PG (ref 26.8–34.3)
MCHC RBC AUTO-ENTMCNC: 32.4 G/DL (ref 31.4–37.4)
MCV RBC AUTO: 88 FL (ref 82–98)
MONOCYTES # BLD AUTO: 0.62 THOUSAND/ΜL (ref 0.17–1.22)
MONOCYTES NFR BLD AUTO: 9 % (ref 4–12)
NEUTROPHILS # BLD AUTO: 4.2 THOUSANDS/ΜL (ref 1.85–7.62)
NEUTS SEG NFR BLD AUTO: 65 % (ref 43–75)
NRBC BLD AUTO-RTO: 0 /100 WBCS
NT-PROBNP SERPL-MCNC: 446 PG/ML
PLATELET # BLD AUTO: 154 THOUSANDS/UL (ref 149–390)
PLATELET # BLD AUTO: 154 THOUSANDS/UL (ref 149–390)
PMV BLD AUTO: 8.9 FL (ref 8.9–12.7)
PMV BLD AUTO: 9.1 FL (ref 8.9–12.7)
POTASSIUM SERPL-SCNC: 4.3 MMOL/L (ref 3.5–5.3)
PROT SERPL-MCNC: 7.3 G/DL (ref 6.4–8.2)
PROTHROMBIN TIME: 14.3 SECONDS (ref 11.6–14.5)
RBC # BLD AUTO: 4.31 MILLION/UL (ref 3.81–5.12)
RH BLD: NEGATIVE
RSV RNA RESP QL NAA+PROBE: NEGATIVE
SARS-COV-2 RNA RESP QL NAA+PROBE: POSITIVE
SODIUM SERPL-SCNC: 132 MMOL/L (ref 136–145)
TROPONIN I SERPL-MCNC: 0.04 NG/ML
TROPONIN I SERPL-MCNC: 0.04 NG/ML
WBC # BLD AUTO: 6.59 THOUSAND/UL (ref 4.31–10.16)

## 2020-12-05 PROCEDURE — 83880 ASSAY OF NATRIURETIC PEPTIDE: CPT | Performed by: PHYSICIAN ASSISTANT

## 2020-12-05 PROCEDURE — 99285 EMERGENCY DEPT VISIT HI MDM: CPT

## 2020-12-05 PROCEDURE — 85379 FIBRIN DEGRADATION QUANT: CPT | Performed by: PHYSICIAN ASSISTANT

## 2020-12-05 PROCEDURE — 86140 C-REACTIVE PROTEIN: CPT | Performed by: PHYSICIAN ASSISTANT

## 2020-12-05 PROCEDURE — 36415 COLL VENOUS BLD VENIPUNCTURE: CPT | Performed by: PHYSICIAN ASSISTANT

## 2020-12-05 PROCEDURE — 71045 X-RAY EXAM CHEST 1 VIEW: CPT

## 2020-12-05 PROCEDURE — 99223 1ST HOSP IP/OBS HIGH 75: CPT | Performed by: PHYSICIAN ASSISTANT

## 2020-12-05 PROCEDURE — 86900 BLOOD TYPING SEROLOGIC ABO: CPT | Performed by: PHYSICIAN ASSISTANT

## 2020-12-05 PROCEDURE — 99285 EMERGENCY DEPT VISIT HI MDM: CPT | Performed by: PHYSICIAN ASSISTANT

## 2020-12-05 PROCEDURE — XW033E5 INTRODUCTION OF REMDESIVIR ANTI-INFECTIVE INTO PERIPHERAL VEIN, PERCUTANEOUS APPROACH, NEW TECHNOLOGY GROUP 5: ICD-10-PCS | Performed by: INTERNAL MEDICINE

## 2020-12-05 PROCEDURE — 93005 ELECTROCARDIOGRAM TRACING: CPT

## 2020-12-05 PROCEDURE — 96375 TX/PRO/DX INJ NEW DRUG ADDON: CPT

## 2020-12-05 PROCEDURE — 85025 COMPLETE CBC W/AUTO DIFF WBC: CPT | Performed by: PHYSICIAN ASSISTANT

## 2020-12-05 PROCEDURE — 96374 THER/PROPH/DIAG INJ IV PUSH: CPT

## 2020-12-05 PROCEDURE — 83520 IMMUNOASSAY QUANT NOS NONAB: CPT | Performed by: PHYSICIAN ASSISTANT

## 2020-12-05 PROCEDURE — 80053 COMPREHEN METABOLIC PANEL: CPT | Performed by: PHYSICIAN ASSISTANT

## 2020-12-05 PROCEDURE — 86901 BLOOD TYPING SEROLOGIC RH(D): CPT | Performed by: PHYSICIAN ASSISTANT

## 2020-12-05 PROCEDURE — 0241U HB NFCT DS VIR RESP RNA 4 TRGT: CPT | Performed by: PHYSICIAN ASSISTANT

## 2020-12-05 PROCEDURE — 84484 ASSAY OF TROPONIN QUANT: CPT | Performed by: PHYSICIAN ASSISTANT

## 2020-12-05 PROCEDURE — 85730 THROMBOPLASTIN TIME PARTIAL: CPT | Performed by: PHYSICIAN ASSISTANT

## 2020-12-05 PROCEDURE — 85049 AUTOMATED PLATELET COUNT: CPT | Performed by: PHYSICIAN ASSISTANT

## 2020-12-05 PROCEDURE — 83930 ASSAY OF BLOOD OSMOLALITY: CPT | Performed by: PHYSICIAN ASSISTANT

## 2020-12-05 PROCEDURE — 85610 PROTHROMBIN TIME: CPT | Performed by: PHYSICIAN ASSISTANT

## 2020-12-05 PROCEDURE — 84145 PROCALCITONIN (PCT): CPT | Performed by: PHYSICIAN ASSISTANT

## 2020-12-05 PROCEDURE — 82728 ASSAY OF FERRITIN: CPT | Performed by: PHYSICIAN ASSISTANT

## 2020-12-05 PROCEDURE — 82550 ASSAY OF CK (CPK): CPT | Performed by: PHYSICIAN ASSISTANT

## 2020-12-05 RX ORDER — ACETAMINOPHEN 325 MG/1
650 TABLET ORAL EVERY 6 HOURS PRN
Status: DISCONTINUED | OUTPATIENT
Start: 2020-12-05 | End: 2020-12-10 | Stop reason: HOSPADM

## 2020-12-05 RX ORDER — DOXYCYCLINE HYCLATE 100 MG/1
100 CAPSULE ORAL ONCE
Status: COMPLETED | OUTPATIENT
Start: 2020-12-05 | End: 2020-12-05

## 2020-12-05 RX ORDER — HYDROCHLOROTHIAZIDE 12.5 MG/1
12.5 TABLET ORAL DAILY
Status: DISCONTINUED | OUTPATIENT
Start: 2020-12-06 | End: 2020-12-10 | Stop reason: HOSPADM

## 2020-12-05 RX ORDER — SODIUM CHLORIDE 9 MG/ML
50 INJECTION, SOLUTION INTRAVENOUS CONTINUOUS
Status: DISCONTINUED | OUTPATIENT
Start: 2020-12-05 | End: 2020-12-06

## 2020-12-05 RX ORDER — FAMOTIDINE 20 MG/1
20 TABLET, FILM COATED ORAL DAILY
Status: DISCONTINUED | OUTPATIENT
Start: 2020-12-06 | End: 2020-12-10 | Stop reason: HOSPADM

## 2020-12-05 RX ORDER — ATORVASTATIN CALCIUM 40 MG/1
40 TABLET, FILM COATED ORAL
Status: DISCONTINUED | OUTPATIENT
Start: 2020-12-05 | End: 2020-12-10 | Stop reason: HOSPADM

## 2020-12-05 RX ORDER — DOCUSATE SODIUM 100 MG/1
100 CAPSULE, LIQUID FILLED ORAL 2 TIMES DAILY PRN
Status: DISCONTINUED | OUTPATIENT
Start: 2020-12-05 | End: 2020-12-10 | Stop reason: HOSPADM

## 2020-12-05 RX ORDER — GABAPENTIN 300 MG/1
600 CAPSULE ORAL 2 TIMES DAILY
Status: DISCONTINUED | OUTPATIENT
Start: 2020-12-05 | End: 2020-12-10 | Stop reason: HOSPADM

## 2020-12-05 RX ORDER — DEXAMETHASONE SODIUM PHOSPHATE 4 MG/ML
6 INJECTION, SOLUTION INTRA-ARTICULAR; INTRALESIONAL; INTRAMUSCULAR; INTRAVENOUS; SOFT TISSUE EVERY 24 HOURS
Status: DISCONTINUED | OUTPATIENT
Start: 2020-12-06 | End: 2020-12-10 | Stop reason: HOSPADM

## 2020-12-05 RX ORDER — DOXYCYCLINE HYCLATE 100 MG/1
100 CAPSULE ORAL EVERY 12 HOURS
Status: DISCONTINUED | OUTPATIENT
Start: 2020-12-06 | End: 2020-12-07

## 2020-12-05 RX ORDER — GUAIFENESIN 600 MG
600 TABLET, EXTENDED RELEASE 12 HR ORAL EVERY 12 HOURS SCHEDULED
Status: DISCONTINUED | OUTPATIENT
Start: 2020-12-05 | End: 2020-12-10 | Stop reason: HOSPADM

## 2020-12-05 RX ORDER — MELATONIN
2000 DAILY
Status: DISCONTINUED | OUTPATIENT
Start: 2020-12-06 | End: 2020-12-10 | Stop reason: HOSPADM

## 2020-12-05 RX ORDER — CALCIUM CARBONATE 200(500)MG
1000 TABLET,CHEWABLE ORAL DAILY PRN
Status: DISCONTINUED | OUTPATIENT
Start: 2020-12-05 | End: 2020-12-10 | Stop reason: HOSPADM

## 2020-12-05 RX ORDER — ZINC SULFATE 50(220)MG
220 CAPSULE ORAL DAILY
Status: DISCONTINUED | OUTPATIENT
Start: 2020-12-06 | End: 2020-12-10 | Stop reason: HOSPADM

## 2020-12-05 RX ORDER — DEXAMETHASONE SODIUM PHOSPHATE 10 MG/ML
6 INJECTION, SOLUTION INTRAMUSCULAR; INTRAVENOUS ONCE
Status: COMPLETED | OUTPATIENT
Start: 2020-12-05 | End: 2020-12-05

## 2020-12-05 RX ORDER — CEFTRIAXONE 1 G/50ML
1000 INJECTION, SOLUTION INTRAVENOUS EVERY 24 HOURS
Status: DISCONTINUED | OUTPATIENT
Start: 2020-12-06 | End: 2020-12-07

## 2020-12-05 RX ORDER — MULTIVITAMIN/IRON/FOLIC ACID 18MG-0.4MG
1 TABLET ORAL DAILY
Status: DISCONTINUED | OUTPATIENT
Start: 2020-12-13 | End: 2020-12-10 | Stop reason: HOSPADM

## 2020-12-05 RX ORDER — CEFTRIAXONE 1 G/50ML
1000 INJECTION, SOLUTION INTRAVENOUS ONCE
Status: COMPLETED | OUTPATIENT
Start: 2020-12-05 | End: 2020-12-05

## 2020-12-05 RX ORDER — ASCORBIC ACID 500 MG
1000 TABLET ORAL EVERY 12 HOURS SCHEDULED
Status: DISCONTINUED | OUTPATIENT
Start: 2020-12-05 | End: 2020-12-10 | Stop reason: HOSPADM

## 2020-12-05 RX ADMIN — DEXAMETHASONE SODIUM PHOSPHATE 6 MG: 10 INJECTION, SOLUTION INTRAMUSCULAR; INTRAVENOUS at 16:03

## 2020-12-05 RX ADMIN — GUAIFENESIN 600 MG: 600 TABLET ORAL at 20:50

## 2020-12-05 RX ADMIN — DOXYCYCLINE 100 MG: 100 CAPSULE ORAL at 16:03

## 2020-12-05 RX ADMIN — ATORVASTATIN CALCIUM 40 MG: 40 TABLET, FILM COATED ORAL at 22:55

## 2020-12-05 RX ADMIN — ENOXAPARIN SODIUM 30 MG: 30 INJECTION SUBCUTANEOUS at 20:50

## 2020-12-05 RX ADMIN — OXYCODONE HYDROCHLORIDE AND ACETAMINOPHEN 1000 MG: 500 TABLET ORAL at 20:50

## 2020-12-05 RX ADMIN — SODIUM CHLORIDE 50 ML/HR: 0.9 INJECTION, SOLUTION INTRAVENOUS at 20:40

## 2020-12-05 RX ADMIN — CEFTRIAXONE 1000 MG: 1 INJECTION, SOLUTION INTRAVENOUS at 16:03

## 2020-12-05 RX ADMIN — ACETAMINOPHEN 650 MG: 325 TABLET, FILM COATED ORAL at 22:55

## 2020-12-05 RX ADMIN — GABAPENTIN 600 MG: 300 CAPSULE ORAL at 20:50

## 2020-12-05 RX ADMIN — REMDESIVIR 200 MG: 100 INJECTION, POWDER, LYOPHILIZED, FOR SOLUTION INTRAVENOUS at 23:04

## 2020-12-06 LAB
ALBUMIN SERPL BCP-MCNC: 3.4 G/DL (ref 3.5–5)
ALP SERPL-CCNC: 57 U/L (ref 46–116)
ALT SERPL W P-5'-P-CCNC: 17 U/L (ref 12–78)
ANION GAP SERPL CALCULATED.3IONS-SCNC: 9 MMOL/L (ref 4–13)
ANION GAP SERPL CALCULATED.3IONS-SCNC: 9 MMOL/L (ref 4–13)
AST SERPL W P-5'-P-CCNC: 21 U/L (ref 5–45)
ATRIAL RATE: 61 BPM
BILIRUB SERPL-MCNC: 0.4 MG/DL (ref 0.2–1)
BUN SERPL-MCNC: 14 MG/DL (ref 5–25)
BUN SERPL-MCNC: 15 MG/DL (ref 5–25)
CALCIUM ALBUM COR SERPL-MCNC: 9.4 MG/DL (ref 8.3–10.1)
CALCIUM SERPL-MCNC: 8.5 MG/DL (ref 8.3–10.1)
CALCIUM SERPL-MCNC: 8.9 MG/DL (ref 8.3–10.1)
CHLORIDE SERPL-SCNC: 94 MMOL/L (ref 100–108)
CHLORIDE SERPL-SCNC: 95 MMOL/L (ref 100–108)
CO2 SERPL-SCNC: 26 MMOL/L (ref 21–32)
CO2 SERPL-SCNC: 28 MMOL/L (ref 21–32)
CORTIS AM PEAK SERPL-MCNC: 2 UG/DL (ref 4.2–22.4)
CREAT SERPL-MCNC: 0.64 MG/DL (ref 0.6–1.3)
CREAT SERPL-MCNC: 0.8 MG/DL (ref 0.6–1.3)
D DIMER PPP FEU-MCNC: 0.89 UG/ML FEU
ERYTHROCYTE [DISTWIDTH] IN BLOOD BY AUTOMATED COUNT: 13.6 % (ref 11.6–15.1)
GFR SERPL CREATININE-BSD FRML MDRD: 65 ML/MIN/1.73SQ M
GFR SERPL CREATININE-BSD FRML MDRD: 79 ML/MIN/1.73SQ M
GLUCOSE SERPL-MCNC: 105 MG/DL (ref 65–140)
GLUCOSE SERPL-MCNC: 123 MG/DL (ref 65–140)
HCT VFR BLD AUTO: 40.8 % (ref 34.8–46.1)
HGB BLD-MCNC: 13.2 G/DL (ref 11.5–15.4)
MCH RBC QN AUTO: 28.4 PG (ref 26.8–34.3)
MCHC RBC AUTO-ENTMCNC: 32.4 G/DL (ref 31.4–37.4)
MCV RBC AUTO: 88 FL (ref 82–98)
OSMOLALITY UR/SERPL-RTO: 296 MMOL/KG (ref 282–298)
OSMOLALITY UR: 456 MMOL/KG
P AXIS: 61 DEGREES
PLATELET # BLD AUTO: 159 THOUSANDS/UL (ref 149–390)
PMV BLD AUTO: 9.3 FL (ref 8.9–12.7)
POTASSIUM SERPL-SCNC: 3.9 MMOL/L (ref 3.5–5.3)
POTASSIUM SERPL-SCNC: 4.1 MMOL/L (ref 3.5–5.3)
PR INTERVAL: 194 MS
PROCALCITONIN SERPL-MCNC: <0.05 NG/ML
PROT SERPL-MCNC: 7.9 G/DL (ref 6.4–8.2)
QRS AXIS: 93 DEGREES
QRSD INTERVAL: 150 MS
QT INTERVAL: 466 MS
QTC INTERVAL: 469 MS
RBC # BLD AUTO: 4.65 MILLION/UL (ref 3.81–5.12)
SODIUM 24H UR-SCNC: 84 MOL/L
SODIUM SERPL-SCNC: 130 MMOL/L (ref 136–145)
SODIUM SERPL-SCNC: 131 MMOL/L (ref 136–145)
T WAVE AXIS: -30 DEGREES
TROPONIN I SERPL-MCNC: 0.04 NG/ML
URATE SERPL-MCNC: 5.2 MG/DL (ref 2–6.8)
VENTRICULAR RATE: 61 BPM
WBC # BLD AUTO: 5.55 THOUSAND/UL (ref 4.31–10.16)

## 2020-12-06 PROCEDURE — 99232 SBSQ HOSP IP/OBS MODERATE 35: CPT | Performed by: INTERNAL MEDICINE

## 2020-12-06 PROCEDURE — 93010 ELECTROCARDIOGRAM REPORT: CPT | Performed by: INTERNAL MEDICINE

## 2020-12-06 PROCEDURE — 85379 FIBRIN DEGRADATION QUANT: CPT | Performed by: PHYSICIAN ASSISTANT

## 2020-12-06 PROCEDURE — 80053 COMPREHEN METABOLIC PANEL: CPT | Performed by: PHYSICIAN ASSISTANT

## 2020-12-06 PROCEDURE — 84550 ASSAY OF BLOOD/URIC ACID: CPT | Performed by: PHYSICIAN ASSISTANT

## 2020-12-06 PROCEDURE — 85027 COMPLETE CBC AUTOMATED: CPT | Performed by: PHYSICIAN ASSISTANT

## 2020-12-06 PROCEDURE — 84300 ASSAY OF URINE SODIUM: CPT | Performed by: PHYSICIAN ASSISTANT

## 2020-12-06 PROCEDURE — 83935 ASSAY OF URINE OSMOLALITY: CPT | Performed by: PHYSICIAN ASSISTANT

## 2020-12-06 PROCEDURE — 82533 TOTAL CORTISOL: CPT | Performed by: PHYSICIAN ASSISTANT

## 2020-12-06 PROCEDURE — 80048 BASIC METABOLIC PNL TOTAL CA: CPT | Performed by: PHYSICIAN ASSISTANT

## 2020-12-06 PROCEDURE — 84484 ASSAY OF TROPONIN QUANT: CPT | Performed by: PHYSICIAN ASSISTANT

## 2020-12-06 RX ADMIN — GUAIFENESIN 600 MG: 600 TABLET ORAL at 09:58

## 2020-12-06 RX ADMIN — GABAPENTIN 600 MG: 300 CAPSULE ORAL at 17:51

## 2020-12-06 RX ADMIN — ENOXAPARIN SODIUM 30 MG: 30 INJECTION SUBCUTANEOUS at 21:52

## 2020-12-06 RX ADMIN — ZINC SULFATE 220 MG (50 MG) CAPSULE 220 MG: CAPSULE at 09:57

## 2020-12-06 RX ADMIN — DOXYCYCLINE 100 MG: 100 CAPSULE ORAL at 15:11

## 2020-12-06 RX ADMIN — FAMOTIDINE 20 MG: 20 TABLET ORAL at 09:58

## 2020-12-06 RX ADMIN — CEFTRIAXONE 1000 MG: 1 INJECTION, SOLUTION INTRAVENOUS at 15:11

## 2020-12-06 RX ADMIN — OXYCODONE HYDROCHLORIDE AND ACETAMINOPHEN 1000 MG: 500 TABLET ORAL at 21:51

## 2020-12-06 RX ADMIN — GABAPENTIN 600 MG: 300 CAPSULE ORAL at 09:58

## 2020-12-06 RX ADMIN — ATORVASTATIN CALCIUM 40 MG: 40 TABLET, FILM COATED ORAL at 15:11

## 2020-12-06 RX ADMIN — HYDROCHLOROTHIAZIDE 12.5 MG: 12.5 TABLET ORAL at 09:57

## 2020-12-06 RX ADMIN — ENOXAPARIN SODIUM 30 MG: 30 INJECTION SUBCUTANEOUS at 09:57

## 2020-12-06 RX ADMIN — GUAIFENESIN 600 MG: 600 TABLET ORAL at 21:51

## 2020-12-06 RX ADMIN — Medication 2000 UNITS: at 09:58

## 2020-12-06 RX ADMIN — OXYCODONE HYDROCHLORIDE AND ACETAMINOPHEN 1000 MG: 500 TABLET ORAL at 09:58

## 2020-12-06 RX ADMIN — REMDESIVIR 100 MG: 100 INJECTION, POWDER, LYOPHILIZED, FOR SOLUTION INTRAVENOUS at 21:52

## 2020-12-06 RX ADMIN — DEXAMETHASONE SODIUM PHOSPHATE 6 MG: 4 INJECTION, SOLUTION INTRA-ARTICULAR; INTRALESIONAL; INTRAMUSCULAR; INTRAVENOUS; SOFT TISSUE at 15:11

## 2020-12-07 PROBLEM — E27.40 LOW SERUM CORTISOL LEVEL: Status: ACTIVE | Noted: 2020-12-07

## 2020-12-07 PROBLEM — R79.89 LOW SERUM CORTISOL LEVEL: Status: ACTIVE | Noted: 2020-12-07

## 2020-12-07 LAB
ALBUMIN SERPL BCP-MCNC: 3.3 G/DL (ref 3.5–5)
ALP SERPL-CCNC: 51 U/L (ref 46–116)
ALT SERPL W P-5'-P-CCNC: 14 U/L (ref 12–78)
ANION GAP SERPL CALCULATED.3IONS-SCNC: 8 MMOL/L (ref 4–13)
AST SERPL W P-5'-P-CCNC: 17 U/L (ref 5–45)
BILIRUB SERPL-MCNC: 0.3 MG/DL (ref 0.2–1)
BUN SERPL-MCNC: 18 MG/DL (ref 5–25)
CALCIUM ALBUM COR SERPL-MCNC: 9.6 MG/DL (ref 8.3–10.1)
CALCIUM SERPL-MCNC: 9 MG/DL (ref 8.3–10.1)
CHLORIDE SERPL-SCNC: 94 MMOL/L (ref 100–108)
CO2 SERPL-SCNC: 28 MMOL/L (ref 21–32)
CREAT SERPL-MCNC: 0.73 MG/DL (ref 0.6–1.3)
D DIMER PPP FEU-MCNC: 0.67 UG/ML FEU
ERYTHROCYTE [DISTWIDTH] IN BLOOD BY AUTOMATED COUNT: 13.4 % (ref 11.6–15.1)
GFR SERPL CREATININE-BSD FRML MDRD: 73 ML/MIN/1.73SQ M
GLUCOSE SERPL-MCNC: 99 MG/DL (ref 65–140)
HCT VFR BLD AUTO: 39 % (ref 34.8–46.1)
HGB BLD-MCNC: 12.8 G/DL (ref 11.5–15.4)
MCH RBC QN AUTO: 28.3 PG (ref 26.8–34.3)
MCHC RBC AUTO-ENTMCNC: 32.8 G/DL (ref 31.4–37.4)
MCV RBC AUTO: 86 FL (ref 82–98)
PLATELET # BLD AUTO: 177 THOUSANDS/UL (ref 149–390)
PMV BLD AUTO: 9.3 FL (ref 8.9–12.7)
POTASSIUM SERPL-SCNC: 4 MMOL/L (ref 3.5–5.3)
PROCALCITONIN SERPL-MCNC: <0.05 NG/ML
PROT SERPL-MCNC: 7.6 G/DL (ref 6.4–8.2)
RBC # BLD AUTO: 4.53 MILLION/UL (ref 3.81–5.12)
SODIUM SERPL-SCNC: 130 MMOL/L (ref 136–145)
TSH SERPL DL<=0.05 MIU/L-ACNC: 0.44 UIU/ML (ref 0.36–3.74)
WBC # BLD AUTO: 5.47 THOUSAND/UL (ref 4.31–10.16)

## 2020-12-07 PROCEDURE — 84443 ASSAY THYROID STIM HORMONE: CPT | Performed by: PHYSICIAN ASSISTANT

## 2020-12-07 PROCEDURE — 99232 SBSQ HOSP IP/OBS MODERATE 35: CPT | Performed by: INTERNAL MEDICINE

## 2020-12-07 PROCEDURE — 85027 COMPLETE CBC AUTOMATED: CPT | Performed by: PHYSICIAN ASSISTANT

## 2020-12-07 PROCEDURE — 80053 COMPREHEN METABOLIC PANEL: CPT | Performed by: PHYSICIAN ASSISTANT

## 2020-12-07 PROCEDURE — 85379 FIBRIN DEGRADATION QUANT: CPT | Performed by: PHYSICIAN ASSISTANT

## 2020-12-07 PROCEDURE — 93005 ELECTROCARDIOGRAM TRACING: CPT

## 2020-12-07 PROCEDURE — 82024 ASSAY OF ACTH: CPT | Performed by: INTERNAL MEDICINE

## 2020-12-07 PROCEDURE — 84145 PROCALCITONIN (PCT): CPT | Performed by: PHYSICIAN ASSISTANT

## 2020-12-07 RX ORDER — ONDANSETRON 2 MG/ML
4 INJECTION INTRAMUSCULAR; INTRAVENOUS EVERY 4 HOURS PRN
Status: DISCONTINUED | OUTPATIENT
Start: 2020-12-07 | End: 2020-12-10 | Stop reason: HOSPADM

## 2020-12-07 RX ORDER — NITROFURANTOIN 25; 75 MG/1; MG/1
100 CAPSULE ORAL DAILY
Status: DISCONTINUED | OUTPATIENT
Start: 2020-12-08 | End: 2020-12-10 | Stop reason: HOSPADM

## 2020-12-07 RX ADMIN — DOXYCYCLINE 100 MG: 100 CAPSULE ORAL at 05:00

## 2020-12-07 RX ADMIN — OXYCODONE HYDROCHLORIDE AND ACETAMINOPHEN 1000 MG: 500 TABLET ORAL at 20:16

## 2020-12-07 RX ADMIN — GABAPENTIN 600 MG: 300 CAPSULE ORAL at 20:15

## 2020-12-07 RX ADMIN — GUAIFENESIN 600 MG: 600 TABLET ORAL at 20:15

## 2020-12-07 RX ADMIN — ONDANSETRON 4 MG: 2 INJECTION INTRAMUSCULAR; INTRAVENOUS at 12:18

## 2020-12-07 RX ADMIN — OXYCODONE HYDROCHLORIDE AND ACETAMINOPHEN 1000 MG: 500 TABLET ORAL at 08:12

## 2020-12-07 RX ADMIN — ENOXAPARIN SODIUM 30 MG: 30 INJECTION SUBCUTANEOUS at 20:16

## 2020-12-07 RX ADMIN — DEXAMETHASONE SODIUM PHOSPHATE 6 MG: 4 INJECTION, SOLUTION INTRA-ARTICULAR; INTRALESIONAL; INTRAMUSCULAR; INTRAVENOUS; SOFT TISSUE at 15:13

## 2020-12-07 RX ADMIN — GUAIFENESIN 600 MG: 600 TABLET ORAL at 08:12

## 2020-12-07 RX ADMIN — ENOXAPARIN SODIUM 30 MG: 30 INJECTION SUBCUTANEOUS at 08:13

## 2020-12-07 RX ADMIN — Medication 2000 UNITS: at 08:12

## 2020-12-07 RX ADMIN — Medication 1 DROP: at 22:11

## 2020-12-07 RX ADMIN — CEFTRIAXONE 1000 MG: 1 INJECTION, SOLUTION INTRAVENOUS at 15:17

## 2020-12-07 RX ADMIN — HYDROCHLOROTHIAZIDE 12.5 MG: 12.5 TABLET ORAL at 08:12

## 2020-12-07 RX ADMIN — DOXYCYCLINE 100 MG: 100 CAPSULE ORAL at 15:11

## 2020-12-07 RX ADMIN — ATORVASTATIN CALCIUM 40 MG: 40 TABLET, FILM COATED ORAL at 15:50

## 2020-12-07 RX ADMIN — REMDESIVIR 100 MG: 100 INJECTION, POWDER, LYOPHILIZED, FOR SOLUTION INTRAVENOUS at 22:12

## 2020-12-08 LAB
ACTH PLAS-MCNC: 2.4 PG/ML (ref 7.2–63.3)
ALBUMIN SERPL BCP-MCNC: 3.2 G/DL (ref 3.5–5)
ALP SERPL-CCNC: 45 U/L (ref 46–116)
ALT SERPL W P-5'-P-CCNC: 13 U/L (ref 12–78)
ANION GAP SERPL CALCULATED.3IONS-SCNC: 7 MMOL/L (ref 4–13)
AST SERPL W P-5'-P-CCNC: 14 U/L (ref 5–45)
ATRIAL RATE: 92 BPM
BILIRUB SERPL-MCNC: 0.3 MG/DL (ref 0.2–1)
BUN SERPL-MCNC: 20 MG/DL (ref 5–25)
CALCIUM ALBUM COR SERPL-MCNC: 9.5 MG/DL (ref 8.3–10.1)
CALCIUM SERPL-MCNC: 8.9 MG/DL (ref 8.3–10.1)
CHLORIDE SERPL-SCNC: 96 MMOL/L (ref 100–108)
CO2 SERPL-SCNC: 27 MMOL/L (ref 21–32)
CREAT SERPL-MCNC: 0.77 MG/DL (ref 0.6–1.3)
D DIMER PPP FEU-MCNC: 0.55 UG/ML FEU
ERYTHROCYTE [DISTWIDTH] IN BLOOD BY AUTOMATED COUNT: 13.4 % (ref 11.6–15.1)
GFR SERPL CREATININE-BSD FRML MDRD: 68 ML/MIN/1.73SQ M
GLUCOSE SERPL-MCNC: 114 MG/DL (ref 65–140)
HCT VFR BLD AUTO: 39.1 % (ref 34.8–46.1)
HGB BLD-MCNC: 12.8 G/DL (ref 11.5–15.4)
MCH RBC QN AUTO: 28.1 PG (ref 26.8–34.3)
MCHC RBC AUTO-ENTMCNC: 32.7 G/DL (ref 31.4–37.4)
MCV RBC AUTO: 86 FL (ref 82–98)
PLATELET # BLD AUTO: 199 THOUSANDS/UL (ref 149–390)
PMV BLD AUTO: 9.5 FL (ref 8.9–12.7)
POTASSIUM SERPL-SCNC: 3.8 MMOL/L (ref 3.5–5.3)
PROT SERPL-MCNC: 7.1 G/DL (ref 6.4–8.2)
QRS AXIS: 36 DEGREES
QRSD INTERVAL: 134 MS
QT INTERVAL: 452 MS
QTC INTERVAL: 470 MS
RBC # BLD AUTO: 4.56 MILLION/UL (ref 3.81–5.12)
SODIUM SERPL-SCNC: 130 MMOL/L (ref 136–145)
T WAVE AXIS: -73 DEGREES
VENTRICULAR RATE: 65 BPM
WBC # BLD AUTO: 5.65 THOUSAND/UL (ref 4.31–10.16)

## 2020-12-08 PROCEDURE — 93010 ELECTROCARDIOGRAM REPORT: CPT | Performed by: INTERNAL MEDICINE

## 2020-12-08 PROCEDURE — 80053 COMPREHEN METABOLIC PANEL: CPT | Performed by: PHYSICIAN ASSISTANT

## 2020-12-08 PROCEDURE — 85027 COMPLETE CBC AUTOMATED: CPT | Performed by: PHYSICIAN ASSISTANT

## 2020-12-08 PROCEDURE — 99232 SBSQ HOSP IP/OBS MODERATE 35: CPT | Performed by: INTERNAL MEDICINE

## 2020-12-08 PROCEDURE — 85379 FIBRIN DEGRADATION QUANT: CPT | Performed by: PHYSICIAN ASSISTANT

## 2020-12-08 RX ORDER — SODIUM CHLORIDE 9 MG/ML
75 INJECTION, SOLUTION INTRAVENOUS CONTINUOUS
Status: DISCONTINUED | OUTPATIENT
Start: 2020-12-08 | End: 2020-12-10 | Stop reason: HOSPADM

## 2020-12-08 RX ADMIN — DEXAMETHASONE SODIUM PHOSPHATE 6 MG: 4 INJECTION, SOLUTION INTRA-ARTICULAR; INTRALESIONAL; INTRAMUSCULAR; INTRAVENOUS; SOFT TISSUE at 17:35

## 2020-12-08 RX ADMIN — GUAIFENESIN 600 MG: 600 TABLET ORAL at 08:29

## 2020-12-08 RX ADMIN — OXYCODONE HYDROCHLORIDE AND ACETAMINOPHEN 1000 MG: 500 TABLET ORAL at 21:57

## 2020-12-08 RX ADMIN — GABAPENTIN 600 MG: 300 CAPSULE ORAL at 17:35

## 2020-12-08 RX ADMIN — ENOXAPARIN SODIUM 30 MG: 30 INJECTION SUBCUTANEOUS at 21:57

## 2020-12-08 RX ADMIN — ZINC SULFATE 220 MG (50 MG) CAPSULE 220 MG: CAPSULE at 08:29

## 2020-12-08 RX ADMIN — SODIUM CHLORIDE 75 ML/HR: 0.9 INJECTION, SOLUTION INTRAVENOUS at 08:35

## 2020-12-08 RX ADMIN — ATORVASTATIN CALCIUM 40 MG: 40 TABLET, FILM COATED ORAL at 17:35

## 2020-12-08 RX ADMIN — NITROFURANTOIN (MONOHYDRATE/MACROCRYSTALS) 100 MG: 75; 25 CAPSULE ORAL at 08:28

## 2020-12-08 RX ADMIN — ENOXAPARIN SODIUM 30 MG: 30 INJECTION SUBCUTANEOUS at 08:28

## 2020-12-08 RX ADMIN — Medication 2000 UNITS: at 08:28

## 2020-12-08 RX ADMIN — REMDESIVIR 100 MG: 100 INJECTION, POWDER, LYOPHILIZED, FOR SOLUTION INTRAVENOUS at 22:05

## 2020-12-08 RX ADMIN — GUAIFENESIN 600 MG: 600 TABLET ORAL at 21:57

## 2020-12-08 RX ADMIN — GABAPENTIN 600 MG: 300 CAPSULE ORAL at 08:28

## 2020-12-08 RX ADMIN — SODIUM CHLORIDE 75 ML/HR: 0.9 INJECTION, SOLUTION INTRAVENOUS at 21:58

## 2020-12-08 RX ADMIN — OXYCODONE HYDROCHLORIDE AND ACETAMINOPHEN 1000 MG: 500 TABLET ORAL at 08:28

## 2020-12-08 RX ADMIN — FAMOTIDINE 20 MG: 20 TABLET ORAL at 08:29

## 2020-12-08 RX ADMIN — Medication 1 DROP: at 21:58

## 2020-12-08 RX ADMIN — HYDROCHLOROTHIAZIDE 12.5 MG: 12.5 TABLET ORAL at 08:28

## 2020-12-09 LAB
ALBUMIN SERPL BCP-MCNC: 3.1 G/DL (ref 3.5–5)
ALP SERPL-CCNC: 45 U/L (ref 46–116)
ALT SERPL W P-5'-P-CCNC: 12 U/L (ref 12–78)
ANION GAP SERPL CALCULATED.3IONS-SCNC: 10 MMOL/L (ref 4–13)
AST SERPL W P-5'-P-CCNC: 17 U/L (ref 5–45)
BILIRUB SERPL-MCNC: 0.3 MG/DL (ref 0.2–1)
BUN SERPL-MCNC: 20 MG/DL (ref 5–25)
CALCIUM ALBUM COR SERPL-MCNC: 9.2 MG/DL (ref 8.3–10.1)
CALCIUM SERPL-MCNC: 8.5 MG/DL (ref 8.3–10.1)
CHLORIDE SERPL-SCNC: 97 MMOL/L (ref 100–108)
CO2 SERPL-SCNC: 23 MMOL/L (ref 21–32)
CREAT SERPL-MCNC: 0.69 MG/DL (ref 0.6–1.3)
CRP SERPL QL: 0.8 MG/L
D DIMER PPP FEU-MCNC: 0.49 UG/ML FEU
FERRITIN SERPL-MCNC: 75 NG/ML (ref 8–388)
GFR SERPL CREATININE-BSD FRML MDRD: 77 ML/MIN/1.73SQ M
GLUCOSE SERPL-MCNC: 111 MG/DL (ref 65–140)
IL6 SERPL-MCNC: 3.8 PG/ML (ref 0–13)
POTASSIUM SERPL-SCNC: 4 MMOL/L (ref 3.5–5.3)
PROT SERPL-MCNC: 7.2 G/DL (ref 6.4–8.2)
SODIUM SERPL-SCNC: 130 MMOL/L (ref 136–145)

## 2020-12-09 PROCEDURE — 85379 FIBRIN DEGRADATION QUANT: CPT | Performed by: INTERNAL MEDICINE

## 2020-12-09 PROCEDURE — 97163 PT EVAL HIGH COMPLEX 45 MIN: CPT

## 2020-12-09 PROCEDURE — 86140 C-REACTIVE PROTEIN: CPT | Performed by: INTERNAL MEDICINE

## 2020-12-09 PROCEDURE — 82728 ASSAY OF FERRITIN: CPT | Performed by: INTERNAL MEDICINE

## 2020-12-09 PROCEDURE — 97167 OT EVAL HIGH COMPLEX 60 MIN: CPT

## 2020-12-09 PROCEDURE — 99232 SBSQ HOSP IP/OBS MODERATE 35: CPT | Performed by: INTERNAL MEDICINE

## 2020-12-09 PROCEDURE — 80053 COMPREHEN METABOLIC PANEL: CPT | Performed by: INTERNAL MEDICINE

## 2020-12-09 RX ADMIN — ZINC SULFATE 220 MG (50 MG) CAPSULE 220 MG: CAPSULE at 10:00

## 2020-12-09 RX ADMIN — ONDANSETRON 4 MG: 2 INJECTION INTRAMUSCULAR; INTRAVENOUS at 04:42

## 2020-12-09 RX ADMIN — OXYCODONE HYDROCHLORIDE AND ACETAMINOPHEN 1000 MG: 500 TABLET ORAL at 10:00

## 2020-12-09 RX ADMIN — ONDANSETRON 4 MG: 2 INJECTION INTRAMUSCULAR; INTRAVENOUS at 09:20

## 2020-12-09 RX ADMIN — ENOXAPARIN SODIUM 30 MG: 30 INJECTION SUBCUTANEOUS at 10:00

## 2020-12-09 RX ADMIN — ATORVASTATIN CALCIUM 40 MG: 40 TABLET, FILM COATED ORAL at 17:31

## 2020-12-09 RX ADMIN — OXYCODONE HYDROCHLORIDE AND ACETAMINOPHEN 1000 MG: 500 TABLET ORAL at 21:46

## 2020-12-09 RX ADMIN — FAMOTIDINE 20 MG: 20 TABLET ORAL at 10:00

## 2020-12-09 RX ADMIN — SODIUM CHLORIDE 75 ML/HR: 0.9 INJECTION, SOLUTION INTRAVENOUS at 11:55

## 2020-12-09 RX ADMIN — GUAIFENESIN 600 MG: 600 TABLET ORAL at 21:46

## 2020-12-09 RX ADMIN — ENOXAPARIN SODIUM 30 MG: 30 INJECTION SUBCUTANEOUS at 21:46

## 2020-12-09 RX ADMIN — GABAPENTIN 600 MG: 300 CAPSULE ORAL at 10:00

## 2020-12-09 RX ADMIN — REMDESIVIR 100 MG: 100 INJECTION, POWDER, LYOPHILIZED, FOR SOLUTION INTRAVENOUS at 21:43

## 2020-12-09 RX ADMIN — GUAIFENESIN 600 MG: 600 TABLET ORAL at 10:00

## 2020-12-09 RX ADMIN — NITROFURANTOIN (MONOHYDRATE/MACROCRYSTALS) 100 MG: 75; 25 CAPSULE ORAL at 10:00

## 2020-12-09 RX ADMIN — HYDROCHLOROTHIAZIDE 12.5 MG: 12.5 TABLET ORAL at 10:00

## 2020-12-09 RX ADMIN — GABAPENTIN 600 MG: 300 CAPSULE ORAL at 17:31

## 2020-12-09 RX ADMIN — Medication 2000 UNITS: at 10:00

## 2020-12-09 RX ADMIN — DEXAMETHASONE SODIUM PHOSPHATE 6 MG: 4 INJECTION, SOLUTION INTRA-ARTICULAR; INTRALESIONAL; INTRAMUSCULAR; INTRAVENOUS; SOFT TISSUE at 17:31

## 2020-12-10 VITALS
RESPIRATION RATE: 17 BRPM | TEMPERATURE: 97.6 F | HEART RATE: 60 BPM | DIASTOLIC BLOOD PRESSURE: 70 MMHG | HEIGHT: 61 IN | WEIGHT: 148 LBS | SYSTOLIC BLOOD PRESSURE: 164 MMHG | BODY MASS INDEX: 27.94 KG/M2 | OXYGEN SATURATION: 97 %

## 2020-12-10 LAB
ANION GAP SERPL CALCULATED.3IONS-SCNC: 9 MMOL/L (ref 4–13)
BUN SERPL-MCNC: 15 MG/DL (ref 5–25)
CALCIUM SERPL-MCNC: 8.7 MG/DL (ref 8.3–10.1)
CHLORIDE SERPL-SCNC: 101 MMOL/L (ref 100–108)
CO2 SERPL-SCNC: 24 MMOL/L (ref 21–32)
CREAT SERPL-MCNC: 0.72 MG/DL (ref 0.6–1.3)
CRP SERPL QL: <0.5 MG/L
D DIMER PPP FEU-MCNC: 0.49 UG/ML FEU
FERRITIN SERPL-MCNC: 70 NG/ML (ref 8–388)
GFR SERPL CREATININE-BSD FRML MDRD: 74 ML/MIN/1.73SQ M
GLUCOSE SERPL-MCNC: 114 MG/DL (ref 65–140)
POTASSIUM SERPL-SCNC: 3.8 MMOL/L (ref 3.5–5.3)
SODIUM SERPL-SCNC: 134 MMOL/L (ref 136–145)

## 2020-12-10 PROCEDURE — 85379 FIBRIN DEGRADATION QUANT: CPT | Performed by: INTERNAL MEDICINE

## 2020-12-10 PROCEDURE — 86140 C-REACTIVE PROTEIN: CPT | Performed by: INTERNAL MEDICINE

## 2020-12-10 PROCEDURE — 99239 HOSP IP/OBS DSCHRG MGMT >30: CPT | Performed by: INTERNAL MEDICINE

## 2020-12-10 PROCEDURE — 82728 ASSAY OF FERRITIN: CPT | Performed by: INTERNAL MEDICINE

## 2020-12-10 PROCEDURE — 80048 BASIC METABOLIC PNL TOTAL CA: CPT | Performed by: INTERNAL MEDICINE

## 2020-12-10 RX ORDER — PREDNISONE 1 MG/1
5 TABLET ORAL DAILY
Qty: 100 TABLET | Refills: 0 | Status: ON HOLD | OUTPATIENT
Start: 2020-12-10 | End: 2022-05-08

## 2020-12-10 RX ADMIN — FAMOTIDINE 20 MG: 20 TABLET ORAL at 09:52

## 2020-12-10 RX ADMIN — GABAPENTIN 600 MG: 300 CAPSULE ORAL at 09:52

## 2020-12-10 RX ADMIN — GUAIFENESIN 600 MG: 600 TABLET ORAL at 09:52

## 2020-12-10 RX ADMIN — NITROFURANTOIN (MONOHYDRATE/MACROCRYSTALS) 100 MG: 75; 25 CAPSULE ORAL at 09:52

## 2020-12-10 RX ADMIN — SODIUM CHLORIDE 75 ML/HR: 0.9 INJECTION, SOLUTION INTRAVENOUS at 01:25

## 2020-12-10 RX ADMIN — OXYCODONE HYDROCHLORIDE AND ACETAMINOPHEN 1000 MG: 500 TABLET ORAL at 09:52

## 2020-12-10 RX ADMIN — Medication 2000 UNITS: at 09:52

## 2020-12-10 RX ADMIN — HYDROCHLOROTHIAZIDE 12.5 MG: 12.5 TABLET ORAL at 09:52

## 2020-12-10 RX ADMIN — Medication 1 DROP: at 09:59

## 2020-12-10 RX ADMIN — ENOXAPARIN SODIUM 30 MG: 30 INJECTION SUBCUTANEOUS at 09:52

## 2020-12-10 RX ADMIN — ZINC SULFATE 220 MG (50 MG) CAPSULE 220 MG: CAPSULE at 09:52

## 2020-12-11 ENCOUNTER — TRANSITIONAL CARE MANAGEMENT (OUTPATIENT)
Dept: FAMILY MEDICINE CLINIC | Facility: CLINIC | Age: 85
End: 2020-12-11

## 2020-12-15 ENCOUNTER — LAB REQUISITION (OUTPATIENT)
Dept: LAB | Facility: HOSPITAL | Age: 85
End: 2020-12-15
Payer: MEDICARE

## 2020-12-15 DIAGNOSIS — E87.1 HYPONATREMIA: ICD-10-CM

## 2020-12-15 DIAGNOSIS — E87.1 HYPO-OSMOLALITY AND HYPONATREMIA: ICD-10-CM

## 2020-12-15 LAB
ANION GAP SERPL CALCULATED.3IONS-SCNC: 7 MMOL/L (ref 4–13)
BUN SERPL-MCNC: 28 MG/DL (ref 5–25)
CALCIUM SERPL-MCNC: 9.3 MG/DL (ref 8.3–10.1)
CHLORIDE SERPL-SCNC: 98 MMOL/L (ref 100–108)
CO2 SERPL-SCNC: 30 MMOL/L (ref 21–32)
CREAT SERPL-MCNC: 0.81 MG/DL (ref 0.6–1.3)
GFR SERPL CREATININE-BSD FRML MDRD: 64 ML/MIN/1.73SQ M
GLUCOSE SERPL-MCNC: 124 MG/DL (ref 65–140)
POTASSIUM SERPL-SCNC: 4.4 MMOL/L (ref 3.5–5.3)
SODIUM SERPL-SCNC: 135 MMOL/L (ref 136–145)

## 2020-12-15 PROCEDURE — 80048 BASIC METABOLIC PNL TOTAL CA: CPT | Performed by: FAMILY MEDICINE

## 2020-12-18 ENCOUNTER — TELEPHONE (OUTPATIENT)
Dept: FAMILY MEDICINE CLINIC | Facility: CLINIC | Age: 85
End: 2020-12-18

## 2020-12-22 ENCOUNTER — TELEPHONE (OUTPATIENT)
Dept: FAMILY MEDICINE CLINIC | Facility: CLINIC | Age: 85
End: 2020-12-22

## 2021-01-11 DIAGNOSIS — I10 ESSENTIAL HYPERTENSION: ICD-10-CM

## 2021-01-11 DIAGNOSIS — E78.2 MIXED HYPERLIPIDEMIA: ICD-10-CM

## 2021-01-11 DIAGNOSIS — G60.9 IDIOPATHIC PERIPHERAL NEUROPATHY: ICD-10-CM

## 2021-01-11 DIAGNOSIS — K21.9 GASTROESOPHAGEAL REFLUX DISEASE: ICD-10-CM

## 2021-01-11 RX ORDER — PRAVASTATIN SODIUM 20 MG
20 TABLET ORAL DAILY
Qty: 90 TABLET | Refills: 0 | Status: SHIPPED | OUTPATIENT
Start: 2021-01-11 | End: 2021-04-12 | Stop reason: SDUPTHER

## 2021-01-11 RX ORDER — OMEPRAZOLE 20 MG/1
20 CAPSULE, DELAYED RELEASE ORAL DAILY
Qty: 90 CAPSULE | Refills: 0 | Status: SHIPPED | OUTPATIENT
Start: 2021-01-11 | End: 2021-04-12 | Stop reason: SDUPTHER

## 2021-01-11 RX ORDER — HYDROCHLOROTHIAZIDE 12.5 MG/1
12.5 TABLET ORAL DAILY
Qty: 90 TABLET | Refills: 0 | Status: SHIPPED | OUTPATIENT
Start: 2021-01-11 | End: 2021-04-12 | Stop reason: SDUPTHER

## 2021-01-11 RX ORDER — GABAPENTIN 600 MG/1
600 TABLET ORAL 2 TIMES DAILY
Qty: 180 TABLET | Refills: 0 | Status: SHIPPED | OUTPATIENT
Start: 2021-01-11 | End: 2021-04-12 | Stop reason: SDUPTHER

## 2021-01-22 ENCOUNTER — TELEPHONE (OUTPATIENT)
Dept: FAMILY MEDICINE CLINIC | Facility: CLINIC | Age: 86
End: 2021-01-22

## 2021-01-22 NOTE — TELEPHONE ENCOUNTER
Chart review for plasma donation reveals Roxie Ibarra is not a candidate at this time   Will remove from list

## 2021-02-09 ENCOUNTER — TELEPHONE (OUTPATIENT)
Dept: OTHER | Facility: OTHER | Age: 86
End: 2021-02-09

## 2021-02-10 NOTE — TELEPHONE ENCOUNTER
Patient called to cancel her Appointment for Feb 10, 2021 at 9:15am  Please f/u with Patient to Reschedule   Thank You

## 2021-04-12 DIAGNOSIS — N39.0 RECURRENT UTI: ICD-10-CM

## 2021-04-12 DIAGNOSIS — K21.9 GASTROESOPHAGEAL REFLUX DISEASE: ICD-10-CM

## 2021-04-12 DIAGNOSIS — E78.2 MIXED HYPERLIPIDEMIA: ICD-10-CM

## 2021-04-12 DIAGNOSIS — I10 ESSENTIAL HYPERTENSION: ICD-10-CM

## 2021-04-12 DIAGNOSIS — G60.9 IDIOPATHIC PERIPHERAL NEUROPATHY: ICD-10-CM

## 2021-04-12 RX ORDER — NITROFURANTOIN 25; 75 MG/1; MG/1
100 CAPSULE ORAL DAILY
Qty: 90 CAPSULE | Refills: 0 | Status: CANCELLED | OUTPATIENT
Start: 2021-04-12

## 2021-04-12 RX ORDER — GABAPENTIN 600 MG/1
600 TABLET ORAL 2 TIMES DAILY
Qty: 180 TABLET | Refills: 0 | Status: SHIPPED | OUTPATIENT
Start: 2021-04-12 | End: 2021-07-13 | Stop reason: SDUPTHER

## 2021-04-12 RX ORDER — HYDROCHLOROTHIAZIDE 12.5 MG/1
12.5 TABLET ORAL DAILY
Qty: 90 TABLET | Refills: 0 | Status: SHIPPED | OUTPATIENT
Start: 2021-04-12 | End: 2021-07-13 | Stop reason: SDUPTHER

## 2021-04-12 RX ORDER — PRAVASTATIN SODIUM 20 MG
20 TABLET ORAL DAILY
Qty: 90 TABLET | Refills: 0 | Status: SHIPPED | OUTPATIENT
Start: 2021-04-12 | End: 2021-07-13 | Stop reason: SDUPTHER

## 2021-04-12 RX ORDER — OMEPRAZOLE 20 MG/1
20 CAPSULE, DELAYED RELEASE ORAL DAILY
Qty: 90 CAPSULE | Refills: 0 | Status: SHIPPED | OUTPATIENT
Start: 2021-04-12 | End: 2021-07-13 | Stop reason: SDUPTHER

## 2021-04-12 NOTE — TELEPHONE ENCOUNTER
Med refill  gabapentin (NEURONTIN) 600 MG tablet    hydrochlorothiazide (HYDRODIURIL) 12 5 mg tablet    omeprazole (PriLOSEC) 20 mg delayed release capsule    pravastatin (PRAVACHOL) 20 mg tablet      Send to   [phone]

## 2021-04-19 ENCOUNTER — IMMUNIZATIONS (OUTPATIENT)
Dept: FAMILY MEDICINE CLINIC | Facility: HOSPITAL | Age: 86
End: 2021-04-19

## 2021-04-19 DIAGNOSIS — Z23 ENCOUNTER FOR IMMUNIZATION: Primary | ICD-10-CM

## 2021-04-19 PROCEDURE — 0001A SARS-COV-2 / COVID-19 MRNA VACCINE (PFIZER-BIONTECH) 30 MCG: CPT

## 2021-04-19 PROCEDURE — 91300 SARS-COV-2 / COVID-19 MRNA VACCINE (PFIZER-BIONTECH) 30 MCG: CPT

## 2021-04-29 DIAGNOSIS — N39.0 RECURRENT UTI: ICD-10-CM

## 2021-04-29 RX ORDER — NITROFURANTOIN 25; 75 MG/1; MG/1
100 CAPSULE ORAL DAILY
Qty: 90 CAPSULE | Refills: 3 | Status: SHIPPED | OUTPATIENT
Start: 2021-04-29 | End: 2022-05-13

## 2021-04-29 NOTE — TELEPHONE ENCOUNTER
The patient was last seen on 2/10/21 by Lawson Garza in the [facility]  Chart note was incomplete    Request for same, 90 day supply with 3 refills was queued and forwarded to the Advanced Practitioner covering the [facility] for approval

## 2021-04-29 NOTE — TELEPHONE ENCOUNTER
Patient is calling to request a new prescription for nitrofurantoin (MACROBID) 100 mg capsule   , 90 day supply to [address]  [address] [facility] [address]  Phone:  [phone]

## 2021-05-13 ENCOUNTER — IMMUNIZATIONS (OUTPATIENT)
Dept: FAMILY MEDICINE CLINIC | Facility: HOSPITAL | Age: 86
End: 2021-05-13

## 2021-05-13 DIAGNOSIS — Z23 ENCOUNTER FOR IMMUNIZATION: Primary | ICD-10-CM

## 2021-05-13 PROCEDURE — 91300 SARS-COV-2 / COVID-19 MRNA VACCINE (PFIZER-BIONTECH) 30 MCG: CPT

## 2021-05-13 PROCEDURE — 0002A SARS-COV-2 / COVID-19 MRNA VACCINE (PFIZER-BIONTECH) 30 MCG: CPT

## 2021-06-22 ENCOUNTER — OFFICE VISIT (OUTPATIENT)
Dept: FAMILY MEDICINE CLINIC | Facility: CLINIC | Age: 86
End: 2021-06-22
Payer: MEDICARE

## 2021-06-22 ENCOUNTER — HOSPITAL ENCOUNTER (OUTPATIENT)
Dept: RADIOLOGY | Facility: HOSPITAL | Age: 86
Discharge: HOME | End: 2021-06-22
Payer: MEDICARE

## 2021-06-22 VITALS
HEART RATE: 106 BPM | OXYGEN SATURATION: 78 % | DIASTOLIC BLOOD PRESSURE: 78 MMHG | SYSTOLIC BLOOD PRESSURE: 138 MMHG | WEIGHT: 155 LBS | HEIGHT: 61 IN | RESPIRATION RATE: 16 BRPM | BODY MASS INDEX: 29.27 KG/M2

## 2021-06-22 DIAGNOSIS — R06.02 SHORT OF BREATH ON EXERTION: ICD-10-CM

## 2021-06-22 DIAGNOSIS — G62.9 NEUROPATHY: ICD-10-CM

## 2021-06-22 DIAGNOSIS — I87.2 VENOUS INSUFFICIENCY: ICD-10-CM

## 2021-06-22 DIAGNOSIS — R60.0 BILATERAL LEG EDEMA: Primary | ICD-10-CM

## 2021-06-22 DIAGNOSIS — R60.0 BILATERAL LEG EDEMA: ICD-10-CM

## 2021-06-22 PROCEDURE — 71046 X-RAY EXAM CHEST 2 VIEWS: CPT

## 2021-06-22 PROCEDURE — 99213 OFFICE O/P EST LOW 20 MIN: CPT | Performed by: NURSE PRACTITIONER

## 2021-06-22 RX ORDER — FUROSEMIDE 20 MG/1
20 TABLET ORAL DAILY
Qty: 10 TABLET | Refills: 0 | Status: ON HOLD | OUTPATIENT
Start: 2021-06-22 | End: 2022-05-08

## 2021-06-22 NOTE — PROGRESS NOTES
[facility]        NAME: Marycruz Velázquez is a 80 y o  female  : 1930    MRN: [id number]  DATE: 2021  TIME: 10:55 AM    Assessment and Plan   Bilateral leg edema [R60 0]  1  Bilateral leg edema  furosemide (LASIX) 20 mg tablet    XR chest pa & lateral   2  Short of breath on exertion     3  Venous insufficiency     4  Neuropathy           Patient Instructions     Patient Instructions   Chest Xray as ordered  Take Lasix 20 mg daily for 3 days, hold Hydrochlorothiazide while taking Lasix  Elevate legs and wear compression stockings to reduce swelling  Call if swelling does not improve  GO to ER with any chest pain, shortness of breath, weakness  Call with any problems or concerns  Chief Complaint     Chief Complaint   Patient presents with    Edema     LEGS/ANKLES/FEET X 1 MONTH         History of Present Illness       C/o leg and feet swelling x 1 month  HX of venous insufficiency, neuropathy  Takes HCTZ 12 5 daily and neurontin 600 mg bid  Denies chest pain, short of breath with activity  Tries to elevate legs but feet start to cramp when elevated  Follows low salt diet  Does not wear compression stocking  Review of Systems   Review of Systems   Constitutional: Positive for activity change  Negative for chills and fever  HENT: Negative  Negative for congestion  Respiratory: Positive for shortness of breath  Negative for cough, chest tightness and wheezing  Cardiovascular: Positive for leg swelling  Negative for chest pain  Gastrointestinal: Negative for abdominal pain  Genitourinary: Negative  Negative for difficulty urinating  Skin: Negative for color change and pallor  Neurological: Positive for weakness (generalized) and numbness (hx b/l leg neuropathy)  Negative for dizziness, facial asymmetry, speech difficulty and headaches  Psychiatric/Behavioral: Negative for confusion           Current Medications       Current Outpatient Medications:     apixaban (ELIQUIS) 2 5 mg, Take 1 tablet (2 5 mg total) by mouth 2 (two) times a day, Disp: 60 tablet, Rfl: 0    aspirin (ECOTRIN LOW STRENGTH) 81 mg EC tablet, Take 1 tablet (81 mg total) by mouth daily, Disp: , Rfl: 0    Cholecalciferol (VITAMIN D3) 1000 units CAPS, Take 1,000 Units by mouth daily , Disp: , Rfl:     Cyanocobalamin (VITAMIN B12 PO), Take by mouth, Disp: , Rfl:     estradiol (ESTRACE) 0 1 mg/g vaginal cream, Insert 1g into the vagina three (3) time a week , Disp: 42 5 g, Rfl: 3    gabapentin (NEURONTIN) 600 MG tablet, Take 1 tablet (600 mg total) by mouth 2 (two) times a day, Disp: 180 tablet, Rfl: 0    hydrochlorothiazide (HYDRODIURIL) 12 5 mg tablet, Take 1 tablet (12 5 mg total) by mouth daily, Disp: 90 tablet, Rfl: 0    Multiple Vitamins-Minerals (PRESERVISION AREDS PO), Take by mouth 2 (two) times a day, Disp: , Rfl:     nitrofurantoin (MACROBID) 100 mg capsule, Take 1 capsule (100 mg total) by mouth daily, Disp: 90 capsule, Rfl: 3    omeprazole (PriLOSEC) 20 mg delayed release capsule, Take 1 capsule (20 mg total) by mouth daily, Disp: 90 capsule, Rfl: 0    pravastatin (PRAVACHOL) 20 mg tablet, Take 1 tablet (20 mg total) by mouth daily, Disp: 90 tablet, Rfl: 0    furosemide (LASIX) 20 mg tablet, Take 1 tablet (20 mg total) by mouth daily, Disp: 10 tablet, Rfl: 0    predniSONE 5 mg tablet, Take 1 tablet (5 mg total) by mouth daily Please take 40 mg daily for 6 days and then 30 mg daily for 3 days then 20 mg daily for 3 days then 10 mg daily for 3 days then 5 mg daily (Patient not taking: Reported on 6/22/2021), Disp: 100 tablet, Rfl: 0    Current Allergies     Allergies as of 06/22/2021    (No Known Allergies)            The following portions of the patient's history were reviewed and updated as appropriate: allergies, current medications, past family history, past medical history, past social history, past surgical history and problem list      Past Medical History:   Diagnosis Date    Balance disorder     Chronic pain     GERD (gastroesophageal reflux disease)     Hard of hearing     Hyperlipidemia     Hypertension     Neuropathy     Pneumonia     Tinnitus     UTI (urinary tract infection)        Past Surgical History:   Procedure Laterality Date    EYE SURGERY      HYSTERECTOMY      TONSILLECTOMY         Family History   Problem Relation Age of Onset    Stroke Mother     Stroke Father     Heart disease Daughter     Substance Abuse Neg Hx     Mental illness Neg Hx          Medications have been verified  Objective   /78   Pulse (!) 106   Resp 16   Ht 5' 1" (1 549 m)   Wt 70 3 kg (155 lb)   SpO2 (!) 78%   BMI 29 29 kg/m²        Physical Exam     Physical Exam  Constitutional:       General: She is not in acute distress  Appearance: Normal appearance  HENT:      Head: Normocephalic  Cardiovascular:      Rate and Rhythm: Tachycardia present  Heart sounds: Normal heart sounds  No murmur heard  No friction rub  No gallop  Comments: +3 pitting edema to b/l lower extremities  Pulmonary:      Effort: Pulmonary effort is normal  No respiratory distress  Breath sounds: Normal breath sounds  No wheezing, rhonchi or rales  Chest:      Chest wall: No tenderness  Musculoskeletal:         General: Tenderness (b/l lower extrem) present  Cervical back: Normal range of motion  Right lower leg: Edema present  Left lower leg: Edema present  Comments: +3 pitting edema to b/l lower extremities   Skin:     General: Skin is warm and dry  Coloration: Skin is not pale  Findings: No erythema  Neurological:      Mental Status: She is alert and oriented to person, place, and time  Psychiatric:         Mood and Affect: Mood normal          Behavior: Behavior normal          Thought Content:  Thought content normal          Judgment: Judgment normal

## 2021-06-22 NOTE — PATIENT INSTRUCTIONS
Chest Xray as ordered  Take Lasix 20 mg daily for 3 days, hold Hydrochlorothiazide while taking Lasix  Elevate legs and wear compression stockings to reduce swelling  Call if swelling does not improve  GO to ER with any chest pain, shortness of breath, weakness  Call with any problems or concerns

## 2021-06-24 ENCOUNTER — TELEPHONE (OUTPATIENT)
Dept: FAMILY MEDICINE CLINIC | Facility: CLINIC | Age: 86
End: 2021-06-24

## 2021-06-24 NOTE — TELEPHONE ENCOUNTER
Pt was wondering if it would be a problem if she got her XRAY in august that's the soonest appt they have        Pt cb  [phone]

## 2021-06-25 ENCOUNTER — TELEPHONE (OUTPATIENT)
Dept: FAMILY MEDICINE CLINIC | Facility: CLINIC | Age: 86
End: 2021-06-25

## 2021-06-25 NOTE — TELEPHONE ENCOUNTER
[facility]     Pt has an x ray done 06/22 and the results were given       Pt was told to call back to clarify what her son Mary Jo Bahena was referring to

## 2021-06-25 NOTE — TELEPHONE ENCOUNTER
Pt son called yesterday regarding --- Dr Thomas Mayo referring for swollen ankles & abnormal chest x-ray ---she called to make an appt and the soonest available is in august     Pt wanted to know if that would be a problem       #  [phone]

## 2021-06-28 DIAGNOSIS — R60.0 BILATERAL LEG EDEMA: Primary | ICD-10-CM

## 2021-06-28 DIAGNOSIS — I42.9 CARDIOMYOPATHY, UNSPECIFIED TYPE (HCC): ICD-10-CM

## 2021-06-28 NOTE — TELEPHONE ENCOUNTER
Dr Jean Lugo do you want me to scheduled her in the office for the echo or is that what you are going to speak to cardiology about ?

## 2021-06-28 NOTE — TELEPHONE ENCOUNTER
Dr Luis Ponce that an echo prior to visit with him would be helpful  I will order that in the chart  She should call central scheduling to schedule

## 2021-06-28 NOTE — TELEPHONE ENCOUNTER
I will message Cardiology and scheduled to see, see if they can do an echocardiogram prior to that visit if she is available

## 2021-07-13 DIAGNOSIS — I10 ESSENTIAL HYPERTENSION: ICD-10-CM

## 2021-07-13 DIAGNOSIS — E78.2 MIXED HYPERLIPIDEMIA: ICD-10-CM

## 2021-07-13 DIAGNOSIS — G60.9 IDIOPATHIC PERIPHERAL NEUROPATHY: ICD-10-CM

## 2021-07-13 DIAGNOSIS — K21.9 GASTROESOPHAGEAL REFLUX DISEASE: ICD-10-CM

## 2021-07-13 NOTE — TELEPHONE ENCOUNTER
Med Refill    gabapentin (NEURONTIN) 600 MG tablet    hydrochlorothiazide (HYDRODIURIL) 12 5 mg tablet    omeprazole (PriLOSEC) 20 mg delayed release capsule    pravastatin (PRAVACHOL) 20 mg tablet    #[id number]    Send To:   [address] [facility] [phone]

## 2021-07-14 NOTE — TELEPHONE ENCOUNTER
Chart reviewed for refill of gabapentin, HCTZ, omprazole, pravastatin    AMW 9/11/2020 -- was seen 6/22/21 -- is scheduled for ECHO 7/28/21  Will give Rx to get her thru to [facility] 9/2011   Order for CMP, LP placed to be done prior to AMW

## 2021-07-15 RX ORDER — PRAVASTATIN SODIUM 20 MG
20 TABLET ORAL DAILY
Qty: 90 TABLET | Refills: 0 | Status: SHIPPED | OUTPATIENT
Start: 2021-07-15 | End: 2021-10-06 | Stop reason: SDUPTHER

## 2021-07-15 RX ORDER — HYDROCHLOROTHIAZIDE 12.5 MG/1
12.5 TABLET ORAL DAILY
Qty: 90 TABLET | Refills: 0 | Status: SHIPPED | OUTPATIENT
Start: 2021-07-15 | End: 2021-10-06 | Stop reason: SDUPTHER

## 2021-07-15 RX ORDER — GABAPENTIN 600 MG/1
600 TABLET ORAL 2 TIMES DAILY
Qty: 180 TABLET | Refills: 0 | Status: SHIPPED | OUTPATIENT
Start: 2021-07-15 | End: 2021-10-06 | Stop reason: SDUPTHER

## 2021-07-15 RX ORDER — OMEPRAZOLE 20 MG/1
20 CAPSULE, DELAYED RELEASE ORAL DAILY
Qty: 90 CAPSULE | Refills: 0 | Status: SHIPPED | OUTPATIENT
Start: 2021-07-15 | End: 2021-10-06 | Stop reason: SDUPTHER

## 2021-07-28 ENCOUNTER — HOSPITAL ENCOUNTER (OUTPATIENT)
Dept: NON INVASIVE DIAGNOSTICS | Age: 86
Discharge: HOME | End: 2021-07-28
Payer: MEDICARE

## 2021-07-28 DIAGNOSIS — I42.9 CARDIOMYOPATHY, UNSPECIFIED TYPE (HCC): ICD-10-CM

## 2021-07-28 DIAGNOSIS — R60.0 BILATERAL LEG EDEMA: ICD-10-CM

## 2021-07-28 PROCEDURE — 93306 TTE W/DOPPLER COMPLETE: CPT

## 2021-07-28 PROCEDURE — 93306 TTE W/DOPPLER COMPLETE: CPT | Performed by: INTERNAL MEDICINE

## 2021-07-29 ENCOUNTER — TELEPHONE (OUTPATIENT)
Dept: FAMILY MEDICINE CLINIC | Facility: CLINIC | Age: 86
End: 2021-07-29

## 2021-07-29 NOTE — TELEPHONE ENCOUNTER
----- Message from Ynes Lima MD sent at 7/29/2021 12:41 PM EDT -----  Call patient with lab result-only minor abnormalities on echo  She should discuss this with Cardiology at her consult visit

## 2021-07-29 NOTE — RESULT ENCOUNTER NOTE
Call patient with lab result-only minor abnormalities on echo  She should discuss this with Cardiology at her consult visit

## 2021-08-02 ENCOUNTER — APPOINTMENT (EMERGENCY)
Dept: CT IMAGING | Facility: HOSPITAL | Age: 86
End: 2021-08-02
Payer: MEDICARE

## 2021-08-02 ENCOUNTER — HOSPITAL ENCOUNTER (EMERGENCY)
Facility: HOSPITAL | Age: 86
Discharge: HOME | End: 2021-08-03
Attending: EMERGENCY MEDICINE | Admitting: EMERGENCY MEDICINE
Payer: MEDICARE

## 2021-08-02 DIAGNOSIS — R51.9 ACUTE HEADACHE: ICD-10-CM

## 2021-08-02 DIAGNOSIS — M43.6 NECK STIFFNESS: Primary | ICD-10-CM

## 2021-08-02 DIAGNOSIS — M62.838 TRAPEZIUS MUSCLE SPASM: ICD-10-CM

## 2021-08-02 LAB
ALBUMIN SERPL BCP-MCNC: 3.7 G/DL (ref 3.5–5)
ALP SERPL-CCNC: 75 U/L (ref 46–116)
ALT SERPL W P-5'-P-CCNC: 19 U/L (ref 12–78)
ANION GAP SERPL CALCULATED.3IONS-SCNC: 9 MMOL/L (ref 4–13)
AST SERPL W P-5'-P-CCNC: 27 U/L (ref 5–45)
BASOPHILS # BLD AUTO: 0.03 THOUSANDS/ΜL (ref 0–0.1)
BASOPHILS NFR BLD AUTO: 0 % (ref 0–1)
BILIRUB SERPL-MCNC: 0.5 MG/DL (ref 0.2–1)
BUN SERPL-MCNC: 17 MG/DL (ref 5–25)
CALCIUM SERPL-MCNC: 8.8 MG/DL (ref 8.3–10.1)
CHLORIDE SERPL-SCNC: 99 MMOL/L (ref 100–108)
CO2 SERPL-SCNC: 29 MMOL/L (ref 21–32)
CREAT SERPL-MCNC: 0.77 MG/DL (ref 0.6–1.3)
EOSINOPHIL # BLD AUTO: 0.22 THOUSAND/ΜL (ref 0–0.61)
EOSINOPHIL NFR BLD AUTO: 3 % (ref 0–6)
ERYTHROCYTE [DISTWIDTH] IN BLOOD BY AUTOMATED COUNT: 13.2 % (ref 11.6–15.1)
GFR SERPL CREATININE-BSD FRML MDRD: 68 ML/MIN/1.73SQ M
GLUCOSE CSF-MCNC: 66 MG/DL (ref 50–80)
GLUCOSE SERPL-MCNC: 116 MG/DL (ref 65–140)
GRAM STN SPEC: NORMAL
GRAM STN SPEC: NORMAL
HCT VFR BLD AUTO: 37.6 % (ref 34.8–46.1)
HGB BLD-MCNC: 12 G/DL (ref 11.5–15.4)
IMM GRANULOCYTES # BLD AUTO: 0.03 THOUSAND/UL (ref 0–0.2)
IMM GRANULOCYTES NFR BLD AUTO: 0 % (ref 0–2)
LYMPHOCYTES # BLD AUTO: 2.23 THOUSANDS/ΜL (ref 0.6–4.47)
LYMPHOCYTES NFR BLD AUTO: 25 % (ref 14–44)
MCH RBC QN AUTO: 29.1 PG (ref 26.8–34.3)
MCHC RBC AUTO-ENTMCNC: 31.9 G/DL (ref 31.4–37.4)
MCV RBC AUTO: 91 FL (ref 82–98)
MONOCYTES # BLD AUTO: 0.83 THOUSAND/ΜL (ref 0.17–1.22)
MONOCYTES NFR BLD AUTO: 9 % (ref 4–12)
NEUTROPHILS # BLD AUTO: 5.59 THOUSANDS/ΜL (ref 1.85–7.62)
NEUTS SEG NFR BLD AUTO: 63 % (ref 43–75)
NRBC BLD AUTO-RTO: 0 /100 WBCS
PLATELET # BLD AUTO: 219 THOUSANDS/UL (ref 149–390)
PMV BLD AUTO: 9.1 FL (ref 8.9–12.7)
POTASSIUM SERPL-SCNC: 4.1 MMOL/L (ref 3.5–5.3)
PROT CSF-MCNC: 56 MG/DL (ref 15–45)
PROT SERPL-MCNC: 8.2 G/DL (ref 6.4–8.2)
RBC # BLD AUTO: 4.12 MILLION/UL (ref 3.81–5.12)
SODIUM SERPL-SCNC: 137 MMOL/L (ref 136–145)
WBC # BLD AUTO: 8.93 THOUSAND/UL (ref 4.31–10.16)

## 2021-08-02 PROCEDURE — 93005 ELECTROCARDIOGRAM TRACING: CPT

## 2021-08-02 PROCEDURE — 82945 GLUCOSE OTHER FLUID: CPT | Performed by: EMERGENCY MEDICINE

## 2021-08-02 PROCEDURE — 89051 BODY FLUID CELL COUNT: CPT | Performed by: EMERGENCY MEDICINE

## 2021-08-02 PROCEDURE — 85025 COMPLETE CBC W/AUTO DIFF WBC: CPT | Performed by: EMERGENCY MEDICINE

## 2021-08-02 PROCEDURE — 70496 CT ANGIOGRAPHY HEAD: CPT

## 2021-08-02 PROCEDURE — 84157 ASSAY OF PROTEIN OTHER: CPT | Performed by: EMERGENCY MEDICINE

## 2021-08-02 PROCEDURE — 99284 EMERGENCY DEPT VISIT MOD MDM: CPT | Performed by: EMERGENCY MEDICINE

## 2021-08-02 PROCEDURE — 99284 EMERGENCY DEPT VISIT MOD MDM: CPT

## 2021-08-02 PROCEDURE — 96375 TX/PRO/DX INJ NEW DRUG ADDON: CPT

## 2021-08-02 PROCEDURE — 36415 COLL VENOUS BLD VENIPUNCTURE: CPT | Performed by: EMERGENCY MEDICINE

## 2021-08-02 PROCEDURE — 80053 COMPREHEN METABOLIC PANEL: CPT | Performed by: EMERGENCY MEDICINE

## 2021-08-02 PROCEDURE — 70498 CT ANGIOGRAPHY NECK: CPT

## 2021-08-02 PROCEDURE — 89050 BODY FLUID CELL COUNT: CPT | Performed by: EMERGENCY MEDICINE

## 2021-08-02 PROCEDURE — 96365 THER/PROPH/DIAG IV INF INIT: CPT

## 2021-08-02 PROCEDURE — 87070 CULTURE OTHR SPECIMN AEROBIC: CPT | Performed by: EMERGENCY MEDICINE

## 2021-08-02 PROCEDURE — 62270 DX LMBR SPI PNXR: CPT | Performed by: EMERGENCY MEDICINE

## 2021-08-02 RX ORDER — MAGNESIUM SULFATE HEPTAHYDRATE 40 MG/ML
2 INJECTION, SOLUTION INTRAVENOUS ONCE
Status: COMPLETED | OUTPATIENT
Start: 2021-08-02 | End: 2021-08-02

## 2021-08-02 RX ORDER — KETOROLAC TROMETHAMINE 30 MG/ML
15 INJECTION, SOLUTION INTRAMUSCULAR; INTRAVENOUS ONCE
Status: COMPLETED | OUTPATIENT
Start: 2021-08-02 | End: 2021-08-02

## 2021-08-02 RX ORDER — LIDOCAINE HYDROCHLORIDE 10 MG/ML
5 INJECTION, SOLUTION EPIDURAL; INFILTRATION; INTRACAUDAL; PERINEURAL ONCE
Status: COMPLETED | OUTPATIENT
Start: 2021-08-02 | End: 2021-08-02

## 2021-08-02 RX ORDER — METOCLOPRAMIDE HYDROCHLORIDE 5 MG/ML
10 INJECTION INTRAMUSCULAR; INTRAVENOUS ONCE
Status: COMPLETED | OUTPATIENT
Start: 2021-08-02 | End: 2021-08-02

## 2021-08-02 RX ORDER — DIPHENHYDRAMINE HYDROCHLORIDE 50 MG/ML
25 INJECTION INTRAMUSCULAR; INTRAVENOUS ONCE
Status: COMPLETED | OUTPATIENT
Start: 2021-08-02 | End: 2021-08-02

## 2021-08-02 RX ADMIN — MAGNESIUM SULFATE HEPTAHYDRATE 2 G: 40 INJECTION, SOLUTION INTRAVENOUS at 19:26

## 2021-08-02 RX ADMIN — LIDOCAINE HYDROCHLORIDE 5 ML: 10 INJECTION, SOLUTION EPIDURAL; INFILTRATION; INTRACAUDAL; PERINEURAL at 19:25

## 2021-08-02 RX ADMIN — IOHEXOL 85 ML: 350 INJECTION, SOLUTION INTRAVENOUS at 21:23

## 2021-08-02 RX ADMIN — KETOROLAC TROMETHAMINE 15 MG: 30 INJECTION, SOLUTION INTRAMUSCULAR at 19:25

## 2021-08-02 RX ADMIN — DIPHENHYDRAMINE HYDROCHLORIDE 25 MG: 50 INJECTION, SOLUTION INTRAMUSCULAR; INTRAVENOUS at 19:26

## 2021-08-02 RX ADMIN — METOCLOPRAMIDE HYDROCHLORIDE 10 MG: 5 INJECTION INTRAMUSCULAR; INTRAVENOUS at 19:25

## 2021-08-02 NOTE — PROGRESS NOTES
Cardiology Consultation     Daniella Jessica  [id number]  1/28/1930  [facility] [address] [address]  [address] [facility] [address] [facility] [address] [phone]  1  Pure hypercholesterolemia     2  Essential hypertension     3  Paroxysmal atrial fibrillation (HCC)     4  Edema, unspecified type       Patient Active Problem List   Diagnosis    Hypertension    Neuropathy    Gastroesophageal reflux disease without esophagitis    Vitamin B 12 deficiency    Fall    Recurrent UTI - no acute infection     Arthropathy    Hyperlipidemia    Venous insufficiency    Intermediate stage nonexudative age-related macular degeneration of both eyes    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)    Acute pain of left shoulder    Change in mental status    Hx: UTI (urinary tract infection)    Pneumonia due to COVID-19 virus    Hyponatremia    Low serum cortisol level (HCC)       HPI   Patient is here for a cardiology evaluation  She has hypertension and hyperlipidemia  She has had issues with lower extremity edema  Echocardiogram done July 2021 demonstrated LVEF of 50-55% with mild to moderate TR noted  PAP was mildly elevated  Doppler evaluation of the LV suggested grade 1 diastolic dysfunction  Patient does have history of venous insufficiency  Blood work done August 2021 demonstrated creatinine of 0 77  Patient was seen in the ED August 2021 in reference to headache  EKG done August 2, 2021 demonstrated sinus rhythm with first-degree AV block and left bundle branch block  Prior EKG done December 7, 2020 demonstrated atrial fibrillation  Patient's vital signs today are stable  She has had no chest pain or significant dyspnea  She is listed as being on furosemide and hydrochlorothiazide but only takes hydrochlorothiazide    She tells me this is been helpful in reference to her leg edema which is improved  She does admit to need for improvement in reference to salt intake  She does elevate her legs are with a recliner at home  PMH-  Past Medical History:   Diagnosis Date    Balance disorder     Chronic pain     GERD (gastroesophageal reflux disease)     Hard of hearing     Hyperlipidemia     Hypertension     Neuropathy     Pneumonia     Tinnitus     UTI (urinary tract infection)         SOCIAL HISTORY-  Social History     Socioeconomic History    Marital status:      Spouse name: Not on file    Number of children: Not on file    Years of education: Not on file    Highest education level: Not on file   Occupational History    Not on file   Tobacco Use    Smoking status: Never Smoker    Smokeless tobacco: Never Used   Vaping Use    Vaping Use: Never used   Substance and Sexual Activity    Alcohol use: Yes     Comment: rarely    Drug use: No    Sexual activity: Not Currently   Other Topics Concern    Not on file   Social History Narrative    Not on file     Social Determinants of Health     Financial Resource Strain:     Difficulty of Paying Living Expenses:    Food Insecurity:     Worried About Running Out of Food in the Last Year:     [address] in the Last Year:    Transportation Needs:     Lack of Transportation (Medical):      Lack of Transportation (Non-Medical):    Physical Activity:     Days of Exercise per Week:     Minutes of Exercise per Session:    Stress:     Feeling of Stress :    Social Connections:     Frequency of Communication with Friends and Family:     Frequency of Social Gatherings with Friends and Family:     Attends Church Services:     Active Member of Clubs or Organizations:     Attends Club or Organization Meetings:     Marital Status:    Intimate Partner Violence:     Fear of Current or Ex-Partner:     Emotionally Abused:     Physically Abused:     Sexually Abused:         FAMILY HISTORY-  Family History   Problem Relation Age of Onset    Stroke Mother     Stroke Father     Heart disease Daughter     Substance Abuse Neg Hx     Mental illness Neg Hx        SURGICAL HISTORY-  Past Surgical History:   Procedure Laterality Date    EYE SURGERY      HYSTERECTOMY      TONSILLECTOMY           Current Outpatient Medications:     apixaban (ELIQUIS) 2 5 mg, Take 1 tablet (2 5 mg total) by mouth 2 (two) times a day, Disp: 60 tablet, Rfl: 0    aspirin (ECOTRIN LOW STRENGTH) 81 mg EC tablet, Take 1 tablet (81 mg total) by mouth daily, Disp: , Rfl: 0    Baclofen 5 MG TABS, Take 5 mg by mouth 3 (three) times a day, Disp: 45 tablet, Rfl: 0    Cholecalciferol (VITAMIN D3) 1000 units CAPS, Take 1,000 Units by mouth daily , Disp: , Rfl:     Cyanocobalamin (VITAMIN B12 PO), Take by mouth, Disp: , Rfl:     estradiol (ESTRACE) 0 1 mg/g vaginal cream, Insert 1g into the vagina three (3) time a week , Disp: 42 5 g, Rfl: 3    gabapentin (NEURONTIN) 600 MG tablet, Take 1 tablet (600 mg total) by mouth 2 (two) times a day, Disp: 180 tablet, Rfl: 0    hydrochlorothiazide (HYDRODIURIL) 12 5 mg tablet, Take 1 tablet (12 5 mg total) by mouth daily, Disp: 90 tablet, Rfl: 0    Multiple Vitamins-Minerals (PRESERVISION AREDS PO), Take by mouth 2 (two) times a day, Disp: , Rfl:     omeprazole (PriLOSEC) 20 mg delayed release capsule, Take 1 capsule (20 mg total) by mouth daily, Disp: 90 capsule, Rfl: 0    pravastatin (PRAVACHOL) 20 mg tablet, Take 1 tablet (20 mg total) by mouth daily, Disp: 90 tablet, Rfl: 0    furosemide (LASIX) 20 mg tablet, Take 1 tablet (20 mg total) by mouth daily, Disp: 10 tablet, Rfl: 0    nitrofurantoin (MACROBID) 100 mg capsule, Take 1 capsule (100 mg total) by mouth daily, Disp: 90 capsule, Rfl: 3    predniSONE 5 mg tablet, Take 1 tablet (5 mg total) by mouth daily Please take 40 mg daily for 6 days and then 30 mg daily for 3 days then 20 mg daily for 3 days then 10 mg daily for 3 days then 5 mg daily (Patient not taking: Reported on 8/3/2021), Disp: 100 tablet, Rfl: 0  No Known Allergies  Vitals:    08/11/21 1118   BP: 120/60   BP Location: Left arm   Patient Position: Sitting   Cuff Size: Standard   Pulse: 76   Weight: 68 8 kg (151 lb 9 6 oz)   Height: 5' 1" (1 549 m)         Review of Systems:  Review of Systems   All other systems reviewed and are negative  Physical Exam:  Physical Exam  Vitals reviewed  Constitutional:       Appearance: She is well-developed  HENT:      Head: Normocephalic and atraumatic  Eyes:      Conjunctiva/sclera: Conjunctivae normal       Pupils: Pupils are equal, round, and reactive to light  Cardiovascular:      Rate and Rhythm: Normal rate  Heart sounds: Normal heart sounds  Pulmonary:      Effort: Pulmonary effort is normal       Breath sounds: Normal breath sounds  Musculoskeletal:      Cervical back: Normal range of motion and neck supple  Right lower leg: Edema present  Left lower leg: Edema present  Skin:     General: Skin is warm and dry  Neurological:      Mental Status: She is alert and oriented to person, place, and time  Discussion/Summary:  Patient appears compensated at the present time  Her echocardiogram showed no significant abnormality  She does have mild lower extremity edema but she says is improved with hydrochlorothiazide  Recent blood work showed that her potassium was in the normal range  She does get occasional leg cramping  I have asked her to call if there is any significant change  Recent EKG demonstrated sinus rhythm with prior history of PAF on adjusted dose Eliquis  I have asked her to watch her salt intake and to monitor her weight  I have asked her to call if there is a problem otherwise I will see her again in six months time

## 2021-08-03 ENCOUNTER — OFFICE VISIT (OUTPATIENT)
Dept: FAMILY MEDICINE CLINIC | Facility: CLINIC | Age: 86
End: 2021-08-03
Payer: MEDICARE

## 2021-08-03 VITALS
DIASTOLIC BLOOD PRESSURE: 72 MMHG | HEIGHT: 61 IN | SYSTOLIC BLOOD PRESSURE: 148 MMHG | WEIGHT: 155 LBS | OXYGEN SATURATION: 99 % | TEMPERATURE: 97.6 F | HEART RATE: 82 BPM | BODY MASS INDEX: 29.27 KG/M2

## 2021-08-03 VITALS
DIASTOLIC BLOOD PRESSURE: 63 MMHG | SYSTOLIC BLOOD PRESSURE: 146 MMHG | OXYGEN SATURATION: 97 % | WEIGHT: 155 LBS | RESPIRATION RATE: 16 BRPM | HEIGHT: 61 IN | TEMPERATURE: 98.6 F | BODY MASS INDEX: 29.27 KG/M2 | HEART RATE: 75 BPM

## 2021-08-03 DIAGNOSIS — M54.2 MUSCULOSKELETAL NECK PAIN: Primary | ICD-10-CM

## 2021-08-03 LAB
APPEARANCE CSF: NORMAL
ATRIAL RATE: 93 BPM
LYMPHOCYTES NFR CSF MANUAL: 80 %
MONOS+MACROS CSF MANUAL: 20 %
P AXIS: 70 DEGREES
PR INTERVAL: 230 MS
QRS AXIS: 85 DEGREES
QRSD INTERVAL: 146 MS
QT INTERVAL: 396 MS
QTC INTERVAL: 492 MS
RBC # CSF MANUAL: 0 UL (ref 0–10)
T WAVE AXIS: -57 DEGREES
TOTAL CELLS COUNTED BLD: NO
TOTAL CELLS COUNTED SPEC: 10
TUBE # CSF: 4
VENTRICULAR RATE: 93 BPM
WBC # CSF AUTO: 1 /UL (ref 0–5)

## 2021-08-03 PROCEDURE — 93010 ELECTROCARDIOGRAM REPORT: CPT | Performed by: INTERNAL MEDICINE

## 2021-08-03 PROCEDURE — 99214 OFFICE O/P EST MOD 30 MIN: CPT | Performed by: FAMILY MEDICINE

## 2021-08-03 RX ORDER — BACLOFEN 5 MG/1
5 TABLET ORAL 3 TIMES DAILY
Qty: 45 TABLET | Refills: 0 | Status: ON HOLD | OUTPATIENT
Start: 2021-08-03 | End: 2022-05-08

## 2021-08-03 NOTE — PATIENT INSTRUCTIONS
Continued you sure hot pack 10-15 minutes 3-4 times daily  Try to sleep with her neck in a neutral position  Trial of baclofen 5 mg 3 times a day for muscle spasm  Schedule appointment for physical therapy for your neck  If therapy does not help relieve your discomfort, we may need to have you see pain management  Get topical over-the-counter patch to use as directed for topical treatment

## 2021-08-03 NOTE — DISCHARGE INSTRUCTIONS
Acute Headache   WHAT YOU NEED TO KNOW:   An acute headache is pain or discomfort that starts suddenly and gets worse quickly  You may have an acute headache only when you feel stress or eat certain foods  Other acute headache pain can happen every day, and sometimes several times a day  DISCHARGE INSTRUCTIONS:   Return to the emergency department if:   · You have severe pain  · You have numbness or weakness on one side of your face or body  · You have a headache that occurs after a blow to the head, a fall, or other trauma  · You have a headache, are forgetful or confused, or have trouble speaking  · You have a headache, stiff neck, and a fever  Contact your healthcare provider if:   · You have a constant headache and are vomiting  · You have a headache each day that does not get better, even after treatment  · You have changes in your headaches, or new symptoms that occur when you have a headache  · You have questions or concerns about your condition or care  Medicines: You may need any of the following:  · Prescription pain medicine  may be given  The medicine your healthcare provider recommends will depend on the kind of headaches you have  You will need to take prescription headache medicines as directed to prevent a problem called rebound headache  These headaches happen with regular use of pain relievers for headache disorders  · NSAIDs , such as ibuprofen, help decrease swelling, pain, and fever  This medicine is available with or without a doctor's order  NSAIDs can cause stomach bleeding or kidney problems in certain people  If you take blood thinner medicine, always ask your healthcare provider if NSAIDs are safe for you  Always read the medicine label and follow directions  · Acetaminophen  decreases pain and fever  It is available without a doctor's order  Ask how much to take and how often to take it  Follow directions   Read the labels of all other medicines you are using to see if they also contain acetaminophen, or ask your doctor or pharmacist  Acetaminophen can cause liver damage if not taken correctly  Do not use more than 3 grams (3,000 milligrams) total of acetaminophen in one day  · Antidepressants  may be given for some kinds of headaches  · Take your medicine as directed  Contact your healthcare provider if you think your medicine is not helping or if you have side effects  Tell him or her if you are allergic to any medicine  Keep a list of the medicines, vitamins, and herbs you take  Include the amounts, and when and why you take them  Bring the list or the pill bottles to follow-up visits  Carry your medicine list with you in case of an emergency  Manage your symptoms:   · Apply heat or ice  on the headache area  Use a heat or ice pack  For an ice pack, you can also put crushed ice in a plastic bag  Cover the pack or bag with a towel before you apply it to your skin  Ice and heat both help decrease pain, and heat also helps decrease muscle spasms  Apply heat for 20 to 30 minutes every 2 hours  Apply ice for 15 to 20 minutes every hour  Apply heat or ice for as long and for as many days as directed  You may alternate heat and ice  · Relax your muscles  Lie down in a comfortable position and close your eyes  Relax your muscles slowly  Start at your toes and work your way up your body  · Keep a record of your headaches  Write down when your headaches start and stop  Include your symptoms and what you were doing when the headache began  Record what you ate or drank for 24 hours before the headache started  Describe the pain and where it hurts  Keep track of what you did to treat your headache and if it worked  Prevent an acute headache:   · Avoid anything that triggers an acute headache  Examples include exposure to chemicals, going to high altitude, or not getting enough sleep  Create a regular sleep routine   Go to sleep at the same time and wake up at the same time each day  Do not use electronic devices before bedtime  These may trigger a headache or prevent you from sleeping well  · Do not smoke  Nicotine and other chemicals in cigarettes and cigars can trigger an acute headache or make it worse  Ask your healthcare provider for information if you currently smoke and need help to quit  E-cigarettes or smokeless tobacco still contain nicotine  Talk to your healthcare provider before you use these products  · Limit alcohol as directed  Alcohol can trigger an acute headache or make it worse  If you have cluster headaches, do not drink alcohol during an episode  For other types of headaches, ask your healthcare provider if it is safe for you to drink alcohol  Ask how much is safe for you to drink, and how often  · Exercise as directed  Exercise can reduce tension and help with headache pain  Aim for 30 minutes of physical activity on most days of the week  Your healthcare provider can help you create an exercise plan  · Eat a variety of healthy foods  Healthy foods include fruits, vegetables, low-fat dairy products, lean meats, fish, whole grains, and cooked beans  Your healthcare provider or dietitian can help you create meals plans if you need to avoid foods that trigger headaches  Follow up with your healthcare provider as directed:  Bring your headache record with you when you see your healthcare provider  Write down your questions so you remember to ask them during your visits  © Copyright [employer] 2021 Information is for End User's use only and may not be sold, redistributed or otherwise used for commercial purposes  All illustrations and images included in CareNotes® are the copyrighted property of A D [employer] M [employer] Inc  or [facility] [employer]ra [employer]   The above information is an  only  It is not intended as medical advice for individual conditions or treatments   Talk to your doctor, nurse or pharmacist before following any medical regimen to see if it is safe and effective for you  Muscle Spasm   WHAT YOU NEED TO KNOW:   A muscle spasm is a sudden contraction of any muscle or group of muscles  A muscle cramp is a painful muscle spasm  Muscle cramps commonly occur after intense exercise or during pregnancy  They may also be caused by certain medications, dehydration, low calcium or magnesium levels, or another medical condition  DISCHARGE INSTRUCTIONS:   Medicines: You may need the following:  · NSAIDs  help decrease swelling and pain or fever  This medicine is available with or without a doctor's order  NSAIDs can cause stomach bleeding or kidney problems in certain people  If you take blood thinner medicine, always ask your healthcare provider if NSAIDs are safe for you  Always read the medicine label and follow directions  · Take your medicine as directed  Contact your healthcare provider if you think your medicine is not helping or if you have side effects  Tell him of her if you are allergic to any medicine  Keep a list of the medicines, vitamins, and herbs you take  Include the amounts, and when and why you take them  Bring the list or the pill bottles to follow-up visits  Carry your medicine list with you in case of an emergency  Follow up with your healthcare provider as directed: You may need other tests or treatment  You may also be referred to a physical therapist or other specialist  Write down your questions so you remember to ask them during your visits  Self-care:   · Stretch  your muscle to help relieve the cramp  It may be helpful to keep your muscle in the stretched position until the cramp is gone  · Apply heat  to help decrease pain and muscle spasms  Apply heat on the area for 20 to 30 minutes every 2 hours for as many days as directed  · Apply ice  to help decrease swelling and pain  Ice may also help prevent tissue damage   Use an ice pack, or put crushed ice in a plastic bag  Cover it with a towel and place it on your muscle for 15 to 20 minutes every hour or as directed  · Drink more liquids  to help prevent muscle cramps caused by dehydration  Sports drinks may help replace electrolytes you lose through sweat during exercise  Ask your healthcare provider how much liquid to drink each day and which liquids are best for you  · Eat healthy foods , such as fruits, vegetables, whole grains, low-fat dairy products, and lean proteins (meat, beans, and fish)  If you are pregnant, ask your healthcare provider about foods that are high in magnesium and sodium  They may help to relieve cramps during pregnancy  · Massage your muscle  to help relieve the cramp  · Take frequent deep breaths  until the cramp feels better  Lie down while you take the deep breaths so you do not get dizzy or lightheaded  Contact your healthcare provider if:   · You have signs of dehydration, such as a headache, dark yellow urine, dry eyes or mouth, or a fast heartbeat  · You have questions or concerns about your condition or care  Return to the emergency department if:   · You have warmth, swelling, or redness in the cramping muscle  · You have frequent or unrelieved muscle cramps in several different muscles  · You have muscle cramps with numbness, tingling, and burning in your hands and feet  © Copyright [employer] 2021 Information is for End User's use only and may not be sold, redistributed or otherwise used for commercial purposes  All illustrations and images included in CareNotes® are the copyrighted property of [employer] Inc  or [facility] [employer]ra Mclean   The above information is an  only  It is not intended as medical advice for individual conditions or treatments  Talk to your doctor, nurse or pharmacist before following any medical regimen to see if it is safe and effective for you        Lumbar Puncture   WHAT YOU NEED TO KNOW:   A lumbar puncture is a procedure used to collect cerebrospinal fluid (CSF)  CSF is a clear, protective fluid that flows around the brain and inside the spinal canal  A lumbar puncture is usually done to check for an infection, inflammation, bleeding, or other conditions that affect the brain  It may also be done to remove CSF to reduce pressure in the brain  DISCHARGE INSTRUCTIONS:   Return to the emergency department if:   · You have a severe headache that does not get better after you lie down  · You have a fever  · You have a stiff neck or trouble thinking clearly  · Your legs, feet, or other parts below the waist feel numb, tingly, or weak  · You have bleeding or a discharge coming from the area where the needle was put into your back  · You have severe pain in your back or neck  Call your doctor if:   · You have questions or concerns about your condition or care  Medicines: You may need any of the following:  · Acetaminophen  decreases pain and fever  It is available without a doctor's order  Ask how much to take and how often to take it  Follow directions  Read the labels of all other medicines you are using to see if they also contain acetaminophen, or ask your doctor or pharmacist  Acetaminophen can cause liver damage if not taken correctly  Do not use more than 4 grams (4,000 milligrams) total of acetaminophen in one day  · NSAIDs  help decrease swelling and pain or fever  This medicine is available with or without a doctor's order  NSAIDs can cause stomach bleeding or kidney problems in certain people  If you take blood thinner medicine, always ask your healthcare provider if NSAIDs are safe for you  Always read the medicine label and follow directions  · Prescription pain medicine  may be given  Ask your healthcare provider how to take this medicine safely  Some prescription pain medicines contain acetaminophen   Do not take other medicines that contain acetaminophen without talking to your healthcare provider  Too much acetaminophen may cause liver damage  Prescription pain medicine may cause constipation  Ask your healthcare provider how to prevent or treat constipation  · Take your medicine as directed  Contact your healthcare provider if you think your medicine is not helping or if you have side effects  Tell him or her if you are allergic to any medicine  Keep a list of the medicines, vitamins, and herbs you take  Include the amounts, and when and why you take them  Bring the list or the pill bottles to follow-up visits  Carry your medicine list with you in case of an emergency  Care for a post-lumbar puncture headache: You may develop a headache during the first few hours after your procedure that may last for several days  The headache may be mild to severe and may get worse when you sit or stand  The following may help ease a post-lumbar puncture headache:  · Drink more liquid  than usual after your lumbar puncture  Ask how much liquid is right for you  Caffeine may be used to treat a headache  Drinks such as coffee, tea, or some soft drinks have caffeine  Caffeine is also available over the counter in tablet form  Ask about using caffeine to treat your headache  Do not drink alcohol  Alcohol can make your headache worse  · Lie down  or rest to ease your headache pain  Follow up with your healthcare provider as directed:  Write down your questions so you remember to ask them during your visits  © Copyright [employer] 2021 Information is for End User's use only and may not be sold, redistributed or otherwise used for commercial purposes  All illustrations and images included in CareNotes® are the copyrighted property of [employer]  or [employer]  The above information is an  only  It is not intended as medical advice for individual conditions or treatments   Talk to your doctor, nurse or pharmacist before following any medical regimen to see if it is safe and effective for you

## 2021-08-03 NOTE — PROGRESS NOTES
[id number]         NAME: Tressa Walker is a 80 y o  female  : 1930    MRN: [id number]  DATE: August 3, 2021  TIME: 3:29 PM    Assessment and Plan   Musculoskeletal neck pain [M54 2]  1  Musculoskeletal neck pain  Baclofen 5 MG TABS    Ambulatory referral to [facility]       No problem-specific Assessment & Plan notes found for this encounter  Patient Instructions     Patient Instructions   Continued you sure hot pack 10-15 minutes 3-4 times daily  Try to sleep with her neck in a neutral position  Trial of baclofen 5 mg 3 times a day for muscle spasm  Schedule appointment for physical therapy for your neck  If therapy does not help relieve your discomfort, we may need to have you see pain management  Get topical over-the-counter patch to use as directed for topical treatment  Chief Complaint     Chief Complaint   Patient presents with    Neck Pain     pt states she went to ED last night for neck pain, [facility]  History of Present Illness       Patient comes in to follow-up from ER visit with neck pain  She generally has some chronic neck pain however over the last 4-5 days this became increasingly worse  This is the ER yesterday had extensive workup  Was toe this is most likely to be musculoskeletal   She does admit that she tends to drop her head frequently  She is not sure of her neck position while sleeping  There has been no trauma  No recent cervical spine x-rays  CT did not report any significant bony abnormality  Review of Systems   Review of Systems   Constitutional: Negative for appetite change, chills, diaphoresis and fever  HENT: Negative for ear pain, rhinorrhea, sinus pressure and sore throat  Eyes: Negative for discharge, redness and itching  Respiratory: Negative for cough, shortness of breath and wheezing  Cardiovascular: Negative for chest pain and palpitations          Rapid or slow heart rate   Gastrointestinal: Negative for abdominal pain, diarrhea, nausea and vomiting  Musculoskeletal: Positive for neck pain and neck stiffness  Skin: Negative for color change and wound  Neurological: Positive for headaches  Negative for dizziness and light-headedness           Current Medications       Current Outpatient Medications:     apixaban (ELIQUIS) 2 5 mg, Take 1 tablet (2 5 mg total) by mouth 2 (two) times a day, Disp: 60 tablet, Rfl: 0    aspirin (ECOTRIN LOW STRENGTH) 81 mg EC tablet, Take 1 tablet (81 mg total) by mouth daily, Disp: , Rfl: 0    Cholecalciferol (VITAMIN D3) 1000 units CAPS, Take 1,000 Units by mouth daily , Disp: , Rfl:     Cyanocobalamin (VITAMIN B12 PO), Take by mouth, Disp: , Rfl:     estradiol (ESTRACE) 0 1 mg/g vaginal cream, Insert 1g into the vagina three (3) time a week , Disp: 42 5 g, Rfl: 3    furosemide (LASIX) 20 mg tablet, Take 1 tablet (20 mg total) by mouth daily, Disp: 10 tablet, Rfl: 0    gabapentin (NEURONTIN) 600 MG tablet, Take 1 tablet (600 mg total) by mouth 2 (two) times a day, Disp: 180 tablet, Rfl: 0    hydrochlorothiazide (HYDRODIURIL) 12 5 mg tablet, Take 1 tablet (12 5 mg total) by mouth daily, Disp: 90 tablet, Rfl: 0    Multiple Vitamins-Minerals (PRESERVISION AREDS PO), Take by mouth 2 (two) times a day, Disp: , Rfl:     nitrofurantoin (MACROBID) 100 mg capsule, Take 1 capsule (100 mg total) by mouth daily, Disp: 90 capsule, Rfl: 3    omeprazole (PriLOSEC) 20 mg delayed release capsule, Take 1 capsule (20 mg total) by mouth daily, Disp: 90 capsule, Rfl: 0    pravastatin (PRAVACHOL) 20 mg tablet, Take 1 tablet (20 mg total) by mouth daily, Disp: 90 tablet, Rfl: 0    Baclofen 5 MG TABS, Take 5 mg by mouth 3 (three) times a day, Disp: 45 tablet, Rfl: 0    predniSONE 5 mg tablet, Take 1 tablet (5 mg total) by mouth daily Please take 40 mg daily for 6 days and then 30 mg daily for 3 days then 20 mg daily for 3 days then 10 mg daily for 3 days then 5 mg daily (Patient not taking: Reported on 8/3/2021), Disp: 100 tablet, Rfl: 0  No current facility-administered medications for this visit  Current Allergies     Allergies as of 08/03/2021    (No Known Allergies)            The following portions of the patient's history were reviewed and updated as appropriate: allergies, current medications, past family history, past medical history, past social history, past surgical history and problem list      Past Medical History:   Diagnosis Date    Balance disorder     Chronic pain     GERD (gastroesophageal reflux disease)     Hard of hearing     Hyperlipidemia     Hypertension     Neuropathy     Pneumonia     Tinnitus     UTI (urinary tract infection)        Past Surgical History:   Procedure Laterality Date    EYE SURGERY      HYSTERECTOMY      TONSILLECTOMY         Family History   Problem Relation Age of Onset    Stroke Mother     Stroke Father     Heart disease Daughter     Substance Abuse Neg Hx     Mental illness Neg Hx          Medications have been verified  Objective   /72 (BP Location: Left arm, Patient Position: Sitting, Cuff Size: Adult)   Pulse 82   Temp 97 6 °F (36 4 °C)   Ht 5' 1" (1 549 m)   Wt 70 3 kg (155 lb)   SpO2 99%   BMI 29 29 kg/m²        Physical Exam     Physical Exam  Vitals reviewed  Constitutional:       General: She is not in acute distress  Appearance: She is well-developed  HENT:      Head: Normocephalic and atraumatic  Right Ear: Tympanic membrane and external ear normal  No drainage  Left Ear: Tympanic membrane normal  No drainage  Mouth/Throat:      Pharynx: No oropharyngeal exudate  Eyes:      General:         Right eye: No discharge  Left eye: No discharge  Conjunctiva/sclera: Conjunctivae normal    Neck:      Thyroid: No thyromegaly  Cardiovascular:      Rate and Rhythm: Normal rate and regular rhythm  Heart sounds: Normal heart sounds  Pulmonary:      Effort: Pulmonary effort is normal  No respiratory distress  Breath sounds: No wheezing or rales  Musculoskeletal:      Cervical back: Neck supple  Swelling, spasms, tenderness and bony tenderness present  No torticollis or crepitus  Pain with movement present  Decreased range of motion  Lymphadenopathy:      Cervical: No cervical adenopathy  Skin:     General: Skin is warm  Findings: No erythema or rash

## 2021-08-06 LAB — BACTERIA CSF CULT: NO GROWTH

## 2021-08-11 ENCOUNTER — CONSULT (OUTPATIENT)
Dept: CARDIOLOGY CLINIC | Facility: CLINIC | Age: 86
End: 2021-08-11
Payer: MEDICARE

## 2021-08-11 VITALS
HEART RATE: 76 BPM | BODY MASS INDEX: 28.62 KG/M2 | HEIGHT: 61 IN | SYSTOLIC BLOOD PRESSURE: 120 MMHG | WEIGHT: 151.6 LBS | DIASTOLIC BLOOD PRESSURE: 60 MMHG

## 2021-08-11 DIAGNOSIS — I10 ESSENTIAL HYPERTENSION: ICD-10-CM

## 2021-08-11 DIAGNOSIS — R60.9 EDEMA, UNSPECIFIED TYPE: ICD-10-CM

## 2021-08-11 DIAGNOSIS — I48.0 PAROXYSMAL ATRIAL FIBRILLATION (HCC): ICD-10-CM

## 2021-08-11 DIAGNOSIS — E78.00 PURE HYPERCHOLESTEROLEMIA: Primary | ICD-10-CM

## 2021-08-11 PROCEDURE — 99204 OFFICE O/P NEW MOD 45 MIN: CPT | Performed by: INTERNAL MEDICINE

## 2021-08-18 ENCOUNTER — EVALUATION (OUTPATIENT)
Dept: PHYSICAL THERAPY | Facility: CLINIC | Age: 86
End: 2021-08-18
Payer: MEDICARE

## 2021-08-18 DIAGNOSIS — M54.2 MUSCULOSKELETAL NECK PAIN: ICD-10-CM

## 2021-08-18 PROCEDURE — 97161 PT EVAL LOW COMPLEX 20 MIN: CPT | Performed by: PHYSICAL THERAPIST

## 2021-08-18 PROCEDURE — 97140 MANUAL THERAPY 1/> REGIONS: CPT | Performed by: PHYSICAL THERAPIST

## 2021-08-18 NOTE — PROGRESS NOTES
PT Evaluation     Today's date: 2021  Patient name: Pita Clifford  : 1930  MRN: [id number]  Referring provider: Tricia Quiles MD  Dx:   Encounter Diagnosis     ICD-10-CM    1  Musculoskeletal neck pain  M54 2 Ambulatory referral to [facility]                  Assessment  Assessment details: Pita Clifford is a 80 y o  female presenting to outpatient physical therapy at [place] [address] with complaints of cervical pain  She is having the most difficulty when looking upward due to head pain  Patient was referred to PT through [place] [address]  She would benefit from skilled PT services with focus on manual treatment to address her hypomobility issues, stretching and postural strengthening in order to return to all normal activities and reach maximum level of function  No further referral appears necessary at this time based upon examination results  Primary movement impairment diagnosis of hypomobility resulting in pathoanatomical symptoms of 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  Impairments include:    1  Pain in endrange cervical extension - addressing with mobs and flexibility  2  Decreased postural strength - addressing with HEP strengthening  3  Limited flexibility - addressing with HEP  4  Poor postural awareness - addressing with education    Impairments: abnormal or restricted ROM, activity intolerance, impaired balance, impaired physical strength, lacks appropriate home exercise program, pain with function and poor posture   Barriers to therapy: None  Understanding of Dx/Px/POC: good   Prognosis: good    Goals  ST  Independent with HEP in 2 weeks  2  Increase cervical AROM to 75% of WNL all motions in 3 weeks   3  Good postural awareness in 2 weeks    LT  Achieve FOTO score of 36/100 in 6 weeks   2  Able to look and reach to [facility] levels without cervical pain in 6 weeks  3  Strength B traps = 4/5 in 6 weeks  4    No headaches in 6 weeks    Plan  Patient would benefit from: skilled PT and PT eval  Planned modality interventions: cryotherapy, TENS and thermotherapy: hydrocollator packs  Planned therapy interventions: ADL retraining, body mechanics training, flexibility, functional ROM exercises, home exercise program, joint mobilization, manual therapy, neuromuscular re-education, postural training, strengthening, stretching, therapeutic activities and therapeutic exercise  Frequency: 2x week  Duration in weeks: 6  Plan of Care beginning date: 2021  Plan of Care expiration date: 2021  Treatment plan discussed with: patient        Subjective Evaluation    History of Present Illness  Mechanism of injury: Pt reports having cervical pain since 21 when she went to the ER due to severe pain  She generally has some chronic neck pain however over the last 2 weeks this became increasingly worse  She does admit that she tends to drop her head frequently  She is not sure of her neck position while sleeping  There has been no trauma  No recent cervical spine x-rays  CT did not report any significant bony abnormality  Has headaches at all times with heaviness  Muscle relaxants did not help  Admits that she is very inactive  Unable to reach upward due to cervical pain, not shoulder pain or limited ROM  Has used a walker for years for ambulation    She has B LE neuropathy               Recurrent probem    Quality of life: fair    Pain  Current pain ratin  At best pain ratin  At worst pain rating: 10  Quality: tight, sharp and dull ache  Relieving factors: heat  Progression: worsening    Social Support  Lives with: adult children    Employment status: not working  Hand dominance: right      Diagnostic Tests  CT scan: normal  Treatments  Previous treatment: medication  Patient Goals  Patient goals for therapy: increased strength, independence with ADLs/IADLs, return to sport/leisure activities, decreased pain and increased motion          Objective     Concurrent Complaints  Positive for headaches  Negative for disturbed sleep and dizziness    Static Posture     Head  Forward  Shoulders  Rounded  Postural Observations  Seated posture: poor  Standing posture: poor  Correction of posture: has no consistent effect        Palpation   Left   Hypertonic in the cervical paraspinals and suboccipitals  Muscle spasm in the cervical paraspinals and suboccipitals  Tenderness of the cervical paraspinals and suboccipitals  Right   Hypertonic in the cervical paraspinals and suboccipitals  Muscle spasm in the cervical paraspinals and suboccipitals  Tenderness of the cervical paraspinals and suboccipitals  Neurological Testing     Sensation   Cervical/Thoracic   Left   Intact: light touch    Right   Intact: light touch    Active Range of Motion   Cervical/Thoracic Spine       Cervical    Flexion:  Restriction level: minimal  Extension:  with pain Restriction level: maximal  Left lateral flexion:  with pain Restriction level: maximal  Right lateral flexion:  Restriction level maximal  Left rotation:  Restriction level: maximal  Right rotation:  with pain Restriction level: maximal    Strength/Myotome Testing     Left Shoulder     Planes of Motion   Flexion: 4+   Extension: 5   Abduction: 4+     Isolated Muscles   Middle trapezius: 4-     Right Shoulder     Planes of Motion   Flexion: 4+   Extension: 5   Abduction: 4+     Isolated Muscles   Middle trapezius: 4-     Tests     Left Shoulder   Negative [id number]  General Comments:    Upper quarter screen   Shoulder: unremarkable  Neuro Exam:     Headaches   Patient reports headaches: Yes                POC EXPIRES On:  9/29/21  PRECAUTIONS:  Fall risk  CO-MORBIDITES:  None  PERSONAL FACTORS:  None      Manuals HEP 8/18           STM B cervical paraspinals in sitting  10'                                                  Neuro Re-Ed     Seated chin tucks 8/18 5           Seated B scap retraction 8/18 5                                                                            Ther Ex                                                                                                            Ther Activity                              Gait Training                              Modalities

## 2021-08-23 ENCOUNTER — OFFICE VISIT (OUTPATIENT)
Dept: PHYSICAL THERAPY | Facility: CLINIC | Age: 86
End: 2021-08-23
Payer: MEDICARE

## 2021-08-23 DIAGNOSIS — M54.2 MUSCULOSKELETAL NECK PAIN: Primary | ICD-10-CM

## 2021-08-23 PROCEDURE — 97112 NEUROMUSCULAR REEDUCATION: CPT | Performed by: PHYSICAL THERAPIST

## 2021-08-23 PROCEDURE — 97140 MANUAL THERAPY 1/> REGIONS: CPT | Performed by: PHYSICAL THERAPIST

## 2021-08-23 NOTE — PROGRESS NOTES
Daily Note     Today's date: 2021  Patient name: Demetra Arias  : 1930  MRN: [id number]  Referring provider: Angelina Morales MD  Dx:   Encounter Diagnosis     ICD-10-CM    1  Musculoskeletal neck pain  M54 2                   Subjective:  Pt reports min less R sided cervical tightness, but still gives her occasional headaches  Objective:  See treatment diary below      Assessment:  Pt presented to outpatient physical therapy at [place] [address] with complaints of cervical pain  She is having the most difficulty when looking upward due to head pain  Patient was referred to PT through [place] [address]  She will continue to benefit from skilled PT services with focus on manual treatment to address her hypomobility issues, stretching and postural strengthening in order to return to all normal activities and reach maximum level of function  Pt had little to no L sided cervical tightness, but still min-mod R UT tightness  Pt tolerated new exercises better than expected today  Plan:  Continue to lessen soft tissue tightness and increase postural awareness / strength          POC EXPIRES On:  21  PRECAUTIONS:  Fall risk  CO-MORBIDITES:  None  PERSONAL FACTORS:  None      Manuals HEP           STM B cervical paraspinals in sitting  10' 15'                                                 Neuro Re-Ed     Seated chin tucks  5 20          Seated B scap retraction  5 20          TB rows B seated   Blue 20          Doorway pec stretch L/R   10" 3 ea          Bent over rows standing L/R   2# 15 ea                                    Ther Ex                                                                                                            Ther Activity                              Gait Training                              Modalities     c-spine sitting   10' post tx

## 2021-08-27 ENCOUNTER — OFFICE VISIT (OUTPATIENT)
Dept: PHYSICAL THERAPY | Facility: CLINIC | Age: 86
End: 2021-08-27
Payer: MEDICARE

## 2021-08-27 DIAGNOSIS — M54.2 MUSCULOSKELETAL NECK PAIN: Primary | ICD-10-CM

## 2021-08-27 PROCEDURE — 97140 MANUAL THERAPY 1/> REGIONS: CPT | Performed by: PHYSICAL THERAPIST

## 2021-08-27 PROCEDURE — 97112 NEUROMUSCULAR REEDUCATION: CPT | Performed by: PHYSICAL THERAPIST

## 2021-08-27 NOTE — PROGRESS NOTES
Daily Note     Today's date: 2021  Patient name: Tressa Walker  : 1930  MRN: [id number]  Referring provider: Nithya Hadley MD  Dx:   Encounter Diagnosis     ICD-10-CM    1  Musculoskeletal neck pain  M54 2                   Subjective:  Pt reports being able to hold her head up much better since last session  Pain on L side is gone and R is improving  Objective:  See treatment diary below      Assessment:  Pt presented to outpatient physical therapy at [place] [address] with complaints of cervical pain  She is having the most difficulty when looking upward due to head pain  Patient was referred to PT through [place] [address]  She will continue to benefit from skilled PT services with focus on manual treatment to address her hypomobility issues, stretching and postural strengthening in order to return to all normal activities and reach maximum level of function  Pt had little no L sided cervical tightness, but still min-mod R UT tightness although less pain  Pt is able to keep her head up well now due to lessening pain  Plan:  Continue to lessen soft tissue tightness and increase postural awareness / strength  Last schedule session on 21          POC EXPIRES On:  21  PRECAUTIONS:  Fall risk  CO-MORBIDITES:  None  PERSONAL FACTORS:  None      Manuals HEP          STM B cervical paraspinals in sitting  10' 15' 15'                                                Neuro Re-Ed     Seated chin tucks  5 20 20         Seated B scap retraction  5 20 20         TB rows B seated   Blue 20 Blue 20         Doorway pec stretch L/R   10" 3 ea 10" 3 ea         Bent over rows standing L/R   2# 15 ea 2# 20 ea                                   Ther Ex                                                                                                            Ther Activity                              Gait Training                              Modalities     c-spine sitting   10' post tx 10' post tx

## 2021-08-30 ENCOUNTER — OFFICE VISIT (OUTPATIENT)
Dept: PHYSICAL THERAPY | Facility: CLINIC | Age: 86
End: 2021-08-30
Payer: MEDICARE

## 2021-08-30 DIAGNOSIS — M54.2 MUSCULOSKELETAL NECK PAIN: Primary | ICD-10-CM

## 2021-08-30 PROCEDURE — 97140 MANUAL THERAPY 1/> REGIONS: CPT | Performed by: PHYSICAL THERAPIST

## 2021-08-30 PROCEDURE — 97112 NEUROMUSCULAR REEDUCATION: CPT | Performed by: PHYSICAL THERAPIST

## 2021-08-30 NOTE — PROGRESS NOTES
Daily Note     Today's date: 2021  Patient name: Pranay Meyer  : 1930  MRN: [id number]  Referring provider: Nicho Corado MD  Dx:   Encounter Diagnosis     ICD-10-CM    1  Musculoskeletal neck pain  M54 2                   Subjective:  Pt reports getting some pain relief since last session  Objective:  See treatment diary below      Assessment:  Pt presented to outpatient physical therapy at [place] [address] with complaints of cervical pain  She is having the most difficulty when looking upward due to head pain  Patient was referred to PT through [place] [address]  She will continue to benefit from skilled PT services with focus on manual treatment to address her hypomobility issues, stretching and postural strengthening in order to return to all normal activities and reach maximum level of function  Pt had no L sided cervical tightness for the first time since starting PT and only minimal on R  Pt is able to keep her head up well now due to lessening pain  Good tolerance to increased exercises today  Plan:  Continue to lessen soft tissue tightness and increase postural awareness / strength  Last scheduled session on 21          POC EXPIRES On:  21  PRECAUTIONS:  Fall risk  CO-MORBIDITES:  None  PERSONAL FACTORS:  None      Manuals HEP         STM B cervical paraspinals in sitting  10' 15' 15' 15'                                               Neuro Re-Ed     Seated chin tucks  5 20 20 20        Seated B scap retraction  5 20 20 20        TB rows B seated   Blue 20 Blue 20 Blue 20        Doorway pec stretch L/R   10" 3 ea 10" 3 ea 10" 3 ea        Bent over rows standing L/R   2# 15 ea 2# 20 ea 2# 20 ea        Seated shoulder press L/R     2# 20 ea        Seated biceps curls L/R     2# 20 ea        Ther Ex                                                                                                            Ther Activity Gait Training                              Modalities     c-spine sitting   10' post tx 10' post tx 10' post tx

## 2021-09-02 ENCOUNTER — OFFICE VISIT (OUTPATIENT)
Dept: PHYSICAL THERAPY | Facility: CLINIC | Age: 86
End: 2021-09-02
Payer: MEDICARE

## 2021-09-02 DIAGNOSIS — M54.2 MUSCULOSKELETAL NECK PAIN: Primary | ICD-10-CM

## 2021-09-02 PROCEDURE — 97112 NEUROMUSCULAR REEDUCATION: CPT | Performed by: PHYSICAL THERAPIST

## 2021-09-02 PROCEDURE — 97140 MANUAL THERAPY 1/> REGIONS: CPT | Performed by: PHYSICAL THERAPIST

## 2021-09-02 NOTE — PROGRESS NOTES
Daily Note     Today's date: 2021  Patient name: Pranay Meyer  : 1930  MRN: [id number]  Referring provider: Nicho Corado MD  Dx:   Encounter Diagnosis     ICD-10-CM    1  Musculoskeletal neck pain  M54 2                   Subjective:  Pt reports feeling better this week  Holding her head up much better  Objective:  See treatment diary below      Assessment:  Pt presented to outpatient physical therapy at [place] [address] with complaints of cervical pain  She is having the most difficulty when looking upward due to head pain  Patient was referred to PT through [place] [address]  She will continue to benefit from skilled PT services with focus on manual treatment to address her hypomobility issues, stretching and postural strengthening in order to return to all normal activities and reach maximum level of function  Pt has had no L sided cervical tightness this week and minimal R sided tightness at most   She may be ready for D/C in about 1 week  She is able to keep her head up well now due to lessening pain  Plan:  Continue to lessen soft tissue tightness and increase postural awareness / strength  Last scheduled session on 21  Likely D/C then           POC EXPIRES On:  21  PRECAUTIONS:  Fall risk  CO-MORBIDITES:  None  PERSONAL FACTORS:  None      Manuals HEP  9       STM B cervical paraspinals in sitting  10' 15' 15' 15' 15'                                              Neuro Re-Ed     Seated chin tucks  5 20 20 20 20       Seated B scap retraction  5 20 20 20 20       TB rows B seated   Blue 20 Blue 20 Blue 20 Blue 20       Doorway pec stretch L/R   10" 3 ea 10" 3 ea 10" 3 ea 10" 3 ea       Bent over rows standing L/R   2# 15 ea 2# 20 ea 2# 20 ea 3# 20 ea       Seated shoulder press L/R     2# 20 ea 3# 20 ea       Seated biceps curls L/R     2# 20 ea 3# 20 ea       Ther Ex Ther Activity                              Gait Training                              Modalities     c-spine sitting   10' post tx 10' post tx 10' post tx 10' post tx

## 2021-09-07 ENCOUNTER — OFFICE VISIT (OUTPATIENT)
Dept: PHYSICAL THERAPY | Facility: CLINIC | Age: 86
End: 2021-09-07
Payer: MEDICARE

## 2021-09-07 DIAGNOSIS — M54.2 MUSCULOSKELETAL NECK PAIN: Primary | ICD-10-CM

## 2021-09-07 PROCEDURE — 97140 MANUAL THERAPY 1/> REGIONS: CPT | Performed by: PHYSICAL THERAPIST

## 2021-09-07 PROCEDURE — 97112 NEUROMUSCULAR REEDUCATION: CPT | Performed by: PHYSICAL THERAPIST

## 2021-09-07 NOTE — PROGRESS NOTES
Daily Note     Today's date: 2021  Patient name: Pranay Meyer  : 1930  MRN: [id number]  Referring provider: Nicho Corado MD  Dx:   Encounter Diagnosis     ICD-10-CM    1  Musculoskeletal neck pain  M54 2                   Subjective:  Pt reports much less neck pain  Objective:  See treatment diary below      Assessment:  Pt presented to outpatient physical therapy at [place] [address] with complaints of cervical pain  She is having the most difficulty when looking upward due to head pain  Patient was referred to PT through [place] [address]  She will continue to benefit from skilled PT services with focus on manual treatment to address her hypomobility issues, stretching and postural strengthening in order to return to all normal activities and reach maximum level of function  Pt has had no L sided cervical tightness this week and minimal R sided tightness un suboccipitals, none in UT anymore  She is able to keep her head up well now due to lessening pain  Plan:  Continue to lessen soft tissue tightness and increase postural awareness / strength  Plan D/C by 21           POC EXPIRES On:  21  PRECAUTIONS:  Fall risk  CO-MORBIDITES:  None  PERSONAL FACTORS:  None      Manuals HEP       STM B cervical paraspinals in sitting  10' 15' 15' 15' 15' 15'                                             Neuro Re-Ed     Seated chin tucks  5 20 20 20 20 20      Seated B scap retraction  5 20 20 20 20 20      TB rows B seated   Blue 20 Blue 20 Blue 20 Blue 20 Blue 20      TB biceps curls B seated       Blue 20       Doorway pec stretch L/R   10" 3 ea 10" 3 ea 10" 3 ea 10" 3 ea 10" 3      Bent over rows standing L/R   2# 15 ea 2# 20 ea 2# 20 ea 3# 20 ea 3# 20 ea      Seated shoulder press L/R     2# 20 ea 3# 20 ea 3# 20 ea      Seated biceps curls L/R     2# 20 ea 3# 20 ea 3# 20 ea      Ther Ex Ther Activity                              Gait Training                              Modalities     c-spine sitting   10' post tx 10' post tx 10' post tx 10' post tx 10' post tx

## 2021-09-09 ENCOUNTER — OFFICE VISIT (OUTPATIENT)
Dept: PHYSICAL THERAPY | Facility: CLINIC | Age: 86
End: 2021-09-09
Payer: MEDICARE

## 2021-09-09 DIAGNOSIS — M54.2 MUSCULOSKELETAL NECK PAIN: Primary | ICD-10-CM

## 2021-09-09 PROCEDURE — 97140 MANUAL THERAPY 1/> REGIONS: CPT | Performed by: PHYSICAL THERAPIST

## 2021-09-09 PROCEDURE — 97112 NEUROMUSCULAR REEDUCATION: CPT | Performed by: PHYSICAL THERAPIST

## 2021-09-09 NOTE — PROGRESS NOTES
Daily Note     Today's date: 2021  Patient name: Carlita Calvin  : 1930  MRN: [id number]  Referring provider: Hung Small MD  Dx:   Encounter Diagnosis     ICD-10-CM    1  Musculoskeletal neck pain  M54 2                   Subjective:  Pt reports feeling better, but aching more with the rainy weather  Objective:  See treatment diary below      Assessment:  Pt presented to outpatient physical therapy at [place] [address] with complaints of cervical pain  She is having the most difficulty when looking upward due to head pain  Patient was referred to PT through [place] [address]  She will continue to benefit from skilled PT services with focus on manual treatment to address her hypomobility issues, stretching and postural strengthening in order to return to all normal activities and reach maximum level of function  Pt has had no L sided cervical tightness anymore and only min on R in suboccipitals, none in UT  She is able to keep her head up well now due to lessening pain  Plan:  Continue to lessen soft tissue tightness and increase postural awareness / strength  Consider adding seated retro UBE  Plan D/C by 21           POC EXPIRES On:  21  PRECAUTIONS:  Fall risk  CO-MORBIDITES:  None  PERSONAL FACTORS:  None      Manuals HEP      STM B cervical paraspinals in sitting  10' 15' 15' 15' 15' 15' 12'                                            Neuro Re-Ed     Seated chin tucks  5 20 20 20 20 20 20     Seated B scap retraction  5 20 20 20 20 20 20     TB rows B seated   Blue 20 Blue 20 Blue 20 Blue 20 Blue 20 Blue 20     TB biceps curls B seated       Blue 20  Blue 20     Doorway pec stretch L/R   10" 3 ea 10" 3 ea 10" 3 ea 10" 3 ea 10" 3 10" 3     Bent over rows standing L/R   2# 15 ea 2# 20 ea 2# 20 ea 3# 20 ea 3# 20 ea 3# 20 ea     Seated shoulder press L/R     2# 20 ea 3# 20 ea 3# 20 ea 3# 20 ea     Seated retro UBE NV     Seated biceps curls L/R     2# 20 ea 3# 20 ea 3# 20 ea 3# 20 ea     Ther Ex                                                                                                            Ther Activity                              Gait Training                              Modalities    MH c-spine sitting   10' post tx 10' post tx 10' post tx 10' post tx 10' post tx 10' post tx

## 2021-09-13 ENCOUNTER — OFFICE VISIT (OUTPATIENT)
Dept: PHYSICAL THERAPY | Facility: CLINIC | Age: 86
End: 2021-09-13
Payer: MEDICARE

## 2021-09-13 DIAGNOSIS — M54.2 MUSCULOSKELETAL NECK PAIN: Primary | ICD-10-CM

## 2021-09-13 PROCEDURE — 97140 MANUAL THERAPY 1/> REGIONS: CPT | Performed by: PHYSICAL THERAPIST

## 2021-09-13 PROCEDURE — 97112 NEUROMUSCULAR REEDUCATION: CPT | Performed by: PHYSICAL THERAPIST

## 2021-09-13 NOTE — PROGRESS NOTES
Daily Note     Today's date: 2021  Patient name: Nat Mcneal  : 1930  MRN: [id number]  Referring provider: Jamilah Burrell MD  Dx:   Encounter Diagnosis     ICD-10-CM    1  Musculoskeletal neck pain  M54 2                   Subjective:  Patient stated no significant pain prior to treatment session  Objective:  See treatment diary below      Assessment:  Patient performed addition of UBE without c/o pain; required verbal cueing for proper technique with TB rows and doorway stretch; positive response to manual intervention  Plan:  Continue to lessen soft tissue tightness and increase postural awareness / strength  Plan D/C by 21           POC EXPIRES On:  21  PRECAUTIONS:  Fall risk  CO-MORBIDITES:  None  PERSONAL FACTORS:  None      Manuals HEP     STM B cervical paraspinals in sitting  10' 15' 15' 15' 15' 15' 12' 12'                                           Neuro Re-Ed     Seated chin tucks  5 20 20 20 20 20 20 20    Seated B scap retraction  5 20 20 20 20 20 20 20    TB rows B seated   Blue 20 Blue 20 Blue 20 Blue 20 Blue 20 Blue 20 Blue 20    TB biceps curls B seated       Blue 20  Blue 20 Blue 20    Doorway pec stretch L/R   10" 3 ea 10" 3 ea 10" 3 ea 10" 3 ea 10" 3 10" 3 10" 3    Bent over rows standing L/R   2# 15 ea 2# 20 ea 2# 20 ea 3# 20 ea 3# 20 ea 3# 20 ea 3# 20 ea    Seated shoulder press L/R     2# 20 ea 3# 20 ea 3# 20 ea 3# 20 ea 3# 20 ea    Seated retro UBE        NV 4 min    Seated biceps curls L/R     2# 20 ea 3# 20 ea 3# 20 ea 3# 20 ea 3# 20 ea    Ther Ex                                                                                                            Ther Activity                              Gait Training                              Modalities    MH c-spine sitting   10' post tx 10' post tx 10' post tx 10' post tx 10' post tx 10' post tx np

## 2021-09-16 ENCOUNTER — APPOINTMENT (OUTPATIENT)
Dept: PHYSICAL THERAPY | Facility: CLINIC | Age: 86
End: 2021-09-16
Payer: MEDICARE

## 2021-09-17 ENCOUNTER — OFFICE VISIT (OUTPATIENT)
Dept: PHYSICAL THERAPY | Facility: CLINIC | Age: 86
End: 2021-09-17
Payer: MEDICARE

## 2021-09-17 DIAGNOSIS — M54.2 MUSCULOSKELETAL NECK PAIN: Primary | ICD-10-CM

## 2021-09-17 PROCEDURE — 97112 NEUROMUSCULAR REEDUCATION: CPT

## 2021-09-17 PROCEDURE — 97110 THERAPEUTIC EXERCISES: CPT

## 2021-09-17 PROCEDURE — 97140 MANUAL THERAPY 1/> REGIONS: CPT

## 2021-09-17 NOTE — PROGRESS NOTES
Daily Note     Today's date: 2021  Patient name: Estefani Wilson  : 1930  MRN: [id number]  Referring provider: Richard Umanzor MD  Dx:   Encounter Diagnosis     ICD-10-CM    1  Musculoskeletal neck pain  M54 2                   Subjective:  Patient reports she still has neck pain but not how it was  Objective:  See treatment diary below    fAssessment: Pt performed below TE with great tolerance, no complain of pain or discomfort throughout PT session  However, pt experiences L neck pain when she keep her neck in nutreul position, tends to going into flexion  Plan:  Continue to lessen soft tissue tightness and increase postural awareness / strength  Plan D/C by 21           POC EXPIRES On:  21  PRECAUTIONS:  Fall risk  CO-MORBIDITES:  None  PERSONAL FACTORS:  None      Manuals HEP    STM B cervical paraspinals in sitting  12' 12' 15'                        Neuro Re-Ed     Seated chin tucks  20 20 20   Seated B scap retraction  20 20 20   TB rows B seated  Blue 20 Blue 20 Blue 20   TB biceps curls B seated  Blue 20 Blue 20 Blue 20   Doorway pec stretch L/R  10" 3 10" 3 hep   Bent over rows standing L/R  3# 20 ea 3# 20 ea 3# 20   Seated shoulder press L/R  3# 20 ea 3# 20 ea 3# 20   Seated shoulder brachilioradilias  L/R    3# 20   Seated retro UBE  NV 4 min 2'/2'   Seated biceps curls L/R  3# 20 ea 3# 20 ea 3# 20 ea   Ther Ex    Seated UT stretch L/R    10" 3   Seated shldr rows    20x   Seated scap  squeezes    5" 10                                      Ther Activity                  Gait Training                  Modalities    MH c-spine sitting  10' post tx np np

## 2021-09-20 ENCOUNTER — EVALUATION (OUTPATIENT)
Dept: PHYSICAL THERAPY | Facility: CLINIC | Age: 86
End: 2021-09-20
Payer: MEDICARE

## 2021-09-20 DIAGNOSIS — M54.2 MUSCULOSKELETAL NECK PAIN: Primary | ICD-10-CM

## 2021-09-20 PROCEDURE — 97140 MANUAL THERAPY 1/> REGIONS: CPT | Performed by: PHYSICAL THERAPIST

## 2021-09-20 PROCEDURE — 97110 THERAPEUTIC EXERCISES: CPT | Performed by: PHYSICAL THERAPIST

## 2021-09-20 PROCEDURE — 97112 NEUROMUSCULAR REEDUCATION: CPT | Performed by: PHYSICAL THERAPIST

## 2021-09-20 NOTE — PROGRESS NOTES
PT Re-evaluation     Today's date: 2021  Patient name: Pasha Burnett   : 1930  MRN: [id number]  Referring provider: Silvestre Mooney MD  Dx:   Encounter Diagnosis     ICD-10-CM    1  Musculoskeletal neck pain  M54 2                   Assessment  Assessment details: Pasha Burnett is a 80 y o  female who presented to outpatient physical therapy at [place] [id number] with complaints of cervical pain  She was having the most difficulty when looking upward due to head pain  Patient was referred to PT through [place] [id number]  She has improved significantly with PT and can perform most tasks now  Only min R>L UT tightness remaining  She will continue totbenefit from skilled PT services with focus on manual treatment to address her hypomobility issues, stretching and postural strengthening in order to return to all normal activities and reach maximum level of function  Thank you! Impairments: abnormal or restricted ROM, activity intolerance, impaired physical strength and pain with function  Barriers to therapy: None  Understanding of Dx/Px/POC: good   Prognosis: good    Goals  ST  Independent with HEP in 2 weeks - Met  2  Increase cervical AROM to 75% of WNL all motions in 3 weeks - Met  3  Good postural awareness in 2 weeks - Met    LT  Achieve FOTO score of 36/100 in 6 weeks - Met   2  Able to look and reach to [place] levels without cervical pain in 6 weeks - Mostly Met  3  Strength B traps = 4/5 in 6 weeks - Met  4  No headaches in 6 weeks - Met  5    No tightness B UT's in 4 weeks    Plan  Patient would benefit from: skilled PT  Planned modality interventions: thermotherapy: hydrocollator packs  Planned therapy interventions: ADL retraining, body mechanics training, flexibility, functional ROM exercises, home exercise program, joint mobilization, manual therapy, neuromuscular re-education, postural training, strengthening, stretching, therapeutic activities and therapeutic exercise  Frequency: 2x week  Duration in weeks: 4  Treatment plan discussed with: patient        Subjective Evaluation    History of Present Illness  Mechanism of injury: Pt reports having cervical pain that started on 21 when she went to the ER due to severe pain  She generally has some chronic neck pain  She does admit that she tends to drop her head frequently  She is not sure of her neck position while sleeping  There has been no trauma  No recent cervical spine x-rays  CT did not report any significant bony abnormality  Has headaches at all times with heaviness  Muscle relaxants did not help  Admits that she is very inactive  Was unable to reach upward due to cervical pain, not shoulder pain or limited ROM  Has used a walker for years for ambulation  She has B LE neuropathy  Since starting PT, she feels much better, but still min pain                Recurrent probem    Quality of life: good    Pain  Current pain ratin  At best pain ratin  At worst pain rating: 3  Quality: tight and dull ache  Relieving factors: heat  Progression: improved    Social Support  Lives with: adult children    Employment status: not working  Hand dominance: right      Diagnostic Tests  CT scan: normal  Treatments  Previous treatment: medication  Current treatment: physical therapy  Patient Goals  Patient goals for therapy: increased strength, independence with ADLs/IADLs, return to sport/leisure activities, decreased pain and increased motion          Objective     Concurrent Complaints  Positive for headaches  Negative for disturbed sleep and dizziness    Postural Observations  Seated posture: fair  Standing posture: good  Correction of posture: makes symptoms better        Palpation   Left   No palpable tenderness to the suboccipitals  Right   Hypertonic in the cervical paraspinals and suboccipitals       Neurological Testing     Sensation   Cervical/Thoracic   Left   Intact: light touch    Right Intact: light touch    Active Range of Motion   Cervical/Thoracic Spine       Cervical    Flexion:  WFL  Extension:  Restriction level: minimal  Left lateral flexion:  Restriction level: minimal  Right lateral flexion:  Restriction level minimal  Left rotation:  Restriction level: minimal  Right rotation:  Restriction level: minimal    Strength/Myotome Testing     Left Shoulder     Planes of Motion   Flexion: 5   Extension: 5   Abduction: 4+     Isolated Muscles   Middle trapezius: 4     Right Shoulder     Planes of Motion   Flexion: 5   Extension: 5   Abduction: 4+     Isolated Muscles   Middle trapezius: 4     Tests     Left Shoulder   Negative ULTT1  General Comments:    Upper quarter screen   Shoulder: unremarkable  Neuro Exam:     Headaches   Patient reports headaches: Yes            POC EXPIRES On:  10/18/21  PRECAUTIONS:  Fall risk  CO-MORBIDITES:  None  PERSONAL FACTORS:  None      Manuals HEP 9/9 9/13 9/17 9/20    STM B cervical paraspinals in sitting  12' 12' 15' 12'                               Neuro Re-Ed       Seated chin tucks 8/18 20 20 20 20    Seated B scap retraction 8/18 20 20 20 20    TB rows B seated 8/23 Blue 20 Blue 20 Blue 20 Blue 20    TB biceps curls B seated  Blue 20 Blue 20 Blue 20 Blue 20    Doorway pec stretch L/R 8/23 10" 3 10" 3 hep     Bent over rows standing L/R 8/23 3# 20 ea 3# 20 ea 3# 20 3# 20    Seated shoulder press L/R  3# 20 ea 3# 20 ea 3# 20 3# 20    Seated shoulder brachilioradilias  L/R    3# 20 [email or web address] 20    Seated retro UBE  NV 4 min 2'/2' L1 2'/2'    Seated biceps curls L/R  3# 20 ea 3# 20 ea 3# 20 ea 3# 20 ea    Ther Ex      Seated UT stretch L/R    10" 3 10" 3    Seated shldr rows    20x     Seated scap  squeezes    5" 10                                                  Ther Activity                        Gait Training                        Modalities      MH c-spine sitting  10' post tx np np

## 2021-09-23 ENCOUNTER — OFFICE VISIT (OUTPATIENT)
Dept: PHYSICAL THERAPY | Facility: CLINIC | Age: 86
End: 2021-09-23
Payer: MEDICARE

## 2021-09-23 DIAGNOSIS — M54.2 MUSCULOSKELETAL NECK PAIN: Primary | ICD-10-CM

## 2021-09-23 PROCEDURE — 97140 MANUAL THERAPY 1/> REGIONS: CPT

## 2021-09-23 PROCEDURE — 97110 THERAPEUTIC EXERCISES: CPT

## 2021-09-23 NOTE — PROGRESS NOTES
Daily Note     Today's date: 2021  Patient name: Debbie Mtz  : 1930  MRN: [id number]  Referring provider: Judge Radha MD  Dx:   Encounter Diagnosis     ICD-10-CM    1  Musculoskeletal neck pain  M54 2                   Subjective: Pt states her neck is acting up possibly due to the weather  Objective: See treatment diary below      Assessment: Pt performed below TE with great tolerance  Pt continues to respond well to manuals  Plan: Continue plan of care       POC EXPIRES On:  10/18/21  PRECAUTIONS:  Fall risk  CO-MORBIDITES:  None  PERSONAL FACTORS:  None      Manuals HEP    STM B cervical paraspinals in sitting  12' 12' 15' 12' 12'                              Neuro Re-Ed       Seated chin tucks  20 20 20 20 20   Seated B scap retraction  20 20 20 20 20   TB rows B seated  Blue 20 Blue 20 Blue 20 Blue 20 Blue 20   TB biceps curls B seated  Blue 20 Blue 20 Blue 20 Blue 20 Blue 20   Doorway pec stretch L/R  10" 3 10" 3 hep     Bent over rows standing L/R  3# 20 ea 3# 20 ea 3# 20 3# 20 3# 20   Seated shoulder press L/R  3# 20 ea 3# 20 ea 3# 20 3# 20 3# 20   Seated shoulder brachilioradilias  L/R    3# 20 [email or web address] 3# 20   Seated retro UBE  NV 4 min 2'/2' L1 2'/2' L1 2'/2'   Seated biceps curls L/R  3# 20 ea 3# 20 ea 3# 20 ea 3# 20 ea 3# 20   Ther Ex      Seated UT stretch L/R    10" 3 10" 3 10" 3   Seated shldr rows    20x  20x   Seated scap  squeezes    5" 10  5" 10                                                Ther Activity                        Gait Training                        Modalities      MH c-spine sitting  10' post tx np np

## 2021-09-27 ENCOUNTER — OFFICE VISIT (OUTPATIENT)
Dept: PHYSICAL THERAPY | Facility: CLINIC | Age: 86
End: 2021-09-27
Payer: MEDICARE

## 2021-09-27 DIAGNOSIS — M54.2 MUSCULOSKELETAL NECK PAIN: Primary | ICD-10-CM

## 2021-09-27 PROCEDURE — 97110 THERAPEUTIC EXERCISES: CPT

## 2021-09-27 PROCEDURE — 97140 MANUAL THERAPY 1/> REGIONS: CPT

## 2021-09-27 NOTE — PROGRESS NOTES
Daily Note     Today's date: 2021  Patient name: Kt Serra  : 1930  MRN: [id number]  Referring provider: Velvet Dejesus MD  Dx:   Encounter Diagnosis     ICD-10-CM    1  Musculoskeletal neck pain  M54 2                   Subjective: Pt states her neck is better than where she started but is still bothers her  Objective: See treatment diary below      Assessment:  Pt continues to respond well to manuals  Pt performed below TE with great tolerance, especially pt was able to tolerate 5' on UBE retro  Plan: Continue plan of care       POC EXPIRES On:  10/18/21  PRECAUTIONS:  Fall risk  CO-MORBIDITES:  None  PERSONAL FACTORS:  None      Manuals HEP    STM B cervical paraspinals in sitting  15' 12' 12' 12'                           Neuro Re-Ed      Seated chin tucks  20 20 20 20   Seated B scap retraction  20 20 20 20   TB rows B seated  Blue 20 Blue 20 Blue 20 Blue 20   TB biceps curls B seated  Blue 20 Blue 20 Blue 20 Blue 02   Doorway pec stretch L/R  hep      Bent over rows standing L/R  3# 20 3# 20 3# 20 3# 20   Seated shoulder press L/R  3# 20 3# 20 3# 20 3# 20   Seated shoulder brachilioradilias  L/R  3# 20 [email or web address] 20 3# 20 3# 20   Seated retro UBE  2'/2' L1 2'/2' L1 2'/2' L1 5'   Seated biceps curls L/R  3# 20 ea 3# 20 ea 3# 20 3# 20   Ther Ex     Seated UT stretch L/R  10" 3 10" 3 10" 3 10" 3   Seated shldr rows  20x  20x 20   Seated scap  squeezes  5" 10  5" 10 5" 10                                           Ther Activity                     Gait Training                     Modalities     MH c-spine sitting  np

## 2021-09-30 ENCOUNTER — OFFICE VISIT (OUTPATIENT)
Dept: PHYSICAL THERAPY | Facility: CLINIC | Age: 86
End: 2021-09-30
Payer: MEDICARE

## 2021-09-30 DIAGNOSIS — M54.2 MUSCULOSKELETAL NECK PAIN: Primary | ICD-10-CM

## 2021-09-30 PROCEDURE — 97140 MANUAL THERAPY 1/> REGIONS: CPT | Performed by: PHYSICAL THERAPIST

## 2021-09-30 PROCEDURE — 97110 THERAPEUTIC EXERCISES: CPT | Performed by: PHYSICAL THERAPIST

## 2021-09-30 NOTE — PROGRESS NOTES
Daily Note     Today's date: 2021  Patient name: Sherri Seay  : 1930  MRN: [id number]  Referring provider: Don Joy MD  Dx:   Encounter Diagnosis     ICD-10-CM    1  Musculoskeletal neck pain  M54 2                   Subjective: pain in right side of neck, improving with PT, motivated to continue to reach all goals      Objective: See treatment diary below      Assessment: Tolerated treatment well  Patient demonstrated fatigue post treatment and would benefit from continued PT  Pt reports improved sxs from baseline following MT today  Plan: Progress treatment as tolerated    Continue with postural stability and mobility therex with MT to continue improving cervical symptoms     POC EXPIRES On:  10/18/21  PRECAUTIONS:  Fall risk  CO-MORBIDITES:  None  PERSONAL FACTORS:  None      Manuals HEP    STM B cervical paraspinals in sitting  15' 12' 12' 12' 12'                              Neuro Re-Ed       Seated chin tucks  20 20 20 20 20   Seated B scap retraction  20 20 20 20 20   TB rows B seated  Blue 20 Blue 20 Blue 20 Blue 20 Blue 20   TB biceps curls B seated  Blue 20 Blue 20 Blue 20 Blue 20 Blue 20   Doorway pec stretch L/R  hep       Bent over rows standing L/R  3# 20 3# 20 3# 20 3# 20 3#20   Seated shoulder press L/R  3# 20 3# 20 3# 20 3# 20 3#20   Seated shoulder brachilioradilias  L/R  3# 20 [email or web address] 20 3# 20 3# 20 3#20   Seated retro UBE  2'/2' L1 2'/2' L1 2'/2' L1 5' L1 5'   Seated biceps curls L/R  3# 20 ea 3# 20 ea 3# 20 3# 20 3# 20   Ther Ex      Seated UT stretch L/R  10" 3 10" 3 10" 3 10" 3 10" 3   Seated shldr rows  20x  20x 20 20   Seated scap  squeezes  5" 10  5" 10 5" 10 5" 10                                                Ther Activity                        Gait Training                        Modalities      MH c-spine sitting  np

## 2021-10-04 ENCOUNTER — OFFICE VISIT (OUTPATIENT)
Dept: PHYSICAL THERAPY | Facility: CLINIC | Age: 86
End: 2021-10-04
Payer: MEDICARE

## 2021-10-04 DIAGNOSIS — M54.2 MUSCULOSKELETAL NECK PAIN: Primary | ICD-10-CM

## 2021-10-04 PROCEDURE — 97110 THERAPEUTIC EXERCISES: CPT

## 2021-10-04 PROCEDURE — 97140 MANUAL THERAPY 1/> REGIONS: CPT

## 2021-10-06 DIAGNOSIS — G60.9 IDIOPATHIC PERIPHERAL NEUROPATHY: ICD-10-CM

## 2021-10-06 DIAGNOSIS — K21.9 GASTROESOPHAGEAL REFLUX DISEASE: ICD-10-CM

## 2021-10-06 DIAGNOSIS — I10 ESSENTIAL HYPERTENSION: ICD-10-CM

## 2021-10-06 DIAGNOSIS — E78.2 MIXED HYPERLIPIDEMIA: ICD-10-CM

## 2021-10-07 ENCOUNTER — OFFICE VISIT (OUTPATIENT)
Dept: PHYSICAL THERAPY | Facility: CLINIC | Age: 86
End: 2021-10-07
Payer: MEDICARE

## 2021-10-07 DIAGNOSIS — M54.2 MUSCULOSKELETAL NECK PAIN: Primary | ICD-10-CM

## 2021-10-07 PROCEDURE — 97110 THERAPEUTIC EXERCISES: CPT

## 2021-10-07 PROCEDURE — 97140 MANUAL THERAPY 1/> REGIONS: CPT

## 2021-10-07 RX ORDER — OMEPRAZOLE 20 MG/1
20 CAPSULE, DELAYED RELEASE ORAL DAILY
Qty: 90 CAPSULE | Refills: 0 | Status: SHIPPED | OUTPATIENT
Start: 2021-10-07 | End: 2021-12-29 | Stop reason: SDUPTHER

## 2021-10-07 RX ORDER — HYDROCHLOROTHIAZIDE 12.5 MG/1
12.5 TABLET ORAL DAILY
Qty: 90 TABLET | Refills: 0 | Status: SHIPPED | OUTPATIENT
Start: 2021-10-07 | End: 2021-12-29 | Stop reason: SDUPTHER

## 2021-10-07 RX ORDER — PRAVASTATIN SODIUM 20 MG
20 TABLET ORAL DAILY
Qty: 90 TABLET | Refills: 0 | Status: SHIPPED | OUTPATIENT
Start: 2021-10-07 | End: 2021-12-29 | Stop reason: SDUPTHER

## 2021-10-07 RX ORDER — GABAPENTIN 600 MG/1
600 TABLET ORAL 2 TIMES DAILY
Qty: 180 TABLET | Refills: 0 | Status: SHIPPED | OUTPATIENT
Start: 2021-10-07 | End: 2021-12-29 | Stop reason: SDUPTHER

## 2021-12-07 ENCOUNTER — TELEPHONE (OUTPATIENT)
Dept: FAMILY MEDICINE CLINIC | Facility: CLINIC | Age: 86
End: 2021-12-07

## 2021-12-29 DIAGNOSIS — E78.2 MIXED HYPERLIPIDEMIA: ICD-10-CM

## 2021-12-29 DIAGNOSIS — K21.9 GASTROESOPHAGEAL REFLUX DISEASE: ICD-10-CM

## 2021-12-29 DIAGNOSIS — G60.9 IDIOPATHIC PERIPHERAL NEUROPATHY: ICD-10-CM

## 2021-12-29 DIAGNOSIS — I10 ESSENTIAL HYPERTENSION: ICD-10-CM

## 2021-12-29 RX ORDER — OMEPRAZOLE 20 MG/1
20 CAPSULE, DELAYED RELEASE ORAL DAILY
Qty: 90 CAPSULE | Refills: 0 | Status: SHIPPED | OUTPATIENT
Start: 2021-12-29 | End: 2022-04-05 | Stop reason: SDUPTHER

## 2021-12-29 RX ORDER — HYDROCHLOROTHIAZIDE 12.5 MG/1
12.5 TABLET ORAL DAILY
Qty: 90 TABLET | Refills: 0 | Status: SHIPPED | OUTPATIENT
Start: 2021-12-29 | End: 2022-04-05 | Stop reason: SDUPTHER

## 2021-12-29 RX ORDER — PRAVASTATIN SODIUM 20 MG
20 TABLET ORAL DAILY
Qty: 90 TABLET | Refills: 0 | Status: SHIPPED | OUTPATIENT
Start: 2021-12-29 | End: 2022-04-05 | Stop reason: SDUPTHER

## 2021-12-29 RX ORDER — GABAPENTIN 600 MG/1
600 TABLET ORAL 2 TIMES DAILY
Qty: 180 TABLET | Refills: 0 | Status: SHIPPED | OUTPATIENT
Start: 2021-12-29 | End: 2022-04-05 | Stop reason: SDUPTHER

## 2022-01-24 ENCOUNTER — PATIENT MESSAGE (OUTPATIENT)
Dept: FAMILY MEDICINE CLINIC | Facility: CLINIC | Age: 87
End: 2022-01-24

## 2022-04-05 DIAGNOSIS — K21.9 GASTROESOPHAGEAL REFLUX DISEASE: ICD-10-CM

## 2022-04-05 DIAGNOSIS — G60.9 IDIOPATHIC PERIPHERAL NEUROPATHY: ICD-10-CM

## 2022-04-05 DIAGNOSIS — E78.2 MIXED HYPERLIPIDEMIA: ICD-10-CM

## 2022-04-05 DIAGNOSIS — I10 ESSENTIAL HYPERTENSION: ICD-10-CM

## 2022-04-05 RX ORDER — HYDROCHLOROTHIAZIDE 12.5 MG/1
12.5 TABLET ORAL DAILY
Qty: 90 TABLET | Refills: 0 | Status: SHIPPED | OUTPATIENT
Start: 2022-04-05 | End: 2022-06-27

## 2022-04-05 RX ORDER — GABAPENTIN 600 MG/1
600 TABLET ORAL 2 TIMES DAILY
Qty: 180 TABLET | Refills: 0 | Status: SHIPPED | OUTPATIENT
Start: 2022-04-05 | End: 2022-07-07 | Stop reason: SDUPTHER

## 2022-04-05 RX ORDER — PRAVASTATIN SODIUM 20 MG
20 TABLET ORAL DAILY
Qty: 90 TABLET | Refills: 0 | Status: SHIPPED | OUTPATIENT
Start: 2022-04-05 | End: 2022-06-27

## 2022-04-05 RX ORDER — OMEPRAZOLE 20 MG/1
20 CAPSULE, DELAYED RELEASE ORAL DAILY
Qty: 90 CAPSULE | Refills: 0 | Status: SHIPPED | OUTPATIENT
Start: 2022-04-05 | End: 2022-06-27

## 2022-05-08 ENCOUNTER — HOSPITAL ENCOUNTER (INPATIENT)
Facility: HOSPITAL | Age: 87
LOS: 5 days | Discharge: HOME HEALTH | DRG: 871 | End: 2022-05-13
Attending: EMERGENCY MEDICINE | Admitting: INTERNAL MEDICINE
Payer: MEDICARE

## 2022-05-08 ENCOUNTER — APPOINTMENT (EMERGENCY)
Dept: RADIOLOGY | Facility: HOSPITAL | Age: 87
DRG: 871 | End: 2022-05-08
Payer: MEDICARE

## 2022-05-08 DIAGNOSIS — E86.0 DEHYDRATION: ICD-10-CM

## 2022-05-08 DIAGNOSIS — N17.9 ACUTE KIDNEY INJURY (HCC): ICD-10-CM

## 2022-05-08 DIAGNOSIS — A41.9 SEVERE SEPSIS (HCC): Primary | ICD-10-CM

## 2022-05-08 DIAGNOSIS — J18.9 COMMUNITY ACQUIRED PNEUMONIA: ICD-10-CM

## 2022-05-08 DIAGNOSIS — R65.20 SEVERE SEPSIS (HCC): Primary | ICD-10-CM

## 2022-05-08 PROBLEM — R53.1 GENERALIZED WEAKNESS: Status: ACTIVE | Noted: 2022-05-08

## 2022-05-08 PROBLEM — I50.32 CHRONIC DIASTOLIC HEART FAILURE (HCC): Status: ACTIVE | Noted: 2022-05-08

## 2022-05-08 LAB
ALBUMIN SERPL BCP-MCNC: 3.2 G/DL (ref 3.5–5)
ALP SERPL-CCNC: 70 U/L (ref 46–116)
ALT SERPL W P-5'-P-CCNC: 21 U/L (ref 12–78)
ANION GAP SERPL CALCULATED.3IONS-SCNC: 10 MMOL/L (ref 4–13)
APTT PPP: 40 SECONDS (ref 23–37)
AST SERPL W P-5'-P-CCNC: 52 U/L (ref 5–45)
BASOPHILS # BLD MANUAL: 0 THOUSAND/UL (ref 0–0.1)
BASOPHILS NFR MAR MANUAL: 0 % (ref 0–1)
BILIRUB SERPL-MCNC: 0.6 MG/DL (ref 0.2–1)
BILIRUB UR QL STRIP: NEGATIVE
BUN SERPL-MCNC: 33 MG/DL (ref 5–25)
CALCIUM ALBUM COR SERPL-MCNC: 9.4 MG/DL (ref 8.3–10.1)
CALCIUM SERPL-MCNC: 8.8 MG/DL (ref 8.3–10.1)
CHLORIDE SERPL-SCNC: 102 MMOL/L (ref 100–108)
CLARITY UR: CLEAR
CO2 SERPL-SCNC: 28 MMOL/L (ref 21–32)
COLOR UR: YELLOW
CREAT SERPL-MCNC: 1.46 MG/DL (ref 0.6–1.3)
EOSINOPHIL # BLD MANUAL: 0 THOUSAND/UL (ref 0–0.4)
EOSINOPHIL NFR BLD MANUAL: 0 % (ref 0–6)
ERYTHROCYTE [DISTWIDTH] IN BLOOD BY AUTOMATED COUNT: 13.2 % (ref 11.6–15.1)
FLUAV RNA RESP QL NAA+PROBE: NEGATIVE
FLUBV RNA RESP QL NAA+PROBE: NEGATIVE
GFR SERPL CREATININE-BSD FRML MDRD: 31 ML/MIN/1.73SQ M
GLUCOSE SERPL-MCNC: 93 MG/DL (ref 65–140)
GLUCOSE UR STRIP-MCNC: NEGATIVE MG/DL
HCT VFR BLD AUTO: 34.3 % (ref 34.8–46.1)
HGB BLD-MCNC: 10.9 G/DL (ref 11.5–15.4)
HGB UR QL STRIP.AUTO: NEGATIVE
INR PPP: 1.24 (ref 0.84–1.19)
KETONES UR STRIP-MCNC: NEGATIVE MG/DL
LACTATE SERPL-SCNC: 3.6 MMOL/L (ref 0.5–2)
LACTATE SERPL-SCNC: 3.6 MMOL/L (ref 0.5–2)
LEUKOCYTE ESTERASE UR QL STRIP: NEGATIVE
LYMPHOCYTES # BLD AUTO: 1.97 THOUSAND/UL (ref 0.6–4.47)
LYMPHOCYTES # BLD AUTO: 8 % (ref 14–44)
MCH RBC QN AUTO: 29.3 PG (ref 26.8–34.3)
MCHC RBC AUTO-ENTMCNC: 31.8 G/DL (ref 31.4–37.4)
MCV RBC AUTO: 92 FL (ref 82–98)
METAMYELOCYTES NFR BLD MANUAL: 1 % (ref 0–1)
MONOCYTES # BLD AUTO: 1.47 THOUSAND/UL (ref 0–1.22)
MONOCYTES NFR BLD: 6 % (ref 4–12)
NEUTROPHILS # BLD MANUAL: 20.89 THOUSAND/UL (ref 1.85–7.62)
NEUTS BAND NFR BLD MANUAL: 2 % (ref 0–8)
NEUTS SEG NFR BLD AUTO: 83 % (ref 43–75)
NITRITE UR QL STRIP: NEGATIVE
PH UR STRIP.AUTO: 5.5 [PH]
PLATELET # BLD AUTO: 174 THOUSANDS/UL (ref 149–390)
PLATELET # BLD AUTO: 219 THOUSANDS/UL (ref 149–390)
PLATELET BLD QL SMEAR: ADEQUATE
PMV BLD AUTO: 9.1 FL (ref 8.9–12.7)
PMV BLD AUTO: 9.3 FL (ref 8.9–12.7)
POTASSIUM SERPL-SCNC: 4.3 MMOL/L (ref 3.5–5.3)
PROCALCITONIN SERPL-MCNC: 42.72 NG/ML
PROT SERPL-MCNC: 6.8 G/DL (ref 6.4–8.2)
PROT UR STRIP-MCNC: NEGATIVE MG/DL
PROTHROMBIN TIME: 15.5 SECONDS (ref 11.6–14.5)
RBC # BLD AUTO: 3.72 MILLION/UL (ref 3.81–5.12)
RSV RNA RESP QL NAA+PROBE: NEGATIVE
SARS-COV-2 RNA RESP QL NAA+PROBE: NEGATIVE
SODIUM SERPL-SCNC: 140 MMOL/L (ref 136–145)
SP GR UR STRIP.AUTO: 1.02 (ref 1–1.03)
UROBILINOGEN UR QL STRIP.AUTO: 0.2 E.U./DL
WBC # BLD AUTO: 24.58 THOUSAND/UL (ref 4.31–10.16)

## 2022-05-08 PROCEDURE — 1124F ACP DISCUSS-NO DSCNMKR DOCD: CPT | Performed by: EMERGENCY MEDICINE

## 2022-05-08 PROCEDURE — 81003 URINALYSIS AUTO W/O SCOPE: CPT | Performed by: EMERGENCY MEDICINE

## 2022-05-08 PROCEDURE — 83605 ASSAY OF LACTIC ACID: CPT | Performed by: EMERGENCY MEDICINE

## 2022-05-08 PROCEDURE — 85007 BL SMEAR W/DIFF WBC COUNT: CPT | Performed by: EMERGENCY MEDICINE

## 2022-05-08 PROCEDURE — 99285 EMERGENCY DEPT VISIT HI MDM: CPT | Performed by: EMERGENCY MEDICINE

## 2022-05-08 PROCEDURE — 87040 BLOOD CULTURE FOR BACTERIA: CPT | Performed by: EMERGENCY MEDICINE

## 2022-05-08 PROCEDURE — 84145 PROCALCITONIN (PCT): CPT | Performed by: EMERGENCY MEDICINE

## 2022-05-08 PROCEDURE — 0241U HB NFCT DS VIR RESP RNA 4 TRGT: CPT | Performed by: EMERGENCY MEDICINE

## 2022-05-08 PROCEDURE — 96365 THER/PROPH/DIAG IV INF INIT: CPT

## 2022-05-08 PROCEDURE — 80053 COMPREHEN METABOLIC PANEL: CPT | Performed by: EMERGENCY MEDICINE

## 2022-05-08 PROCEDURE — 93005 ELECTROCARDIOGRAM TRACING: CPT

## 2022-05-08 PROCEDURE — 96367 TX/PROPH/DG ADDL SEQ IV INF: CPT

## 2022-05-08 PROCEDURE — 36415 COLL VENOUS BLD VENIPUNCTURE: CPT | Performed by: EMERGENCY MEDICINE

## 2022-05-08 PROCEDURE — 85610 PROTHROMBIN TIME: CPT | Performed by: EMERGENCY MEDICINE

## 2022-05-08 PROCEDURE — 85730 THROMBOPLASTIN TIME PARTIAL: CPT | Performed by: EMERGENCY MEDICINE

## 2022-05-08 PROCEDURE — 85049 AUTOMATED PLATELET COUNT: CPT | Performed by: INTERNAL MEDICINE

## 2022-05-08 PROCEDURE — 96366 THER/PROPH/DIAG IV INF ADDON: CPT

## 2022-05-08 PROCEDURE — 99285 EMERGENCY DEPT VISIT HI MDM: CPT

## 2022-05-08 PROCEDURE — 85027 COMPLETE CBC AUTOMATED: CPT | Performed by: EMERGENCY MEDICINE

## 2022-05-08 PROCEDURE — 71045 X-RAY EXAM CHEST 1 VIEW: CPT

## 2022-05-08 PROCEDURE — 99223 1ST HOSP IP/OBS HIGH 75: CPT | Performed by: INTERNAL MEDICINE

## 2022-05-08 PROCEDURE — 83605 ASSAY OF LACTIC ACID: CPT | Performed by: INTERNAL MEDICINE

## 2022-05-08 RX ORDER — ACETAMINOPHEN 325 MG/1
650 TABLET ORAL EVERY 6 HOURS PRN
Status: DISCONTINUED | OUTPATIENT
Start: 2022-05-08 | End: 2022-05-13 | Stop reason: HOSPADM

## 2022-05-08 RX ORDER — SODIUM CHLORIDE, SODIUM GLUCONATE, SODIUM ACETATE, POTASSIUM CHLORIDE, MAGNESIUM CHLORIDE, SODIUM PHOSPHATE, DIBASIC, AND POTASSIUM PHOSPHATE .53; .5; .37; .037; .03; .012; .00082 G/100ML; G/100ML; G/100ML; G/100ML; G/100ML; G/100ML; G/100ML
1000 INJECTION, SOLUTION INTRAVENOUS ONCE
Status: DISCONTINUED | OUTPATIENT
Start: 2022-05-08 | End: 2022-05-08

## 2022-05-08 RX ORDER — HEPARIN SODIUM 5000 [USP'U]/ML
5000 INJECTION, SOLUTION INTRAVENOUS; SUBCUTANEOUS EVERY 8 HOURS SCHEDULED
Status: DISCONTINUED | OUTPATIENT
Start: 2022-05-08 | End: 2022-05-13 | Stop reason: HOSPADM

## 2022-05-08 RX ORDER — SODIUM CHLORIDE, SODIUM GLUCONATE, SODIUM ACETATE, POTASSIUM CHLORIDE, MAGNESIUM CHLORIDE, SODIUM PHOSPHATE, DIBASIC, AND POTASSIUM PHOSPHATE .53; .5; .37; .037; .03; .012; .00082 G/100ML; G/100ML; G/100ML; G/100ML; G/100ML; G/100ML; G/100ML
1000 INJECTION, SOLUTION INTRAVENOUS ONCE
Status: COMPLETED | OUTPATIENT
Start: 2022-05-08 | End: 2022-05-08

## 2022-05-08 RX ORDER — GABAPENTIN 300 MG/1
600 CAPSULE ORAL 2 TIMES DAILY
Status: DISCONTINUED | OUTPATIENT
Start: 2022-05-08 | End: 2022-05-08

## 2022-05-08 RX ORDER — PANTOPRAZOLE SODIUM 40 MG/1
40 TABLET, DELAYED RELEASE ORAL
Status: DISCONTINUED | OUTPATIENT
Start: 2022-05-09 | End: 2022-05-13 | Stop reason: HOSPADM

## 2022-05-08 RX ORDER — SODIUM CHLORIDE, SODIUM GLUCONATE, SODIUM ACETATE, POTASSIUM CHLORIDE, MAGNESIUM CHLORIDE, SODIUM PHOSPHATE, DIBASIC, AND POTASSIUM PHOSPHATE .53; .5; .37; .037; .03; .012; .00082 G/100ML; G/100ML; G/100ML; G/100ML; G/100ML; G/100ML; G/100ML
70 INJECTION, SOLUTION INTRAVENOUS ONCE
Status: COMPLETED | OUTPATIENT
Start: 2022-05-08 | End: 2022-05-08

## 2022-05-08 RX ORDER — BENZONATATE 100 MG/1
100 CAPSULE ORAL 3 TIMES DAILY PRN
Status: DISCONTINUED | OUTPATIENT
Start: 2022-05-08 | End: 2022-05-13 | Stop reason: HOSPADM

## 2022-05-08 RX ORDER — ASPIRIN 81 MG/1
81 TABLET ORAL DAILY
Status: DISCONTINUED | OUTPATIENT
Start: 2022-05-09 | End: 2022-05-13 | Stop reason: HOSPADM

## 2022-05-08 RX ORDER — MELATONIN
1000 DAILY
Status: DISCONTINUED | OUTPATIENT
Start: 2022-05-09 | End: 2022-05-13 | Stop reason: HOSPADM

## 2022-05-08 RX ORDER — GABAPENTIN 300 MG/1
300 CAPSULE ORAL 2 TIMES DAILY
Status: DISCONTINUED | OUTPATIENT
Start: 2022-05-08 | End: 2022-05-13 | Stop reason: HOSPADM

## 2022-05-08 RX ORDER — PRAVASTATIN SODIUM 20 MG
20 TABLET ORAL DAILY
Status: DISCONTINUED | OUTPATIENT
Start: 2022-05-09 | End: 2022-05-13 | Stop reason: HOSPADM

## 2022-05-08 RX ADMIN — SODIUM CHLORIDE 1000 ML: 0.9 INJECTION, SOLUTION INTRAVENOUS at 23:56

## 2022-05-08 RX ADMIN — GABAPENTIN 300 MG: 300 CAPSULE ORAL at 22:41

## 2022-05-08 RX ADMIN — PIPERACILLIN AND TAZOBACTAM 3.38 G: 3; .375 INJECTION, POWDER, LYOPHILIZED, FOR SOLUTION INTRAVENOUS at 19:33

## 2022-05-08 RX ADMIN — SODIUM CHLORIDE, SODIUM GLUCONATE, SODIUM ACETATE, POTASSIUM CHLORIDE, MAGNESIUM CHLORIDE, SODIUM PHOSPHATE, DIBASIC, AND POTASSIUM PHOSPHATE 70 ML: .53; .5; .37; .037; .03; .012; .00082 INJECTION, SOLUTION INTRAVENOUS at 19:52

## 2022-05-08 RX ADMIN — HEPARIN SODIUM 5000 UNITS: 5000 INJECTION INTRAVENOUS; SUBCUTANEOUS at 22:41

## 2022-05-08 RX ADMIN — SODIUM CHLORIDE, SODIUM GLUCONATE, SODIUM ACETATE, POTASSIUM CHLORIDE, MAGNESIUM CHLORIDE, SODIUM PHOSPHATE, DIBASIC, AND POTASSIUM PHOSPHATE 1000 ML: .53; .5; .37; .037; .03; .012; .00082 INJECTION, SOLUTION INTRAVENOUS at 19:52

## 2022-05-08 RX ADMIN — SODIUM CHLORIDE, SODIUM GLUCONATE, SODIUM ACETATE, POTASSIUM CHLORIDE, MAGNESIUM CHLORIDE, SODIUM PHOSPHATE, DIBASIC, AND POTASSIUM PHOSPHATE 1000 ML: .53; .5; .37; .037; .03; .012; .00082 INJECTION, SOLUTION INTRAVENOUS at 17:38

## 2022-05-08 RX ADMIN — PIPERACILLIN AND TAZOBACTAM 2.25 G: 2; .25 INJECTION, POWDER, LYOPHILIZED, FOR SOLUTION INTRAVENOUS at 23:57

## 2022-05-08 RX ADMIN — Medication 1 TABLET: at 22:41

## 2022-05-08 NOTE — Clinical Note
Case was discussed with NADIA TORRES and the patient's admission status was agreed to be Admission Status: inpatient status to the service of Dr Yang Kolb

## 2022-05-08 NOTE — ED PROVIDER NOTES
History  Chief Complaint   Patient presents with    Weakness - Generalized     pt has overall generalized weakness, increase sob, fatigue, and uti symptoms      This 66-year-old female with history of hypertension, hyperlipidemia, neuropathy and frequent UTIs has had weakness and fatigue today with anorexia  She admits to some cough without sputum production today  Denies any new pain  States she was nauseous this morning and ambulated to the bathroom where she nearly fell due to weakness  Only had some water today and nothing more  Admits that her mouth is dry  Ambulance states family says this is how she normally looks when she gets UTI  Prior to Admission Medications   Prescriptions Last Dose Informant Patient Reported? Taking? Baclofen 5 MG TABS Not Taking at Unknown time  No No   Sig: Take 5 mg by mouth 3 (three) times a day   Patient not taking: Reported on 5/8/2022    Cholecalciferol (VITAMIN D3) 1000 units CAPS 5/8/2022 at Unknown time Self Yes Yes   Sig: Take 1,000 Units by mouth daily    Cyanocobalamin (VITAMIN B12 PO) 5/8/2022 at Unknown time Self Yes Yes   Sig: Take by mouth   Multiple Vitamins-Minerals (PRESERVISION AREDS PO) 5/8/2022 at Unknown time Self Yes Yes   Sig: Take by mouth 2 (two) times a day   apixaban (ELIQUIS) 2 5 mg Not Taking at Unknown time  No No   Sig: Take 1 tablet (2 5 mg total) by mouth 2 (two) times a day   Patient not taking: Reported on 5/8/2022    aspirin (ECOTRIN LOW STRENGTH) 81 mg EC tablet 5/8/2022 at Unknown time Self No Yes   Sig: Take 1 tablet (81 mg total) by mouth daily   estradiol (ESTRACE) 0 1 mg/g vaginal cream Not Taking at Unknown time  No No   Sig: Insert 1g into the vagina three (3) time a week     Patient not taking: Reported on 5/8/2022    furosemide (LASIX) 20 mg tablet Not Taking at Unknown time  No No   Sig: Take 1 tablet (20 mg total) by mouth daily   Patient not taking: Reported on 5/8/2022    gabapentin (NEURONTIN) 600 MG tablet 5/8/2022 at Unknown time  No Yes   Sig: Take 1 tablet (600 mg total) by mouth 2 (two) times a day   hydrochlorothiazide (HYDRODIURIL) 12 5 mg tablet 5/8/2022 at Unknown time  No Yes   Sig: Take 1 tablet (12 5 mg total) by mouth daily   nitrofurantoin (MACROBID) 100 mg capsule Unknown at Unknown time  No No   Sig: Take 1 capsule (100 mg total) by mouth daily   omeprazole (PriLOSEC) 20 mg delayed release capsule 5/8/2022 at Unknown time  No Yes   Sig: Take 1 capsule (20 mg total) by mouth daily   pravastatin (PRAVACHOL) 20 mg tablet 5/8/2022 at Unknown time  No Yes   Sig: Take 1 tablet (20 mg total) by mouth daily   predniSONE 5 mg tablet Unknown at Unknown time  No No   Sig: Take 1 tablet (5 mg total) by mouth daily Please take 40 mg daily for 6 days and then 30 mg daily for 3 days then 20 mg daily for 3 days then 10 mg daily for 3 days then 5 mg daily   Patient not taking: Reported on 8/3/2021      Facility-Administered Medications: None       Past Medical History:   Diagnosis Date    Balance disorder     Chronic pain     GERD (gastroesophageal reflux disease)     Hard of hearing     Hyperlipidemia     Hypertension     Neuropathy     Pneumonia     Tinnitus     UTI (urinary tract infection)        Past Surgical History:   Procedure Laterality Date    EYE SURGERY      HYSTERECTOMY      TONSILLECTOMY         Family History   Problem Relation Age of Onset    Stroke Mother     Stroke Father     Heart disease Daughter     Substance Abuse Neg Hx     Mental illness Neg Hx      I have reviewed and agree with the history as documented      E-Cigarette/Vaping    E-Cigarette Use Never User      E-Cigarette/Vaping Substances     Social History     Tobacco Use    Smoking status: Never Smoker    Smokeless tobacco: Never Used   Vaping Use    Vaping Use: Never used   Substance Use Topics    Alcohol use: Not Currently     Comment: rarely    Drug use: No       Review of Systems   Constitutional: Positive for activity change, appetite change, fatigue and fever  Negative for chills  HENT: Negative for congestion, rhinorrhea and sore throat  Respiratory: Positive for cough  Negative for shortness of breath  Cardiovascular: Negative for chest pain, palpitations and leg swelling  Gastrointestinal: Positive for nausea  Negative for abdominal pain, blood in stool and diarrhea  Genitourinary: Negative for dysuria and flank pain  Musculoskeletal: Positive for back pain (  Chronic) and gait problem  Skin: Negative for rash and wound  Neurological: Positive for weakness and light-headedness  Negative for syncope and headaches  All other systems reviewed and are negative  Physical Exam  Physical Exam  Vitals and nursing note reviewed  Constitutional:       General: She is not in acute distress  Appearance: She is well-developed and normal weight  She is ill-appearing  She is not diaphoretic  HENT:      Head: Normocephalic and atraumatic  Right Ear: External ear normal       Left Ear: External ear normal       Nose: Nose normal       Mouth/Throat:      Mouth: Mucous membranes are dry  Eyes:      General: No scleral icterus  Conjunctiva/sclera: Conjunctivae normal       Pupils: Pupils are equal, round, and reactive to light  Neck:      Vascular: No carotid bruit  Cardiovascular:      Rate and Rhythm: Normal rate and regular rhythm  Pulses: Normal pulses  Heart sounds: Normal heart sounds  No murmur heard  Pulmonary:      Effort: Pulmonary effort is normal       Breath sounds: Rales ( few, faint bibasilar) present  Abdominal:      General: Bowel sounds are normal       Palpations: Abdomen is soft  Tenderness: There is no abdominal tenderness  There is no right CVA tenderness, left CVA tenderness, guarding or rebound  Musculoskeletal:         General: No tenderness or signs of injury  Normal range of motion  Cervical back: Normal range of motion and neck supple   No rigidity  Right lower leg: No edema  Left lower leg: No edema  Lymphadenopathy:      Cervical: No cervical adenopathy  Skin:     General: Skin is warm and dry  Capillary Refill: Capillary refill takes less than 2 seconds  Findings: No rash  Neurological:      General: No focal deficit present  Mental Status: She is alert  She is disoriented  Cranial Nerves: No cranial nerve deficit  Sensory: No sensory deficit  Motor: Weakness present  Coordination: Coordination normal       Deep Tendon Reflexes: Reflexes are normal and symmetric  Psychiatric:         Attention and Perception: Attention normal          Mood and Affect: Affect is flat  Speech: Speech is delayed  Behavior: Behavior is slowed  Behavior is cooperative  Thought Content: Thought content is not paranoid  Thought content does not include homicidal or suicidal ideation  Cognition and Memory: Memory is impaired           Vital Signs  ED Triage Vitals   Temperature Pulse Respirations Blood Pressure SpO2   05/08/22 1731 05/08/22 1729 05/08/22 1729 05/08/22 1800 05/08/22 1729   (!) 100 8 °F (38 2 °C) 95 18 108/56 95 %      Temp Source Heart Rate Source Patient Position - Orthostatic VS BP Location FiO2 (%)   05/08/22 1731 05/08/22 1900 05/08/22 1900 05/08/22 1900 --   Temporal Monitor Lying Right arm       Pain Score       --                  Vitals:    05/08/22 1900 05/08/22 1930 05/08/22 2000 05/08/22 2015   BP: 120/58 100/51 125/55 108/52   Pulse: 84 80 82 80   Patient Position - Orthostatic VS: Lying Lying Lying Lying         Visual Acuity  Visual Acuity      Most Recent Value   L Pupil Size (mm) 3   R Pupil Size (mm) 3          ED Medications  Medications   multi-electrolyte (PLASMALYTE-A/ISOLYTE-S PH 7 4) IV solution 1,000 mL (1,000 mL Intravenous New Bag 5/8/22 1952)   acetaminophen (TYLENOL) tablet 650 mg (has no administration in time range)   multi-electrolyte (PLASMALYTE-A/ISOLYTE-S PH 7 4) IV solution 1,000 mL (0 mL Intravenous Stopped 5/8/22 1857)   piperacillin-tazobactam (ZOSYN) IVPB 3 375 g (0 g Intravenous Stopped 5/8/22 1951)   multi-electrolyte (PLASMALYTE-A/ISOLYTE-S PH 7 4) IV solution 70 mL (70 mL Intravenous New Bag 5/8/22 1952)       Diagnostic Studies  Results Reviewed     Procedure Component Value Units Date/Time    COVID/FLU/RSV [[id number]]  (Normal) Collected: 05/08/22 1857    Lab Status: Final result Specimen: Nares from Nasopharyngeal Swab Updated: 05/08/22 1940     SARS-CoV-2 Negative     INFLUENZA A PCR Negative     INFLUENZA B PCR Negative     RSV PCR Negative    Narrative:      FOR PEDIATRIC PATIENTS - copy/paste COVID Guidelines URL to browser: [email or web address]    SARS-CoV-2 assay is a Nucleic Acid Amplification assay intended for the  qualitative detection of nucleic acid from SARS-CoV-2 in nasopharyngeal  swabs  Results are for the presumptive identification of SARS-CoV-2 RNA  Positive results are indicative of infection with SARS-CoV-2, the virus  causing COVID-19, but do not rule out bacterial infection or co-infection  with other viruses  Laboratories within the [place] and its  territories are required to report all positive results to the appropriate  public health authorities  Negative results do not preclude SARS-CoV-2  infection and should not be used as the sole basis for treatment or other  patient management decisions  Negative results must be combined with  clinical observations, patient history, and epidemiological information  This test has not been FDA cleared or approved  This test has been authorized by FDA under an Emergency Use Authorization  (EUA)   This test is only authorized for the duration of time the  declaration that circumstances exist justifying the authorization of the  emergency use of an in vitro diagnostic tests for detection of SARS-CoV-2  virus and/or diagnosis of COVID-19 infection under section 564(b)(1) of  the Act, 21 U  S C  370GKM-5(Q)(9), unless the authorization is terminated  or revoked sooner  The test has been validated but independent review by FDA  and CLIA is pending  Test performed using [employer] Gene[employer]pert: This RT-PCR assay targets N2,  a region unique to SARS-CoV-2  A conserved region in the E-gene was chosen  for pan-Sarbecovirus detection which includes SARS-CoV-2  UA w Reflex to Microscopic w Reflex to Culture [[id number]] Collected: 05/08/22 1815    Lab Status: Final result Specimen: Urine, Straight Cath Updated: 05/08/22 1925     Color, UA Yellow     Clarity, UA Clear     Specific Gravity, UA 1 025     pH, UA 5 5     Leukocytes, UA Negative     Nitrite, UA Negative     Protein, UA Negative mg/dl      Glucose, UA Negative mg/dl      Ketones, UA Negative mg/dl      Urobilinogen, UA 0 2 E U /dl      Bilirubin, UA Negative     Blood, UA Negative    Lactic acid [[id number]]  (Abnormal) Collected: 05/08/22 1823    Lab Status: Final result Specimen: Blood from Arm, Right Updated: 05/08/22 1911     LACTIC ACID 3 6 mmol/L     Narrative:      Result may be elevated if tourniquet was used during collection  Lactic acid 2 Hours [[id number]]     Lab Status: No result Specimen: Blood     Protime-INR [[id number]]  (Abnormal) Collected: 05/08/22 1823    Lab Status: Final result Specimen: Blood from Arm, Right Updated: 05/08/22 1906     Protime 15 5 seconds      INR 1 24    APTT [[id number]]  (Abnormal) Collected: 05/08/22 1823    Lab Status: Final result Specimen: Blood from Arm, Right Updated: 05/08/22 1906     PTT 40 seconds     Procalcitonin [[id number]]  (Abnormal) Collected: 05/08/22 1738    Lab Status: Final result Specimen: Blood from Arm, Right Updated: 05/08/22 1853     Procalcitonin 42 72 ng/ml     Blood culture #2 [[id number]] Collected: 05/08/22 1823    Lab Status:  In process Specimen: Blood from Arm, Left Updated: 05/08/22 1846    Blood culture #1 [[id number]] Collected: 05/08/22 1823    Lab Status: In process Specimen: Blood from Arm, Right Updated: 05/08/22 1845    CBC and differential [[id number]]  (Abnormal) Collected: 05/08/22 1738    Lab Status: Final result Specimen: Blood from Arm, Right Updated: 05/08/22 1831     WBC 24 58 Thousand/uL      RBC 3 72 Million/uL      Hemoglobin 10 9 g/dL      Hematocrit 34 3 %      MCV 92 fL      MCH 29 3 pg      MCHC 31 8 g/dL      RDW 13 2 %      MPV 9 3 fL      Platelets 380 Thousands/uL     Narrative: This is an appended report  These results have been appended to a previously verified report      Manual Differential(PHLEBS Do Not Order) [[id number]]  (Abnormal) Collected: 05/08/22 1738    Lab Status: Final result Specimen: Blood from Arm, Right Updated: 05/08/22 1831     Segmented % 83 %      Bands % 2 %      Lymphocytes % 8 %      Monocytes % 6 %      Eosinophils, % 0 %      Basophils % 0 %      Metamyelocytes% 1 %      Absolute Neutrophils 20 89 Thousand/uL      Lymphocytes Absolute 1 97 Thousand/uL      Monocytes Absolute 1 47 Thousand/uL      Eosinophils Absolute 0 00 Thousand/uL      Basophils Absolute 0 00 Thousand/uL      Total Counted --     Platelet Estimate Adequate    Comprehensive metabolic panel [[id number]]  (Abnormal) Collected: 05/08/22 1738    Lab Status: Final result Specimen: Blood from Arm, Right Updated: 05/08/22 1805     Sodium 140 mmol/L      Potassium 4 3 mmol/L      Chloride 102 mmol/L      CO2 28 mmol/L      ANION GAP 10 mmol/L      BUN 33 mg/dL      Creatinine 1 46 mg/dL      Glucose 93 mg/dL      Calcium 8 8 mg/dL      Corrected Calcium 9 4 mg/dL      AST 52 U/L      ALT 21 U/L      Alkaline Phosphatase 70 U/L      Total Protein 6 8 g/dL      Albumin 3 2 g/dL      Total Bilirubin 0 60 mg/dL      eGFR 31 ml/min/1 73sq m     Narrative:      [place] guidelines for Chronic Kidney Disease (CKD):     Stage 1 with normal or high GFR (GFR > 90 mL/min/1 73 square meters)    Stage 2 Mild CKD (GFR = 60-89 mL/min/1 73 square meters)    Stage 3A Moderate CKD (GFR = 45-59 mL/min/1 73 square meters)    Stage 3B Moderate CKD (GFR = 30-44 mL/min/1 73 square meters)    Stage 4 Severe CKD (GFR = 15-29 mL/min/1 73 square meters)    Stage 5 End Stage CKD (GFR <15 mL/min/1 73 square meters)  Note: GFR calculation is accurate only with a steady state creatinine                 XR chest 1 view portable    (Results Pending)              Procedures  ECG 12 Lead Documentation Only    Date/Time: 5/8/2022 5:42 PM  Performed by: Anyi Roblero DO  Authorized by: Anyi Roblero DO     ECG reviewed by me, the ED Provider: yes    Patient location:  ED  Previous ECG:     Previous ECG:  Compared to current    Similarity:  No change    Comparison to cardiac monitor: Yes    Rhythm:     Rhythm: sinus rhythm and A-V block    Ectopy:     Ectopy: none    QRS:     QRS axis:  Normal    QRS intervals: Wide  Conduction:     Conduction: abnormal      Abnormal conduction: complete LBBB and 1st degree    ST segments:     ST segments:  Normal  T waves:     T waves: normal               ED Course                            Initial Sepsis Screening     Row Name 05/08/22 1924                Is the patient's history suggestive of a new or worsening infection? Yes (Proceed)  -MF        Suspected source of infection pneumonia  -MF        Are two or more of the following signs & symptoms of infection both present and new to the patient?  Yes (Proceed)  -MF        Indicate SIRS criteria Tachycardia > 90 bpm;Leukocytosis (WBC > 28776 IJL)  -MF        If the answer is yes to both questions, suspicion of sepsis is present --        If severe sepsis is present AND tissue hypoperfusion perists in the hour after fluid resuscitation or lactate > 4, the patient meets criteria for SEPTIC SHOCK --        Are any of the following organ dysfunction criteria present within 6 hours of suspected infection and SIRS criteria that are NOT considered to be chronic conditions? Yes  -        Organ dysfunction Lactate > 2 0 mmol/L  -        Date of presentation of severe sepsis 05/08/22  -        Time of presentation of severe sepsis 1920  -        Tissue hypoperfusion persists in the hour after crystalloid fluid administration, evidenced, by either: --        Was hypotension present within one hour of the conclusion of crystalloid fluid administration? --        Date of presentation of septic shock --        Time of presentation of septic shock --              User Key  (r) = Recorded By, (t) = Taken By, (c) = Cosigned By    [address] Name Provider Type     Dragan Ocasio DO Physician                              MDM  Number of Diagnoses or Management Options  Acute kidney injury [facility]): new and requires workup  Community acquired pneumonia: new and requires workup  Dehydration: new and requires workup  Severe sepsis [facility]): new and requires workup  Diagnosis management comments:  Very elderly lady from home with fever fatigue tachycardia and new cough  Does have history of recurrent UTI  Will check sepsis labs, chest x-ray and resuscitate with 1 liter Plasma-Lyte which had been started in the field  Patient does appear very dehydrated  Concern for COVID, community acquired pneumonia, dehydration, SHERLYN, electrolyte imbalance, sepsis  Patient with severe sepsis with lactic acid greater than 2, good blood pressure  Chest x-ray does show some infiltrates at the bases  I will treat for pneumonia while waiting for urine results and viral nasal swab results         Amount and/or Complexity of Data Reviewed  Clinical lab tests: ordered and reviewed  Tests in the radiology section of CPT®: ordered and reviewed  Review and summarize past medical records: yes  Discuss the patient with other providers: yes  Independent visualization of images, tracings, or specimens: yes        Disposition  Final diagnoses: Severe sepsis ([facility] [address] )   Community acquired pneumonia   Dehydration   Acute kidney injury ([facility] [place] [address] )     Time reflects when diagnosis was documented in both MDM as applicable and the Disposition within this note     Time User Action Codes Description Comment    5/8/2022  7:23 PM Hui Brill Add [A41 9,  R65 20] Severe sepsis ([facility] [place] [address] )     5/8/2022  7:24 PM Hui Brill Add [J18 9] Community acquired pneumonia     5/8/2022  7:28 PM Hui Brill Add [E86 0] Dehydration     5/8/2022  7:29 PM Hui Brill Add [N17 9] Acute kidney injury [facility])       ED Disposition     ED Disposition Condition Date/Time Comment    Admit Stable Sun May 8, 2022  8:20 PM Case was discussed with [facility] and the patient's admission status was agreed to be Admission Status: inpatient status to the service of Dr Myriam John   Follow-up Information    None         Patient's Medications   Discharge Prescriptions    No medications on file       No discharge procedures on file      PDMP Review       Value Time User    PDMP Reviewed  Yes 12/10/2020 12:30 PM Kenneth Elaine MD          ED Provider  Electronically Signed by           Terresa Gottron, DO  05/08/22 2022

## 2022-05-09 LAB
ALBUMIN SERPL BCP-MCNC: 2.6 G/DL (ref 3.5–5)
ALP SERPL-CCNC: 62 U/L (ref 46–116)
ALT SERPL W P-5'-P-CCNC: 16 U/L (ref 12–78)
ANION GAP SERPL CALCULATED.3IONS-SCNC: 6 MMOL/L (ref 4–13)
AST SERPL W P-5'-P-CCNC: 48 U/L (ref 5–45)
ATRIAL RATE: 91 BPM
BASOPHILS # BLD MANUAL: 0 THOUSAND/UL (ref 0–0.1)
BASOPHILS NFR MAR MANUAL: 0 % (ref 0–1)
BILIRUB SERPL-MCNC: 0.6 MG/DL (ref 0.2–1)
BUN SERPL-MCNC: 33 MG/DL (ref 5–25)
CALCIUM ALBUM COR SERPL-MCNC: 9 MG/DL (ref 8.3–10.1)
CALCIUM SERPL-MCNC: 7.9 MG/DL (ref 8.3–10.1)
CHLORIDE SERPL-SCNC: 105 MMOL/L (ref 100–108)
CO2 SERPL-SCNC: 29 MMOL/L (ref 21–32)
CREAT SERPL-MCNC: 1.04 MG/DL (ref 0.6–1.3)
EOSINOPHIL # BLD MANUAL: 0 THOUSAND/UL (ref 0–0.4)
EOSINOPHIL NFR BLD MANUAL: 0 % (ref 0–6)
ERYTHROCYTE [DISTWIDTH] IN BLOOD BY AUTOMATED COUNT: 13.7 % (ref 11.6–15.1)
GFR SERPL CREATININE-BSD FRML MDRD: 46 ML/MIN/1.73SQ M
GLUCOSE SERPL-MCNC: 111 MG/DL (ref 65–140)
HCT VFR BLD AUTO: 29.8 % (ref 34.8–46.1)
HGB BLD-MCNC: 9.3 G/DL (ref 11.5–15.4)
L PNEUMO1 AG UR QL IA.RAPID: NEGATIVE
LACTATE SERPL-SCNC: 1.8 MMOL/L (ref 0.5–2)
LACTATE SERPL-SCNC: 2.1 MMOL/L (ref 0.5–2)
LYMPHOCYTES # BLD AUTO: 2.29 THOUSAND/UL (ref 0.6–4.47)
LYMPHOCYTES # BLD AUTO: 9 % (ref 14–44)
MAGNESIUM SERPL-MCNC: 1.7 MG/DL (ref 1.6–2.6)
MCH RBC QN AUTO: 29 PG (ref 26.8–34.3)
MCHC RBC AUTO-ENTMCNC: 31.2 G/DL (ref 31.4–37.4)
MCV RBC AUTO: 93 FL (ref 82–98)
MONOCYTES # BLD AUTO: 0 THOUSAND/UL (ref 0–1.22)
MONOCYTES NFR BLD: 0 % (ref 4–12)
NEUTROPHILS # BLD MANUAL: 23.15 THOUSAND/UL (ref 1.85–7.62)
NEUTS BAND NFR BLD MANUAL: 2 % (ref 0–8)
NEUTS SEG NFR BLD AUTO: 89 % (ref 43–75)
P AXIS: 45 DEGREES
PLATELET # BLD AUTO: 168 THOUSANDS/UL (ref 149–390)
PLATELET BLD QL SMEAR: ADEQUATE
PMV BLD AUTO: 9.5 FL (ref 8.9–12.7)
POTASSIUM SERPL-SCNC: 3.6 MMOL/L (ref 3.5–5.3)
PR INTERVAL: 216 MS
PROCALCITONIN SERPL-MCNC: 36.5 NG/ML
PROT SERPL-MCNC: 5.8 G/DL (ref 6.4–8.2)
QRS AXIS: 30 DEGREES
QRSD INTERVAL: 128 MS
QT INTERVAL: 394 MS
QTC INTERVAL: 484 MS
RBC # BLD AUTO: 3.21 MILLION/UL (ref 3.81–5.12)
RBC MORPH BLD: NORMAL
S PNEUM AG UR QL: NEGATIVE
SODIUM SERPL-SCNC: 140 MMOL/L (ref 136–145)
T WAVE AXIS: 208 DEGREES
VENTRICULAR RATE: 91 BPM
WBC # BLD AUTO: 25.44 THOUSAND/UL (ref 4.31–10.16)

## 2022-05-09 PROCEDURE — 83735 ASSAY OF MAGNESIUM: CPT | Performed by: INTERNAL MEDICINE

## 2022-05-09 PROCEDURE — 85027 COMPLETE CBC AUTOMATED: CPT | Performed by: INTERNAL MEDICINE

## 2022-05-09 PROCEDURE — 83605 ASSAY OF LACTIC ACID: CPT | Performed by: INTERNAL MEDICINE

## 2022-05-09 PROCEDURE — 97166 OT EVAL MOD COMPLEX 45 MIN: CPT

## 2022-05-09 PROCEDURE — 93010 ELECTROCARDIOGRAM REPORT: CPT | Performed by: INTERNAL MEDICINE

## 2022-05-09 PROCEDURE — 85007 BL SMEAR W/DIFF WBC COUNT: CPT | Performed by: INTERNAL MEDICINE

## 2022-05-09 PROCEDURE — 80053 COMPREHEN METABOLIC PANEL: CPT | Performed by: INTERNAL MEDICINE

## 2022-05-09 PROCEDURE — 99232 SBSQ HOSP IP/OBS MODERATE 35: CPT | Performed by: INTERNAL MEDICINE

## 2022-05-09 PROCEDURE — 87449 NOS EACH ORGANISM AG IA: CPT | Performed by: INTERNAL MEDICINE

## 2022-05-09 PROCEDURE — 84145 PROCALCITONIN (PCT): CPT | Performed by: INTERNAL MEDICINE

## 2022-05-09 PROCEDURE — 97163 PT EVAL HIGH COMPLEX 45 MIN: CPT

## 2022-05-09 RX ADMIN — Medication 1 TABLET: at 18:30

## 2022-05-09 RX ADMIN — PIPERACILLIN AND TAZOBACTAM 2.25 G: 2; .25 INJECTION, POWDER, LYOPHILIZED, FOR SOLUTION INTRAVENOUS at 14:25

## 2022-05-09 RX ADMIN — HEPARIN SODIUM 5000 UNITS: 5000 INJECTION INTRAVENOUS; SUBCUTANEOUS at 23:20

## 2022-05-09 RX ADMIN — Medication 1000 UNITS: at 08:49

## 2022-05-09 RX ADMIN — PIPERACILLIN AND TAZOBACTAM 2.25 G: 2; .25 INJECTION, POWDER, LYOPHILIZED, FOR SOLUTION INTRAVENOUS at 20:43

## 2022-05-09 RX ADMIN — HEPARIN SODIUM 5000 UNITS: 5000 INJECTION INTRAVENOUS; SUBCUTANEOUS at 06:06

## 2022-05-09 RX ADMIN — ASPIRIN 81 MG: 81 TABLET, COATED ORAL at 08:48

## 2022-05-09 RX ADMIN — GABAPENTIN 300 MG: 300 CAPSULE ORAL at 18:30

## 2022-05-09 RX ADMIN — PRAVASTATIN SODIUM 20 MG: 20 TABLET ORAL at 08:49

## 2022-05-09 RX ADMIN — PANTOPRAZOLE SODIUM 40 MG: 40 TABLET, DELAYED RELEASE ORAL at 06:06

## 2022-05-09 RX ADMIN — Medication 1 TABLET: at 08:48

## 2022-05-09 RX ADMIN — PIPERACILLIN AND TAZOBACTAM 2.25 G: 2; .25 INJECTION, POWDER, LYOPHILIZED, FOR SOLUTION INTRAVENOUS at 08:52

## 2022-05-09 RX ADMIN — HEPARIN SODIUM 5000 UNITS: 5000 INJECTION INTRAVENOUS; SUBCUTANEOUS at 14:28

## 2022-05-09 RX ADMIN — GABAPENTIN 300 MG: 300 CAPSULE ORAL at 08:49

## 2022-05-09 NOTE — PLAN OF CARE
Problem: Potential for Falls  Goal: Patient will remain free of falls  Description: INTERVENTIONS:  - Educate patient/family on patient safety including physical limitations  - Instruct patient to call for assistance with activity   - Consult OT/PT to assist with strengthening/mobility   - Keep Call bell within reach  - Keep bed low and locked with side rails adjusted as appropriate  - Keep care items and personal belongings within reach  - Initiate and maintain comfort rounds  - Make Fall Risk Sign visible to staff  - Offer Toileting every 2 hours  - Initiate/Maintain bed/chair alarm  - Obtain necessary fall risk management equipment:  - Apply yellow socks and bracelet for high fall risk patients  - Consider moving patient to room near nurses station  5/9/2022 1422 by Kishor Johnson RN  Outcome: Progressing  5/9/2022 1422 by Kishor Johnson RN  Outcome: Progressing     Problem: MOBILITY - ADULT  Goal: Maintain or return to baseline ADL function  Description: INTERVENTIONS:  -  Assess patient's ability to carry out ADLs; assess patient's baseline for ADL function and identify physical deficits which impact ability to perform ADLs (bathing, care of mouth/teeth, toileting, grooming, dressing, etc )  - Assess/evaluate cause of self-care deficits   - Assess range of motion  - Assess patient's mobility; develop plan if impaired  - Assess patient's need for assistive devices and provide as appropriate  - Encourage maximum independence but intervene and supervise when necessary  - Involve family in performance of ADLs  - Assess for home care needs following discharge   - Consider OT consult to assist with ADL evaluation and planning for discharge  - Provide patient education as appropriate  5/9/2022 1422 by Kishor Johnson RN  Outcome: Progressing  5/9/2022 1422 by Kishor Johnson RN  Outcome: Progressing  Goal: Maintains/Returns to pre admission functional level  Description: INTERVENTIONS:  - Perform BMAT or MOVE assessment daily    - Set and communicate daily mobility goal to care team and patient/family/caregiver  - Collaborate with rehabilitation services on mobility goals if consulted  - Perform Range of Motion 2 times a day  - Reposition patient every 2 hours    - Dangle patient 2 times a day  - Stand patient 2 times a day  - Ambulate patient 2 times a day  - Out of bed to chair 2 times a day   - Out of bed for meals 2 times a day  - Out of bed for toileting  - Record patient progress and toleration of activity level   5/9/2022 1422 by Shawn Daley RN  Outcome: Progressing  5/9/2022 1422 by Shawn Daley RN  Outcome: Progressing     Problem: Prexisting or High Potential for Compromised Skin Integrity  Goal: Skin integrity is maintained or improved  Description: INTERVENTIONS:  - Identify patients at risk for skin breakdown  - Assess and monitor skin integrity  - Assess and monitor nutrition and hydration status  - Monitor labs   - Assess for incontinence   - Turn and reposition patient  - Assist with mobility/ambulation  - Relieve pressure over bony prominences  - Avoid friction and shearing  - Provide appropriate hygiene as needed including keeping skin clean and dry  - Evaluate need for skin moisturizer/barrier cream  - Collaborate with interdisciplinary team   - Patient/family teaching  - Consider wound care consult   5/9/2022 1422 by Shawn Daley RN  Outcome: Progressing  5/9/2022 1422 by Shawn Daley RN  Outcome: Progressing

## 2022-05-09 NOTE — ASSESSMENT & PLAN NOTE
Wt Readings from Last 3 Encounters:   05/09/22 72 6 kg (160 lb 0 9 oz)   08/11/21 68 8 kg (151 lb 9 6 oz)   08/03/21 70 3 kg (155 lb)     · Hypovolemic on exam  · Received 30 cc milligram per kilogram of IVF due to meeting severe sepsis  · Hold home HCTZ due to SHERLYN  · Prior echo July 2021 EF 50-55 percent, grade 1 diastolic dysfunction  · Monitor I/O and daily weights  · Continue to monitor for signs fluid overload

## 2022-05-09 NOTE — PLAN OF CARE
Problem: OCCUPATIONAL THERAPY ADULT  Goal: Performs self-care activities at highest level of function for planned discharge setting  See evaluation for individualized goals  Description: Treatment Interventions: ADL retraining,Functional transfer training,UE strengthening/ROM,Endurance training,Patient/family training,Compensatory technique education,Energy conservation,Activityengagement          See flowsheet documentation for full assessment, interventions and recommendations  Note: Limitation: Decreased ADL status,Decreased UE strength,Decreased endurance,Decreased self-care trans,Decreased high-level ADLs  Prognosis: Good  Assessment: Pt is a 80 y o  female seen for OT evaluation at [facility], admitted 5/8/2022 w/ Sepsis ([address] )  OT completed expanded review of pt's medical and social history  Comorbidities affecting pt's functional performance at time of assessment include: PNA, generalized weakness, SHERLYN, CHF, GERD, HTN, Neuropathy, hyperlipidemia  Personal factors affecting pt at time of IE include:steps to enter environment and difficulty performing IADLS   Pt with active OT orders and OOB/ambulate orders  Prior to admission, pt was living with her children in a [facility] with a first floor set-up  Pt was I w/  ADLS, A with IADLS, (-) drove, & required use of a rollator PTA  Upon evaluation: Pt requires S for bed mobility, S for functional mobility/transfers, Independent for UB ADLs and S for LB ADLS  2* the following deficits impacting occupational performance: weakness, decreased strength, decreased balance and decreased tolerance  Pt to benefit from continued skilled OT tx while in the hospital to address deficits as defined above and maximize level of functional independence w ADL's and functional mobility  Occupational Performance areas to address include: grooming, bathing/shower, toilet hygiene, dressing, functional mobility, community mobility and clothing management   Based on findings, pt is of moderate complexity  The patient's raw score on the AM-PAC Daily Activity inpatient short form is 21, standardized score is 44 27, greater than 39 4  Patients at this level are likely to benefit from DC to home, which DOES coincide with CURRENT above OT recommendations  However please refer to therapist recommendation for discharge planning given other factors that may influence destination  At this time, OT recommendations at time of discharge are home with home health       OT Discharge Recommendation: Home with home health rehabilitation

## 2022-05-09 NOTE — ASSESSMENT & PLAN NOTE
· Creatinine 1 46 on admission   · Baseline appears to be about 0 7-0 8  · Hold home HCTZ  · Patient appears dry on exam, met severe sepsis  · Received 30 cc/kilogram in IVF   · Continue to monitor

## 2022-05-09 NOTE — ASSESSMENT & PLAN NOTE
· Presented with 1 day of generalized weakness    Denies fall or injury  · Weakness most likely secondary to sepsis  · Fall precautions   · Consult PT/OT and case management

## 2022-05-09 NOTE — CASE MANAGEMENT
Case Management Assessment & Discharge Planning Note    [facility]  Location [address] [id number] MRN [id number]  : 1930 Date 2022       Current Admission Date: 2022  Current Admission Diagnosis:Sepsis [facility])   Patient Active Problem List    Diagnosis Date Noted    Generalized weakness 2022    SHERLYN (acute kidney injury) ([facility] [address] ) 2022    Chronic diastolic heart failure ([facility] [address] ) 2022    Low serum cortisol level ([facility] [address] ) 2020    Pneumonia due to COVID-19 virus 2020    Hyponatremia 2020    Acute pain of left shoulder 2020    Hx: UTI (urinary tract infection) 2020    Change in mental status     Intermediate stage nonexudative age-related macular degeneration of both eyes 2019    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 eye with active choroidal neovascularization ([facility] [address] ) 2019    Pneumonia 2018    Fall 2018    Recurrent UTI - no acute infection  2018    Sepsis ([facility] [address] ) 2018    Neuropathy 2016    Vitamin B 12 deficiency 2016    Venous insufficiency 2016    Hypertension 2015    Gastroesophageal reflux disease without esophagitis 2015    Arthropathy 2015    Hyperlipidemia 2015      LOS (days): 1  Geometric Mean LOS (GMLOS) (days):   Days to GMLOS:     OBJECTIVE:    Risk of Unplanned Readmission Score: 17         Current admission status: Inpatient  Referral Reason: VNA    Preferred Pharmacy:   [facility] [address]  [facility] [place] - [address]  [address]  [place] [address]  Phone: [phone] Fax: [phone]    Primary Care Provider: Kasia Lovett MD    Primary Insurance: MEDICARE  Secondary Insurance:     ASSESSMENT:  [address] Proxies    There are no active Health Care Proxies on file         Advance Directives  Does patient have a Health Care POA?: No  Was patient offered paperwork?: Yes (Patient reports she had paperwork unsure if document completed)  Does patient currently have a Health Care decision maker?: No  Does patient have Advance Directives?: No  Was patient offered paperwork?: Yes (Patient reports she had paperwork unsure if document completed)    Readmission Root Cause  30 Day Readmission: No    Patient Information  Admitted from[de-identified] Home  Mental Status: Alert  During Assessment patient was accompanied by: Not accompanied during assessment  Assessment information provided by[de-identified] Patient  Primary Caregiver: Self  Support Systems: [facility] [employer] [place] of Residence: [address] [place] [address] do you live in?: [address] [place] [address] entry access options  Select all that apply : Stairs  Number of steps to enter home : 2  Do the steps have railings?: No  Type of Current Residence: 2 story home  Upon entering residence, is there a bedroom on the main floor (no further steps)?: No  A bedroom is located on the following floor levels of residence (select all that apply):: 2nd Floor  Upon entering residence, is there a bathroom on the main floor (no further steps)?: Yes  Number of steps to 2nd floor from main floor: One Flight  In the last 12 months, was there a time when you were not able to pay the mortgage or rent on time?: No  In the last 12 months, how many places have you lived?: 1  In the last 12 months, was there a time when you did not have a steady place to sleep or slept in a shelter (including now)?: No  Homeless/housing insecurity resource given?: N/A  Living Arrangements: Lives w/ [facility] w/ Daughter  Is patient a ?: No    Activities of Daily Living Prior to Admission  Functional Status: Independent  Completes ADLs independently?: Yes  Ambulates independently?: Yes  Does patient use assisted devices?: Yes  Assisted Devices (DME) used:  Other (Comment) (Viktoriya Hawley)  Does patient currently own DME?: Yes  What DME does the patient currently own?: Wendy Bishop  Does patient have a history of Outpatient Therapy (PT/OT)?: No  Does the patient have a history of Short-Term Rehab?: Yes [facility])  Does patient have a history of HHC?: Yes ([facility])  Does patient currently have [address]?: No    Patient Information Continued  Income Source: Pension/halfway  Does patient have prescription coverage?: Yes  Within the past 12 months, you worried that your food would run out before you got the money to buy more : Never true  Within the past 12 months, the food you bought just didnt last and you didnt have money to get more : Never true  Food insecurity resource given?: N/A  Does patient receive dialysis treatments?: No  Does patient have a history of substance abuse?: No    Means of Transportation  Means of Transport to Appts[de-identified] Family transport  In the past 12 months, has lack of transportation kept you from medical appointments or from getting medications?: No  In the past 12 months, has lack of transportation kept you from meetings, work, or from getting things needed for daily living?: No  Was application for public transport provided?: N/A    DISCHARGE DETAILS:    Discharge planning discussed with[de-identified] Patient  Freedom of Choice: Yes     CM contacted family/caregiver?: Yes (Left VM for Dtr Brianna)  Were Treatment Team discharge recommendations reviewed with patient/caregiver?: Yes  Did patient/caregiver verbalize understanding of patient care needs?: Yes  Were patient/caregiver advised of the risks associated with not following Treatment Team discharge recommendations?: Yes    Contacts  Patient Contacts: Skyla Stewart (Child) [phone] (Mobile)  Relationship to Patient[de-identified] Family  Contact Method: Phone  Phone Number: Skyla Stewart (Child) [phone] (Mobile)  Reason/Outcome: Emergency [address]         Is the patient interested in [address] at discharge?: Yes  [address] requested[de-identified] [address] Name[de-identified] [address] [facility] Provider[de-identified] PCP  [address] Services Needed[de-identified] Evaluate Functional Status and Safety,Gait/ADL Training,Strengthening/Theraputic Exercises to Improve Function (CP assessment/Med instruction)  Homebound Criteria Met[de-identified] Uses an Assist Device (i e  cane, walker, etc)  Supporting Clincal Findings[de-identified] Fatigues Easliy in Short Distances,Limited Endurance    Other Referral/Resources/Interventions Provided:  Interventions: [facility]    Treatment Team Recommendation: Home with Mane Zheng at Discharge : Family     Additional Comments: Met with patient at bedside  Discussed therapy recommendations for  [address] and agreeable  Patient lives with daughter BOLIVAR and her son  Stays on first floor and sleeps in a recliner chair   Call to daughter Jesus Alberto  and left VM

## 2022-05-09 NOTE — ASSESSMENT & PLAN NOTE
· Home regimen: aspirin and HCTZ 12 5 milligram daily  · Hold HCTZ due to SHERLYN  · Monitor vitals as per protocol

## 2022-05-09 NOTE — ASSESSMENT & PLAN NOTE
· Generalized weakness, decreased appetite and nonproductive cough  · Met sepsis criteria    Received 30 cc/kilogram in IVF  · Continue on IV Zosyn  · Respiratory protocol   · Trend WBC and fever curve  · Trend procalcitonin  · Pneumonia labs and blood cultures pending

## 2022-05-09 NOTE — ASSESSMENT & PLAN NOTE
· Creatinine 1 46 on admission   · Baseline appears to be about 0 7-0 8  · Hold home HCTZ  · Creatinine this morning is 1 04  · Patient appears dry on exam, met severe sepsis  · Received 30 cc/kilogram in IVF   · Avoid hypotension/nephrotoxins medications  · Trend BMP

## 2022-05-09 NOTE — ASSESSMENT & PLAN NOTE
· Met sepsis due to fever (100 8), tachycardia, tachypnea, WBC 24 58  · Signs of pneumonia on chest x-ray  · Lactic 3 6, 2 hour pending   · Received 30 cc/kilogram in IVF  · Procalcitonin 42 72, a m  reflex pending  · COVID/flu/RSV and UA negative  · Started on IV Zosyn, continue  · ESBL history  · Blood cultures and pneumonia labs pending

## 2022-05-09 NOTE — PROGRESS NOTES
[place]  Progress Note - Susan So 1/28/1930, 80 y o  female MRN: [id number]  Unit/Bed#: -01 Encounter: [id number]  Primary Care Provider: Sid Alvarez MD   Date and time admitted to hospital: 5/8/2022  5:28 PM    Chronic diastolic heart failure [facility])  Assessment & Plan  Wt Readings from Last 3 Encounters:   05/09/22 72 6 kg (160 lb 0 9 oz)   08/11/21 68 8 kg (151 lb 9 6 oz)   08/03/21 70 3 kg (155 lb)     · Hypovolemic on exam  · Received 30 cc milligram per kilogram of IVF due to meeting severe sepsis  · Hold home HCTZ due to SHERLYN  · Prior echo July 2021 EF 50-55 percent, grade 1 diastolic dysfunction  · Monitor I/O and daily weights  · Continue to monitor for signs fluid overload  SHERLYN (acute kidney injury) ([facility] [address] )  Assessment & Plan  · Creatinine 1 46 on admission   · Baseline appears to be about 0 7-0 8  · Hold home HCTZ  · Creatinine this morning is 1 04  · Patient appears dry on exam, met severe sepsis  · Received 30 cc/kilogram in IVF   · Avoid hypotension/nephrotoxins medications  · Trend BMP    Generalized weakness  Assessment & Plan  · Presented with 1 day of generalized weakness  Denies fall or injury  · Weakness most likely secondary to sepsis  · Fall precautions   · Consult PT/OT and case management    Hyperlipidemia  Assessment & Plan  · Continue statin    Pneumonia  Assessment & Plan  · Generalized weakness, decreased appetite and nonproductive cough  · Met sepsis criteria    Received 30 cc/kilogram in IVF  · Continue on IV Zosyn  · Respiratory protocol   · Trend WBC and fever curve  · Trend procalcitonin  · Pneumonia labs and blood cultures pending    Gastroesophageal reflux disease without esophagitis  Assessment & Plan  · Continue PPI    Hypertension  Assessment & Plan  · Home regimen: aspirin and HCTZ 12 5 milligram daily  · Hold HCTZ due to SHERLYN  · Monitor vitals as per protocol    * Sepsis ([address] )  Assessment & Plan  · Met sepsis due to fever (100 8), tachycardia, tachypnea, WBC 24 58  · Signs of pneumonia on chest x-ray  · Lactic 3 6-2 1  · Received 30 cc/kilogram in IVF  · Procalcitonin 42 72- 36 5  · COVID/flu/RSV and UA negative  · Continue on IV Zosyn  · ESBL history  · Blood cultures and pneumonia labs pending        Labs & Imaging: I have personally reviewed pertinent reports  VTE Prophylaxis: in place  Code Status:   Level 3 - DNAR and DNI    Patient Centered Rounds: I have performed bedside rounds with nursing staff today  Discussions with Specialists or Other Care Team Provider: CM    Education and Discussions with Family / Patient:  Yo Corona    Current Length of Stay: 1 day(s)    Current Patient Status: Inpatient   Certification Statement: The patient will continue to require additional inpatient hospital stay due to see my assessment and plan  Subjective:   Patient is seen and examined at bedside  Still having significant cough and feels short of breath  Denies any other complaints  Afebrile  All other ROS are negative  Objective:    Vitals: Blood pressure (!) 115/48, pulse 79, temperature 97 5 °F (36 4 °C), temperature source Oral, resp  rate 18, height 5' 2" (1 575 m), weight 72 6 kg (160 lb 0 9 oz), SpO2 92 %  ,Body mass index is 29 27 kg/m²  SPO2 RA Rest      ED to Hosp-Admission (Current) from 5/8/2022 in [address] Med Surg Unit   SpO2 92 %   SpO2 Activity At Rest   O2 Device None (Room air)   O2 Flow Rate --        I&O:     Intake/Output Summary (Last 24 hours) at 5/9/2022 0659  Last data filed at 5/9/2022 0001  Gross per 24 hour   Intake 1100 ml   Output 150 ml   Net 950 ml       Physical Exam:    General- Alert, lying comfortably in bed  Not in any acute distress    Neck- Supple, No JVD  CVS- regular, S1 and S2 normal  Chest- Bilateral Air entry, rhonchi on left base  Abdomen- soft, nontender, not distended, no guarding or rigidity, BS+  Extremities-  No pedal edema, No calf tenderness Normal ROM in all extremities  CNS-   Alert, awake and orientedx3  No focal deficits present      Invasive Devices  Report    Peripheral Intravenous Line            Peripheral IV 05/08/22 Proximal;Right;Ventral (anterior) Forearm <1 day                      Social History  reviewed  Family History   Problem Relation Age of Onset    Stroke Mother     Stroke Father     Heart disease Daughter     Substance Abuse Neg Hx     Mental illness Neg Hx     reviewed    Meds:  Current Facility-Administered Medications   Medication Dose Route Frequency Provider Last Rate Last Admin    acetaminophen (TYLENOL) tablet 650 mg  650 mg Oral Q6H PRN Laura Vela PA-C        aspirin (ECOTRIN LOW STRENGTH) EC tablet 81 mg  81 mg Oral Daily Laura Vela PA-C        benzonatate (TESSALON PERLES) capsule 100 mg  100 mg Oral TID PRN Laura Vela PA-C        cholecalciferol (VITAMIN D3) tablet 1,000 Units  1,000 Units Oral Daily Laura Vela PA-C        gabapentin (NEURONTIN) capsule 300 mg  300 mg Oral BID Laura Vela PA-C   300 mg at 05/08/22 2241    heparin (porcine) subcutaneous injection 5,000 Units  5,000 Units Subcutaneous Q8H [address] Laura Vela PA-C   5,000 Units at 05/09/22 0606    multivitamin-minerals (CENTRUM) tablet 1 tablet  1 tablet Oral BID Laura Vela PA-C   1 tablet at 05/08/22 2241    pantoprazole (PROTONIX) EC tablet 40 mg  40 mg Oral Early Morning Patricia Saldana PA-C   40 mg at 05/09/22 0606    piperacillin-tazobactam (ZOSYN) 2 25 g in sodium chloride 0 9 % 50 mL IVPB  2 25 g Intravenous Q6H Patricia Saldana PA-C 100 mL/hr at 05/08/22 2357 2 25 g at 05/08/22 2357    pravastatin (PRAVACHOL) tablet 20 mg  20 mg Oral Daily Laura Vela PA-C          Medications Prior to Admission   Medication    aspirin (ECOTRIN LOW STRENGTH) 81 mg EC tablet    Cholecalciferol (VITAMIN D3) 1000 units CAPS    Cyanocobalamin (VITAMIN B12 PO)    gabapentin (NEURONTIN) 600 MG tablet    hydrochlorothiazide (HYDRODIURIL) 12 5 mg tablet    Multiple Vitamins-Minerals (PRESERVISION AREDS PO)    omeprazole (PriLOSEC) 20 mg delayed release capsule    pravastatin (PRAVACHOL) 20 mg tablet    nitrofurantoin (MACROBID) 100 mg capsule       Labs:  Results from last 7 days   Lab Units 05/09/22  0252 05/08/22  2228 05/08/22  1738   WBC Thousand/uL 25 44*  --  24 58*   HEMOGLOBIN g/dL 9 3*  --  10 9*   HEMATOCRIT % 29 8*  --  34 3*   PLATELETS Thousands/uL 168 174 219   LYMPHO PCT % 9*  --  8*   MONO PCT % 0*  --  6   EOS PCT % 0  --  0     Results from last 7 days   Lab Units 05/09/22  0252 05/08/22  1738   POTASSIUM mmol/L 3 6 4 3   CHLORIDE mmol/L 105 102   CO2 mmol/L 29 28   BUN mg/dL 33* 33*   CREATININE mg/dL 1 04 1 46*   CALCIUM mg/dL 7 9* 8 8   ALK PHOS U/L 62 70   ALT U/L 16 21   AST U/L 48* 52*     Lab Results   Component Value Date    TROPONINI 0 04 12/06/2020    TROPONINI 0 04 12/05/2020    TROPONINI 0 04 12/05/2020    CKTOTAL 46 12/05/2020    CKTOTAL 112 01/26/2014     Results from last 7 days   Lab Units 05/08/22  1823   INR  1 24*     Lab Results   Component Value Date    BLOODCX No Growth After 5 Days  11/26/2018    BLOODCX No Growth After 5 Days  11/26/2018    BLOODCX No Growth After 5 Days  05/21/2018    URINECX >100,000 cfu/ml Escherichia coli (A) 10/22/2019    URINECX >100,000 cfu/ml Escherichia coli (A) 07/05/2019    URINECX 70,000-79,000 cfu/ml Enterococcus faecalis (A) 06/15/2019    URINECX 20,000-29,000 cfu/ml  06/15/2019    SPUTUMCULTUR 1+ Growth of Pseudomonas aeruginosa (A) 05/23/2018    SPUTUMCULTUR 1+ Growth of  05/23/2018         Imaging:  Results for orders placed during the hospital encounter of 05/08/22    XR chest 1 view portable    Narrative  CHEST    INDICATION:   Fever, cough  COMPARISON:  Two-view chest 6/22/2021    EXAM PERFORMED/VIEWS:  XR CHEST PORTABLE      FINDINGS:    Normal cardiac silhouette  Aortic calcification is present  Moderate to large hiatal hernia      Small patchy infiltrates in the left lower lobe  No pneumothorax, pulmonary edema, or large pleural effusion  Mild degenerative changes of the spine  Subcentimeter calcifications adjacent to bilateral humeral heads may represent dystrophic calcification or calcific tendinitis in the appropriate clinical context  Impression  Left lower lobe infiltrates attributed to pneumonia given the clinical history  This study demonstrates a significant  finding and was documented as such in [facility] for liaison and referring practitioner notification  Workstation performed: [id number]    Results for orders placed during the hospital encounter of 06/22/21    XR chest pa & lateral    Narrative  CHEST    INDICATION:   R60 0: Localized edema  Covid positive on 12/5/2020  COMPARISON: Chest radiograph from 12/5/2020 and chest CT from 11/26/2018  EXAM PERFORMED/VIEWS:  XR CHEST PA & LATERAL  DUAL ENERGY SUBTRACTION  FINDINGS:    Cardiomediastinal silhouette normal  Moderate hiatal hernia  Mild bilateral groundglass opacity  No effusion or pneumothorax  Osseous structures normal for age  Impression  Mild bilateral groundglass opacity, question interstitial edema  Moderate hiatal hernia            Workstation performed: [id number]      Last 24 Hours Medication List:   Current Facility-Administered Medications   Medication Dose Route Frequency Provider Last Rate    acetaminophen  650 mg Oral Q6H PRN Sheldon Bolds, PA-C      aspirin  81 mg Oral Daily Sheldon Bolds, PA-C      benzonatate  100 mg Oral TID PRN Sheldon Bolds, PA-C      cholecalciferol  1,000 Units Oral Daily Sheldon Bolds, PA-C      gabapentin  300 mg Oral BID Sheldon Bolds, PA-C      heparin (porcine)  5,000 Units Subcutaneous [facility] Sheldon Bolds, PA-C      multivitamin-minerals  1 tablet Oral BID Sheldon Bolds, PA-C      pantoprazole  40 mg Oral Early Morning Sheldon Bolds, PA-C      piperacillin-tazobactam  2 25 g Intravenous Q6H Patricia Saldana PA-C 2 25 g (05/08/22 8121)    pravastatin  20 mg Oral Daily Jazz Dumont PA-C          Today, Patient Was Seen By: Aileen Luu MD    ** Please Note: Dictation voice to text software may have been used in the creation of this document   **

## 2022-05-09 NOTE — PLAN OF CARE
Problem: PHYSICAL THERAPY ADULT  Goal: Performs mobility at highest level of function for planned discharge setting  See evaluation for individualized goals  Description: Treatment/Interventions: ADL retraining,Functional transfer training,LE strengthening/ROM,Therapeutic exercise,Elevations,Endurance training,Patient/family training,Equipment eval/education,Bed mobility,Gait training,Compensatory technique education,Continued evaluation,Spoke to nursing,Spoke to case management,OT  Equipment Recommended: Esau Cantu       See flowsheet documentation for full assessment, interventions and recommendations  Note: Prognosis: Fair  Problem List: Impaired balance,Decreased endurance,Decreased mobility  Assessment: Pt is a 80 y o  female who presented to ED 5/8/22 with c/o weakness, SOB, fatigue, UTI symptoms  Dx:  SHERLYN, PNA, dehydration, sepsis  Comorbidities affecting pt's physical performance at time of assessment include: UTI, balance disorder  PLOF and home set up listed above; Upon evaluation: Pt requires min A for bed mobility however sleeps in lift recliner chair at home, S for sit to stand, and S for short distance ambulation with RW  Full objective findings from PT assessment regarding body systems outlined above  Current limitations include impaired balance, decreased endurance/activity tolerance, gait deviations  Pt's clinical presentation is currently unstable/unpredictable seen in pt's presentation of continuous monitoring in hospital, fall risk  Pt would benefit from continued PT while in hospital and follow up with HHCPT at D/C to increase strength, balance, endurance, independence with funcitonal mobility to return to PLOF, maximize independence, decrease caregiver burden and improve quality of life  The patient's AM-PAC Basic Mobility Inpatient Short Form Raw Score is 22  A Raw score of greater than 17 suggests the patient may benefit from discharge to home with HHCPT and use of RW for all mobility  Please also refer to the recommendation of the Physical Therapist for safe discharge planning  PT Discharge Recommendation: Home with home health rehabilitation          See flowsheet documentation for full assessment

## 2022-05-09 NOTE — ASSESSMENT & PLAN NOTE
· Met sepsis due to fever (100 8), tachycardia, tachypnea, WBC 24 58  · Signs of pneumonia on chest x-ray  · Lactic 3 6-2 1  · Received 30 cc/kilogram in IVF  · Procalcitonin 42 72- 36 5  · COVID/flu/RSV and UA negative  · Continue on IV Zosyn  · ESBL history  · Blood cultures and pneumonia labs pending

## 2022-05-09 NOTE — OCCUPATIONAL THERAPY NOTE
Occupational Therapy Evaluation     Patient Name: Matt Barbour  MARK'R Date: 5/9/2022  Problem List  Principal Problem:    Sepsis ([facility] [address] )  Active Problems:    Hypertension    Gastroesophageal reflux disease without esophagitis    Pneumonia    Hyperlipidemia    Generalized weakness    SHERLYN (acute kidney injury) ([facility] [address] )    Chronic diastolic heart failure ([facility] )    Past Medical History  Past Medical History:   Diagnosis Date    Balance disorder     Chronic pain     GERD (gastroesophageal reflux disease)     Hard of hearing     Hyperlipidemia     Hypertension     Neuropathy     Pneumonia     Tinnitus     UTI (urinary tract infection)      Past Surgical History  Past Surgical History:   Procedure Laterality Date    EYE SURGERY      HYSTERECTOMY      TONSILLECTOMY               05/09/22 1400   OT Last Visit   OT Visit Date 05/09/22   Note Type   Note type Evaluation   Restrictions/Precautions   Weight Bearing Precautions Per Order No   Pain Assessment   Pain Assessment Tool 0-10   Pain Score No Pain   Home Living   Type of Home House  (1+1 BRENDA)   Home Layout Two level;1/2 bath on main level; Able to live on main level with bedroom/bathroom; Performs ADLs on one level   Bathroom Shower/Tub Walk-in shower  (2nd floor, pt sponge bathes in FF 1/2 bath)   [address]   (Rollator)   Additional Comments Pt lives with dtr & son, sleeps in electric recliner chair  Prior Function   Level of [place] Independent with ADLs and functional mobility   Lives With Daughter; Son   Gavino Help From Family   ADL Assistance Independent   IADLs Needs assistance   Falls in the last 6 months 0   Vocational Retired   Comments (-) drive, "straightens up," son cooks   Lifestyle   Autonomy Pt reports always being independent in ADLs   Reciprocal Relationships Lives with son & daughter   Service to Others Pt worked in [employer] [address] [facility] Pt enjoys reading & puzzles   Psychosocial Psychosocial (WDL) WDL   Subjective   Subjective "My children my spoil me"   ADL   Eating Assistance 7  Independent   Grooming Assistance 7  Independent   UB Bathing Assistance 6  Modified Independent   LB Bathing Assistance 6  Modified Independent   UB Dressing Assistance 7  Independent   LB Dressing Assistance 5  [address]  5  Supervision/Setup   Bed Mobility   Supine to Sit 5  Supervision   Additional items HOB elevated; Increased time required   Transfers   Sit to Stand 5  Supervision   Additional items Increased time required;Armrests; Verbal cues   Stand to Sit 5  Supervision   Additional items Increased time required;Armrests   Stand pivot 5  Supervision   Additional items Increased time required;Verbal cues   Functional Mobility   Functional Mobility 5  Supervision   Additional items Rolling walker   Balance   Static Sitting Fair   Dynamic Sitting Fair   Static Standing Fair -   Dynamic Standing Fair -   Ambulatory Fair -   Activity Tolerance   Activity Tolerance Patient limited by fatigue   Nurse Made Aware RN 40 Unique Chen   RUE Assessment   RUE Assessment WFL   LUE Assessment   LUE Assessment WFL   Hand Function   Gross Motor Coordination Functional   Fine Motor Coordination Functional   Cognition   Overall Cognitive Status WFL   Arousal/Participation Alert   Attention Within functional limits   Orientation Level Oriented X4   Memory Within functional limits   Following Commands Follows all commands and directions without difficulty   Assessment   Limitation Decreased ADL status; Decreased UE strength;Decreased endurance;Decreased self-care trans;Decreased high-level ADLs   Prognosis Good   Assessment Pt is a 80 y o  female seen for OT evaluation at [facility], admitted 5/8/2022 w/ Sepsis ([facility] [address] )  OT completed expanded review of pt's medical and social history   Comorbidities affecting pt's functional performance at time of assessment include: PNA, generalized weakness, SHERLYN, CHF, GERD, HTN, Neuropathy, hyperlipidemia  Personal factors affecting pt at time of IE include:steps to enter environment and difficulty performing IADLS   Pt with active OT orders and OOB/ambulate orders  Prior to admission, pt was living with her children in a [facility] with a first floor set-up  Pt was I w/  ADLS, A with IADLS, (-) drove, & required use of a rollator PTA  Upon evaluation: Pt requires S for bed mobility, S for functional mobility/transfers, Independent for UB ADLs and S for LB ADLS  2* the following deficits impacting occupational performance: weakness, decreased strength, decreased balance and decreased tolerance  Pt to benefit from continued skilled OT tx while in the hospital to address deficits as defined above and maximize level of functional independence w ADL's and functional mobility  Occupational Performance areas to address include: grooming, bathing/shower, toilet hygiene, dressing, functional mobility, community mobility and clothing management  Based on findings, pt is of moderate complexity  The patient's raw score on the AM-PAC Daily Activity inpatient short form is 21, standardized score is 44 27, greater than 39 4  Patients at this level are likely to benefit from DC to home, which DOES coincide with CURRENT above OT recommendations  However please refer to therapist recommendation for discharge planning given other factors that may influence destination  At this time, OT recommendations at time of discharge are home with home health  Goals   Patient Goals Pt wishes to rest   Plan   Treatment Interventions ADL retraining;Functional transfer training;UE strengthening/ROM; Endurance training;Patient/family training; Compensatory technique education; Energy conservation; Activityengagement   Goal Expiration Date 05/19/22   OT Treatment Day 0   OT Frequency 1-2x/wk   Recommendation   OT Discharge Recommendation Home with home health rehabilitation   -[facility] Daily Activity Inpatient   Lower Body Dressing 3   Bathing 3   Toileting 3   Upper Body Dressing 4   Grooming 4   Eating 4   Daily Activity Raw Score 21   Daily Activity Standardized Score (Calc for Raw Score >=11) 44 27   AM-PAC Applied Cognition Inpatient   Following a Speech/Presentation 4   Understanding Ordinary Conversation 4   Taking Medications 4   Remembering Where Things Are Placed or Put Away 4   Remembering List of 4-5 Errands 3   Taking Care of Complicated Tasks 3   Applied Cognition Raw Score 22   Applied Cognition Standardized Score 47 83     Pt will achieve the following goals within 10 days  *Pt will complete LB bathing and dressing with Mod I      * Pt will complete toileting w/ Mod I w/ G hygiene/thoroughness using DME PRN    *Pt will perform functional transfers with on/off all surfaces with Mod I using DME as needed w/ G balance/safety  *Pt will increase standing tolerance to 5 minutes in order to complete sinkside ADL  *Pt will complete item retrieval and light home management with S while demonstrating good safety  *Pt will demonstrate increased activity tolerance >20 min in order to complete ADL routine  *Pt will identify 3-5 fall risks during ADL routine to ensure home safety upon discharge  *Pt will improve functional mobility during ADL/IADL/leisure tasks to mod I using DME as needed w/ G balance/safety        Tu Kenr, OTR/L

## 2022-05-09 NOTE — ASSESSMENT & PLAN NOTE
· Home regimen: aspirin and HCTZ 12 5 milligram daily  · Hold HCTZ due to SHERLYN  · Normotensive thus far, continue to monitor

## 2022-05-09 NOTE — SEPSIS NOTE
Sepsis Note   Maryann Pitts 80 y o  female MRN: [id number]  Unit/Bed#: DEVONTE Encounter: [id number]       qSOFA     Row Name 05/08/22 2045 05/08/22 2030 05/08/22 2015 05/08/22 2000 05/08/22 1930    Altered mental status GCS < 15 -- -- -- -- --    Respiratory Rate > / =22 0 0 0 0 0    Systolic BP < / =513 0 0 0 0 1    Q Sofa Score 0 0 0 0 1    Row Name 05/08/22 1900 05/08/22 1830 05/08/22 1800 05/08/22 1729       Altered mental status GCS < 15 -- -- 0 --     Respiratory Rate > / =22 0 1 0 0     Systolic BP < / =438 0 0 0 --     Q Sofa Score 0 1 0 --                Initial Sepsis Screening     Row Name 05/08/22 2111 05/08/22 2022 05/08/22 1924          Is the patient's history suggestive of a new or worsening infection? Yes (Proceed)  - -- Yes (Proceed)  -      Suspected source of infection pneumonia  - -- pneumonia  -MF      Are two or more of the following signs & symptoms of infection both present and new to the patient? Yes (Proceed)  - -- Yes (Proceed)  -      Indicate SIRS criteria Hyperthemia > 38 3C (100 9F); Tachycardia > 90 bpm;Leukocytosis (WBC > 49098 IJL); Tachypnea > 20 resp per min  - -- Tachycardia > 90 bpm;Leukocytosis (WBC > 72546 IJL)  -      If the answer is yes to both questions, suspicion of sepsis is present -- -- --      If severe sepsis is present AND tissue hypoperfusion perists in the hour after fluid resuscitation or lactate > 4, the patient meets criteria for SEPTIC SHOCK -- -- --      Are any of the following organ dysfunction criteria present within 6 hours of suspected infection and SIRS criteria that are NOT considered to be chronic conditions?  Yes  - -- Yes  -      Organ dysfunction Lactate > 2 0 mmol/L  - -- Lactate > 2 0 mmol/L  -      Date of presentation of severe sepsis -- -- 05/08/22  -      Time of presentation of severe sepsis -- -- 1920  -      Tissue hypoperfusion persists in the hour after crystalloid fluid administration, evidenced, by either: -- -- -- Was hypotension present within one hour of the conclusion of crystalloid fluid administration?  No  -LH No  -MF --      Date of presentation of septic shock -- -- --      Time of presentation of septic shock -- -- --            User Key  (r) = Recorded By, (t) = Taken By, (c) = Cosigned By    Initials Name Provider Type    MEGAN Cuevas DO Physician    [address], PA-C Physician Assistant

## 2022-05-09 NOTE — H&P
[place]  H&P- Edger Kiara 1/28/1930, 80 y o  female MRN: [id number]  Unit/Bed#: -01 Encounter: [id number]  Primary Care Provider: Melissa Nathan MD   Date and time admitted to hospital: 5/8/2022  5:28 PM    * Sepsis [facility])  Assessment & Plan  · Met sepsis due to fever (100 8), tachycardia, tachypnea, WBC 24 58  · Signs of pneumonia on chest x-ray  · Lactic 3 6, 2 hour pending   · Received 30 cc/kilogram in IVF  · Procalcitonin 42 72, a m  reflex pending  · COVID/flu/RSV and UA negative  · Started on IV Zosyn, continue  · ESBL history  · Blood cultures and pneumonia labs pending    Pneumonia  Assessment & Plan  · Generalized weakness, decreased appetite and nonproductive cough  · Met sepsis criteria  Received 30 cc/kilogram in IVF  · Started on IV Zosyn due to prior cultures, continue   · Respiratory protocol   · Pneumonia labs and blood cultures pending    SHERLYN (acute kidney injury) ([facility] [address] )  Assessment & Plan  · Creatinine 1 46 on admission   · Baseline appears to be about 0 7-0 8  · Hold home HCTZ  · Patient appears dry on exam, met severe sepsis  · Received 30 cc/kilogram in IVF   · Continue to monitor    Generalized weakness  Assessment & Plan  · Presented with 1 day of generalized weakness    Denies fall or injury  · Weakness most likely secondary to sepsis  · Fall precautions   · Consult PT/OT and case management    Chronic diastolic heart failure ([facility] [address] )  Assessment & Plan  Wt Readings from Last 3 Encounters:   08/11/21 68 8 kg (151 lb 9 6 oz)   08/03/21 70 3 kg (155 lb)   08/02/21 70 3 kg (155 lb)     · Hypovolemic on exam  · Received 30 cc milligram per kilogram of IVF due to meeting severe sepsis  · Hold home HCTZ due to SHERLYN  · Prior echo July 2021 EF 50-55 percent, grade 1 diastolic dysfunction  · Monitor I/O and daily weights  · Continue to monitor for signs fluid overload        Hyperlipidemia  Assessment & Plan  · Continue statin    Gastroesophageal reflux disease without esophagitis  Assessment & Plan  · Continue PPI    Hypertension  Assessment & Plan  · Home regimen: aspirin and HCTZ 12 5 milligram daily  · Hold HCTZ due to SHERLYN  · Normotensive thus far, continue to monitor    VTE Pharmacologic Prophylaxis: VTE Score: 5 High Risk (Score >/= 5) - Pharmacological DVT Prophylaxis Ordered: heparin  Sequential Compression Devices Ordered  Code Status: Level 3 - DNAR and DNI   Discussion with family: Patient declined call to   Son and daughter updated by ER staff prior to patient being admitted  Anticipated Length of Stay: Patient will be admitted on an inpatient basis with an anticipated length of stay of greater than 2 midnights secondary to Sepsis, pneumonia  Total Time for Visit, including Counseling / Coordination of Care: 60 minutes Greater than 50% of this total time spent on direct patient counseling and coordination of care  Chief Complaint:  Weakness    History of Present Illness:  Wilfred Burgess is a 80 y o  female with a PMH of CHF, hyperlipidemia, GERD, hypertension who presents from home due to generalized weakness and nonproductive cough since night of 5/7  Patient reports that she felt like her knees were going to give out  Denies fall or injury  At baseline uses a walker to ambulate  In the ER met sepsis criteria due to fever, tachycardia, tachypnea and possible signs of pneumonia on chest x-ray  Started on IV antibiotics and received IV fluids  Patient is seen in bed post IVF and reports improvement in her symptoms but continues to feel tired  Last 8 night of 5/7  Patient reports decreased appetite  Denies dysuria or increased/decreased frequency  Compliant with home medications  Denies alcohol or tobacco abuse  Review of Systems:  Review of Systems   Constitutional: Positive for fatigue  Negative for fever  HENT: Negative for sore throat  Respiratory: Positive for cough   Negative for chest tightness and shortness of breath  Cardiovascular: Negative for chest pain  Gastrointestinal: Negative for abdominal distention, abdominal pain, diarrhea, nausea and vomiting  Genitourinary: Negative for difficulty urinating  Musculoskeletal: Negative for arthralgias  Neurological: Positive for weakness  Negative for headaches  Psychiatric/Behavioral: Negative for agitation and behavioral problems  All other systems reviewed and are negative  Past Medical and Surgical History:   Past Medical History:   Diagnosis Date    Balance disorder     Chronic pain     GERD (gastroesophageal reflux disease)     Hard of hearing     Hyperlipidemia     Hypertension     Neuropathy     Pneumonia     Tinnitus     UTI (urinary tract infection)        Past Surgical History:   Procedure Laterality Date    EYE SURGERY      HYSTERECTOMY      TONSILLECTOMY         Meds/Allergies:  Prior to Admission medications    Medication Sig Start Date End Date Taking?  Authorizing Provider   aspirin (ECOTRIN LOW STRENGTH) 81 mg EC tablet Take 1 tablet (81 mg total) by mouth daily 12/11/18  Yes Ev Souza MD   Cholecalciferol (VITAMIN D3) 1000 units CAPS Take 1,000 Units by mouth daily    Yes Historical Provider, MD   Cyanocobalamin (VITAMIN B12 PO) Take by mouth   Yes Historical Provider, MD   gabapentin (NEURONTIN) 600 MG tablet Take 1 tablet (600 mg total) by mouth 2 (two) times a day 4/5/22  Yes Ev Souza MD   hydrochlorothiazide (HYDRODIURIL) 12 5 mg tablet Take 1 tablet (12 5 mg total) by mouth daily 4/5/22  Yes Ev Souza MD   Multiple Vitamins-Minerals (PRESERVISION AREDS PO) Take by mouth 2 (two) times a day   Yes Historical Provider, MD   omeprazole (PriLOSEC) 20 mg delayed release capsule Take 1 capsule (20 mg total) by mouth daily 4/5/22  Yes Ev Souza MD   pravastatin (PRAVACHOL) 20 mg tablet Take 1 tablet (20 mg total) by mouth daily 4/5/22  Yes Ev Souza MD   apixaban (ELIQUIS) 2 5 mg Take 1 tablet (2 5 mg total) by mouth 2 (two) times a day  Patient not taking: Reported on 5/8/2022  12/10/20   Efrain Tapia MD   Baclofen 5 MG TABS Take 5 mg by mouth 3 (three) times a day  Patient not taking: Reported on 5/8/2022  8/3/21   Gene Michaels MD   estradiol (ESTRACE) 0 1 mg/g vaginal cream Insert 1g into the vagina three (3) time a week  Patient not taking: Reported on 5/8/2022 8/28/20   REAL Osullivan   furosemide (LASIX) 20 mg tablet Take 1 tablet (20 mg total) by mouth daily  Patient not taking: Reported on 5/8/2022 6/22/21   Toney Hatchet, CRNP   nitrofurantoin (MACROBID) 100 mg capsule Take 1 capsule (100 mg total) by mouth daily 4/29/21   Bj Merchant PA-C   predniSONE 5 mg tablet Take 1 tablet (5 mg total) by mouth daily Please take 40 mg daily for 6 days and then 30 mg daily for 3 days then 20 mg daily for 3 days then 10 mg daily for 3 days then 5 mg daily  Patient not taking: Reported on 8/3/2021 12/10/20   Efrain Tapia MD     I have reviewed home medications with patient personally  Allergies: No Known Allergies    Social History:  Marital Status:     Occupation:  Unknown  Patient Pre-hospital Living Situation: Home  Patient Pre-hospital Level of Mobility: walks with walker  Patient Pre-hospital Diet Restrictions:  Cardiac  Substance Use History:   Social History     Substance and Sexual Activity   Alcohol Use Not Currently    Comment: rarely     Social History     Tobacco Use   Smoking Status Never Smoker   Smokeless Tobacco Never Used     Social History     Substance and Sexual Activity   Drug Use No       Family History:  Family History   Problem Relation Age of Onset    Stroke Mother     Stroke Father     Heart disease Daughter     Substance Abuse Neg Hx     Mental illness Neg Hx        Physical Exam:     Vitals:   Blood Pressure: (!) 117/49 (05/08/22 2137)  Pulse: 83 (05/08/22 2137)  Temperature: 97 5 °F (36 4 °C) (05/08/22 2137)  Temp Source: Oral (05/08/22 2137)  Respirations: 18 (05/08/22 2137)  Height: 5' 2" (157 5 cm) (05/08/22 2137)  Weight - Scale: 72 7 kg (160 lb 4 4 oz) (05/08/22 2137)  SpO2: 94 % (05/08/22 2131)    Physical Exam  Vitals and nursing note reviewed  Constitutional:       Appearance: Normal appearance  HENT:      Head: Normocephalic  Eyes:      Extraocular Movements: Extraocular movements intact  Pupils: Pupils are equal, round, and reactive to light  Cardiovascular:      Rate and Rhythm: Normal rate and regular rhythm  Heart sounds: No murmur heard  Pulmonary:      Effort: Pulmonary effort is normal  No respiratory distress  Breath sounds: Normal breath sounds  No wheezing  Abdominal:      General: Bowel sounds are normal  There is no distension  Tenderness: There is no abdominal tenderness  There is no guarding  Musculoskeletal:         General: Normal range of motion  Cervical back: Normal range of motion  Right lower leg: No edema  Left lower leg: No edema  Skin:     General: Skin is warm  Neurological:      General: No focal deficit present  Mental Status: She is alert, oriented to person, place, and time and easily aroused  Psychiatric:         Mood and Affect: Mood normal          Speech: Speech is delayed  Behavior: Behavior is slowed  Thought Content:  Thought content normal           Additional Data:     Lab Results:  Results from last 7 days   Lab Units 05/08/22  1738   WBC Thousand/uL 24 58*   HEMOGLOBIN g/dL 10 9*   HEMATOCRIT % 34 3*   PLATELETS Thousands/uL 219   BANDS PCT % 2   LYMPHO PCT % 8*   MONO PCT % 6   EOS PCT % 0     Results from last 7 days   Lab Units 05/08/22  1738   SODIUM mmol/L 140   POTASSIUM mmol/L 4 3   CHLORIDE mmol/L 102   CO2 mmol/L 28   BUN mg/dL 33*   CREATININE mg/dL 1 46*   ANION GAP mmol/L 10   CALCIUM mg/dL 8 8   ALBUMIN g/dL 3 2*   TOTAL BILIRUBIN mg/dL 0 60   ALK PHOS U/L 70   ALT U/L 21   AST U/L 52*   GLUCOSE RANDOM mg/dL 93 Results from last 7 days   Lab Units 05/08/22  1823   INR  1 24*             Results from last 7 days   Lab Units 05/08/22  1823 05/08/22  1738   LACTIC ACID mmol/L 3 6*  --    PROCALCITONIN ng/ml  --  42 72*       Imaging: Reviewed radiology reports from this admission including: chest xray  XR chest 1 view portable    (Results Pending)         ** Please Note: This note has been constructed using a voice recognition system   **

## 2022-05-09 NOTE — CASE MANAGEMENT
Case Management Discharge Planning Note    Patient name Chano Del Angel  Location [phone] MRN [id number]  : 1930 Date 2022       Current Admission Date: 2022  Current Admission Diagnosis:Sepsis [facility])   Patient Active Problem List    Diagnosis Date Noted    Generalized weakness 2022    SHERLYN (acute kidney injury) ([facility] [address] ) 2022    Chronic diastolic heart failure ([facility] [address] ) 2022    Low serum cortisol level ([facility] [address] ) 2020    Pneumonia due to COVID-19 virus 2020    Hyponatremia 2020    Acute pain of left shoulder 2020    Hx: UTI (urinary tract infection) 2020    Change in mental status     Intermediate stage nonexudative age-related macular degeneration of both eyes 2019    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 eye with active choroidal neovascularization ([facility] [address] ) 2019    Pneumonia 2018    Fall 2018    Recurrent UTI - no acute infection  2018    Sepsis ([facility] [address] ) 2018    Neuropathy 2016    Vitamin B 12 deficiency 2016    Venous insufficiency 2016    Hypertension 2015    Gastroesophageal reflux disease without esophagitis 2015    Arthropathy 2015    Hyperlipidemia 2015      LOS (days): 1  Geometric Mean LOS (GMLOS) (days):   Days to GMLOS:     OBJECTIVE:  Risk of Unplanned Readmission Score: 17         Current admission status: Inpatient   Preferred Pharmacy:   [facility] [address]  [address]  [address]    [facility] [address]  Phone: [phone] Fax: [phone]    Primary Care Provider: Gwendalyn Bosworth, MD    Primary Insurance: MEDICARE  Secondary Insurance:     DISCHARGE DETAILS:      Contacts  Patient Contacts: Mac Vela (Child) [phone] (Mobile)  Relationship to Patient[de-identified] Family  Contact Method: Phone  Phone Number: Mac Vela (Child) [phone] (Mobile)  Reason/Outcome: Emergency Contact    Requested Home Health Care         Is the patient interested in Evan Fang at discharge?: Yes  [address] requested[de-identified] [address] Name[de-identified] [address] Provider[de-identified] PCP  Home Health Services Needed[de-identified] Evaluate Functional Status and Safety,Gait/ADL Training,Strengthening/Theraputic Exercises to Improve Function,Other (comment) (CP assess /Med instruction)  Homebound Criteria Met[de-identified] Uses an Assist Device (i e  cane, walker, etc)  Supporting Clincal Findings[de-identified] Fatigues Claribel in [employer]        Additional Comments: Call recieved from daughter Marlyn Dowling  Discussed therapy recommendations for [facility]  [facility] not accepting new referrals  Patient has also used [facility] in past   Referral made to [facility] and they can accpet    Met with son and patient at bedside and discussed same

## 2022-05-09 NOTE — ASSESSMENT & PLAN NOTE
· Generalized weakness, decreased appetite and nonproductive cough  · Met sepsis criteria    Received 30 cc/kilogram in IVF  · Started on IV Zosyn due to prior cultures, continue   · Respiratory protocol   · Pneumonia labs and blood cultures pending

## 2022-05-09 NOTE — PHYSICAL THERAPY NOTE
PHYSICAL THERAPY Evaluation    Performed at least 2 patient identifiers during session:  Patient Active Problem List   Diagnosis    Hypertension    Neuropathy    Gastroesophageal reflux disease without esophagitis    Vitamin B 12 deficiency    [facility]    Pneumonia    Fall    Recurrent UTI - no acute infection     Arthropathy    Hyperlipidemia    Venous insufficiency    Intermediate stage nonexudative age-related macular degeneration of both eyes    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)    Acute pain of left shoulder    Change in mental status    Hx: UTI (urinary tract infection)    Pneumonia due to COVID-19 virus    Hyponatremia    Low serum cortisol level (HCC)    Generalized weakness    [facility]ERLYN [facility]acute kidney injury) ([facility] )    Chronic diastolic heart failure (HCC)       Past Medical History:   Diagnosis Date    Balance disorder     Chronic pain     GERD (gastroesophageal reflux disease)     Hard of hearing     Hyperlipidemia     Hypertension     Neuropathy     Pneumonia     Tinnitus     UTI (urinary tract infection)        Past Surgical History:   Procedure Laterality Date    EYE SURGERY      HYSTERECTOMY      TONSILLECTOMY          05/09/22 0814   PT Last Visit   PT Visit Date 05/09/22   Note Type   Note type Evaluation   Pain Assessment   Pain Assessment Tool 0-10   Pain Score No Pain   Restrictions/Precautions   Weight Bearing Precautions Per Order No   Home Living   Type of Home House   Home Layout Two level;1/2 bath on main level  (2 BRENDA; pt sleeps in lift recliner chair, sponge bathes on FF)   Bathroom Shower/Tub Walk-in shower  (2nd floor tub & walk-in shower; pt does not use 2/2 upstairs)   Home Equipment   (Pt uses Rollator at baseline)   Additional Comments Pt lives with daughter and son however both work and pt is home alone at times;  pt sleeps in lift recliner chair on first floor at baseline;  pt sponge bathes in 1/2 bath on first floor;  has not been upstairs to second floor since December, "I am very comfortable on the first floor "    Prior Function   Level of [place] Independent with ADLs and functional mobility; Needs assistance with IADLs   Lives With Daughter; Son   Uday Godfrey Help From Family   ADL Assistance Independent   IADLs Needs assistance  (Pt does not drive, children drive/shop/cooking)   Falls in the last 6 months 0   Vocational Retired   General   Additional Pertinent History [place]   Family/Caregiver Present No   Cognition   Overall Cognitive Status WFL   Arousal/Participation Alert   Orientation Level Oriented X4   Memory Within functional limits   Following Commands Follows all commands and directions without difficulty   Subjective   Subjective "Do you think I should be walking now?  It seems kind of early "     RUE Assessment   RUE Assessment WFL   LUE Assessment   LUE Assessment WFL   RLE Assessment   RLE Assessment WFL   LLE Assessment   LLE Assessment WFL   Bed Mobility   Supine to Sit 4  Minimal assistance   Additional items Assist x 1  (pt sleeps in lift recliner chair)   Transfers   Sit to Stand 5  Supervision   Additional items Assist x 1   Stand to Sit 5  Supervision   Additional items Assist x 1   Stand pivot 5  Supervision   Additional items Assist x 1   Ambulation/Elevation   Gait pattern Forward Flexion;Decreased foot clearance   Gait Assistance 5  Supervision   Additional items Assist x 1   Assistive Device Rolling walker   Distance 50ft with RW, no LOB;  slow audrey   Balance   Static Sitting Fair   Dynamic Sitting Fair   Static Standing Fair -   Dynamic Standing Fair -   Ambulatory Fair -   Activity Tolerance   Activity Tolerance Patient limited by fatigue   Nurse Made Aware Rajiv Carnes and Courtney Templeton   Assessment   Prognosis Fair   Problem List Impaired balance;Decreased endurance;Decreased mobility Assessment Pt is a 80 y o  female who presented to ED 5/8/22 with c/o weakness, SOB, fatigue, UTI symptoms  Dx:  SHERLYN, PNA, dehydration, sepsis  Comorbidities affecting pt's physical performance at time of assessment include: UTI, balance disorder  PLOF and home set up listed above; Upon evaluation: Pt requires min A for bed mobility however sleeps in lift recliner chair at home, S for sit to stand, and S for short distance ambulation with RW  Full objective findings from PT assessment regarding body systems outlined above  Current limitations include impaired balance, decreased endurance/activity tolerance, gait deviations  Pt's clinical presentation is currently unstable/unpredictable seen in pt's presentation of continuous monitoring in hospital, fall risk  Pt would benefit from continued PT while in hospital and follow up with HHCPT at [place] to increase strength, balance, endurance, independence with funcitonal mobility to return to PLOF, maximize independence, decrease caregiver burden and improve quality of life  The patient's AM-PAC Basic Mobility Inpatient Short Form Raw Score is 22  A Raw score of greater than 17 suggests the patient may benefit from discharge to home with HHCPT and use of RW for all mobility  Please also refer to the recommendation of the Physical Therapist for safe discharge planning  Goals   Patient Goals to get better   STG Expiration Date 05/23/22   Short Term Goal #1 Pt will be able to demo:  mod I sit to/from supine, mod I sit to/from standing at RW, mod I to ambulate 150ft with RW, S to ascend/descend 2 steps with handrail; to return to PLOF and access home environment   Plan   Treatment/Interventions ADL retraining;Functional transfer training;LE strengthening/ROM; Therapeutic exercise;Elevations; Endurance training;Patient/family training;Equipment eval/education; Bed mobility;Gait training; Compensatory technique education;Continued evaluation;Spoke to nursing;Spoke to case management;OT   PT Frequency 3-5x/wk   Recommendation   PT Discharge Recommendation Home with home health rehabilitation   Equipment Recommended [facility] Recommended Wheeled walker   Additional Comments pt to use RW for all mobility at home   Nidhi 8 in Bed Without Bedrails 4   Lying on Back to Sitting on Edge of Flat Bed 3   Moving Bed to Chair 4   Standing Up From Chair 4   Walk in Room 4   Climb 3-5 Stairs 3   Basic Mobility Inpatient Raw Score 22   Basic Mobility Standardized Score 47 4   Highest Level Of Mobility   JH-HLM Goal 7: Walk 25 feet or more   JH-HLM Highest Level of Mobility 7: Walk 25 feet or more   JH-HLM Goal Achieved No     Christine Livingston, PT      Patient Name: Eunice Ribera  VOXGV'W Date: 5/9/2022

## 2022-05-10 LAB
ANION GAP SERPL CALCULATED.3IONS-SCNC: 6 MMOL/L (ref 4–13)
BASOPHILS # BLD MANUAL: 0 THOUSAND/UL (ref 0–0.1)
BASOPHILS NFR MAR MANUAL: 0 % (ref 0–1)
BUN SERPL-MCNC: 29 MG/DL (ref 5–25)
CALCIUM SERPL-MCNC: 8.1 MG/DL (ref 8.3–10.1)
CHLORIDE SERPL-SCNC: 106 MMOL/L (ref 100–108)
CO2 SERPL-SCNC: 28 MMOL/L (ref 21–32)
CREAT SERPL-MCNC: 0.98 MG/DL (ref 0.6–1.3)
EOSINOPHIL # BLD MANUAL: 0 THOUSAND/UL (ref 0–0.4)
EOSINOPHIL NFR BLD MANUAL: 0 % (ref 0–6)
ERYTHROCYTE [DISTWIDTH] IN BLOOD BY AUTOMATED COUNT: 13.8 % (ref 11.6–15.1)
GFR SERPL CREATININE-BSD FRML MDRD: 50 ML/MIN/1.73SQ M
GLUCOSE SERPL-MCNC: 96 MG/DL (ref 65–140)
HCT VFR BLD AUTO: 29.3 % (ref 34.8–46.1)
HGB BLD-MCNC: 9.3 G/DL (ref 11.5–15.4)
LYMPHOCYTES # BLD AUTO: 1.38 THOUSAND/UL (ref 0.6–4.47)
LYMPHOCYTES # BLD AUTO: 6 % (ref 14–44)
MAGNESIUM SERPL-MCNC: 2 MG/DL (ref 1.6–2.6)
MCH RBC QN AUTO: 29 PG (ref 26.8–34.3)
MCHC RBC AUTO-ENTMCNC: 31.7 G/DL (ref 31.4–37.4)
MCV RBC AUTO: 91 FL (ref 82–98)
MONOCYTES # BLD AUTO: 0.92 THOUSAND/UL (ref 0–1.22)
MONOCYTES NFR BLD: 4 % (ref 4–12)
NEUTROPHILS # BLD MANUAL: 20.72 THOUSAND/UL (ref 1.85–7.62)
NEUTS BAND NFR BLD MANUAL: 3 % (ref 0–8)
NEUTS SEG NFR BLD AUTO: 87 % (ref 43–75)
PLATELET # BLD AUTO: 183 THOUSANDS/UL (ref 149–390)
PLATELET BLD QL SMEAR: ADEQUATE
PMV BLD AUTO: 9.6 FL (ref 8.9–12.7)
POTASSIUM SERPL-SCNC: 3.8 MMOL/L (ref 3.5–5.3)
PROCALCITONIN SERPL-MCNC: 23.33 NG/ML
RBC # BLD AUTO: 3.21 MILLION/UL (ref 3.81–5.12)
RBC MORPH BLD: NORMAL
SODIUM SERPL-SCNC: 140 MMOL/L (ref 136–145)
WBC # BLD AUTO: 23.02 THOUSAND/UL (ref 4.31–10.16)

## 2022-05-10 PROCEDURE — 84145 PROCALCITONIN (PCT): CPT | Performed by: INTERNAL MEDICINE

## 2022-05-10 PROCEDURE — 85027 COMPLETE CBC AUTOMATED: CPT | Performed by: INTERNAL MEDICINE

## 2022-05-10 PROCEDURE — 83735 ASSAY OF MAGNESIUM: CPT | Performed by: INTERNAL MEDICINE

## 2022-05-10 PROCEDURE — 85007 BL SMEAR W/DIFF WBC COUNT: CPT | Performed by: INTERNAL MEDICINE

## 2022-05-10 PROCEDURE — 99232 SBSQ HOSP IP/OBS MODERATE 35: CPT | Performed by: INTERNAL MEDICINE

## 2022-05-10 PROCEDURE — 97116 GAIT TRAINING THERAPY: CPT

## 2022-05-10 PROCEDURE — 80048 BASIC METABOLIC PNL TOTAL CA: CPT | Performed by: INTERNAL MEDICINE

## 2022-05-10 RX ADMIN — HEPARIN SODIUM 5000 UNITS: 5000 INJECTION INTRAVENOUS; SUBCUTANEOUS at 05:51

## 2022-05-10 RX ADMIN — PIPERACILLIN AND TAZOBACTAM 2.25 G: 2; .25 INJECTION, POWDER, LYOPHILIZED, FOR SOLUTION INTRAVENOUS at 08:08

## 2022-05-10 RX ADMIN — HEPARIN SODIUM 5000 UNITS: 5000 INJECTION INTRAVENOUS; SUBCUTANEOUS at 14:37

## 2022-05-10 RX ADMIN — PIPERACILLIN AND TAZOBACTAM 2.25 G: 2; .25 INJECTION, POWDER, LYOPHILIZED, FOR SOLUTION INTRAVENOUS at 14:37

## 2022-05-10 RX ADMIN — PIPERACILLIN AND TAZOBACTAM 2.25 G: 2; .25 INJECTION, POWDER, LYOPHILIZED, FOR SOLUTION INTRAVENOUS at 02:50

## 2022-05-10 RX ADMIN — PIPERACILLIN AND TAZOBACTAM 2.25 G: 2; .25 INJECTION, POWDER, LYOPHILIZED, FOR SOLUTION INTRAVENOUS at 19:59

## 2022-05-10 RX ADMIN — PANTOPRAZOLE SODIUM 40 MG: 40 TABLET, DELAYED RELEASE ORAL at 05:51

## 2022-05-10 RX ADMIN — Medication 1000 UNITS: at 08:08

## 2022-05-10 RX ADMIN — GABAPENTIN 300 MG: 300 CAPSULE ORAL at 16:53

## 2022-05-10 RX ADMIN — ASPIRIN 81 MG: 81 TABLET, COATED ORAL at 08:08

## 2022-05-10 RX ADMIN — Medication 1 TABLET: at 17:23

## 2022-05-10 RX ADMIN — PRAVASTATIN SODIUM 20 MG: 20 TABLET ORAL at 08:08

## 2022-05-10 RX ADMIN — Medication 1 TABLET: at 08:08

## 2022-05-10 RX ADMIN — GABAPENTIN 300 MG: 300 CAPSULE ORAL at 08:08

## 2022-05-10 NOTE — ASSESSMENT & PLAN NOTE
Wt Readings from Last 3 Encounters:   05/10/22 70 8 kg (156 lb 1 4 oz)   08/11/21 68 8 kg (151 lb 9 6 oz)   08/03/21 70 3 kg (155 lb)     · Hypovolemic on exam  · Received 30 cc milligram per kilogram of IVF due to meeting severe sepsis  · Hold home HCTZ due to SHERLYN  · Prior echo July 2021 EF 50-55 percent, grade 1 diastolic dysfunction  · Monitor I/O and daily weights  · Continue to monitor for signs fluid overload

## 2022-05-10 NOTE — ASSESSMENT & PLAN NOTE
· Presented with 1 day of generalized weakness    Denies fall or injury  · Weakness most likely secondary to sepsis  · Fall precautions   · P T  OT and  consult appreciated  · Recommended home with home healthcare

## 2022-05-10 NOTE — PLAN OF CARE
Problem: Potential for Falls  Goal: Patient will remain free of falls  Description: INTERVENTIONS:  - Educate patient/family on patient safety including physical limitations  - Instruct patient to call for assistance with activity   - Consult OT/PT to assist with strengthening/mobility   - Keep Call bell within reach  - Keep bed low and locked with side rails adjusted as appropriate  - Keep care items and personal belongings within reach  - Initiate and maintain comfort rounds  - Make Fall Risk Sign visible to staff  - Offer Toileting every 2 hours  - Initiate/Maintain bed/chair alarm  - Obtain necessary fall risk management equipment:  - Apply yellow socks and bracelet for high fall risk patients  - Consider moving patient to room near nurses station  Outcome: Progressing     Problem: MOBILITY - ADULT  Goal: Maintain or return to baseline ADL function  Description: INTERVENTIONS:  -  Assess patient's ability to carry out ADLs; assess patient's baseline for ADL function and identify physical deficits which impact ability to perform ADLs (bathing, care of mouth/teeth, toileting, grooming, dressing, etc )  - Assess/evaluate cause of self-care deficits   - Assess range of motion  - Assess patient's mobility; develop plan if impaired  - Assess patient's need for assistive devices and provide as appropriate  - Encourage maximum independence but intervene and supervise when necessary  - Involve family in performance of ADLs  - Assess for home care needs following discharge   - Consider OT consult to assist with ADL evaluation and planning for discharge  - Provide patient education as appropriate  Outcome: Progressing  Goal: Maintains/Returns to pre admission functional level  Description: INTERVENTIONS:  - Perform BMAT or MOVE assessment daily    - Set and communicate daily mobility goal to care team and patient/family/caregiver     - Collaborate with rehabilitation services on mobility goals if consulted  - Perform Range of Motion 2 times a day  - Reposition patient every 2 hours    - Dangle patient 2 times a day  - Stand patient 2 times a day  - Ambulate patient 2 times a day  - Out of bed to chair 2 times a day   - Out of bed for meals 2 times a day  - Out of bed for toileting  - Record patient progress and toleration of activity level   Outcome: Progressing     Problem: Prexisting or High Potential for Compromised Skin Integrity  Goal: Skin integrity is maintained or improved  Description: INTERVENTIONS:  - Identify patients at risk for skin breakdown  - Assess and monitor skin integrity  - Assess and monitor nutrition and hydration status  - Monitor labs   - Assess for incontinence   - Turn and reposition patient  - Assist with mobility/ambulation  - Relieve pressure over bony prominences  - Avoid friction and shearing  - Provide appropriate hygiene as needed including keeping skin clean and dry  - Evaluate need for skin moisturizer/barrier cream  - Collaborate with interdisciplinary team   - Patient/family teaching  - Consider wound care consult   Outcome: Progressing

## 2022-05-10 NOTE — PROGRESS NOTES
[place]  Progress Note - [place] 1/28/1930, 80 y o  female MRN: [id number]  Unit/Bed#: -[place] Encounter: [id number]  Primary Care Provider: Adeola Lai MD   Date and time admitted to hospital: 5/8/2022  5:28 PM    Chronic diastolic heart failure [facility])  Assessment & Plan  Wt Readings from Last 3 Encounters:   05/10/22 70 8 kg (156 lb 1 4 oz)   08/11/21 68 8 kg (151 lb 9 6 oz)   08/03/21 70 3 kg (155 lb)     · Hypovolemic on exam  · Received 30 cc milligram per kilogram of IVF due to meeting severe sepsis  · Hold home HCTZ due to SHERLYN  · Prior echo July 2021 EF 50-55 percent, grade 1 diastolic dysfunction  · Monitor I/O and daily weights  · Continue to monitor for signs fluid overload  SHERLYN (acute kidney injury) ([facility] )  Assessment & Plan  · Creatinine 1 46 on admission   · Baseline appears to be about 0 7-0 8  · Hold home HCTZ  · Creatinine this morning is 0 98  · Patient appears dry on exam, met severe sepsis  · Received 30 cc/kilogram in IVF   · Avoid hypotension/nephrotoxins medications  · Trend BMP    Generalized weakness  Assessment & Plan  · Presented with 1 day of generalized weakness  Denies fall or injury  · Weakness most likely secondary to sepsis  · Fall precautions   · P T  OT and  consult appreciated  · Recommended home with home healthcare    Hyperlipidemia  Assessment & Plan  · Continue statin    Pneumonia  Assessment & Plan  · Generalized weakness, decreased appetite and nonproductive cough  · Met sepsis criteria  Received 30 cc/kilogram in IVF  · Continue on IV Zosyn  · Respiratory protocol   · Trend WBC and fever curve  · Procalcitonin is 36 5-23  3    Trend procalcitonin  · Blood cultures are negative to date    Gastroesophageal reflux disease without esophagitis  Assessment & Plan  · Continue PPI    Hypertension  Assessment & Plan  · Home regimen: aspirin and HCTZ 12 5 milligram daily  · Hold HCTZ due to SHERLYN  · Monitor vitals as per protocol    * Sepsis ([facility] [address] )  Assessment & Plan  · Met sepsis due to fever (100 8), tachycardia, tachypnea, WBC 24 58  · Signs of pneumonia on chest x-ray  · Lactic 3 6-2 1  · Received 30 cc/kilogram in IVF  · Procalcitonin 42 72- 36 5  · COVID/flu/RSV and UA negative  · Continue on IV Zosyn  · ESBL history  · Blood cultures are negative to date  · Urine for Streptococcus and Legionella is negative        Labs & Imaging: I have personally reviewed pertinent reports  VTE Prophylaxis: in place  Code Status:   Level 3 - DNAR and DNI    Patient Centered Rounds: I have performed bedside rounds with nursing staff today  Discussions with Specialists or Other Care Team Provider: CM    Education and Discussions with Family / Patient:  Daughter Shakila Salinas voicemail    Current Length of Stay: 2 day(s)    Current Patient Status: Inpatient   Certification Statement: The patient will continue to require additional inpatient hospital stay due to see my assessment and plan  Subjective:   Patient is seen and examined at bedside  Has significant cough with some shortness of breath  Denies any other complaints  Afebrile  All other ROS are negative  Objective:    Vitals: Blood pressure 137/60, pulse 78, temperature 97 9 °F (36 6 °C), resp  rate 18, height 5' 2" (1 575 m), weight 70 8 kg (156 lb 1 4 oz), SpO2 93 %  ,Body mass index is 28 55 kg/m²  SPO2 RA Rest      ED to Hosp-Admission (Current) from 5/8/2022 in [address] Med Surg Unit   SpO2 93 %   SpO2 Activity At Rest   O2 Device None (Room air)   O2 Flow Rate --        I&O: No intake or output data in the 24 hours ending 05/10/22 0904    Physical Exam:    General- Alert, lying comfortably in bed  Not in any acute distress    Neck- Supple, No JVD  CVS- regular, S1 and S2 normal  Chest- Bilateral Air entry, decreased at bases with some rhonchi  Abdomen- soft, nontender, not distended, no guarding or rigidity, BS+  Extremities-  No pedal edema, No calf tenderness  CNS-   Alert, awake and orientedx3  No focal deficits present      Invasive Devices  Report    Peripheral Intravenous Line            Peripheral IV 05/10/22 Right;Ventral (anterior) Forearm <1 day                      Social History  reviewed  Family History   Problem Relation Age of Onset    Stroke Mother     Stroke Father     Heart disease Daughter     Substance Abuse Neg Hx     Mental illness Neg Hx     reviewed    Meds:  Current Facility-Administered Medications   Medication Dose Route Frequency Provider Last Rate Last Admin    acetaminophen (TYLENOL) tablet 650 mg  650 mg Oral Q6H PRN Martine Sanchez PA-C        aspirin (ECOTRIN LOW STRENGTH) EC tablet 81 mg  81 mg Oral Daily Patricia Saldana PA-C   81 mg at 05/10/22 6490    benzonatate (TESSALON PERLES) capsule 100 mg  100 mg Oral TID PRN Martine Sanchez PA-C        cholecalciferol (VITAMIN D3) tablet 1,000 Units  1,000 Units Oral Daily Martine Sanchez PA-C   1,000 Units at 05/10/22 5947    gabapentin (NEURONTIN) capsule 300 mg  300 mg Oral BID Martine Sanchez PA-C   300 mg at 05/10/22 1378    heparin (porcine) subcutaneous injection 5,000 Units  5,000 Units Subcutaneous Pending sale to [facility] Martine Sanchez PA-C   5,000 Units at 05/10/22 0551    multivitamin-minerals (CENTRUM) tablet 1 tablet  1 tablet Oral BID Martine Sanchez PA-C   1 tablet at 05/10/22 0808    pantoprazole (PROTONIX) EC tablet 40 mg  40 mg Oral Early Morning Patricia Saldana PA-C   40 mg at 05/10/22 0551    piperacillin-tazobactam (ZOSYN) 2 25 g in sodium chloride 0 9 % 50 mL IVPB  2 25 g Intravenous Q6H Patricia Saldana PA-C 100 mL/hr at 05/10/22 0808 2 25 g at 05/10/22 0808    pravastatin (PRAVACHOL) tablet 20 mg  20 mg Oral Daily Martine Sanchez PA-C   20 mg at 05/10/22 0808      Medications Prior to Admission   Medication    aspirin (ECOTRIN LOW STRENGTH) 81 mg EC tablet    Cholecalciferol (VITAMIN D3) 1000 units CAPS    Cyanocobalamin (VITAMIN B12 PO)    gabapentin (NEURONTIN) 600 MG tablet    hydrochlorothiazide (HYDRODIURIL) 12 5 mg tablet    Multiple Vitamins-Minerals (PRESERVISION AREDS PO)    omeprazole (PriLOSEC) 20 mg delayed release capsule    pravastatin (PRAVACHOL) 20 mg tablet    nitrofurantoin (MACROBID) 100 mg capsule       Labs:  Results from last 7 days   Lab Units 05/10/22  0250 05/09/22  0252 05/08/22  2228 05/08/22  1738 05/08/22  1738   WBC Thousand/uL 23 02* 25 44*  --   --  24 58*   HEMOGLOBIN g/dL 9 3* 9 3*  --   --  10 9*   HEMATOCRIT % 29 3* 29 8*  --   --  34 3*   PLATELETS Thousands/uL 183 168 174   < > 219   LYMPHO PCT % 6* 9*  --   --  8*   MONO PCT % 4 0*  --   --  6   EOS PCT % 0 0  --   --  0    < > = values in this interval not displayed  Results from last 7 days   Lab Units 05/10/22  0250 05/09/22 0252 05/08/22  1738   POTASSIUM mmol/L 3 8 3 6 4 3   CHLORIDE mmol/L 106 105 102   CO2 mmol/L 28 29 28   BUN mg/dL 29* 33* 33*   CREATININE mg/dL 0 98 1 04 1 46*   CALCIUM mg/dL 8 1* 7 9* 8 8   ALK PHOS U/L  --  62 70   ALT U/L  --  16 21   AST U/L  --  48* 52*     Lab Results   Component Value Date    TROPONINI 0 04 12/06/2020    TROPONINI 0 04 12/05/2020    TROPONINI 0 04 12/05/2020    CKTOTAL 46 12/05/2020    CKTOTAL 112 01/26/2014     Results from last 7 days   Lab Units 05/08/22  1823   INR  1 24*     Lab Results   Component Value Date    BLOODCX No Growth at 24 hrs  05/08/2022    BLOODCX No Growth at 24 hrs  05/08/2022    BLOODCX No Growth After 5 Days   11/26/2018    URINECX >100,000 cfu/ml Escherichia coli (A) 10/22/2019    URINECX >100,000 cfu/ml Escherichia coli (A) 07/05/2019    URINECX 70,000-79,000 cfu/ml Enterococcus faecalis (A) 06/15/2019    URINECX 20,000-29,000 cfu/ml  06/15/2019    SPUTUMCULTUR 1+ Growth of Pseudomonas aeruginosa (A) 05/23/2018    SPUTUMCULTUR 1+ Growth of  05/23/2018         Imaging:  Results for orders placed during the hospital encounter of 05/08/22    XR chest 1 view portable    Narrative  CHEST    INDICATION:   Fever, cough     COMPARISON:  Two-view chest 6/22/2021    EXAM PERFORMED/VIEWS:  XR CHEST PORTABLE      FINDINGS:    Normal cardiac silhouette  Aortic calcification is present  Moderate to large hiatal hernia  Small patchy infiltrates in the left lower lobe  No pneumothorax, pulmonary edema, or large pleural effusion  Mild degenerative changes of the spine  Subcentimeter calcifications adjacent to bilateral humeral heads may represent dystrophic calcification or calcific tendinitis in the appropriate clinical context  Impression  Left lower lobe infiltrates attributed to pneumonia given the clinical history  This study demonstrates a significant  finding and was documented as such in [facility] for liaison and referring practitioner notification  Workstation performed: [id number]    Results for orders placed during the hospital encounter of 06/22/21    XR chest pa & lateral    Narrative  CHEST    INDICATION:   R60 0: Localized edema  Covid positive on 12/5/2020  COMPARISON: Chest radiograph from 12/5/2020 and chest CT from 11/26/2018  EXAM PERFORMED/VIEWS:  XR CHEST PA & LATERAL  DUAL ENERGY SUBTRACTION  FINDINGS:    Cardiomediastinal silhouette normal  Moderate hiatal hernia  Mild bilateral groundglass opacity  No effusion or pneumothorax  Osseous structures normal for age  Impression  Mild bilateral groundglass opacity, question interstitial edema  Moderate hiatal hernia            Workstation performed: [id number]      Last 24 Hours Medication List:   Current Facility-Administered Medications   Medication Dose Route Frequency Provider Last Rate    acetaminophen  650 mg Oral Q6H PRN Deborrah Karo, PA-C      aspirin  81 mg Oral Daily Deborrah Karo, PA-C      benzonatate  100 mg Oral TID PRN Deborrah Karo, PA-C      cholecalciferol  1,000 Units Oral Daily Deborrah Karo, PA-C      gabapentin  300 mg Oral BID Deborrah Karo, PA-C      heparin (porcine)  5,000 Units Subcutaneous [facility] [address] Jyoti Blanc David De La Cruz PA-C      multivitamin-minerals  1 tablet Oral BID Sawyer Campos PA-C      pantoprazole  40 mg Oral Early Morning Sawyer Campos PA-C      piperacillin-tazobactam  2 25 g Intravenous Q6H Patricia Saldana PA-C 2 25 g (05/10/22 7522)    pravastatin  20 mg Oral Daily Sawyer Campos PA-C          Today, Patient Was Seen By: Shayan Baer MD    ** Please Note: Dictation voice to text software may have been used in the creation of this document   **

## 2022-05-10 NOTE — ASSESSMENT & PLAN NOTE
· Creatinine 1 46 on admission   · Baseline appears to be about 0 7-0 8  · Hold home HCTZ  · Creatinine this morning is 0 98  · Patient appears dry on exam, met severe sepsis  · Received 30 cc/kilogram in IVF   · Avoid hypotension/nephrotoxins medications  · Trend BMP

## 2022-05-10 NOTE — PLAN OF CARE
Problem: PHYSICAL THERAPY ADULT  Goal: Performs mobility at highest level of function for planned discharge setting  See evaluation for individualized goals  Description: Treatment/Interventions: ADL retraining,Functional transfer training,LE strengthening/ROM,Therapeutic exercise,Elevations,Endurance training,Patient/family training,Equipment eval/education,Bed mobility,Gait training,Compensatory technique education,Continued evaluation,Spoke to nursing,Spoke to case management,OT  Equipment Recommended: Cleavon Gone       See flowsheet documentation for full assessment, interventions and recommendations  Note: Prognosis: Fair  Problem List: Decreased endurance,Impaired balance,Decreased mobility  Assessment: Treatment consisted of bed mobility,balance training, ambulation training, safety awareness, fall prevention, endurance training to increase upright positions and functional mobility  Pt with improved posture with use of RW;  No LOB, slow steady audrey; Pt would benefit from continued PT while in hospital and follow up with HHCPT at [place] to increase strength, balance, endurance, mobility independence to return to PLOF, decrease caregiver burden and improve quality of life  The patient's AM-PAC Basic Mobility Inpatient Short Form Raw Score is 22  A Raw score of greater than 17 suggests the patient may benefit from discharge to home with use of RW for all mobility and C PT  Please also refer to the recommendation of the Physical Therapist for safe discharge planning  PT Discharge Recommendation: Home with home health rehabilitation          See flowsheet documentation for full assessment

## 2022-05-10 NOTE — PHYSICAL THERAPY NOTE
PHYSICAL THERAPY NOTE       05/10/22 1041   PT Last Visit   PT Visit Date 05/10/22   Note Type   Note Type Treatment   Pain Assessment   Pain Assessment Tool 0-10   Pain Score No Pain   Restrictions/Precautions   Weight Bearing Precautions Per Order No   General   Chart Reviewed Yes   Cognition   Overall Cognitive Status WFL   Arousal/Participation Alert   Attention Within functional limits   Orientation Level Oriented X4   Memory Within functional limits   Following Commands Follows all commands and directions without difficulty   Subjective   Subjective Pt states she has been ambulating to/from bathroom with RW, states she has been up and moving;  pt agreeable to get to chair for lunch   Bed Mobility   Supine to Sit 5  Supervision   Additional items Increased time required   Additional Comments Pt remains OOB in chair at end of session   Transfers   Sit to Stand 5  Supervision   Additional items Increased time required   Stand to Sit 5  Supervision   Additional items Increased time required   Ambulation/Elevation   Gait pattern Forward Flexion; Excessively slow   Gait Assistance 5  Supervision   Additional items Assist x 1   Assistive Device Rolling walker   Distance 50ft with RW, no LOB, slow audrey, able to hold conversation during ambulation   Balance   Static Sitting Fair   Dynamic Sitting Fair   Static Standing Fair -  (RW)   Dynamic Standing Fair -  (RW)   Ambulatory Fair -  (RW)   Activity Tolerance   Activity Tolerance Patient limited by fatigue   Assessment   Prognosis Fair   Problem List Decreased endurance; Impaired balance;Decreased mobility   Assessment Treatment consisted of bed mobility,balance training, ambulation training, safety awareness, fall prevention, endurance training to increase upright positions and functional mobility  Pt with improved posture with use of RW;  No LOB, slow steady audrey;   Pt would benefit from continued PT while in hospital and follow up with [facility] at [place] to increase strength, balance, endurance, mobility independence to return to PLOF, decrease caregiver burden and improve quality of life  The patient's AM-PAC Basic Mobility Inpatient Short Form Raw Score is 22  A Raw score of greater than 17 suggests the patient may benefit from discharge to home with use of RW for all mobility and [place] [facility]  Please also refer to the recommendation of the Physical Therapist for safe discharge planning  Goals   Patient Goals to get better and go home   STG Expiration Date 05/23/22   Plan   Treatment/Interventions ADL retraining;Functional transfer training;LE strengthening/ROM; Elevations; Therapeutic exercise; Endurance training;Patient/family training;Equipment eval/education; Bed mobility; Compensatory technique education;Gait training;Continued evaluation;Spoke to nursing;Spoke to case management;OT   PT Frequency 3-5x/wk   Recommendation   PT Discharge Recommendation Home with home health rehabilitation   Equipment Recommended [facility] Recommended Wheeled walker   Additional Comments use of RW for all mobility   AM-PAC Basic Mobility Inpatient   Turning in Bed Without Bedrails 4   Lying on Back to Sitting on Edge of Flat Bed 3   Moving Bed to Chair 4   Standing Up From Chair 4   Walk in Room 4   Climb 3-5 Stairs 3   Basic Mobility Inpatient Raw Score 22   Basic Mobility Standardized Score 47 4   Highest Level Of Mobility   JH-HLM Goal 7: Walk 25 feet or more   JH-HLM Highest Level of Mobility 7: Walk 25 feet or more   JH-HLM Goal Achieved Yes     Kostas Bojorquez, PT    Patient Name: Evelin Escalante  [facility]S Date: 5/10/2022

## 2022-05-10 NOTE — ASSESSMENT & PLAN NOTE
· Met sepsis due to fever (100 8), tachycardia, tachypnea, WBC 24 58  · Signs of pneumonia on chest x-ray  · Lactic 3 6-2 1  · Received 30 cc/kilogram in IVF  · Procalcitonin 42 72- 36 5  · COVID/flu/RSV and UA negative  · Continue on IV Zosyn  · ESBL history  · Blood cultures are negative to date  · Urine for Streptococcus and Legionella is negative

## 2022-05-10 NOTE — ASSESSMENT & PLAN NOTE
· Generalized weakness, decreased appetite and nonproductive cough  · Met sepsis criteria  Received 30 cc/kilogram in IVF  · Continue on IV Zosyn  · Respiratory protocol   · Trend WBC and fever curve  · Procalcitonin is 36 5-23  3    Trend procalcitonin  · Blood cultures are negative to date

## 2022-05-11 ENCOUNTER — APPOINTMENT (INPATIENT)
Dept: RADIOLOGY | Facility: HOSPITAL | Age: 87
DRG: 871 | End: 2022-05-11
Payer: MEDICARE

## 2022-05-11 LAB
ANION GAP SERPL CALCULATED.3IONS-SCNC: 9 MMOL/L (ref 4–13)
BASOPHILS # BLD AUTO: 0.04 THOUSANDS/ΜL (ref 0–0.1)
BASOPHILS NFR BLD AUTO: 0 % (ref 0–1)
BUN SERPL-MCNC: 18 MG/DL (ref 5–25)
CALCIUM SERPL-MCNC: 8 MG/DL (ref 8.3–10.1)
CHLORIDE SERPL-SCNC: 106 MMOL/L (ref 100–108)
CO2 SERPL-SCNC: 25 MMOL/L (ref 21–32)
CREAT SERPL-MCNC: 0.79 MG/DL (ref 0.6–1.3)
EOSINOPHIL # BLD AUTO: 0.08 THOUSAND/ΜL (ref 0–0.61)
EOSINOPHIL NFR BLD AUTO: 0 % (ref 0–6)
ERYTHROCYTE [DISTWIDTH] IN BLOOD BY AUTOMATED COUNT: 13.4 % (ref 11.6–15.1)
GFR SERPL CREATININE-BSD FRML MDRD: 65 ML/MIN/1.73SQ M
GLUCOSE SERPL-MCNC: 88 MG/DL (ref 65–140)
HCT VFR BLD AUTO: 29.6 % (ref 34.8–46.1)
HGB BLD-MCNC: 9.6 G/DL (ref 11.5–15.4)
IMM GRANULOCYTES # BLD AUTO: 0.24 THOUSAND/UL (ref 0–0.2)
IMM GRANULOCYTES NFR BLD AUTO: 1 % (ref 0–2)
LYMPHOCYTES # BLD AUTO: 1.76 THOUSANDS/ΜL (ref 0.6–4.47)
LYMPHOCYTES NFR BLD AUTO: 10 % (ref 14–44)
MCH RBC QN AUTO: 29.4 PG (ref 26.8–34.3)
MCHC RBC AUTO-ENTMCNC: 32.4 G/DL (ref 31.4–37.4)
MCV RBC AUTO: 91 FL (ref 82–98)
MONOCYTES # BLD AUTO: 1 THOUSAND/ΜL (ref 0.17–1.22)
MONOCYTES NFR BLD AUTO: 6 % (ref 4–12)
NEUTROPHILS # BLD AUTO: 15.17 THOUSANDS/ΜL (ref 1.85–7.62)
NEUTS SEG NFR BLD AUTO: 83 % (ref 43–75)
NRBC BLD AUTO-RTO: 0 /100 WBCS
PLATELET # BLD AUTO: 192 THOUSANDS/UL (ref 149–390)
PMV BLD AUTO: 9.3 FL (ref 8.9–12.7)
POTASSIUM SERPL-SCNC: 4 MMOL/L (ref 3.5–5.3)
PROCALCITONIN SERPL-MCNC: 10.73 NG/ML
RBC # BLD AUTO: 3.26 MILLION/UL (ref 3.81–5.12)
SODIUM SERPL-SCNC: 140 MMOL/L (ref 136–145)
WBC # BLD AUTO: 18.29 THOUSAND/UL (ref 4.31–10.16)

## 2022-05-11 PROCEDURE — 99232 SBSQ HOSP IP/OBS MODERATE 35: CPT | Performed by: INTERNAL MEDICINE

## 2022-05-11 PROCEDURE — 84145 PROCALCITONIN (PCT): CPT | Performed by: INTERNAL MEDICINE

## 2022-05-11 PROCEDURE — 85025 COMPLETE CBC W/AUTO DIFF WBC: CPT | Performed by: INTERNAL MEDICINE

## 2022-05-11 PROCEDURE — 80048 BASIC METABOLIC PNL TOTAL CA: CPT | Performed by: INTERNAL MEDICINE

## 2022-05-11 PROCEDURE — 71045 X-RAY EXAM CHEST 1 VIEW: CPT

## 2022-05-11 RX ORDER — HYDROCHLOROTHIAZIDE 12.5 MG/1
12.5 TABLET ORAL DAILY
Status: DISCONTINUED | OUTPATIENT
Start: 2022-05-11 | End: 2022-05-13 | Stop reason: HOSPADM

## 2022-05-11 RX ORDER — SACCHAROMYCES BOULARDII 250 MG
250 CAPSULE ORAL 2 TIMES DAILY
Status: DISCONTINUED | OUTPATIENT
Start: 2022-05-11 | End: 2022-05-13 | Stop reason: HOSPADM

## 2022-05-11 RX ADMIN — HEPARIN SODIUM 5000 UNITS: 5000 INJECTION INTRAVENOUS; SUBCUTANEOUS at 01:13

## 2022-05-11 RX ADMIN — GABAPENTIN 300 MG: 300 CAPSULE ORAL at 08:46

## 2022-05-11 RX ADMIN — Medication 1000 UNITS: at 08:46

## 2022-05-11 RX ADMIN — PIPERACILLIN AND TAZOBACTAM 2.25 G: 2; .25 INJECTION, POWDER, LYOPHILIZED, FOR SOLUTION INTRAVENOUS at 20:07

## 2022-05-11 RX ADMIN — HEPARIN SODIUM 5000 UNITS: 5000 INJECTION INTRAVENOUS; SUBCUTANEOUS at 13:25

## 2022-05-11 RX ADMIN — Medication 250 MG: at 13:11

## 2022-05-11 RX ADMIN — PIPERACILLIN AND TAZOBACTAM 2.25 G: 2; .25 INJECTION, POWDER, LYOPHILIZED, FOR SOLUTION INTRAVENOUS at 13:12

## 2022-05-11 RX ADMIN — Medication 250 MG: at 17:21

## 2022-05-11 RX ADMIN — GABAPENTIN 300 MG: 300 CAPSULE ORAL at 17:19

## 2022-05-11 RX ADMIN — ASPIRIN 81 MG: 81 TABLET, COATED ORAL at 08:46

## 2022-05-11 RX ADMIN — PRAVASTATIN SODIUM 20 MG: 20 TABLET ORAL at 08:46

## 2022-05-11 RX ADMIN — ACETAMINOPHEN 650 MG: 325 TABLET ORAL at 08:53

## 2022-05-11 RX ADMIN — Medication 1 TABLET: at 17:19

## 2022-05-11 RX ADMIN — HYDROCHLOROTHIAZIDE 12.5 MG: 12.5 TABLET ORAL at 13:12

## 2022-05-11 RX ADMIN — PANTOPRAZOLE SODIUM 40 MG: 40 TABLET, DELAYED RELEASE ORAL at 05:46

## 2022-05-11 RX ADMIN — HEPARIN SODIUM 5000 UNITS: 5000 INJECTION INTRAVENOUS; SUBCUTANEOUS at 05:46

## 2022-05-11 RX ADMIN — PIPERACILLIN AND TAZOBACTAM 2.25 G: 2; .25 INJECTION, POWDER, LYOPHILIZED, FOR SOLUTION INTRAVENOUS at 01:13

## 2022-05-11 RX ADMIN — PIPERACILLIN AND TAZOBACTAM 2.25 G: 2; .25 INJECTION, POWDER, LYOPHILIZED, FOR SOLUTION INTRAVENOUS at 08:45

## 2022-05-11 RX ADMIN — Medication 1 TABLET: at 08:46

## 2022-05-11 NOTE — ASSESSMENT & PLAN NOTE
Wt Readings from Last 3 Encounters:   05/11/22 71 9 kg (158 lb 8 2 oz)   08/11/21 68 8 kg (151 lb 9 6 oz)   08/03/21 70 3 kg (155 lb)     · Hypovolemic on exam  · Received 30 cc milligram per kilogram of IVF due to meeting severe sepsis  · Prior echo July 2021 EF 50-55 percent, grade 1 diastolic dysfunction  · Monitor I/O and daily weights  · Continue to monitor for signs fluid overload

## 2022-05-11 NOTE — ASSESSMENT & PLAN NOTE
· Creatinine 1 46 on admission   · Baseline appears to be about 0 7-0 8  · Creatinine this morning is 0 79  · Patient appears dry on exam, met severe sepsis  · Received 30 cc/kilogram in IVF   · Avoid hypotension/nephrotoxins medications  · Resolved  · Will restart hydrochlorothiazide  · Trend BMP

## 2022-05-11 NOTE — ASSESSMENT & PLAN NOTE
· Met sepsis due to fever (100 8), tachycardia, tachypnea, WBC 24 58  · Signs of pneumonia on chest x-ray  · Lactic 3 6-2 1  · Received 30 cc/kilogram in IVF  · Procalcitonin 42 72- 36 5-10 7  · COVID/flu/RSV and UA negative  · Continue on IV Zosyn  · ESBL history  · Blood cultures are negative to date  · Urine for Streptococcus and Legionella is negative

## 2022-05-11 NOTE — ASSESSMENT & PLAN NOTE
· Generalized weakness, decreased appetite and nonproductive cough  · Met sepsis criteria  Received 30 cc/kilogram in IVF  · Continue on IV Zosyn  · Respiratory protocol   · Trend WBC and fever curve  · Procalcitonin is 36 5-23 3-10 7    Trend procalcitonin  · Blood cultures are negative to date

## 2022-05-11 NOTE — PLAN OF CARE
Problem: Potential for Falls  Goal: Patient will remain free of falls  Description: INTERVENTIONS:  - Educate patient/family on patient safety including physical limitations  - Instruct patient to call for assistance with activity   - Consult OT/PT to assist with strengthening/mobility   - Keep Call bell within reach  - Keep bed low and locked with side rails adjusted as appropriate  - Keep care items and personal belongings within reach  - Initiate and maintain comfort rounds  - Make Fall Risk Sign visible to staff  - Offer Toileting every 2 hours  - Initiate/Maintain bed/chair alarm  - Obtain necessary fall risk management equipment:  - Apply yellow socks and bracelet for high fall risk patients  - Consider moving patient to room near nurses station  5/11/2022 0129 by Blayne Ivey RN  Outcome: Progressing  5/10/2022 2325 by Blayne Ivey RN  Outcome: Progressing     Problem: MOBILITY - ADULT  Goal: Maintain or return to baseline ADL function  Description: INTERVENTIONS:  -  Assess patient's ability to carry out ADLs; assess patient's baseline for ADL function and identify physical deficits which impact ability to perform ADLs (bathing, care of mouth/teeth, toileting, grooming, dressing, etc )  - Assess/evaluate cause of self-care deficits   - Assess range of motion  - Assess patient's mobility; develop plan if impaired  - Assess patient's need for assistive devices and provide as appropriate  - Encourage maximum independence but intervene and supervise when necessary  - Involve family in performance of ADLs  - Assess for home care needs following discharge   - Consider OT consult to assist with ADL evaluation and planning for discharge  - Provide patient education as appropriate  5/11/2022 0129 by Blayne Ivey RN  Outcome: Progressing  5/10/2022 2325 by Blayne Ivey RN  Outcome: Progressing  Goal: Maintains/Returns to pre admission functional level  Description: INTERVENTIONS:  - Perform BMAT or MOVE assessment daily    - Set and communicate daily mobility goal to care team and patient/family/caregiver  - Collaborate with rehabilitation services on mobility goals if consulted  - Perform Range of Motion 2 times a day  - Reposition patient every 2 hours    - Dangle patient 2 times a day  - Stand patient 2 times a day  - Ambulate patient 2 times a day  - Out of bed to chair 2 times a day   - Out of bed for meals 2 times a day  - Out of bed for toileting  - Record patient progress and toleration of activity level   5/11/2022 0129 by Odette Gomez RN  Outcome: Progressing  5/10/2022 2325 by Odette Gomez RN  Outcome: Progressing     Problem: Prexisting or High Potential for Compromised Skin Integrity  Goal: Skin integrity is maintained or improved  Description: INTERVENTIONS:  - Identify patients at risk for skin breakdown  - Assess and monitor skin integrity  - Assess and monitor nutrition and hydration status  - Monitor labs   - Assess for incontinence   - Turn and reposition patient  - Assist with mobility/ambulation  - Relieve pressure over bony prominences  - Avoid friction and shearing  - Provide appropriate hygiene as needed including keeping skin clean and dry  - Evaluate need for skin moisturizer/barrier cream  - Collaborate with interdisciplinary team   - Patient/family teaching  - Consider wound care consult   Outcome: Progressing

## 2022-05-11 NOTE — PROGRESS NOTES
[place]  Progress Note - Matt Barbour 1/28/1930, 80 y o  female MRN: [id number]  Unit/Bed#: -01 Encounter: [id number]  Primary Care Provider: Arnulfo Moore MD   Date and time admitted to hospital: 5/8/2022  5:28 PM    Chronic diastolic heart failure [facility])  Assessment & Plan  Wt Readings from Last 3 Encounters:   05/11/22 71 9 kg (158 lb 8 2 oz)   08/11/21 68 8 kg (151 lb 9 6 oz)   08/03/21 70 3 kg (155 lb)     · Hypovolemic on exam  · Received 30 cc milligram per kilogram of IVF due to meeting severe sepsis  · Prior echo July 2021 EF 50-55 percent, grade 1 diastolic dysfunction  · Monitor I/O and daily weights  · Continue to monitor for signs fluid overload  SHERLYN (acute kidney injury) ([facility] [address] )  Assessment & Plan  · Creatinine 1 46 on admission   · Baseline appears to be about 0 7-0 8  · Creatinine this morning is 0 79  · Patient appears dry on exam, met severe sepsis  · Received 30 cc/kilogram in IVF   · Avoid hypotension/nephrotoxins medications  · Resolved  · Will restart hydrochlorothiazide  · Trend BMP    Generalized weakness  Assessment & Plan  · Presented with 1 day of generalized weakness  Denies fall or injury  · Weakness most likely secondary to sepsis  · Fall precautions   · P T  OT and  consult appreciated  · Recommended home with home healthcare    Hyperlipidemia  Assessment & Plan  · Continue statin    Pneumonia  Assessment & Plan  · Generalized weakness, decreased appetite and nonproductive cough  · Met sepsis criteria  Received 30 cc/kilogram in IVF  · Continue on IV Zosyn  · Respiratory protocol   · Trend WBC and fever curve  · Procalcitonin is 36 5-23 3-10 7    Trend procalcitonin  · Blood cultures are negative to date    Gastroesophageal reflux disease without esophagitis  Assessment & Plan  · Continue PPI    Hypertension  Assessment & Plan  · Home regimen: aspirin and HCTZ 12 5 milligram daily  · Will start on hydrochlorothiazide  · Monitor vitals as per protocol    * Sepsis ([facility] [address] )  Assessment & Plan  · Met sepsis due to fever (100 8), tachycardia, tachypnea, WBC 24 58  · Signs of pneumonia on chest x-ray  · Lactic 3 6-2 1  · Received 30 cc/kilogram in IVF  · Procalcitonin 42 72- 36 5-10 7  · COVID/flu/RSV and UA negative  · Continue on IV Zosyn  · ESBL history  · Blood cultures are negative to date  · Urine for Streptococcus and Legionella is negative        Labs & Imaging: I have personally reviewed pertinent reports  VTE Prophylaxis: in place  Code Status:   Level 3 - DNAR and DNI    Patient Centered Rounds: I have performed bedside rounds with nursing staff today  Discussions with Specialists or Other Care Team Provider:      Education and Discussions with Family / Patient:  Daughter Alisia Fisher    Current Length of Stay: 3 day(s)    Current Patient Status: Inpatient   Certification Statement: The patient will continue to require additional inpatient hospital stay due to see my assessment and plan  Subjective:   Patient is seen and examined at bedside  Feels weak and tired  Had an episode of loose stool this morning  Afebrile  All other ROS are negative  Objective:    Vitals: Blood pressure (!) 175/76, pulse 75, temperature 98 °F (36 7 °C), temperature source Oral, resp  rate 18, height 5' 2" (1 575 m), weight 71 9 kg (158 lb 8 2 oz), SpO2 97 %  ,Body mass index is 28 99 kg/m²  SPO2 RA Rest    Flowsheet Row ED to Hosp-Admission (Current) from 5/8/2022 in [address] Med Surg Unit   SpO2 97 %   SpO2 Activity At Rest   O2 Device None (Room air)   O2 Flow Rate --        I&O:     Intake/Output Summary (Last 24 hours) at 5/11/2022 1203  Last data filed at 5/11/2022 1019  Gross per 24 hour   Intake --   Output 500 ml   Net -500 ml       Physical Exam:    General- Alert, lying comfortably in bed  Not in any acute distress    Neck- Supple, No JVD  CVS- regular, S1 and S2 normal  Chest- Bilateral Air entry, decreased at bases  Abdomen- soft, nontender, not distended, no guarding or rigidity, BS+  Extremities-  No pedal edema, No calf tenderness                         Normal ROM in all extremities  CNS-   Alert, awake and orientedx3  No focal deficits present      Invasive Devices  Report    Peripheral Intravenous Line  Duration           Peripheral IV 05/10/22 Right;Ventral (anterior) Forearm 1 day                      Social History  reviewed  Family History   Problem Relation Age of Onset    Stroke Mother     Stroke Father     Heart disease Daughter     Substance Abuse Neg Hx     Mental illness Neg Hx     reviewed    Meds:  Current Facility-Administered Medications   Medication Dose Route Frequency Provider Last Rate Last Admin    acetaminophen (TYLENOL) tablet 650 mg  650 mg Oral Q6H PRN Benita Sylvester PA-C   650 mg at 05/11/22 0853    aspirin (ECOTRIN LOW STRENGTH) EC tablet 81 mg  81 mg Oral Daily Patricia Saldana PA-C   81 mg at 05/11/22 0846    benzonatate (TESSALON PERLES) capsule 100 mg  100 mg Oral TID PRN Benita Sylvester PA-C        cholecalciferol (VITAMIN D3) tablet 1,000 Units  1,000 Units Oral Daily Patricia Saldana PA-C   1,000 Units at 05/11/22 0846    gabapentin (NEURONTIN) capsule 300 mg  300 mg Oral BID Benita Sylvester PA-C   300 mg at 05/11/22 0846    heparin (porcine) subcutaneous injection 5,000 Units  5,000 Units Subcutaneous [facility] Patricia Saldana PA-C   5,000 Units at 05/11/22 0546    hydrochlorothiazide (HYDRODIURIL) tablet 12 5 mg  12 5 mg Oral Daily Vijaya Maynard MD        multivitamin-minerals (CENTRUM) tablet 1 tablet  1 tablet Oral BID Bentia Sylvester PA-C   1 tablet at 05/11/22 0846    pantoprazole (PROTONIX) EC tablet 40 mg  40 mg Oral Early Morning Patricia Saldana PA-C   40 mg at 05/11/22 0546    piperacillin-tazobactam (ZOSYN) 2 25 g in sodium chloride 0 9 % 50 mL IVPB  2 25 g Intravenous Q6H Patricia Saldana PA-C 100 mL/hr at 05/11/22 0845 2 25 g at 05/11/22 0845    pravastatin (PRAVACHOL) tablet 20 mg  20 mg Oral Daily Jovita Andrews PA-C   20 mg at 05/11/22 0846      Medications Prior to Admission   Medication    aspirin (ECOTRIN LOW STRENGTH) 81 mg EC tablet    Cholecalciferol (VITAMIN D3) 1000 units CAPS    Cyanocobalamin (VITAMIN B12 PO)    gabapentin (NEURONTIN) 600 MG tablet    hydrochlorothiazide (HYDRODIURIL) 12 5 mg tablet    Multiple Vitamins-Minerals (PRESERVISION AREDS PO)    omeprazole (PriLOSEC) 20 mg delayed release capsule    pravastatin (PRAVACHOL) 20 mg tablet    nitrofurantoin (MACROBID) 100 mg capsule       Labs:  Results from last 7 days   Lab Units 05/11/22  0546 05/10/22  0250 05/09/22  0252   WBC Thousand/uL 18 29* 23 02* 25 44*   HEMOGLOBIN g/dL 9 6* 9 3* 9 3*   HEMATOCRIT % 29 6* 29 3* 29 8*   PLATELETS Thousands/uL 192 183 168   NEUTROS PCT % 83*  --   --    LYMPHS PCT % 10*  --   --    LYMPHO PCT %  --  6* 9*   MONOS PCT % 6  --   --    MONO PCT %  --  4 0*   EOS PCT % 0 0 0     Results from last 7 days   Lab Units 05/11/22  0546 05/10/22  0250 05/09/22  0252 05/08/22  1738   POTASSIUM mmol/L 4 0 3 8 3 6 4 3   CHLORIDE mmol/L 106 106 105 102   CO2 mmol/L 25 28 29 28   BUN mg/dL 18 29* 33* 33*   CREATININE mg/dL 0 79 0 98 1 04 1 46*   CALCIUM mg/dL 8 0* 8 1* 7 9* 8 8   ALK PHOS U/L  --   --  62 70   ALT U/L  --   --  16 21   AST U/L  --   --  48* 52*     Lab Results   Component Value Date    TROPONINI 0 04 12/06/2020    TROPONINI 0 04 12/05/2020    TROPONINI 0 04 12/05/2020    CKTOTAL 46 12/05/2020    CKTOTAL 112 01/26/2014     Results from last 7 days   Lab Units 05/08/22  1823   INR  1 24*     Lab Results   Component Value Date    BLOODCX No Growth at 48 hrs  05/08/2022    BLOODCX No Growth at 48 hrs  05/08/2022    BLOODCX No Growth After 5 Days   11/26/2018    URINECX >100,000 cfu/ml Escherichia coli (A) 10/22/2019    URINECX >100,000 cfu/ml Escherichia coli (A) 07/05/2019    URINECX 70,000-79,000 cfu/ml Enterococcus faecalis (A) 06/15/2019    URINECX 20,000-29,000 cfu/ml  06/15/2019 SPUTUMCULTUR 1+ Growth of Pseudomonas aeruginosa (A) 05/23/2018    SPUTUMCULTUR 1+ Growth of  05/23/2018         Imaging:  Results for orders placed during the hospital encounter of 05/08/22    XR chest 1 view portable    Narrative  CHEST    INDICATION:   Fever, cough  COMPARISON:  Two-view chest 6/22/2021    EXAM PERFORMED/VIEWS:  XR CHEST PORTABLE      FINDINGS:    Normal cardiac silhouette  Aortic calcification is present  Moderate to large hiatal hernia  Small patchy infiltrates in the left lower lobe  No pneumothorax, pulmonary edema, or large pleural effusion  Mild degenerative changes of the spine  Subcentimeter calcifications adjacent to bilateral humeral heads may represent dystrophic calcification or calcific tendinitis in the appropriate clinical context  Impression  Left lower lobe infiltrates attributed to pneumonia given the clinical history  This study demonstrates a significant  finding and was documented as such in [facility] for liaison and referring practitioner notification  Workstation performed: [id number]    Results for orders placed during the hospital encounter of 06/22/21    XR chest pa & lateral    Narrative  CHEST    INDICATION:   R60 0: Localized edema  Covid positive on 12/5/2020  COMPARISON: Chest radiograph from 12/5/2020 and chest CT from 11/26/2018  EXAM PERFORMED/VIEWS:  XR CHEST PA & LATERAL  DUAL ENERGY SUBTRACTION  FINDINGS:    Cardiomediastinal silhouette normal  Moderate hiatal hernia  Mild bilateral groundglass opacity  No effusion or pneumothorax  Osseous structures normal for age  Impression  Mild bilateral groundglass opacity, question interstitial edema  Moderate hiatal hernia            Workstation performed: [id number]      Last 24 Hours Medication List:   Current Facility-Administered Medications   Medication Dose Route Frequency Provider Last Rate    acetaminophen  650 mg Oral Q6H PRN Malon Bence, PA-C      aspirin 81 mg Oral Daily Tony Climannette, PA-C      benzonatate  100 mg Oral TID PRN Tony Climes, PA-C      cholecalciferol  1,000 Units Oral Daily Tony Climes, PA-C      gabapentin  300 mg Oral BID Tony Climannette, PA-UMA      heparin (porcine)  5,000 Units Subcutaneous [facility] Tony Climannette, PA-UMA      hydrochlorothiazide  12 5 mg Oral Daily Shreya Guido MD      multivitamin-minerals  1 tablet Oral BID Tony Climannette, PA-UMA      pantoprazole  40 mg Oral Early Morning Tony Climannette, PA-UMA      piperacillin-tazobactam  2 25 g Intravenous Q6H Patricia Saldana PA-C 2 25 g (05/11/22 0845)    pravastatin  20 mg Oral Daily Tony Rodriguez PA-C          Today, Patient Was Seen By: Shreya Guido MD    ** Please Note: Dictation voice to text software may have been used in the creation of this document   **

## 2022-05-11 NOTE — ASSESSMENT & PLAN NOTE
· Home regimen: aspirin and HCTZ 12 5 milligram daily  · Will start on hydrochlorothiazide  · Monitor vitals as per protocol

## 2022-05-12 LAB
ANION GAP SERPL CALCULATED.3IONS-SCNC: 8 MMOL/L (ref 4–13)
BASOPHILS # BLD AUTO: 0.03 THOUSANDS/ΜL (ref 0–0.1)
BASOPHILS NFR BLD AUTO: 0 % (ref 0–1)
BUN SERPL-MCNC: 13 MG/DL (ref 5–25)
CALCIUM SERPL-MCNC: 8.6 MG/DL (ref 8.3–10.1)
CHLORIDE SERPL-SCNC: 103 MMOL/L (ref 100–108)
CO2 SERPL-SCNC: 27 MMOL/L (ref 21–32)
CREAT SERPL-MCNC: 0.79 MG/DL (ref 0.6–1.3)
EOSINOPHIL # BLD AUTO: 0.07 THOUSAND/ΜL (ref 0–0.61)
EOSINOPHIL NFR BLD AUTO: 1 % (ref 0–6)
ERYTHROCYTE [DISTWIDTH] IN BLOOD BY AUTOMATED COUNT: 13.4 % (ref 11.6–15.1)
GFR SERPL CREATININE-BSD FRML MDRD: 65 ML/MIN/1.73SQ M
GLUCOSE SERPL-MCNC: 94 MG/DL (ref 65–140)
HCT VFR BLD AUTO: 31.9 % (ref 34.8–46.1)
HGB BLD-MCNC: 10 G/DL (ref 11.5–15.4)
IMM GRANULOCYTES # BLD AUTO: 0.28 THOUSAND/UL (ref 0–0.2)
IMM GRANULOCYTES NFR BLD AUTO: 2 % (ref 0–2)
LYMPHOCYTES # BLD AUTO: 1.93 THOUSANDS/ΜL (ref 0.6–4.47)
LYMPHOCYTES NFR BLD AUTO: 13 % (ref 14–44)
MCH RBC QN AUTO: 28.7 PG (ref 26.8–34.3)
MCHC RBC AUTO-ENTMCNC: 31.3 G/DL (ref 31.4–37.4)
MCV RBC AUTO: 91 FL (ref 82–98)
MONOCYTES # BLD AUTO: 1.1 THOUSAND/ΜL (ref 0.17–1.22)
MONOCYTES NFR BLD AUTO: 7 % (ref 4–12)
NEUTROPHILS # BLD AUTO: 12.07 THOUSANDS/ΜL (ref 1.85–7.62)
NEUTS SEG NFR BLD AUTO: 77 % (ref 43–75)
NRBC BLD AUTO-RTO: 0 /100 WBCS
PLATELET # BLD AUTO: 204 THOUSANDS/UL (ref 149–390)
PMV BLD AUTO: 9.3 FL (ref 8.9–12.7)
POTASSIUM SERPL-SCNC: 3.8 MMOL/L (ref 3.5–5.3)
PROCALCITONIN SERPL-MCNC: 6.18 NG/ML
RBC # BLD AUTO: 3.49 MILLION/UL (ref 3.81–5.12)
SODIUM SERPL-SCNC: 138 MMOL/L (ref 136–145)
WBC # BLD AUTO: 15.48 THOUSAND/UL (ref 4.31–10.16)

## 2022-05-12 PROCEDURE — 99232 SBSQ HOSP IP/OBS MODERATE 35: CPT | Performed by: INTERNAL MEDICINE

## 2022-05-12 PROCEDURE — 97535 SELF CARE MNGMENT TRAINING: CPT

## 2022-05-12 PROCEDURE — 85025 COMPLETE CBC W/AUTO DIFF WBC: CPT | Performed by: INTERNAL MEDICINE

## 2022-05-12 PROCEDURE — 97530 THERAPEUTIC ACTIVITIES: CPT

## 2022-05-12 PROCEDURE — 80048 BASIC METABOLIC PNL TOTAL CA: CPT | Performed by: INTERNAL MEDICINE

## 2022-05-12 PROCEDURE — 84145 PROCALCITONIN (PCT): CPT | Performed by: INTERNAL MEDICINE

## 2022-05-12 PROCEDURE — 97116 GAIT TRAINING THERAPY: CPT

## 2022-05-12 RX ORDER — HYDRALAZINE HYDROCHLORIDE 20 MG/ML
10 INJECTION INTRAMUSCULAR; INTRAVENOUS EVERY 6 HOURS PRN
Status: DISCONTINUED | OUTPATIENT
Start: 2022-05-12 | End: 2022-05-13 | Stop reason: HOSPADM

## 2022-05-12 RX ORDER — CEFTRIAXONE 1 G/50ML
1000 INJECTION, SOLUTION INTRAVENOUS EVERY 24 HOURS
Status: DISCONTINUED | OUTPATIENT
Start: 2022-05-12 | End: 2022-05-13 | Stop reason: HOSPADM

## 2022-05-12 RX ADMIN — ASPIRIN 81 MG: 81 TABLET, COATED ORAL at 08:21

## 2022-05-12 RX ADMIN — Medication 1 TABLET: at 08:21

## 2022-05-12 RX ADMIN — HEPARIN SODIUM 5000 UNITS: 5000 INJECTION INTRAVENOUS; SUBCUTANEOUS at 05:43

## 2022-05-12 RX ADMIN — Medication 250 MG: at 19:06

## 2022-05-12 RX ADMIN — GABAPENTIN 300 MG: 300 CAPSULE ORAL at 08:21

## 2022-05-12 RX ADMIN — PANTOPRAZOLE SODIUM 40 MG: 40 TABLET, DELAYED RELEASE ORAL at 05:42

## 2022-05-12 RX ADMIN — HYDROCHLOROTHIAZIDE 12.5 MG: 12.5 TABLET ORAL at 08:21

## 2022-05-12 RX ADMIN — Medication 250 MG: at 08:21

## 2022-05-12 RX ADMIN — GABAPENTIN 300 MG: 300 CAPSULE ORAL at 19:06

## 2022-05-12 RX ADMIN — CEFTRIAXONE 1000 MG: 1 INJECTION, SOLUTION INTRAVENOUS at 14:06

## 2022-05-12 RX ADMIN — PRAVASTATIN SODIUM 20 MG: 20 TABLET ORAL at 08:21

## 2022-05-12 RX ADMIN — HEPARIN SODIUM 5000 UNITS: 5000 INJECTION INTRAVENOUS; SUBCUTANEOUS at 21:50

## 2022-05-12 RX ADMIN — HEPARIN SODIUM 5000 UNITS: 5000 INJECTION INTRAVENOUS; SUBCUTANEOUS at 00:10

## 2022-05-12 RX ADMIN — HYDRALAZINE HYDROCHLORIDE 10 MG: 20 INJECTION, SOLUTION INTRAMUSCULAR; INTRAVENOUS at 16:46

## 2022-05-12 RX ADMIN — ACETAMINOPHEN 650 MG: 325 TABLET ORAL at 19:06

## 2022-05-12 RX ADMIN — Medication 1 TABLET: at 19:06

## 2022-05-12 RX ADMIN — ACETAMINOPHEN 650 MG: 325 TABLET ORAL at 00:16

## 2022-05-12 RX ADMIN — Medication 1000 UNITS: at 08:21

## 2022-05-12 RX ADMIN — PIPERACILLIN AND TAZOBACTAM 2.25 G: 2; .25 INJECTION, POWDER, LYOPHILIZED, FOR SOLUTION INTRAVENOUS at 00:10

## 2022-05-12 RX ADMIN — HEPARIN SODIUM 5000 UNITS: 5000 INJECTION INTRAVENOUS; SUBCUTANEOUS at 14:06

## 2022-05-12 RX ADMIN — PIPERACILLIN AND TAZOBACTAM 2.25 G: 2; .25 INJECTION, POWDER, LYOPHILIZED, FOR SOLUTION INTRAVENOUS at 08:21

## 2022-05-12 NOTE — PROGRESS NOTES
[place]  Progress Note - Susan So 1/28/1930, 80 y o  female MRN: [id number]  Unit/Bed#: -01 Encounter: [id number]  Primary Care Provider: Sid Alvarez MD   Date and time admitted to hospital: 5/8/2022  5:28 PM    Chronic diastolic heart failure [facility])  Assessment & Plan  Wt Readings from Last 3 Encounters:   05/12/22 71 4 kg (157 lb 6 5 oz)   08/11/21 68 8 kg (151 lb 9 6 oz)   08/03/21 70 3 kg (155 lb)     · Hypovolemic on exam  · Received 30 cc milligram per kilogram of IVF due to meeting severe sepsis  · Prior echo July 2021 EF 50-55 percent, grade 1 diastolic dysfunction  · Monitor I/O and daily weights  · On hydrochlorothiazide  · Continue to monitor for signs fluid overload  SHERLYN (acute kidney injury) ([facility] [address] )  Assessment & Plan  · Creatinine 1 46 on admission   · Baseline appears to be about 0 7-0 8  · Creatinine this morning is 0 79  · Patient appears dry on exam, met severe sepsis  · Received 30 cc/kilogram in IVF   · Avoid hypotension/nephrotoxins medications  · Resolved  · Trend BMP    Generalized weakness  Assessment & Plan  · Presented with 1 day of generalized weakness  Denies fall or injury  · Weakness most likely secondary to sepsis  · Fall precautions   · P T  OT and  consult appreciated  · Recommended home with home healthcare    Hyperlipidemia  Assessment & Plan  · Continue statin    Pneumonia  Assessment & Plan  · Generalized weakness, decreased appetite and nonproductive cough  · Met sepsis criteria    Received 30 cc/kilogram in IVF  · Continue on IV Zosyn  · Respiratory protocol   · Trend WBC and fever curve  · WBC trending down  · Blood cultures are negative to date    Gastroesophageal reflux disease without esophagitis  Assessment & Plan  · Continue PPI    Hypertension  Assessment & Plan  · Home regimen: aspirin and HCTZ 12 5 milligram daily  · Continue on hydrochlorothiazide  · Monitor vitals as per protocol    * Sepsis ([facility] [address] )  Assessment & Plan  · Met sepsis due to fever (100 8), tachycardia, tachypnea, WBC 24 58  · Signs of pneumonia on chest x-ray  · Lactic 3 6-2 1  · Received 30 cc/kilogram in IVF  · Procalcitonin 42 72- 36 5-10 7-6 1  · COVID/flu/RSV and UA negative  · Continue on IV Zosyn  · ESBL history  · Blood cultures are negative to date  · Urine for Streptococcus and Legionella is negative  · Patient is having semi loose stools  Will order stool for culture      Labs & Imaging: I have personally reviewed pertinent reports  VTE Prophylaxis: in place  Code Status:   Level 3 - DNAR and DNI    Patient Centered Rounds: I have performed bedside rounds with nursing staff today  Discussions with Specialists or Other Care Team Provider: CM    Education and Discussions with Family / Patient:  Yo Mcmillan-no response    Current Length of Stay: 4 day(s)    Current Patient Status: Inpatient   Certification Statement: The patient will continue to require additional inpatient hospital stay due to see my assessment and plan  Subjective:   Patient is seen and examined at bedside  Patient had 3 semi loose stools last night  Feels weak and tired  Cough is improving  Denies any shortness of breath  Afebrile  All other ROS are negative  Objective:    Vitals: Blood pressure 163/70, pulse 75, temperature 98 °F (36 7 °C), temperature source Oral, resp  rate (!) 25, height 5' 2" (1 575 m), weight 71 4 kg (157 lb 6 5 oz), SpO2 95 %  ,Body mass index is 28 79 kg/m²  SPO2 RA Rest    Flowsheet Row ED to Hosp-Admission (Current) from 5/8/2022 in [address] Med Surg [address]   SpO2 95 %   SpO2 Activity At Rest   O2 Device None (Room air)   O2 Flow Rate --        I&O:     Intake/Output Summary (Last 24 hours) at 5/12/2022 0738  Last data filed at 5/12/2022 0325  Gross per 24 hour   Intake 150 ml   Output 600 ml   Net -450 ml       Physical Exam:    General- Alert, lying comfortably in bed  Not in any acute distress    Neck- Supple, No JVD  CVS- regular, S1 and S2 normal  Chest- Bilateral Air entry, some left-sided rhonchi present  Abdomen- soft, nontender, not distended, no guarding or rigidity, BS+  Extremities-  No pedal edema, No calf tenderness  Moving all 4 extremities  CNS-   Alert, awake and orientedx3  No focal deficits present      Invasive Devices  Report    Peripheral Intravenous Line  Duration           Peripheral IV 05/10/22 Right;Ventral (anterior) Forearm 2 days                      Social History  reviewed  Family History   Problem Relation Age of Onset    Stroke Mother     Stroke Father     Heart disease Daughter     Substance Abuse Neg Hx     Mental illness Neg Hx     reviewed    Meds:  Current Facility-Administered Medications   Medication Dose Route Frequency Provider Last Rate Last Admin    acetaminophen (TYLENOL) tablet 650 mg  650 mg Oral Q6H PRN Jazz Dumont PA-C   650 mg at 05/12/22 0016    aspirin (ECOTRIN LOW STRENGTH) EC tablet 81 mg  81 mg Oral Daily Patricia Saldana PA-C   81 mg at 05/11/22 0846    benzonatate (TESSALON PERLES) capsule 100 mg  100 mg Oral TID PRN Jazz Dumont PA-C        cholecalciferol (VITAMIN D3) tablet 1,000 Units  1,000 Units Oral Daily Patricia Saldana PA-C   1,000 Units at 05/11/22 0846    gabapentin (NEURONTIN) capsule 300 mg  300 mg Oral BID Jazz Dumont PA-C   300 mg at 05/11/22 1719    heparin (porcine) subcutaneous injection 5,000 Units  5,000 Units Subcutaneous Q8H [address] Patricia Saldana PA-C   5,000 Units at 05/12/22 0543    hydrochlorothiazide (HYDRODIURIL) tablet 12 5 mg  12 5 mg Oral Daily Aileen Luu MD   12 5 mg at 05/11/22 1312    multivitamin-minerals (CENTRUM) tablet 1 tablet  1 tablet Oral BID Jazz Dumont PA-C   1 tablet at 05/11/22 1719    pantoprazole (PROTONIX) EC tablet 40 mg  40 mg Oral Early Morning Patricia Saldana PA-C   40 mg at 05/12/22 0542    piperacillin-tazobactam (ZOSYN) 2 25 g in sodium chloride 0 9 % 50 mL IVPB  2 25 g Intravenous Q6H Patricia Saldana PA-C 100 mL/hr at 05/12/22 0010 2 25 g at 05/12/22 0010    pravastatin (PRAVACHOL) tablet 20 mg  20 mg Oral Daily Khadijah Guevara PA-C   20 mg at 05/11/22 3963    saccharomyces boulardii (FLORASTOR) capsule 250 mg  250 mg Oral BID Edgar Harper MD   250 mg at 05/11/22 1721      Medications Prior to Admission   Medication    aspirin (ECOTRIN LOW STRENGTH) 81 mg EC tablet    Cholecalciferol (VITAMIN D3) 1000 units CAPS    Cyanocobalamin (VITAMIN B12 PO)    gabapentin (NEURONTIN) 600 MG tablet    hydrochlorothiazide (HYDRODIURIL) 12 5 mg tablet    Multiple Vitamins-Minerals (PRESERVISION AREDS PO)    omeprazole (PriLOSEC) 20 mg delayed release capsule    pravastatin (PRAVACHOL) 20 mg tablet    nitrofurantoin (MACROBID) 100 mg capsule       Labs:  Results from last 7 days   Lab Units 05/12/22  0326 05/11/22  0546 05/10/22  0250   WBC Thousand/uL 15 48* 18 29* 23 02*   HEMOGLOBIN g/dL 10 0* 9 6* 9 3*   HEMATOCRIT % 31 9* 29 6* 29 3*   PLATELETS Thousands/uL 204 192 183   NEUTROS PCT % 77* 83*  --    LYMPHS PCT % 13* 10*  --    LYMPHO PCT %  --   --  6*   MONOS PCT % 7 6  --    MONO PCT %  --   --  4   EOS PCT % 1 0 0     Results from last 7 days   Lab Units 05/12/22  0326 05/11/22  0546 05/10/22  0250 05/09/22  0252 05/08/22  1738   POTASSIUM mmol/L 3 8 4 0 3 8 3 6 4 3   CHLORIDE mmol/L 103 106 106 105 102   CO2 mmol/L 27 25 28 29 28   BUN mg/dL 13 18 29* 33* 33*   CREATININE mg/dL 0 79 0 79 0 98 1 04 1 46*   CALCIUM mg/dL 8 6 8 0* 8 1* 7 9* 8 8   ALK PHOS U/L  --   --   --  62 70   ALT U/L  --   --   --  16 21   AST U/L  --   --   --  48* 52*     Lab Results   Component Value Date    TROPONINI 0 04 12/06/2020    TROPONINI 0 04 12/05/2020    TROPONINI 0 04 12/05/2020    CKTOTAL 46 12/05/2020    CKTOTAL 112 01/26/2014     Results from last 7 days   Lab Units 05/08/22  1823   INR  1 24*     Lab Results   Component Value Date    BLOODCX No Growth at 72 hrs  05/08/2022    BLOODCX No Growth at 72 hrs  05/08/2022    Merari Link No Growth After 5 Days  11/26/2018    URINECX >100,000 cfu/ml Escherichia coli (A) 10/22/2019    URINECX >100,000 cfu/ml Escherichia coli (A) 07/05/2019    URINECX 70,000-79,000 cfu/ml Enterococcus faecalis (A) 06/15/2019    URINECX 20,000-29,000 cfu/ml  06/15/2019    SPUTUMCULTUR 1+ Growth of Pseudomonas aeruginosa (A) 05/23/2018    SPUTUMCULTUR 1+ Growth of  05/23/2018         Imaging:  Results for orders placed during the hospital encounter of 05/08/22    XR chest portable    Narrative  CHEST    INDICATION:   SOB     COMPARISON:  5/8/2022    EXAM PERFORMED/VIEWS:  XR CHEST PORTABLE      FINDINGS:    Cardiomediastinal silhouette appears stable  Persistent left lower lobe consolidation consistent with pneumonia  No pleural effusion or pneumothorax  Osseous structures appear within normal limits for patient age  Impression  Persistent left lower lobe consolidation consistent with pneumonia  Workstation performed: [id number]    Results for orders placed during the hospital encounter of 06/22/21    XR chest pa & lateral    Narrative  CHEST    INDICATION:   R60 0: Localized edema  Covid positive on 12/5/2020  COMPARISON: Chest radiograph from 12/5/2020 and chest CT from 11/26/2018  EXAM PERFORMED/VIEWS:  XR CHEST PA & LATERAL  DUAL ENERGY SUBTRACTION  FINDINGS:    Cardiomediastinal silhouette normal  Moderate hiatal hernia  Mild bilateral groundglass opacity  No effusion or pneumothorax  Osseous structures normal for age  Impression  Mild bilateral groundglass opacity, question interstitial edema  Moderate hiatal hernia            Workstation performed: [id number]      Last 24 Hours Medication List:   Current Facility-Administered Medications   Medication Dose Route Frequency Provider Last Rate    acetaminophen  650 mg Oral Q6H PRN Monica Soriano PA-C      aspirin  81 mg Oral Daily Monica Soriano PA-C      benzonatate  100 mg Oral TID PRN Monica Soriano PA-C      cholecalciferol 1,000 Units Oral Daily Jovanal Kathy, PA-C      gabapentin  300 mg Oral BID Jamil Chavez, PA-C      heparin (porcine)  5,000 Units Subcutaneous [facility] Jamil Chavez, PA-C      hydrochlorothiazide  12 5 mg Oral Daily Rhianna Cooper MD      multivitamin-minerals  1 tablet Oral BID Jamil Chavez, PA-C      pantoprazole  40 mg Oral Early Morning MARIA A Terrazas-C      piperacillin-tazobactam  2 25 g Intravenous Q6H Patricia Saldana PA-C 2 25 g (05/12/22 0010)    pravastatin  20 mg Oral Daily Patricia Saldana PA-C      saccharomyces boulardii  250 mg Oral BID Rhianna Cooper MD          Today, Patient Was Seen By: Rhianna Cooper MD    ** Please Note: Dictation voice to text software may have been used in the creation of this document   **

## 2022-05-12 NOTE — ASSESSMENT & PLAN NOTE
· Generalized weakness, decreased appetite and nonproductive cough  · Met sepsis criteria    Received 30 cc/kilogram in IVF  · Continue on IV Zosyn  · Respiratory protocol   · Trend WBC and fever curve  · WBC trending down  · Blood cultures are negative to date

## 2022-05-12 NOTE — PHYSICAL THERAPY NOTE
PT tx   05/12/22 1410   PT Last Visit   PT Visit Date 05/12/22   Note Type   Note Type Treatment   Pain Assessment   Pain Assessment Tool 0-10   Pain Score No Pain   Restrictions/Precautions   Weight Bearing Precautions Per Order No   General   Chart Reviewed Yes   Additional Pertinent History antibitoic changed   Cognition   Overall Cognitive Status WFL   Attention Within functional limits   Orientation Level Oriented to person   Memory Within functional limits   Following Commands Follows one step commands without difficulty   Subjective   Subjective hopes to stop diarrhea does not need BR at present   Bed Mobility   Supine to Sit 5  Supervision   Transfers   Sit to Stand 5  Supervision   Stand to Sit 5  Supervision   Stand pivot 5  Supervision   Additional items   (rw)   Ambulation/Elevation   Gait pattern WNL   Gait Assistance 5  Supervision   Assistive Device Rolling walker   Distance 50',25'   Balance   Static Sitting Good   Dynamic Sitting Good   Static Standing Good   Dynamic Standing Good   Ambulatory Good   Endurance Deficit   Endurance Deficit No   Activity Tolerance   Activity Tolerance Patient tolerated treatment well   Nurse Made Aware KATY Jose   Assessment   Prognosis Good   Assessment Pt able to mobilize with rw in room  Remains on room air  Revewied IS  Oob in chair after session  Con't for 1 additional session to ensure pt mobilizing appropriately   Barriers to Discharge   (medical status)   Goals   Patient Goals get better   Plan   Treatment/Interventions ADL retraining;Functional transfer training; Therapeutic exercise;Gait training;Spoke to nursing;Spoke to case management;OT   Progress Progressing toward goals   PT Frequency 1-2x/wk   Recommendation   PT Discharge Recommendation Home with home health rehabilitation   [address]   Turning in Bed Without Bedrails 4   Lying on Back to Sitting on Edge of Flat Bed 4   Moving Bed to Chair 4   Standing Up From Chair 4   Walk in Room 4   Climb 3-5 Stairs 3   Basic Mobility Inpatient Raw Score 23   Basic Mobility Standardized Score 50 88   Highest Level Of Mobility   JH-HLM Goal 7: Walk 25 feet or more   JH-HLM Highest Level of Mobility 7: Walk 25 feet or more   JH-HLM Goal Achieved Yes   Manuelito Meyer, PT

## 2022-05-12 NOTE — OCCUPATIONAL THERAPY NOTE
Occupational Therapy Tx Note     Patient Name: Kelvin Christianson  GEXRE'L Date: 5/12/2022  Problem List  Principal Problem:    Sepsis [facility])  Active Problems:    Hypertension    Gastroesophageal reflux disease without esophagitis    Pneumonia    Hyperlipidemia    Generalized weakness    SHERLYN (acute kidney injury) ([facility] [address] )    Chronic diastolic heart failure ([facility] [address] )              05/12/22 1407   OT Last Visit   OT Visit Date 05/12/22   Note Type   Note Type Treatment   Restrictions/Precautions   Weight Bearing Precautions Per Order No   Other Precautions Contact/isolation   Pain Assessment   Pain Assessment Tool 0-10   Pain Score No Pain   ADL   Grooming Assistance 7  Independent   LB Dressing Assistance 5  Supervision/Setup   LB Dressing Deficit Thread RLE into pants; Thread LLE into pants;Pull up over hips   LB Dressing Comments Pt originally donned RLE first, proceeded to have difficulty threadig LLE  Pt states "I don't know why I put it on this leg first  I normally dress my other leg first " Instructed pt to start over and dress like she would at home  Pt proceeded to complete LB dressing with S while seated EOB     Bed Mobility   Supine to Sit 5  Supervision   Additional items Increased time required   Additional Comments Pt OOB in recliner at end of session   Transfers   Sit to Stand 5  Supervision   Additional items Increased time required   Stand to Sit 5  Supervision   Additional items Increased time required   Stand pivot 5  Supervision   Additional items Increased time required  (RW)   Functional Mobility   Functional Mobility 5  Supervision   Additional Comments Increased time   Additional items Rolling walker   Cognition   Overall Cognitive Status WFL   Arousal/Participation Alert   Attention Within functional limits   Orientation Level Oriented X4   Memory Within functional limits   Following Commands Follows all commands and directions without difficulty   Additional Activities   Additional Activities Comments Spirometer 5x   Activity Tolerance   Activity Tolerance Patient tolerated treatment well   Medical Staff Made Aware RN Sydney, PT Radha   Assessment   Assessment Pt seen for OT tx session with focus on functional balance, functional mobility, ADL status, and transfer safety  Patient agreeable to OT treatment session  Pt received supine in bed  Pt performed supine > sit EOB with S and increased time  Pt completed LB dressing with S & increased time  Pt completed all sit <> stands & functional mobility with S, RW & increased time  Pt performed grooming of hair independently seated in recliner  Practiced spirometer 5x  Patient continues to be functioning below baseline level, occupational performance remains limited secondary to factors listed above, and pt at increased risk for falls and injury  The patient's raw score on the AM-PAC Daily Activity inpatient short form is 22, standardized score is 47 1, greater than 39 4  Patients at this level are likely to benefit from DC to home  Please refer to the recommendation of the Occupational Therapist for safe DC planning  From OT standpoint, recommendation at time of d/c would be Home OT  Patient to benefit from continued Occupational Therapy treatment while in the hospital to address deficits as defined above and maximize level of functional independence with ADLs and functional mobility  Pt left seated in recliner with call bell in reach, tray table in reach, needs met, RN informed  Plan   Treatment Interventions ADL retraining;Functional transfer training;Patient/family training; Compensatory technique education; Energy conservation   Goal Expiration Date 05/19/22   OT Treatment Day 1   OT Frequency 1-2x/wk   Recommendation   OT Discharge Recommendation Home with home health rehabilitation   -[facility] Daily Activity Inpatient   Lower Body Dressing 3   Bathing 3   Toileting 4   Upper Body Dressing 4   Grooming 4   Eating 4   Daily Activity Raw Score 22   Daily Activity Standardized Score (Calc for Raw Score >=11) 47  [address] [facility]   Following a Speech/Presentation 4   Understanding Ordinary Conversation 4   Taking Medications 4   Remembering Where Things Are Placed or Put Away 4   Remembering List of 4-5 Errands 3     Alexsandra Rainey, OTR/L

## 2022-05-12 NOTE — ASSESSMENT & PLAN NOTE
· Home regimen: aspirin and HCTZ 12 5 milligram daily  · Continue on hydrochlorothiazide  · Monitor vitals as per protocol

## 2022-05-12 NOTE — CASE MANAGEMENT
Case Management Progress Note    Patient name Nany Reyes  Location [address] 232/-04 MRN [id number]  : 1930 Date 2022       LOS (days): 4  Geometric Mean LOS (GMLOS) (days): 4 80  Days to GMLOS:1        OBJECTIVE:        Current admission status: Inpatient  Preferred Pharmacy:   [facility] [address], [place] - [address]  [address]  [facility] [address]  Phone: [phone] Fax: [phone]    Primary Care Provider: Ambika Mcguire MD    Primary Insurance: MEDICARE  Secondary Insurance:     PROGRESS NOTE:  Notified by nursing that patients son has concerns about her being weak and unable to return home  PT/OT reevaluated patient and continues to recommend home with home services  Lester Robertson to follow at discharge  Called and spoke with patients daughter Sarah Manawa re: discharge plan  She also has concerns about her mother returning home and feeling weak  Explained that at this time patient does not qualify for SNF, but is set up with VNA/PT/OT  Deyanira plans on visiting [facility] to see how patient is feeling

## 2022-05-12 NOTE — PLAN OF CARE
Problem: OCCUPATIONAL THERAPY ADULT  Goal: Performs self-care activities at highest level of function for planned discharge setting  See evaluation for individualized goals  Description: Treatment Interventions: ADL retraining, Functional transfer training, Patient/family training, Compensatory technique education, Energy conservation          See flowsheet documentation for full assessment, interventions and recommendations  Outcome: Progressing  Note: Limitation: Decreased ADL status, Decreased UE strength, Decreased endurance, Decreased self-care trans, Decreased high-level ADLs  Prognosis: Good  Assessment: Pt seen for OT tx session with focus on functional balance, functional mobility, ADL status, and transfer safety  Patient agreeable to OT treatment session  Pt received supine in bed  Pt performed supine > sit EOB with S and increased time  Pt completed LB dressing with S & increased time  Pt completed all sit <> stands & functional mobility with S, RW & increased time  Pt performed grooming of hair independently seated in recliner  Practiced spirometer 5x  Patient continues to be functioning below baseline level, occupational performance remains limited secondary to factors listed above, and pt at increased risk for falls and injury  The patient's raw score on the AM-PAC Daily Activity inpatient short form is 22, standardized score is 47 1, greater than 39 4  Patients at this level are likely to benefit from DC to home  Please refer to the recommendation of the Occupational Therapist for safe DC planning  From OT standpoint, recommendation at time of d/c would be Home OT  Patient to benefit from continued Occupational Therapy treatment while in the hospital to address deficits as defined above and maximize level of functional independence with ADLs and functional mobility  Pt left seated in recliner with call bell in reach, tray table in reach, needs met, RN informed       OT Discharge Recommendation: Home with home health rehabilitation

## 2022-05-12 NOTE — PLAN OF CARE
Problem: PHYSICAL THERAPY ADULT  Goal: Performs mobility at highest level of function for planned discharge setting  See evaluation for individualized goals  Description: Treatment/Interventions: ADL retraining,Functional transfer training,LE strengthening/ROM,Therapeutic exercise,Elevations,Endurance training,Patient/family training,Equipment eval/education,Bed mobility,Gait training,Compensatory technique education,Continued evaluation,Spoke to nursing,Spoke to case management,OT  Equipment Recommended: Esau Cantu       See flowsheet documentation for full assessment, interventions and recommendations  Outcome: Progressing  Note: Prognosis: Good  Problem List: Decreased endurance, Impaired balance, Decreased mobility  Assessment: Pt able to mobilize with rw in room  Remains on room air  Revewied IS  Oob in chair after session  Con't for 1 additional session to ensure pt mobilizing appropriately  Barriers to Discharge:  (medical status)        PT Discharge Recommendation: Home with home health rehabilitation          See flowsheet documentation for full assessment

## 2022-05-12 NOTE — CASE MANAGEMENT
Case Management Discharge Planning Note    Patient name Shruthi Expose  Location [address] 232/-06 MRN [id number]  : 1930 Date 2022       Current Admission Date: 2022  Current Admission Diagnosis:Sepsis [facility])   Patient Active Problem List    Diagnosis Date Noted    Generalized weakness 2022    SHERLYN (acute kidney injury) ([facility] [address] ) 2022    Chronic diastolic heart failure ([facility] [address] ) 2022    Low serum cortisol level ([facility] [address] ) 2020    Pneumonia due to COVID-19 virus 2020    Hyponatremia 2020    Acute pain of left shoulder 2020    Hx: UTI (urinary tract infection) 2020    Change in mental status     Intermediate stage nonexudative age-related macular degeneration of both eyes 2019    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 eye with active choroidal neovascularization ([facility] [address] ) 2019    Pneumonia 2018    Fall 2018    Recurrent UTI - no acute infection  2018    Sepsis ([facility] [address] ) 2018    Neuropathy 2016    Vitamin B 12 deficiency 2016    Venous insufficiency 2016    Hypertension 2015    Gastroesophageal reflux disease without esophagitis 2015    Arthropathy 2015    Hyperlipidemia 2015      LOS (days): 4  Geometric Mean LOS (GMLOS) (days): 4 80  Days to GMLOS:1     OBJECTIVE:  Risk of Unplanned Readmission Score: 12 3         Current admission status: Inpatient   Preferred Pharmacy:   [facility] [address]  [address]  [address]    [facility] [address]  Phone: [phone] Fax: [phone]    Primary Care Provider: Sam Eli MD    Primary Insurance: MEDICARE  Secondary Insurance:     DISCHARGE DETAILS:             IMM Given (Date):: 22  IMM Given to[de-identified] Patient

## 2022-05-12 NOTE — ASSESSMENT & PLAN NOTE
· Creatinine 1 46 on admission   · Baseline appears to be about 0 7-0 8  · Creatinine this morning is 0 79  · Patient appears dry on exam, met severe sepsis  · Received 30 cc/kilogram in IVF   · Avoid hypotension/nephrotoxins medications  · Resolved  · Trend BMP

## 2022-05-12 NOTE — ASSESSMENT & PLAN NOTE
Wt Readings from Last 3 Encounters:   05/12/22 71 4 kg (157 lb 6 5 oz)   08/11/21 68 8 kg (151 lb 9 6 oz)   08/03/21 70 3 kg (155 lb)     · Hypovolemic on exam  · Received 30 cc milligram per kilogram of IVF due to meeting severe sepsis  · Prior echo July 2021 EF 50-55 percent, grade 1 diastolic dysfunction  · Monitor I/O and daily weights  · On hydrochlorothiazide  · Continue to monitor for signs fluid overload

## 2022-05-12 NOTE — ASSESSMENT & PLAN NOTE
· Met sepsis due to fever (100 8), tachycardia, tachypnea, WBC 24 58  · Signs of pneumonia on chest x-ray  · Lactic 3 6-2 1  · Received 30 cc/kilogram in IVF  · Procalcitonin 42 72- 36 5-10 7-6 1  · COVID/flu/RSV and UA negative  · Continue on IV Zosyn  · ESBL history  · Blood cultures are negative to date  · Urine for Streptococcus and Legionella is negative  · Patient is having semi loose stools    Will order stool for culture

## 2022-05-13 VITALS
BODY MASS INDEX: 28.2 KG/M2 | WEIGHT: 153.22 LBS | RESPIRATION RATE: 23 BRPM | SYSTOLIC BLOOD PRESSURE: 119 MMHG | OXYGEN SATURATION: 91 % | HEIGHT: 62 IN | HEART RATE: 87 BPM | TEMPERATURE: 98 F | DIASTOLIC BLOOD PRESSURE: 73 MMHG

## 2022-05-13 LAB
ANION GAP SERPL CALCULATED.3IONS-SCNC: 12 MMOL/L (ref 4–13)
BASOPHILS # BLD MANUAL: 0 THOUSAND/UL (ref 0–0.1)
BASOPHILS NFR MAR MANUAL: 0 % (ref 0–1)
BUN SERPL-MCNC: 10 MG/DL (ref 5–25)
CALCIUM SERPL-MCNC: 8.9 MG/DL (ref 8.3–10.1)
CHLORIDE SERPL-SCNC: 101 MMOL/L (ref 100–108)
CO2 SERPL-SCNC: 26 MMOL/L (ref 21–32)
CREAT SERPL-MCNC: 0.68 MG/DL (ref 0.6–1.3)
EOSINOPHIL # BLD MANUAL: 0.14 THOUSAND/UL (ref 0–0.4)
EOSINOPHIL NFR BLD MANUAL: 1 % (ref 0–6)
ERYTHROCYTE [DISTWIDTH] IN BLOOD BY AUTOMATED COUNT: 13.5 % (ref 11.6–15.1)
GFR SERPL CREATININE-BSD FRML MDRD: 76 ML/MIN/1.73SQ M
GLUCOSE SERPL-MCNC: 90 MG/DL (ref 65–140)
HCT VFR BLD AUTO: 32.1 % (ref 34.8–46.1)
HGB BLD-MCNC: 10.3 G/DL (ref 11.5–15.4)
LYMPHOCYTES # BLD AUTO: 15 % (ref 14–44)
LYMPHOCYTES # BLD AUTO: 2.06 THOUSAND/UL (ref 0.6–4.47)
MCH RBC QN AUTO: 28.7 PG (ref 26.8–34.3)
MCHC RBC AUTO-ENTMCNC: 32.1 G/DL (ref 31.4–37.4)
MCV RBC AUTO: 89 FL (ref 82–98)
MONOCYTES # BLD AUTO: 0.82 THOUSAND/UL (ref 0–1.22)
MONOCYTES NFR BLD: 6 % (ref 4–12)
NEUTROPHILS # BLD MANUAL: 10.72 THOUSAND/UL (ref 1.85–7.62)
NEUTS BAND NFR BLD MANUAL: 1 % (ref 0–8)
NEUTS SEG NFR BLD AUTO: 77 % (ref 43–75)
PLATELET # BLD AUTO: 237 THOUSANDS/UL (ref 149–390)
PLATELET BLD QL SMEAR: ADEQUATE
PMV BLD AUTO: 9.1 FL (ref 8.9–12.7)
POTASSIUM SERPL-SCNC: 3.7 MMOL/L (ref 3.5–5.3)
PROCALCITONIN SERPL-MCNC: 3.04 NG/ML
RBC # BLD AUTO: 3.59 MILLION/UL (ref 3.81–5.12)
RBC MORPH BLD: NORMAL
SODIUM SERPL-SCNC: 139 MMOL/L (ref 136–145)
WBC # BLD AUTO: 13.74 THOUSAND/UL (ref 4.31–10.16)

## 2022-05-13 PROCEDURE — 80048 BASIC METABOLIC PNL TOTAL CA: CPT | Performed by: INTERNAL MEDICINE

## 2022-05-13 PROCEDURE — 84145 PROCALCITONIN (PCT): CPT | Performed by: INTERNAL MEDICINE

## 2022-05-13 PROCEDURE — 99239 HOSP IP/OBS DSCHRG MGMT >30: CPT | Performed by: INTERNAL MEDICINE

## 2022-05-13 PROCEDURE — 85007 BL SMEAR W/DIFF WBC COUNT: CPT | Performed by: INTERNAL MEDICINE

## 2022-05-13 PROCEDURE — 85027 COMPLETE CBC AUTOMATED: CPT | Performed by: INTERNAL MEDICINE

## 2022-05-13 RX ORDER — SACCHAROMYCES BOULARDII 250 MG
250 CAPSULE ORAL 2 TIMES DAILY
Qty: 10 CAPSULE | Refills: 0 | Status: SHIPPED | OUTPATIENT
Start: 2022-05-13 | End: 2022-05-18

## 2022-05-13 RX ORDER — BENZONATATE 100 MG/1
100 CAPSULE ORAL 3 TIMES DAILY PRN
Qty: 20 CAPSULE | Refills: 0 | Status: SHIPPED | OUTPATIENT
Start: 2022-05-13

## 2022-05-13 RX ORDER — CEFDINIR 300 MG/1
300 CAPSULE ORAL EVERY 12 HOURS SCHEDULED
Qty: 8 CAPSULE | Refills: 0 | Status: SHIPPED | OUTPATIENT
Start: 2022-05-13 | End: 2022-05-17

## 2022-05-13 RX ORDER — AMLODIPINE BESYLATE 5 MG/1
5 TABLET ORAL DAILY
Status: DISCONTINUED | OUTPATIENT
Start: 2022-05-13 | End: 2022-05-13

## 2022-05-13 RX ADMIN — Medication 250 MG: at 08:35

## 2022-05-13 RX ADMIN — Medication 1000 UNITS: at 08:35

## 2022-05-13 RX ADMIN — GABAPENTIN 300 MG: 300 CAPSULE ORAL at 08:35

## 2022-05-13 RX ADMIN — HYDROCHLOROTHIAZIDE 12.5 MG: 12.5 TABLET ORAL at 08:35

## 2022-05-13 RX ADMIN — ASPIRIN 81 MG: 81 TABLET, COATED ORAL at 08:35

## 2022-05-13 RX ADMIN — HEPARIN SODIUM 5000 UNITS: 5000 INJECTION INTRAVENOUS; SUBCUTANEOUS at 05:17

## 2022-05-13 RX ADMIN — PANTOPRAZOLE SODIUM 40 MG: 40 TABLET, DELAYED RELEASE ORAL at 05:17

## 2022-05-13 RX ADMIN — Medication 1 TABLET: at 08:35

## 2022-05-13 RX ADMIN — PRAVASTATIN SODIUM 20 MG: 20 TABLET ORAL at 08:35

## 2022-05-13 RX ADMIN — AMLODIPINE BESYLATE 5 MG: 5 TABLET ORAL at 08:35

## 2022-05-13 NOTE — ASSESSMENT & PLAN NOTE
· Creatinine 1 46 on admission   · Baseline appears to be about 0 7-0 8  · Creatinine this morning is 0 68  · Patient appears dry on exam, met severe sepsis  · Received 30 cc/kilogram in IVF   · Avoid hypotension/nephrotoxins medications  · Resolved

## 2022-05-13 NOTE — ASSESSMENT & PLAN NOTE
This is a chronic health problem that is well controlled with current medications and lifestyle measures. Last A1c in January 2018 6.1%. Not on any medications. Lost over 30 pounds with intentional weight loss to come over this.   · Home regimen: aspirin and HCTZ 12 5 milligram daily  · Continue on hydrochlorothiazide  · Monitor vitals as per protocol

## 2022-05-13 NOTE — ASSESSMENT & PLAN NOTE
· Met sepsis due to fever (100 8), tachycardia, tachypnea, WBC 24 58  · Signs of pneumonia on chest x-ray  · Lactic 3 6-2 1  · Received 30 cc/kilogram in IVF  · Procalcitonin 42 72- 36 5-10 7-6 1  · COVID/flu/RSV and UA negative  · Continue on IV Zosyn-was switched to Rocephin  · ESBL history  · Blood cultures are negative to date  · Urine for Streptococcus and Legionella is negative  · No further diarrhea  · Patient will be switched to GUALLPA GEORGE and discharged home to complete the course  · Patient will be discharged home with home care services

## 2022-05-13 NOTE — ASSESSMENT & PLAN NOTE
Wt Readings from Last 3 Encounters:   05/13/22 69 5 kg (153 lb 3 5 oz)   08/11/21 68 8 kg (151 lb 9 6 oz)   08/03/21 70 3 kg (155 lb)     · Hypovolemic on exam  · Received 30 cc milligram per kilogram of IVF due to meeting severe sepsis  · Prior echo July 2021 EF 50-55 percent, grade 1 diastolic dysfunction  · Monitor I/O and daily weights  · On hydrochlorothiazide  · Stable at discharge

## 2022-05-13 NOTE — DISCHARGE SUMMARY
[place]  Discharge- Parish Zhao 1/28/1930, 80 y o  female MRN: [id number]  Unit/Bed#: -01 Encounter: [id number]  Primary Care Provider: Bobbi Blum MD   Date and time admitted to hospital: 5/8/2022  5:28 PM    Chronic diastolic heart failure [facility])  Assessment & Plan  Wt Readings from Last 3 Encounters:   05/13/22 69 5 kg (153 lb 3 5 oz)   08/11/21 68 8 kg (151 lb 9 6 oz)   08/03/21 70 3 kg (155 lb)     · Hypovolemic on exam  · Received 30 cc milligram per kilogram of IVF due to meeting severe sepsis  · Prior echo July 2021 EF 50-55 percent, grade 1 diastolic dysfunction  · Monitor I/O and daily weights  · On hydrochlorothiazide  · Stable at discharge    SHERLYN (acute kidney injury) ([facility] [address] )  Assessment & Plan  · Creatinine 1 46 on admission   · Baseline appears to be about 0 7-0 8  · Creatinine this morning is 0 68  · Patient appears dry on exam, met severe sepsis  · Received 30 cc/kilogram in IVF   · Avoid hypotension/nephrotoxins medications  · Resolved    Generalized weakness  Assessment & Plan  · Presented with 1 day of generalized weakness  Denies fall or injury  · Weakness most likely secondary to sepsis  · Fall precautions   · P T  OT and  consult appreciated  · Recommended home with home healthcare    Hyperlipidemia  Assessment & Plan  · Continue statin    Pneumonia  Assessment & Plan  · Generalized weakness, decreased appetite and nonproductive cough  · Met sepsis criteria    Received 30 cc/kilogram in IVF  · Patient received IV Zosyn which was switched to Rocephin  · Respiratory protocol   · Trend WBC and fever curve  · WBC trending down  · Blood cultures are negative to date  · Patient will be discharged Omnicef to complete the course  · Patient had pulse ox with ambulation done and did not require home O2 on discharge    Gastroesophageal reflux disease without esophagitis  Assessment & Plan  · Continue PPI    Hypertension  Assessment & Plan  · Home regimen: aspirin and HCTZ 12 5 milligram daily  · Continue on hydrochlorothiazide  · Monitor vitals as per protocol    * Sepsis ([facility] [address] )  Assessment & Plan  · Met sepsis due to fever (100 8), tachycardia, tachypnea, WBC 24 58  · Signs of pneumonia on chest x-ray  · Lactic 3 6-2 1  · Received 30 cc/kilogram in IVF  · Procalcitonin 42 72- 36 5-10 7-6 1  · COVID/flu/RSV and UA negative  · Continue on IV Zosyn-was switched to Rocephin  · ESBL history  · Blood cultures are negative to date  · Urine for Streptococcus and Legionella is negative  · No further diarrhea  · Patient will be switched to GUALLPA GEORGE and discharged home to complete the course  · Patient will be discharged home with home care services      Hospital Course:     Kaushik Kang is a 80 y o  female patient who originally presented to the hospital on   Admission Orders (From admission, onward)     Ordered        05/08/22 2021  INPATIENT ADMISSION  Once                     due to generalized weakness  Patient was admitted with sepsis secondary to pneumonia, acute kidney injury  Patient was treated with IV Zosyn  Patient blood cultures are negative to date  COVID/flu was negative  Patient leukocytosis improved significantly and is trending down  Patient was seen by Physical therapy and recommended home care services  Patient acute kidney injury resolved  Patient is saturating well on room air  Patient did not require any oxygen at discharge  Patient is feeling back to baseline and will be discharged home with home care services  Discussed with patient and daughter were in agreement with the discharge plan  On exam  Chest-clear to auscultation  Abdomen-soft, nontender  Heart-S1-S2 regular  Neuro-alert awake oriented x3  No focal deficits  Please see above list of diagnoses and related plan for additional information     Follow-up with PCP in 1 week  Repeat chest x-ray in 6-8 weeks as outpatient to assess for resolution  Return to ER with any worsening chest pain, shortness of breath, fever, chills or any other alarming symptoms  Condition at Discharge:  good      Discharge instructions/Information to patient and family:   See after visit summary for information provided to patient and family  Provisions for Follow-Up Care:  See after visit summary for information related to follow-up care and any pertinent home health orders  Disposition:     Home with VNA Services (Reminder: Complete face to face encounter)       Discharge Statement:  I spent 45 minutes discharging the patient  This time was spent on the day of discharge  I had direct contact with the patient on the day of discharge  Greater than 50% of the total time was spent examining patient, answering all patient questions, arranging and discussing plan of care with patient as well as directly providing post-discharge instructions  Additional time then spent on discharge activities  Discharge Medications:  See after visit summary for reconciled discharge medications provided to patient and family        ** Please Note: This note has been constructed using a voice recognition system **

## 2022-05-13 NOTE — PLAN OF CARE
Problem: Potential for Falls  Goal: Patient will remain free of falls  Description: INTERVENTIONS:  - Educate patient/family on patient safety including physical limitations  - Instruct patient to call for assistance with activity   - Consult OT/PT to assist with strengthening/mobility   - Keep Call bell within reach  - Keep bed low and locked with side rails adjusted as appropriate  - Keep care items and personal belongings within reach  - Initiate and maintain comfort rounds  - Make Fall Risk Sign visible to staff  - Offer Toileting every 2 hours  - Initiate/Maintain bed/chair alarm  - Obtain necessary fall risk management equipment:  - Apply yellow socks and bracelet for high fall risk patients  - Consider moving patient to room near nurses station  Outcome: Progressing

## 2022-05-13 NOTE — ASSESSMENT & PLAN NOTE
· Generalized weakness, decreased appetite and nonproductive cough  · Met sepsis criteria    Received 30 cc/kilogram in IVF  · Patient received IV Zosyn which was switched to Rocephin  · Respiratory protocol   · Trend WBC and fever curve  · WBC trending down  · Blood cultures are negative to date  · Patient will be discharged Omnicef to complete the course  · Patient had pulse ox with ambulation done and did not require home O2 on discharge

## 2022-05-13 NOTE — DISCHARGE INSTRUCTIONS
Follow-up with PCP in 1 week  Repeat chest x-ray in 6-8 weeks as outpatient to assess for resolution  Return to ER with any worsening chest pain, shortness of breath, fever, chills or any other alarming symptoms

## 2022-05-14 LAB
BACTERIA BLD CULT: NORMAL
BACTERIA BLD CULT: NORMAL

## 2022-05-16 ENCOUNTER — TELEPHONE (OUTPATIENT)
Dept: UROLOGY | Facility: AMBULATORY SURGERY CENTER | Age: 87
End: 2022-05-16

## 2022-05-16 ENCOUNTER — TRANSITIONAL CARE MANAGEMENT (OUTPATIENT)
Dept: FAMILY MEDICINE CLINIC | Facility: CLINIC | Age: 87
End: 2022-05-16

## 2022-05-16 ENCOUNTER — TELEPHONE (OUTPATIENT)
Dept: FAMILY MEDICINE CLINIC | Facility: CLINIC | Age: 87
End: 2022-05-16

## 2022-05-16 NOTE — TELEPHONE ENCOUNTER
Patient is calling to request a prescription refill    Medication:  Nitrofurantoin 100 mg      30 / 90 day supply:  90 Day supply    Pharmacy: [facility] [address] [facility]  [address]    Pt can be reached at: [phone]

## 2022-05-16 NOTE — TELEPHONE ENCOUNTER
Patient had just been reordered   90 day hernandez 3 refills on 4/29  ER Doctor in 5/13 discontinued the Macrobid  Patient is requesting a refill   Please advise

## 2022-05-16 NOTE — TELEPHONE ENCOUNTER
Pt nurse call and want to let you know about her medication     Microbid for her UTI for everyday the hospital told her to stop taken that the hospital put her on something else     Cefdinir 300mg twice a day for her PNA     She want to let you know about that      [phone] jasper

## 2022-05-19 ENCOUNTER — TELEPHONE (OUTPATIENT)
Dept: UROLOGY | Facility: HOSPITAL | Age: 87
End: 2022-05-19

## 2022-05-19 NOTE — TELEPHONE ENCOUNTER
Daughter is requesting a refill for Nitrosurantion Mono 100 mg capsule, once daily  States her mother was taking the routinely for maintenance  The hospital cancelled the prescription

## 2022-05-19 NOTE — TELEPHONE ENCOUNTER
Patient is calling back to check status of refill  Patient was informed it has not been sent to her pharmacy  Patient is requesting a refill for:  Nitrofurantoin 100 mg capsule daily   Quantity: 90 with refills     Please send to:  [id number]  [facility]

## 2022-05-20 NOTE — TELEPHONE ENCOUNTER
Spoke with daughter and advised on AP's note  She is frustrated but understands  She is going to call the PCP to see if they can fill the medication until she sees us

## 2022-05-20 NOTE — TELEPHONE ENCOUNTER
Spoke with patient's daughter  Advised that patient hasn't been seen since 2/10/21 and we will need to see her before we keep refilling her prescription  She stated it is very hard to get her mom out of the house and to an appointment  I advised that it is our policy but she can see if her PCP can refill it for her  She agreed to a follow up  Next available was 6/14 in [facility]  Daughter asking if we can send the refill in, since her meds go out Monday and she will be out and this is keeping her UTIs from coming

## 2022-05-20 NOTE — TELEPHONE ENCOUNTER
Patient cancelled appt and was never seen in 2021  Cannot provide refill as she has not been seen in 2 years   Can request refill from her PCP in the interim

## 2022-05-24 ENCOUNTER — OFFICE VISIT (OUTPATIENT)
Dept: FAMILY MEDICINE CLINIC | Facility: CLINIC | Age: 87
End: 2022-05-24
Payer: MEDICARE

## 2022-05-24 VITALS
SYSTOLIC BLOOD PRESSURE: 120 MMHG | HEART RATE: 82 BPM | BODY MASS INDEX: 27.6 KG/M2 | HEIGHT: 62 IN | OXYGEN SATURATION: 91 % | DIASTOLIC BLOOD PRESSURE: 68 MMHG | TEMPERATURE: 97.6 F | WEIGHT: 150 LBS

## 2022-05-24 DIAGNOSIS — N30.00 ACUTE CYSTITIS WITHOUT HEMATURIA: ICD-10-CM

## 2022-05-24 DIAGNOSIS — H35.3211 EXUDATIVE AGE-RELATED MACULAR DEGENERATION OF RIGHT EYE WITH ACTIVE CHOROIDAL NEOVASCULARIZATION (HCC): ICD-10-CM

## 2022-05-24 DIAGNOSIS — I48.0 PAROXYSMAL ATRIAL FIBRILLATION (HCC): ICD-10-CM

## 2022-05-24 DIAGNOSIS — A41.9 SEPSIS, DUE TO UNSPECIFIED ORGANISM, UNSPECIFIED WHETHER ACUTE ORGAN DYSFUNCTION PRESENT (HCC): ICD-10-CM

## 2022-05-24 DIAGNOSIS — I10 PRIMARY HYPERTENSION: ICD-10-CM

## 2022-05-24 DIAGNOSIS — Z87.440 HISTORY OF RECURRENT UTIS: ICD-10-CM

## 2022-05-24 DIAGNOSIS — J18.9 PNEUMONIA OF LEFT LOWER LOBE DUE TO INFECTIOUS ORGANISM: Primary | ICD-10-CM

## 2022-05-24 PROBLEM — E27.40 LOW SERUM CORTISOL LEVEL (HCC): Status: RESOLVED | Noted: 2020-12-07 | Resolved: 2022-05-24

## 2022-05-24 PROBLEM — R79.89 LOW SERUM CORTISOL LEVEL: Status: RESOLVED | Noted: 2020-12-07 | Resolved: 2022-05-24

## 2022-05-24 PROCEDURE — 99495 TRANSJ CARE MGMT MOD F2F 14D: CPT | Performed by: FAMILY MEDICINE

## 2022-05-24 RX ORDER — NITROFURANTOIN 25; 75 MG/1; MG/1
100 CAPSULE ORAL 2 TIMES DAILY
Qty: 90 CAPSULE | Refills: 1 | Status: SHIPPED | OUTPATIENT
Start: 2022-05-24 | End: 2022-06-14

## 2022-05-24 NOTE — PROGRESS NOTES
[address]        NAME: Rajani Dunn is a 80 y o  female  : 1930    MRN: [id number]  DATE: May 24, 2022  TIME: 5:36 PM    Assessment and Plan   Pneumonia of left lower lobe due to infectious organism [J18 9]  1  Pneumonia of left lower lobe due to infectious organism  XR chest pa & lateral   2  Sepsis, due to unspecified organism, unspecified whether acute organ dysfunction present ([address] )     3  Acute cystitis without hematuria     4  Primary hypertension     5  History of recurrent UTIs  nitrofurantoin (MACROBID) 100 mg capsule   6  Paroxysmal atrial fibrillation (HCC)     7  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)         Paroxysmal atrial fibrillation (HCC)  No current symptoms  Has seen Cardiology  Remains on aspirin only  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 eye with active choroidal neovascularization ([address] )  Followed by Ophthalmology  Has been relatively stable  Chronic diastolic heart failure (HCC)  Wt Readings from Last 3 Encounters:   22 68 kg (150 lb)   22 69 5 kg (153 lb 3 5 oz)   21 68 8 kg (151 lb 9 6 oz)     Encouraged to follow-up with Cardiology  Patient is always reluctant to leave the house  Sepsis ([address] )  Feeling well today  Sepsis appears resolved  Patient Instructions     Patient Instructions   Restart nitrofurantoin due to recurrent urinary tract infections  Chest x-ray ordered for after    He should follow up with Cardiology in her cardiomyopathy with congestive heart failure in the past   Continue other medications    Follow up with Urology also          Chief Complaint     Chief Complaint   Patient presents with    Transition of Care Management         History of Present Illness       TCM Call (since 2022)     Date and time call was made  2022  1:06 PM    Hospital care reviewed  Records reviewed    Patient was hospitialized at  [facility]    Date of Admission  05/08/22    Date of discharge  05/13/22    Diagnosis  Sepsis [facility]    Disposition  Home    Were the patients medications reviewed and updated  Yes    Current Symptoms  None      TCM Call (since 4/23/2022)     Post hospital issues  None    Should patient be enrolled in anticoag monitoring? No    Scheduled for follow up? Yes  05/24/2022    Did you obtain your prescribed medications  Yes    Do you need help managing your prescriptions or medications  No    Is transportation to your appointment needed  No    I have advised the patient to call PCP with any new or worsening symptoms    noelle alexander ma    Living Arrangements  Family members      Patient here for TCM  Information as above  Hospital records reviewed  Medications reviewed  Still having some fatigue  Some general weakness persist   Patient does have visiting nurses along with home physical therapy  Discharge summary recommends repeat chest x-ray in 6-8 weeks after discharge  Patient also needs a refill on her suppressive nitrofurantoin before she sees the urologist         Review of Systems   Review of Systems   Constitutional: Positive for fatigue  Negative for appetite change, chills, diaphoresis and fever  HENT: Negative for ear pain, rhinorrhea, sinus pressure and sore throat  Eyes: Negative for discharge, redness and itching  Respiratory: Positive for cough and shortness of breath  Negative for wheezing  Cardiovascular: Negative for chest pain and palpitations  Rapid or slow heart rate   Gastrointestinal: Negative for abdominal pain, diarrhea, nausea and vomiting  Genitourinary: Negative for difficulty urinating, dysuria, hematuria and urgency           Current Medications       Current Outpatient Medications:     aspirin (ECOTRIN LOW STRENGTH) 81 mg EC tablet, Take 1 tablet (81 mg total) by mouth daily, Disp: , Rfl: 0    benzonatate (TESSALON PERLES) 100 mg capsule, Take 1 capsule (100 mg total) by mouth as needed in the morning and 1 capsule (100 mg total) as needed at noon and 1 capsule (100 mg total) as needed in the evening for cough  , Disp: 20 capsule, Rfl: 0    Cholecalciferol (VITAMIN D3) 1000 units CAPS, Take 1,000 Units by mouth daily , Disp: , Rfl:     Cyanocobalamin (VITAMIN B12 PO), Take by mouth, Disp: , Rfl:     gabapentin (NEURONTIN) 600 MG tablet, Take 1 tablet (600 mg total) by mouth 2 (two) times a day, Disp: 180 tablet, Rfl: 0    hydrochlorothiazide (HYDRODIURIL) 12 5 mg tablet, Take 1 tablet (12 5 mg total) by mouth daily, Disp: 90 tablet, Rfl: 0    Multiple Vitamins-Minerals (PRESERVISION AREDS PO), Take by mouth 2 (two) times a day, Disp: , Rfl:     nitrofurantoin (MACROBID) 100 mg capsule, Take 1 capsule (100 mg total) by mouth in the morning and 1 capsule (100 mg total) in the evening , Disp: 90 capsule, Rfl: 1    omeprazole (PriLOSEC) 20 mg delayed release capsule, Take 1 capsule (20 mg total) by mouth daily, Disp: 90 capsule, Rfl: 0    pravastatin (PRAVACHOL) 20 mg tablet, Take 1 tablet (20 mg total) by mouth daily, Disp: 90 tablet, Rfl: 0    Current Allergies     Allergies as of 05/24/2022    (No Known Allergies)            The following portions of the patient's history were reviewed and updated as appropriate: allergies, current medications, past family history, past medical history, past social history, past surgical history and problem list      Past Medical History:   Diagnosis Date    Balance disorder     Chronic pain     GERD (gastroesophageal reflux disease)     Hard of hearing     Hyperlipidemia     Hypertension     Low serum cortisol level (HCC) 12/7/2020    Neuropathy     Pneumonia     Tinnitus     UTI (urinary tract infection)        Past Surgical History:   Procedure Laterality Date    EYE SURGERY      HYSTERECTOMY      TONSILLECTOMY         Family History   Problem Relation Age of Onset    Stroke Mother  Stroke Father     Heart disease Daughter     Substance Abuse Neg Hx     Mental illness Neg Hx          Medications have been verified  Objective   /68   Pulse 82   Temp 97 6 °F (36 4 °C) (Tympanic)   Ht 5' 2" (1 575 m)   Wt 68 kg (150 lb)   SpO2 91%   BMI 27 44 kg/m²        Physical Exam     Physical Exam  Vitals reviewed  Constitutional:       General: She is not in acute distress  Appearance: She is well-developed  HENT:      Head: Normocephalic and atraumatic  Right Ear: No drainage  Left Ear: No drainage  Nose: Nose normal       Mouth/Throat:      Pharynx: No oropharyngeal exudate  Eyes:      General:         Right eye: No discharge  Left eye: No discharge  Conjunctiva/sclera: Conjunctivae normal    Neck:      Thyroid: No thyromegaly  Cardiovascular:      Rate and Rhythm: Normal rate and regular rhythm  Heart sounds: Normal heart sounds  Pulmonary:      Effort: Pulmonary effort is normal  No respiratory distress  Breath sounds: No wheezing or rales  Musculoskeletal:      Cervical back: Normal range of motion and neck supple  Right lower leg: No edema  Left lower leg: No edema  Lymphadenopathy:      Cervical: No cervical adenopathy     Psychiatric:         Mood and Affect: Mood normal          Behavior: Behavior normal

## 2022-05-24 NOTE — ASSESSMENT & PLAN NOTE
Wt Readings from Last 3 Encounters:   05/24/22 68 kg (150 lb)   05/13/22 69 5 kg (153 lb 3 5 oz)   08/11/21 68 8 kg (151 lb 9 6 oz)     Encouraged to follow-up with Cardiology  Patient is always reluctant to leave the house

## 2022-05-24 NOTE — PATIENT INSTRUCTIONS
Restart nitrofurantoin due to recurrent urinary tract infections  Chest x-ray ordered for after July 1st   He should follow up with Cardiology in her cardiomyopathy with congestive heart failure in the past   Continue other medications    Follow up with Urology also

## 2022-06-14 ENCOUNTER — OFFICE VISIT (OUTPATIENT)
Dept: UROLOGY | Facility: HOSPITAL | Age: 87
End: 2022-06-14
Payer: MEDICARE

## 2022-06-14 VITALS
SYSTOLIC BLOOD PRESSURE: 140 MMHG | DIASTOLIC BLOOD PRESSURE: 68 MMHG | HEIGHT: 62 IN | BODY MASS INDEX: 27.6 KG/M2 | WEIGHT: 150 LBS

## 2022-06-14 DIAGNOSIS — Z87.440 HISTORY OF RECURRENT UTIS: ICD-10-CM

## 2022-06-14 DIAGNOSIS — N39.0 RECURRENT UTI: Primary | ICD-10-CM

## 2022-06-14 DIAGNOSIS — N39.41 URGE INCONTINENCE: ICD-10-CM

## 2022-06-14 PROCEDURE — 99213 OFFICE O/P EST LOW 20 MIN: CPT | Performed by: NURSE PRACTITIONER

## 2022-06-14 RX ORDER — NITROFURANTOIN 25; 75 MG/1; MG/1
100 CAPSULE ORAL DAILY
Qty: 90 CAPSULE | Refills: 3 | Status: SHIPPED | OUTPATIENT
Start: 2022-06-14 | End: 2022-08-07 | Stop reason: SDUPTHER

## 2022-06-14 RX ORDER — TROSPIUM CHLORIDE 20 MG/1
20 TABLET, FILM COATED ORAL 2 TIMES DAILY
Qty: 60 TABLET | Refills: 3 | Status: SHIPPED | OUTPATIENT
Start: 2022-06-14

## 2022-06-14 NOTE — PROGRESS NOTES
06/14/22    Adelina Leventhal   1/28/1930   [id number]     Assessment  1 Recurrent UTI  2 Urge incontinence     Discussion/Plan  1 Recurrent UTI    Continue 100 mg Macrobid once daily  2 Urge incontinence    Trial Trospium chloride BID     Patient wishes to call if medication is not efficacious  All questions answered at this time  Subjective  HPI   Adelina Leventhal is a 80 y o  female with a history of recurrent UTI and vaginal atrophy presents today for follow up  Patient continues to use topical Estrogen and daily low dose prophylactic Macrobid  Her last urinary tract infection was 10/22/19 for E  Coli organism  Previous cultures have been positive for E coli organism  Patient has had no recurrence of UTI since that time  She continues to report episodes of dysuria and vaginal irritation  Patient denies any lower urinary tract symptoms or gross hematuria  She has not had UTI since beginning daily macrobid  She wishes to try something for urge incontinence  She ambulates with a rolling walker  Her son is in the office today  No additional changes to her overall health  Review of Systems - History obtained from chart review and the patient  General ROS: negative  Psychological ROS: negative  Endocrine ROS: negative  Respiratory ROS: no cough, shortness of breath, or wheezing  Cardiovascular ROS: no chest pain or dyspnea on exertion  Gastrointestinal ROS: negative  Genito-Urinary ROS: positive for - incontinence  Musculoskeletal ROS: negative  Neurological ROS: no TIA or stroke symptoms  Dermatological ROS: negative       Objective  Physical Exam  Vitals and nursing note reviewed  Constitutional:       General: She is not in acute distress  Appearance: Normal appearance  She is normal weight  She is not ill-appearing, toxic-appearing or diaphoretic  HENT:      Head: Normocephalic and atraumatic  Pulmonary:      Effort: Pulmonary effort is normal  No respiratory distress     Abdominal: Tenderness: There is no right CVA tenderness or left CVA tenderness  Musculoskeletal:         General: Normal range of motion  Cervical back: Normal range of motion  Skin:     General: Skin is warm and dry  Neurological:      General: No focal deficit present  Mental Status: She is alert and oriented to person, place, and time  Mental status is at baseline  Psychiatric:         Mood and Affect: Mood normal          Behavior: Behavior normal          Thought Content: Thought content normal          Judgment: Judgment normal              REAL Clark     I have spent 15 minutes with Patient  today in which greater than 50% of this time was spent in counseling/coordination of care regarding Intructions for management

## 2022-06-22 ENCOUNTER — TELEPHONE (OUTPATIENT)
Dept: UROLOGY | Facility: MEDICAL CENTER | Age: 87
End: 2022-06-22

## 2022-06-22 NOTE — TELEPHONE ENCOUNTER
Pt called the office stated that she was prescript trospium chloride (SANCTURA) 20 mg tablet       Pt stated that she started to feel shaky, all over her body, hands especially  Pt noe other symptoms  Pt stopped taking Trospium  4 days ago       Pt can be reached at [phone]

## 2022-07-07 DIAGNOSIS — G60.9 IDIOPATHIC PERIPHERAL NEUROPATHY: ICD-10-CM

## 2022-07-07 DIAGNOSIS — K21.9 GASTROESOPHAGEAL REFLUX DISEASE: ICD-10-CM

## 2022-07-07 DIAGNOSIS — E78.2 MIXED HYPERLIPIDEMIA: ICD-10-CM

## 2022-07-07 DIAGNOSIS — I10 ESSENTIAL HYPERTENSION: ICD-10-CM

## 2022-07-07 RX ORDER — OMEPRAZOLE 20 MG/1
20 CAPSULE, DELAYED RELEASE ORAL DAILY
Qty: 90 CAPSULE | Refills: 0 | Status: SHIPPED | OUTPATIENT
Start: 2022-07-07 | End: 2022-08-05 | Stop reason: SDUPTHER

## 2022-07-07 RX ORDER — HYDROCHLOROTHIAZIDE 12.5 MG/1
12.5 TABLET ORAL DAILY
Qty: 90 TABLET | Refills: 0 | Status: SHIPPED | OUTPATIENT
Start: 2022-07-07 | End: 2022-08-05 | Stop reason: SDUPTHER

## 2022-07-07 RX ORDER — GABAPENTIN 600 MG/1
600 TABLET ORAL 2 TIMES DAILY
Qty: 180 TABLET | Refills: 0 | Status: SHIPPED | OUTPATIENT
Start: 2022-07-07 | End: 2022-08-05 | Stop reason: SDUPTHER

## 2022-07-07 RX ORDER — PRAVASTATIN SODIUM 20 MG
20 TABLET ORAL DAILY
Qty: 30 TABLET | Refills: 0 | Status: SHIPPED | OUTPATIENT
Start: 2022-07-07 | End: 2022-08-05 | Stop reason: SDUPTHER

## 2022-07-11 NOTE — TELEPHONE ENCOUNTER
Patient called stating she stopped taking the medication (trospium CL ) 20 mg  She stated it was giving her the shakes  She got a new prescription from the pharmacy and she wants to know how to proceed      Patient can be reached at [phone]

## 2022-07-11 NOTE — TELEPHONE ENCOUNTER
Called Rebecca Olmdeo back and is aware that she stopped Trospium  Not sure what other medication was given  Saw that Dr Afshin Coppola just prescribed Gabapentin   She can call back if she wants another medication

## 2022-07-29 ENCOUNTER — TELEPHONE (OUTPATIENT)
Dept: FAMILY MEDICINE CLINIC | Facility: CLINIC | Age: 87
End: 2022-07-29

## 2022-07-29 NOTE — TELEPHONE ENCOUNTER
1st attempt to contact pt-    I left a VM advising pt to call the office, as there is a message from Dr Falguni Rizo for her  Please schedule pt for MAW

## 2022-07-30 NOTE — TELEPHONE ENCOUNTER
Spoke with patients daughter Lucille Vides, advised patient due her AMW -- she will discuss with patient and call back to schedule

## 2022-08-05 DIAGNOSIS — G60.9 IDIOPATHIC PERIPHERAL NEUROPATHY: ICD-10-CM

## 2022-08-05 DIAGNOSIS — I10 ESSENTIAL HYPERTENSION: ICD-10-CM

## 2022-08-05 DIAGNOSIS — K21.9 GASTROESOPHAGEAL REFLUX DISEASE: ICD-10-CM

## 2022-08-05 DIAGNOSIS — E78.2 MIXED HYPERLIPIDEMIA: ICD-10-CM

## 2022-08-05 RX ORDER — HYDROCHLOROTHIAZIDE 12.5 MG/1
12.5 TABLET ORAL DAILY
Qty: 90 TABLET | Refills: 0 | Status: SHIPPED | OUTPATIENT
Start: 2022-08-05 | End: 2022-09-25

## 2022-08-05 RX ORDER — GABAPENTIN 600 MG/1
600 TABLET ORAL 2 TIMES DAILY
Qty: 180 TABLET | Refills: 0 | Status: SHIPPED | OUTPATIENT
Start: 2022-08-05

## 2022-08-05 RX ORDER — OMEPRAZOLE 20 MG/1
20 CAPSULE, DELAYED RELEASE ORAL DAILY
Qty: 90 CAPSULE | Refills: 0 | Status: SHIPPED | OUTPATIENT
Start: 2022-08-05 | End: 2022-09-25

## 2022-08-06 RX ORDER — PRAVASTATIN SODIUM 20 MG
20 TABLET ORAL DAILY
Qty: 30 TABLET | Refills: 0 | Status: SHIPPED | OUTPATIENT
Start: 2022-08-06 | End: 2022-08-25 | Stop reason: SDUPTHER

## 2022-08-07 DIAGNOSIS — Z87.440 HISTORY OF RECURRENT UTIS: ICD-10-CM

## 2022-08-07 DIAGNOSIS — G60.9 IDIOPATHIC PERIPHERAL NEUROPATHY: ICD-10-CM

## 2022-08-07 DIAGNOSIS — I10 ESSENTIAL HYPERTENSION: ICD-10-CM

## 2022-08-07 DIAGNOSIS — E78.2 MIXED HYPERLIPIDEMIA: ICD-10-CM

## 2022-08-07 DIAGNOSIS — K21.9 GASTROESOPHAGEAL REFLUX DISEASE: ICD-10-CM

## 2022-08-08 RX ORDER — PRAVASTATIN SODIUM 20 MG
20 TABLET ORAL DAILY
Qty: 30 TABLET | Refills: 0 | OUTPATIENT
Start: 2022-08-08

## 2022-08-08 RX ORDER — HYDROCHLOROTHIAZIDE 12.5 MG/1
12.5 TABLET ORAL DAILY
Qty: 90 TABLET | Refills: 0 | OUTPATIENT
Start: 2022-08-08

## 2022-08-08 RX ORDER — NITROFURANTOIN 25; 75 MG/1; MG/1
100 CAPSULE ORAL DAILY
Qty: 90 CAPSULE | Refills: 0 | Status: SHIPPED | OUTPATIENT
Start: 2022-08-08

## 2022-08-08 RX ORDER — GABAPENTIN 600 MG/1
600 TABLET ORAL 2 TIMES DAILY
Qty: 180 TABLET | Refills: 0 | OUTPATIENT
Start: 2022-08-08

## 2022-08-08 RX ORDER — OMEPRAZOLE 20 MG/1
20 CAPSULE, DELAYED RELEASE ORAL DAILY
Qty: 90 CAPSULE | Refills: 0 | OUTPATIENT
Start: 2022-08-08

## 2022-08-25 ENCOUNTER — OFFICE VISIT (OUTPATIENT)
Dept: FAMILY MEDICINE CLINIC | Facility: CLINIC | Age: 87
End: 2022-08-25
Payer: MEDICARE

## 2022-08-25 VITALS
OXYGEN SATURATION: 93 % | HEART RATE: 66 BPM | SYSTOLIC BLOOD PRESSURE: 120 MMHG | BODY MASS INDEX: 27.69 KG/M2 | WEIGHT: 151.4 LBS | DIASTOLIC BLOOD PRESSURE: 64 MMHG

## 2022-08-25 DIAGNOSIS — Z23 NEED FOR PNEUMOCOCCAL VACCINATION: ICD-10-CM

## 2022-08-25 DIAGNOSIS — I50.32 CHRONIC DIASTOLIC HEART FAILURE (HCC): ICD-10-CM

## 2022-08-25 DIAGNOSIS — E78.2 MIXED HYPERLIPIDEMIA: ICD-10-CM

## 2022-08-25 DIAGNOSIS — K21.9 GASTROESOPHAGEAL REFLUX DISEASE WITHOUT ESOPHAGITIS: ICD-10-CM

## 2022-08-25 DIAGNOSIS — G62.9 NEUROPATHY: ICD-10-CM

## 2022-08-25 DIAGNOSIS — H35.3211 EXUDATIVE AGE-RELATED MACULAR DEGENERATION OF RIGHT EYE WITH ACTIVE CHOROIDAL NEOVASCULARIZATION (HCC): ICD-10-CM

## 2022-08-25 DIAGNOSIS — I48.0 PAROXYSMAL ATRIAL FIBRILLATION (HCC): ICD-10-CM

## 2022-08-25 DIAGNOSIS — Z00.00 MEDICARE ANNUAL WELLNESS VISIT, SUBSEQUENT: Primary | ICD-10-CM

## 2022-08-25 DIAGNOSIS — I10 PRIMARY HYPERTENSION: ICD-10-CM

## 2022-08-25 PROBLEM — U07.1 PNEUMONIA DUE TO COVID-19 VIRUS: Status: RESOLVED | Noted: 2020-12-05 | Resolved: 2022-08-25

## 2022-08-25 PROBLEM — J18.9 PNEUMONIA: Status: RESOLVED | Noted: 2018-11-26 | Resolved: 2022-08-25

## 2022-08-25 PROBLEM — A41.9 SEPSIS (HCC): Status: RESOLVED | Noted: 2018-05-22 | Resolved: 2022-08-25

## 2022-08-25 PROBLEM — J12.82 PNEUMONIA DUE TO COVID-19 VIRUS: Status: RESOLVED | Noted: 2020-12-05 | Resolved: 2022-08-25

## 2022-08-25 PROBLEM — N17.9 AKI (ACUTE KIDNEY INJURY) (HCC): Status: RESOLVED | Noted: 2022-05-08 | Resolved: 2022-08-25

## 2022-08-25 PROBLEM — H35.3132 INTERMEDIATE STAGE NONEXUDATIVE AGE-RELATED MACULAR DEGENERATION OF BOTH EYES: Status: RESOLVED | Noted: 2019-07-01 | Resolved: 2022-08-25

## 2022-08-25 PROBLEM — E87.1 HYPONATREMIA: Status: RESOLVED | Noted: 2020-12-05 | Resolved: 2022-08-25

## 2022-08-25 PROCEDURE — 90677 PCV20 VACCINE IM: CPT

## 2022-08-25 PROCEDURE — 99214 OFFICE O/P EST MOD 30 MIN: CPT | Performed by: FAMILY MEDICINE

## 2022-08-25 PROCEDURE — G0439 PPPS, SUBSEQ VISIT: HCPCS | Performed by: FAMILY MEDICINE

## 2022-08-25 PROCEDURE — G0009 ADMIN PNEUMOCOCCAL VACCINE: HCPCS

## 2022-08-25 RX ORDER — PRAVASTATIN SODIUM 20 MG
20 TABLET ORAL DAILY
Qty: 90 TABLET | Refills: 3 | Status: SHIPPED | OUTPATIENT
Start: 2022-08-25

## 2022-08-25 NOTE — PROGRESS NOTES
Assessment and Plan:     Problem List Items Addressed This Visit        Digestive    Gastroesophageal reflux disease without esophagitis    Relevant Orders    CBC and differential       Cardiovascular and Mediastinum    Hypertension    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)    Chronic diastolic heart failure (HCC)    Relevant Orders    CBC and differential    Comprehensive metabolic panel    TSH, 3rd generation    Paroxysmal atrial fibrillation (HCC)    Relevant Orders    TSH, 3rd generation       Nervous and Auditory    Neuropathy       Other    Mixed hyperlipidemia    Relevant Medications    pravastatin (PRAVACHOL) 20 mg tablet    Other Relevant Orders    Comprehensive metabolic panel    Lipid Panel with Direct LDL reflex    TSH, 3rd generation      Other Visit Diagnoses     Medicare annual wellness visit, subsequent    -  Primary    Need for pneumococcal vaccination        Relevant Orders    Pneumococcal Conjugate Vaccine 20-valent (Pcv20) (Completed)        BMI Counseling: Body mass index is 27 69 kg/m²  The BMI is above normal  Nutrition recommendations include decreasing portion sizes and moderation in carbohydrate intake  No pharmacotherapy was ordered  Rationale for BMI follow-up plan is due to patient being overweight or obese  Depression Screening and Follow-up Plan: Patient was screened for depression during today's encounter  They screened negative with a PHQ-2 score of 0  Falls Plan of Care: Recommended assistive device to help with gait and balance  Assessed feet and footwear  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 and on the muscles in the back of the neck  Okay for topical diclofenac gel 1% 2-3 times daily      Preventive health issues were discussed with patient, and age appropriate screening tests were ordered as noted in patient's After Visit Summary  Personalized health advice and appropriate referrals for health education or preventive services given if needed, as noted in patient's After Visit Summary  History of Present Illness:     Patient presents for a Medicare Wellness Visit    Patient here for Medicare wellness and medical management multiple conditions  1) hyperlipidemia-no recent levels  2) congestive heart failure with paroxysmal atrial fibrillation  No recent visit to Cardiology  3) recurrent UTI-working with Urology  Is having good success with preventative measures a Macrodantin 100 mg once daily  4) GERD-stable symptoms pattern  5) macular degeneration-follows with Ophthalmology  6) neuropathy-stable with current dose of gabapentin  7) chronic neck pain  Had completed physical therapy which improved slightly  Still trying to do over-the-counter medications  Patient Care Team:  Pino Bartlett MD as PCP - General     Review of Systems:     Review of Systems   Constitutional: Negative for activity change, appetite change, diaphoresis and fatigue  Eyes: Positive for visual disturbance  Respiratory: Negative for cough, chest tightness, shortness of breath and wheezing  Cardiovascular: Negative for chest pain, palpitations and leg swelling  Fast or slow heart rate   Gastrointestinal: Negative for abdominal pain, blood in stool, constipation, diarrhea, nausea and vomiting  Genitourinary: Negative for difficulty urinating, dysuria, frequency and hematuria  Musculoskeletal: Positive for neck pain and neck stiffness  Negative for arthralgias, gait problem, joint swelling and myalgias  Neurological: Positive for numbness  Negative for dizziness, light-headedness and headaches  Psychiatric/Behavioral: Negative for agitation, confusion, dysphoric mood and sleep disturbance  The patient is not nervous/anxious           Problem List: Patient Active Problem List   Diagnosis    Hypertension    Neuropathy    Gastroesophageal reflux disease without esophagitis    Vitamin B 12 deficiency    Fall    Recurrent UTI - no acute infection     Arthropathy    Mixed hyperlipidemia    Venous insufficiency    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)    Acute pain of left shoulder    Change in mental status    Hx: UTI (urinary tract infection)    Generalized weakness    Chronic diastolic heart failure (HCC)    Paroxysmal atrial fibrillation (HCC)      Past Medical and Surgical History:     Past Medical History:   Diagnosis Date    [facility]/8/2022    Balance disorder     Chronic pain     GERD (gastroesophageal reflux disease)     Hard of hearing     Hyperlipidemia     Hypertension     Hyponatremia 12/5/2020    Low serum cortisol level ([facility] ) 12/7/2020    Neuropathy     Pneumonia     Pneumonia due to COVID-19 virus 12/5/2020    Sepsis [facility] ) 5/22/2018    Tinnitus     UTI (urinary tract infection)      Past Surgical History:   Procedure Laterality Date    EYE SURGERY      HYSTERECTOMY      TONSILLECTOMY        Family History:     Family History   Problem Relation Age of Onset    Stroke Mother     Stroke Father     Heart disease Daughter     Substance Abuse Neg Hx     Mental illness Neg Hx       Social History:     Social History     Socioeconomic History    Marital status:       Spouse name: None    Number of children: None    Years of education: None    Highest education level: None   Occupational History    None   Tobacco Use    Smoking status: Never Smoker    Smokeless tobacco: Never Used   Vaping Use    Vaping Use: Never used   Substance and Sexual Activity    Alcohol use: Not Currently     Comment: rarely    Drug use: No    Sexual activity: Not Currently   Other Topics Concern    None   Social History Narrative    None     Social Determinants of Health     Financial Resource Strain: Low Risk     Difficulty of Paying Living Expenses: Not hard at all   Food Insecurity: No Food Insecurity    Worried About Running Out of Food in the Last Year: Never true    Gaston of Food in the Last Year: Never true   Transportation Needs: No Transportation Needs    Lack of Transportation (Medical): No    Lack of Transportation (Non-Medical): No   Physical Activity: Not on file   Stress: Not on file   Social Connections: Not on file   Intimate Partner Violence: Not on file   Housing Stability: Low Risk     Unable to Pay for Housing in the Last Year: No    Number of Places Lived in the Last Year: 1    Unstable Housing in the Last Year: No      Medications and Allergies:     Current Outpatient Medications   Medication Sig Dispense Refill    gabapentin (NEURONTIN) 600 MG tablet Take 1 tablet (600 mg total) by mouth 2 (two) times a day 180 tablet 0    hydrochlorothiazide (HYDRODIURIL) 12 5 mg tablet Take 1 tablet (12 5 mg total) by mouth daily 90 tablet 0    nitrofurantoin (MACROBID) 100 mg capsule Take 1 capsule (100 mg total) by mouth in the morning 90 capsule 0    omeprazole (PriLOSEC) 20 mg delayed release capsule Take 1 capsule (20 mg total) by mouth daily 90 capsule 0    pravastatin (PRAVACHOL) 20 mg tablet Take 1 tablet (20 mg total) by mouth daily 90 tablet 3    aspirin (ECOTRIN LOW STRENGTH) 81 mg EC tablet Take 1 tablet (81 mg total) by mouth daily  0    Cholecalciferol (VITAMIN D3) 1000 units CAPS Take 1,000 Units by mouth daily       Cyanocobalamin (VITAMIN B12 PO) Take by mouth      Multiple Vitamins-Minerals (PRESERVISION AREDS PO) Take by mouth 2 (two) times a day       No current facility-administered medications for this visit       No Known Allergies   Immunizations:     Immunization History   Administered Date(s) Administered    COVID-19 PFIZER VACCINE 0 3 ML IM 04/19/2021, 05/13/2021    Influenza Split High Dose Preservative Free IM 09/09/2016    Influenza, high dose seasonal 0 7 mL 11/27/2018, 09/11/2020    Pneumococcal Conjugate 13-Valent 09/09/2016    Pneumococcal Conjugate Vaccine 20-valent (Pcv20), Polysace 08/25/2022    Pneumococcal Polysaccharide PPV23 04/23/2013      Health Maintenance: There are no preventive care reminders to display for this patient  Topic Date Due    COVID-19 Vaccine (3 - Booster for Pfizer series) 10/13/2021    Influenza Vaccine (1) 09/01/2022      Medicare Screening Tests and Risk Assessments:     Mitchel Dumont 89 is here for her Subsequent Wellness visit  Last Medicare Wellness visit information reviewed, patient interviewed, no change since last AWV  Health Risk Assessment:   Patient rates overall health as very good  Patient feels that their physical health rating is slightly worse  Patient is satisfied with their life  Eyesight was rated as slightly worse  Hearing was rated as slightly worse  Patient feels that their emotional and mental health rating is same  Patients states they are never, rarely angry  Patient states they are never, rarely unusually tired/fatigued  Pain experienced in the last 7 days has been none  Patient states that she has experienced no weight loss or gain in last 6 months  Depression Screening:   PHQ-2 Score: 0      Fall Risk Screening: In the past year, patient has experienced: history of falling in past year    Number of falls: 2 or more  Injured during fall?: No    Feels unsteady when standing or walking?: No    Worried about falling?: No      Urinary Incontinence Screening:   Patient has leaked urine accidently in the last six months  Wears Depends    Home Safety:  Patient has trouble with stairs inside or outside of their home  Patient has working smoke alarms and has no working carbon monoxide detector  Home safety hazards include: none  Nutrition:   Current diet is Regular       Medications:   Patient is not currently taking any over-the-counter supplements  Patient is not able to manage medications  Daughter gets meds out for her    Activities of Daily Living (ADLs)/Instrumental Activities of Daily Living (IADLs):   Walk and transfer into and out of bed and chair?: Yes  Dress and groom yourself?: Yes    Bathe or shower yourself?: Yes    Feed yourself?  Yes  Do your laundry/housekeeping?: Yes  Manage your money, pay your bills and track your expenses?: Yes  Make your own meals?: Yes    Do your own shopping?: No    Previous Hospitalizations:   Any hospitalizations or ED visits within the last 12 months?: Yes    How many hospitalizations have you had in the last year?: 1-2    Advance Care Planning:   Living will: No    Durable POA for healthcare: No    Advanced directive: No    Five wishes given: Yes      Comments: Has the information, just has not filled it out yet    Cognitive Screening:   Provider or family/friend/caregiver concerned regarding cognition?: No    PREVENTIVE SCREENINGS      Cardiovascular Screening:    General: Screening Not Indicated and History Lipid Disorder      Diabetes Screening:     General: Screening Current      Colorectal Cancer Screening:     General: Screening Not Indicated      Breast Cancer Screening:     General: Screening Not Indicated      Cervical Cancer Screening:    General: Screening Not Indicated      Osteoporosis Screening:    General: Screening Not Indicated      Abdominal Aortic Aneurysm (AAA) Screening:        General: Screening Not Indicated      Lung Cancer Screening:     General: Screening Not Indicated      Hepatitis C Screening:    General: Screening Not Indicated    Screening, Brief Intervention, and Referral to Treatment (SBIRT)    Screening    Typical number of drinks in a week: 0    AUDIT-C Screenin) How often did you have a drink containing alcohol in the past year? never  2) How many drinks did you have on a typical day when you were drinking in the past year? 0  3) How often did you have 6 or more drinks on one occasion in the past year? never    AUDIT-C Score: 0  Interpretation: Score 0-2 (female): Negative screen for alcohol misuse    Single Item Drug Screening:  How often have you used an illegal drug (including marijuana) or a prescription medication for non-medical reasons in the past year? never    Single Item Drug Screen Score: 0  Interpretation: Negative screen for possible drug use disorder    No exam data present     Physical Exam:     /64   Pulse 66   Wt 68 7 kg (151 lb 6 4 oz)   SpO2 93%   BMI 27 69 kg/m²     Physical Exam  Vitals and nursing note reviewed  Constitutional:       General: She is not in acute distress  Appearance: She is well-developed  HENT:      Head: Normocephalic and atraumatic  Right Ear: External ear normal       Left Ear: External ear normal       Nose: Nose normal    Eyes:      General:         Right eye: No discharge  Left eye: No discharge  Pupils: Pupils are equal, round, and reactive to light  Neck:      Thyroid: No thyromegaly  Cardiovascular:      Rate and Rhythm: Normal rate and regular rhythm  Heart sounds: Normal heart sounds  No murmur heard  Pulmonary:      Effort: Pulmonary effort is normal  No respiratory distress  Breath sounds: Normal breath sounds  No wheezing or rales  Musculoskeletal:      Cervical back: Neck supple  Deformity, tenderness and bony tenderness present  Pain with movement present  Decreased range of motion  Right lower leg: Edema present  Lymphadenopathy:      Cervical: No cervical adenopathy  Neurological:      Mental Status: She is alert and oriented to person, place, and time     Psychiatric:         Mood and Affect: Mood normal          Behavior: Behavior normal           Janice Valdovinos MD

## 2022-08-25 NOTE — PATIENT INSTRUCTIONS
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 and on the muscles in the back of the neck  Okay for topical diclofenac gel 1% 2-3 times daily  Medicare Preventive Visit Patient Instructions  Thank you for completing your Welcome to Medicare Visit or Medicare Annual Wellness Visit today  Your next wellness visit will be due in one year (8/26/2023)  The screening/preventive services that you may require over the next 5-10 years are detailed below  Some tests may not apply to you based off risk factors and/or age  Screening tests ordered at today's visit but not completed yet may show as past due  Also, please note that scanned in results may not display below  Preventive Screenings:  Service Recommendations Previous Testing/Comments   Colorectal Cancer Screening  * Colonoscopy    * Fecal Occult Blood Test (FOBT)/Fecal Immunochemical Test (FIT)  * Fecal DNA/Cologuard Test  * Flexible Sigmoidoscopy Age: 39-70 years old   Colonoscopy: every 10 years (may be performed more frequently if at higher risk)  OR  FOBT/FIT: every 1 year  OR  Cologuard: every 3 years  OR  Sigmoidoscopy: every 5 years  Screening may be recommended earlier than age 39 if at higher risk for colorectal cancer  Also, an individualized decision between you and your healthcare provider will decide whether screening between the ages of 74-80 would be appropriate  Colonoscopy: Not on file  FOBT/FIT: Not on file  Cologuard: Not on file  Sigmoidoscopy: Not on file    Screening Not Indicated     Breast Cancer Screening Age: 36 years old  Frequency: every 1-2 years  Not required if history of left and right mastectomy Mammogram: Not on file        Cervical Cancer Screening Between the ages of 21-29, pap smear recommended once every 3 years     Between the ages of 33-67, can perform pap smear with HPV co-testing every 5 years  Recommendations may differ for women with a history of total hysterectomy, cervical cancer, or abnormal pap smears in past  Pap Smear: Not on file    Screening Not Indicated   Hepatitis C Screening Once for adults born between 1945 and 1965  More frequently in patients at high risk for Hepatitis C Hep C Antibody: Not on file        Diabetes Screening 1-2 times per year if you're at risk for diabetes or have pre-diabetes Fasting glucose: 117 mg/dL (5/22/2018)  A1C: No results in last 5 years (No results in last 5 years)  Screening Current   Cholesterol Screening Once every 5 years if you don't have a lipid disorder  May order more often based on risk factors  Lipid panel: 11/02/2020    Screening Not Indicated  History Lipid Disorder     Other Preventive Screenings Covered by Medicare:  Abdominal Aortic Aneurysm (AAA) Screening: covered once if your at risk  You're considered to be at risk if you have a family history of AAA  Lung Cancer Screening: covers low dose CT scan once per year if you meet all of the following conditions: (1) Age 50-69; (2) No signs or symptoms of lung cancer; (3) Current smoker or have quit smoking within the last 15 years; (4) You have a tobacco smoking history of at least 20 pack years (packs per day multiplied by number of years you smoked); (5) You get a written order from a healthcare provider    Glaucoma Screening: covered annually if you're considered high risk: (1) You have diabetes OR (2) Family history of glaucoma OR (3)  aged 48 and older OR (3)  American aged 72 and older  Osteoporosis Screening: covered every 2 years if you meet one of the following conditions: (1) You're estrogen deficient and at risk for osteoporosis based off medical history and other findings; (2) Have a vertebral abnormality; (3) On glucocorticoid therapy for more than 3 months; (4) Have primary hyperparathyroidism; (5) On osteoporosis medications and need to assess response to drug therapy  Last bone density test (DXA Scan): Not on file  HIV Screening: covered annually if you're between the age of 12-76  Also covered annually if you are younger than 13 and older than 72 with risk factors for HIV infection  For pregnant patients, it is covered up to 3 times per pregnancy  Immunizations:  Immunization Recommendations   Influenza Vaccine Annual influenza vaccination during flu season is recommended for all persons aged >= 6 months who do not have contraindications   Pneumococcal Vaccine   * Pneumococcal conjugate vaccine = PCV13 (Prevnar 13), PCV15 (Vaxneuvance), PCV20 (Prevnar 20)  * Pneumococcal polysaccharide vaccine = PPSV23 (Pneumovax) Adults 25-60 years old: 1-3 doses may be recommended based on certain risk factors  Adults 72 years old: 1-2 doses may be recommended based off what pneumonia vaccine you previously received   Hepatitis B Vaccine 3 dose series if at intermediate or high risk (ex: diabetes, end stage renal disease, liver disease)   Tetanus (Td) Vaccine - COST NOT COVERED BY MEDICARE PART B Following completion of primary series, a booster dose should be given every 10 years to maintain immunity against tetanus  Td may also be given as tetanus wound prophylaxis  Tdap Vaccine - COST NOT COVERED BY MEDICARE PART B Recommended at least once for all adults  For pregnant patients, recommended with each pregnancy  Shingles Vaccine (Shingrix) - COST NOT COVERED BY MEDICARE PART B  2 shot series recommended in those aged 48 and above     Health Maintenance Due:  There are no preventive care reminders to display for this patient  Immunizations Due:      Topic Date Due    COVID-19 Vaccine (3 - Booster for Pfizer series) 10/13/2021    Influenza Vaccine (1) 09/01/2022     Advance Directives   What are advance directives? Advance directives are legal documents that state your wishes and plans for medical care   These plans are made ahead of time in case you lose your ability to make decisions for yourself  Advance directives can apply to any medical decision, such as the treatments you want, and if you want to donate organs  What are the types of advance directives? There are many types of advance directives, and each state has rules about how to use them  You may choose a combination of any of the following:  Living will: This is a written record of the treatment you want  You can also choose which treatments you do not want, which to limit, and which to stop at a certain time  This includes surgery, medicine, IV fluid, and tube feedings  Durable power of  for [facility]): This is a written record that states who you want to make healthcare choices for you when you are unable to make them for yourself  This person, called a proxy, is usually a family member or a friend  You may choose more than 1 proxy  Do not resuscitate (DNR) order:  A DNR order is used in case your heart stops beating or you stop breathing  It is a request not to have certain forms of treatment, such as CPR  A DNR order may be included in other types of advance directives  Medical directive: This covers the care that you want if you are in a coma, near death, or unable to make decisions for yourself  You can list the treatments you want for each condition  Treatment may include pain medicine, surgery, blood transfusions, dialysis, IV or tube feedings, and a ventilator (breathing machine)  Values history: This document has questions about your views, beliefs, and how you feel and think about life  This information can help others choose the care that you would choose  Why are advance directives important? An advance directive helps you control your care  Although spoken wishes may be used, it is better to have your wishes written down  Spoken wishes can be misunderstood, or not followed  Treatments may be given even if you do not want them   An advance directive may make it easier for your family to make difficult choices about your care  Fall Prevention    Fall prevention  includes ways to make your home and other areas safer  It also includes ways you can move more carefully to prevent a fall  Health conditions that cause changes in your blood pressure, vision, or muscle strength and coordination may increase your risk for falls  Medicines may also increase your risk for falls if they make you dizzy, weak, or sleepy  Fall prevention tips:   Stand or sit up slowly  Use assistive devices as directed  Wear shoes that fit well and have soles that   Wear a personal alarm  Stay active  Manage your medical conditions  Home Safety Tips:  Add items to prevent falls in the bathroom  Keep paths clear  Install bright lights in your home  Keep items you use often on shelves within reach  [place] or place reflective tape on the edges of your stairs  Urinary Incontinence   Urinary incontinence (UI)  is when you lose control of your bladder  UI develops because your bladder cannot store or empty urine properly  The 3 most common types of UI are stress incontinence, urge incontinence, or both  Medicines:   May be given to help strengthen your bladder control  Report any side effects of medication to your healthcare provider  Do pelvic muscle exercises often:  Your pelvic muscles help you stop urinating  Squeeze these muscles tight for 5 seconds, then relax for 5 seconds  Gradually work up to squeezing for 10 seconds  Do 3 sets of 15 repetitions a day, or as directed  This will help strengthen your pelvic muscles and improve bladder control  Train your bladder:  Go to the bathroom at set times, such as every 2 hours, even if you do not feel the urge to go  You can also try to hold your urine when you feel the urge to go  For example, hold your urine for 5 minutes when you feel the urge to go  As that becomes easier, hold your urine for 10 minutes  Self-care:   Keep a UI record  Write down how often you leak urine and how much you leak  Make a note of what you were doing when you leaked urine  Drink liquids as directed  You may need to limit the amount of liquid you drink to help control your urine leakage  Do not drink any liquid right before you go to bed  Limit or do not have drinks that contain caffeine or alcohol  Prevent constipation  Eat a variety of high-fiber foods  Good examples are high-fiber cereals, beans, vegetables, and whole-grain breads  Walking is the best way to trigger your intestines to have a bowel movement  Exercise regularly and maintain a healthy weight  Weight loss and exercise will decrease pressure on your bladder and help you control your leakage  Use a catheter as directed  to help empty your bladder  A catheter is a tiny, plastic tube that is put into your bladder to drain your urine  Go to behavior therapy as directed  Behavior therapy may be used to help you learn to control your urge to urinate  Weight Management   Why it is important to manage your weight:  Being overweight increases your risk of health conditions such as heart disease, high blood pressure, type 2 diabetes, and certain types of cancer  It can also increase your risk for osteoarthritis, sleep apnea, and other respiratory problems  Aim for a slow, steady weight loss  Even a small amount of weight loss can lower your risk of health problems  How to lose weight safely:  A safe and healthy way to lose weight is to eat fewer calories and get regular exercise  You can lose up about 1 pound a week by decreasing the number of calories you eat by 500 calories each day  Healthy meal plan for weight management:  A healthy meal plan includes a variety of foods, contains fewer calories, and helps you stay healthy  A healthy meal plan includes the following:  Eat whole-grain foods more often  A healthy meal plan should contain fiber   Fiber is the part of grains, fruits, and vegetables that is not broken down by your body  Whole-grain foods are healthy and provide extra fiber in your diet  Some examples of whole-grain foods are whole-wheat breads and pastas, oatmeal, brown rice, and bulgur  Eat a variety of vegetables every day  Include dark, leafy greens such as spinach, kale, fidel greens, and mustard greens  Eat yellow and orange vegetables such as carrots, sweet potatoes, and winter squash  Eat a variety of fruits every day  Choose fresh or canned fruit (canned in its own juice or light syrup) instead of juice  Fruit juice has very little or no fiber  Eat low-fat dairy foods  Drink fat-free (skim) milk or 1% milk  Eat fat-free yogurt and low-fat cottage cheese  Try low-fat cheeses such as mozzarella and other reduced-fat cheeses  Choose meat and other protein foods that are low in fat  Choose beans or other legumes such as split peas or lentils  Choose fish, skinless poultry (chicken or turkey), or lean cuts of red meat (beef or pork)  Before you cook meat or poultry, cut off any visible fat  Use less fat and oil  Try baking foods instead of frying them  Add less fat, such as margarine, sour cream, regular salad dressing and mayonnaise to foods  Eat fewer high-fat foods  Some examples of high-fat foods include french fries, doughnuts, ice cream, and cakes  Eat fewer sweets  Limit foods and drinks that are high in sugar  This includes candy, cookies, regular soda, and sweetened drinks  Exercise:  Exercise at least 30 minutes per day on most days of the week  Some examples of exercise include walking, biking, dancing, and swimming  You can also fit in more physical activity by taking the stairs instead of the elevator or parking farther away from stores  Ask your healthcare provider about the best exercise plan for you        © Copyright [employer] 2018 Information is for End User's use only and may not be sold, redistributed or otherwise used for commercial purposes   All illustrations and images included in CareNotes® are the copyrighted property of A D A M , Inc  or [facility] Indra Jefferson

## 2022-09-25 DIAGNOSIS — I10 ESSENTIAL HYPERTENSION: ICD-10-CM

## 2022-09-25 DIAGNOSIS — K21.9 GASTROESOPHAGEAL REFLUX DISEASE: ICD-10-CM

## 2022-09-25 RX ORDER — OMEPRAZOLE 20 MG/1
CAPSULE, DELAYED RELEASE ORAL
Qty: 90 CAPSULE | Refills: 0 | Status: SHIPPED | OUTPATIENT
Start: 2022-09-25

## 2022-09-25 RX ORDER — HYDROCHLOROTHIAZIDE 12.5 MG/1
TABLET ORAL
Qty: 90 TABLET | Refills: 0 | Status: SHIPPED | OUTPATIENT
Start: 2022-09-25

## 2022-10-12 PROBLEM — N39.0 UTI (URINARY TRACT INFECTION): Status: RESOLVED | Noted: 2018-11-26 | Resolved: 2022-10-12

## 2022-11-14 DIAGNOSIS — G60.9 IDIOPATHIC PERIPHERAL NEUROPATHY: ICD-10-CM

## 2022-11-14 RX ORDER — GABAPENTIN 600 MG/1
TABLET ORAL
Qty: 180 TABLET | Refills: 0 | Status: SHIPPED | OUTPATIENT
Start: 2022-11-14

## 2022-12-24 DIAGNOSIS — K21.9 GASTROESOPHAGEAL REFLUX DISEASE: ICD-10-CM

## 2022-12-24 DIAGNOSIS — I10 ESSENTIAL HYPERTENSION: ICD-10-CM

## 2022-12-24 RX ORDER — HYDROCHLOROTHIAZIDE 12.5 MG/1
TABLET ORAL
Qty: 90 TABLET | Refills: 0 | Status: SHIPPED | OUTPATIENT
Start: 2022-12-24

## 2022-12-24 RX ORDER — OMEPRAZOLE 20 MG/1
CAPSULE, DELAYED RELEASE ORAL
Qty: 90 CAPSULE | Refills: 0 | Status: SHIPPED | OUTPATIENT
Start: 2022-12-24

## 2023-02-08 DIAGNOSIS — Z87.440 HISTORY OF RECURRENT UTIS: ICD-10-CM

## 2023-02-08 DIAGNOSIS — E78.2 MIXED HYPERLIPIDEMIA: ICD-10-CM

## 2023-02-08 DIAGNOSIS — I10 ESSENTIAL HYPERTENSION: ICD-10-CM

## 2023-02-08 DIAGNOSIS — K21.9 GASTROESOPHAGEAL REFLUX DISEASE: ICD-10-CM

## 2023-02-08 DIAGNOSIS — G60.9 IDIOPATHIC PERIPHERAL NEUROPATHY: ICD-10-CM

## 2023-02-08 RX ORDER — NITROFURANTOIN 25; 75 MG/1; MG/1
100 CAPSULE ORAL DAILY
Qty: 90 CAPSULE | Refills: 0 | Status: SHIPPED | OUTPATIENT
Start: 2023-02-08

## 2023-02-08 NOTE — TELEPHONE ENCOUNTER
Medication Refill Request     Name  Nitrofurantoin  Dose/Frequency 100mg daily  Quantity 90  Verified pharmacy   [x]  Verified ordering Provider   [x]  Does patient have enough for the next 3 days?  Yes [x] No []

## 2023-02-09 RX ORDER — HYDROCHLOROTHIAZIDE 12.5 MG/1
12.5 TABLET ORAL DAILY
Qty: 90 TABLET | Refills: 0 | Status: SHIPPED | OUTPATIENT
Start: 2023-02-09

## 2023-02-09 RX ORDER — PRAVASTATIN SODIUM 20 MG
20 TABLET ORAL DAILY
Qty: 90 TABLET | Refills: 3 | Status: SHIPPED | OUTPATIENT
Start: 2023-02-09

## 2023-02-09 RX ORDER — OMEPRAZOLE 20 MG/1
20 CAPSULE, DELAYED RELEASE ORAL DAILY
Qty: 90 CAPSULE | Refills: 0 | Status: SHIPPED | OUTPATIENT
Start: 2023-02-09

## 2023-02-09 RX ORDER — GABAPENTIN 600 MG/1
600 TABLET ORAL 2 TIMES DAILY
Qty: 180 TABLET | Refills: 0 | Status: SHIPPED | OUTPATIENT
Start: 2023-02-09

## 2023-02-21 ENCOUNTER — RA CDI HCC (OUTPATIENT)
Dept: OTHER | Facility: HOSPITAL | Age: 88
End: 2023-02-21

## 2023-02-21 NOTE — PROGRESS NOTES
I11 0  [facility]  coding opportunities          Chart Reviewed number of suggestions sent to Provider: 1     Patients Insurance     Medicare Insurance: [employer]

## 2023-02-28 ENCOUNTER — OFFICE VISIT (OUTPATIENT)
Dept: FAMILY MEDICINE CLINIC | Facility: CLINIC | Age: 88
End: 2023-02-28

## 2023-02-28 VITALS
BODY MASS INDEX: 27.98 KG/M2 | TEMPERATURE: 97.5 F | DIASTOLIC BLOOD PRESSURE: 74 MMHG | SYSTOLIC BLOOD PRESSURE: 144 MMHG | OXYGEN SATURATION: 95 % | WEIGHT: 153 LBS | HEART RATE: 74 BPM

## 2023-02-28 DIAGNOSIS — K21.9 GASTROESOPHAGEAL REFLUX DISEASE WITHOUT ESOPHAGITIS: ICD-10-CM

## 2023-02-28 DIAGNOSIS — I48.0 PAROXYSMAL ATRIAL FIBRILLATION (HCC): ICD-10-CM

## 2023-02-28 DIAGNOSIS — H35.3211 EXUDATIVE AGE-RELATED MACULAR DEGENERATION OF RIGHT EYE WITH ACTIVE CHOROIDAL NEOVASCULARIZATION (HCC): ICD-10-CM

## 2023-02-28 DIAGNOSIS — I50.32 CHRONIC DIASTOLIC HEART FAILURE (HCC): ICD-10-CM

## 2023-02-28 DIAGNOSIS — E78.2 MIXED HYPERLIPIDEMIA: ICD-10-CM

## 2023-02-28 DIAGNOSIS — I87.2 VENOUS INSUFFICIENCY: ICD-10-CM

## 2023-02-28 DIAGNOSIS — I10 PRIMARY HYPERTENSION: Primary | ICD-10-CM

## 2023-02-28 DIAGNOSIS — Z87.440 HISTORY OF RECURRENT UTI (URINARY TRACT INFECTION): ICD-10-CM

## 2023-02-28 PROBLEM — E27.40 UNSPECIFIED ADRENOCORTICAL INSUFFICIENCY (HCC): Status: ACTIVE | Noted: 2023-02-28

## 2023-02-28 PROBLEM — E27.40 UNSPECIFIED ADRENOCORTICAL INSUFFICIENCY (HCC): Status: RESOLVED | Noted: 2023-02-28 | Resolved: 2023-02-28

## 2023-02-28 NOTE — PROGRESS NOTES
[id number]         NAME: Srikanth Charles is a 80 y o  female  : 1930    MRN: [id number]  DATE: 2023  TIME: 12:22 PM    Assessment and Plan   Primary hypertension [I10]  1  Primary hypertension  CBC and differential    Comprehensive metabolic panel    CBC and differential    Comprehensive metabolic panel      2  Gastroesophageal reflux disease without esophagitis        3  Venous insufficiency        4  Paroxysmal atrial fibrillation (HCC)        5  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 eye with active choroidal neovascularization ([facility] )        6  Mixed hyperlipidemia  Comprehensive metabolic panel    Lipid Panel with Direct LDL reflex    Comprehensive metabolic panel    Lipid Panel with Direct LDL reflex      7  Chronic diastolic heart failure ([facility] )        8  History of recurrent UTI (urinary tract infection)            Chronic diastolic heart failure (HCC)  Wt Readings from Last 3 Encounters:   23 69 4 kg (153 lb)   22 68 7 kg (151 lb 6 4 oz)   22 68 kg (150 lb)     Some mild foot edema  No significant shortness of breath  Patient prefers not to go back to cardiology  We will continue to monitor  Paroxysmal atrial fibrillation (HCC)  No recent episodes  Remains on aspirin only  Patient Instructions     Patient Instructions   Continue current medications  Try to get her legs elevated at least level or above the level of your heart to help with swelling  Continue to wear your compression stockings  Chief Complaint     Chief Complaint   Patient presents with   • Follow-up         History of Present Illness       Patient for medical follow-up multiple issues  No recent labs  1) hypertension-slightly elevated today  2) GERD-stable symptom pattern  3) paroxysmal atrial fibrillation-no recent episodes  Heart rate controlled  4) venous insufficiency-patient does have some compression stockings    Mostly of the feet  She does not really elevate her feet sufficiently during the day  5) hyperlipidemia-stable on statin  6) macular degeneration-patient has stopped seeing the eye doctor  7) history recurrent UTI  Currently on daily suppressive Macrodantin 100 mg  Has not had any recent UTI symptoms  Review of Systems   Review of Systems   Constitutional: Negative for activity change, appetite change, diaphoresis and fatigue  HENT: Negative for congestion, sinus pressure and sore throat  Respiratory: Positive for shortness of breath  Negative for cough, chest tightness and wheezing  Cardiovascular: Positive for leg swelling  Negative for chest pain and palpitations  Fast or slow heart rate   Gastrointestinal: Negative for abdominal pain, blood in stool, constipation, diarrhea, nausea and vomiting  Genitourinary: Negative for difficulty urinating, dysuria, frequency and hematuria  Musculoskeletal: Negative for arthralgias, gait problem, joint swelling and myalgias  Neurological: Negative for dizziness, light-headedness and headaches  Psychiatric/Behavioral: Negative for agitation, confusion, dysphoric mood and sleep disturbance  The patient is not nervous/anxious            Current Medications       Current Outpatient Medications:   •  aspirin (ECOTRIN LOW STRENGTH) 81 mg EC tablet, Take 1 tablet (81 mg total) by mouth daily, Disp: , Rfl: 0  •  Cholecalciferol (VITAMIN D3) 1000 units CAPS, Take 1,000 Units by mouth daily , Disp: , Rfl:   •  Cyanocobalamin (VITAMIN B12 PO), Take by mouth, Disp: , Rfl:   •  gabapentin (NEURONTIN) 600 MG tablet, Take 1 tablet (600 mg total) by mouth 2 (two) times a day, Disp: 180 tablet, Rfl: 0  •  hydrochlorothiazide (HYDRODIURIL) 12 5 mg tablet, Take 1 tablet (12 5 mg total) by mouth daily, Disp: 90 tablet, Rfl: 0  •  Multiple Vitamins-Minerals (PRESERVISION AREDS PO), Take by mouth 2 (two) times a day, Disp: , Rfl:   •  nitrofurantoin (MACROBID) 100 mg capsule, Take 1 capsule (100 mg total) by mouth in the morning, Disp: 90 capsule, Rfl: 0  •  omeprazole (PriLOSEC) 20 mg delayed release capsule, Take 1 capsule (20 mg total) by mouth daily, Disp: 90 capsule, Rfl: 0  •  pravastatin (PRAVACHOL) 20 mg tablet, Take 1 tablet (20 mg total) by mouth daily, Disp: 90 tablet, Rfl: 3    Current Allergies     Allergies as of 02/28/2023   • (No Known Allergies)            The following portions of the patient's history were reviewed and updated as appropriate: allergies, current medications, past family history, past medical history, past social history, past surgical history and problem list      Past Medical History:   Diagnosis Date   • [facility] ) 5/8/2022   • Balance disorder    • Chronic pain    • GERD (gastroesophageal reflux disease)    • Hard of hearing    • Hyperlipidemia    • Hypertension    • Hyponatremia 12/5/2020   • Low serum cortisol level 12/7/2020   • Neuropathy    • Pneumonia    • Pneumonia due to COVID-19 virus 12/5/2020   • Sepsis ([facility] ) 5/22/2018   • Tinnitus    • Unspecified adrenocortical insufficiency ([facility] ) 2/28/2023    Felt to be related to COVID-19 infection  No current symptoms  • UTI (urinary tract infection)        Past Surgical History:   Procedure Laterality Date   • EYE SURGERY     • HYSTERECTOMY     • TONSILLECTOMY         Family History   Problem Relation Age of Onset   • Stroke Mother    • Stroke Father    • Heart disease Daughter    • Substance Abuse Neg Hx    • Mental illness Neg Hx          Medications have been verified  Objective   /74   Pulse 74   Temp 97 5 °F (36 4 °C) (Tympanic)   Wt 69 4 kg (153 lb)   SpO2 95%   BMI 27 98 kg/m²        Physical Exam     Physical Exam  Vitals reviewed  Constitutional:       General: She is not in acute distress  Appearance: She is well-developed  She is not diaphoretic  HENT:      Head: Normocephalic and atraumatic        Right Ear: Tympanic membrane, ear canal and external ear normal       Left Ear: Tympanic membrane, ear canal and external ear normal       Nose: Nose normal  No mucosal edema or rhinorrhea  Right Sinus: No maxillary sinus tenderness  Left Sinus: No maxillary sinus tenderness  Mouth/Throat:      Mouth: Mucous membranes are not pale and not dry  Dentition: Normal dentition  Pharynx: Uvula midline  No oropharyngeal exudate  Eyes:      General:         Right eye: No discharge  Left eye: No discharge  Extraocular Movements: Extraocular movements intact  Conjunctiva/sclera: Conjunctivae normal       Pupils: Pupils are equal, round, and reactive to light  Neck:      Thyroid: No thyromegaly  Cardiovascular:      Rate and Rhythm: Normal rate and regular rhythm  Heart sounds: Normal heart sounds  No murmur heard  Pulmonary:      Effort: Pulmonary effort is normal  No respiratory distress  Breath sounds: Normal breath sounds  No wheezing or rales  Musculoskeletal:      Cervical back: Normal range of motion and neck supple  Right lower leg: No edema  Left lower leg: No edema  Lymphadenopathy:      Cervical: No cervical adenopathy  Skin:     Findings: No rash  Neurological:      Mental Status: She is alert and oriented to person, place, and time  Cranial Nerves: No cranial nerve deficit     Psychiatric:         Mood and Affect: Mood normal          Behavior: Behavior normal

## 2023-02-28 NOTE — PATIENT INSTRUCTIONS
Continue current medications  Try to get her legs elevated at least level or above the level of your heart to help with swelling  Continue to wear your compression stockings

## 2023-02-28 NOTE — ASSESSMENT & PLAN NOTE
Wt Readings from Last 3 Encounters:   02/28/23 69 4 kg (153 lb)   08/25/22 68 7 kg (151 lb 6 4 oz)   06/14/22 68 kg (150 lb)     Some mild foot edema  No significant shortness of breath  Patient prefers not to go back to cardiology  We will continue to monitor

## 2023-03-06 DIAGNOSIS — G60.9 IDIOPATHIC PERIPHERAL NEUROPATHY: ICD-10-CM

## 2023-03-06 RX ORDER — GABAPENTIN 600 MG/1
600 TABLET ORAL 2 TIMES DAILY
Qty: 180 TABLET | Refills: 0 | Status: SHIPPED | OUTPATIENT
Start: 2023-03-06

## 2023-04-05 LAB
ALBUMIN SERPL-MCNC: 3.9 G/DL (ref 3.6–5.1)
ALBUMIN/GLOB SERPL: 1.6 (CALC) (ref 1–2.5)
ALP SERPL-CCNC: 68 U/L (ref 37–153)
ALT SERPL-CCNC: 7 U/L (ref 6–29)
AST SERPL-CCNC: 13 U/L (ref 10–35)
BASOPHILS # BLD AUTO: 27 CELLS/UL (ref 0–200)
BASOPHILS NFR BLD AUTO: 0.4 %
BILIRUB SERPL-MCNC: 0.4 MG/DL (ref 0.2–1.2)
BUN SERPL-MCNC: 18 MG/DL (ref 7–25)
BUN/CREAT SERPL: 18 (CALC) (ref 6–22)
CALCIUM SERPL-MCNC: 9.6 MG/DL (ref 8.6–10.4)
CHLORIDE SERPL-SCNC: 97 MMOL/L (ref 98–110)
CHOLEST SERPL-MCNC: 169 MG/DL
CHOLEST/HDLC SERPL: 3.7 (CALC)
CO2 SERPL-SCNC: 34 MMOL/L (ref 20–32)
CREAT SERPL-MCNC: 1 MG/DL (ref 0.6–0.95)
EOSINOPHIL # BLD AUTO: 129 CELLS/UL (ref 15–500)
EOSINOPHIL NFR BLD AUTO: 1.9 %
ERYTHROCYTE [DISTWIDTH] IN BLOOD BY AUTOMATED COUNT: 12.1 % (ref 11–15)
GFR/BSA.PRED SERPLBLD CYS-BASED-ARV: 53 ML/MIN/1.73M2
GLOBULIN SER CALC-MCNC: 2.5 G/DL (CALC) (ref 1.9–3.7)
GLUCOSE SERPL-MCNC: 86 MG/DL (ref 65–99)
HCT VFR BLD AUTO: 33.7 % (ref 35–45)
HDLC SERPL-MCNC: 46 MG/DL
HGB BLD-MCNC: 11.2 G/DL (ref 11.7–15.5)
LDLC SERPL CALC-MCNC: 98 MG/DL (CALC)
LYMPHOCYTES # BLD AUTO: 1822 CELLS/UL (ref 850–3900)
LYMPHOCYTES NFR BLD AUTO: 26.8 %
MCH RBC QN AUTO: 29.3 PG (ref 27–33)
MCHC RBC AUTO-ENTMCNC: 33.2 G/DL (ref 32–36)
MCV RBC AUTO: 88.2 FL (ref 80–100)
MONOCYTES # BLD AUTO: 666 CELLS/UL (ref 200–950)
MONOCYTES NFR BLD AUTO: 9.8 %
NEUTROPHILS # BLD AUTO: 4155 CELLS/UL (ref 1500–7800)
NEUTROPHILS NFR BLD AUTO: 61.1 %
NONHDLC SERPL-MCNC: 123 MG/DL (CALC)
PLATELET # BLD AUTO: 204 THOUSAND/UL (ref 140–400)
PMV BLD REES-ECKER: 9.8 FL (ref 7.5–12.5)
POTASSIUM SERPL-SCNC: 4.5 MMOL/L (ref 3.5–5.3)
PROT SERPL-MCNC: 6.4 G/DL (ref 6.1–8.1)
RBC # BLD AUTO: 3.82 MILLION/UL (ref 3.8–5.1)
SODIUM SERPL-SCNC: 137 MMOL/L (ref 135–146)
TRIGL SERPL-MCNC: 146 MG/DL
WBC # BLD AUTO: 6.8 THOUSAND/UL (ref 3.8–10.8)

## 2023-06-05 ENCOUNTER — TELEPHONE (OUTPATIENT)
Dept: UROLOGY | Facility: AMBULATORY SURGERY CENTER | Age: 88
End: 2023-06-05

## 2023-06-05 DIAGNOSIS — Z87.440 HISTORY OF RECURRENT UTIS: ICD-10-CM

## 2023-06-05 RX ORDER — NITROFURANTOIN 25; 75 MG/1; MG/1
100 CAPSULE ORAL DAILY
Qty: 30 CAPSULE | Refills: 0 | Status: CANCELLED | OUTPATIENT
Start: 2023-06-05

## 2023-06-05 NOTE — PROGRESS NOTES
6/7/2023    Abhay Andrews  1/28/1930  [id number]        Assessment  -Recurrent urinary tract infections  -Overactive bladder    Discussion/Plan  Izzy Cunningham is a 80 y o  female being managed by our office    1  Recurrent urinary tract infections- patient unable to provide urine specimen in the office today  Provided patient with orders for urinalysis with microscopic and culture  She denies any symptoms of acute infection  Patient will remain on Macrobid 100 mg daily prophylactically  2  Overactive bladder-we discussed trialing Myrbetriq 25 mg daily for symptoms of urinary frequency and nocturia  Would avoid anticholinergics given her advanced age  Prescription sent to her pharmacy  Reviewed dietary behavioral modifications  Call with results of urine testing  She will then follow-up in 3 months for reevaluation with PVR assessment  Patient advised to call sooner with any questions or issues     -All questions answered, patient and daughter agree with plan     History of Present Illness  80 y o  female with a history of recurrent UTI presents today for follow up  Patient was seen in the office in June 2022  She is accompanied today by her daughter  She remains on prophylactic Macrobid 100 mg daily  Patient has had no recent urinary tract infections  She states she discontinued vaginal Estrace secondary to difficulty applying medication  Patient reports worsening urinary frequency and nocturia  She wears sanitary briefs daily for protection and notes irritation around the vagina and urethra  Patient had previously been prescribed trospium, but discontinued medication for possible side effects  She states she developed tremors in hands, but is unsure if it was related to this medication  Patient is requesting prescription for a different medication  She denies any episodes of gross hematuria  Review of Systems  Review of Systems   Constitutional: Negative  HENT: Negative      Respiratory: Negative  Cardiovascular: Negative  Gastrointestinal: Negative  Genitourinary: Positive for frequency  Negative for decreased urine volume, difficulty urinating, dysuria, flank pain, hematuria and urgency  Musculoskeletal: Negative  Skin: Negative  Neurological: Negative  Psychiatric/Behavioral: Negative  Past Medical History  Past Medical History:   Diagnosis Date   • SHERLYN (acute kidney injury) ([place] [id number] ) 5/8/2022   • Balance disorder    • Chronic pain    • GERD (gastroesophageal reflux disease)    • Hard of hearing    • Hyperlipidemia    • Hypertension    • Hyponatremia 12/5/2020   • Low serum cortisol level 12/7/2020   • Neuropathy    • Pneumonia    • Pneumonia due to COVID-19 virus 12/5/2020   • Sepsis ([place] [id number] ) 5/22/2018   • Tinnitus    • Unspecified adrenocortical insufficiency ([place] [id number] ) 2/28/2023    Felt to be related to COVID-19 infection  No current symptoms  • UTI (urinary tract infection)        Past Social History  Past Surgical History:   Procedure Laterality Date   • EYE SURGERY     • HYSTERECTOMY     • TONSILLECTOMY         Past Family History  Family History   Problem Relation Age of Onset   • Stroke Mother    • Stroke Father    • Heart disease Daughter    • Substance Abuse Neg Hx    • Mental illness Neg Hx        Past Social history  Social History     Socioeconomic History   • Marital status:       Spouse name: Not on file   • Number of children: Not on file   • Years of education: Not on file   • Highest education level: Not on file   Occupational History   • Not on file   Tobacco Use   • Smoking status: Never   • Smokeless tobacco: Never   Vaping Use   • Vaping Use: Never used   Substance and Sexual Activity   • Alcohol use: Not Currently     Comment: rarely   • Drug use: No   • Sexual activity: Not Currently   Other Topics Concern   • Not on file   Social History Narrative   • Not on file     Social Determinants of Health     Financial Resource Strain: Low Risk  (8/25/2022) Overall Financial Resource Strain (CARDIA)    • Difficulty of Paying Living Expenses: Not hard at all   Food Insecurity: Not on file   Transportation Needs: No Transportation Needs (8/25/2022)    PRAPARE - Transportation    • Lack of Transportation (Medical): No    • Lack of Transportation (Non-Medical): No   Physical Activity: Not on file   Stress: Not on file   Social Connections: Not on file   Intimate Partner Violence: Not on file   Housing Stability: Not on file       Current Medications  Current Outpatient Medications   Medication Sig Dispense Refill   • aspirin (ECOTRIN LOW STRENGTH) 81 mg EC tablet Take 1 tablet (81 mg total) by mouth daily  0   • Cholecalciferol (VITAMIN D3) 1000 units CAPS Take 1,000 Units by mouth daily      • Cyanocobalamin (VITAMIN B12 PO) Take by mouth     • gabapentin (NEURONTIN) 600 MG tablet Take 1 tablet (600 mg total) by mouth 2 (two) times a day 180 tablet 0   • hydrochlorothiazide (HYDRODIURIL) 12 5 mg tablet Take 1 tablet (12 5 mg total) by mouth daily 90 tablet 0   • Multiple Vitamins-Minerals (PRESERVISION AREDS PO) Take by mouth 2 (two) times a day     • nitrofurantoin (MACROBID) 100 mg capsule Take 1 capsule (100 mg total) by mouth in the morning 30 capsule 0   • omeprazole (PriLOSEC) 20 mg delayed release capsule Take 1 capsule (20 mg total) by mouth daily 90 capsule 0   • pravastatin (PRAVACHOL) 20 mg tablet Take 1 tablet (20 mg total) by mouth daily 90 tablet 3     No current facility-administered medications for this visit  Allergies  No Known Allergies    Past medical history, social history, family history, medications and allergies were reviewed  Vitals  There were no vitals filed for this visit  Physical Exam  Physical Exam  Constitutional:       Appearance: Normal appearance  She is well-developed  HENT:      Head: Normocephalic  Eyes:      Pupils: Pupils are equal, round, and reactive to light     Pulmonary:      Effort: Pulmonary effort is normal    Abdominal:      Palpations: Abdomen is soft  Musculoskeletal:         General: Normal range of motion  Cervical back: Normal range of motion  Right lower leg: Edema present  Left lower leg: Edema present  Comments: Gait unsteady, ambulates with walker   Skin:     General: Skin is warm and dry  Neurological:      General: No focal deficit present  Mental Status: She is alert and oriented to person, place, and time  Psychiatric:         Mood and Affect: Mood normal          Behavior: Behavior normal          Thought Content: Thought content normal          Judgment: Judgment normal          Results    I have personally reviewed all pertinent lab results and reviewed with patient  Lab Results   Component Value Date    BUN 18 04/05/2023    CALCIUM 9 6 04/05/2023    CL 97 (L) 04/05/2023    CO2 34 (H) 04/05/2023    CREATININE 1 00 (H) 04/05/2023    GLUCOSE 95 04/08/2019    K 4 5 04/05/2023     12/20/2016     Lab Results   Component Value Date    HCT 33 7 (L) 04/05/2023    HGB 11 2 (L) 04/05/2023    MCV 88 2 04/05/2023     04/05/2023    WBC 6 8 04/05/2023     No results found for this or any previous visit (from the past 1 hour(s))

## 2023-06-05 NOTE — TELEPHONE ENCOUNTER
"Patient is requesting medication and per last encounter with AP, \"Patient last seen in the office in June 2022 by Earl Nevarez   please call patient and assist with scheduling yearly follow up with AP\"    "

## 2023-06-05 NOTE — TELEPHONE ENCOUNTER
Spoke with Tony Rios and daughter and explained that she needs to be seen by us yearly for medication refills - she may want to ask for next time to have her PCP refill - the daughter understood

## 2023-06-07 ENCOUNTER — OFFICE VISIT (OUTPATIENT)
Dept: UROLOGY | Facility: HOSPITAL | Age: 88
End: 2023-06-07
Payer: MEDICARE

## 2023-06-07 VITALS
DIASTOLIC BLOOD PRESSURE: 72 MMHG | HEIGHT: 62 IN | HEART RATE: 85 BPM | WEIGHT: 150 LBS | OXYGEN SATURATION: 98 % | BODY MASS INDEX: 27.6 KG/M2 | SYSTOLIC BLOOD PRESSURE: 128 MMHG

## 2023-06-07 DIAGNOSIS — N39.0 RECURRENT UTI: Primary | ICD-10-CM

## 2023-06-07 DIAGNOSIS — N32.81 OAB (OVERACTIVE BLADDER): ICD-10-CM

## 2023-06-07 DIAGNOSIS — Z87.440 HISTORY OF RECURRENT UTIS: ICD-10-CM

## 2023-06-07 PROCEDURE — 99213 OFFICE O/P EST LOW 20 MIN: CPT | Performed by: NURSE PRACTITIONER

## 2023-06-07 RX ORDER — NITROFURANTOIN 25; 75 MG/1; MG/1
100 CAPSULE ORAL DAILY
Qty: 90 CAPSULE | Refills: 3 | Status: ON HOLD | OUTPATIENT
Start: 2023-06-07

## 2023-06-18 ENCOUNTER — APPOINTMENT (EMERGENCY)
Dept: RADIOLOGY | Facility: HOSPITAL | Age: 88
DRG: 871 | End: 2023-06-18
Payer: MEDICARE

## 2023-06-18 ENCOUNTER — APPOINTMENT (EMERGENCY)
Dept: CT IMAGING | Facility: HOSPITAL | Age: 88
DRG: 871 | End: 2023-06-18
Payer: MEDICARE

## 2023-06-18 ENCOUNTER — HOSPITAL ENCOUNTER (INPATIENT)
Facility: HOSPITAL | Age: 88
LOS: 7 days | Discharge: NURSING, REHAB OR LONG-TERM CARE | DRG: 871 | End: 2023-06-25
Attending: EMERGENCY MEDICINE | Admitting: INTERNAL MEDICINE
Payer: MEDICARE

## 2023-06-18 DIAGNOSIS — E87.1 ACUTE HYPONATREMIA: ICD-10-CM

## 2023-06-18 DIAGNOSIS — R26.2 AMBULATORY DYSFUNCTION: ICD-10-CM

## 2023-06-18 DIAGNOSIS — H92.09 EARACHE: ICD-10-CM
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
## 2023-06-18 DIAGNOSIS — R19.7 DIARRHEA, UNSPECIFIED TYPE: ICD-10-CM

## 2023-06-18 DIAGNOSIS — I50.33 ACUTE ON CHRONIC DIASTOLIC HEART FAILURE (HCC): ICD-10-CM

## 2023-06-18 DIAGNOSIS — R91.8 MULTIPLE PULMONARY NODULES: ICD-10-CM

## 2023-06-18 PROBLEM — N30.90 CYSTITIS: Status: ACTIVE | Noted: 2023-06-18

## 2023-06-18 LAB
ABO GROUP BLD: NORMAL
ALBUMIN SERPL BCP-MCNC: 4 G/DL (ref 3.5–5)
ALP SERPL-CCNC: 61 U/L (ref 34–104)
ALT SERPL W P-5'-P-CCNC: 7 U/L (ref 7–52)
ANION GAP SERPL CALCULATED.3IONS-SCNC: 9 MMOL/L (ref 4–13)
APTT PPP: 42 SECONDS (ref 23–37)
AST SERPL W P-5'-P-CCNC: 14 U/L (ref 13–39)
BACTERIA UR QL AUTO: ABNORMAL /HPF
BASOPHILS # BLD AUTO: 0.05 THOUSANDS/ÂΜL (ref 0–0.1)
BASOPHILS NFR BLD AUTO: 1 % (ref 0–1)
BILIRUB SERPL-MCNC: 0.37 MG/DL (ref 0.2–1)
BILIRUB UR QL STRIP: NEGATIVE
BLD GP AB SCN SERPL QL: NEGATIVE
BUN SERPL-MCNC: 12 MG/DL (ref 5–25)
CALCIUM SERPL-MCNC: 9.4 MG/DL (ref 8.4–10.2)
CHLORIDE SERPL-SCNC: 91 MMOL/L (ref 96–108)
CLARITY UR: ABNORMAL
CO2 SERPL-SCNC: 29 MMOL/L (ref 21–32)
COLOR UR: YELLOW
CREAT SERPL-MCNC: 0.92 MG/DL (ref 0.6–1.3)
EOSINOPHIL # BLD AUTO: 0.61 THOUSAND/ÂΜL (ref 0–0.61)
EOSINOPHIL NFR BLD AUTO: 6 % (ref 0–6)
ERYTHROCYTE [DISTWIDTH] IN BLOOD BY AUTOMATED COUNT: 12.6 % (ref 11.6–15.1)
GFR SERPL CREATININE-BSD FRML MDRD: 53 ML/MIN/1.73SQ M
GLUCOSE SERPL-MCNC: 104 MG/DL (ref 65–140)
GLUCOSE UR STRIP-MCNC: NEGATIVE MG/DL
HCT VFR BLD AUTO: 34.9 % (ref 34.8–46.1)
HGB BLD-MCNC: 11.1 G/DL (ref 11.5–15.4)
HGB UR QL STRIP.AUTO: ABNORMAL
IMM GRANULOCYTES # BLD AUTO: 0.05 THOUSAND/UL (ref 0–0.2)
IMM GRANULOCYTES NFR BLD AUTO: 1 % (ref 0–2)
INR PPP: 1.01 (ref 0.84–1.19)
KETONES UR STRIP-MCNC: NEGATIVE MG/DL
LACTATE SERPL-SCNC: 1.1 MMOL/L (ref 0.5–2)
LEUKOCYTE ESTERASE UR QL STRIP: ABNORMAL
LYMPHOCYTES # BLD AUTO: 1.41 THOUSANDS/ÂΜL (ref 0.6–4.47)
LYMPHOCYTES NFR BLD AUTO: 14 % (ref 14–44)
MCH RBC QN AUTO: 28.2 PG (ref 26.8–34.3)
MCHC RBC AUTO-ENTMCNC: 31.8 G/DL (ref 31.4–37.4)
MCV RBC AUTO: 89 FL (ref 82–98)
MONOCYTES # BLD AUTO: 1.06 THOUSAND/ÂΜL (ref 0.17–1.22)
MONOCYTES NFR BLD AUTO: 10 % (ref 4–12)
MUCOUS THREADS UR QL AUTO: ABNORMAL
NEUTROPHILS # BLD AUTO: 7.13 THOUSANDS/ÂΜL (ref 1.85–7.62)
NEUTS SEG NFR BLD AUTO: 68 % (ref 43–75)
NITRITE UR QL STRIP: NEGATIVE
NON-SQ EPI CELLS URNS QL MICRO: ABNORMAL /HPF
NRBC BLD AUTO-RTO: 0 /100 WBCS
PH UR STRIP.AUTO: 6.5 [PH]
PLATELET # BLD AUTO: 265 THOUSANDS/UL (ref 149–390)
PMV BLD AUTO: 8.5 FL (ref 8.9–12.7)
POTASSIUM SERPL-SCNC: 3.8 MMOL/L (ref 3.5–5.3)
PROCALCITONIN SERPL-MCNC: 0.1 NG/ML
PROT SERPL-MCNC: 7.3 G/DL (ref 6.4–8.4)
PROT UR STRIP-MCNC: ABNORMAL MG/DL
PROTHROMBIN TIME: 14 SECONDS (ref 11.6–14.5)
RBC # BLD AUTO: 3.93 MILLION/UL (ref 3.81–5.12)
RBC #/AREA URNS AUTO: ABNORMAL /HPF
RH BLD: NEGATIVE
SODIUM 24H UR-SCNC: 119 MOL/L
SODIUM SERPL-SCNC: 129 MMOL/L (ref 135–147)
SP GR UR STRIP.AUTO: 1.01 (ref 1–1.03)
SPECIMEN EXPIRATION DATE: NORMAL
TSH SERPL DL<=0.05 MIU/L-ACNC: 2.13 UIU/ML (ref 0.45–4.5)
URATE SERPL-MCNC: 4.6 MG/DL (ref 2–7.5)
UROBILINOGEN UR STRIP-ACNC: <2 MG/DL
WBC # BLD AUTO: 10.31 THOUSAND/UL (ref 4.31–10.16)
WBC #/AREA URNS AUTO: ABNORMAL /HPF

## 2023-06-18 PROCEDURE — 81001 URINALYSIS AUTO W/SCOPE: CPT | Performed by: EMERGENCY MEDICINE

## 2023-06-18 PROCEDURE — 99223 1ST HOSP IP/OBS HIGH 75: CPT | Performed by: PHYSICIAN ASSISTANT

## 2023-06-18 PROCEDURE — 99285 EMERGENCY DEPT VISIT HI MDM: CPT

## 2023-06-18 PROCEDURE — 85730 THROMBOPLASTIN TIME PARTIAL: CPT | Performed by: EMERGENCY MEDICINE

## 2023-06-18 PROCEDURE — 80053 COMPREHEN METABOLIC PANEL: CPT | Performed by: EMERGENCY MEDICINE

## 2023-06-18 PROCEDURE — 36415 COLL VENOUS BLD VENIPUNCTURE: CPT | Performed by: EMERGENCY MEDICINE

## 2023-06-18 PROCEDURE — 83930 ASSAY OF BLOOD OSMOLALITY: CPT | Performed by: PHYSICIAN ASSISTANT

## 2023-06-18 PROCEDURE — 87040 BLOOD CULTURE FOR BACTERIA: CPT | Performed by: EMERGENCY MEDICINE

## 2023-06-18 PROCEDURE — 74176 CT ABD & PELVIS W/O CONTRAST: CPT

## 2023-06-18 PROCEDURE — G1004 CDSM NDSC: HCPCS

## 2023-06-18 PROCEDURE — 87086 URINE CULTURE/COLONY COUNT: CPT | Performed by: EMERGENCY MEDICINE

## 2023-06-18 PROCEDURE — 71250 CT THORAX DX C-: CPT

## 2023-06-18 PROCEDURE — 71045 X-RAY EXAM CHEST 1 VIEW: CPT

## 2023-06-18 PROCEDURE — 72125 CT NECK SPINE W/O DYE: CPT

## 2023-06-18 PROCEDURE — 86900 BLOOD TYPING SEROLOGIC ABO: CPT | Performed by: EMERGENCY MEDICINE

## 2023-06-18 PROCEDURE — 83605 ASSAY OF LACTIC ACID: CPT | Performed by: EMERGENCY MEDICINE

## 2023-06-18 PROCEDURE — 84145 PROCALCITONIN (PCT): CPT | Performed by: EMERGENCY MEDICINE

## 2023-06-18 PROCEDURE — 72170 X-RAY EXAM OF PELVIS: CPT

## 2023-06-18 PROCEDURE — 85610 PROTHROMBIN TIME: CPT | Performed by: EMERGENCY MEDICINE

## 2023-06-18 PROCEDURE — 93005 ELECTROCARDIOGRAM TRACING: CPT

## 2023-06-18 PROCEDURE — 85025 COMPLETE CBC W/AUTO DIFF WBC: CPT | Performed by: EMERGENCY MEDICINE

## 2023-06-18 PROCEDURE — 84443 ASSAY THYROID STIM HORMONE: CPT | Performed by: PHYSICIAN ASSISTANT

## 2023-06-18 PROCEDURE — 83935 ASSAY OF URINE OSMOLALITY: CPT | Performed by: PHYSICIAN ASSISTANT

## 2023-06-18 PROCEDURE — 84300 ASSAY OF URINE SODIUM: CPT | Performed by: PHYSICIAN ASSISTANT

## 2023-06-18 PROCEDURE — 70450 CT HEAD/BRAIN W/O DYE: CPT

## 2023-06-18 PROCEDURE — 86850 RBC ANTIBODY SCREEN: CPT | Performed by: EMERGENCY MEDICINE

## 2023-06-18 PROCEDURE — 84550 ASSAY OF BLOOD/URIC ACID: CPT | Performed by: PHYSICIAN ASSISTANT

## 2023-06-18 PROCEDURE — 86901 BLOOD TYPING SEROLOGIC RH(D): CPT | Performed by: EMERGENCY MEDICINE

## 2023-06-18 RX ORDER — FUROSEMIDE 10 MG/ML
20 INJECTION INTRAMUSCULAR; INTRAVENOUS ONCE
Status: COMPLETED | OUTPATIENT
Start: 2023-06-18 | End: 2023-06-18

## 2023-06-18 RX ORDER — MELATONIN
1000 DAILY
Status: DISCONTINUED | OUTPATIENT
Start: 2023-06-19 | End: 2023-06-25 | Stop reason: HOSPADM

## 2023-06-18 RX ORDER — ACETAMINOPHEN 325 MG/1
650 TABLET ORAL EVERY 6 HOURS PRN
Status: DISCONTINUED | OUTPATIENT
Start: 2023-06-18 | End: 2023-06-25 | Stop reason: HOSPADM

## 2023-06-18 RX ORDER — PANTOPRAZOLE SODIUM 20 MG/1
20 TABLET, DELAYED RELEASE ORAL
Status: DISCONTINUED | OUTPATIENT
Start: 2023-06-19 | End: 2023-06-25 | Stop reason: HOSPADM

## 2023-06-18 RX ORDER — PRAVASTATIN SODIUM 20 MG
20 TABLET ORAL
Status: DISCONTINUED | OUTPATIENT
Start: 2023-06-19 | End: 2023-06-25 | Stop reason: HOSPADM

## 2023-06-18 RX ORDER — MAGNESIUM HYDROXIDE/ALUMINUM HYDROXICE/SIMETHICONE 120; 1200; 1200 MG/30ML; MG/30ML; MG/30ML
30 SUSPENSION ORAL EVERY 6 HOURS PRN
Status: DISCONTINUED | OUTPATIENT
Start: 2023-06-18 | End: 2023-06-25 | Stop reason: HOSPADM

## 2023-06-18 RX ORDER — ENOXAPARIN SODIUM 100 MG/ML
40 INJECTION SUBCUTANEOUS DAILY
Status: DISCONTINUED | OUTPATIENT
Start: 2023-06-19 | End: 2023-06-25 | Stop reason: HOSPADM

## 2023-06-18 RX ORDER — SENNOSIDES 8.6 MG
1 TABLET ORAL
Status: DISCONTINUED | OUTPATIENT
Start: 2023-06-18 | End: 2023-06-25 | Stop reason: HOSPADM

## 2023-06-18 RX ORDER — LANOLIN ALCOHOL/MO/W.PET/CERES
6 CREAM (GRAM) TOPICAL
Status: DISCONTINUED | OUTPATIENT
Start: 2023-06-19 | End: 2023-06-25 | Stop reason: HOSPADM

## 2023-06-18 RX ORDER — GABAPENTIN 300 MG/1
600 CAPSULE ORAL 2 TIMES DAILY
Status: DISCONTINUED | OUTPATIENT
Start: 2023-06-18 | End: 2023-06-25 | Stop reason: HOSPADM

## 2023-06-18 RX ADMIN — ACETAMINOPHEN 650 MG: 325 TABLET, FILM COATED ORAL at 22:26

## 2023-06-18 RX ADMIN — MELATONIN 6 MG: at 23:57

## 2023-06-18 RX ADMIN — PIPERACILLIN AND TAZOBACTAM 3.38 G: 3; .375 INJECTION, POWDER, LYOPHILIZED, FOR SOLUTION INTRAVENOUS at 21:56

## 2023-06-18 RX ADMIN — FUROSEMIDE 20 MG: 10 INJECTION, SOLUTION INTRAMUSCULAR; INTRAVENOUS at 22:01

## 2023-06-18 RX ADMIN — GABAPENTIN 600 MG: 300 CAPSULE ORAL at 21:58

## 2023-06-18 NOTE — ED PROVIDER NOTES
Emergency Department Trauma Note  Radha Rodrigues 80 y o  female MRN: [id number]  Unit/Bed#: /-01 Encounter: [id number]      Trauma Alert: Trauma Acuity: Trauma Evaluation  Model of Arrival: Mode of Arrival: Direct from scene via    Trauma Team: Current Providers  Attending Provider: Gt Appiah DO  Attending Provider: Sheralyn Ganser, MD  ED Physician: Gt Appiah DO  Registered Nurse: Ceci Ma RN  Advanced Practitioner: Melchor Brannon PA-C  Registered Nurse: Sage Perry RN  Patient Care Assistant: Krista Christine  Consultants:     None      History of Present Illness     Chief Complaint:   Chief Complaint   Patient presents with   • Fall     To ED via EMS after falling while walking with walker  Family reports that patient fell turning, family reports that her right neck was up against the walker  Notes neck pain     HPI:  Radha Rodrigues is a 80 y o  female who presents with neck and low back pain  Mechanism:Details of Incident: Pt states being in the kitchen and then turned around to put something in the oven and fell down  Head strike and neck pain  Injury Date: 06/18/23 Injury Time: 1700 Injury Occurence Location - [address] [place] [address] Way: [address] [place] [address]    80year-old female with history of dementia, hypertension, on aspirin was heard to fall at home this afternoon  Family came right to her side as per the medics  Patient found awake on the ground with her neck against the leg of her walker  She recalls standing in the kitchen and as she turned the walker slid out from underneath her causing her to fall  Patient complains of pain in the neck and lumbar spine  Denies other injury but is a poor historian  She appears hemodynamically stable and is cooperative  Very hard collar is in place          Review of Systems   Unable to perform ROS: Dementia       Historical Information     Immunizations:   Immunization History   Administered Date(s) Administered   • COVID-19 PFIZER VACCINE 0 3 ML IM 04/19/2021, 05/13/2021   • Influenza Split High Dose Preservative Free IM 09/09/2016   • Influenza, high dose seasonal 0 7 mL 11/27/2018, 09/11/2020   • Pneumococcal Conjugate 13-Valent 09/09/2016   • Pneumococcal Conjugate Vaccine 20-valent (Pcv20), Polysace 08/25/2022   • Pneumococcal Polysaccharide PPV23 04/23/2013       Past Medical History:   Diagnosis Date   • [facility] ) 5/8/2022   • Balance disorder    • Chronic pain    • GERD (gastroesophageal reflux disease)    • Hard of hearing    • Hyperlipidemia    • Hypertension    • Hyponatremia 12/5/2020   • Low serum cortisol level 12/7/2020   • Neuropathy    • Pneumonia    • Pneumonia due to COVID-19 virus 12/5/2020   • Sepsis ([facility] ) 5/22/2018   • Tinnitus    • Unspecified adrenocortical insufficiency ([place] [address] ) 2/28/2023    Felt to be related to COVID-19 infection  No current symptoms  • UTI (urinary tract infection)        Family History   Problem Relation Age of Onset   • Stroke Mother    • Stroke Father    • Heart disease Daughter    • Substance Abuse Neg Hx    • Mental illness Neg Hx      Past Surgical History:   Procedure Laterality Date   • EYE SURGERY     • HYSTERECTOMY     • TONSILLECTOMY       Social History     Tobacco Use   • Smoking status: Never     Passive exposure: Never   • Smokeless tobacco: Never   Vaping Use   • Vaping Use: Never used   Substance Use Topics   • Alcohol use: Not Currently     Comment: rarely   • Drug use: No     E-Cigarette/Vaping   • E-Cigarette Use Never User      E-Cigarette/Vaping Substances   • Nicotine No    • THC No    • CBD No    • Flavoring No    • Other No    • Unknown No        Family History: non-contributory    Meds/Allergies   Prior to Admission Medications   Prescriptions Last Dose Informant Patient Reported? Taking?    Cholecalciferol (VITAMIN D3) 1000 units CAPS 6/18/2023 Self Yes Yes   Sig: Take 1,000 Units by mouth daily    Cyanocobalamin (VITAMIN B12 PO) 6/18/2023 Self Yes Yes Sig: Take 1,000 mcg by mouth in the morning   Mirabegron ER 25 MG TB24 6/18/2023  No Yes   Sig: Take 25 mg by mouth in the morning   Multiple Vitamins-Minerals (PRESERVISION AREDS PO) 6/18/2023 Self Yes Yes   Sig: Take by mouth 2 (two) times a day   aspirin (ECOTRIN LOW STRENGTH) 81 mg EC tablet 6/18/2023 Self No Yes   Sig: Take 1 tablet (81 mg total) by mouth daily   gabapentin (NEURONTIN) 600 MG tablet 6/18/2023 Self No Yes   Sig: Take 1 tablet (600 mg total) by mouth 2 (two) times a day   hydrochlorothiazide (HYDRODIURIL) 12 5 mg tablet 6/18/2023 Self No Yes   Sig: Take 1 tablet (12 5 mg total) by mouth daily   nitrofurantoin (MACROBID) 100 mg capsule 6/18/2023  No Yes   Sig: Take 1 capsule (100 mg total) by mouth in the morning   omeprazole (PriLOSEC) 20 mg delayed release capsule 6/18/2023 Self No Yes   Sig: Take 1 capsule (20 mg total) by mouth daily   pravastatin (PRAVACHOL) 20 mg tablet 6/17/2023 Self No Yes   Sig: Take 1 tablet (20 mg total) by mouth daily      Facility-Administered Medications: None       No Known Allergies    PHYSICAL EXAM    PE limited by: Unsteady gait    Objective   Vitals:   First set: Temperature: 98 2 °F (36 8 °C) (06/18/23 1726)  Pulse: 88 (06/18/23 1726)  Respirations: 20 (06/18/23 1726)  Blood Pressure: (!) 177/95 (06/18/23 1730)  SpO2: 96 % (06/18/23 1726)    Primary Survey:   (A) Airway: Normal vocalizations  (B) Breathing: Symmetric air intake and chest rise  (C) Circulation: Pulses:   normal  (D) Disabliity:  GCS Total:  14  (E) Expose:  Completed    Secondary Survey: (Click on Physical Exam tab above)  Physical Exam  Vitals and nursing note reviewed  Constitutional:       General: She is not in acute distress  Appearance: She is well-developed and normal weight  She is not ill-appearing or diaphoretic  HENT:      Head: Normocephalic and atraumatic        Right Ear: Tympanic membrane, ear canal and external ear normal       Left Ear: Tympanic membrane, ear canal and external ear normal       Nose: Nose normal       Mouth/Throat:      Mouth: Mucous membranes are moist       Pharynx: Oropharynx is clear  Eyes:      General: No scleral icterus  Conjunctiva/sclera: Conjunctivae normal       Pupils: Pupils are equal, round, and reactive to light  Neck:      Vascular: No carotid bruit  Cardiovascular:      Rate and Rhythm: Normal rate and regular rhythm  Pulses: Normal pulses  Heart sounds: Normal heart sounds  No murmur heard  Pulmonary:      Effort: Pulmonary effort is normal       Breath sounds: Normal breath sounds  Chest:      Chest wall: No tenderness  Abdominal:      General: Bowel sounds are normal       Palpations: Abdomen is soft  Tenderness: There is no abdominal tenderness  There is no guarding or rebound  Musculoskeletal:         General: No deformity  Normal range of motion  Cervical back: Normal range of motion and neck supple  No tenderness  Skin:     General: Skin is warm and dry  Capillary Refill: Capillary refill takes less than 2 seconds  Findings: No bruising or rash  Neurological:      General: No focal deficit present  Mental Status: She is alert  Mental status is at baseline  She is disoriented  Cranial Nerves: No cranial nerve deficit  Coordination: Coordination normal       Gait: Gait abnormal       Deep Tendon Reflexes: Reflexes are normal and symmetric  Psychiatric:         Mood and Affect: Mood normal          Behavior: Behavior normal          Cervical spine cleared by clinical criteria?  No (imaging required)      Invasive Devices     Peripheral Intravenous Line  Duration           Peripheral IV 06/18/23 Left Antecubital <1 day                Lab Results:   Results Reviewed     Procedure Component Value Units Date/Time    Uric acid [[id number]]  (Normal) Collected: 06/18/23 1734    Lab Status: Final result Specimen: Blood from Arm, Right Updated: 06/18/23 2128     Uric Acid 4 6 mg/dL Narrative:      N-acetyl-p-benzoquinone imine (metabolite of Acetaminophen) will generate erroneously low results in samples for patients that have taken an overdose of Acetaminophen  TSH, 3rd generation [[id number]]  (Normal) Collected: 06/18/23 1734    Lab Status: Final result Specimen: Blood from Arm, Right Updated: 06/18/23 2128     TSH 3RD GENERATON 2 132 uIU/mL     Osmolality, urine [[id number]] Collected: 06/18/23 1953    Lab Status: In process Specimen: Urine, Clean Catch Updated: 06/18/23 2036    Urine Microscopic [[id number]]  (Abnormal) Collected: 06/18/23 1954    Lab Status: Final result Specimen: Urine, Clean Catch Updated: 06/18/23 2031     RBC, UA       Field obscured, unable to enumerate     /hpf     WBC, UA Innumerable /hpf      Epithelial Cells Moderate /hpf      Bacteria, UA Moderate /hpf      MUCUS THREADS None Seen    Urine culture [[id number]] Collected: 06/18/23 1954    Lab Status: In process Specimen: Urine, Clean Catch Updated: 06/18/23 2031    Lactic acid [[id number]]  (Normal) Collected: 06/18/23 1947    Lab Status: Final result Specimen: Blood from Arm, Right Updated: 06/18/23 2017     LACTIC ACID 1 1 mmol/L     Narrative:      Result may be elevated if tourniquet was used during collection      Sodium, urine, random [[id number]] Collected: 06/18/23 1953    Lab Status: Final result Specimen: Urine, Clean Catch Updated: 06/18/23 2012     Sodium, Ur 119    UA w Reflex to Microscopic w Reflex to Culture [[id number]]  (Abnormal) Collected: 06/18/23 1954    Lab Status: Final result Specimen: Urine, Clean Catch Updated: 06/18/23 2005     Color, UA Yellow     Clarity, UA Cloudy     Specific Gravity, UA 1 015     pH, UA 6 5     Leukocytes, UA Large     Nitrite, UA Negative     Protein, UA 30 (1+) mg/dl      Glucose, UA Negative mg/dl      Ketones, UA Negative mg/dl      Urobilinogen, UA <2 0 mg/dl      Bilirubin, UA Negative     Occult Blood, UA Moderate    Blood culture #2 [[id number]] Collected: 06/18/23 1947    Lab Status: In process Specimen: Blood from Arm, Right Updated: 06/18/23 1958    Osmolality-If this is regarding a toxic alcohol, STOP  Test is not routinely indicated  Please consult medical  on call for further guidance  [[id number]] Collected: 06/18/23 1953    Lab Status: In process Specimen: Blood from Arm, Right Updated: 06/18/23 1958    Blood culture #1 [[id number]] Collected: 06/18/23 1947    Lab Status:  In process Specimen: Blood from Arm, Right Updated: 06/18/23 1958    Procalcitonin [[id number]]  (Normal) Collected: 06/18/23 1734    Lab Status: Final result Specimen: Blood from Arm, Right Updated: 06/18/23 1947     Procalcitonin 0 10 ng/ml     Protime-INR [[id number]]  (Normal) Collected: 06/18/23 1734    Lab Status: Final result Specimen: Blood from Arm, Right Updated: 06/18/23 1804     Protime 14 0 seconds      INR 1 01    APTT [[id number]]  (Abnormal) Collected: 06/18/23 1734    Lab Status: Final result Specimen: Blood from Arm, Right Updated: 06/18/23 1804     PTT 42 seconds     Comprehensive metabolic panel [[id number]]  (Abnormal) Collected: 06/18/23 1734    Lab Status: Final result Specimen: Blood from Arm, Right Updated: 06/18/23 1758     Sodium 129 mmol/L      Potassium 3 8 mmol/L      Chloride 91 mmol/L      CO2 29 mmol/L      ANION GAP 9 mmol/L      BUN 12 mg/dL      Creatinine 0 92 mg/dL      Glucose 104 mg/dL      Calcium 9 4 mg/dL      AST 14 U/L      ALT 7 U/L      Alkaline Phosphatase 61 U/L      Total Protein 7 3 g/dL      Albumin 4 0 g/dL      Total Bilirubin 0 37 mg/dL      eGFR 53 ml/min/1 73sq m     Narrative:      [place] guidelines for Chronic Kidney Disease (CKD):   •  Stage 1 with normal or high GFR (GFR > 90 mL/min/1 73 square meters)  •  Stage 2 Mild CKD (GFR = 60-89 mL/min/1 73 square meters)  •  Stage 3A Moderate CKD (GFR = 45-59 mL/min/1 73 square meters)  •  Stage 3B Moderate CKD (GFR = 30-44 mL/min/1 73 square meters)  • Stage 4 Severe CKD (GFR = 15-29 mL/min/1 73 square meters)  •  Stage 5 End Stage CKD (GFR <15 mL/min/1 73 square meters)  Note: GFR calculation is accurate only with a steady state creatinine    CBC and differential [[id number]]  (Abnormal) Collected: 06/18/23 1734    Lab Status: Final result Specimen: Blood from Arm, Right Updated: 06/18/23 1750     WBC 10 31 Thousand/uL      RBC 3 93 Million/uL      Hemoglobin 11 1 g/dL      Hematocrit 34 9 %      MCV 89 fL      MCH 28 2 pg      MCHC 31 8 g/dL      RDW 12 6 %      MPV 8 5 fL      Platelets 814 Thousands/uL      nRBC 0 /100 WBCs      Neutrophils Relative 68 %      Immat GRANS % 1 %      Lymphocytes Relative 14 %      Monocytes Relative 10 %      Eosinophils Relative 6 %      Basophils Relative 1 %      Neutrophils Absolute 7 13 Thousands/µL      Immature Grans Absolute 0 05 Thousand/uL      Lymphocytes Absolute 1 41 Thousands/µL      Monocytes Absolute 1 06 Thousand/µL      Eosinophils Absolute 0 61 Thousand/µL      Basophils Absolute 0 05 Thousands/µL                  Imaging Studies:   Direct to CT: No  TRAUMA - CT head wo contrast   Final Result by Jordyn Flores MD (06/18 1802)      No acute intracranial abnormality  Workstation performed: [id number]         TRAUMA - CT spine cervical wo contrast   Final Result by Jordyn Flores MD (06/18 1805)      No acute fracture  Diffuse mottled osteopenia  Moderate cervical spondylosis  Chronic erosive changes along the dens may indicate underlying chronic inflammatory arthropathy  Workstation performed: [id number]         CT chest abdomen pelvis wo contrast   Final Result by Jordyn Flores MD (06/18 1822)      No acute traumatic CT findings  Eccentric bladder wall thickening along the left side  Cannot exclude underlying neoplasm  Cystoscopy would be more helpful  Multiple pulmonary nodules with the largest measuring up to 1 cm in the right lower lobe  Cannot exclude metastatic disease  Consider further evaluation with a PET CT study  Additional findings as above  Workstation performed: [id number]         CT recon only thoracolumbar   Final Result by Cindy Mederos MD (06/18 1826)      No acute fracture  Workstation performed: [id number]         XR Trauma chest portable   Final Result by Cindy Mederos MD (06/18 1827)      Mild pulmonary vascular congestion  The pulmonary nodules seen on the CT study are difficult to visualize on this radiograph  Large hiatal hernia                  Workstation performed: [id number]         XR Trauma pelvis ap only 1 or 2 vw   Final Result by Cindy Mederos MD (06/18 1828)      No acute osseous abnormality  Workstation performed: [id number]               Procedures  ECG 12 Lead Documentation Only    Date/Time: 6/18/2023 11:52 PM    Performed by: Alyson Dailey DO  Authorized by: Alyson Dailey DO    ECG reviewed by me, the ED Provider: yes    Patient location:  ED  Previous ECG:     Previous ECG:  Compared to current    Similarity:  No change    Comparison to cardiac monitor: Yes    Rate:     ECG rate assessment: normal    Rhythm:     Rhythm: sinus rhythm and A-V block    Ectopy:     Ectopy: none    QRS:     QRS axis:  Normal    QRS intervals: Wide  Conduction:     Conduction: abnormal      Abnormal conduction: complete LBBB and 1st degree               ED Course  ED Course as of 06/19/23 0005   Sydni Jun 18, 2023   1916 Patient crying in pain after having urinary incontinence  States that the burning in the periurethral region  On exam there is redness and some white exudates  This is consistent with vaginal candidiasis  We had her walk here with a walker  She is very unstable and daughter feels that it is unsafe for her to care for at home tonight  Labs show acute hyponatremia which needs work-up and CT shows multiple pulmonary nodules and abnormality of the urinary bladder  Texted Slim for admission             Medical Decision Making  55-year-old female from home with amatory dysfunction, frequent falls  This patient had a unwitnessed fall but it was heard and family came to her side immediately  Awake and alert currently complaining of pain in her neck upper leg  Concern for ischemic or hemorrhagic stroke, intracranial hemorrhage, cervical spine fracture, right hip strain versus fracture  Pelvic fracture, dehydration, electrolyte abnormality  Hips and imaging reviewed  Patient is a bit difficult time ambulating and also complains of vaginal and urethral discomfort upon urination  Appears to have a yeast infection  Has history of UTIs and will check urine  Discussed with SLIM for admission  Acute hyponatremia: acute illness or injury  Ambulatory dysfunction: chronic illness or injury with exacerbation, progression, or side effects of treatment  Fall from standing, initial encounter: acute illness or injury  Multiple pulmonary nodules: acute illness or injury  Amount and/or Complexity of Data Reviewed  Labs: ordered  Radiology: ordered  Risk  Decision regarding hospitalization  Disposition  [facility] Transfer: No  Final diagnoses:   Fall from standing, initial encounter   Acute hyponatremia   Multiple pulmonary nodules   Ambulatory dysfunction     Time reflects when diagnosis was documented in both MDM as applicable and the Disposition within this note     Time User Action Codes Description Comment    6/18/2023  7:46 PM Sandeep Kohli Add [I51  XXXA] Fall from standing, initial encounter     6/18/2023  7:46 PM Sandeep Kohli Add [E87 1] Acute hyponatremia     6/18/2023  7:46 PM Sandeep Kohli Add [R91 8] Multiple pulmonary nodules     6/18/2023  7:46 PM Snadeep Kohli Add [R26 2] Ambulatory dysfunction       ED Disposition     ED Disposition   Admit    Condition   Stable    Date/Time   Sun Jun 18, 2023  7:50 PM    Comment   Case was discussed with NADIA TORRES and the patient's admission status was agreed to be Admission Status: inpatient status to the service of Dr Heide Woodward   Follow-up Information    None       Current Discharge Medication List      CONTINUE these medications which have NOT CHANGED    Details   aspirin (ECOTRIN LOW STRENGTH) 81 mg EC tablet Take 1 tablet (81 mg total) by mouth daily  Refills: 0    Associated Diagnoses: Essential hypertension      Cholecalciferol (VITAMIN D3) 1000 units CAPS Take 1,000 Units by mouth daily       Cyanocobalamin (VITAMIN B12 PO) Take 1,000 mcg by mouth in the morning      gabapentin (NEURONTIN) 600 MG tablet Take 1 tablet (600 mg total) by mouth 2 (two) times a day  Qty: 180 tablet, Refills: 0    Associated Diagnoses: Idiopathic peripheral neuropathy      hydrochlorothiazide (HYDRODIURIL) 12 5 mg tablet Take 1 tablet (12 5 mg total) by mouth daily  Qty: 90 tablet, Refills: 0    Associated Diagnoses: Essential hypertension      Mirabegron ER 25 MG TB24 Take 25 mg by mouth in the morning  Qty: 30 tablet, Refills: 3    Associated Diagnoses: OAB (overactive bladder)      Multiple Vitamins-Minerals (PRESERVISION AREDS PO) Take by mouth 2 (two) times a day      nitrofurantoin (MACROBID) 100 mg capsule Take 1 capsule (100 mg total) by mouth in the morning  Qty: 90 capsule, Refills: 3    Associated Diagnoses: History of recurrent UTIs      omeprazole (PriLOSEC) 20 mg delayed release capsule Take 1 capsule (20 mg total) by mouth daily  Qty: 90 capsule, Refills: 0    Associated Diagnoses: Gastroesophageal reflux disease      pravastatin (PRAVACHOL) 20 mg tablet Take 1 tablet (20 mg total) by mouth daily  Qty: 90 tablet, Refills: 3    Associated Diagnoses: Mixed hyperlipidemia           No discharge procedures on file      PDMP Review       Value Time User    PDMP Reviewed  Yes 5/13/2022 10:39 AM Roderick Corrales MD          ED Provider  Electronically Signed by         Zenobia Clements DO  06/19/23 0005

## 2023-06-18 NOTE — ASSESSMENT & PLAN NOTE
· Na 129 on admission  · Will obtain serum osmolality, urine osmolality, urine sodium, uric acid, TSH, and AM cortisol   · Suspect hypervolemia as etiology as patient examines volume overloaded  · Give Lasix 20 Mg IV x1 and monitor urine output

## 2023-06-18 NOTE — ASSESSMENT & PLAN NOTE
· History of recurrent UTIs, follows with urology and is on prophylactic Macrobid  · Prior growth of ESBL E  coli and Enterococcus faecalis in 2019, however more recent urine cultures only growing pansensitive E  coli  · Admission UA consistent with cystitis, see above  · Hold Macrobid in setting of acute infection

## 2023-06-18 NOTE — ASSESSMENT & PLAN NOTE
· Presents to the ED with fall while using walker this afternoon  · Trauma work-up negative  · PT, OT, and CM consults  · Neuro exam on admission with 4-5 strength in bilateral lower extremities, 5 out of strength in bilateral upper extremities -no focal weakness    Endorses numbness in bilateral feet (unchanged from baseline neuropathy per patient)  · Suspect that generalized weakness secondary to cystitis  · Continue antibiotics

## 2023-06-18 NOTE — ASSESSMENT & PLAN NOTE
· Hx of paroxysmal Afib - last EKG with Aifb 12/2020  · Previously on eliquis as of 2021, however this was stopped shortly after per daughter for unclear reasons  · Rate controlled on admission at 96 bpm, EKG on admit showing sinus rhythm

## 2023-06-19 ENCOUNTER — APPOINTMENT (INPATIENT)
Dept: NON INVASIVE DIAGNOSTICS | Facility: HOSPITAL | Age: 88
DRG: 871 | End: 2023-06-19
Payer: MEDICARE

## 2023-06-19 PROBLEM — I50.33 ACUTE ON CHRONIC DIASTOLIC HEART FAILURE (HCC): Status: ACTIVE | Noted: 2022-05-08

## 2023-06-19 LAB
ALBUMIN SERPL BCP-MCNC: 3.8 G/DL (ref 3.5–5)
ALP SERPL-CCNC: 60 U/L (ref 34–104)
ALT SERPL W P-5'-P-CCNC: 9 U/L (ref 7–52)
ANION GAP SERPL CALCULATED.3IONS-SCNC: 9 MMOL/L (ref 4–13)
AORTIC ROOT: 2.9 CM
APICAL FOUR CHAMBER EJECTION FRACTION: 68 %
ASCENDING AORTA: 2.7 CM
AST SERPL W P-5'-P-CCNC: 29 U/L (ref 13–39)
BILIRUB SERPL-MCNC: 0.61 MG/DL (ref 0.2–1)
BNP SERPL-MCNC: 161 PG/ML (ref 0–100)
BUN SERPL-MCNC: 12 MG/DL (ref 5–25)
CALCIUM SERPL-MCNC: 9.3 MG/DL (ref 8.4–10.2)
CHLORIDE SERPL-SCNC: 88 MMOL/L (ref 96–108)
CO2 SERPL-SCNC: 30 MMOL/L (ref 21–32)
CREAT SERPL-MCNC: 0.94 MG/DL (ref 0.6–1.3)
E WAVE DECELERATION TIME: 230 MS
ERYTHROCYTE [DISTWIDTH] IN BLOOD BY AUTOMATED COUNT: 12.9 % (ref 11.6–15.1)
FRACTIONAL SHORTENING: 32 (ref 28–44)
GFR SERPL CREATININE-BSD FRML MDRD: 52 ML/MIN/1.73SQ M
GLUCOSE SERPL-MCNC: 108 MG/DL (ref 65–140)
HCT VFR BLD AUTO: 36.4 % (ref 34.8–46.1)
HGB BLD-MCNC: 11.8 G/DL (ref 11.5–15.4)
INTERVENTRICULAR SEPTUM IN DIASTOLE (PARASTERNAL SHORT AXIS VIEW): 1 CM
INTERVENTRICULAR SEPTUM: 1 CM (ref 0.6–1.1)
LAAS-AP2: 14 CM2
LAAS-AP4: 23.9 CM2
LEFT ATRIUM SIZE: 3.5 CM
LEFT INTERNAL DIMENSION IN SYSTOLE: 2.1 CM (ref 2.1–4)
LEFT VENTRICLE DIASTOLIC VOLUME (MOD BIPLANE): 95 ML
LEFT VENTRICLE SYSTOLIC VOLUME (MOD BIPLANE): 35 ML
LEFT VENTRICULAR INTERNAL DIMENSION IN DIASTOLE: 3.1 CM (ref 3.5–6)
LEFT VENTRICULAR POSTERIOR WALL IN END DIASTOLE: 1 CM
LEFT VENTRICULAR STROKE VOLUME: 24 ML
LV EF: 63 %
LVSV (TEICH): 24 ML
MAGNESIUM SERPL-MCNC: 1.7 MG/DL (ref 1.9–2.7)
MCH RBC QN AUTO: 28.2 PG (ref 26.8–34.3)
MCHC RBC AUTO-ENTMCNC: 32.4 G/DL (ref 31.4–37.4)
MCV RBC AUTO: 87 FL (ref 82–98)
MV E'TISSUE VEL-SEP: 7 CM/S
MV PEAK A VEL: 1.04 M/S
MV PEAK E VEL: 96 CM/S
MV STENOSIS PRESSURE HALF TIME: 67 MS
MV VALVE AREA P 1/2 METHOD: 3.28
OSMOLALITY UR/SERPL-RTO: 273 MMOL/KG (ref 282–298)
OSMOLALITY UR: 420 MMOL/KG
PHOSPHATE SERPL-MCNC: 3.5 MG/DL (ref 2.3–4.1)
PLATELET # BLD AUTO: 254 THOUSANDS/UL (ref 149–390)
PMV BLD AUTO: 8.4 FL (ref 8.9–12.7)
POTASSIUM SERPL-SCNC: 4.8 MMOL/L (ref 3.5–5.3)
PROCALCITONIN SERPL-MCNC: 0.13 NG/ML
PROT SERPL-MCNC: 7.6 G/DL (ref 6.4–8.4)
RA PRESSURE ESTIMATED: 3 MMHG
RBC # BLD AUTO: 4.18 MILLION/UL (ref 3.81–5.12)
RIGHT ATRIUM AREA SYSTOLE A4C: 8 CM2
RIGHT VENTRICLE ID DIMENSION: 2.9 CM
RV PSP: 34 MMHG
SL CV LEFT ATRIUM LENGTH A2C: 5.1 CM
SL CV LV EF: 60
SL CV PED ECHO LEFT VENTRICLE DIASTOLIC VOLUME (MOD BIPLANE) 2D: 39 ML
SL CV PED ECHO LEFT VENTRICLE SYSTOLIC VOLUME (MOD BIPLANE) 2D: 15 ML
SODIUM SERPL-SCNC: 127 MMOL/L (ref 135–147)
TR MAX PG: 31 MMHG
TR PEAK VELOCITY: 2.8 M/S
TRICUSPID ANNULAR PLANE SYSTOLIC EXCURSION: 2.1 CM
TRICUSPID VALVE PEAK REGURGITATION VELOCITY: 2.8 M/S
WBC # BLD AUTO: 15.19 THOUSAND/UL (ref 4.31–10.16)

## 2023-06-19 PROCEDURE — 80053 COMPREHEN METABOLIC PANEL: CPT | Performed by: INTERNAL MEDICINE

## 2023-06-19 PROCEDURE — 93306 TTE W/DOPPLER COMPLETE: CPT

## 2023-06-19 PROCEDURE — 85027 COMPLETE CBC AUTOMATED: CPT | Performed by: INTERNAL MEDICINE

## 2023-06-19 PROCEDURE — 84100 ASSAY OF PHOSPHORUS: CPT | Performed by: INTERNAL MEDICINE

## 2023-06-19 PROCEDURE — 83880 ASSAY OF NATRIURETIC PEPTIDE: CPT | Performed by: INTERNAL MEDICINE

## 2023-06-19 PROCEDURE — 84145 PROCALCITONIN (PCT): CPT | Performed by: INTERNAL MEDICINE

## 2023-06-19 PROCEDURE — 93306 TTE W/DOPPLER COMPLETE: CPT | Performed by: INTERNAL MEDICINE

## 2023-06-19 PROCEDURE — 83735 ASSAY OF MAGNESIUM: CPT | Performed by: INTERNAL MEDICINE

## 2023-06-19 PROCEDURE — 99232 SBSQ HOSP IP/OBS MODERATE 35: CPT | Performed by: INTERNAL MEDICINE

## 2023-06-19 RX ORDER — FUROSEMIDE 10 MG/ML
20 INJECTION INTRAMUSCULAR; INTRAVENOUS
Status: DISCONTINUED | OUTPATIENT
Start: 2023-06-19 | End: 2023-06-21

## 2023-06-19 RX ADMIN — ENOXAPARIN SODIUM 40 MG: 100 INJECTION SUBCUTANEOUS at 08:28

## 2023-06-19 RX ADMIN — ACETAMINOPHEN 650 MG: 325 TABLET, FILM COATED ORAL at 12:54

## 2023-06-19 RX ADMIN — MELATONIN 6 MG: at 20:44

## 2023-06-19 RX ADMIN — CYANOCOBALAMIN TAB 500 MCG 1000 MCG: 500 TAB at 08:28

## 2023-06-19 RX ADMIN — GABAPENTIN 600 MG: 300 CAPSULE ORAL at 18:20

## 2023-06-19 RX ADMIN — Medication 1000 UNITS: at 08:28

## 2023-06-19 RX ADMIN — PIPERACILLIN AND TAZOBACTAM 3.38 G: 3; .375 INJECTION, POWDER, LYOPHILIZED, FOR SOLUTION INTRAVENOUS at 04:16

## 2023-06-19 RX ADMIN — PANTOPRAZOLE SODIUM 20 MG: 20 TABLET, DELAYED RELEASE ORAL at 08:29

## 2023-06-19 RX ADMIN — FUROSEMIDE 20 MG: 10 INJECTION, SOLUTION INTRAMUSCULAR; INTRAVENOUS at 18:20

## 2023-06-19 RX ADMIN — PIPERACILLIN AND TAZOBACTAM 3.38 G: 3; .375 INJECTION, POWDER, LYOPHILIZED, FOR SOLUTION INTRAVENOUS at 12:43

## 2023-06-19 RX ADMIN — ACETAMINOPHEN 650 MG: 325 TABLET, FILM COATED ORAL at 04:17

## 2023-06-19 RX ADMIN — GABAPENTIN 600 MG: 300 CAPSULE ORAL at 08:28

## 2023-06-19 RX ADMIN — PIPERACILLIN AND TAZOBACTAM 3.38 G: 3; .375 INJECTION, POWDER, LYOPHILIZED, FOR SOLUTION INTRAVENOUS at 20:44

## 2023-06-19 RX ADMIN — PRAVASTATIN SODIUM 20 MG: 20 TABLET ORAL at 18:20

## 2023-06-19 RX ADMIN — ACETAMINOPHEN 650 MG: 325 TABLET, FILM COATED ORAL at 23:16

## 2023-06-19 RX ADMIN — ASPIRIN 81 MG: 81 TABLET, COATED ORAL at 08:28

## 2023-06-19 NOTE — PROGRESS NOTES
The patient’s standard-infusion Piperacillin-Tazobactam / Zosyn (infused over 30-60 minutes) has been converted to extended-infusion (infused over 4 hours) per [facility] Extended-Infusion Piperacillin-Tazobactam Protocol for Adults as approved by the Pharmacy and Therapeutics Committee (accessible here on MyNET)       The patient met ALL eligible criteria:    Age >= 25years old       And did NOT have ANY exclusions:     Emergency Department or Operating Room patient  Drug incompatibilities that could NOT be avoided with timing or separate line administration    The following are reminders for Nursing regarding administration:  Infuse the first dose of Zosyn over 30min as a load (if new start), and then all subsequent doses will be given as an extended-infusion over 4 hours (see dosing below)  Use primary tubing as an intermittent infusion; change out primary tubing every 24 hours   Ensure full dose of the medication is given at the appropriate rate  Most incompatible drugs can be scheduled during times when the Zosyn is not being infused; however, if one requires administration during the same time, a separate site or lumen MUST be used  If access is limited and an incompatible medication urgently needs to be given, the Zosyn extended-infusion can be held for up to 30min (remember to flush line before/after)  Extended-infusion Zosyn does NOT require special timing around hemodialysis (it can even be given simultaneously)  If a patient needs an urgent MRI while Zosyn is infusing and there is not a MRI-compatible pump available for use, finish the infusion over the traditional length (30min) and ask Pharmacy to reschedule the next doses so that they start a few hours earlier  Pharmacy will assist nursing in troubleshooting other administration issues as they arise  Dosing for Piperacillin-Tazobactam  CrCl (mL/min) Traditional Dosing Extended-Infusion Dosing #   CrCl > 40 High-Dose  CrCl > 40 Low-Dose 4 5g Q6H (over 30min)  3 375g IV Q6H (over 30min) 3 375g IV Q8H (over 4hr)*    *1st dose loaded over 30min, then start extended-infusion dosing 4hr later   CrCl 20-40 High-Dose  CrCl 20-40 Low-Dose 3 375g IV Q6H (over 30min)  2 25g IV Q6H (over 30min)    CrCl < 20 High-Dose  CrCl < 20 Low-Dose 2 25g IV Q6H (over 30min)  2 25g IV Q8H (over 30min) 3 375g IV Q12H (over 4hr)*    *1st dose loaded over 30min, then start extended-infusion dosing 6hr later   Hemo/Peritoneal Dialysis High-Dose  Hemo/Peritoneal Dialysis Low-Dose 2 25g IV Q6H (over 30min)  2 25g IV Q8H (over 30min)    CVVH/D High-Dose  CVVH/D Low-Dose 3 375g IV Q6H (over 30min)  2 25 IV Q6H (over 30min) 3 375g IV Q8H (over 4hr)*    *1st dose loaded over 30min, then start extended-infusion dosing 4hr later   # = Use 4 5g dosing (same interval) if morbidly obese (BMI ?40)    Please call the Pharmacy with any questions or concerns

## 2023-06-19 NOTE — CASE MANAGEMENT
Case Management Assessment & Discharge Planning Note    Patient name Hu Sotelo  Location /-48 MRN [id number]  : 1930 Date 2023       Current Admission Date: 2023  Current Admission Diagnosis:Cystitis   Patient Active Problem List    Diagnosis Date Noted   • Cystitis 2023   • Paroxysmal atrial fibrillation ([facility] ) 2022   • Generalized weakness 2022   • Acute on chronic diastolic heart failure ([facility] ) 2022   • Hyponatremia 2020   • Acute pain of left shoulder 2020   • History of recurrent UTI (urinary tract infection) 2020   • Change in mental status    • 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 eye with active choroidal neovascularization ([facility] ) 2019   • Fall 2018   • Sepsis without acute organ dysfunction ([facility] ) 2018   • Neuropathy 2016   • Vitamin B 12 deficiency 2016   • Venous insufficiency 2016   • Hypertension 2015   • Gastroesophageal reflux disease without esophagitis 2015   • Arthropathy 2015   • Mixed hyperlipidemia 2015      LOS (days): 1  Geometric Mean LOS (GMLOS) (days): 3 50  Days to GMLOS:2 7     OBJECTIVE:    Risk of Unplanned Readmission Score: 12 19         Current admission status: Inpatient       Preferred Pharmacy:   [facility] [address] MARIELOS GUIDRY [address]  [address]    [place] [address]  Phone: [phone] Fax: [phone]    Primary Care Provider: Zayra Charles MD    Primary Insurance: MEDICARE  Secondary Insurance:     ASSESSMENT:  [address] [facility] Representative - Child   Primary Phone: [phone] [facility]  Home Phone: [phone]               Advance Directives  Does patient have a [address]?: Yes  Does patient have Advance Directives?: Yes  Advance Directives: Living will, Power of  for health care  Primary Contact: Brianna    Readmission Root Cause  30 Day Readmission: No    Patient Information  Admitted from[de-identified] Home  Mental Status: Alert  During Assessment patient was accompanied by: Not accompanied during assessment  Assessment information provided by[de-identified] Patient  Primary Caregiver: Self  Support Systems: Family members  Home entry access options   Select all that apply : Stairs  Number of steps to enter home : 2  Type of Current Residence: 2 story home  Upon entering residence, is there a bedroom on the main floor (no further steps)?: Yes  Upon entering residence, is there a bathroom on the main floor (no further steps)?: Yes  In the last 12 months, was there a time when you were not able to pay the mortgage or rent on time?: No  In the last 12 months, how many places have you lived?: 1  In the last 12 months, was there a time when you did not have a steady place to sleep or slept in a shelter (including now)?: No  Homeless/housing insecurity resource given?: N/A  Living Arrangements: Lives w/ Daughter    Activities of Daily Living Prior to Admission  Functional Status: Independent  Completes ADLs independently?: Yes  Ambulates independently?: Yes  Does patient use assisted devices?: Yes  Assisted Devices (DME) used: [employer]  Does patient currently own DME?: Yes  What DME does the patient currently own?: [employer]  Does patient have a history of Outpatient Therapy (PT/OT)?: No  Does the patient have a history of Short-Term Rehab?: Yes (hx [facility])  Does patient have a history of HHC?: Yes (hx HHC--unsure of agency)  Does patient currently have [facility]?: No    Patient Information Continued  Does patient have prescription coverage?: Yes  Within the past 12 months, you worried that your food would run out before you got the money to buy more : Never true  Within the past 12 months, the food you bought just didn't last and you didn't have money to get more : Never true  Food insecurity resource given?: N/A  Does patient receive dialysis treatments?: No  Does patient have a history of substance abuse?: No  Does patient have a history of Mental Health Diagnosis?: No    Means of Transportation  Means of Transport to Appts[de-identified] Family transport  In the past 12 months, has lack of transportation kept you from medical appointments or from getting medications?: No  In the past 12 months, has lack of transportation kept you from meetings, work, or from getting things needed for daily living?: No  Was application for public transport provided?: N/A    DISCHARGE DETAILS:    Discharge planning discussed with[de-identified] patient  Freedom of Choice: Yes     CM contacted family/caregiver?: Yes  Were Treatment Team discharge recommendations reviewed with patient/caregiver?: Yes  Did patient/caregiver verbalize understanding of patient care needs?: Yes  Were patient/caregiver advised of the risks associated with not following Treatment Team discharge recommendations?: Yes    Contacts  Patient Contacts: Anastasia Montejo (Child) [phone] (Mobile)  Relationship to Patient[de-identified] Family  Contact Method: Phone  Phone Number: Anastasia Montejo (Child) [phone] (Mobile)  Reason/Outcome: Discharge [address]         Is the patient interested in [address] at discharge?: No    DME Referral Provided  Referral made for DME?: No    Additional Comments: Met with pt to discuss the role of CM and to discuss any help pt may need prior to dc  Pt lives with her daughter Ashley Ledezma and son in a 2 story home with 2 BRENDA; pt has a 1st floor setup  Pt performed ADL's indptly pta, pt uses a RW and has a cane  Pt has a hx of HHC but unsure of the agency  Pt has a hx of rehab at [address] does not drive; pt's daughter provides transport  Pt's preferred pharmacy is [facility]; pt uses [facility] for prescription coverage  Message left for pt's daughter to discuss dc planning  Pt states her daughter will transport home at dc

## 2023-06-19 NOTE — ASSESSMENT & PLAN NOTE
· History of recurrent UTIs, follows with urology and is on prophylactic Macrobid  · Prior growth of ESBL E  coli and Enterococcus faecalis in 2019, however more recent urine cultures only growing pansensitive E  coli  · Admission UA consistent with cystitis, see above  · Ezio Speaks in setting of acute infection  · Zosyn

## 2023-06-19 NOTE — PLAN OF CARE
Problem: Potential for Falls  Goal: Patient will remain free of falls  Description: INTERVENTIONS:  - Educate patient/family on patient safety including physical limitations  - Instruct patient to call for assistance with activity   - Consult OT/PT to assist with strengthening/mobility   - Keep Call bell within reach  - Keep bed low and locked with side rails adjusted as appropriate  - Keep care items and personal belongings within reach  - Initiate and maintain comfort rounds  - Make Fall Risk Sign visible to staff  - Offer Toileting every 2 Hours, in advance of need  - Initiate/Maintain 2alarm  - Obtain necessary fall risk management equipment: 2  - Apply yellow socks and bracelet for high fall risk patients  - Consider moving patient to room near nurses station  Outcome: Progressing     Problem: MOBILITY - ADULT  Goal: Maintain or return to baseline ADL function  Description: INTERVENTIONS:  -  Assess patient's ability to carry out ADLs; assess patient's baseline for ADL function and identify physical deficits which impact ability to perform ADLs (bathing, care of mouth/teeth, toileting, grooming, dressing, etc )  - Assess/evaluate cause of self-care deficits   - Assess range of motion  - Assess patient's mobility; develop plan if impaired  - Assess patient's need for assistive devices and provide as appropriate  - Encourage maximum independence but intervene and supervise when necessary  - Involve family in performance of ADLs  - Assess for home care needs following discharge   - Consider OT consult to assist with ADL evaluation and planning for discharge  - Provide patient education as appropriate  Outcome: Progressing  Goal: Maintains/Returns to pre admission functional level  Description: INTERVENTIONS:  - Perform BMAT or MOVE assessment daily    - Set and communicate daily mobility goal to care team and patient/family/caregiver     - Collaborate with rehabilitation services on mobility goals if consulted  - Perform Range of Motion 2 times a day  - Reposition patient every 2 hours    - Dangle patient 2 times a day  - Stand patient 2 times a day  - Ambulate patient 2 times a day  - Out of bed to chair 2 times a day   - Out of bed for meals 2 times a day  - Out of bed for toileting  - Record patient progress and toleration of activity level   Outcome: Progressing     Problem: Prexisting or High Potential for Compromised Skin Integrity  Goal: Skin integrity is maintained or improved  Description: INTERVENTIONS:  - Identify patients at risk for skin breakdown  - Assess and monitor skin integrity  - Assess and monitor nutrition and hydration status  - Monitor labs   - Assess for incontinence   - Turn and reposition patient  - Assist with mobility/ambulation  - Relieve pressure over bony prominences  - Avoid friction and shearing  - Provide appropriate hygiene as needed including keeping skin clean and dry  - Evaluate need for skin moisturizer/barrier cream  - Collaborate with interdisciplinary team   - Patient/family teaching  - Consider wound care consult   Outcome: Progressing

## 2023-06-19 NOTE — ASSESSMENT & PLAN NOTE
Wt Readings from Last 3 Encounters:   06/18/23 70 kg (154 lb 5 2 oz)   06/07/23 68 kg (150 lb)   02/28/23 69 4 kg (153 lb)     · Maintained on HCTZ 12 5 mg daily  · Last echo July 2021: LVEF 50 to 55%  G1 DD  Mild to moderate TR    · Patient examines hypervolemic with 3+ pitting edema to bilateral lower extremities and mild pulmonary vascular congestion on chest x-ray  · Give Lasix 20 Mg IV x1 -trend urine output  · I/O and daily weights  · Sodium and fluid restriction  · Obtain updated echo, if abnormal will consult cardiology

## 2023-06-19 NOTE — H&P
[facility]  H&P  Name: Reid Carvalho 80 y o  female I MRN: [id number]  Unit/Bed#: -01 I Date of Admission: 6/18/2023   Date of Service: 6/19/2023 I Hospital Day: 1      Assessment/Plan   * Cystitis  Assessment & Plan  · UA with innumerable WBCs and moderate bacteria, negative nitrite  · Urine culture, pending   · Of note vulva is erythematous and macerated without discharge -suspect secondary to urinary incontinence and use of briefs/pads  · Frequent toileting and avoid utilization of diapers while in bed -should have geoff under her  · Barrier cream to labia   · Started on Zosyn due to prior growth of ESBL and Enterococcus faecalis    Generalized weakness  Assessment & Plan  · Presents to the ED with fall while using walker this afternoon  · Trauma work-up negative  · PT, OT, and CM consults  · Neuro exam on admission with 4-5 strength in bilateral lower extremities, 5 out of strength in bilateral upper extremities -no focal weakness    Endorses numbness in bilateral feet (unchanged from baseline neuropathy per patient)  · Suspect that generalized weakness secondary to cystitis  · Continue antibiotics    History of recurrent UTI (urinary tract infection)  Assessment & Plan  · History of recurrent UTIs, follows with urology and is on prophylactic Macrobid  · Prior growth of ESBL E  coli and Enterococcus faecalis in 2019, however more recent urine cultures only growing pansensitive E  coli  · Admission UA consistent with cystitis, see above  · Velinda Holding in setting of acute infection    Hyponatremia  Assessment & Plan  · Na 129 on admission  · Will obtain serum osmolality, urine osmolality, urine sodium, uric acid, TSH, and AM cortisol   · Suspect hypervolemia as etiology as patient examines volume overloaded  · Give Lasix 20 Mg IV x1 and monitor urine output    Sepsis without acute organ dysfunction ([facility] )  Assessment & Plan  · SIRS criteria met on admission with tachycardia and tachypnea  · Suspected sources cystitis as seen on UA  · No severe sepsis criteria met  · Continue Zosyn  · Blood cultures x2, pending  · Urine culture, pending    Paroxysmal atrial fibrillation (HCC)  Assessment & Plan  · Hx of paroxysmal Afib - last EKG with Aifb 12/2020  · Previously on eliquis as of 2021, however this was stopped shortly after per daughter for unclear reasons  · Rate controlled on admission at 96 bpm, EKG on admit showing sinus rhythm    Hypertension  Assessment & Plan  · Home regimen: HCTZ 12 5 mg daily  · HCTZ held to allow for IV diuresis as patient examines hypervolemic    Chronic diastolic heart failure [facility])  Assessment & Plan  Wt Readings from Last 3 Encounters:   06/18/23 70 kg (154 lb 5 2 oz)   06/07/23 68 kg (150 lb)   02/28/23 69 4 kg (153 lb)     · Maintained on HCTZ 12 5 mg daily  · Last echo July 2021: LVEF 50 to 55%  G1 DD  Mild to moderate TR  · Patient examines hypervolemic with 3+ pitting edema to bilateral lower extremities and mild pulmonary vascular congestion on chest x-ray  · Give Lasix 20 Mg IV x1 -trend urine output  · I/O and daily weights  · Sodium and fluid restriction  · Obtain updated echo, if abnormal will consult cardiology    Gastroesophageal reflux disease without esophagitis  Assessment & Plan  · Placed on formulary equivalent Protonix    Mixed hyperlipidemia  Assessment & Plan  · Continue home pravastatin       VTE Pharmacologic Prophylaxis: VTE Score: 6 High Risk (Score >/= 5) - Pharmacological DVT Prophylaxis Ordered: enoxaparin (Lovenox)  Sequential Compression Devices Ordered  Code Status: Level 3 - DNAR and DNI   Discussion with family: Updated  (daughter) via phone  Anticipated Length of Stay: Patient will be admitted on an inpatient basis with an anticipated length of stay of greater than 2 midnights secondary to Cystitis requiring IV antibiotics and generalized weakness      Total Time Spent on Date of Encounter in care of patient: "75 minutes This time was spent on one or more of the following: performing physical exam; counseling and coordination of care; obtaining or reviewing history; documenting in the medical record; reviewing/ordering tests, medications or procedures; communicating with other healthcare professionals and discussing with patient's family/caregivers  Chief Complaint: \" It burns really bad anytime I pee and I have had urinary incontinence\"    History of Present Illness:  Melani Cervantes is a 80 y o  female with a PMH of recurrent UTIs on daily Macrobid, paroxysmal A-fib not on anticoagulation, HTN, and chronic diastolic heart failure who presents with generalized weakness status post fall while ambulating with her walker earlier today  Patient reports this is her first fall in quite some time, however ED documentation shows multiple falls recently  Patient reports she has had urinary incontinence over the last couple of weeks and has been having significant burning with urination  Reports her daughter noted erythematous labia a couple of days ago  No fevers or chills  No chest pain or dyspnea  Daughter has noted increased leg swelling over the last few days  Review of Systems:  Review of Systems   Constitutional: Negative for chills and fever  HENT: Negative for congestion  Respiratory: Negative for cough and shortness of breath  Cardiovascular: Positive for leg swelling  Negative for chest pain  Gastrointestinal: Negative for abdominal pain, constipation, diarrhea, nausea and vomiting  Genitourinary: Positive for dysuria  Positive for urinary incontinence   Musculoskeletal: Positive for gait problem (Uses walker at baseline)  Neurological: Positive for weakness (Generalized, nonfocal)  All other systems reviewed and are negative        Past Medical and Surgical History:   Past Medical History:   Diagnosis Date   • SHERLYN (acute kidney injury) ([facility] ) 5/8/2022   • Balance disorder    • Chronic " pain    • GERD (gastroesophageal reflux disease)    • Hard of hearing    • Hyperlipidemia    • Hypertension    • Hyponatremia 12/5/2020   • Low serum cortisol level 12/7/2020   • Neuropathy    • Pneumonia    • Pneumonia due to COVID-19 virus 12/5/2020   • Sepsis ([facility] ) 5/22/2018   • Tinnitus    • Unspecified adrenocortical insufficiency ([facility] ) 2/28/2023    Felt to be related to COVID-19 infection  No current symptoms  • UTI (urinary tract infection)        Past Surgical History:   Procedure Laterality Date   • EYE SURGERY     • HYSTERECTOMY     • TONSILLECTOMY         Meds/Allergies:  Prior to Admission medications    Medication Sig Start Date End Date Taking? Authorizing Provider   aspirin (ECOTRIN LOW STRENGTH) 81 mg EC tablet Take 1 tablet (81 mg total) by mouth daily 12/11/18  Yes Wayne Mina MD   Cholecalciferol (VITAMIN D3) 1000 units CAPS Take 1,000 Units by mouth daily    Yes Historical Provider, MD   Cyanocobalamin (VITAMIN B12 PO) Take 1,000 mcg by mouth in the morning   Yes Historical Provider, MD   gabapentin (NEURONTIN) 600 MG tablet Take 1 tablet (600 mg total) by mouth 2 (two) times a day 4/10/23  Yes Wayne Mina MD   hydrochlorothiazide (HYDRODIURIL) 12 5 mg tablet Take 1 tablet (12 5 mg total) by mouth daily 4/10/23  Yes Wayne Mina MD   Mirabegron ER 25 MG TB24 Take 25 mg by mouth in the morning 6/7/23  Yes REAL Arevalo   Multiple Vitamins-Minerals (PRESERVISION AREDS PO) Take by mouth 2 (two) times a day   Yes Historical Provider, MD   nitrofurantoin (MACROBID) 100 mg capsule Take 1 capsule (100 mg total) by mouth in the morning 6/7/23  Yes REAL Arevalo   omeprazole (PriLOSEC) 20 mg delayed release capsule Take 1 capsule (20 mg total) by mouth daily 4/10/23  Yes Wayne Mina MD   pravastatin (PRAVACHOL) 20 mg tablet Take 1 tablet (20 mg total) by mouth daily 4/10/23  Yes Wayne Mina MD     I have reviewed home medications with patient family member      Allergies: No Known Allergies    Social History:  Marital Status:    Occupation: Retired  Patient Pre-hospital Living Situation: Home, With other family member: Daughter  Patient Pre-hospital Level of Mobility: walks with walker  Patient Pre-hospital Diet Restrictions: None  Substance Use History:   Social History     Substance and Sexual Activity   Alcohol Use Not Currently    Comment: rarely     Social History     Tobacco Use   Smoking Status Never   • Passive exposure: Never   Smokeless Tobacco Never     Social History     Substance and Sexual Activity   Drug Use No       Family History:  Family History   Problem Relation Age of Onset   • Stroke Mother    • Stroke Father    • Heart disease Daughter    • Substance Abuse Neg Hx    • Mental illness Neg Hx        Physical Exam:     Vitals:   Blood Pressure: 122/52 (06/19/23 0657)  Pulse: 71 (06/19/23 0657)  Temperature: 97 5 °F (36 4 °C) (06/18/23 2113)  Temp Source: Tympanic (06/18/23 1734)  Respirations: 17 (06/19/23 0657)  Weight - Scale: 73 kg (160 lb 15 oz) (06/19/23 0211)  SpO2: 95 % (06/19/23 0657)    Physical Exam  Vitals and nursing note reviewed  Exam conducted with a chaperone present Blayne Muniz RN present in room)  Constitutional:       General: She is not in acute distress  Appearance: Normal appearance  She is not ill-appearing  Comments: Pleasant and conversational   HENT:      Head: Normocephalic  Nose: Nose normal       Mouth/Throat:      Mouth: Mucous membranes are moist    Eyes:      Extraocular Movements: Extraocular movements intact  Conjunctiva/sclera: Conjunctivae normal    Cardiovascular:      Rate and Rhythm: Normal rate and regular rhythm  Pulses: Normal pulses  Heart sounds: No murmur heard  Pulmonary:      Effort: Pulmonary effort is normal       Comments: Decrease breath sounds throughout all lung fields, however without wheezing, rhonchi, or rales  Abdominal:      General: Abdomen is flat        Palpations: Abdomen is soft       Tenderness: There is no abdominal tenderness  There is no guarding or rebound  Genitourinary:     Comments: Labia minora with significant erythema and maceration  No purulent drainage  Musculoskeletal:         General: Normal range of motion  Comments: 3+ pitting edema to bilateral lower extremities   Skin:     General: Skin is warm and dry  Coloration: Skin is not pale  Neurological:      Mental Status: She is alert  Comments: Oriented to self, place, month, year, president  5 out of 5 strength in bilateral upper and lower extremities  Psychiatric:         Mood and Affect: Mood normal          Thought Content: Thought content normal           Additional Data:     Lab Results:  Results from last 7 days   Lab Units 06/18/23  1734   WBC Thousand/uL 10 31*   HEMOGLOBIN g/dL 11 1*   HEMATOCRIT % 34 9   PLATELETS Thousands/uL 265   NEUTROS PCT % 68   LYMPHS PCT % 14   MONOS PCT % 10   EOS PCT % 6     Results from last 7 days   Lab Units 06/18/23  1734   SODIUM mmol/L 129*   POTASSIUM mmol/L 3 8   CHLORIDE mmol/L 91*   CO2 mmol/L 29   BUN mg/dL 12   CREATININE mg/dL 0 92   ANION GAP mmol/L 9   CALCIUM mg/dL 9 4   ALBUMIN g/dL 4 0   TOTAL BILIRUBIN mg/dL 0 37   ALK PHOS U/L 61   ALT U/L 7   AST U/L 14   GLUCOSE RANDOM mg/dL 104     Results from last 7 days   Lab Units 06/18/23  1734   INR  1 01             Results from last 7 days   Lab Units 06/18/23  1947 06/18/23  1734   LACTIC ACID mmol/L 1 1  --    PROCALCITONIN ng/ml  --  0 10       Lines/Drains:  Invasive Devices     Peripheral Intravenous Line  Duration           Peripheral IV 06/18/23 Left Antecubital <1 day                    Imaging: Reviewed radiology reports from this admission including: chest CT scan, abdominal/pelvic CT, CT head and CT C-spine  TRAUMA - CT head wo contrast   Final Result by Meredeth Lennox, MD (06/18 1802)      No acute intracranial abnormality                    Workstation performed: [id number]         TRAUMA - CT spine cervical wo contrast   Final Result by Claudia Miller MD (06/18 1805)      No acute fracture  Diffuse mottled osteopenia  Moderate cervical spondylosis  Chronic erosive changes along the dens may indicate underlying chronic inflammatory arthropathy  Workstation performed: [id number]         CT chest abdomen pelvis wo contrast   Final Result by Claudia Miller MD (06/18 1822)      No acute traumatic CT findings  Eccentric bladder wall thickening along the left side  Cannot exclude underlying neoplasm  Cystoscopy would be more helpful  Multiple pulmonary nodules with the largest measuring up to 1 cm in the right lower lobe  Cannot exclude metastatic disease  Consider further evaluation with a PET CT study  Additional findings as above  Workstation performed: [id number]         CT recon only thoracolumbar   Final Result by Claudia Miller MD (06/18 1826)      No acute fracture  Workstation performed: [id number]         XR Trauma chest portable   Final Result by Claudia Miller MD (06/18 1827)      Mild pulmonary vascular congestion  The pulmonary nodules seen on the CT study are difficult to visualize on this radiograph  Large hiatal hernia                  Workstation performed: [id number]         XR Trauma pelvis ap only 1 or 2 vw   Final Result by Claudia Miller MD (06/18 1828)      No acute osseous abnormality  Workstation performed: [id number]             EKG and Other Studies Reviewed on Admission:   · EKG: Sinus with left bundle branch block  ** Please Note: This note has been constructed using a voice recognition system   **

## 2023-06-19 NOTE — ASSESSMENT & PLAN NOTE
Wt Readings from Last 3 Encounters:   06/19/23 72 6 kg (160 lb)   06/07/23 68 kg (150 lb)   02/28/23 69 4 kg (153 lb)     · Maintained on HCTZ 12 5 mg daily  · Last echo July 2021: LVEF 50 to 55%  G1 DD  Mild to moderate TR    · Patient examines hypervolemic with 3+ pitting edema to bilateral lower extremities and mild pulmonary vascular congestion on chest x-ray  · Give Lasix 20 Mg IV x1 -trend urine output  · I/O and daily weights  · Sodium and fluid restriction  · Start on Lasix 20 mg IV twice daily  · Obtain updated echo, if abnormal will consult cardiology

## 2023-06-19 NOTE — ASSESSMENT & PLAN NOTE
· Home regimen: HCTZ 12 5 mg daily  · HCTZ held to allow for IV diuresis as patient examines hypervolemic

## 2023-06-19 NOTE — ASSESSMENT & PLAN NOTE
· SIRS criteria met on admission with tachycardia and tachypnea  · Suspected sources cystitis as seen on UA  · No severe sepsis criteria met  · Continue Zosyn  · Blood cultures x2, pending  · Urine culture, pending

## 2023-06-19 NOTE — PROGRESS NOTES
[place]  Progress Note  Name: Jm Kuo  MRN: [id number]  Unit/Bed#: -01 I Date of Admission: 6/18/2023   Date of Service: 6/19/2023 I Hospital Day: 1    Assessment/Plan   Paroxysmal atrial fibrillation [facility])  Assessment & Plan  · Hx of paroxysmal Afib - last EKG with Aifb 12/2020  · Previously on eliquis as of 2021, however this was stopped shortly after per daughter for unclear reasons  · Rate controlled on admission at 96 bpm, EKG on admit showing sinus rhythm    Acute on chronic diastolic heart failure [facility])  Assessment & Plan  Wt Readings from Last 3 Encounters:   06/19/23 72 6 kg (160 lb)   06/07/23 68 kg (150 lb)   02/28/23 69 4 kg (153 lb)     · Maintained on HCTZ 12 5 mg daily  · Last echo July 2021: LVEF 50 to 55%  G1 DD  Mild to moderate TR  · Patient examines hypervolemic with 3+ pitting edema to bilateral lower extremities and mild pulmonary vascular congestion on chest x-ray  · Give Lasix 20 Mg IV x1 -trend urine output  · I/O and daily weights  · Sodium and fluid restriction  · Start on Lasix 20 mg IV twice daily  · Obtain updated echo, if abnormal will consult cardiology    Generalized weakness  Assessment & Plan  · Presents to the ED with fall while using walker this afternoon  · Trauma work-up negative  · PT, OT, and CM consults  · Neuro exam on admission with 4-5 strength in bilateral lower extremities, 5 out of strength in bilateral upper extremities -no focal weakness    Endorses numbness in bilateral feet (unchanged from baseline neuropathy per patient)  · Suspect that generalized weakness secondary to cystitis  · Continue antibiotics    Hyponatremia  Assessment & Plan  · Na 129 on admission  · Will obtain serum osmolality, urine osmolality, urine sodium, uric acid, TSH, and AM cortisol   · Suspect hypervolemia as etiology as patient examines volume overloaded  · Give Lasix 20 Mg IV x1 and monitor urine output    History of recurrent UTI (urinary tract infection)  Assessment & Plan  · History of recurrent UTIs, follows with urology and is on prophylactic Macrobid  · Prior growth of ESBL E  coli and Enterococcus faecalis in 2019, however more recent urine cultures only growing pansensitive E  coli  · Admission UA consistent with cystitis, see above  · [place] in setting of acute infection  · Zosyn    Mixed hyperlipidemia  Assessment & Plan  · Continue home pravastatin    Sepsis without acute organ dysfunction ([address] )  Assessment & Plan  · SIRS criteria met on admission with tachycardia and tachypnea  · Suspected sources cystitis as seen on UA  · No severe sepsis criteria met  · Continue Zosyn  · Blood cultures x2, pending  · Urine culture, pending    Gastroesophageal reflux disease without esophagitis  Assessment & Plan  · Placed on formulary equivalent Protonix    Hypertension  Assessment & Plan  · Home regimen: HCTZ 12 5 mg daily  · HCTZ held to allow for IV diuresis as patient examines hypervolemic    * Cystitis  Assessment & Plan  · UA with innumerable WBCs and moderate bacteria, negative nitrite  · Urine culture, pending   · Of note vulva is erythematous and macerated without discharge -suspect secondary to urinary incontinence and use of briefs/pads  · Frequent toileting and avoid utilization of diapers while in bed -should have geoff under her  · Barrier cream to labia   · Started on Zosyn due to prior growth of ESBL and Enterococcus faecalis           Labs & Imaging: I have personally reviewed pertinent reports  VTE Prophylaxis: in place  Code Status:   Level 3 - DNAR and DNI    Patient Centered Rounds: I have performed bedside rounds with nursing staff today  Discussions with Specialists or Other Care Team Provider: LINDY      Current Length of Stay: 1 day(s)    Current Patient Status: Inpatient   Certification Statement: The patient will continue to require additional inpatient hospital stay due to see my assessment and plan  "    Subjective:   Patient is seen and examined at bedside  Denies any new complaints  Afebrile  All other ROS are negative  Objective:    Vitals: Blood pressure 146/67, pulse 71, temperature 97 5 °F (36 4 °C), resp  rate 17, height 5' 2\" (1 575 m), weight 72 6 kg (160 lb), SpO2 95 %  ,Body mass index is 29 26 kg/m²  SPO2 RA Rest    Flowsheet Row ED to Hosp-Admission (Current) from 6/18/2023 in [address] Med [facility]   SpO2 95 %   SpO2 Activity At Rest   O2 Device None (Room air)   O2 Flow Rate --        I&O:     Intake/Output Summary (Last 24 hours) at 6/19/2023 1326  Last data filed at 6/19/2023 0416  Gross per 24 hour   Intake 100 ml   Output --   Net 100 ml       Physical Exam:    General- Alert, lying comfortably in bed  Not in any acute distress  Neck- Supple, No JVD  CVS- regular, S1 and S2 normal  Chest- Bilateral Air entry, decreased at bases  Abdomen- soft, nontender, not distended, no guarding or rigidity, BS+  Extremities-  No pedal edema, No calf tenderness                         Normal ROM in all extremities  CNS-   Alert, awake and orientedx3  No focal deficits present      Invasive Devices     Peripheral Intravenous Line  Duration           Peripheral IV 06/18/23 Left Antecubital <1 day                      Social History  reviewed  Family History   Problem Relation Age of Onset   • Stroke Mother    • Stroke Father    • Heart disease Daughter    • Substance Abuse Neg Hx    • Mental illness Neg Hx     reviewed    Meds:  Current Facility-Administered Medications   Medication Dose Route Frequency Provider Last Rate Last Admin   • acetaminophen (TYLENOL) tablet 650 mg  650 mg Oral Q6H PRN Mariam Amezquita PA-C   650 mg at 06/19/23 1254   • aluminum-magnesium hydroxide-simethicone (MYLANTA) oral suspension 30 mL  30 mL Oral Q6H PRN aMriam Amezquita PA-C       • aspirin (ECOTRIN LOW STRENGTH) EC tablet 81 mg  81 mg Oral Daily Mariam Amezquita PA-C   81 mg at 06/19/23 6485 " • cholecalciferol (VITAMIN D3) tablet 1,000 Units  1,000 Units Oral Daily Ivar Paling, PA-C   1,000 Units at 06/19/23 9479   • cyanocobalamin (VITAMIN B-12) tablet 1,000 mcg  1,000 mcg Oral Daily Ivar Paling, PA-C   1,000 mcg at 06/19/23 9801   • enoxaparin (LOVENOX) subcutaneous injection 40 mg  40 mg Subcutaneous Daily Ivar Paling, PA-C   40 mg at 06/19/23 9190   • furosemide (LASIX) injection 20 mg  20 mg Intravenous BID (diuretic) Betty Ordoñez MD       • gabapentin (NEURONTIN) capsule 600 mg  600 mg Oral BID Ivar Paling, PA-C   600 mg at 06/19/23 8647   • melatonin tablet 6 mg  6 mg Oral HS Ivar Paling, PA-C   6 mg at 06/18/23 1137   • pantoprazole (PROTONIX) EC tablet 20 mg  20 mg Oral Daily Before Breakfast Ivar Paling, PA-C   20 mg at 06/19/23 8851   • piperacillin-tazobactam (ZOSYN) IVPB 3 375 g  3 375 g Intravenous Q8H Ivar Paling, PA-C 25 mL/hr at 06/19/23 1243 3 375 g at 06/19/23 1243   • pravastatin (PRAVACHOL) tablet 20 mg  20 mg Oral After Dinner Ivar Paling, PA-C       • senna (SENOKOT) tablet 8 6 mg  1 tablet Oral HS PRN Ivar Paling, PA-C          Medications Prior to Admission   Medication   • aspirin (ECOTRIN LOW STRENGTH) 81 mg EC tablet   • Cholecalciferol (VITAMIN D3) 1000 units CAPS   • Cyanocobalamin (VITAMIN B12 PO)   • gabapentin (NEURONTIN) 600 MG tablet   • hydrochlorothiazide (HYDRODIURIL) 12 5 mg tablet   • Mirabegron ER 25 MG TB24   • Multiple Vitamins-Minerals (PRESERVISION AREDS PO)   • nitrofurantoin (MACROBID) 100 mg capsule   • omeprazole (PriLOSEC) 20 mg delayed release capsule   • pravastatin (PRAVACHOL) 20 mg tablet       Labs:  Results from last 7 days   Lab Units 06/18/23  1734   WBC Thousand/uL 10 31*   HEMOGLOBIN g/dL 11 1*   HEMATOCRIT % 34 9   PLATELETS Thousands/uL 265   NEUTROS PCT % 68   LYMPHS PCT % 14   MONOS PCT % 10   EOS PCT % 6     Results from last 7 days   Lab Units 06/18/23  1734   POTASSIUM mmol/L 3  8   CHLORIDE mmol/L 91*   CO2 mmol/L 29   BUN mg/dL 12   CREATININE mg/dL 0 92   CALCIUM mg/dL 9 4   ALK PHOS U/L 61   ALT U/L 7   AST U/L 14     Lab Results   Component Value Date    TROPONINI 0 04 12/06/2020    TROPONINI 0 04 12/05/2020    TROPONINI 0 04 12/05/2020    CKTOTAL 46 12/05/2020    CKTOTAL 112 01/26/2014     Results from last 7 days   Lab Units 06/18/23  1734   INR  1 01     Lab Results   Component Value Date    BLOODCX Received in Microbiology Lab  Culture in Progress  06/18/2023    BLOODCX Received in Microbiology Lab  Culture in Progress  06/18/2023    BLOODCX No Growth After 5 Days  05/08/2022    BLOODCX No Growth After 5 Days  05/08/2022    URINECX >100,000 cfu/ml Escherichia coli (A) 10/22/2019    URINECX >100,000 cfu/ml Escherichia coli (A) 07/05/2019    URINECX 70,000-79,000 cfu/ml Enterococcus faecalis (A) 06/15/2019    URINECX 20,000-29,000 cfu/ml 06/15/2019    SPUTUMCULTUR 1+ Growth of Pseudomonas aeruginosa (A) 05/23/2018    SPUTUMCULTUR 1+ Growth of 05/23/2018         Imaging:  Results for orders placed during the hospital encounter of 06/18/23    XR Trauma chest portable    Narrative  CHEST    INDICATION:   TRAUMA  COMPARISON: 5/11/2022  EXAM PERFORMED/VIEWS:  XR CHEST PORTABLE  Images:  1    FINDINGS: Overlying EKG wire leads    Cardiomegaly  Mild pulmonary vascular congestion  The pulmonary nodule seen on the CT study are difficult to visualize on this radiograph  No large effusions or obvious pneumothorax  Osseous structures appear within normal limits for patient age  Large hiatal hernia  Impression  Mild pulmonary vascular congestion  The pulmonary nodules seen on the CT study are difficult to visualize on this radiograph  Large hiatal hernia            Workstation performed: [id number]    Results for orders placed during the hospital encounter of 06/22/21    XR chest pa & lateral    Narrative  CHEST    INDICATION:   R60 0: Localized edema    Covid positive on 12/5/2020  COMPARISON: Chest radiograph from 12/5/2020 and chest CT from 11/26/2018  EXAM PERFORMED/VIEWS:  XR CHEST PA & LATERAL  DUAL ENERGY SUBTRACTION  FINDINGS:    Cardiomediastinal silhouette normal  Moderate hiatal hernia  Mild bilateral groundglass opacity  No effusion or pneumothorax  Osseous structures normal for age  Impression  Mild bilateral groundglass opacity, question interstitial edema  Moderate hiatal hernia  Workstation performed: [id number]      Last 24 Hours Medication List:   Current Facility-Administered Medications   Medication Dose Route Frequency Provider Last Rate   • acetaminophen  650 mg Oral Q6H PRN Verita Liner, PA-C     • aluminum-magnesium hydroxide-simethicone  30 mL Oral Q6H PRN Verita Liner, PA-C     • aspirin  81 mg Oral Daily Verita Liner, PA-C     • cholecalciferol  1,000 Units Oral Daily Verita Liner, PA-C     • cyanocobalamin  1,000 mcg Oral Daily Verita Liner, PA-C     • enoxaparin  40 mg Subcutaneous Daily Verita Liner, PA-C     • furosemide  20 mg Intravenous BID (diuretic) Anu Newman MD     • gabapentin  600 mg Oral BID Verita Liner, PA-C     • melatonin  6 mg Oral HS Verita Liner, PA-C     • pantoprazole  20 mg Oral Daily Before Breakfast Verita Liner, PA-C     • piperacillin-tazobactam  3 375 g Intravenous Q8H Verita Liner, PA-C 3 375 g (06/19/23 1243)   • pravastatin  20 mg Oral After Dinner Verita Liner, PA-C     • senna  1 tablet Oral HS PRN Verita Liner, PA-C          Today, Patient Was Seen By: Anu Nweman MD    ** Please Note: Dictation voice to text software may have been used in the creation of this document   **

## 2023-06-19 NOTE — ASSESSMENT & PLAN NOTE
· UA with innumerable WBCs and moderate bacteria, negative nitrite  · Urine culture, pending   · Of note vulva is erythematous and macerated without discharge -suspect secondary to urinary incontinence and use of briefs/pads  · Frequent toileting and avoid utilization of diapers while in bed -should have geoff under her  · Barrier cream to labia   · Started on Zosyn due to prior growth of ESBL and Enterococcus faecalis

## 2023-06-20 LAB
ALBUMIN SERPL BCP-MCNC: 3.3 G/DL (ref 3.5–5)
ALP SERPL-CCNC: 52 U/L (ref 34–104)
ALT SERPL W P-5'-P-CCNC: 6 U/L (ref 7–52)
ANION GAP SERPL CALCULATED.3IONS-SCNC: 7 MMOL/L
AST SERPL W P-5'-P-CCNC: 13 U/L (ref 13–39)
ATRIAL RATE: 88 BPM
ATRIAL RATE: 96 BPM
BACTERIA UR CULT: NORMAL
BASOPHILS # BLD AUTO: 0.06 THOUSANDS/ÂΜL (ref 0–0.1)
BASOPHILS NFR BLD AUTO: 1 % (ref 0–1)
BILIRUB SERPL-MCNC: 0.56 MG/DL (ref 0.2–1)
BUN SERPL-MCNC: 12 MG/DL (ref 5–25)
CALCIUM ALBUM COR SERPL-MCNC: 9.2 MG/DL (ref 8.3–10.1)
CALCIUM SERPL-MCNC: 8.6 MG/DL (ref 8.4–10.2)
CHLORIDE SERPL-SCNC: 92 MMOL/L (ref 96–108)
CO2 SERPL-SCNC: 30 MMOL/L (ref 21–32)
CORTIS AM PEAK SERPL-MCNC: 21.4 UG/DL (ref 6.7–22.6)
CREAT SERPL-MCNC: 0.87 MG/DL (ref 0.6–1.3)
EOSINOPHIL # BLD AUTO: 0.53 THOUSAND/ÂΜL (ref 0–0.61)
EOSINOPHIL NFR BLD AUTO: 4 % (ref 0–6)
ERYTHROCYTE [DISTWIDTH] IN BLOOD BY AUTOMATED COUNT: 13.1 % (ref 11.6–15.1)
GFR SERPL CREATININE-BSD FRML MDRD: 57 ML/MIN/1.73SQ M
GLUCOSE SERPL-MCNC: 102 MG/DL (ref 65–140)
HCT VFR BLD AUTO: 32.5 % (ref 34.8–46.1)
HGB BLD-MCNC: 10.5 G/DL (ref 11.5–15.4)
IMM GRANULOCYTES # BLD AUTO: 0.06 THOUSAND/UL (ref 0–0.2)
IMM GRANULOCYTES NFR BLD AUTO: 1 % (ref 0–2)
LYMPHOCYTES # BLD AUTO: 1.27 THOUSANDS/ÂΜL (ref 0.6–4.47)
LYMPHOCYTES NFR BLD AUTO: 10 % (ref 14–44)
MAGNESIUM SERPL-MCNC: 1.5 MG/DL (ref 1.9–2.7)
MCH RBC QN AUTO: 28.3 PG (ref 26.8–34.3)
MCHC RBC AUTO-ENTMCNC: 32.3 G/DL (ref 31.4–37.4)
MCV RBC AUTO: 88 FL (ref 82–98)
MONOCYTES # BLD AUTO: 1.31 THOUSAND/ÂΜL (ref 0.17–1.22)
MONOCYTES NFR BLD AUTO: 10 % (ref 4–12)
NEUTROPHILS # BLD AUTO: 9.86 THOUSANDS/ÂΜL (ref 1.85–7.62)
NEUTS SEG NFR BLD AUTO: 74 % (ref 43–75)
NRBC BLD AUTO-RTO: 0 /100 WBCS
P AXIS: 55 DEGREES
P AXIS: 64 DEGREES
PHOSPHATE SERPL-MCNC: 3.5 MG/DL (ref 2.3–4.1)
PLATELET # BLD AUTO: 249 THOUSANDS/UL (ref 149–390)
PMV BLD AUTO: 8.5 FL (ref 8.9–12.7)
POTASSIUM SERPL-SCNC: 3.6 MMOL/L (ref 3.5–5.3)
PR INTERVAL: 252 MS
PR INTERVAL: 254 MS
PROCALCITONIN SERPL-MCNC: 0.14 NG/ML
PROT SERPL-MCNC: 6.2 G/DL (ref 6.4–8.4)
QRS AXIS: 53 DEGREES
QRS AXIS: 99 DEGREES
QRSD INTERVAL: 134 MS
QRSD INTERVAL: 134 MS
QT INTERVAL: 372 MS
QT INTERVAL: 386 MS
QTC INTERVAL: 467 MS
QTC INTERVAL: 469 MS
RBC # BLD AUTO: 3.71 MILLION/UL (ref 3.81–5.12)
SODIUM SERPL-SCNC: 129 MMOL/L (ref 135–147)
T WAVE AXIS: -36 DEGREES
T WAVE AXIS: 246 DEGREES
VENTRICULAR RATE: 88 BPM
VENTRICULAR RATE: 96 BPM
WBC # BLD AUTO: 13.09 THOUSAND/UL (ref 4.31–10.16)

## 2023-06-20 PROCEDURE — 93010 ELECTROCARDIOGRAM REPORT: CPT | Performed by: INTERNAL MEDICINE

## 2023-06-20 PROCEDURE — 82533 TOTAL CORTISOL: CPT | Performed by: INTERNAL MEDICINE

## 2023-06-20 PROCEDURE — 83735 ASSAY OF MAGNESIUM: CPT | Performed by: INTERNAL MEDICINE

## 2023-06-20 PROCEDURE — 84100 ASSAY OF PHOSPHORUS: CPT | Performed by: INTERNAL MEDICINE

## 2023-06-20 PROCEDURE — 97163 PT EVAL HIGH COMPLEX 45 MIN: CPT

## 2023-06-20 PROCEDURE — 85025 COMPLETE CBC W/AUTO DIFF WBC: CPT | Performed by: INTERNAL MEDICINE

## 2023-06-20 PROCEDURE — 99232 SBSQ HOSP IP/OBS MODERATE 35: CPT | Performed by: INTERNAL MEDICINE

## 2023-06-20 PROCEDURE — 84145 PROCALCITONIN (PCT): CPT | Performed by: INTERNAL MEDICINE

## 2023-06-20 PROCEDURE — 80053 COMPREHEN METABOLIC PANEL: CPT | Performed by: INTERNAL MEDICINE

## 2023-06-20 RX ORDER — LANOLIN ALCOHOL/MO/W.PET/CERES
400 CREAM (GRAM) TOPICAL 2 TIMES DAILY
Status: DISCONTINUED | OUTPATIENT
Start: 2023-06-20 | End: 2023-06-25 | Stop reason: HOSPADM

## 2023-06-20 RX ORDER — MAGNESIUM SULFATE HEPTAHYDRATE 40 MG/ML
2 INJECTION, SOLUTION INTRAVENOUS ONCE
Status: COMPLETED | OUTPATIENT
Start: 2023-06-20 | End: 2023-06-20

## 2023-06-20 RX ADMIN — ALUMINUM HYDROXIDE, MAGNESIUM HYDROXIDE, AND SIMETHICONE 30 ML: 200; 200; 20 SUSPENSION ORAL at 17:46

## 2023-06-20 RX ADMIN — MAGNESIUM SULFATE HEPTAHYDRATE 2 G: 2 INJECTION, SOLUTION INTRAVENOUS at 10:14

## 2023-06-20 RX ADMIN — FUROSEMIDE 20 MG: 10 INJECTION, SOLUTION INTRAMUSCULAR; INTRAVENOUS at 10:14

## 2023-06-20 RX ADMIN — MAGNESIUM OXIDE TAB 400 MG (241.3 MG ELEMENTAL MG) 400 MG: 400 (241.3 MG) TAB at 17:46

## 2023-06-20 RX ADMIN — Medication 1000 UNITS: at 08:33

## 2023-06-20 RX ADMIN — MAGNESIUM OXIDE TAB 400 MG (241.3 MG ELEMENTAL MG) 400 MG: 400 (241.3 MG) TAB at 10:14

## 2023-06-20 RX ADMIN — ASPIRIN 81 MG: 81 TABLET, COATED ORAL at 08:33

## 2023-06-20 RX ADMIN — PIPERACILLIN AND TAZOBACTAM 3.38 G: 3; .375 INJECTION, POWDER, LYOPHILIZED, FOR SOLUTION INTRAVENOUS at 04:59

## 2023-06-20 RX ADMIN — PANTOPRAZOLE SODIUM 20 MG: 20 TABLET, DELAYED RELEASE ORAL at 05:00

## 2023-06-20 RX ADMIN — GABAPENTIN 600 MG: 300 CAPSULE ORAL at 08:32

## 2023-06-20 RX ADMIN — PIPERACILLIN AND TAZOBACTAM 3.38 G: 3; .375 INJECTION, POWDER, LYOPHILIZED, FOR SOLUTION INTRAVENOUS at 19:57

## 2023-06-20 RX ADMIN — GABAPENTIN 600 MG: 300 CAPSULE ORAL at 17:35

## 2023-06-20 RX ADMIN — ENOXAPARIN SODIUM 40 MG: 100 INJECTION SUBCUTANEOUS at 08:33

## 2023-06-20 RX ADMIN — CYANOCOBALAMIN TAB 500 MCG 1000 MCG: 500 TAB at 08:32

## 2023-06-20 RX ADMIN — MELATONIN 6 MG: at 21:22

## 2023-06-20 RX ADMIN — PIPERACILLIN AND TAZOBACTAM 3.38 G: 3; .375 INJECTION, POWDER, LYOPHILIZED, FOR SOLUTION INTRAVENOUS at 12:34

## 2023-06-20 RX ADMIN — PRAVASTATIN SODIUM 20 MG: 20 TABLET ORAL at 17:35

## 2023-06-20 RX ADMIN — FUROSEMIDE 20 MG: 10 INJECTION, SOLUTION INTRAMUSCULAR; INTRAVENOUS at 15:52

## 2023-06-20 NOTE — PLAN OF CARE
Problem: PHYSICAL THERAPY ADULT  Goal: Performs mobility at highest level of function for planned discharge setting  See evaluation for individualized goals  Description: Treatment/Interventions: Functional transfer training, LE strengthening/ROM, Therapeutic exercise, Endurance training, Patient/family training, Equipment eval/education, Bed mobility, Gait training  Equipment Recommended: Glenn Ching       See flowsheet documentation for full assessment, interventions and recommendations  Note:    Problem List: Decreased strength, Decreased endurance, Impaired balance, Decreased mobility, Impaired hearing  Assessment: Pt is a 80 y o  female seen for PT evaluation s/p admit to [facility] on 8/18/2022 w/ Cystitis  Order placed for PT  Comorbidities affecting pt's physical performance at time of assessment include: HTN, limited hearing, CHF and neuropathy  Personal factors affecting pt at time of IE include: steps to enter environment, advanced age, inability to perform IADLs, inability to perform ADLs, inability to ambulate household distances and recent fall(s)  Prior to admission, pt was was independent w/ all functional mobility w/ rollator, lived in one floor environment and lived with daughter and son  Upon evaluation: Pt requires mod A for bed mobility and mod A for sit to stand  Limited assessment due to fatigue and inability to perform SPT  (Please find full objective findings from PT assessment regarding body systems outlined above)  Impairments and limitations also listed above, especially due to  weakness, impaired balance, decreased endurance, decreased activity tolerance and fall risk  Pt's clinical presentation is currently unstable/unpredictable seen in pt's presentation of fall risk, significant decline in functional mobility compared to baseline, and limited insight into deficits    Pt to benefit from continued skilled PT tx while in hospital and upon DC to address deficits as defined above and maximize level of functional mobility  Recommend progression of transfers and ambulation as appropriate  See flowsheet documentation for full assessment

## 2023-06-20 NOTE — PHYSICAL THERAPY NOTE
PHYSICAL THERAPY EVALUATION  NAME: Blanca Duty  AGE:   80 y o  MRN:  [id number]  ADMIT DX: Acute hyponatremia [E87 1]  Multiple pulmonary nodules [R91 8]  Ambulatory dysfunction [R26 2]    PMH:   Past Medical History:   Diagnosis Date    SHERLYN (acute kidney injury) ([place] [address] ) 5/8/2022    Balance disorder     Chronic pain     GERD (gastroesophageal reflux disease)     Hard of hearing     Hyperlipidemia     Hypertension     Hyponatremia 12/5/2020    Low serum cortisol level 12/7/2020    Neuropathy     Pneumonia     Pneumonia due to COVID-19 virus 12/5/2020    Sepsis ([place] [address] ) 5/22/2018    Tinnitus     Unspecified adrenocortical insufficiency ([place] [address] ) 2/28/2023    Felt to be related to COVID-19 infection  No current symptoms  UTI (urinary tract infection)      LENGTH OF STAY: 2       06/20/23 0931   PT Last Visit   PT Visit Date 06/20/23   Note Type   Note type Evaluation   Pain Assessment   Pain Assessment Tool 0-10   Pain Score No Pain   Restrictions/Precautions   Weight Bearing Precautions Per Order No   Other Precautions Contact/isolation; Chair Alarm; Bed Alarm; Fall Risk;Multiple lines   Home Living   Type of [address] [place] [address] Two level; Able to live on main level with bedroom/bathroom;Stairs to enter with rails;1/2 bath on main level  (1 BRENDA; spongebathes at baseline)   Home Equipment Walker;Cane  (rollator)   Additional Comments Ambulates with mod I and rollator at baseline  Prior Function   Level of [place] Independent with ADLs; Independent with functional mobility  (needs assist to drive)   Lives With Daughter;Son  (pt reports she rarely is home alone)   Receives Help From Family   IADLs Independent with meal prep; Family/Friend/Other provides transportation; Family/Friend/Other provides medication management   Falls in the last 6 months 1 to 4  (1 admitting fall)   Vocational Retired  (payroll/personel)   General   Family/Caregiver Present No   Cognition   Overall Cognitive Status [employer] "  Arousal/Participation Cooperative   Orientation Level Oriented X4   Memory Within functional limits   Following Commands Follows one step commands without difficulty   Comments Pt identified by name and   Subjective   Subjective Agrees to PT evaluation and is cooperative throughout session  \"I can't move my feet  \"   RLE Assessment   RLE Assessment X   Strength RLE   RLE Overall Strength 3+/5  (functionally)   LLE Assessment   LLE Assessment X   Strength LLE   LLE Overall Strength 3+/5  (functionally)   Bed Mobility   Supine to Sit 3  Moderate assistance   Additional items Assist x 1;HOB elevated; Bedrails; Increased time required;Verbal cues;LE management   Sit to Supine 3  Moderate assistance   Additional items Assist x 1; Increased time required;Verbal cues;LE management   Transfers   Sit to Stand 3  Moderate assistance   Additional items Assist x 1; Increased time required;Verbal cues   Stand to Sit 3  Moderate assistance   Additional items Assist x 1; Increased time required;Verbal cues  (with RW)   Additional Comments x2 sit to stand trials performed from EOB; able to advance feet ~1/2 inch on floor with weight shifting assist   Pt then reports need to sit due to fatigue  Pt declines further mobility due to fatigue  Balance   Static Sitting Fair   Dynamic Sitting Fair -   Static Standing Poor   Dynamic Standing Poor   Endurance Deficit   Endurance Deficit Yes   Endurance Deficit Description limited standing tolerance, fatigue   Activity Tolerance   Activity Tolerance Patient limited by fatigue   Medical Staff Made Aware Spoke to CM regarding recommendations   Nurse Made Aware Per RN, pt appropriate to evaluate   Assessment   Problem List Decreased strength;Decreased endurance; Impaired balance;Decreased mobility; Impaired hearing   Assessment Pt is a 80 y o  female seen for PT evaluation s/p admit to [facility] [address]  [facility] on 2022 w/ Cystitis  Order placed for PT   Comorbidities affecting pt's physical " performance at time of assessment include: HTN, limited hearing, CHF and neuropathy  Personal factors affecting pt at time of IE include: steps to enter environment, advanced age, inability to perform IADLs, inability to perform ADLs, inability to ambulate household distances and recent fall(s)  Prior to admission, pt was was independent w/ all functional mobility w/ rollator, lived in one floor environment and lived with daughter and son  Upon evaluation: Pt requires mod A for bed mobility and mod A for sit to stand  Limited assessment due to fatigue and inability to perform SPT  (Please find full objective findings from PT assessment regarding body systems outlined above)  Impairments and limitations also listed above, especially due to  weakness, impaired balance, decreased endurance, decreased activity tolerance and fall risk  Pt's clinical presentation is currently unstable/unpredictable seen in pt's presentation of fall risk, significant decline in functional mobility compared to baseline, and limited insight into deficits  Pt to benefit from continued skilled PT tx while in hospital and upon DC to address deficits as defined above and maximize level of functional mobility  Recommend progression of transfers and ambulation as appropriate  Goals   Patient Goals none stated at this time   STG Expiration Date 06/30/23   Short Term Goal #1 Pt will be able to: (1) perform bed mobility with min A to promote upright posture and OOB mobility (2) perform sit to stand with min A to decrease burden of care (3) ambulate at least 20` with min A and RW to increase activity tolerance (4) increase standing balance by 1 grade to decrease risk of falls   PT Treatment Day 0   Plan   Treatment/Interventions Functional transfer training;LE strengthening/ROM; Therapeutic exercise; Endurance training;Patient/family training;Equipment eval/education; Bed mobility;Gait training   PT Frequency 3-5x/wk   Recommendation   UB Rehab Discharge Recommendation (PT/OT) Level 2   Equipment Recommended [facility] Recommended Wheeled walker   AM-PAC Basic Mobility Inpatient   Turning in Flat Bed Without Bedrails 3   Lying on Back to Sitting on Edge of Flat Bed Without Bedrails 2   Moving Bed to Chair 1   Standing Up From Chair Using Arms 2   Walk in Room 1   Climb 3-5 Stairs With Railing 1   Basic Mobility Inpatient Raw Score 10   Turning Head Towards Sound 4   Follow Simple Instructions 4   Low Function Basic Mobility Raw Score  18   Low Function Basic Mobility Standardized Score  29 25   Highest Level Of Mobility   -HL Goal 4: Move to chair/commode   -HL Achieved 5: Stand (1 or more minutes)   End of Consult   Patient Position at End of Consult Supine;Bed/Chair alarm activated; All needs within reach     The patient's AM-PAC Basic Mobility Inpatient Short Form Raw Score is 10, Standardized Score is    A Raw Score of less than 16 suggests the patient may benefit from discharge to post-acute rehabilitation services  However please refer to therapist recommendation for discharge planning given other factors that may influence destination  Adapted from [employer] [facility] “6-Clicks” Basic Mobility and Daily Activity Scores With Discharge Destination  Physical Therapy, 2021;101:1-9   DOI: [email or web address]      Oscar Hermosillo, PT,DPT

## 2023-06-20 NOTE — ASSESSMENT & PLAN NOTE
Wt Readings from Last 3 Encounters:   06/20/23 72 7 kg (160 lb 4 4 oz)   06/07/23 68 kg (150 lb)   02/28/23 69 4 kg (153 lb)     · Maintained on HCTZ 12 5 mg daily  · Last echo July 2021: LVEF 50 to 55%  G1 DD  Mild to moderate TR    · Patient examines hypervolemic with 3+ pitting edema to bilateral lower extremities and mild pulmonary vascular congestion on chest x-ray  · Give Lasix 20 Mg IV x1 -trend urine output  · I/O and daily weights  · Sodium and fluid restriction  · On Lasix 20 mg IV twice daily  · Echocardiogram showed ejection fraction of 60-65 % with grade 1 diastolic dysfunction

## 2023-06-20 NOTE — ASSESSMENT & PLAN NOTE
· History of recurrent UTIs, follows with urology and is on prophylactic Macrobid  · Prior growth of ESBL E  coli and Enterococcus faecalis in 2019, however more recent urine cultures only growing pansensitive E  coli  · Admission UA consistent with cystitis, see above  · Zora Martinez in setting of acute infection  · Continue on Zosyn  · Urine cultures are pending

## 2023-06-20 NOTE — ASSESSMENT & PLAN NOTE
· Na 129 on admission  · Will obtain serum osmolality, urine osmolality, urine sodium, uric acid, TSH, and AM cortisol   · Suspect hypervolemia as etiology as patient examines volume overloaded  · Give Lasix 20 Mg IV x1 and monitor urine output  · On Lasix 20 mg IV twice daily  · Waiting blood work from this morning

## 2023-06-20 NOTE — CASE MANAGEMENT
Case Management Discharge Planning Note    Patient name Slim Burgess  Location /-50 MRN [id number]  : 1930 Date 2023       Current Admission Date: 2023  Current Admission Diagnosis:Cystitis   Patient Active Problem List    Diagnosis Date Noted   • Cystitis 2023   • Paroxysmal atrial fibrillation ([facility] ) 2022   • Generalized weakness 2022   • Acute on chronic diastolic heart failure ([facility] ) 2022   • Hyponatremia 2020   • Acute pain of left shoulder 2020   • History of recurrent UTI (urinary tract infection) 2020   • Change in mental status    • 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 eye with active choroidal neovascularization ([facility] ) 2019   • Fall 2018   • Sepsis without acute organ dysfunction ([place] [address] ) 2018   • Neuropathy 2016   • Vitamin B 12 deficiency 2016   • Venous insufficiency 2016   • Hypertension 2015   • Gastroesophageal reflux disease without esophagitis 2015   • Arthropathy 2015   • Mixed hyperlipidemia 2015      LOS (days): 2  Geometric Mean LOS (GMLOS) (days): 5 00  Days to GMLOS:3 2     OBJECTIVE:  Risk of Unplanned Readmission Score: 13 57         Current admission status: Inpatient   Preferred Pharmacy:   [facility] [address] MARIELOS GUIDRY [address]  [address]  [facility] [address]  Phone: [phone] Fax: [phone]    Primary Care Provider: Mariana Winkler MD    Primary Insurance: MEDICARE  Secondary Insurance:     DISCHARGE DETAILS:        Spoke with pt and her son regarding str recommendation from therapy  Pt is in agreement with going to [facility]  Pt prefers [facility]  Referrals made in Aidin

## 2023-06-20 NOTE — ASSESSMENT & PLAN NOTE
· SIRS criteria met on admission with tachycardia and tachypnea  · Suspected sources cystitis as seen on UA  · No severe sepsis criteria met  · Continue Zosyn  · Blood cultures are negative to date  · Urine culture, pending

## 2023-06-20 NOTE — PROGRESS NOTES
[place]  Progress Note  Name: Stacey Us  MRN: [id number]  Unit/Bed#: -01 I Date of Admission: 6/18/2023   Date of Service: 6/20/2023 I Hospital Day: 2    Assessment/Plan   Paroxysmal atrial fibrillation [facility])  Assessment & Plan  · Hx of paroxysmal Afib - last EKG with Aifb 12/2020  · Previously on eliquis as of 2021, however this was stopped shortly after per daughter for unclear reasons  · Rate controlled on admission at 96 bpm, EKG on admit showing sinus rhythm    Acute on chronic diastolic heart failure [facility])  Assessment & Plan  Wt Readings from Last 3 Encounters:   06/20/23 72 7 kg (160 lb 4 4 oz)   06/07/23 68 kg (150 lb)   02/28/23 69 4 kg (153 lb)     · Maintained on HCTZ 12 5 mg daily  · Last echo July 2021: LVEF 50 to 55%  G1 DD  Mild to moderate TR  · Patient examines hypervolemic with 3+ pitting edema to bilateral lower extremities and mild pulmonary vascular congestion on chest x-ray  · Give Lasix 20 Mg IV x1 -trend urine output  · I/O and daily weights  · Sodium and fluid restriction  · On Lasix 20 mg IV twice daily  · Echocardiogram showed ejection fraction of 60-65 % with grade 1 diastolic dysfunction    Generalized weakness  Assessment & Plan  · Presents to the ED with fall while using walker this afternoon  · Trauma work-up negative  · PT, OT, and CM consults  · Neuro exam on admission with 4-5 strength in bilateral lower extremities, 5 out of strength in bilateral upper extremities -no focal weakness    Endorses numbness in bilateral feet (unchanged from baseline neuropathy per patient)  · Suspect that generalized weakness secondary to cystitis  · Continue antibiotics    Hyponatremia  Assessment & Plan  · Na 129 on admission  · Will obtain serum osmolality, urine osmolality, urine sodium, uric acid, TSH, and AM cortisol   · Suspect hypervolemia as etiology as patient examines volume overloaded  · Give Lasix 20 Mg IV x1 and monitor urine output  · On Lasix 20 mg IV twice daily  · Waiting blood work from this morning    History of recurrent UTI (urinary tract infection)  Assessment & Plan  · History of recurrent UTIs, follows with urology and is on prophylactic Macrobid  · Prior growth of ESBL E  coli and Enterococcus faecalis in 2019, however more recent urine cultures only growing pansensitive E  coli  · Admission UA consistent with cystitis, see above  · Jerolyn Peña in setting of acute infection  · Continue on Zosyn  · Urine cultures are pending    Mixed hyperlipidemia  Assessment & Plan  · Continue home pravastatin    Sepsis without acute organ dysfunction ([facility] )  Assessment & Plan  · SIRS criteria met on admission with tachycardia and tachypnea  · Suspected sources cystitis as seen on UA  · No severe sepsis criteria met  · Continue Zosyn  · Blood cultures are negative to date  · Urine culture, pending    Gastroesophageal reflux disease without esophagitis  Assessment & Plan  · Placed on formulary equivalent Protonix    Hypertension  Assessment & Plan  · Home regimen: HCTZ 12 5 mg daily  · HCTZ held to allow for IV diuresis as patient examines hypervolemic    * Cystitis  Assessment & Plan  · UA with innumerable WBCs and moderate bacteria, negative nitrite  · Urine culture, pending   · Of note vulva is erythematous and macerated without discharge -suspect secondary to urinary incontinence and use of briefs/pads  · Frequent toileting and avoid utilization of diapers while in bed -should have geoff under her  · Barrier cream to labia   · On Zosyn due to prior growth of ESBL and Enterococcus faecalis             Labs & Imaging: I have personally reviewed pertinent reports  VTE Prophylaxis: in place  Code Status:   Level 3 - DNAR and DNI    Patient Centered Rounds: I have performed bedside rounds with nursing staff today      Discussions with Specialists or Other Care Team Provider: LINDY    Education and Discussions with Family / Patient: Daughter on "phone    Current Length of Stay: 2 day(s)    Current Patient Status: Inpatient   Certification Statement: The patient will continue to require additional inpatient hospital stay due to see my assessment and plan  Subjective:   Patient is seen and examined at bedside  Denies any new complaints  Afebrile  All other ROS are negative  Objective:    Vitals: Blood pressure 136/57, pulse 80, temperature 97 7 °F (36 5 °C), resp  rate 16, height 5' 2\" (1 575 m), weight 72 7 kg (160 lb 4 4 oz), SpO2 93 %  ,Body mass index is 29 31 kg/m²  SPO2 RA Rest    Flowsheet Row ED to Hosp-Admission (Current) from 6/18/2023 in [address] [facility]urg Unit   SpO2 93 %   SpO2 Activity At Rest   O2 Device None (Room air)   O2 Flow Rate --        I&O:     Intake/Output Summary (Last 24 hours) at 6/20/2023 0834  Last data filed at 6/20/2023 0604  Gross per 24 hour   Intake --   Output 1300 ml   Net -1300 ml       Physical Exam:    General- Alert, lying comfortably in bed  Not in any acute distress  Neck- Supple, No JVD  CVS- regular, S1 and S2 normal  Chest- Bilateral Air entry, decreased at bases  Abdomen- soft, nontender, not distended, no guarding or rigidity, BS+  Extremities-  No pedal edema, No calf tenderness                         Normal ROM in all extremities  CNS-   Alert, awake and orientedx3  No focal deficits present      Invasive Devices     Peripheral Intravenous Line  Duration           Peripheral IV 06/18/23 Right Antecubital 1 day    Peripheral IV 06/19/23 Left;Proximal;Ventral (anterior) Forearm <1 day                      Social History  reviewed  Family History   Problem Relation Age of Onset   • Stroke Mother    • Stroke Father    • Heart disease Daughter    • Substance Abuse Neg Hx    • Mental illness Neg Hx     reviewed    Meds:  Current Facility-Administered Medications   Medication Dose Route Frequency Provider Last Rate Last Admin   • acetaminophen (TYLENOL) tablet 650 mg  650 mg Oral Q6H " PRN Herbert Ojeda PA-C   650 mg at 06/19/23 2316   • aluminum-magnesium hydroxide-simethicone (MYLANTA) oral suspension 30 mL  30 mL Oral Q6H PRN Herbert Ojeda PA-C       • aspirin (ECOTRIN LOW STRENGTH) EC tablet 81 mg  81 mg Oral Daily Herbert Ojeda PA-C   81 mg at 06/19/23 4697   • cholecalciferol (VITAMIN D3) tablet 1,000 Units  1,000 Units Oral Daily Herbert Oejda PA-C   1,000 Units at 06/19/23 7159   • cyanocobalamin (VITAMIN B-12) tablet 1,000 mcg  1,000 mcg Oral Daily Herbert Ojeda PA-C   1,000 mcg at 06/19/23 3637   • enoxaparin (LOVENOX) subcutaneous injection 40 mg  40 mg Subcutaneous Daily Herbert Ojeda PA-C   40 mg at 06/19/23 1903   • furosemide (LASIX) injection 20 mg  20 mg Intravenous BID (diuretic) Allan Velázquez MD   20 mg at 06/19/23 1820   • gabapentin (NEURONTIN) capsule 600 mg  600 mg Oral BID Herbert Ojeda PA-C   600 mg at 06/19/23 1820   • melatonin tablet 6 mg  6 mg Oral HS Herbert Ojeda PA-C   6 mg at 06/19/23 2044   • pantoprazole (PROTONIX) EC tablet 20 mg  20 mg Oral Daily Before Breakfast Herbert Ojeda PA-C   20 mg at 06/20/23 0500   • piperacillin-tazobactam (ZOSYN) IVPB 3 375 g  3 375 g Intravenous Q8H Herbert Ojeda PA-C 25 mL/hr at 06/19/23 1243 3 375 g at 06/20/23 0459   • pravastatin (PRAVACHOL) tablet 20 mg  20 mg Oral After Dinner Herbert Ojeda PA-C   20 mg at 06/19/23 1820   • senna (SENOKOT) tablet 8 6 mg  1 tablet Oral HS PRN Herbert Ojeda PA-C          Medications Prior to Admission   Medication   • aspirin (ECOTRIN LOW STRENGTH) 81 mg EC tablet   • Cholecalciferol (VITAMIN D3) 1000 units CAPS   • Cyanocobalamin (VITAMIN B12 PO)   • gabapentin (NEURONTIN) 600 MG tablet   • hydrochlorothiazide (HYDRODIURIL) 12 5 mg tablet   • Mirabegron ER 25 MG TB24   • Multiple Vitamins-Minerals (PRESERVISION AREDS PO)   • nitrofurantoin (MACROBID) 100 mg capsule   • omeprazole (PriLOSEC) 20 mg delayed release capsule   • pravastatin (PRAVACHOL) 20 mg tablet       Labs:  Results from last 7 days   Lab Units 06/19/23  1407 06/18/23  1734   WBC Thousand/uL 15 19* 10 31*   HEMOGLOBIN g/dL 11 8 11 1*   HEMATOCRIT % 36 4 34 9   PLATELETS Thousands/uL 254 265   NEUTROS PCT %  --  68   LYMPHS PCT %  --  14   MONOS PCT %  --  10   EOS PCT %  --  6     Results from last 7 days   Lab Units 06/19/23  1407 06/18/23  1734   POTASSIUM mmol/L 4 8 3 8   CHLORIDE mmol/L 88* 91*   CO2 mmol/L 30 29   BUN mg/dL 12 12   CREATININE mg/dL 0 94 0 92   CALCIUM mg/dL 9 3 9 4   ALK PHOS U/L 60 61   ALT U/L 9 7   AST U/L 29 14     Lab Results   Component Value Date    TROPONINI 0 04 12/06/2020    TROPONINI 0 04 12/05/2020    TROPONINI 0 04 12/05/2020    CKTOTAL 46 12/05/2020    CKTOTAL 112 01/26/2014     Results from last 7 days   Lab Units 06/18/23  1734   INR  1 01     Lab Results   Component Value Date    BLOODCX No Growth at 24 hrs  06/18/2023    BLOODCX No Growth at 24 hrs  06/18/2023    BLOODCX No Growth After 5 Days  05/08/2022    BLOODCX No Growth After 5 Days  05/08/2022    URINECX >100,000 cfu/ml Escherichia coli (A) 10/22/2019    URINECX >100,000 cfu/ml Escherichia coli (A) 07/05/2019    URINECX 70,000-79,000 cfu/ml Enterococcus faecalis (A) 06/15/2019    URINECX 20,000-29,000 cfu/ml 06/15/2019    SPUTUMCULTUR 1+ Growth of Pseudomonas aeruginosa (A) 05/23/2018    SPUTUMCULTUR 1+ Growth of 05/23/2018         Imaging:  Results for orders placed during the hospital encounter of 06/18/23    XR Trauma chest portable    Narrative  CHEST    INDICATION:   TRAUMA  COMPARISON: 5/11/2022  EXAM PERFORMED/VIEWS:  XR CHEST PORTABLE  Images:  1    FINDINGS: Overlying EKG wire leads    Cardiomegaly  Mild pulmonary vascular congestion  The pulmonary nodule seen on the CT study are difficult to visualize on this radiograph  No large effusions or obvious pneumothorax  Osseous structures appear within normal limits for patient age   Large hiatal hernia  Impression  Mild pulmonary vascular congestion  The pulmonary nodules seen on the CT study are difficult to visualize on this radiograph  Large hiatal hernia            Workstation performed: [id number]    Results for orders placed during the hospital encounter of 06/22/21    XR chest pa & lateral    Narrative  CHEST    INDICATION:   R60 0: Localized edema  Covid positive on 12/5/2020  COMPARISON: Chest radiograph from 12/5/2020 and chest CT from 11/26/2018  EXAM PERFORMED/VIEWS:  XR CHEST PA & LATERAL  DUAL ENERGY SUBTRACTION  FINDINGS:    Cardiomediastinal silhouette normal  Moderate hiatal hernia  Mild bilateral groundglass opacity  No effusion or pneumothorax  Osseous structures normal for age  Impression  Mild bilateral groundglass opacity, question interstitial edema  Moderate hiatal hernia            Workstation performed: [id number]      Last 24 Hours Medication List:   Current Facility-Administered Medications   Medication Dose Route Frequency Provider Last Rate   • acetaminophen  650 mg Oral Q6H PRN Ferna Nab, PA-C     • aluminum-magnesium hydroxide-simethicone  30 mL Oral Q6H PRN Ferna Nab, PA-C     • aspirin  81 mg Oral Daily Ferna Nab, PA-C     • cholecalciferol  1,000 Units Oral Daily Ferna Nab, PA-C     • cyanocobalamin  1,000 mcg Oral Daily Ferna Nab, PA-C     • enoxaparin  40 mg Subcutaneous Daily Ferna Nab, PA-C     • furosemide  20 mg Intravenous BID (diuretic) Victory Bamberger, MD     • gabapentin  600 mg Oral BID Ferna Nab, PA-C     • melatonin  6 mg Oral HS Ferna Nab, PA-C     • pantoprazole  20 mg Oral Daily Before Breakfast Ferna Nab, PA-C     • piperacillin-tazobactam  3 375 g Intravenous Q8H Ferna Nab, PA-C 3 375 g (06/19/23 1243)   • pravastatin  20 mg Oral After Dinner Ferna Nab, PA-C     • senna  1 tablet Oral HS PRN Ferna Nab, PA-C          Today, Patient Was Seen By: Kimberly Villanueva MD    ** Please Note: Dictation voice to text software may have been used in the creation of this document   **

## 2023-06-20 NOTE — ASSESSMENT & PLAN NOTE
· UA with innumerable WBCs and moderate bacteria, negative nitrite  · Urine culture, pending   · Of note vulva is erythematous and macerated without discharge -suspect secondary to urinary incontinence and use of briefs/pads  · Frequent toileting and avoid utilization of diapers while in bed -should have geoff under her  · Barrier cream to labia   · On Zosyn due to prior growth of ESBL and Enterococcus faecalis

## 2023-06-21 ENCOUNTER — APPOINTMENT (INPATIENT)
Dept: RADIOLOGY | Facility: HOSPITAL | Age: 88
DRG: 871 | End: 2023-06-21
Payer: MEDICARE

## 2023-06-21 ENCOUNTER — APPOINTMENT (INPATIENT)
Dept: CT IMAGING | Facility: HOSPITAL | Age: 88
DRG: 871 | End: 2023-06-21
Payer: MEDICARE

## 2023-06-21 PROBLEM — R91.8 PULMONARY NODULES: Status: ACTIVE | Noted: 2023-06-21

## 2023-06-21 LAB
ANION GAP SERPL CALCULATED.3IONS-SCNC: 5 MMOL/L
BASOPHILS # BLD AUTO: 0.04 THOUSANDS/ÂΜL (ref 0–0.1)
BASOPHILS NFR BLD AUTO: 0 % (ref 0–1)
BUN SERPL-MCNC: 16 MG/DL (ref 5–25)
CALCIUM SERPL-MCNC: 8.6 MG/DL (ref 8.4–10.2)
CHLORIDE SERPL-SCNC: 93 MMOL/L (ref 96–108)
CO2 SERPL-SCNC: 35 MMOL/L (ref 21–32)
CREAT SERPL-MCNC: 1 MG/DL (ref 0.6–1.3)
EOSINOPHIL # BLD AUTO: 0.31 THOUSAND/ÂΜL (ref 0–0.61)
EOSINOPHIL NFR BLD AUTO: 2 % (ref 0–6)
ERYTHROCYTE [DISTWIDTH] IN BLOOD BY AUTOMATED COUNT: 13.2 % (ref 11.6–15.1)
GFR SERPL CREATININE-BSD FRML MDRD: 48 ML/MIN/1.73SQ M
GLUCOSE SERPL-MCNC: 107 MG/DL (ref 65–140)
HCT VFR BLD AUTO: 30.4 % (ref 34.8–46.1)
HGB BLD-MCNC: 10 G/DL (ref 11.5–15.4)
IMM GRANULOCYTES # BLD AUTO: 0.09 THOUSAND/UL (ref 0–0.2)
IMM GRANULOCYTES NFR BLD AUTO: 1 % (ref 0–2)
LYMPHOCYTES # BLD AUTO: 1.69 THOUSANDS/ÂΜL (ref 0.6–4.47)
LYMPHOCYTES NFR BLD AUTO: 13 % (ref 14–44)
MAGNESIUM SERPL-MCNC: 2.1 MG/DL (ref 1.9–2.7)
MCH RBC QN AUTO: 28.7 PG (ref 26.8–34.3)
MCHC RBC AUTO-ENTMCNC: 32.9 G/DL (ref 31.4–37.4)
MCV RBC AUTO: 87 FL (ref 82–98)
MONOCYTES # BLD AUTO: 1.41 THOUSAND/ÂΜL (ref 0.17–1.22)
MONOCYTES NFR BLD AUTO: 11 % (ref 4–12)
NEUTROPHILS # BLD AUTO: 9.65 THOUSANDS/ÂΜL (ref 1.85–7.62)
NEUTS SEG NFR BLD AUTO: 73 % (ref 43–75)
NRBC BLD AUTO-RTO: 0 /100 WBCS
PLATELET # BLD AUTO: 240 THOUSANDS/UL (ref 149–390)
PMV BLD AUTO: 8.4 FL (ref 8.9–12.7)
POTASSIUM SERPL-SCNC: 3.7 MMOL/L (ref 3.5–5.3)
RBC # BLD AUTO: 3.49 MILLION/UL (ref 3.81–5.12)
SODIUM SERPL-SCNC: 133 MMOL/L (ref 135–147)
WBC # BLD AUTO: 13.19 THOUSAND/UL (ref 4.31–10.16)

## 2023-06-21 PROCEDURE — 80048 BASIC METABOLIC PNL TOTAL CA: CPT | Performed by: INTERNAL MEDICINE

## 2023-06-21 PROCEDURE — 85025 COMPLETE CBC W/AUTO DIFF WBC: CPT | Performed by: INTERNAL MEDICINE

## 2023-06-21 PROCEDURE — G1004 CDSM NDSC: HCPCS

## 2023-06-21 PROCEDURE — 71045 X-RAY EXAM CHEST 1 VIEW: CPT

## 2023-06-21 PROCEDURE — 70450 CT HEAD/BRAIN W/O DYE: CPT

## 2023-06-21 PROCEDURE — 99232 SBSQ HOSP IP/OBS MODERATE 35: CPT | Performed by: INTERNAL MEDICINE

## 2023-06-21 PROCEDURE — 83735 ASSAY OF MAGNESIUM: CPT | Performed by: INTERNAL MEDICINE

## 2023-06-21 RX ORDER — OXYCODONE HYDROCHLORIDE 5 MG/1
5 TABLET ORAL EVERY 6 HOURS PRN
Status: DISCONTINUED | OUTPATIENT
Start: 2023-06-21 | End: 2023-06-25 | Stop reason: HOSPADM

## 2023-06-21 RX ORDER — FUROSEMIDE 10 MG/ML
20 INJECTION INTRAMUSCULAR; INTRAVENOUS DAILY
Status: DISCONTINUED | OUTPATIENT
Start: 2023-06-21 | End: 2023-06-22

## 2023-06-21 RX ORDER — OXYCODONE HYDROCHLORIDE 5 MG/1
5 TABLET ORAL ONCE
Status: COMPLETED | OUTPATIENT
Start: 2023-06-21 | End: 2023-06-21

## 2023-06-21 RX ADMIN — MELATONIN 6 MG: at 21:10

## 2023-06-21 RX ADMIN — GABAPENTIN 600 MG: 300 CAPSULE ORAL at 17:15

## 2023-06-21 RX ADMIN — OXYCODONE HYDROCHLORIDE 5 MG: 5 TABLET ORAL at 21:16

## 2023-06-21 RX ADMIN — PIPERACILLIN AND TAZOBACTAM 3.38 G: 3; .375 INJECTION, POWDER, LYOPHILIZED, FOR SOLUTION INTRAVENOUS at 04:35

## 2023-06-21 RX ADMIN — ACETAMINOPHEN 650 MG: 325 TABLET, FILM COATED ORAL at 23:56

## 2023-06-21 RX ADMIN — ENOXAPARIN SODIUM 40 MG: 100 INJECTION SUBCUTANEOUS at 08:19

## 2023-06-21 RX ADMIN — MAGNESIUM OXIDE TAB 400 MG (241.3 MG ELEMENTAL MG) 400 MG: 400 (241.3 MG) TAB at 17:16

## 2023-06-21 RX ADMIN — MAGNESIUM OXIDE TAB 400 MG (241.3 MG ELEMENTAL MG) 400 MG: 400 (241.3 MG) TAB at 08:19

## 2023-06-21 RX ADMIN — GABAPENTIN 600 MG: 300 CAPSULE ORAL at 08:19

## 2023-06-21 RX ADMIN — PRAVASTATIN SODIUM 20 MG: 20 TABLET ORAL at 17:15

## 2023-06-21 RX ADMIN — OXYCODONE HYDROCHLORIDE 5 MG: 5 TABLET ORAL at 13:08

## 2023-06-21 RX ADMIN — ACETAMINOPHEN 650 MG: 325 TABLET, FILM COATED ORAL at 00:39

## 2023-06-21 RX ADMIN — FUROSEMIDE 20 MG: 10 INJECTION, SOLUTION INTRAMUSCULAR; INTRAVENOUS at 08:19

## 2023-06-21 RX ADMIN — CYANOCOBALAMIN TAB 500 MCG 1000 MCG: 500 TAB at 08:19

## 2023-06-21 RX ADMIN — Medication 1000 UNITS: at 08:19

## 2023-06-21 RX ADMIN — PIPERACILLIN AND TAZOBACTAM 3.38 G: 3; .375 INJECTION, POWDER, LYOPHILIZED, FOR SOLUTION INTRAVENOUS at 13:08

## 2023-06-21 RX ADMIN — PANTOPRAZOLE SODIUM 20 MG: 20 TABLET, DELAYED RELEASE ORAL at 04:36

## 2023-06-21 RX ADMIN — ASPIRIN 81 MG: 81 TABLET, COATED ORAL at 08:19

## 2023-06-21 RX ADMIN — PIPERACILLIN AND TAZOBACTAM 3.38 G: 3; .375 INJECTION, POWDER, LYOPHILIZED, FOR SOLUTION INTRAVENOUS at 19:36

## 2023-06-21 NOTE — ASSESSMENT & PLAN NOTE
· Na 129 on admission  · Hyponatremia work-up  · Suspect hypervolemia as etiology as patient examines volume overloaded  · Give Lasix 20 Mg IV x1 and monitor urine output  · Sodium this morning is 133  · On lasix

## 2023-06-21 NOTE — ASSESSMENT & PLAN NOTE
Wt Readings from Last 3 Encounters:   06/21/23 70 7 kg (155 lb 13 8 oz)   06/07/23 68 kg (150 lb)   02/28/23 69 4 kg (153 lb)     · Maintained on HCTZ 12 5 mg daily  · Last echo July 2021: LVEF 50 to 55%  G1 DD  Mild to moderate TR    · Patient examines hypervolemic with 3+ pitting edema to bilateral lower extremities and mild pulmonary vascular congestion on chest x-ray  · Give Lasix 20 Mg IV x1 -trend urine output  · I/O and daily weights  · Sodium and fluid restriction  · Will switch to Lasix 20 mg IV daily  · Echocardiogram showed ejection fraction of 60-65 % with grade 1 diastolic dysfunction

## 2023-06-21 NOTE — ASSESSMENT & PLAN NOTE
· UA with innumerable WBCs and moderate bacteria, negative nitrite  · Of note vulva is erythematous and macerated without discharge -suspect secondary to urinary incontinence and use of briefs/pads  · Frequent toileting and avoid utilization of diapers while in bed -should have geoff under her  · Barrier cream to labia   On Zosyn due to prior growth of ESBL and Enterococcus faecalis

## 2023-06-21 NOTE — ASSESSMENT & PLAN NOTE
· History of recurrent UTIs, follows with urology and is on prophylactic Macrobid  · Prior growth of ESBL E  coli and Enterococcus faecalis in 2019, however more recent urine cultures only growing pansensitive E  coli  · Admission UA consistent with cystitis, see above  · Areatha Coffer in setting of acute infection  · Urine cultures showed mixed contaminants  · Continue on Zosyn

## 2023-06-21 NOTE — PROGRESS NOTES
-- Patient:  -- MRN: [id number]  -- Aidin Request ID: [id number]  -- Level of care reserved: Brad Leonard  -- Partner Reserved: [facility], [address] [phone]  -- Clinical needs requested:  -- Geography searched: [place] around [id number]  -- Start of Service:  -- Request sent: 3:28pm EDT on 6/20/2023 by Blayne Odell  -- Partner reserved: 7:56am EDT on 6/21/2023 by Blayne Odell  -- Choice list shared: 7:55am EDT on 6/21/2023 by Blayne Odell

## 2023-06-21 NOTE — OCCUPATIONAL THERAPY NOTE
Occupational Therapy Cx Note     Patient Name: Arturo Lowery  KGHNG'V Date: 6/21/2023  Problem List  Principal Problem:    Cystitis  Active Problems:    Hypertension    Gastroesophageal reflux disease without esophagitis    Sepsis without acute organ dysfunction (HCC)    Mixed hyperlipidemia    History of recurrent UTI (urinary tract infection)    Hyponatremia    Generalized weakness    Acute on chronic diastolic heart failure (HCC)    Paroxysmal atrial fibrillation ([facility] )    Pulmonary nodules          06/21/23 1451   OT Last Visit   OT Visit Date 06/21/23   Note Type   Note type Cancelled Session   Cancel Reasons Other   Additional Comments Attempted to see pt for OT eval  Pt pending CT of head  Will f/u as able & appropriate         Siobhan Quest, OTR/L

## 2023-06-21 NOTE — ASSESSMENT & PLAN NOTE
· Presents to the ED with fall while using walker this afternoon  · Trauma work-up negative  · PT, OT, and CM consults  · Neuro exam on admission with 4-5 strength in bilateral lower extremities, 5 out of strength in bilateral upper extremities -no focal weakness    Endorses numbness in bilateral feet (unchanged from baseline neuropathy per patient)  · Suspect that generalized weakness secondary to cystitis  · On Zosyn

## 2023-06-21 NOTE — ASSESSMENT & PLAN NOTE
· SIRS criteria met on admission with tachycardia and tachypnea  · Suspected sources cystitis as seen on UA  · No severe sepsis criteria met  · Patient was started on Zosyn  · Blood cultures are negative to date  · Urine culture showed mixed contaminants  · On Zosyn

## 2023-06-21 NOTE — PROGRESS NOTES
"Mateo Poe  Progress Note  Name: Juan Patel  MRN: [id number]  Unit/Bed#: -01 I Date of Admission: 6/18/2023   Date of Service: 6/21/2023 I Hospital Day: 3    Assessment/Plan   Pulmonary nodules  Assessment & Plan  CT showed-\" Multiple pulmonary nodules with the largest measuring up to 1 cm in the right lower lobe  Cannot exclude metastatic disease  Consider further evaluation with a PET CT study  \"  Outpatient follow-up with pulmonary and outpatient PET scan  Discussed with patient and daughter    Paroxysmal atrial fibrillation [facility])  Assessment & Plan  · Hx of paroxysmal Afib - last EKG with Aifb 12/2020  · Previously on eliquis as of 2021, however this was stopped shortly after per daughter for unclear reasons  · Rate controlled on admission at 96 bpm, EKG on admit showing sinus rhythm    Acute on chronic diastolic heart failure [facility])  Assessment & Plan  Wt Readings from Last 3 Encounters:   06/21/23 70 7 kg (155 lb 13 8 oz)   06/07/23 68 kg (150 lb)   02/28/23 69 4 kg (153 lb)     · Maintained on HCTZ 12 5 mg daily  · Last echo July 2021: LVEF 50 to 55%  G1 DD  Mild to moderate TR  · Patient examines hypervolemic with 3+ pitting edema to bilateral lower extremities and mild pulmonary vascular congestion on chest x-ray  · Give Lasix 20 Mg IV x1 -trend urine output  · I/O and daily weights  · Sodium and fluid restriction  · Will switch to Lasix 20 mg IV daily  · Echocardiogram showed ejection fraction of 60-65 % with grade 1 diastolic dysfunction    Generalized weakness  Assessment & Plan  · Presents to the ED with fall while using walker this afternoon  · Trauma work-up negative  · PT, OT, and CM consults  · Neuro exam on admission with 4-5 strength in bilateral lower extremities, 5 out of strength in bilateral upper extremities -no focal weakness    Endorses numbness in bilateral feet (unchanged from baseline neuropathy per patient)  · Suspect that generalized weakness " secondary to cystitis  · On Zosyn    Hyponatremia  Assessment & Plan  · Na 129 on admission  · Hyponatremia work-up  · Suspect hypervolemia as etiology as patient examines volume overloaded  · Give Lasix 20 Mg IV x1 and monitor urine output  · Sodium this morning is 133  · On lasix    History of recurrent UTI (urinary tract infection)  Assessment & Plan  · History of recurrent UTIs, follows with urology and is on prophylactic Macrobid  · Prior growth of ESBL E  coli and Enterococcus faecalis in 2019, however more recent urine cultures only growing pansensitive E  coli  · Admission UA consistent with cystitis, see above  · Pinky Aschoff in setting of acute infection  · Urine cultures showed mixed contaminants  · Continue on Zosyn    Mixed hyperlipidemia  Assessment & Plan  · Continue home pravastatin    Sepsis without acute organ dysfunction ([facility] )  Assessment & Plan  · SIRS criteria met on admission with tachycardia and tachypnea  · Suspected sources cystitis as seen on UA  · No severe sepsis criteria met  · Patient was started on Zosyn  · Blood cultures are negative to date  · Urine culture showed mixed contaminants  · On Zosyn    Gastroesophageal reflux disease without esophagitis  Assessment & Plan  · Placed on formulary equivalent Protonix    Hypertension  Assessment & Plan  · Home regimen: HCTZ 12 5 mg daily  · HCTZ held to allow for IV diuresis as patient examines hypervolemic    * Cystitis  Assessment & Plan  · UA with innumerable WBCs and moderate bacteria, negative nitrite  · Of note vulva is erythematous and macerated without discharge -suspect secondary to urinary incontinence and use of briefs/pads  · Frequent toileting and avoid utilization of diapers while in bed -should have geoff under her  · Barrier cream to labia   On Zosyn due to prior growth of ESBL and Enterococcus faecalis           Labs & Imaging: I have personally reviewed pertinent reports  VTE Prophylaxis: in place      Code Status: "  Level 3 - DNAR and DNI    Patient Centered Rounds: I have performed bedside rounds with nursing staff today  Discussions with Specialists or Other Care Team Provider: LINDY    Education and Discussions with Family / Patient: Daughter-left VM    Current Length of Stay: 3 day(s)    Current Patient Status: Inpatient   Certification Statement: The patient will continue to require additional inpatient hospital stay due to see my assessment and plan  Subjective:   Patient is seen and examined at bedside  Complaint of right-sided headache  No other complaint  Afebrile  All other ROS are negative  Objective:    Vitals: Blood pressure 120/50, pulse 69, temperature 97 5 °F (36 4 °C), resp  rate 14, height 5' 2\" (1 575 m), weight 70 7 kg (155 lb 13 8 oz), SpO2 93 %  ,Body mass index is 28 51 kg/m²  SPO2 RA Rest    Flowsheet Row ED to Hosp-Admission (Current) from 6/18/2023 in [address] Med [facility]   SpO2 93 %   SpO2 Activity At Rest   O2 Device None (Room air)   O2 Flow Rate --        I&O:     Intake/Output Summary (Last 24 hours) at 6/21/2023 1221  Last data filed at 6/20/2023 1700  Gross per 24 hour   Intake 240 ml   Output 1175 ml   Net -935 ml       Physical Exam:    General- Alert, lying comfortably in bed  Not in any acute distress  Neck- Supple, No JVD  CVS- regular, S1 and S2 normal  Chest- Bilateral Air entry, No rhochi, crackles or wheezing present  Abdomen- soft, nontender, not distended, no guarding or rigidity, BS+  Extremities-  No pedal edema, No calf tenderness  Moving all 4 extremities  CNS-   Alert, awake and orientedx3  No focal deficits present      Invasive Devices     Peripheral Intravenous Line  Duration           Peripheral IV 06/18/23 Right Antecubital 2 days    Peripheral IV 06/19/23 Left;Proximal;Ventral (anterior) Forearm 1 day                      Social History  reviewed  Family History   Problem Relation Age of Onset   • Stroke Mother    • Stroke Father    • " Heart disease Daughter    • Substance Abuse Neg Hx    • Mental illness Neg Hx     reviewed    Meds:  Current Facility-Administered Medications   Medication Dose Route Frequency Provider Last Rate Last Admin   • acetaminophen (TYLENOL) tablet 650 mg  650 mg Oral Q6H PRN Keenan Martinez PA-C   650 mg at 06/21/23 0039   • aluminum-magnesium hydroxide-simethicone (MYLANTA) oral suspension 30 mL  30 mL Oral Q6H PRN Keenan Martinez PA-C   30 mL at 06/20/23 1746   • aspirin (ECOTRIN LOW STRENGTH) EC tablet 81 mg  81 mg Oral Daily Keenan Martinez PA-C   81 mg at 06/21/23 0519   • cholecalciferol (VITAMIN D3) tablet 1,000 Units  1,000 Units Oral Daily Keenan Martinez PA-C   1,000 Units at 06/21/23 6423   • cyanocobalamin (VITAMIN B-12) tablet 1,000 mcg  1,000 mcg Oral Daily Keenan Martinez PA-C   1,000 mcg at 06/21/23 8087   • enoxaparin (LOVENOX) subcutaneous injection 40 mg  40 mg Subcutaneous Daily Keenan Martinez PA-C   40 mg at 06/21/23 1727   • furosemide (LASIX) injection 20 mg  20 mg Intravenous Daily Morris Blanco MD   20 mg at 06/21/23 9339   • gabapentin (NEURONTIN) capsule 600 mg  600 mg Oral BID Keenan Martinez PA-C   600 mg at 06/21/23 4910   • magnesium Oxide (MAG-OX) tablet 400 mg  400 mg Oral BID Morris Blanco MD   400 mg at 06/21/23 9759   • melatonin tablet 6 mg  6 mg Oral HS Keenan Martinez PA-C   6 mg at 06/20/23 2122   • oxyCODONE (ROXICODONE) IR tablet 5 mg  5 mg Oral Once Morris Blanco MD       • pantoprazole (PROTONIX) EC tablet 20 mg  20 mg Oral Daily Before Breakfast Keenan Martinez PA-C   20 mg at 06/21/23 0436   • piperacillin-tazobactam (ZOSYN) IVPB 3 375 g  3 375 g Intravenous Q8H Keenan Martinez PA-C 25 mL/hr at 06/19/23 1243 3 375 g at 06/21/23 0435   • pravastatin (PRAVACHOL) tablet 20 mg  20 mg Oral After Dinner Keenan Martinez PA-C   20 mg at 06/20/23 3992   • senna (SENOKOT) tablet 8 6 mg  1 tablet Oral HS PRN Keenan Martinez PA-C Medications Prior to Admission   Medication   • aspirin (ECOTRIN LOW STRENGTH) 81 mg EC tablet   • Cholecalciferol (VITAMIN D3) 1000 units CAPS   • Cyanocobalamin (VITAMIN B12 PO)   • gabapentin (NEURONTIN) 600 MG tablet   • hydrochlorothiazide (HYDRODIURIL) 12 5 mg tablet   • Mirabegron ER 25 MG TB24   • Multiple Vitamins-Minerals (PRESERVISION AREDS PO)   • nitrofurantoin (MACROBID) 100 mg capsule   • omeprazole (PriLOSEC) 20 mg delayed release capsule   • pravastatin (PRAVACHOL) 20 mg tablet       Labs:  Results from last 7 days   Lab Units 06/21/23  0438 06/20/23  0847 06/19/23  1407 06/18/23  1734   WBC Thousand/uL 13 19* 13 09* 15 19* 10 31*   HEMOGLOBIN g/dL 10 0* 10 5* 11 8 11 1*   HEMATOCRIT % 30 4* 32 5* 36 4 34 9   PLATELETS Thousands/uL 240 249 254 265   NEUTROS PCT % 73 74  --  68   LYMPHS PCT % 13* 10*  --  14   MONOS PCT % 11 10  --  10   EOS PCT % 2 4  --  6     Results from last 7 days   Lab Units 06/21/23  0438 06/20/23  0847 06/19/23  1407 06/18/23  1734   POTASSIUM mmol/L 3 7 3 6 4 8 3 8   CHLORIDE mmol/L 93* 92* 88* 91*   CO2 mmol/L 35* 30 30 29   BUN mg/dL 16 12 12 12   CREATININE mg/dL 1 00 0 87 0 94 0 92   CALCIUM mg/dL 8 6 8 6 9 3 9 4   ALK PHOS U/L  --  52 60 61   ALT U/L  --  6* 9 7   AST U/L  --  13 29 14     Lab Results   Component Value Date    TROPONINI 0 04 12/06/2020    TROPONINI 0 04 12/05/2020    TROPONINI 0 04 12/05/2020    CKTOTAL 46 12/05/2020    CKTOTAL 112 01/26/2014     Results from last 7 days   Lab Units 06/18/23  1734   INR  1 01     Lab Results   Component Value Date    BLOODCX No Growth at 48 hrs  06/18/2023    BLOODCX No Growth at 48 hrs  06/18/2023    BLOODCX No Growth After 5 Days  05/08/2022    BLOODCX No Growth After 5 Days   05/08/2022    URINECX 30,000-39,000 cfu/ml 06/18/2023    URINECX >100,000 cfu/ml Escherichia coli (A) 10/22/2019    URINECX >100,000 cfu/ml Escherichia coli (A) 07/05/2019    SPUTUMCULTUR 1+ Growth of Pseudomonas aeruginosa (A) 05/23/2018 SPUTUMCULTUR 1+ Growth of 05/23/2018         Imaging:  Results for orders placed during the hospital encounter of 06/18/23    XR Trauma chest portable    Narrative  CHEST    INDICATION:   TRAUMA  COMPARISON: 5/11/2022  EXAM PERFORMED/VIEWS:  XR CHEST PORTABLE  Images:  1    FINDINGS: Overlying EKG wire leads    Cardiomegaly  Mild pulmonary vascular congestion  The pulmonary nodule seen on the CT study are difficult to visualize on this radiograph  No large effusions or obvious pneumothorax  Osseous structures appear within normal limits for patient age  Large hiatal hernia  Impression  Mild pulmonary vascular congestion  The pulmonary nodules seen on the CT study are difficult to visualize on this radiograph  Large hiatal hernia            Workstation performed: [id number]    Results for orders placed during the hospital encounter of 06/22/21    XR chest pa & lateral    Narrative  CHEST    INDICATION:   R60 0: Localized edema  Covid positive on 12/5/2020  COMPARISON: Chest radiograph from 12/5/2020 and chest CT from 11/26/2018  EXAM PERFORMED/VIEWS:  XR CHEST PA & LATERAL  DUAL ENERGY SUBTRACTION  FINDINGS:    Cardiomediastinal silhouette normal  Moderate hiatal hernia  Mild bilateral groundglass opacity  No effusion or pneumothorax  Osseous structures normal for age  Impression  Mild bilateral groundglass opacity, question interstitial edema  Moderate hiatal hernia            Workstation performed: [id number]      Last 24 Hours Medication List:   Current Facility-Administered Medications   Medication Dose Route Frequency Provider Last Rate   • acetaminophen  650 mg Oral Q6H PRN Wm Vasquez PA-C     • aluminum-magnesium hydroxide-simethicone  30 mL Oral Q6H PRN Wm Vasquez PA-C     • aspirin  81 mg Oral Daily Wm Vasquez PA-C     • cholecalciferol  1,000 Units Oral Daily Wm Vasquez PA-C     • cyanocobalamin  1,000 mcg Oral Daily Wm Vasquez PA-C • enoxaparin  40 mg Subcutaneous Daily JULIANNA UrenaC     • furosemide  20 mg Intravenous Daily Victory Bamberger, MD     • gabapentin  600 mg Oral BID JULIANNA UrenaC     • magnesium Oxide  400 mg Oral BID Victory Bamberger, MD     • melatonin  6 mg Oral HS JULIANNA UrenaC     • oxyCODONE  5 mg Oral Once Victory Bamberger, MD     • pantoprazole  20 mg Oral Daily Before Breakfast MARIA A Urena-C     • piperacillin-tazobactam  3 375 g Intravenous Q8H MARIA A Urena-C 3 375 g (06/19/23 1243)   • pravastatin  20 mg Oral After Dinner JULIANNA UrenaC     • senna  1 tablet Oral HS PRN JULIANNA UrenaC          Today, Patient Was Seen By: Victory Bamberger, MD    ** Please Note: Dictation voice to text software may have been used in the creation of this document   **

## 2023-06-21 NOTE — ASSESSMENT & PLAN NOTE
"CT showed-\" Multiple pulmonary nodules with the largest measuring up to 1 cm in the right lower lobe  Cannot exclude metastatic disease  Consider further evaluation with a PET CT study  \"  Outpatient follow-up with pulmonary and outpatient PET scan    Discussed with patient and daughter  "

## 2023-06-21 NOTE — CASE MANAGEMENT
Case Management Discharge Planning Note    Patient name Stephani Wilson  Location /-49 MRN [id number]  : 1930 Date 2023       Current Admission Date: 2023  Current Admission Diagnosis:Cystitis   Patient Active Problem List    Diagnosis Date Noted   • Pulmonary nodules 2023   • Cystitis 2023   • Paroxysmal atrial fibrillation ([facility] ) 2022   • Generalized weakness 2022   • Acute on chronic diastolic heart failure ([facility] ) 2022   • Hyponatremia 2020   • Acute pain of left shoulder 2020   • History of recurrent UTI (urinary tract infection) 2020   • Change in mental status    • 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 eye with active choroidal neovascularization ([facility] ) 2019   • Fall 2018   • Sepsis without acute organ dysfunction ([facility] ) 2018   • Neuropathy 2016   • Vitamin B 12 deficiency 2016   • Venous insufficiency 2016   • Hypertension 2015   • Gastroesophageal reflux disease without esophagitis 2015   • Arthropathy 2015   • Mixed hyperlipidemia 2015      LOS (days): 3  Geometric Mean LOS (GMLOS) (days): 5 00  Days to GMLOS:2 2     OBJECTIVE:  Risk of Unplanned Readmission Score: 13 78         Current admission status: Inpatient   Preferred Pharmacy:   [address]  [address]  [address]  [facility] [address]  Phone: [phone] Fax: [phone]    Primary Care Provider: Melvin Maradiaga MD    Primary Insurance: MEDICARE  Secondary Insurance:     DISCHARGE DETAILS:         IMM reviewed with patient, patient agrees with discharge determination                                                                                      IMM Given (Date):: 23 (3:05 pm)  IMM Given to[de-identified] Patient

## 2023-06-22 LAB
ALBUMIN SERPL BCP-MCNC: 3.3 G/DL (ref 3.5–5)
ALP SERPL-CCNC: 51 U/L (ref 34–104)
ALT SERPL W P-5'-P-CCNC: 7 U/L (ref 7–52)
ANION GAP SERPL CALCULATED.3IONS-SCNC: 5 MMOL/L
AST SERPL W P-5'-P-CCNC: 12 U/L (ref 13–39)
BASOPHILS # BLD AUTO: 0.05 THOUSANDS/ÂΜL (ref 0–0.1)
BASOPHILS NFR BLD AUTO: 0 % (ref 0–1)
BILIRUB SERPL-MCNC: 0.42 MG/DL (ref 0.2–1)
BUN SERPL-MCNC: 20 MG/DL (ref 5–25)
CALCIUM ALBUM COR SERPL-MCNC: 9.4 MG/DL (ref 8.3–10.1)
CALCIUM SERPL-MCNC: 8.8 MG/DL (ref 8.4–10.2)
CHLORIDE SERPL-SCNC: 93 MMOL/L (ref 96–108)
CO2 SERPL-SCNC: 35 MMOL/L (ref 21–32)
CREAT SERPL-MCNC: 1.04 MG/DL (ref 0.6–1.3)
EOSINOPHIL # BLD AUTO: 0.44 THOUSAND/ÂΜL (ref 0–0.61)
EOSINOPHIL NFR BLD AUTO: 3 % (ref 0–6)
ERYTHROCYTE [DISTWIDTH] IN BLOOD BY AUTOMATED COUNT: 13.3 % (ref 11.6–15.1)
GFR SERPL CREATININE-BSD FRML MDRD: 46 ML/MIN/1.73SQ M
GLUCOSE SERPL-MCNC: 102 MG/DL (ref 65–140)
HCT VFR BLD AUTO: 30.9 % (ref 34.8–46.1)
HGB BLD-MCNC: 10 G/DL (ref 11.5–15.4)
IMM GRANULOCYTES # BLD AUTO: 0.07 THOUSAND/UL (ref 0–0.2)
IMM GRANULOCYTES NFR BLD AUTO: 1 % (ref 0–2)
LYMPHOCYTES # BLD AUTO: 1.64 THOUSANDS/ÂΜL (ref 0.6–4.47)
LYMPHOCYTES NFR BLD AUTO: 12 % (ref 14–44)
MCH RBC QN AUTO: 28.5 PG (ref 26.8–34.3)
MCHC RBC AUTO-ENTMCNC: 32.4 G/DL (ref 31.4–37.4)
MCV RBC AUTO: 88 FL (ref 82–98)
MONOCYTES # BLD AUTO: 1.49 THOUSAND/ÂΜL (ref 0.17–1.22)
MONOCYTES NFR BLD AUTO: 11 % (ref 4–12)
NEUTROPHILS # BLD AUTO: 9.53 THOUSANDS/ÂΜL (ref 1.85–7.62)
NEUTS SEG NFR BLD AUTO: 73 % (ref 43–75)
NRBC BLD AUTO-RTO: 0 /100 WBCS
PLATELET # BLD AUTO: 239 THOUSANDS/UL (ref 149–390)
PMV BLD AUTO: 8.4 FL (ref 8.9–12.7)
POTASSIUM SERPL-SCNC: 3.8 MMOL/L (ref 3.5–5.3)
PROT SERPL-MCNC: 6.1 G/DL (ref 6.4–8.4)
RBC # BLD AUTO: 3.51 MILLION/UL (ref 3.81–5.12)
SODIUM SERPL-SCNC: 133 MMOL/L (ref 135–147)
WBC # BLD AUTO: 13.22 THOUSAND/UL (ref 4.31–10.16)

## 2023-06-22 PROCEDURE — 99232 SBSQ HOSP IP/OBS MODERATE 35: CPT | Performed by: INTERNAL MEDICINE

## 2023-06-22 PROCEDURE — 85025 COMPLETE CBC W/AUTO DIFF WBC: CPT | Performed by: INTERNAL MEDICINE

## 2023-06-22 PROCEDURE — 80053 COMPREHEN METABOLIC PANEL: CPT | Performed by: INTERNAL MEDICINE

## 2023-06-22 PROCEDURE — 97167 OT EVAL HIGH COMPLEX 60 MIN: CPT

## 2023-06-22 PROCEDURE — 97535 SELF CARE MNGMENT TRAINING: CPT

## 2023-06-22 RX ORDER — MICONAZOLE NITRATE 20 MG/G
CREAM TOPICAL 2 TIMES DAILY
Status: DISCONTINUED | OUTPATIENT
Start: 2023-06-22 | End: 2023-06-25 | Stop reason: HOSPADM

## 2023-06-22 RX ORDER — NEOMYCIN SULFATE, POLYMYXIN B SULFATE AND HYDROCORTISONE 10; 3.5; 1 MG/ML; MG/ML; [USP'U]/ML
4 SUSPENSION/ DROPS AURICULAR (OTIC) EVERY 6 HOURS SCHEDULED
Status: DISCONTINUED | OUTPATIENT
Start: 2023-06-22 | End: 2023-06-25 | Stop reason: HOSPADM

## 2023-06-22 RX ORDER — FUROSEMIDE 20 MG/1
20 TABLET ORAL DAILY
Status: DISCONTINUED | OUTPATIENT
Start: 2023-06-22 | End: 2023-06-24

## 2023-06-22 RX ADMIN — PIPERACILLIN AND TAZOBACTAM 3.38 G: 3; .375 INJECTION, POWDER, LYOPHILIZED, FOR SOLUTION INTRAVENOUS at 12:39

## 2023-06-22 RX ADMIN — PIPERACILLIN AND TAZOBACTAM 3.38 G: 3; .375 INJECTION, POWDER, LYOPHILIZED, FOR SOLUTION INTRAVENOUS at 04:38

## 2023-06-22 RX ADMIN — ENOXAPARIN SODIUM 40 MG: 100 INJECTION SUBCUTANEOUS at 09:44

## 2023-06-22 RX ADMIN — MAGNESIUM OXIDE TAB 400 MG (241.3 MG ELEMENTAL MG) 400 MG: 400 (241.3 MG) TAB at 16:49

## 2023-06-22 RX ADMIN — ASPIRIN 81 MG: 81 TABLET, COATED ORAL at 09:44

## 2023-06-22 RX ADMIN — NEOMYCIN SULFATE, POLYMYXIN B SULFATE AND HYDROCORTISONE 4 DROP: 10; 3.5; 1 SUSPENSION/ DROPS AURICULAR (OTIC) at 17:27

## 2023-06-22 RX ADMIN — MICONAZOLE NITRATE: 20 CREAM TOPICAL at 16:47

## 2023-06-22 RX ADMIN — MAGNESIUM OXIDE TAB 400 MG (241.3 MG ELEMENTAL MG) 400 MG: 400 (241.3 MG) TAB at 09:44

## 2023-06-22 RX ADMIN — MICONAZOLE NITRATE: 20 CREAM TOPICAL at 12:48

## 2023-06-22 RX ADMIN — FUROSEMIDE 20 MG: 10 INJECTION, SOLUTION INTRAMUSCULAR; INTRAVENOUS at 09:44

## 2023-06-22 RX ADMIN — PIPERACILLIN AND TAZOBACTAM 3.38 G: 3; .375 INJECTION, POWDER, LYOPHILIZED, FOR SOLUTION INTRAVENOUS at 20:04

## 2023-06-22 RX ADMIN — Medication 1000 UNITS: at 09:43

## 2023-06-22 RX ADMIN — PRAVASTATIN SODIUM 20 MG: 20 TABLET ORAL at 16:50

## 2023-06-22 RX ADMIN — PANTOPRAZOLE SODIUM 20 MG: 20 TABLET, DELAYED RELEASE ORAL at 04:38

## 2023-06-22 RX ADMIN — GABAPENTIN 600 MG: 300 CAPSULE ORAL at 09:43

## 2023-06-22 RX ADMIN — CYANOCOBALAMIN TAB 500 MCG 1000 MCG: 500 TAB at 09:43

## 2023-06-22 RX ADMIN — GABAPENTIN 600 MG: 300 CAPSULE ORAL at 16:50

## 2023-06-22 RX ADMIN — FUROSEMIDE 20 MG: 20 TABLET ORAL at 16:49

## 2023-06-22 NOTE — ASSESSMENT & PLAN NOTE
Wt Readings from Last 3 Encounters:   06/22/23 69 4 kg (153 lb)   06/07/23 68 kg (150 lb)   02/28/23 69 4 kg (153 lb)     · Maintained on HCTZ 12 5 mg daily  · Last echo July 2021: LVEF 50 to 55%  G1 DD  Mild to moderate TR    · Patient examines hypervolemic with 3+ pitting edema to bilateral lower extremities and mild pulmonary vascular congestion on chest x-ray  · Give Lasix 20 Mg IV x1 -trend urine output  · I/O and daily weights  · Sodium and fluid restriction  · Will switch to Lasix 20 mg IV daily  · Echocardiogram showed ejection fraction of 60-65 % with grade 1 diastolic dysfunction

## 2023-06-22 NOTE — PLAN OF CARE
Problem: OCCUPATIONAL THERAPY ADULT  Goal: Performs self-care activities at highest level of function for planned discharge setting  See evaluation for individualized goals  Description: Treatment Interventions: ADL retraining, Functional transfer training, UE strengthening/ROM, Endurance training, Patient/family training, Equipment evaluation/education, Compensatory technique education, Continued evaluation          See flowsheet documentation for full assessment, interventions and recommendations  Note: Limitation: Decreased ADL status, Decreased UE strength, Decreased endurance, Decreased self-care trans, Decreased high-level ADLs  Prognosis: Fair  Assessment: Pt is a 80 y o  female seen for OT evaluation at [facility], admitted 6/18/2023 w/ a fall  OT completed extensive review of pt's medical and social history  Comorbidities affecting pt's functional performance at time of assessment include: HTN, GERD, sepsis, HLD, hx of UTI, generalized weakness, CHF, PAF, pulmonary nodules, cystitis, neuropathy, arthopathy  Personal factors affecting pt at time of IE include: steps to enter environment, difficulty performing ADLS, difficulty performing IADLS  and environment  Prior to admission, pt was living in a [address] [place] [address] with 1 [facility] and a [facility] with her children  Pt was I w/  ADLS and A w/ IADLS, (-) drove, & required use of rollator PTA  Upon evaluation: Pt requires Mod Ax1 for bed mobility, Min-Mod AX1 for functional mobility/transfers, Min A for UB ADLs and Mod-Max A for LB ADLS 2* the following deficits impacting occupational performance: weakness, decreased strength, decreased balance, decreased tolerance and increased pain  Full objective findings from OT assessment regarding body systems outlined above  Pt to benefit from continued skilled OT tx while in the hospital to address deficits as defined above and maximize level of functional independence w/ ADL's and functional mobility   Occupational Performance areas to address include: grooming, bathing/shower, toilet hygiene, dressing, functional mobility and clothing management  Based on findings, pt is of high complexity  The patient's raw score on the AM-PAC Daily Activity inpatient short form is 16, standardized score is 35 96, less than 39 4  Patients at this level are likely to benefit from DC to post-acute rehabilitation services  However, please refer to therapist recommendation for discharge planning given other factors that may influence destination  At this time, OT recommendations at time of discharge are DC with Level II resources

## 2023-06-22 NOTE — OCCUPATIONAL THERAPY NOTE
Occupational Therapy Evaluation & Treatment     Patient Name: Belen Landa  PVPYG'K Date: 6/22/2023  Problem List  Principal Problem:    Cystitis  Active Problems:    Hypertension    Gastroesophageal reflux disease without esophagitis    Sepsis without acute organ dysfunction (HCC)    Mixed hyperlipidemia    History of recurrent UTI (urinary tract infection)    Hyponatremia    Generalized weakness    Acute on chronic diastolic heart failure (HCC)    Paroxysmal atrial fibrillation ([facility] )    Pulmonary nodules    Past Medical History  Past Medical History:   Diagnosis Date   • SHERLYN (acute kidney injury) ([facility] ) 5/8/2022   • Balance disorder    • Chronic pain    • GERD (gastroesophageal reflux disease)    • Hard of hearing    • Hyperlipidemia    • Hypertension    • Hyponatremia 12/5/2020   • Low serum cortisol level 12/7/2020   • Neuropathy    • Pneumonia    • Pneumonia due to COVID-19 virus 12/5/2020   • Sepsis ([facility] ) 5/22/2018   • Tinnitus    • Unspecified adrenocortical insufficiency ([facility] ) 2/28/2023    Felt to be related to COVID-19 infection  No current symptoms  • UTI (urinary tract infection)      Past Surgical History  Past Surgical History:   Procedure Laterality Date   • EYE SURGERY     • HYSTERECTOMY     • TONSILLECTOMY             06/22/23 1405   OT Last Visit   OT Visit Date 06/22/23   Note Type   Note type Evaluation   Pain Assessment   Pain Assessment Tool Simmons-Baker FACES   Simmons-Baker FACES Pain Rating 8   Pain Location/Orientation Location: Groin;Orientation: Bilateral  (Heels)   Patient's Stated Pain Goal No pain   Hospital Pain Intervention(s) Emotional support;Repositioned  (Spoke with RN)   Restrictions/Precautions   Weight Bearing Precautions Per Order No   Other Precautions Contact/isolation; Fall Risk;Pain;Bed Alarm; Chair Alarm;Multiple lines;Hard of hearing   Home Living   Type of [address] [place] [address] Two level;1/2 bath on main level;Performs ADLs on one level;Stairs to enter with rails  (1 BRENDA)   Bathroom Shower/Tub   (Sponge bathes at baseline)   Home Equipment Walker;Cane  (Rollator)   Additional Comments Mod I with Rollator   Prior Function   Level of [place] Independent with ADLs; Independent with functional mobility; Needs assistance with IADLS   Lives With Daughter; Son   IADLs Independent with driving;Family/Friend/Other provides meals; Family/Friend/Other provides medication management   Falls in the last 6 months 1 to 4   Vocational Retired   General   Family/Caregiver Present Yes   Additional General Comments Dtr   Subjective   Subjective Pt c/o pain throughout her calves/heels & groin area  (Pt with heel cord shortening, TT to MD about possible order for prevalon boots when at rest as a preventive measure)   ADL   Eating Assistance 7  Independent   Grooming Assistance 5  Supervision/Setup   UB Bathing Assistance 4  Minimal Assistance   LB Bathing Assistance 3  Moderate Assistance   [address] 4  Minimal [address] 2  Maximal [address]  3  Moderate Assistance   Bed Mobility   Supine to Sit 3  Moderate assistance   Additional items Assist x 1; Increased time required;Verbal cues;LE management; Bedrails;HOB elevated   Transfers   Sit to Stand 3  Moderate assistance   Additional items Assist x 1; Increased time required;Verbal cues;Armrests   Stand to Sit 3  Moderate assistance   Additional items Assist x 1; Increased time required;Verbal cues;Armrests   Stand pivot 3  Moderate assistance   Additional items Assist x 1  (RW - steppage txfer bed > recliner)   Additional Comments Multiple STS & pivots performed  See treatment session for progression  Pt required verbal cues for technique throughout   Excessively slow, increased time required   Balance   Static Sitting Fair   Dynamic Sitting Fair   Static Standing Poor +   Dynamic Standing Poor +   Ambulatory Poor +   Activity Tolerance   Activity Tolerance Patient limited by fatigue   Nurse Made Aware KATY Estrada   RUE Assessment   RUE Assessment WFL   LUE Assessment   LUE Assessment WFL   Cognition   Overall Cognitive Status WFL   Arousal/Participation Alert   Attention Within functional limits   Orientation Level Oriented X4   Memory Within functional limits   Following Commands Follows one step commands with increased time or repetition   Assessment   Limitation Decreased ADL status; Decreased UE strength;Decreased endurance;Decreased self-care trans;Decreased high-level ADLs   Prognosis Fair   Assessment Pt is a 80 y o  female seen for OT evaluation at [facility], admitted 6/18/2023 w/ a fall  OT completed extensive review of pt's medical and social history  Comorbidities affecting pt's functional performance at time of assessment include: HTN, GERD, sepsis, HLD, hx of UTI, generalized weakness, CHF, PAF, pulmonary nodules, cystitis, neuropathy, arthopathy  Personal factors affecting pt at time of IE include: steps to enter environment, difficulty performing ADLS, difficulty performing IADLS  and environment  Prior to admission, pt was living in a [facility] with [facility] and a [facility] with her children  Pt was I w/  ADLS and A w/ IADLS, (-) drove, & required use of rollator PTA  Upon evaluation: Pt requires Mod Ax1 for bed mobility, Min-Mod AX1 for functional mobility/transfers, Min A for UB ADLs and Mod-Max A for LB ADLS 2* the following deficits impacting occupational performance: weakness, decreased strength, decreased balance, decreased tolerance and increased pain  Full objective findings from OT assessment regarding body systems outlined above  Pt to benefit from continued skilled OT tx while in the hospital to address deficits as defined above and maximize level of functional independence w/ ADL's and functional mobility  Occupational Performance areas to address include: grooming, bathing/shower, toilet hygiene, dressing, functional mobility and clothing management   Based on findings, pt is of high complexity  The patient's raw score on the AM-PAC Daily Activity inpatient short form is 16, standardized score is 35 96, less than 39 4  Patients at this level are likely to benefit from DC to post-acute rehabilitation services  However, please refer to therapist recommendation for discharge planning given other factors that may influence destination  At this time, OT recommendations at time of discharge are DC with Level II resources  Goals   Patient Goals Pt wants to not have pain in her groin area   Plan   Treatment Interventions ADL retraining;Functional transfer training;UE strengthening/ROM; Endurance training;Patient/family training;Equipment evaluation/education; Compensatory technique education;Continued evaluation   Goal Expiration Date 07/02/23   OT Treatment Day 0   OT Frequency 3-5x/wk   Recommendation   UB Rehab Discharge Recommendation (PT/OT) Level 2   -PAC Daily Activity Inpatient   Lower Body Dressing 2   Bathing 2   Toileting 2   Upper Body Dressing 3   Grooming 3   Eating 4   Daily Activity Raw Score 16   Daily Activity Standardized Score (Calc for Raw Score >=11) 35 96   AM-[facility] Applied Cognition Inpatient   Following a Speech/Presentation 3   Understanding Ordinary Conversation 4   Taking Medications 4   Remembering Where Things Are Placed or Put Away 3   Remembering List of 4-5 Errands 3   Taking Care of Complicated Tasks 3   Applied Cognition Raw Score 20   Applied Cognition Standardized Score 41 76   Additional Treatment Session   Start Time 1350   End Time 1405   Treatment Assessment Pt endorses need to urinate  Pt performed sit > stand from recliner with Mod Ax1  Pt performed stand pivot from recliner > commode with RW & Min Ax1  Performed stand > sit on BSC with Min Ax1 & increased time  Pt urinated, reporting burning sensation, RN made aware  Pt required Min A for lyn-care  Pt improved sit > stand from [facility] with Min Ax1   Stand pivot from [facility] > recliner completed with [address] for stand > sit in recliner  Pt able to boost self back in chair  Pt benefits from VC's for hand placement & technique  Pt left seated in recliner with chair alarm on, all needs in reach, RN made aware  End of Consult   Education Provided Yes;Family or social support of family present for education by provider   Patient Position at End of Consult Bedside chair;Bed/Chair alarm activated; All needs within reach   Nurse Communication Nurse aware of consult     Pt will achieve the following goals within 10 days  *Pt will complete UB bathing and dressing with set-up  *Pt will complete LB bathing and dressing with Min A & DME PRN  *Pt will complete toileting w/ S w/ G hygiene/thoroughness using DME PRN    *Pt will complete bed mobility with S, with HOB elevated & use of side rails PRN to prep for purposeful tasks    *Pt will perform functional transfers with on/off all surfaces with S using DME as needed w/ G balance/safety  *Pt will improve functional mobility during ADL/IADL/leisure tasks to S using DME as needed w/ G balance/safety  *Pt will increase OOB/ sitting tolerance to 2-4 hours per day for increased participation in self care and leisure tasks with no s/s of exertion      Mike Nick, OTR/L

## 2023-06-22 NOTE — PROGRESS NOTES
"[facility]  Progress Note  Name: Thomas Marina  MRN: [id number]  Unit/Bed#: -01 I Date of Admission: 6/18/2023   Date of Service: 6/22/2023 I Hospital Day: 4    Assessment/Plan   Pulmonary nodules  Assessment & Plan  CT showed-\" Multiple pulmonary nodules with the largest measuring up to 1 cm in the right lower lobe  Cannot exclude metastatic disease  Consider further evaluation with a PET CT study  \"  Outpatient follow-up with pulmonary and outpatient PET scan  Discussed with patient and daughter    Paroxysmal atrial fibrillation [facility])  Assessment & Plan  · Hx of paroxysmal Afib - last EKG with Aifb 12/2020  · Previously on eliquis as of 2021, however this was stopped shortly after per daughter for unclear reasons  · Rate controlled on admission at 96 bpm, EKG on admit showing sinus rhythm    Acute on chronic diastolic heart failure [facility])  Assessment & Plan  Wt Readings from Last 3 Encounters:   06/22/23 69 4 kg (153 lb)   06/07/23 68 kg (150 lb)   02/28/23 69 4 kg (153 lb)     · Maintained on HCTZ 12 5 mg daily  · Last echo July 2021: LVEF 50 to 55%  G1 DD  Mild to moderate TR  · Patient examines hypervolemic with 3+ pitting edema to bilateral lower extremities and mild pulmonary vascular congestion on chest x-ray  · Give Lasix 20 Mg IV x1 -trend urine output  · I/O and daily weights  · Sodium and fluid restriction  · Will switch to Lasix 20 mg IV daily  · Echocardiogram showed ejection fraction of 60-65 % with grade 1 diastolic dysfunction    Generalized weakness  Assessment & Plan  · Presents to the ED with fall while using walker this afternoon  · Trauma work-up negative  · PT, OT, and CM consults  · Neuro exam on admission with 4-5 strength in bilateral lower extremities, 5 out of strength in bilateral upper extremities -no focal weakness    Endorses numbness in bilateral feet (unchanged from baseline neuropathy per patient)  · Suspect that generalized weakness secondary " to cystitis  · Patient complained of right-sided headache and repeat CT head was done on June 21 which is unremarkable    Hyponatremia  Assessment & Plan  · Na 129 on admission  · Hyponatremia work-up  · Suspect hypervolemia as etiology as patient examines volume overloaded  · Give Lasix 20 Mg IV x1 and monitor urine output  · Sodium this morning is 133  · On lasix    History of recurrent UTI (urinary tract infection)  Assessment & Plan  · History of recurrent UTIs, follows with urology and is on prophylactic Macrobid  · Prior growth of ESBL E  coli and Enterococcus faecalis in 2019, however more recent urine cultures only growing pansensitive E  coli  · Admission UA consistent with cystitis, see above  · Fareed Smith in setting of acute infection  · Urine cultures showed mixed contaminants  · Continue on Zosyn    Mixed hyperlipidemia  Assessment & Plan  · Continue home pravastatin    Sepsis without acute organ dysfunction ([facility] )  Assessment & Plan  · SIRS criteria met on admission with tachycardia and tachypnea  · Suspected sources cystitis as seen on UA  · No severe sepsis criteria met  · Patient was started on Zosyn  · Blood cultures are negative to date  · Urine culture showed mixed contaminants  · On Zosyn    Gastroesophageal reflux disease without esophagitis  Assessment & Plan  · Placed on formulary equivalent Protonix    Hypertension  Assessment & Plan  · Home regimen: HCTZ 12 5 mg daily  · HCTZ held to allow for IV diuresis as patient examines hypervolemic    * Cystitis  Assessment & Plan  · UA with innumerable WBCs and moderate bacteria, negative nitrite  · Of note vulva is erythematous and macerated without discharge -suspect secondary to urinary incontinence and use of briefs/pads  · Frequent toileting and avoid utilization of diapers while in bed -should have geoff under her  · Barrier cream to labia   On Zosyn due to prior growth of ESBL and Enterococcus faecalis             Labs & Imaging: I have "personally reviewed pertinent reports  VTE Prophylaxis: in place  Code Status:   Level 3 - DNAR and DNI    Patient Centered Rounds: I have performed bedside rounds with nursing staff today  Discussions with Specialists or Other Care Team Provider: CM    Education and Discussions with Family / Patient: Daughter -no response    Current Length of Stay: 4 day(s)    Current Patient Status: Inpatient   Certification Statement: The patient will continue to require additional inpatient hospital stay due to see my assessment and plan  Subjective:   Patient is seen and examined at bedside  Headache on right side is improving  No other complaint  Afebrile  All other ROS are negative  Objective:    Vitals: Blood pressure (!) 123/48, pulse 68, temperature 97 5 °F (36 4 °C), resp  rate 14, height 5' 2\" (1 575 m), weight 69 4 kg (153 lb), SpO2 91 %  ,Body mass index is 27 98 kg/m²  SPO2 RA Rest    Flowsheet Row ED to Hosp-Admission (Current) from 6/18/2023 in [address] Med Surg Unit   SpO2 91 %   SpO2 Activity At Rest   O2 Device None (Room air)   O2 Flow Rate --        I&O:     Intake/Output Summary (Last 24 hours) at 6/22/2023 1505  Last data filed at 6/22/2023 1301  Gross per 24 hour   Intake 358 ml   Output 1050 ml   Net -692 ml       Physical Exam:    General- Alert, lying comfortably in bed  Not in any acute distress  Neck- Supple, No JVD  CVS- regular, S1 and S2 normal  Chest- Bilateral Air entry, No rhochi, crackles or wheezing present  Abdomen- soft, nontender, not distended, no guarding or rigidity, BS+  Extremities-  No pedal edema, No calf tenderness  CNS-   Alert, awake and orientedx3  No focal deficits present      Invasive Devices     Peripheral Intravenous Line  Duration           Peripheral IV 06/18/23 Right Antecubital 3 days    Peripheral IV 06/19/23 Left;Proximal;Ventral (anterior) Forearm 3 days                      Social History  reviewed  Family History   Problem " Relation Age of Onset   • Stroke Mother    • Stroke Father    • Heart disease Daughter    • Substance Abuse Neg Hx    • Mental illness Neg Hx     reviewed    Meds:  Current Facility-Administered Medications   Medication Dose Route Frequency Provider Last Rate Last Admin   • acetaminophen (TYLENOL) tablet 650 mg  650 mg Oral Q6H PRN Hong Konger Fix, PA-C   650 mg at 06/21/23 2356   • aluminum-magnesium hydroxide-simethicone (MYLANTA) oral suspension 30 mL  30 mL Oral Q6H PRN Hong Konger Fix, PA-C   30 mL at 06/20/23 1746   • aspirin (ECOTRIN LOW STRENGTH) EC tablet 81 mg  81 mg Oral Daily Hong Konger Fix, PA-C   81 mg at 06/22/23 1524   • cholecalciferol (VITAMIN D3) tablet 1,000 Units  1,000 Units Oral Daily Hong Konger Fix, PA-C   1,000 Units at 06/22/23 5029   • cyanocobalamin (VITAMIN B-12) tablet 1,000 mcg  1,000 mcg Oral Daily Hong Konger Fix, PA-C   1,000 mcg at 06/22/23 6008   • enoxaparin (LOVENOX) subcutaneous injection 40 mg  40 mg Subcutaneous Daily Hong Konger Fix, PA-C   40 mg at 06/22/23 1554   • furosemide (LASIX) tablet 20 mg  20 mg Oral Daily Dudley Coyle MD       • gabapentin (NEURONTIN) capsule 600 mg  600 mg Oral BID Hong Konger Fix, PA-C   600 mg at 06/22/23 6700   • magnesium Oxide (MAG-OX) tablet 400 mg  400 mg Oral BID Dudley Coyle MD   400 mg at 06/22/23 0944   • melatonin tablet 6 mg  6 mg Oral HS Hong Konger Fix, PA-C   6 mg at 06/21/23 2110   • moisture barrier miconazole 2% cream (aka HÉCTOR MOISTURE BARRIER ANTIFUNGAL CREAM)   Topical BID Dudley Coyle MD   Given at 06/22/23 1248   • neomycin-polymyxin-hydrocortisone (CORTISPORIN) 0 35%-10,000 units/mL-1% otic suspension 4 drop  4 drop Right Ear Q6H [facility] Dudley Coyle MD       • oxyCODONE (ROXICODONE) IR tablet 5 mg  5 mg Oral Q6H PRN Dudley Coyle MD   5 mg at 06/21/23 2116   • pantoprazole (PROTONIX) EC tablet 20 mg  20 mg Oral Daily Before Breakfast Hong Konger Fix, PA-UMA   20 mg at 06/22/23 1136 • piperacillin-tazobactam (ZOSYN) IVPB 3 375 g  3 375 g Intravenous Q8H Jose Roberto Tamayo PA-C 25 mL/hr at 06/19/23 1243 3 375 g at 06/22/23 1239   • pravastatin (PRAVACHOL) tablet 20 mg  20 mg Oral After Dinner Jose Roberto Tamayo PA-C   20 mg at 06/21/23 1715   • senna (SENOKOT) tablet 8 6 mg  1 tablet Oral HS PRN Jose Roberto Tamayo PA-C          Medications Prior to Admission   Medication   • aspirin (ECOTRIN LOW STRENGTH) 81 mg EC tablet   • Cholecalciferol (VITAMIN D3) 1000 units CAPS   • Cyanocobalamin (VITAMIN B12 PO)   • gabapentin (NEURONTIN) 600 MG tablet   • hydrochlorothiazide (HYDRODIURIL) 12 5 mg tablet   • Mirabegron ER 25 MG TB24   • Multiple Vitamins-Minerals (PRESERVISION AREDS PO)   • nitrofurantoin (MACROBID) 100 mg capsule   • omeprazole (PriLOSEC) 20 mg delayed release capsule   • pravastatin (PRAVACHOL) 20 mg tablet       Labs:  Results from last 7 days   Lab Units 06/22/23  0437 06/21/23  0438 06/20/23  0847   WBC Thousand/uL 13 22* 13 19* 13 09*   HEMOGLOBIN g/dL 10 0* 10 0* 10 5*   HEMATOCRIT % 30 9* 30 4* 32 5*   PLATELETS Thousands/uL 239 240 249   NEUTROS PCT % 73 73 74   LYMPHS PCT % 12* 13* 10*   MONOS PCT % 11 11 10   EOS PCT % 3 2 4     Results from last 7 days   Lab Units 06/22/23  0437 06/21/23  0438 06/20/23  0847 06/19/23  1407   POTASSIUM mmol/L 3 8 3 7 3 6 4 8   CHLORIDE mmol/L 93* 93* 92* 88*   CO2 mmol/L 35* 35* 30 30   BUN mg/dL 20 16 12 12   CREATININE mg/dL 1 04 1 00 0 87 0 94   CALCIUM mg/dL 8 8 8 6 8 6 9 3   ALK PHOS U/L 51  --  52 60   ALT U/L 7  --  6* 9   AST U/L 12*  --  13 29     Lab Results   Component Value Date    TROPONINI 0 04 12/06/2020    TROPONINI 0 04 12/05/2020    TROPONINI 0 04 12/05/2020    CKTOTAL 46 12/05/2020    CKTOTAL 112 01/26/2014     Results from last 7 days   Lab Units 06/18/23  1734   INR  1 01     Lab Results   Component Value Date    BLOODCX No Growth at 72 hrs  06/18/2023    BLOODCX No Growth at 72 hrs  06/18/2023    BLOODCX No Growth After 5 Days  05/08/2022    BLOODCX No Growth After 5 Days  05/08/2022    URINECX 30,000-39,000 cfu/ml 06/18/2023    URINECX >100,000 cfu/ml Escherichia coli (A) 10/22/2019    URINECX >100,000 cfu/ml Escherichia coli (A) 07/05/2019    SPUTUMCULTUR 1+ Growth of Pseudomonas aeruginosa (A) 05/23/2018    SPUTUMCULTUR 1+ Growth of 05/23/2018         Imaging:  Results for orders placed during the hospital encounter of 06/18/23    XR chest portable    Narrative  CHEST    INDICATION:   sob  Thank study thanks Epic    COMPARISON: 6/18/2023  EXAM PERFORMED/VIEWS:  XR CHEST PORTABLE      FINDINGS:    Unchanged cardiac and mediastinal contours with a reidentified large hiatal hernia  The lungs are clear  No pneumothorax or pleural effusion  Osseous structures appear within normal limits for patient age  Impression  No acute cardiopulmonary disease  Workstation performed: [id number]    Results for orders placed during the hospital encounter of 06/22/21    XR chest pa & lateral    Narrative  CHEST    INDICATION:   R60 0: Localized edema  Covid positive on 12/5/2020  COMPARISON: Chest radiograph from 12/5/2020 and chest CT from 11/26/2018  EXAM PERFORMED/VIEWS:  XR CHEST PA & LATERAL  DUAL ENERGY SUBTRACTION  FINDINGS:    Cardiomediastinal silhouette normal  Moderate hiatal hernia  Mild bilateral groundglass opacity  No effusion or pneumothorax  Osseous structures normal for age  Impression  Mild bilateral groundglass opacity, question interstitial edema  Moderate hiatal hernia            Workstation performed: [id number]      Last 24 Hours Medication List:   Current Facility-Administered Medications   Medication Dose Route Frequency Provider Last Rate   • acetaminophen  650 mg Oral Q6H PRN Kwan Lane PA-C     • aluminum-magnesium hydroxide-simethicone  30 mL Oral Q6H PRN Kwan Lane PA-C     • aspirin  81 mg Oral Daily Kwan Lane PA-C     • cholecalciferol 1,000 Units Oral Daily [facility] JULIANNA OsorioC     • cyanocobalamin  1,000 mcg Oral Daily [facility] J[facility]C     • enoxaparin  40 mg Subcutaneous Daily JULIANNA KelseyC     • furosemide  20 mg Oral Daily Capri Arroyo MD     • gabapentin  600 mg Oral BID JULIANNA KelseyC     • magnesium Oxide  400 mg Oral BID Capri Arroyo MD     • melatonin  6 mg Oral HS [facility]cy Osorio PA-C     • HÉCTOR ANTIFUNGAL   Topical BID Capri Arroyo MD     • neomycin-polymyxin-hydrocortisone  4 drop Right Ear Q6H [address] Capri Arroyo MD     • oxyCODONE  5 mg Oral Q6H PRN Capri Arroyo MD     • pantoprazole  20 mg Oral Daily Before Breakfast [facility]cy Osorio PA-C     • piperacillin-tazobactam  3 375 g Intravenous Q8H MARIA A Kelsey-C 3 375 g (06/19/23 1243)   • pravastatin  20 mg Oral After Dinner [facility] Osorio PA-C     • senna  1 tablet Oral HS PRN [facility] Osorio PA-C          Today, Patient Was Seen By: Capri Arroyo MD    ** Please Note: Dictation voice to text software may have been used in the creation of this document   **

## 2023-06-22 NOTE — ASSESSMENT & PLAN NOTE
· History of recurrent UTIs, follows with urology and is on prophylactic Macrobid  · Prior growth of ESBL E  coli and Enterococcus faecalis in 2019, however more recent urine cultures only growing pansensitive E  coli  · Admission UA consistent with cystitis, see above  · Manpreet Art in setting of acute infection  · Urine cultures showed mixed contaminants  · Continue on Zosyn

## 2023-06-22 NOTE — ASSESSMENT & PLAN NOTE
· Presents to the ED with fall while using walker this afternoon  · Trauma work-up negative  · PT, OT, and CM consults  · Neuro exam on admission with 4-5 strength in bilateral lower extremities, 5 out of strength in bilateral upper extremities -no focal weakness    Endorses numbness in bilateral feet (unchanged from baseline neuropathy per patient)  · Suspect that generalized weakness secondary to cystitis  · Patient complained of right-sided headache and repeat CT head was done on June 21 which is unremarkable

## 2023-06-22 NOTE — DISCHARGE INSTR - OTHER ORDERS
Skin Care Plan:  1-Apply HÉCTOR Anti-Fungal Cream to B/L Groin and Sacro-buttocks BID and PRN  2-Turn/reposition q2h or when medically stable for pressure re-distribution on skin   3-Elevate heels to offload pressure  4-Moisturize skin daily with skin nourishing cream  5-Ehob cushion in chair when out of bed  6-Preventative Hydraguard to bilateral heels BID and PRN

## 2023-06-22 NOTE — PLAN OF CARE
Problem: Potential for Falls  Goal: Patient will remain free of falls  Description: INTERVENTIONS:  - Educate patient/family on patient safety including physical limitations  - Instruct patient to call for assistance with activity   - Consult OT/PT to assist with strengthening/mobility   - Keep Call bell within reach  - Keep bed low and locked with side rails adjusted as appropriate  - Keep care items and personal belongings within reach  - Initiate and maintain comfort rounds  - Make Fall Risk Sign visible to staff  - Offer Toileting every 3 Hours, in advance of need  - Initiate/Maintain bedalarm  - Obtain necessary fall risk management equipment:   - Apply yellow socks and bracelet for high fall risk patients  - Consider moving patient to room near nurses station  Outcome: Progressing     Problem: MOBILITY - ADULT  Goal: Maintain or return to baseline ADL function  Description: INTERVENTIONS:  -  Assess patient's ability to carry out ADLs; assess patient's baseline for ADL function and identify physical deficits which impact ability to perform ADLs (bathing, care of mouth/teeth, toileting, grooming, dressing, etc )  - Assess/evaluate cause of self-care deficits   - Assess range of motion  - Assess patient's mobility; develop plan if impaired  - Assess patient's need for assistive devices and provide as appropriate  - Encourage maximum independence but intervene and supervise when necessary  - Involve family in performance of ADLs  - Assess for home care needs following discharge   - Consider OT consult to assist with ADL evaluation and planning for discharge  - Provide patient education as appropriate  Outcome: Progressing  Goal: Maintains/Returns to pre admission functional level  Description: INTERVENTIONS:  - Perform BMAT or MOVE assessment daily    - Set and communicate daily mobility goal to care team and patient/family/caregiver     - Collaborate with rehabilitation services on mobility goals if consulted  - Perform Range of Motion 3 times a day  - Reposition patient every 3 hours    - Dangle patient 3 times a day  - Stand patient 3 times a day  - Ambulate patient 3 times a day  - Out of bed to chair 3 times a day   - Out of bed for meals 3 times a day  - Out of bed for toileting  - Record patient progress and toleration of activity level   Outcome: Progressing     Problem: Prexisting or High Potential for Compromised Skin Integrity  Goal: Skin integrity is maintained or improved  Description: INTERVENTIONS:  - Identify patients at risk for skin breakdown  - Assess and monitor skin integrity  - Assess and monitor nutrition and hydration status  - Monitor labs   - Assess for incontinence   - Turn and reposition patient  - Assist with mobility/ambulation  - Relieve pressure over bony prominences  - Avoid friction and shearing  - Provide appropriate hygiene as needed including keeping skin clean and dry  - Evaluate need for skin moisturizer/barrier cream  - Collaborate with interdisciplinary team   - Patient/family teaching  - Consider wound care consult   Outcome: Progressing

## 2023-06-22 NOTE — WOUND OSTOMY CARE
Consult Note - Wound   Kindra Graft 80 y o  female MRN: [id number]  Unit/Bed#: -01 Encounter: [id number]        History and Present Illness:  Patient is a 81 yo female that was admitted to [facility] for treatment of Cystitis  Patient has a PMH of recurrent UTIs on daily Macrobid, paroxysmal A-fib not on anticoagulation, HTN, and chronic diastolic heart failure  Patient is a min assist for turning and repositioning  Patient is incontinent of bowel and bladder - patient was previously using external female urinary system but reports that it made groin more sensitive- purewick was then discontinued  On assessment, patient is lying on regular mattress  Wound Care was consulted for groin irritation  Assessment Findings:   B/L heels are dry intact and beulah with no skin loss or wounds present  Recommend preventative Hydraguard Cream and proper offloading/ repositioning  1  B/L Groin and Sacro-Buttocks are intact, slightly denuded and rashy  Patiet reports area is itchy  Sacro-buttocks tissue blanches  No skin loss or drainage noted in area  Satellite lesions noted  Recommend HÉCTOR for areas  - Groin    - Sacro-Buttocks         No induration, fluctuance, odor, warmth/temperature differences, redness, or purulence noted to the above noted wounds and skin areas assessed  New dressings applied per orders listed below  Patient tolerated well- no s/s of non-verbal pain or discomfort observed during the encounter  Bedside nurse aware of plan of care  See flow sheets for more detailed assessment findings  Orders listed below and wound care will sign off, call or tiger text with questions  Skin Care Plan:  1-Apply HÉCTOR Anti-Fungal Cream to B/L Groin and Sacro-buttocks BID and PRN  2-Turn/reposition q2h or when medically stable for pressure re-distribution on skin   3-Elevate heels to offload pressure    4-Moisturize skin daily with skin nourishing cream  5-Ehob cushion in chair when out of bed   6-Preventative Hydraguard to bilateral heels BID and PRN         Nestorwabhinav Vásquez RN, BSN, [employer]

## 2023-06-23 PROBLEM — R19.7 DIARRHEA: Status: ACTIVE | Noted: 2023-06-23

## 2023-06-23 PROBLEM — H92.09 EARACHE: Status: ACTIVE | Noted: 2023-06-23

## 2023-06-23 LAB
ANION GAP SERPL CALCULATED.3IONS-SCNC: 6 MMOL/L
BASOPHILS # BLD AUTO: 0.05 THOUSANDS/ÂΜL (ref 0–0.1)
BASOPHILS NFR BLD AUTO: 0 % (ref 0–1)
BUN SERPL-MCNC: 23 MG/DL (ref 5–25)
CALCIUM SERPL-MCNC: 9 MG/DL (ref 8.4–10.2)
CHLORIDE SERPL-SCNC: 93 MMOL/L (ref 96–108)
CO2 SERPL-SCNC: 35 MMOL/L (ref 21–32)
CREAT SERPL-MCNC: 1.07 MG/DL (ref 0.6–1.3)
EOSINOPHIL # BLD AUTO: 0.69 THOUSAND/ÂΜL (ref 0–0.61)
EOSINOPHIL NFR BLD AUTO: 5 % (ref 0–6)
ERYTHROCYTE [DISTWIDTH] IN BLOOD BY AUTOMATED COUNT: 13.3 % (ref 11.6–15.1)
GFR SERPL CREATININE-BSD FRML MDRD: 44 ML/MIN/1.73SQ M
GLUCOSE SERPL-MCNC: 100 MG/DL (ref 65–140)
HCT VFR BLD AUTO: 32 % (ref 34.8–46.1)
HGB BLD-MCNC: 10.1 G/DL (ref 11.5–15.4)
IMM GRANULOCYTES # BLD AUTO: 0.07 THOUSAND/UL (ref 0–0.2)
IMM GRANULOCYTES NFR BLD AUTO: 1 % (ref 0–2)
LYMPHOCYTES # BLD AUTO: 1.58 THOUSANDS/ÂΜL (ref 0.6–4.47)
LYMPHOCYTES NFR BLD AUTO: 12 % (ref 14–44)
MCH RBC QN AUTO: 28.1 PG (ref 26.8–34.3)
MCHC RBC AUTO-ENTMCNC: 31.6 G/DL (ref 31.4–37.4)
MCV RBC AUTO: 89 FL (ref 82–98)
MONOCYTES # BLD AUTO: 1.47 THOUSAND/ÂΜL (ref 0.17–1.22)
MONOCYTES NFR BLD AUTO: 12 % (ref 4–12)
NEUTROPHILS # BLD AUTO: 8.95 THOUSANDS/ÂΜL (ref 1.85–7.62)
NEUTS SEG NFR BLD AUTO: 70 % (ref 43–75)
NRBC BLD AUTO-RTO: 0 /100 WBCS
PLATELET # BLD AUTO: 252 THOUSANDS/UL (ref 149–390)
PMV BLD AUTO: 8.4 FL (ref 8.9–12.7)
POTASSIUM SERPL-SCNC: 4 MMOL/L (ref 3.5–5.3)
RBC # BLD AUTO: 3.59 MILLION/UL (ref 3.81–5.12)
SODIUM SERPL-SCNC: 134 MMOL/L (ref 135–147)
WBC # BLD AUTO: 12.81 THOUSAND/UL (ref 4.31–10.16)

## 2023-06-23 PROCEDURE — 85025 COMPLETE CBC W/AUTO DIFF WBC: CPT | Performed by: INTERNAL MEDICINE

## 2023-06-23 PROCEDURE — 99232 SBSQ HOSP IP/OBS MODERATE 35: CPT | Performed by: INTERNAL MEDICINE

## 2023-06-23 PROCEDURE — 87493 C DIFF AMPLIFIED PROBE: CPT | Performed by: INTERNAL MEDICINE

## 2023-06-23 PROCEDURE — 80048 BASIC METABOLIC PNL TOTAL CA: CPT | Performed by: INTERNAL MEDICINE

## 2023-06-23 RX ORDER — AMOXICILLIN AND CLAVULANATE POTASSIUM 875; 125 MG/1; MG/1
1 TABLET, FILM COATED ORAL EVERY 12 HOURS SCHEDULED
Status: DISCONTINUED | OUTPATIENT
Start: 2023-06-23 | End: 2023-06-23

## 2023-06-23 RX ORDER — MINERAL OIL AND PETROLATUM 150; 830 MG/G; MG/G
OINTMENT OPHTHALMIC 2 TIMES DAILY PRN
Status: DISCONTINUED | OUTPATIENT
Start: 2023-06-23 | End: 2023-06-25 | Stop reason: HOSPADM

## 2023-06-23 RX ADMIN — MAGNESIUM OXIDE TAB 400 MG (241.3 MG ELEMENTAL MG) 400 MG: 400 (241.3 MG) TAB at 17:36

## 2023-06-23 RX ADMIN — CYANOCOBALAMIN TAB 500 MCG 1000 MCG: 500 TAB at 08:40

## 2023-06-23 RX ADMIN — GABAPENTIN 600 MG: 300 CAPSULE ORAL at 17:36

## 2023-06-23 RX ADMIN — MICONAZOLE NITRATE: 20 CREAM TOPICAL at 17:36

## 2023-06-23 RX ADMIN — NEOMYCIN SULFATE, POLYMYXIN B SULFATE AND HYDROCORTISONE 4 DROP: 10; 3.5; 1 SUSPENSION/ DROPS AURICULAR (OTIC) at 00:00

## 2023-06-23 RX ADMIN — PIPERACILLIN AND TAZOBACTAM 3.38 G: 3; .375 INJECTION, POWDER, LYOPHILIZED, FOR SOLUTION INTRAVENOUS at 04:36

## 2023-06-23 RX ADMIN — NEOMYCIN SULFATE, POLYMYXIN B SULFATE AND HYDROCORTISONE 4 DROP: 10; 3.5; 1 SUSPENSION/ DROPS AURICULAR (OTIC) at 04:36

## 2023-06-23 RX ADMIN — ASPIRIN 81 MG: 81 TABLET, COATED ORAL at 08:40

## 2023-06-23 RX ADMIN — PANTOPRAZOLE SODIUM 20 MG: 20 TABLET, DELAYED RELEASE ORAL at 04:35

## 2023-06-23 RX ADMIN — ACETAMINOPHEN 650 MG: 325 TABLET, FILM COATED ORAL at 14:13

## 2023-06-23 RX ADMIN — MELATONIN 6 MG: at 00:00

## 2023-06-23 RX ADMIN — NEOMYCIN SULFATE, POLYMYXIN B SULFATE AND HYDROCORTISONE 4 DROP: 10; 3.5; 1 SUSPENSION/ DROPS AURICULAR (OTIC) at 17:36

## 2023-06-23 RX ADMIN — FUROSEMIDE 20 MG: 20 TABLET ORAL at 08:39

## 2023-06-23 RX ADMIN — MELATONIN 6 MG: at 21:35

## 2023-06-23 RX ADMIN — ENOXAPARIN SODIUM 40 MG: 100 INJECTION SUBCUTANEOUS at 08:39

## 2023-06-23 RX ADMIN — MAGNESIUM OXIDE TAB 400 MG (241.3 MG ELEMENTAL MG) 400 MG: 400 (241.3 MG) TAB at 08:39

## 2023-06-23 RX ADMIN — MICONAZOLE NITRATE 1 APPLICATION: 20 CREAM TOPICAL at 08:50

## 2023-06-23 RX ADMIN — PRAVASTATIN SODIUM 20 MG: 20 TABLET ORAL at 17:36

## 2023-06-23 RX ADMIN — Medication 1000 UNITS: at 08:40

## 2023-06-23 RX ADMIN — GABAPENTIN 600 MG: 300 CAPSULE ORAL at 08:39

## 2023-06-23 RX ADMIN — NEOMYCIN SULFATE, POLYMYXIN B SULFATE AND HYDROCORTISONE 4 DROP: 10; 3.5; 1 SUSPENSION/ DROPS AURICULAR (OTIC) at 12:21

## 2023-06-23 NOTE — ASSESSMENT & PLAN NOTE
Patient noted to have diarrhea this morning  DC Zosyn as patient completed the course  Stool for C  difficile is ordered

## 2023-06-23 NOTE — CASE MANAGEMENT
Case Management Discharge Planning Note    Patient name Joyce Mention  Location /-01 MRN [id number]  : 1930 Date 2023       Current Admission Date: 2023  Current Admission Diagnosis:Cystitis   Patient Active Problem List    Diagnosis Date Noted   • Pulmonary nodules 2023   • Cystitis 2023   • Paroxysmal atrial fibrillation ([facility] ) 2022   • Generalized weakness 2022   • Acute on chronic diastolic heart failure ([facility] ) 2022   • Hyponatremia 2020   • Acute pain of left shoulder 2020   • History of recurrent UTI (urinary tract infection) 2020   • Change in mental status    • 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 eye with active choroidal neovascularization ([facility] ) 2019   • Fall 2018   • Sepsis without acute organ dysfunction ([facility] ) 2018   • Neuropathy 2016   • Vitamin B 12 deficiency 2016   • Venous insufficiency 2016   • Hypertension 2015   • Gastroesophageal reflux disease without esophagitis 2015   • Arthropathy 2015   • Mixed hyperlipidemia 2015      LOS (days): 5  Geometric Mean LOS (GMLOS) (days): 5 00  Days to GMLOS:0 4     OBJECTIVE:  Risk of Unplanned Readmission Score: 14 47         Current admission status: Inpatient   Preferred Pharmacy:   [facility] [address] [facility]EL[facility] GUIDRY car[facility]o  [address] - [address]  [address]  [facility]   Phone: [phone] Fax: [phone]    Primary Care Provider: Michael Enriquez MD    Primary Insurance: MEDICARE  Secondary Insurance:     DISCHARGE DETAILS:        Per MD, patient is not medically stable for discharge today s/p multiple episodes of diarrhea  Updated [employer] via [employer]

## 2023-06-23 NOTE — ASSESSMENT & PLAN NOTE
· Home regimen: HCTZ 12 5 mg daily  · HCTZ held to allow for IV diuresis as patient examines hypervolemic  · Continue on Lasix

## 2023-06-23 NOTE — PLAN OF CARE
Problem: Potential for Falls  Goal: Patient will remain free of falls  Description: INTERVENTIONS:  - Educate patient/family on patient safety including physical limitations  - Instruct patient to call for assistance with activity   - Consult OT/PT to assist with strengthening/mobility   - Keep Call bell within reach  - Keep bed low and locked with side rails adjusted as appropriate  - Keep care items and personal belongings within reach  - Initiate and maintain comfort rounds  - Make Fall Risk Sign visible to staff  - Offer Toileting every x Hours, in advance of need  - Initiate/Maintain xalarm  - Obtain necessary fall risk management equipment: x  - Apply yellow socks and bracelet for high fall risk patients  - Consider moving patient to room near nurses station  Outcome: Progressing     Problem: MOBILITY - ADULT  Goal: Maintain or return to baseline ADL function  Description: INTERVENTIONS:  -  Assess patient's ability to carry out ADLs; assess patient's baseline for ADL function and identify physical deficits which impact ability to perform ADLs (bathing, care of mouth/teeth, toileting, grooming, dressing, etc )  - Assess/evaluate cause of self-care deficits   - Assess range of motion  - Assess patient's mobility; develop plan if impaired  - Assess patient's need for assistive devices and provide as appropriate  - Encourage maximum independence but intervene and supervise when necessary  - Involve family in performance of ADLs  - Assess for home care needs following discharge   - Consider OT consult to assist with ADL evaluation and planning for discharge  - Provide patient education as appropriate  Outcome: Progressing  Goal: Maintains/Returns to pre admission functional level  Description: INTERVENTIONS:  - Perform BMAT or MOVE assessment daily    - Set and communicate daily mobility goal to care team and patient/family/caregiver     - Collaborate with rehabilitation services on mobility goals if consulted  - Perform Range of Motion x times a day  - Reposition patient every x hours    - Dangle patient x times a day  - Stand patient x times a day  - Ambulate patient x times a day  - Out of bed to chair x times a day   - Out of bed for meals x times a day  - Out of bed for toileting  - Record patient progress and toleration of activity level   Outcome: Progressing     Problem: Prexisting or High Potential for Compromised Skin Integrity  Goal: Skin integrity is maintained or improved  Description: INTERVENTIONS:  - Identify patients at risk for skin breakdown  - Assess and monitor skin integrity  - Assess and monitor nutrition and hydration status  - Monitor labs   - Assess for incontinence   - Turn and reposition patient  - Assist with mobility/ambulation  - Relieve pressure over bony prominences  - Avoid friction and shearing  - Provide appropriate hygiene as needed including keeping skin clean and dry  - Evaluate need for skin moisturizer/barrier cream  - Collaborate with interdisciplinary team   - Patient/family teaching  - Consider wound care consult   Outcome: Progressing     Problem: PAIN - ADULT  Goal: Verbalizes/displays adequate comfort level or baseline comfort level  Description: Interventions:  - Encourage patient to monitor pain and request assistance  - Assess pain using appropriate pain scale  - Administer analgesics based on type and severity of pain and evaluate response  - Implement non-pharmacological measures as appropriate and evaluate response  - Consider cultural and social influences on pain and pain management  - Notify physician/advanced practitioner if interventions unsuccessful or patient reports new pain  Outcome: Progressing     Problem: INFECTION - ADULT  Goal: Absence or prevention of progression during hospitalization  Description: INTERVENTIONS:  - Assess and monitor for signs and symptoms of infection  - Monitor lab/diagnostic results  - Monitor all insertion sites, i e  indwelling lines, tubes, and drains  - Monitor endotracheal if appropriate and nasal secretions for changes in amount and color  - [place] appropriate cooling/warming therapies per order  - Administer medications as ordered  - Instruct and encourage patient and family to use good hand hygiene technique  - Identify and instruct in appropriate isolation precautions for identified infection/condition  Outcome: Progressing     Problem: SAFETY ADULT  Goal: Patient will remain free of falls  Description: INTERVENTIONS:  - Educate patient/family on patient safety including physical limitations  - Instruct patient to call for assistance with activity   - Consult OT/PT to assist with strengthening/mobility   - Keep Call bell within reach  - Keep bed low and locked with side rails adjusted as appropriate  - Keep care items and personal belongings within reach  - Initiate and maintain comfort rounds  - Make Fall Risk Sign visible to staff  - Offer Toileting every x Hours, in advance of need  - Initiate/Maintain xalarm  - Obtain necessary fall risk management equipment: x  - Apply yellow socks and bracelet for high fall risk patients  - Consider moving patient to room near nurses station  Outcome: Progressing     Problem: DISCHARGE PLANNING  Goal: Discharge to home or other facility with appropriate resources  Description: INTERVENTIONS:  - Identify barriers to discharge w/patient and caregiver  - Arrange for needed discharge resources and transportation as appropriate  - Identify discharge learning needs (meds, wound care, etc )  - Arrange for interpretive services to assist at discharge as needed  - Refer to Case Management Department for coordinating discharge planning if the patient needs post-hospital services based on physician/advanced practitioner order or complex needs related to functional status, cognitive ability, or social support system  Outcome: Progressing     Problem: Knowledge Deficit  Goal: Patient/family/caregiver demonstrates understanding of disease process, treatment plan, medications, and discharge instructions  Description: Complete learning assessment and assess knowledge base  Interventions:  - Provide teaching at level of understanding  - Provide teaching via preferred learning methods  Outcome: Progressing     Problem: Nutrition/Hydration-ADULT  Goal: Nutrient/Hydration intake appropriate for improving, restoring or maintaining nutritional needs  Description: Monitor and assess patient's nutrition/hydration status for malnutrition  Collaborate with interdisciplinary team and initiate plan and interventions as ordered  Monitor patient's weight and dietary intake as ordered or per policy  Utilize nutrition screening tool and intervene as necessary  Determine patient's food preferences and provide high-protein, high-caloric foods as appropriate       INTERVENTIONS:  - Monitor oral intake, urinary output, labs, and treatment plans  - Assess nutrition and hydration status and recommend course of action  - Evaluate amount of meals eaten  - Assist patient with eating if necessary   - Allow adequate time for meals  - Recommend/ encourage appropriate diets, oral nutritional supplements, and vitamin/mineral supplements  - Order, calculate, and assess calorie counts as needed  - Recommend, monitor, and adjust tube feedings and TPN/PPN based on assessed needs  - Assess need for intravenous fluids  - Provide specific nutrition/hydration education as appropriate  - Include patient/family/caregiver in decisions related to nutrition  Outcome: Progressing     Problem: GENITOURINARY - ADULT  Goal: Maintains or returns to baseline urinary function  Description: INTERVENTIONS:  - Assess urinary function  - Encourage oral fluids to ensure adequate hydration if ordered  - Administer IV fluids as ordered to ensure adequate hydration  - Administer ordered medications as needed  - Offer frequent toileting  - Follow urinary retention protocol if ordered  Outcome: Progressing     Problem: METABOLIC, FLUID AND ELECTROLYTES - ADULT  Goal: Electrolytes maintained within normal limits  Description: INTERVENTIONS:  - Monitor labs and assess patient for signs and symptoms of electrolyte imbalances  - Administer electrolyte replacement as ordered  - Monitor response to electrolyte replacements, including repeat lab results as appropriate  - Instruct patient on fluid and nutrition as appropriate  Outcome: Progressing  Goal: Fluid balance maintained  Description: INTERVENTIONS:  - Monitor labs   - Monitor I/O and WT  - Instruct patient on fluid and nutrition as appropriate  - Assess for signs & symptoms of volume excess or deficit  Outcome: Progressing     Problem: SKIN/TISSUE INTEGRITY - ADULT  Goal: Skin Integrity remains intact(Skin Breakdown Prevention)  Description: Assess:  -Perform [place] assessment every x  -Clean and moisturize skin every x  -Inspect skin when repositioning, toileting, and assisting with ADLS  -Assess under medical devices such as x every x  -Assess extremities for adequate circulation and sensation     Bed Management:  -Have minimal linens on bed & keep smooth, unwrinkled  -Change linens as needed when moist or perspiring  -Avoid sitting or lying in one position for more than x hours while in bed  -Keep HOB at [facility]     Toileting:  -Offer bedside commode  -Assess for incontinence every x  -Use incontinent care products after each incontinent episode such as x    Activity:  -Mobilize patient x times a day  -Encourage activity and walks on unit  -Encourage or provide ROM exercises   -Turn and reposition patient every x Hours  -Use appropriate equipment to lift or move patient in bed  -Instruct/ Assist with weight shifting every x when out of bed in chair  -Consider limitation of chair time x hour intervals    Skin Care:  -Avoid use of baby powder, tape, friction and shearing, hot water or constrictive clothing  -Relieve pressure over bony prominences using x  -Do not massage red bony areas    Next Steps:  -Teach patient strategies to minimize risks such as x   -Consider consults to  interdisciplinary teams such as x  Outcome: Progressing  Goal: Incision(s), wounds(s) or drain site(s) healing without S/S of infection  Description: INTERVENTIONS  - Assess and document dressing, incision, wound bed, drain sites and surrounding tissue  - Provide patient and family education  - Perform skin care/dressing changes every x  Outcome: Progressing     Problem: HEMATOLOGIC - ADULT  Goal: Maintains hematologic stability  Description: INTERVENTIONS  - Assess for signs and symptoms of bleeding or hemorrhage  - Monitor labs  - Administer supportive blood products/factors as ordered and appropriate  Outcome: Progressing     Problem: MUSCULOSKELETAL - ADULT  Goal: Maintain or return mobility to safest level of function  Description: INTERVENTIONS:  - Assess patient's ability to carry out ADLs; assess patient's baseline for ADL function and identify physical deficits which impact ability to perform ADLs (bathing, care of mouth/teeth, toileting, grooming, dressing, etc )  - Assess/evaluate cause of self-care deficits   - Assess range of motion  - Assess patient's mobility  - Assess patient's need for assistive devices and provide as appropriate  - Encourage maximum independence but intervene and supervise when necessary  - Involve family in performance of ADLs  - Assess for home care needs following discharge   - Consider OT consult to assist with ADL evaluation and planning for discharge  - Provide patient education as appropriate  Outcome: Progressing

## 2023-06-23 NOTE — ASSESSMENT & PLAN NOTE
· SIRS criteria met on admission with tachycardia and tachypnea  · Suspected sources cystitis as seen on UA  · No severe sepsis criteria met  · Patient was started on Zosyn  · Blood cultures are negative to date  · Urine culture showed mixed contaminants  · DC Zosyn

## 2023-06-23 NOTE — ASSESSMENT & PLAN NOTE
· History of recurrent UTIs, follows with urology and is on prophylactic Macrobid  · Prior growth of ESBL E  coli and Enterococcus faecalis in 2019, however more recent urine cultures only growing pansensitive E  coli  · Admission UA consistent with cystitis, see above  · Zaira Nova in setting of acute infection  · Urine cultures showed mixed contaminants  · DC Zosyn

## 2023-06-23 NOTE — ASSESSMENT & PLAN NOTE
Wt Readings from Last 3 Encounters:   06/23/23 69 3 kg (152 lb 11 1 oz)   06/07/23 68 kg (150 lb)   02/28/23 69 4 kg (153 lb)     · Maintained on HCTZ 12 5 mg daily  · Last echo July 2021: LVEF 50 to 55%  G1 DD  Mild to moderate TR    · Patient examines hypervolemic with 3+ pitting edema to bilateral lower extremities and mild pulmonary vascular congestion on chest x-ray  · Give Lasix 20 Mg IV x1 -trend urine output  · I/O and daily weights  · Sodium and fluid restriction  · Continue Lasix 20 mg daily  · Echocardiogram showed ejection fraction of 60-65 % with grade 1 diastolic dysfunction

## 2023-06-23 NOTE — PROGRESS NOTES
"Mateo Poe  Progress Note  Name: Jimena Hdez  MRN: [id number]  Unit/Bed#: -01 I Date of Admission: 6/18/2023   Date of Service: 6/23/2023 I Hospital Day: 5    Assessment/Plan   Diarrhea  Assessment & Plan  Patient noted to have diarrhea this morning  DC Zosyn as patient completed the course  Stool for C  difficile is ordered    Earache  Assessment & Plan  On Corticosporin eardrops    Pulmonary nodules  Assessment & Plan  CT showed-\" Multiple pulmonary nodules with the largest measuring up to 1 cm in the right lower lobe  Cannot exclude metastatic disease  Consider further evaluation with a PET CT study  \"  Outpatient follow-up with pulmonary and outpatient PET scan  Discussed with patient and daughter    Paroxysmal atrial fibrillation [facility])  Assessment & Plan  · Hx of paroxysmal Afib - last EKG with Aifb 12/2020  · Previously on eliquis as of 2021, however this was stopped shortly after per daughter for unclear reasons  · Rate controlled on admission at 96 bpm, EKG on admit showing sinus rhythm    Acute on chronic diastolic heart failure [facility])  Assessment & Plan  Wt Readings from Last 3 Encounters:   06/23/23 69 3 kg (152 lb 11 1 oz)   06/07/23 68 kg (150 lb)   02/28/23 69 4 kg (153 lb)     · Maintained on HCTZ 12 5 mg daily  · Last echo July 2021: LVEF 50 to 55%  G1 DD  Mild to moderate TR    · Patient examines hypervolemic with 3+ pitting edema to bilateral lower extremities and mild pulmonary vascular congestion on chest x-ray  · Give Lasix 20 Mg IV x1 -trend urine output  · I/O and daily weights  · Sodium and fluid restriction  · Continue Lasix 20 mg daily  · Echocardiogram showed ejection fraction of 60-65 % with grade 1 diastolic dysfunction    Generalized weakness  Assessment & Plan  · Presents to the ED with fall while using walker this afternoon  · Trauma work-up negative  · PT, OT, and CM on board  · Neuro exam on admission with 4-5 strength in bilateral lower " extremities, 5 out of strength in bilateral upper extremities -no focal weakness    Endorses numbness in bilateral feet (unchanged from baseline neuropathy per patient)  · Suspect that generalized weakness secondary to cystitis  · Patient complained of right-sided headache and repeat CT head was done on June 21 which is unremarkable    Hyponatremia  Assessment & Plan  · Na 129 on admission  · Hyponatremia work-up  · Suspect hypervolemia as etiology as patient examines volume overloaded  · Give Lasix 20 Mg IV x1 and monitor urine output  · Sodium this morning is 134  · On fluid restriction  · On lasix    History of recurrent UTI (urinary tract infection)  Assessment & Plan  · History of recurrent UTIs, follows with urology and is on prophylactic Macrobid  · Prior growth of ESBL E  coli and Enterococcus faecalis in 2019, however more recent urine cultures only growing pansensitive E  coli  · Admission UA consistent with cystitis, see above  · Hold Macrobid in setting of acute infection  · Urine cultures showed mixed contaminants  · DC Zosyn    Mixed hyperlipidemia  Assessment & Plan  · Continue home pravastatin    Sepsis without acute organ dysfunction ([address] )  Assessment & Plan  · SIRS criteria met on admission with tachycardia and tachypnea  · Suspected sources cystitis as seen on UA  · No severe sepsis criteria met  · Patient was started on Zosyn  · Blood cultures are negative to date  · Urine culture showed mixed contaminants  · DC Zosyn    Gastroesophageal reflux disease without esophagitis  Assessment & Plan  · Placed on formulary equivalent Protonix    Hypertension  Assessment & Plan  · Home regimen: HCTZ 12 5 mg daily  · HCTZ held to allow for IV diuresis as patient examines hypervolemic  · Continue on Lasix    * Cystitis  Assessment & Plan  · UA with innumerable WBCs and moderate bacteria, negative nitrite  · Of note vulva is erythematous and macerated without discharge -suspect secondary to urinary incontinence "and use of briefs/pads  · Frequent toileting and avoid utilization of diapers while in bed -should have geoff under her  · Barrier cream to labia   On Zosyn due to prior growth of ESBL and Enterococcus faecalis  Completed the course of antibiotic             Labs & Imaging: I have personally reviewed pertinent reports  VTE Prophylaxis: in place  Code Status:   Level 3 - DNAR and DNI    Patient Centered Rounds: I have performed bedside rounds with nursing staff today  Discussions with Specialists or Other Care Team Provider: CM    Education and Discussions with Family / Patient: Daughter on phone    Current Length of Stay: 5 day(s)    Current Patient Status: Inpatient   Certification Statement: The patient will continue to require additional inpatient hospital stay due to see my assessment and plan  Subjective:   Patient is seen and examined at bedside  Right ear pain is improved  Patient complains of diarrhea  Has 3-4 loose bowel movements this morning  Afebrile  All other ROS are negative  Objective:    Vitals: Blood pressure 139/55, pulse 69, temperature 97 7 °F (36 5 °C), resp  rate 14, height 5' 2\" (1 575 m), weight 69 3 kg (152 lb 11 1 oz), SpO2 93 %  ,Body mass index is 27 93 kg/m²  SPO2 RA Rest    Flowsheet Row ED to Hosp-Admission (Current) from 6/18/2023 in [address] Med [facility]urg Unit   SpO2 93 %   SpO2 Activity At Rest   O2 Device None (Room air)   O2 Flow Rate --        I&O:     Intake/Output Summary (Last 24 hours) at 6/23/2023 0939  Last data filed at 6/23/2023 1563  Gross per 24 hour   Intake 658 ml   Output 150 ml   Net 508 ml       Physical Exam:    General- Alert, lying comfortably in bed  Not in any acute distress  Neck- Supple, No JVD  CVS- regular, S1 and S2 normal   Chest- Bilateral Air entry, No rhochi, crackles or wheezing present    Abdomen- soft, nontender, not distended, no guarding or rigidity, BS+  Extremities-  No pedal edema, No calf tenderness      " Normal ROM in all extremities  CNS-   Alert, awake and orientedx3  No focal deficits present  Invasive Devices     Peripheral Intravenous Line  Duration           Peripheral IV 06/19/23 Left;Proximal;Ventral (anterior) Forearm 3 days    Peripheral IV 06/23/23 Dorsal (posterior); Right Forearm <1 day                      Social History  reviewed  Family History   Problem Relation Age of Onset   • Stroke Mother    • Stroke Father    • Heart disease Daughter    • Substance Abuse Neg Hx    • Mental illness Neg Hx     reviewed    Meds:  Current Facility-Administered Medications   Medication Dose Route Frequency Provider Last Rate Last Admin   • acetaminophen (TYLENOL) tablet 650 mg  650 mg Oral Q6H PRN Jaycob Swan PA-C   650 mg at 06/21/23 2356   • aluminum-magnesium hydroxide-simethicone (MYLANTA) oral suspension 30 mL  30 mL Oral Q6H PRN Jaycob Swan PA-C   30 mL at 06/20/23 1746   • amoxicillin-clavulanate (AUGMENTIN) 875-125 mg per tablet 1 tablet  1 tablet Oral Q12H Pricila Verde MD       • aspirin (ECOTRIN LOW STRENGTH) EC tablet 81 mg  81 mg Oral Daily Jaycob Swan PA-C   81 mg at 06/23/23 0840   • cholecalciferol (VITAMIN D3) tablet 1,000 Units  1,000 Units Oral Daily Jaycob Swan PA-C   1,000 Units at 06/23/23 0840   • cyanocobalamin (VITAMIN B-12) tablet 1,000 mcg  1,000 mcg Oral Daily Jaycob Swan PA-C   1,000 mcg at 06/23/23 0840   • enoxaparin (LOVENOX) subcutaneous injection 40 mg  40 mg Subcutaneous Daily Jaycob Swan PA-C   40 mg at 06/23/23 6538   • furosemide (LASIX) tablet 20 mg  20 mg Oral Daily Garcia Cruz MD   20 mg at 06/23/23 7306   • gabapentin (NEURONTIN) capsule 600 mg  600 mg Oral BID Jaycob Swan PA-C   600 mg at 06/23/23 7252   • magnesium Oxide (MAG-OX) tablet 400 mg  400 mg Oral BID Garcia Cruz MD   400 mg at 06/23/23 8937   • melatonin tablet 6 mg  6 mg Oral HS Jaycob Swan PA-C   6 mg at 06/23/23 0000   • moisture barrier miconazole 2% cream (aka HÉCTOR MOISTURE BARRIER ANTIFUNGAL CREAM)   Topical BID Melvin Bryant MD   1 Application at 61/83/30 0850   • neomycin-polymyxin-hydrocortisone (CORTISPORIN) 0 35%-10,000 units/mL-1% otic suspension 4 drop  4 drop Right Ear [facility] Melvin Bryant MD   4 drop at 06/23/23 0436   • oxyCODONE (ROXICODONE) IR tablet 5 mg  5 mg Oral Q6H PRN Melvin Bryant MD   5 mg at 06/21/23 2116   • pantoprazole (PROTONIX) EC tablet 20 mg  20 mg Oral Daily Before Breakfast Sadaf Giraldo PA-C   20 mg at 06/23/23 0435   • pravastatin (PRAVACHOL) tablet 20 mg  20 mg Oral After Zulema JULIANNA GrierC   20 mg at 06/22/23 1650   • senna (SENOKOT) tablet 8 6 mg  1 tablet Oral HS PRN Sadaf Giraldo PA-C          Medications Prior to Admission   Medication   • aspirin (ECOTRIN LOW STRENGTH) 81 mg EC tablet   • Cholecalciferol (VITAMIN D3) 1000 units CAPS   • Cyanocobalamin (VITAMIN B12 PO)   • gabapentin (NEURONTIN) 600 MG tablet   • hydrochlorothiazide (HYDRODIURIL) 12 5 mg tablet   • Mirabegron ER 25 MG TB24   • Multiple Vitamins-Minerals (PRESERVISION AREDS PO)   • nitrofurantoin (MACROBID) 100 mg capsule   • omeprazole (PriLOSEC) 20 mg delayed release capsule   • pravastatin (PRAVACHOL) 20 mg tablet       Labs:  Results from last 7 days   Lab Units 06/23/23  0451 06/22/23  0437 06/21/23  0438   WBC Thousand/uL 12 81* 13 22* 13 19*   HEMOGLOBIN g/dL 10 1* 10 0* 10 0*   HEMATOCRIT % 32 0* 30 9* 30 4*   PLATELETS Thousands/uL 252 239 240   NEUTROS PCT % 70 73 73   LYMPHS PCT % 12* 12* 13*   MONOS PCT % 12 11 11   EOS PCT % 5 3 2     Results from last 7 days   Lab Units 06/23/23  0451 06/22/23  0437 06/21/23  0438 06/20/23  0847 06/19/23  1407   POTASSIUM mmol/L 4 0 3 8 3 7 3 6 4 8   CHLORIDE mmol/L 93* 93* 93* 92* 88*   CO2 mmol/L 35* 35* 35* 30 30   BUN mg/dL 23 20 16 12 12   CREATININE mg/dL 1 07 1 04 1 00 0 87 0 94   CALCIUM mg/dL 9 0 8 8 8 6 8 6 9 3   ALK PHOS U/L --  51  --  52 60   ALT U/L  --  7  --  6* 9   AST U/L  --  12*  --  13 29     Lab Results   Component Value Date    TROPONINI 0 04 12/06/2020    TROPONINI 0 04 12/05/2020    TROPONINI 0 04 12/05/2020    CKTOTAL 46 12/05/2020    CKTOTAL 112 01/26/2014     Results from last 7 days   Lab Units 06/18/23  1734   INR  1 01     Lab Results   Component Value Date    BLOODCX No Growth After 4 Days  06/18/2023    BLOODCX No Growth After 4 Days  06/18/2023    BLOODCX No Growth After 5 Days  05/08/2022    BLOODCX No Growth After 5 Days  05/08/2022    URINECX 30,000-39,000 cfu/ml 06/18/2023    URINECX >100,000 cfu/ml Escherichia coli (A) 10/22/2019    URINECX >100,000 cfu/ml Escherichia coli (A) 07/05/2019    SPUTUMCULTUR 1+ Growth of Pseudomonas aeruginosa (A) 05/23/2018    SPUTUMCULTUR 1+ Growth of 05/23/2018         Imaging:  Results for orders placed during the hospital encounter of 06/18/23    XR chest portable    Narrative  CHEST    INDICATION:   sob  Thank study thanks Epic    COMPARISON: 6/18/2023  EXAM PERFORMED/VIEWS:  XR CHEST PORTABLE      FINDINGS:    Unchanged cardiac and mediastinal contours with a reidentified large hiatal hernia  The lungs are clear  No pneumothorax or pleural effusion  Osseous structures appear within normal limits for patient age  Impression  No acute cardiopulmonary disease  Workstation performed: [id number]    Results for orders placed during the hospital encounter of 06/22/21    XR chest pa & lateral    Narrative  CHEST    INDICATION:   R60 0: Localized edema  Covid positive on 12/5/2020  COMPARISON: Chest radiograph from 12/5/2020 and chest CT from 11/26/2018  EXAM PERFORMED/VIEWS:  XR CHEST PA & LATERAL  DUAL ENERGY SUBTRACTION  FINDINGS:    Cardiomediastinal silhouette normal  Moderate hiatal hernia  Mild bilateral groundglass opacity  No effusion or pneumothorax  Osseous structures normal for age      Impression  Mild bilateral groundglass opacity, question interstitial edema  Moderate hiatal hernia  Workstation performed: [id number]      Last 24 Hours Medication List:   Current Facility-Administered Medications   Medication Dose Route Frequency Provider Last Rate   • acetaminophen  650 mg Oral Q6H PRN Herbert Ojeda PA-C     • aluminum-magnesium hydroxide-simethicone  30 mL Oral Q6H PRN Herbert Ojeda PA-C     • amoxicillin-clavulanate  1 tablet Oral Q12H Pricila Verde MD     • aspirin  81 mg Oral Daily Herbert Ojeda PA-C     • cholecalciferol  1,000 Units Oral Daily Herbert Ojeda PA-C     • cyanocobalamin  1,000 mcg Oral Daily Herbert Ojeda PA-C     • enoxaparin  40 mg Subcutaneous Daily Herbert Ojeda PA-C     • furosemide  20 mg Oral Daily Allan Velázquez MD     • gabapentin  600 mg Oral BID Herbert Ojeda PA-C     • magnesium Oxide  400 mg Oral BID Allan Velázquez MD     • melatonin  6 mg Oral HS Herbert Ojeda PA-C     • HÉCTOR ANTIFUNGAL   Topical BID Allan Velázquez MD     • neomycin-polymyxin-hydrocortisone  4 drop Right Ear Q6H [address] Allan Velázquez MD     • oxyCODONE  5 mg Oral Q6H PRN Allan Velázquez MD     • pantoprazole  20 mg Oral Daily Before Breakfast Herbert Ojeda PA-C     • pravastatin  20 mg Oral After Liseth Brown PA-C     • senna  1 tablet Oral HS PRN Herbert Ojeda PA-C          Today, Patient Was Seen By: Allan Velázquez MD    ** Please Note: Dictation voice to text software may have been used in the creation of this document   **

## 2023-06-23 NOTE — ASSESSMENT & PLAN NOTE
· Presents to the ED with fall while using walker this afternoon  · Trauma work-up negative  · PT, OT, and CM on board  · Neuro exam on admission with 4-5 strength in bilateral lower extremities, 5 out of strength in bilateral upper extremities -no focal weakness    Endorses numbness in bilateral feet (unchanged from baseline neuropathy per patient)  · Suspect that generalized weakness secondary to cystitis  · Patient complained of right-sided headache and repeat CT head was done on June 21 which is unremarkable

## 2023-06-23 NOTE — ASSESSMENT & PLAN NOTE
· UA with innumerable WBCs and moderate bacteria, negative nitrite  · Of note vulva is erythematous and macerated without discharge -suspect secondary to urinary incontinence and use of briefs/pads  · Frequent toileting and avoid utilization of diapers while in bed -should have geoff under her  · Barrier cream to labia   On Zosyn due to prior growth of ESBL and Enterococcus faecalis  Completed the course of antibiotic

## 2023-06-23 NOTE — ASSESSMENT & PLAN NOTE
· Na 129 on admission  · Hyponatremia work-up  · Suspect hypervolemia as etiology as patient examines volume overloaded  · Give Lasix 20 Mg IV x1 and monitor urine output  · Sodium this morning is 134  · On fluid restriction  · On lasix

## 2023-06-24 LAB
ANION GAP SERPL CALCULATED.3IONS-SCNC: 7 MMOL/L
BACTERIA BLD CULT: NORMAL
BACTERIA BLD CULT: NORMAL
BASOPHILS # BLD AUTO: 0.04 THOUSANDS/ÂΜL (ref 0–0.1)
BASOPHILS NFR BLD AUTO: 0 % (ref 0–1)
BUN SERPL-MCNC: 20 MG/DL (ref 5–25)
C DIFF TOX A+B STL QL IA: NEGATIVE
C DIFF TOX B TCDB STL QL NAA+PROBE: POSITIVE
CALCIUM SERPL-MCNC: 9 MG/DL (ref 8.4–10.2)
CHLORIDE SERPL-SCNC: 93 MMOL/L (ref 96–108)
CO2 SERPL-SCNC: 33 MMOL/L (ref 21–32)
CREAT SERPL-MCNC: 0.86 MG/DL (ref 0.6–1.3)
EOSINOPHIL # BLD AUTO: 0.74 THOUSAND/ÂΜL (ref 0–0.61)
EOSINOPHIL NFR BLD AUTO: 6 % (ref 0–6)
ERYTHROCYTE [DISTWIDTH] IN BLOOD BY AUTOMATED COUNT: 13.2 % (ref 11.6–15.1)
GFR SERPL CREATININE-BSD FRML MDRD: 58 ML/MIN/1.73SQ M
GLUCOSE SERPL-MCNC: 93 MG/DL (ref 65–140)
HCT VFR BLD AUTO: 34.6 % (ref 34.8–46.1)
HGB BLD-MCNC: 10.8 G/DL (ref 11.5–15.4)
IMM GRANULOCYTES # BLD AUTO: 0.06 THOUSAND/UL (ref 0–0.2)
IMM GRANULOCYTES NFR BLD AUTO: 1 % (ref 0–2)
LYMPHOCYTES # BLD AUTO: 1.76 THOUSANDS/ÂΜL (ref 0.6–4.47)
LYMPHOCYTES NFR BLD AUTO: 14 % (ref 14–44)
MCH RBC QN AUTO: 27.8 PG (ref 26.8–34.3)
MCHC RBC AUTO-ENTMCNC: 31.2 G/DL (ref 31.4–37.4)
MCV RBC AUTO: 89 FL (ref 82–98)
MONOCYTES # BLD AUTO: 1.43 THOUSAND/ÂΜL (ref 0.17–1.22)
MONOCYTES NFR BLD AUTO: 11 % (ref 4–12)
NEUTROPHILS # BLD AUTO: 9.01 THOUSANDS/ÂΜL (ref 1.85–7.62)
NEUTS SEG NFR BLD AUTO: 68 % (ref 43–75)
NRBC BLD AUTO-RTO: 0 /100 WBCS
PLATELET # BLD AUTO: 245 THOUSANDS/UL (ref 149–390)
PMV BLD AUTO: 8.3 FL (ref 8.9–12.7)
POTASSIUM SERPL-SCNC: 3.9 MMOL/L (ref 3.5–5.3)
RBC # BLD AUTO: 3.88 MILLION/UL (ref 3.81–5.12)
SODIUM SERPL-SCNC: 133 MMOL/L (ref 135–147)
WBC # BLD AUTO: 13.04 THOUSAND/UL (ref 4.31–10.16)

## 2023-06-24 PROCEDURE — 85025 COMPLETE CBC W/AUTO DIFF WBC: CPT | Performed by: INTERNAL MEDICINE

## 2023-06-24 PROCEDURE — 80048 BASIC METABOLIC PNL TOTAL CA: CPT | Performed by: INTERNAL MEDICINE

## 2023-06-24 PROCEDURE — 99232 SBSQ HOSP IP/OBS MODERATE 35: CPT | Performed by: INTERNAL MEDICINE

## 2023-06-24 RX ORDER — VANCOMYCIN HYDROCHLORIDE 125 MG/1
125 CAPSULE ORAL EVERY 6 HOURS SCHEDULED
Status: DISCONTINUED | OUTPATIENT
Start: 2023-06-24 | End: 2023-06-24

## 2023-06-24 RX ORDER — VANCOMYCIN HYDROCHLORIDE 125 MG/1
125 CAPSULE ORAL EVERY 12 HOURS SCHEDULED
Status: DISCONTINUED | OUTPATIENT
Start: 2023-06-24 | End: 2023-06-25 | Stop reason: HOSPADM

## 2023-06-24 RX ADMIN — OXYCODONE HYDROCHLORIDE 5 MG: 5 TABLET ORAL at 06:12

## 2023-06-24 RX ADMIN — ACETAMINOPHEN 650 MG: 325 TABLET, FILM COATED ORAL at 18:42

## 2023-06-24 RX ADMIN — NEOMYCIN SULFATE, POLYMYXIN B SULFATE AND HYDROCORTISONE 4 DROP: 10; 3.5; 1 SUSPENSION/ DROPS AURICULAR (OTIC) at 12:07

## 2023-06-24 RX ADMIN — PRAVASTATIN SODIUM 20 MG: 20 TABLET ORAL at 18:43

## 2023-06-24 RX ADMIN — Medication 1000 UNITS: at 08:17

## 2023-06-24 RX ADMIN — GABAPENTIN 600 MG: 300 CAPSULE ORAL at 08:17

## 2023-06-24 RX ADMIN — VANCOMYCIN HYDROCHLORIDE 125 MG: 125 CAPSULE ORAL at 12:07

## 2023-06-24 RX ADMIN — NEOMYCIN SULFATE, POLYMYXIN B SULFATE AND HYDROCORTISONE 4 DROP: 10; 3.5; 1 SUSPENSION/ DROPS AURICULAR (OTIC) at 00:20

## 2023-06-24 RX ADMIN — GABAPENTIN 600 MG: 300 CAPSULE ORAL at 18:43

## 2023-06-24 RX ADMIN — VANCOMYCIN HYDROCHLORIDE 125 MG: 125 CAPSULE ORAL at 21:34

## 2023-06-24 RX ADMIN — PANTOPRAZOLE SODIUM 20 MG: 20 TABLET, DELAYED RELEASE ORAL at 05:10

## 2023-06-24 RX ADMIN — ACETAMINOPHEN 650 MG: 325 TABLET, FILM COATED ORAL at 05:09

## 2023-06-24 RX ADMIN — ASPIRIN 81 MG: 81 TABLET, COATED ORAL at 08:17

## 2023-06-24 RX ADMIN — NEOMYCIN SULFATE, POLYMYXIN B SULFATE AND HYDROCORTISONE 4 DROP: 10; 3.5; 1 SUSPENSION/ DROPS AURICULAR (OTIC) at 05:37

## 2023-06-24 RX ADMIN — MELATONIN 6 MG: at 21:34

## 2023-06-24 RX ADMIN — MAGNESIUM OXIDE TAB 400 MG (241.3 MG ELEMENTAL MG) 400 MG: 400 (241.3 MG) TAB at 18:43

## 2023-06-24 RX ADMIN — NEOMYCIN SULFATE, POLYMYXIN B SULFATE AND HYDROCORTISONE 4 DROP: 10; 3.5; 1 SUSPENSION/ DROPS AURICULAR (OTIC) at 18:46

## 2023-06-24 RX ADMIN — FUROSEMIDE 20 MG: 20 TABLET ORAL at 08:17

## 2023-06-24 RX ADMIN — ENOXAPARIN SODIUM 40 MG: 100 INJECTION SUBCUTANEOUS at 08:17

## 2023-06-24 RX ADMIN — MAGNESIUM OXIDE TAB 400 MG (241.3 MG ELEMENTAL MG) 400 MG: 400 (241.3 MG) TAB at 08:17

## 2023-06-24 RX ADMIN — CYANOCOBALAMIN TAB 500 MCG 1000 MCG: 500 TAB at 08:17

## 2023-06-24 NOTE — PLAN OF CARE
Problem: Potential for Falls  Goal: Patient will remain free of falls  Description: INTERVENTIONS:  - Educate patient/family on patient safety including physical limitations  - Instruct patient to call for assistance with activity   - Consult OT/PT to assist with strengthening/mobility   - Keep Call bell within reach  - Keep bed low and locked with side rails adjusted as appropriate  - Keep care items and personal belongings within reach  - Initiate and maintain comfort rounds  - Make Fall Risk Sign visible to staff  - Offer Toileting every 2 Hours, in advance of need  - Initiate/Maintain bed alarm  - Obtain necessary fall risk management equipment: socks  - Apply yellow socks and bracelet for high fall risk patients  - Consider moving patient to room near nurses station  Outcome: Progressing     Problem: MOBILITY - ADULT  Goal: Maintain or return to baseline ADL function  Description: INTERVENTIONS:  -  Assess patient's ability to carry out ADLs; assess patient's baseline for ADL function and identify physical deficits which impact ability to perform ADLs (bathing, care of mouth/teeth, toileting, grooming, dressing, etc )  - Assess/evaluate cause of self-care deficits   - Assess range of motion  - Assess patient's mobility; develop plan if impaired  - Assess patient's need for assistive devices and provide as appropriate  - Encourage maximum independence but intervene and supervise when necessary  - Involve family in performance of ADLs  - Assess for home care needs following discharge   - Consider OT consult to assist with ADL evaluation and planning for discharge  - Provide patient education as appropriate  Outcome: Progressing  Goal: Maintains/Returns to pre admission functional level  Description: INTERVENTIONS:  - Perform BMAT or MOVE assessment daily    - Set and communicate daily mobility goal to care team and patient/family/caregiver     - Collaborate with rehabilitation services on mobility goals if consulted  - Perform Range of Motion 2 times a day  - Reposition patient every 2 hours    - Dangle patient 2 times a day  - Stand patient 3 times a day  - Ambulate patient 3 times a day  - Out of bed to chair 3 times a day   - Out of bed for meals 3 times a day  - Out of bed for toileting  - Record patient progress and toleration of activity level   Outcome: Progressing     Problem: Prexisting or High Potential for Compromised Skin Integrity  Goal: Skin integrity is maintained or improved  Description: INTERVENTIONS:  - Identify patients at risk for skin breakdown  - Assess and monitor skin integrity  - Assess and monitor nutrition and hydration status  - Monitor labs   - Assess for incontinence   - Turn and reposition patient  - Assist with mobility/ambulation  - Relieve pressure over bony prominences  - Avoid friction and shearing  - Provide appropriate hygiene as needed including keeping skin clean and dry  - Evaluate need for skin moisturizer/barrier cream  - Collaborate with interdisciplinary team   - Patient/family teaching  - Consider wound care consult   Outcome: Progressing     Problem: PAIN - ADULT  Goal: Verbalizes/displays adequate comfort level or baseline comfort level  Description: Interventions:  - Encourage patient to monitor pain and request assistance  - Assess pain using appropriate pain scale  - Administer analgesics based on type and severity of pain and evaluate response  - Implement non-pharmacological measures as appropriate and evaluate response  - Consider cultural and social influences on pain and pain management  - Notify physician/advanced practitioner if interventions unsuccessful or patient reports new pain  Outcome: Progressing     Problem: INFECTION - ADULT  Goal: Absence or prevention of progression during hospitalization  Description: INTERVENTIONS:  - Assess and monitor for signs and symptoms of infection  - Monitor lab/diagnostic results  - Monitor all insertion sites, i e  indwelling lines, tubes, and drains  - Monitor endotracheal if appropriate and nasal secretions for changes in amount and color  - [place] appropriate cooling/warming therapies per order  - Administer medications as ordered  - Instruct and encourage patient and family to use good hand hygiene technique  - Identify and instruct in appropriate isolation precautions for identified infection/condition  Outcome: Progressing     Problem: SAFETY ADULT  Goal: Patient will remain free of falls  Description: INTERVENTIONS:  - Educate patient/family on patient safety including physical limitations  - Instruct patient to call for assistance with activity   - Consult OT/PT to assist with strengthening/mobility   - Keep Call bell within reach  - Keep bed low and locked with side rails adjusted as appropriate  - Keep care items and personal belongings within reach  - Initiate and maintain comfort rounds  - Make Fall Risk Sign visible to staff  - Offer Toileting every 2 Hours, in advance of need  - Initiate/Maintain bed alarm  - Obtain necessary fall risk management equipment: socks  - Apply yellow socks and bracelet for high fall risk patients  - Consider moving patient to room near nurses station  Outcome: Progressing     Problem: DISCHARGE PLANNING  Goal: Discharge to home or other facility with appropriate resources  Description: INTERVENTIONS:  - Identify barriers to discharge w/patient and caregiver  - Arrange for needed discharge resources and transportation as appropriate  - Identify discharge learning needs (meds, wound care, etc )  - Arrange for interpretive services to assist at discharge as needed  - Refer to Case Management Department for coordinating discharge planning if the patient needs post-hospital services based on physician/advanced practitioner order or complex needs related to functional status, cognitive ability, or social support system  Outcome: Progressing     Problem: Knowledge Deficit  Goal: Patient/family/caregiver demonstrates understanding of disease process, treatment plan, medications, and discharge instructions  Description: Complete learning assessment and assess knowledge base  Interventions:  - Provide teaching at level of understanding  - Provide teaching via preferred learning methods  Outcome: Progressing     Problem: Nutrition/Hydration-ADULT  Goal: Nutrient/Hydration intake appropriate for improving, restoring or maintaining nutritional needs  Description: Monitor and assess patient's nutrition/hydration status for malnutrition  Collaborate with interdisciplinary team and initiate plan and interventions as ordered  Monitor patient's weight and dietary intake as ordered or per policy  Utilize nutrition screening tool and intervene as necessary  Determine patient's food preferences and provide high-protein, high-caloric foods as appropriate       INTERVENTIONS:  - Monitor oral intake, urinary output, labs, and treatment plans  - Assess nutrition and hydration status and recommend course of action  - Evaluate amount of meals eaten  - Assist patient with eating if necessary   - Allow adequate time for meals  - Recommend/ encourage appropriate diets, oral nutritional supplements, and vitamin/mineral supplements  - Order, calculate, and assess calorie counts as needed  - Recommend, monitor, and adjust tube feedings and TPN/PPN based on assessed needs  - Assess need for intravenous fluids  - Provide specific nutrition/hydration education as appropriate  - Include patient/family/caregiver in decisions related to nutrition  Outcome: Progressing     Problem: GENITOURINARY - ADULT  Goal: Maintains or returns to baseline urinary function  Description: INTERVENTIONS:  - Assess urinary function  - Encourage oral fluids to ensure adequate hydration if ordered  - Administer IV fluids as ordered to ensure adequate hydration  - Administer ordered medications as needed  - Offer frequent toileting  - Follow urinary retention protocol if ordered  Outcome: Progressing     Problem: METABOLIC, FLUID AND ELECTROLYTES - ADULT  Goal: Electrolytes maintained within normal limits  Description: INTERVENTIONS:  - Monitor labs and assess patient for signs and symptoms of electrolyte imbalances  - Administer electrolyte replacement as ordered  - Monitor response to electrolyte replacements, including repeat lab results as appropriate  - Instruct patient on fluid and nutrition as appropriate  Outcome: Progressing  Goal: Fluid balance maintained  Description: INTERVENTIONS:  - Monitor labs   - Monitor I/O and WT  - Instruct patient on fluid and nutrition as appropriate  - Assess for signs & symptoms of volume excess or deficit  Outcome: Progressing     Problem: SKIN/TISSUE INTEGRITY - ADULT  Goal: Skin Integrity remains intact(Skin Breakdown Prevention)  Description: Assess:  -Perform Douglas assessment every shift  -Clean and moisturize skin every shift  -Inspect skin when repositioning, toileting, and assisting with ADLS  -Assess under medical devices such as pulse ox every shift  -Assess extremities for adequate circulation and sensation     Bed Management:  -Have minimal linens on bed & keep smooth, unwrinkled  -Change linens as needed when moist or perspiring  -Avoid sitting or lying in one position for more than 2 hours while in bed  -Keep HOB at 45degrees     Toileting:  -Offer bedside commode  -Assess for incontinence every shift  -Use incontinent care products after each incontinent episode such as wipes    Activity:  -Mobilize patient 3 times a day  -Encourage activity and walks on unit  -Encourage or provide ROM exercises   -Turn and reposition patient every 2 Hours  -Use appropriate equipment to lift or move patient in bed  -Instruct/ Assist with weight shifting every 2 when out of bed in chair  -Consider limitation of chair time 2 hour intervals    Skin Care:  -Avoid use of baby powder, tape, friction and shearing, hot water or constrictive clothing  -Relieve pressure over bony prominences using wedge  -Do not massage red bony areas    Next Steps:  -Teach patient strategies to minimize risks such as pressure injury   -Consider consults to  interdisciplinary teams such as pt  Outcome: Progressing  Goal: Incision(s), wounds(s) or drain site(s) healing without S/S of infection  Description: INTERVENTIONS  - Assess and document dressing, incision, wound bed, drain sites and surrounding tissue  - Provide patient and family education  - Perform skin care/dressing changes every shift  Outcome: Progressing     Problem: HEMATOLOGIC - ADULT  Goal: Maintains hematologic stability  Description: INTERVENTIONS  - Assess for signs and symptoms of bleeding or hemorrhage  - Monitor labs  - Administer supportive blood products/factors as ordered and appropriate  Outcome: Progressing     Problem: MUSCULOSKELETAL - ADULT  Goal: Maintain or return mobility to safest level of function  Description: INTERVENTIONS:  - Assess patient's ability to carry out ADLs; assess patient's baseline for ADL function and identify physical deficits which impact ability to perform ADLs (bathing, care of mouth/teeth, toileting, grooming, dressing, etc )  - Assess/evaluate cause of self-care deficits   - Assess range of motion  - Assess patient's mobility  - Assess patient's need for assistive devices and provide as appropriate  - Encourage maximum independence but intervene and supervise when necessary  - Involve family in performance of ADLs  - Assess for home care needs following discharge   - Consider OT consult to assist with ADL evaluation and planning for discharge  - Provide patient education as appropriate  Outcome: Progressing

## 2023-06-24 NOTE — PLAN OF CARE
Problem: Potential for Falls  Goal: Patient will remain free of falls  Description: INTERVENTIONS:  - Educate patient/family on patient safety including physical limitations  - Instruct patient to call for assistance with activity   - Consult OT/PT to assist with strengthening/mobility   - Keep Call bell within reach  - Keep bed low and locked with side rails adjusted as appropriate  - Keep care items and personal belongings within reach  - Initiate and maintain comfort rounds  - Make Fall Risk Sign visible to staff  - Offer Toileting every x Hours, in advance of need  - Initiate/Maintain xalarm  - Obtain necessary fall risk management equipment: x  - Apply yellow socks and bracelet for high fall risk patients  - Consider moving patient to room near nurses station  Outcome: Progressing     Problem: MOBILITY - ADULT  Goal: Maintain or return to baseline ADL function  Description: INTERVENTIONS:  -  Assess patient's ability to carry out ADLs; assess patient's baseline for ADL function and identify physical deficits which impact ability to perform ADLs (bathing, care of mouth/teeth, toileting, grooming, dressing, etc )  - Assess/evaluate cause of self-care deficits   - Assess range of motion  - Assess patient's mobility; develop plan if impaired  - Assess patient's need for assistive devices and provide as appropriate  - Encourage maximum independence but intervene and supervise when necessary  - Involve family in performance of ADLs  - Assess for home care needs following discharge   - Consider OT consult to assist with ADL evaluation and planning for discharge  - Provide patient education as appropriate  Outcome: Progressing  Goal: Maintains/Returns to pre admission functional level  Description: INTERVENTIONS:  - Perform BMAT or MOVE assessment daily    - Set and communicate daily mobility goal to care team and patient/family/caregiver     - Collaborate with rehabilitation services on mobility goals if consulted  - Perform Range of Motion x times a day  - Reposition patient every x hours    - Dangle patient x times a day  - Stand patient x times a day  - Ambulate patient x times a day  - Out of bed to chair x times a day   - Out of bed for meals x times a day  - Out of bed for toileting  - Record patient progress and toleration of activity level   Outcome: Progressing     Problem: Prexisting or High Potential for Compromised Skin Integrity  Goal: Skin integrity is maintained or improved  Description: INTERVENTIONS:  - Identify patients at risk for skin breakdown  - Assess and monitor skin integrity  - Assess and monitor nutrition and hydration status  - Monitor labs   - Assess for incontinence   - Turn and reposition patient  - Assist with mobility/ambulation  - Relieve pressure over bony prominences  - Avoid friction and shearing  - Provide appropriate hygiene as needed including keeping skin clean and dry  - Evaluate need for skin moisturizer/barrier cream  - Collaborate with interdisciplinary team   - Patient/family teaching  - Consider wound care consult   Outcome: Progressing     Problem: PAIN - ADULT  Goal: Verbalizes/displays adequate comfort level or baseline comfort level  Description: Interventions:  - Encourage patient to monitor pain and request assistance  - Assess pain using appropriate pain scale  - Administer analgesics based on type and severity of pain and evaluate response  - Implement non-pharmacological measures as appropriate and evaluate response  - Consider cultural and social influences on pain and pain management  - Notify physician/advanced practitioner if interventions unsuccessful or patient reports new pain  Outcome: Progressing     Problem: INFECTION - ADULT  Goal: Absence or prevention of progression during hospitalization  Description: INTERVENTIONS:  - Assess and monitor for signs and symptoms of infection  - Monitor lab/diagnostic results  - Monitor all insertion sites, i e  indwelling lines, tubes, and drains  - Monitor endotracheal if appropriate and nasal secretions for changes in amount and color  - [place] appropriate cooling/warming therapies per order  - Administer medications as ordered  - Instruct and encourage patient and family to use good hand hygiene technique  - Identify and instruct in appropriate isolation precautions for identified infection/condition  Outcome: Progressing     Problem: SAFETY ADULT  Goal: Patient will remain free of falls  Description: INTERVENTIONS:  - Educate patient/family on patient safety including physical limitations  - Instruct patient to call for assistance with activity   - Consult OT/PT to assist with strengthening/mobility   - Keep Call bell within reach  - Keep bed low and locked with side rails adjusted as appropriate  - Keep care items and personal belongings within reach  - Initiate and maintain comfort rounds  - Make Fall Risk Sign visible to staff  - Offer Toileting every x Hours, in advance of need  - Initiate/Maintain xalarm  - Obtain necessary fall risk management equipment: x  - Apply yellow socks and bracelet for high fall risk patients  - Consider moving patient to room near nurses station  Outcome: Progressing     Problem: DISCHARGE PLANNING  Goal: Discharge to home or other facility with appropriate resources  Description: INTERVENTIONS:  - Identify barriers to discharge w/patient and caregiver  - Arrange for needed discharge resources and transportation as appropriate  - Identify discharge learning needs (meds, wound care, etc )  - Arrange for interpretive services to assist at discharge as needed  - Refer to Case Management Department for coordinating discharge planning if the patient needs post-hospital services based on physician/advanced practitioner order or complex needs related to functional status, cognitive ability, or social support system  Outcome: Progressing     Problem: Knowledge Deficit  Goal: Patient/family/caregiver demonstrates understanding of disease process, treatment plan, medications, and discharge instructions  Description: Complete learning assessment and assess knowledge base  Interventions:  - Provide teaching at level of understanding  - Provide teaching via preferred learning methods  Outcome: Progressing     Problem: Nutrition/Hydration-ADULT  Goal: Nutrient/Hydration intake appropriate for improving, restoring or maintaining nutritional needs  Description: Monitor and assess patient's nutrition/hydration status for malnutrition  Collaborate with interdisciplinary team and initiate plan and interventions as ordered  Monitor patient's weight and dietary intake as ordered or per policy  Utilize nutrition screening tool and intervene as necessary  Determine patient's food preferences and provide high-protein, high-caloric foods as appropriate       INTERVENTIONS:  - Monitor oral intake, urinary output, labs, and treatment plans  - Assess nutrition and hydration status and recommend course of action  - Evaluate amount of meals eaten  - Assist patient with eating if necessary   - Allow adequate time for meals  - Recommend/ encourage appropriate diets, oral nutritional supplements, and vitamin/mineral supplements  - Order, calculate, and assess calorie counts as needed  - Recommend, monitor, and adjust tube feedings and TPN/PPN based on assessed needs  - Assess need for intravenous fluids  - Provide specific nutrition/hydration education as appropriate  - Include patient/family/caregiver in decisions related to nutrition  Outcome: Progressing     Problem: GENITOURINARY - ADULT  Goal: Maintains or returns to baseline urinary function  Description: INTERVENTIONS:  - Assess urinary function  - Encourage oral fluids to ensure adequate hydration if ordered  - Administer IV fluids as ordered to ensure adequate hydration  - Administer ordered medications as needed  - Offer frequent toileting  - Follow urinary retention protocol if ordered  Outcome: Progressing     Problem: METABOLIC, FLUID AND ELECTROLYTES - ADULT  Goal: Electrolytes maintained within normal limits  Description: INTERVENTIONS:  - Monitor labs and assess patient for signs and symptoms of electrolyte imbalances  - Administer electrolyte replacement as ordered  - Monitor response to electrolyte replacements, including repeat lab results as appropriate  - Instruct patient on fluid and nutrition as appropriate  Outcome: Progressing  Goal: Fluid balance maintained  Description: INTERVENTIONS:  - Monitor labs   - Monitor I/O and WT  - Instruct patient on fluid and nutrition as appropriate  - Assess for signs & symptoms of volume excess or deficit  Outcome: Progressing     Problem: SKIN/TISSUE INTEGRITY - ADULT  Goal: Skin Integrity remains intact(Skin Breakdown Prevention)  Description: Assess:  -Perform Douglas assessment every x  -Clean and moisturize skin every x  -Inspect skin when repositioning, toileting, and assisting with ADLS  -Assess under medical devices such as x every x  -Assess extremities for adequate circulation and sensation     Bed Management:  -Have minimal linens on bed & keep smooth, unwrinkled  -Change linens as needed when moist or perspiring  -Avoid sitting or lying in one position for more than x hours while in bed  -Keep HOB at [facility]     Toileting:  -Offer bedside commode  -Assess for incontinence every x  -Use incontinent care products after each incontinent episode such as x    Activity:  -Mobilize patient x times a day  -Encourage activity and walks on unit  -Encourage or provide ROM exercises   -Turn and reposition patient every x Hours  -Use appropriate equipment to lift or move patient in bed  -Instruct/ Assist with weight shifting every x when out of bed in chair  -Consider limitation of chair time x hour intervals    Skin Care:  -Avoid use of baby powder, tape, friction and shearing, hot water or constrictive clothing  -Relieve pressure over bony prominences using x  -Do not massage red bony areas    Next Steps:  -Teach patient strategies to minimize risks such as x   -Consider consults to  interdisciplinary teams such as x  Outcome: Progressing  Goal: Incision(s), wounds(s) or drain site(s) healing without S/S of infection  Description: INTERVENTIONS  - Assess and document dressing, incision, wound bed, drain sites and surrounding tissue  - Provide patient and family education  - Perform skin care/dressing changes every x  Outcome: Progressing     Problem: HEMATOLOGIC - ADULT  Goal: Maintains hematologic stability  Description: INTERVENTIONS  - Assess for signs and symptoms of bleeding or hemorrhage  - Monitor labs  - Administer supportive blood products/factors as ordered and appropriate  Outcome: Progressing     Problem: MUSCULOSKELETAL - ADULT  Goal: Maintain or return mobility to safest level of function  Description: INTERVENTIONS:  - Assess patient's ability to carry out ADLs; assess patient's baseline for ADL function and identify physical deficits which impact ability to perform ADLs (bathing, care of mouth/teeth, toileting, grooming, dressing, etc )  - Assess/evaluate cause of self-care deficits   - Assess range of motion  - Assess patient's mobility  - Assess patient's need for assistive devices and provide as appropriate  - Encourage maximum independence but intervene and supervise when necessary  - Involve family in performance of ADLs  - Assess for home care needs following discharge   - Consider OT consult to assist with ADL evaluation and planning for discharge  - Provide patient education as appropriate  Outcome: Progressing

## 2023-06-24 NOTE — ASSESSMENT & PLAN NOTE
Patient with diarrhea and stool for C  difficile was sent  Stool for C  difficile is positive but likely colonization  Diarrhea has resolved  On empiric oral vancomycin

## 2023-06-24 NOTE — PLAN OF CARE
Problem: Potential for Falls  Goal: Patient will remain free of falls  Description: INTERVENTIONS:  - Educate patient/family on patient safety including physical limitations  - Instruct patient to call for assistance with activity   - Consult OT/PT to assist with strengthening/mobility   - Keep Call bell within reach  - Keep bed low and locked with side rails adjusted as appropriate  - Keep care items and personal belongings within reach  - Initiate and maintain comfort rounds  - Make Fall Risk Sign visible to staff  - Offer Toileting every x Hours, in advance of need  - Initiate/Maintain xalarm  - Obtain necessary fall risk management equipment: x  - Apply yellow socks and bracelet for high fall risk patients  - Consider moving patient to room near nurses station  Outcome: Progressing     Problem: MOBILITY - ADULT  Goal: Maintain or return to baseline ADL function  Description: INTERVENTIONS:  -  Assess patient's ability to carry out ADLs; assess patient's baseline for ADL function and identify physical deficits which impact ability to perform ADLs (bathing, care of mouth/teeth, toileting, grooming, dressing, etc )  - Assess/evaluate cause of self-care deficits   - Assess range of motion  - Assess patient's mobility; develop plan if impaired  - Assess patient's need for assistive devices and provide as appropriate  - Encourage maximum independence but intervene and supervise when necessary  - Involve family in performance of ADLs  - Assess for home care needs following discharge   - Consider OT consult to assist with ADL evaluation and planning for discharge  - Provide patient education as appropriate  Outcome: Progressing  Goal: Maintains/Returns to pre admission functional level  Description: INTERVENTIONS:  - Perform BMAT or MOVE assessment daily    - Set and communicate daily mobility goal to care team and patient/family/caregiver     - Collaborate with rehabilitation services on mobility goals if consulted  - Perform Range of Motion x times a day  - Reposition patient every x hours    - Dangle patient x times a day  - Stand patient x times a day  - Ambulate patient x times a day  - Out of bed to chair x times a day   - Out of bed for meals x times a day  - Out of bed for toileting  - Record patient progress and toleration of activity level   Outcome: Progressing     Problem: Prexisting or High Potential for Compromised Skin Integrity  Goal: Skin integrity is maintained or improved  Description: INTERVENTIONS:  - Identify patients at risk for skin breakdown  - Assess and monitor skin integrity  - Assess and monitor nutrition and hydration status  - Monitor labs   - Assess for incontinence   - Turn and reposition patient  - Assist with mobility/ambulation  - Relieve pressure over bony prominences  - Avoid friction and shearing  - Provide appropriate hygiene as needed including keeping skin clean and dry  - Evaluate need for skin moisturizer/barrier cream  - Collaborate with interdisciplinary team   - Patient/family teaching  - Consider wound care consult   Outcome: Progressing     Problem: PAIN - ADULT  Goal: Verbalizes/displays adequate comfort level or baseline comfort level  Description: Interventions:  - Encourage patient to monitor pain and request assistance  - Assess pain using appropriate pain scale  - Administer analgesics based on type and severity of pain and evaluate response  - Implement non-pharmacological measures as appropriate and evaluate response  - Consider cultural and social influences on pain and pain management  - Notify physician/advanced practitioner if interventions unsuccessful or patient reports new pain  Outcome: Progressing     Problem: INFECTION - ADULT  Goal: Absence or prevention of progression during hospitalization  Description: INTERVENTIONS:  - Assess and monitor for signs and symptoms of infection  - Monitor lab/diagnostic results  - Monitor all insertion sites, i e  indwelling lines, tubes, and drains  - Monitor endotracheal if appropriate and nasal secretions for changes in amount and color  - Guin appropriate cooling/warming therapies per order  - Administer medications as ordered  - Instruct and encourage patient and family to use good hand hygiene technique  - Identify and instruct in appropriate isolation precautions for identified infection/condition  Outcome: Progressing     Problem: SAFETY ADULT  Goal: Patient will remain free of falls  Description: INTERVENTIONS:  - Educate patient/family on patient safety including physical limitations  - Instruct patient to call for assistance with activity   - Consult OT/PT to assist with strengthening/mobility   - Keep Call bell within reach  - Keep bed low and locked with side rails adjusted as appropriate  - Keep care items and personal belongings within reach  - Initiate and maintain comfort rounds  - Make Fall Risk Sign visible to staff  - Offer Toileting every x Hours, in advance of need  - Initiate/Maintain xalarm  - Obtain necessary fall risk management equipment: x  - Apply yellow socks and bracelet for high fall risk patients  - Consider moving patient to room near nurses station  Outcome: Progressing     Problem: DISCHARGE PLANNING  Goal: Discharge to home or other facility with appropriate resources  Description: INTERVENTIONS:  - Identify barriers to discharge w/patient and caregiver  - Arrange for needed discharge resources and transportation as appropriate  - Identify discharge learning needs (meds, wound care, etc )  - Arrange for interpretive services to assist at discharge as needed  - Refer to Case Management Department for coordinating discharge planning if the patient needs post-hospital services based on physician/advanced practitioner order or complex needs related to functional status, cognitive ability, or social support system  Outcome: Progressing     Problem: Knowledge Deficit  Goal: Patient/family/caregiver demonstrates understanding of disease process, treatment plan, medications, and discharge instructions  Description: Complete learning assessment and assess knowledge base  Interventions:  - Provide teaching at level of understanding  - Provide teaching via preferred learning methods  Outcome: Progressing     Problem: Nutrition/Hydration-ADULT  Goal: Nutrient/Hydration intake appropriate for improving, restoring or maintaining nutritional needs  Description: Monitor and assess patient's nutrition/hydration status for malnutrition  Collaborate with interdisciplinary team and initiate plan and interventions as ordered  Monitor patient's weight and dietary intake as ordered or per policy  Utilize nutrition screening tool and intervene as necessary  Determine patient's food preferences and provide high-protein, high-caloric foods as appropriate       INTERVENTIONS:  - Monitor oral intake, urinary output, labs, and treatment plans  - Assess nutrition and hydration status and recommend course of action  - Evaluate amount of meals eaten  - Assist patient with eating if necessary   - Allow adequate time for meals  - Recommend/ encourage appropriate diets, oral nutritional supplements, and vitamin/mineral supplements  - Order, calculate, and assess calorie counts as needed  - Recommend, monitor, and adjust tube feedings and TPN/PPN based on assessed needs  - Assess need for intravenous fluids  - Provide specific nutrition/hydration education as appropriate  - Include patient/family/caregiver in decisions related to nutrition  Outcome: Progressing     Problem: GENITOURINARY - ADULT  Goal: Maintains or returns to baseline urinary function  Description: INTERVENTIONS:  - Assess urinary function  - Encourage oral fluids to ensure adequate hydration if ordered  - Administer IV fluids as ordered to ensure adequate hydration  - Administer ordered medications as needed  - Offer frequent toileting  - Follow urinary retention protocol if ordered  Outcome: Progressing     Problem: METABOLIC, FLUID AND ELECTROLYTES - ADULT  Goal: Electrolytes maintained within normal limits  Description: INTERVENTIONS:  - Monitor labs and assess patient for signs and symptoms of electrolyte imbalances  - Administer electrolyte replacement as ordered  - Monitor response to electrolyte replacements, including repeat lab results as appropriate  - Instruct patient on fluid and nutrition as appropriate  Outcome: Progressing  Goal: Fluid balance maintained  Description: INTERVENTIONS:  - Monitor labs   - Monitor I/O and WT  - Instruct patient on fluid and nutrition as appropriate  - Assess for signs & symptoms of volume excess or deficit  Outcome: Progressing     Problem: SKIN/TISSUE INTEGRITY - ADULT  Goal: Skin Integrity remains intact(Skin Breakdown Prevention)  Description: Assess:  -Perform Douglas assessment every x  -Clean and moisturize skin every x  -Inspect skin when repositioning, toileting, and assisting with ADLS  -Assess under medical devices such as x every x  -Assess extremities for adequate circulation and sensation     Bed Management:  -Have minimal linens on bed & keep smooth, unwrinkled  -Change linens as needed when moist or perspiring  -Avoid sitting or lying in one position for more than x hours while in bed  -Keep HOB at [facility]     Toileting:  -Offer bedside commode  -Assess for incontinence every x  -Use incontinent care products after each incontinent episode such as x    Activity:  -Mobilize patient x times a day  -Encourage activity and walks on unit  -Encourage or provide ROM exercises   -Turn and reposition patient every x Hours  -Use appropriate equipment to lift or move patient in bed  -Instruct/ Assist with weight shifting every x when out of bed in chair  -Consider limitation of chair time x hour intervals    Skin Care:  -Avoid use of baby powder, tape, friction and shearing, hot water or constrictive clothing  -Relieve pressure over bony prominences using x  -Do not massage red bony areas    Next Steps:  -Teach patient strategies to minimize risks such as x   -Consider consults to  interdisciplinary teams such as x  Outcome: Progressing  Goal: Incision(s), wounds(s) or drain site(s) healing without S/S of infection  Description: INTERVENTIONS  - Assess and document dressing, incision, wound bed, drain sites and surrounding tissue  - Provide patient and family education  - Perform skin care/dressing changes every xxxxx  Outcome: Progressing     Problem: HEMATOLOGIC - ADULT  Goal: Maintains hematologic stability  Description: INTERVENTIONS  - Assess for signs and symptoms of bleeding or hemorrhage  - Monitor labs  - Administer supportive blood products/factors as ordered and appropriate  Outcome: Progressing     Problem: MUSCULOSKELETAL - ADULT  Goal: Maintain or return mobility to safest level of function  Description: INTERVENTIONS:  - Assess patient's ability to carry out ADLs; assess patient's baseline for ADL function and identify physical deficits which impact ability to perform ADLs (bathing, care of mouth/teeth, toileting, grooming, dressing, etc )  - Assess/evaluate cause of self-care deficits   - Assess range of motion  - Assess patient's mobility  - Assess patient's need for assistive devices and provide as appropriate  - Encourage maximum independence but intervene and supervise when necessary  - Involve family in performance of ADLs  - Assess for home care needs following discharge   - Consider OT consult to assist with ADL evaluation and planning for discharge  - Provide patient education as appropriate  Outcome: Progressing

## 2023-06-24 NOTE — ASSESSMENT & PLAN NOTE
· Na 129 on admission  · Hyponatremia work-up  · Suspect hypervolemia as etiology as patient examines volume overloaded  · Give Lasix 20 Mg IV x1 and monitor urine output  · Sodium this morning is 133  · On fluid restriction  · On lasix

## 2023-06-24 NOTE — ASSESSMENT & PLAN NOTE
· History of recurrent UTIs, follows with urology and is on prophylactic Macrobid  · Prior growth of ESBL E  coli and Enterococcus faecalis in 2019, however more recent urine cultures only growing pansensitive E  coli  · Admission UA consistent with cystitis, see above  · Jessie Betancourt in setting of acute infection  · Urine cultures showed mixed contaminants  · DC Zosyn

## 2023-06-24 NOTE — ASSESSMENT & PLAN NOTE
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

## 2023-06-24 NOTE — ASSESSMENT & PLAN NOTE
Wt Readings from Last 3 Encounters:   06/24/23 70 2 kg (154 lb 12 2 oz)   06/07/23 68 kg (150 lb)   02/28/23 69 4 kg (153 lb)     · Maintained on HCTZ 12 5 mg daily  · Last echo July 2021: LVEF 50 to 55%  G1 DD  Mild to moderate TR    · Patient examines hypervolemic with 3+ pitting edema to bilateral lower extremities and mild pulmonary vascular congestion on chest x-ray  · Give Lasix 20 Mg IV x1 -trend urine output  · I/O and daily weights  · Sodium and fluid restriction  · Continue Lasix 20 mg daily  · Echocardiogram showed ejection fraction of 60-65 % with grade 1 diastolic dysfunction

## 2023-06-24 NOTE — PROGRESS NOTES
"Mateo Poe  Progress Note  Name: Gila Silva  MRN: [id number]  Unit/Bed#: -01 I Date of Admission: 6/18/2023   Date of Service: 6/24/2023 I Hospital Day: 6    Assessment/Plan   Diarrhea  Assessment & Plan  Patient with diarrhea and stool for C  difficile was sent  Stool for C  difficile is positive but likely colonization  Diarrhea has resolved  On empiric oral vancomycin    Earache  Assessment & Plan  On Corticosporin eardrops    Pulmonary nodules  Assessment & Plan  CT showed-\" Multiple pulmonary nodules with the largest measuring up to 1 cm in the right lower lobe  Cannot exclude metastatic disease  Consider further evaluation with a PET CT study  \"  Outpatient follow-up with pulmonary and outpatient PET scan  Discussed with patient and daughter    Paroxysmal atrial fibrillation [facility])  Assessment & Plan  · Hx of paroxysmal Afib - last EKG with Aifb 12/2020  · Previously on eliquis as of 2021, however this was stopped shortly after per daughter for unclear reasons  · Rate controlled on admission at 96 bpm, EKG on admit showing sinus rhythm    Acute on chronic diastolic heart failure [facility])  Assessment & Plan  Wt Readings from Last 3 Encounters:   06/24/23 70 2 kg (154 lb 12 2 oz)   06/07/23 68 kg (150 lb)   02/28/23 69 4 kg (153 lb)     · Maintained on HCTZ 12 5 mg daily  · Last echo July 2021: LVEF 50 to 55%  G1 DD  Mild to moderate TR    · Patient examines hypervolemic with 3+ pitting edema to bilateral lower extremities and mild pulmonary vascular congestion on chest x-ray  · Give Lasix 20 Mg IV x1 -trend urine output  · I/O and daily weights  · Sodium and fluid restriction  · Continue Lasix 20 mg daily  · Echocardiogram showed ejection fraction of 60-65 % with grade 1 diastolic dysfunction    Generalized weakness  Assessment & Plan  · Presents to the ED with fall while using walker this afternoon  · Trauma work-up negative  · Neuro exam on admission with 4-5 strength in " bilateral lower extremities, 5 out of strength in bilateral upper extremities -no focal weakness    Endorses numbness in bilateral feet (unchanged from baseline neuropathy per patient)  · Suspect that generalized weakness secondary to cystitis  · Patient complained of right-sided headache and repeat CT head was done on June 21 which is unremarkable  · PT recommended rehab    Hyponatremia  Assessment & Plan  · Na 129 on admission  · Hyponatremia work-up  · Suspect hypervolemia as etiology as patient examines volume overloaded  · Give Lasix 20 Mg IV x1 and monitor urine output  · Sodium this morning is 133  · On fluid restriction  · On lasix    History of recurrent UTI (urinary tract infection)  Assessment & Plan  · History of recurrent UTIs, follows with urology and is on prophylactic Macrobid  · Prior growth of ESBL E  coli and Enterococcus faecalis in 2019, however more recent urine cultures only growing pansensitive E  coli  · Admission UA consistent with cystitis, see above  · Hold Macrobid in setting of acute infection  · Urine cultures showed mixed contaminants  · DC Zosyn    Mixed hyperlipidemia  Assessment & Plan  · Continue home pravastatin    Sepsis without acute organ dysfunction ([facility] [address] )  Assessment & Plan  · SIRS criteria met on admission with tachycardia and tachypnea  · Suspected sources cystitis as seen on UA  · No severe sepsis criteria met  · Patient was started on Zosyn  · Blood cultures are negative to date  · Urine culture showed mixed contaminants  · DC Zosyn    Gastroesophageal reflux disease without esophagitis  Assessment & Plan  · Placed on formulary equivalent Protonix    Hypertension  Assessment & Plan  · Home regimen: HCTZ 12 5 mg daily  · HCTZ held to allow for IV diuresis as patient examines hypervolemic  · Continue on Lasix    * Cystitis  Assessment & Plan  · UA with innumerable WBCs and moderate bacteria, negative nitrite  · Of note vulva is erythematous and macerated without discharge "-suspect secondary to urinary incontinence and use of briefs/pads  · Frequent toileting and avoid utilization of diapers while in bed -should have geoff under her  · Barrier cream to labia   On Zosyn due to prior growth of ESBL and Enterococcus faecalis  Completed the course of antibiotic          Labs & Imaging: I have personally reviewed pertinent reports  VTE Prophylaxis: in place  Code Status:   Level 3 - DNAR and DNI    Patient Centered Rounds: I have performed bedside rounds with nursing staff today  Discussions with Specialists or Other Care Team Provider: LINDY    Education and Discussions with Family / Patient: Yo Sotomayor Setting- no response    Current Length of Stay: 6 day(s)    Current Patient Status: Inpatient   Certification Statement: The patient will continue to require additional inpatient hospital stay due to see my assessment and plan  Subjective:   Patient is seen and examined at bedside  Complains of vaginal irritation  Afebrile  All other ROS are negative  Objective:    Vitals: Blood pressure 140/60, pulse 64, temperature (!) 97 3 °F (36 3 °C), temperature source Temporal, resp  rate 17, height 5' 2\" (1 575 m), weight 70 2 kg (154 lb 12 2 oz), SpO2 95 %  ,Body mass index is 28 31 kg/m²  SPO2 RA Rest    Flowsheet Row ED to Hosp-Admission (Current) from 6/18/2023 in [address] Med S[facility] [address]   SpO2 95 %   SpO2 Activity At Rest   O2 Device None (Room air)   O2 Flow Rate --        I&O:     Intake/Output Summary (Last 24 hours) at 6/24/2023 1139  Last data filed at 6/23/2023 1755  Gross per 24 hour   Intake 390 ml   Output --   Net 390 ml       Physical Exam:    General- Alert, lying comfortably in bed  Not in any acute distress  Neck- Supple, No JVD  CVS- regular, S1 and S2 normal  Chest- Bilateral Air entry, No rhochi, crackles or wheezing present    Abdomen- soft, nontender, not distended, no guarding or rigidity, BS+  Extremities-  No pedal edema, No calf " tenderness                         Normal ROM in all extremities  CNS-   Alert, awake and orientedx3  No focal deficits present  Invasive Devices     Peripheral Intravenous Line  Duration           Peripheral IV 06/23/23 Dorsal (posterior); Right Forearm 1 day                      Social History  reviewed  Family History   Problem Relation Age of Onset   • Stroke Mother    • Stroke Father    • Heart disease Daughter    • Substance Abuse Neg Hx    • Mental illness Neg Hx     reviewed    Meds:  Current Facility-Administered Medications   Medication Dose Route Frequency Provider Last Rate Last Admin   • acetaminophen (TYLENOL) tablet 650 mg  650 mg Oral Q6H PRN Margarite Grooms, PA-C   650 mg at 06/24/23 0509   • aluminum-magnesium hydroxide-simethicone (MYLANTA) oral suspension 30 mL  30 mL Oral Q6H PRN Margarite Grooms, PA-C   30 mL at 06/20/23 1746   • artificial tear (LUBRIFRESH P M ) ophthalmic ointment   Both Eyes BID PRN Darrall Mackintosh [place], PA-C       • aspirin (ECOTRIN LOW STRENGTH) EC tablet 81 mg  81 mg Oral Daily Margarite Grooms, PA-C   81 mg at 06/24/23 6796   • cholecalciferol (VITAMIN D3) tablet 1,000 Units  1,000 Units Oral Daily Margarite Grooms, PA-C   1,000 Units at 06/24/23 7698   • cyanocobalamin (VITAMIN B-12) tablet 1,000 mcg  1,000 mcg Oral Daily Margarite Grooms, PA-C   1,000 mcg at 06/24/23 2227   • enoxaparin (LOVENOX) subcutaneous injection 40 mg  40 mg Subcutaneous Daily Margarite Grooms, PA-C   40 mg at 06/24/23 0572   • gabapentin (NEURONTIN) capsule 600 mg  600 mg Oral BID Margarite Grooms, PA-C   600 mg at 06/24/23 8802   • magnesium Oxide (MAG-OX) tablet 400 mg  400 mg Oral BID Humberto Israel MD   400 mg at 06/24/23 4310   • melatonin tablet 6 mg  6 mg Oral HS Margarite Grooms, PA-C   6 mg at 06/23/23 2135   • moisture barrier miconazole 2% cream (aka HÉCTOR MOISTURE BARRIER ANTIFUNGAL CREAM)   Topical BID Humberto Israel MD   Given at 06/23/23 1736   • neomycin-polymyxin-hydrocortisone (CORTISPORIN) 0 35%-10,000 units/mL-1% otic suspension 4 drop  4 drop Right Ear Q6H [address] Demetra Emery MD   4 drop at 06/24/23 0537   • oxyCODONE (ROXICODONE) IR tablet 5 mg  5 mg Oral Q6H PRN Demetra Emery MD   5 mg at 06/24/23 0743   • pantoprazole (PROTONIX) EC tablet 20 mg  20 mg Oral Daily Before Breakfast Terry Jj PA-C   20 mg at 06/24/23 0510   • pravastatin (PRAVACHOL) tablet 20 mg  20 mg Oral After Alexandra JeanIJEOMA   20 mg at 06/23/23 1736   • senna (SENOKOT) tablet 8 6 mg  1 tablet Oral HS PRN eTrry Jj PA-C       • vancomycin (VANCOCIN) capsule 125 mg  125 mg Oral Q12H Claudio Laurent MD          Medications Prior to Admission   Medication   • aspirin (ECOTRIN LOW STRENGTH) 81 mg EC tablet   • Cholecalciferol (VITAMIN D3) 1000 units CAPS   • Cyanocobalamin (VITAMIN B12 PO)   • gabapentin (NEURONTIN) 600 MG tablet   • hydrochlorothiazide (HYDRODIURIL) 12 5 mg tablet   • Mirabegron ER 25 MG TB24   • Multiple Vitamins-Minerals (PRESERVISION AREDS PO)   • nitrofurantoin (MACROBID) 100 mg capsule   • omeprazole (PriLOSEC) 20 mg delayed release capsule   • pravastatin (PRAVACHOL) 20 mg tablet       Labs:  Results from last 7 days   Lab Units 06/24/23  0536 06/23/23  0451 06/22/23  0437   WBC Thousand/uL 13 04* 12 81* 13 22*   HEMOGLOBIN g/dL 10 8* 10 1* 10 0*   HEMATOCRIT % 34 6* 32 0* 30 9*   PLATELETS Thousands/uL 245 252 239   NEUTROS PCT % 68 70 73   LYMPHS PCT % 14 12* 12*   MONOS PCT % 11 12 11   EOS PCT % 6 5 3     Results from last 7 days   Lab Units 06/24/23  0536 06/23/23  0451 06/22/23  0437 06/21/23  0438 06/20/23  0847 06/19/23  1407   POTASSIUM mmol/L 3 9 4 0 3 8   < > 3 6 4 8   CHLORIDE mmol/L 93* 93* 93*   < > 92* 88*   CO2 mmol/L 33* 35* 35*   < > 30 30   BUN mg/dL 20 23 20   < > 12 12   CREATININE mg/dL 0 86 1 07 1 04   < > 0 87 0 94   CALCIUM mg/dL 9 0 9 0 8 8   < > 8 6 9 3   ALK PHOS U/L  --   --  51  --  52 60 ALT U/L  --   --  7  --  6* 9   AST U/L  --   --  12*  --  13 29    < > = values in this interval not displayed  Lab Results   Component Value Date    TROPONINI 0 04 12/06/2020    TROPONINI 0 04 12/05/2020    TROPONINI 0 04 12/05/2020    CKTOTAL 46 12/05/2020    CKTOTAL 112 01/26/2014     Results from last 7 days   Lab Units 06/18/23  1734   INR  1 01     Lab Results   Component Value Date    BLOODCX No Growth After 5 Days  06/18/2023    BLOODCX No Growth After 5 Days  06/18/2023    BLOODCX No Growth After 5 Days  05/08/2022    BLOODCX No Growth After 5 Days  05/08/2022    URINECX 30,000-39,000 cfu/ml 06/18/2023    URINECX >100,000 cfu/ml Escherichia coli (A) 10/22/2019    URINECX >100,000 cfu/ml Escherichia coli (A) 07/05/2019    SPUTUMCULTUR 1+ Growth of Pseudomonas aeruginosa (A) 05/23/2018    SPUTUMCULTUR 1+ Growth of 05/23/2018         Imaging:  Results for orders placed during the hospital encounter of 06/18/23    XR chest portable    Narrative  CHEST    INDICATION:   sob  Thank study thanks Epic    COMPARISON: 6/18/2023  EXAM PERFORMED/VIEWS:  XR CHEST PORTABLE      FINDINGS:    Unchanged cardiac and mediastinal contours with a reidentified large hiatal hernia  The lungs are clear  No pneumothorax or pleural effusion  Osseous structures appear within normal limits for patient age  Impression  No acute cardiopulmonary disease  Workstation performed: [id number]    Results for orders placed during the hospital encounter of 06/22/21    XR chest pa & lateral    Narrative  CHEST    INDICATION:   R60 0: Localized edema  Covid positive on 12/5/2020  COMPARISON: Chest radiograph from 12/5/2020 and chest CT from 11/26/2018  EXAM PERFORMED/VIEWS:  XR CHEST PA & LATERAL  DUAL ENERGY SUBTRACTION  FINDINGS:    Cardiomediastinal silhouette normal  Moderate hiatal hernia  Mild bilateral groundglass opacity  No effusion or pneumothorax      Osseous structures normal for age     Impression  Mild bilateral groundglass opacity, question interstitial edema  Moderate hiatal hernia  Workstation performed: [id number]      Last 24 Hours Medication List:   Current Facility-Administered Medications   Medication Dose Route Frequency Provider Last Rate   • acetaminophen  650 mg Oral Q6H PRN Ashwini [place], IJEOMA     • aluminum-magnesium hydroxide-simethicone  30 mL Oral Q6H PRN Ashwini [place], PA-C     • artificial tear   Both Eyes BID PRN Gisele Midway Dannielle, IJEOMA     • aspirin  81 mg Oral Daily Ashwini [place], PA-C     • cholecalciferol  1,000 Units Oral Daily Ashwini [place], PA-C     • cyanocobalamin  1,000 mcg Oral Daily Ashwini [place], PA-C     • enoxaparin  40 mg Subcutaneous Daily Ashwini [place], IJEOMA     • gabapentin  600 mg Oral BID Ashwini [place], PA-C     • magnesium Oxide  400 mg Oral BID Jose A Robles MD     • melatonin  6 mg Oral HS Ashwini [place], IJEOMA     • HÉCTOR ANTIFUNGAL   Topical BID Jose A Robles MD     • neomycin-polymyxin-hydrocortisone  4 drop Right Ear Q6H Akua Whitman MD     • oxyCODONE  5 mg Oral Q6H PRN Jose A Robles MD     • pantoprazole  20 mg Oral Daily Before Breakfast Ashwini [place], IJEOMA     • pravastatin  20 mg Oral After Britton Johnson PA-C     • senna  1 tablet Oral HS PRN Ashwini [place], IJEOMA     • vancomycin oral  125 mg Oral Q12H Akua Whitman MD          Today, Patient Was Seen By: Jose A Robles MD    ** Please Note: Dictation voice to text software may have been used in the creation of this document   **

## 2023-06-25 VITALS
BODY MASS INDEX: 28.12 KG/M2 | TEMPERATURE: 97.9 F | SYSTOLIC BLOOD PRESSURE: 122 MMHG | WEIGHT: 152.8 LBS | RESPIRATION RATE: 17 BRPM | HEIGHT: 62 IN | HEART RATE: 76 BPM | OXYGEN SATURATION: 90 % | DIASTOLIC BLOOD PRESSURE: 82 MMHG

## 2023-06-25 LAB
ANION GAP SERPL CALCULATED.3IONS-SCNC: 7 MMOL/L
BASOPHILS # BLD AUTO: 0.06 THOUSANDS/ÂΜL (ref 0–0.1)
BASOPHILS NFR BLD AUTO: 1 % (ref 0–1)
BUN SERPL-MCNC: 21 MG/DL (ref 5–25)
CALCIUM SERPL-MCNC: 9.2 MG/DL (ref 8.4–10.2)
CHLORIDE SERPL-SCNC: 93 MMOL/L (ref 96–108)
CO2 SERPL-SCNC: 33 MMOL/L (ref 21–32)
CREAT SERPL-MCNC: 0.91 MG/DL (ref 0.6–1.3)
EOSINOPHIL # BLD AUTO: 0.69 THOUSAND/ÂΜL (ref 0–0.61)
EOSINOPHIL NFR BLD AUTO: 6 % (ref 0–6)
ERYTHROCYTE [DISTWIDTH] IN BLOOD BY AUTOMATED COUNT: 13.2 % (ref 11.6–15.1)
GFR SERPL CREATININE-BSD FRML MDRD: 54 ML/MIN/1.73SQ M
GLUCOSE SERPL-MCNC: 104 MG/DL (ref 65–140)
HCT VFR BLD AUTO: 33.6 % (ref 34.8–46.1)
HGB BLD-MCNC: 10.5 G/DL (ref 11.5–15.4)
IMM GRANULOCYTES # BLD AUTO: 0.05 THOUSAND/UL (ref 0–0.2)
IMM GRANULOCYTES NFR BLD AUTO: 1 % (ref 0–2)
LYMPHOCYTES # BLD AUTO: 1.57 THOUSANDS/ÂΜL (ref 0.6–4.47)
LYMPHOCYTES NFR BLD AUTO: 14 % (ref 14–44)
MCH RBC QN AUTO: 28.2 PG (ref 26.8–34.3)
MCHC RBC AUTO-ENTMCNC: 31.3 G/DL (ref 31.4–37.4)
MCV RBC AUTO: 90 FL (ref 82–98)
MONOCYTES # BLD AUTO: 1.32 THOUSAND/ÂΜL (ref 0.17–1.22)
MONOCYTES NFR BLD AUTO: 12 % (ref 4–12)
NEUTROPHILS # BLD AUTO: 7.41 THOUSANDS/ÂΜL (ref 1.85–7.62)
NEUTS SEG NFR BLD AUTO: 66 % (ref 43–75)
NRBC BLD AUTO-RTO: 0 /100 WBCS
PLATELET # BLD AUTO: 244 THOUSANDS/UL (ref 149–390)
PMV BLD AUTO: 8.6 FL (ref 8.9–12.7)
POTASSIUM SERPL-SCNC: 4.2 MMOL/L (ref 3.5–5.3)
RBC # BLD AUTO: 3.73 MILLION/UL (ref 3.81–5.12)
SODIUM SERPL-SCNC: 133 MMOL/L (ref 135–147)
WBC # BLD AUTO: 11.1 THOUSAND/UL (ref 4.31–10.16)

## 2023-06-25 PROCEDURE — 80048 BASIC METABOLIC PNL TOTAL CA: CPT | Performed by: INTERNAL MEDICINE

## 2023-06-25 PROCEDURE — 99239 HOSP IP/OBS DSCHRG MGMT >30: CPT | Performed by: INTERNAL MEDICINE

## 2023-06-25 PROCEDURE — 85025 COMPLETE CBC W/AUTO DIFF WBC: CPT | Performed by: INTERNAL MEDICINE

## 2023-06-25 RX ORDER — NEOMYCIN SULFATE, POLYMYXIN B SULFATE AND HYDROCORTISONE 10; 3.5; 1 MG/ML; MG/ML; [USP'U]/ML
4 SUSPENSION/ DROPS AURICULAR (OTIC) EVERY 6 HOURS SCHEDULED
Qty: 10 ML | Refills: 0
Start: 2023-06-25 | End: 2023-06-30

## 2023-06-25 RX ORDER — FUROSEMIDE 20 MG/1
20 TABLET ORAL DAILY
Qty: 30 TABLET | Refills: 0
Start: 2023-06-25 | End: 2023-07-25

## 2023-06-25 RX ORDER — VANCOMYCIN HYDROCHLORIDE 125 MG/1
125 CAPSULE ORAL EVERY 12 HOURS SCHEDULED
Qty: 14 CAPSULE | Refills: 0
Start: 2023-06-25 | End: 2023-07-02

## 2023-06-25 RX ADMIN — ASPIRIN 81 MG: 81 TABLET, COATED ORAL at 09:28

## 2023-06-25 RX ADMIN — PANTOPRAZOLE SODIUM 20 MG: 20 TABLET, DELAYED RELEASE ORAL at 05:33

## 2023-06-25 RX ADMIN — Medication 1000 UNITS: at 09:28

## 2023-06-25 RX ADMIN — VANCOMYCIN HYDROCHLORIDE 125 MG: 125 CAPSULE ORAL at 09:28

## 2023-06-25 RX ADMIN — MAGNESIUM OXIDE TAB 400 MG (241.3 MG ELEMENTAL MG) 400 MG: 400 (241.3 MG) TAB at 09:28

## 2023-06-25 RX ADMIN — NEOMYCIN SULFATE, POLYMYXIN B SULFATE AND HYDROCORTISONE 4 DROP: 10; 3.5; 1 SUSPENSION/ DROPS AURICULAR (OTIC) at 01:00

## 2023-06-25 RX ADMIN — GABAPENTIN 600 MG: 300 CAPSULE ORAL at 09:27

## 2023-06-25 RX ADMIN — MICONAZOLE NITRATE: 20 CREAM TOPICAL at 09:30

## 2023-06-25 RX ADMIN — NEOMYCIN SULFATE, POLYMYXIN B SULFATE AND HYDROCORTISONE 4 DROP: 10; 3.5; 1 SUSPENSION/ DROPS AURICULAR (OTIC) at 05:33

## 2023-06-25 RX ADMIN — ENOXAPARIN SODIUM 40 MG: 100 INJECTION SUBCUTANEOUS at 09:28

## 2023-06-25 RX ADMIN — CYANOCOBALAMIN TAB 500 MCG 1000 MCG: 500 TAB at 09:27

## 2023-06-25 NOTE — ASSESSMENT & PLAN NOTE
· SIRS criteria met on admission with tachycardia and tachypnea  · Suspected sources cystitis as seen on UA  · No severe sepsis criteria met  · Patient was started on Zosyn  · Blood cultures are negative to date  · Urine culture showed mixed contaminants  · Completed the course of Zosyn

## 2023-06-25 NOTE — ASSESSMENT & PLAN NOTE
· Presents to the ED with fall while using walker this afternoon  · Trauma work-up negative  · Neuro exam on admission with 4-5 strength in bilateral lower extremities, 5 out of strength in bilateral upper extremities -no focal weakness  Endorses numbness in bilateral feet (unchanged from baseline neuropathy per patient)  · Suspect that generalized weakness secondary to cystitis  · Patient complained of right-sided headache and repeat CT head was done on June 21 which is unremarkable  · PT recommended rehab   Patient will be discharged to [facility] nursing facility for rehab

## 2023-06-25 NOTE — ASSESSMENT & PLAN NOTE
· Na 129 on admission  · Hyponatremia work-up  · Suspect hypervolemia as etiology as patient examines volume overloaded  · Give Lasix 20 Mg IV x1 and monitor urine output  · Sodium this morning is 133  · On fluid restriction  · On lasix  · Outpatient repeat BMP in 1 week

## 2023-06-25 NOTE — CASE MANAGEMENT
Case Management Discharge Planning Note    Patient name Danielle Rothman  Location /-57 MRN [id number]  : 1930 Date 2023       Current Admission Date: 2023  Current Admission Diagnosis:Cystitis   Patient Active Problem List    Diagnosis Date Noted   • Earache 2023   • Diarrhea 2023   • Pulmonary nodules 2023   • Cystitis 2023   • Paroxysmal atrial fibrillation ([facility] [address] ) 2022   • Generalized weakness 2022   • Acute on chronic diastolic heart failure ([facility] [address] ) 2022   • Hyponatremia 2020   • Acute pain of left shoulder 2020   • History of recurrent UTI (urinary tract infection) 2020   • Change in mental status    • 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 eye with active choroidal neovascularization ([facility] [address] ) 2019   • Fall 2018   • [facility] 2018   • Neuropathy 2016   • Vitamin B 12 deficiency 2016   • Venous insufficiency 2016   • Hypertension 2015   • Gastroesophageal reflux disease without esophagitis 2015   • Arthropathy 2015   • Mixed hyperlipidemia 2015      LOS (days): 7  Geometric Mean LOS (GMLOS) (days): 5 00  Days to GMLOS:-1 7     OBJECTIVE:  Risk of Unplanned Readmission Score: 14 07      Current admission status: Inpatient   Preferred Pharmacy:   [facility] [address] Pretty  [facility], [address] - [address]  [address]    [facility] [address] [address]  Phone: [phone] Fax: [phone]    Primary Care Provider: Baron Lexx MD    Primary Insurance: MEDICARE  Secondary Insurance:     DISCHARGE DETAILS:    Discharge planning discussed with[de-identified] pt, her children  Freedom of Choice: Yes     CM contacted family/caregiver?: Yes  Were Treatment Team discharge recommendations reviewed with patient/caregiver?: Yes  Did patient/caregiver verbalize understanding of patient care needs?: Yes  Were patient/caregiver advised of the risks associated with not following Treatment Team discharge recommendations?: Yes    Contacts  Patient Contacts: Cortez Recio (Child) [phone] (Mobile)  Relationship to Patient[de-identified] Family  Contact Method: Phone  Reason/Outcome: Discharge [address]         Is the patient interested in Evan Fang at discharge?: No    DME Referral Provided  Referral made for DME?: No    Other Referral/Resources/Interventions Provided:  Interventions: Short Term Rehab    Treatment Team Recommendation: Short Term Rehab  Discharge Destination Plan[de-identified] Short Term Rehab  Transport at Discharge : BLS Ambulance  Dispatcher Contacted: Yes  Number/Name of Dispatcher: Urszula Garland by Jocelyn and Unit #): [facility]  ETA of Transport (Date): 06/25/23  ETA of Transport (Time): 1400     Transfer Mode: Stretcher  Accompanied by: EMS personnel  Transfer Equipment: BLS devices     Additional Comments: Pt is stable for discharge and will d/c to [address] [facility]: [phone] F: [phone]  Pt will be transported via BLS [facility] transport at 2:00 pm  Pt and her dtr are aware      Accepting Facility Name, [facility] : [facility]  Receiving Facility/Agency Phone Number: P: [phone]  Facility/Agency Fax Number: F: [phone]

## 2023-06-25 NOTE — INCIDENTAL FINDINGS
"The following findings require follow up:  Radiographic finding   Finding: CT showed-\" Multiple pulmonary nodules with the largest measuring up to 1 cm in the right lower lobe  Cannot exclude metastatic disease  Consider further evaluation with a PET CT study  \"   Follow up required: PET CT scan in 2 weeks   Follow up should be done within 2 week(s)    Please notify the following clinician to assist with the follow up:   Dr ATKINSON  "

## 2023-06-25 NOTE — PLAN OF CARE
Problem: Potential for Falls  Goal: Patient will remain free of falls  Description: INTERVENTIONS:  - Educate patient/family on patient safety including physical limitations  - Instruct patient to call for assistance with activity   - Consult OT/PT to assist with strengthening/mobility   - Keep Call bell within reach  - Keep bed low and locked with side rails adjusted as appropriate  - Keep care items and personal belongings within reach  - Initiate and maintain comfort rounds  - Make Fall Risk Sign visible to staff  - Offer Toileting every 2 Hours, in advance of need  - Initiate/Maintain bed alarm  - Obtain necessary fall risk management equipment: bed alarm and call bell usage  - Apply yellow socks and bracelet for high fall risk patients  - Consider moving patient to room near nurses station  Outcome: Progressing     Problem: MOBILITY - ADULT  Goal: Maintain or return to baseline ADL function  Description: INTERVENTIONS:  -  Assess patient's ability to carry out ADLs; assess patient's baseline for ADL function and identify physical deficits which impact ability to perform ADLs (bathing, care of mouth/teeth, toileting, grooming, dressing, etc )  - Assess/evaluate cause of self-care deficits   - Assess range of motion  - Assess patient's mobility; develop plan if impaired  - Assess patient's need for assistive devices and provide as appropriate  - Encourage maximum independence but intervene and supervise when necessary  - Involve family in performance of ADLs  - Assess for home care needs following discharge   - Consider OT consult to assist with ADL evaluation and planning for discharge  - Provide patient education as appropriate  Outcome: Progressing  Goal: Maintains/Returns to pre admission functional level  Description: INTERVENTIONS:  - Perform BMAT or MOVE assessment daily    - Set and communicate daily mobility goal to care team and patient/family/caregiver     - Collaborate with rehabilitation services on mobility goals if consulted  - Perform Range of Motion 3 times a day  - Reposition patient every 2 hours  - Dangle patient 3 times a day  - Stand patient 3 times a day  - Ambulate patient 3 times a day  - Out of bed to chair 3 times a day   - Out of bed for meals 3 times a day  - Out of bed for toileting  - Record patient progress and toleration of activity level   Outcome: Progressing     Problem: PAIN - ADULT  Goal: Verbalizes/displays adequate comfort level or baseline comfort level  Description: Interventions:  - Encourage patient to monitor pain and request assistance  - Assess pain using appropriate pain scale  - Administer analgesics based on type and severity of pain and evaluate response  - Implement non-pharmacological measures as appropriate and evaluate response  - Consider cultural and social influences on pain and pain management  - Notify physician/advanced practitioner if interventions unsuccessful or patient reports new pain  Outcome: Progressing     Problem: INFECTION - ADULT  Goal: Absence or prevention of progression during hospitalization  Description: INTERVENTIONS:  - Assess and monitor for signs and symptoms of infection  - Monitor lab/diagnostic results  - Monitor all insertion sites, i e  indwelling lines, tubes, and drains  - Monitor endotracheal if appropriate and nasal secretions for changes in amount and color  - [place] appropriate cooling/warming therapies per order  - Administer medications as ordered  - Instruct and encourage patient and family to use good hand hygiene technique  - Identify and instruct in appropriate isolation precautions for identified infection/condition  Outcome: Progressing     Problem: Nutrition/Hydration-ADULT  Goal: Nutrient/Hydration intake appropriate for improving, restoring or maintaining nutritional needs  Description: Monitor and assess patient's nutrition/hydration status for malnutrition   Collaborate with interdisciplinary team and initiate plan and interventions as ordered  Monitor patient's weight and dietary intake as ordered or per policy  Utilize nutrition screening tool and intervene as necessary  Determine patient's food preferences and provide high-protein, high-caloric foods as appropriate       INTERVENTIONS:  - Monitor oral intake, urinary output, labs, and treatment plans  - Assess nutrition and hydration status and recommend course of action  - Evaluate amount of meals eaten  - Assist patient with eating if necessary   - Allow adequate time for meals  - Recommend/ encourage appropriate diets, oral nutritional supplements, and vitamin/mineral supplements  - Order, calculate, and assess calorie counts as needed  - Recommend, monitor, and adjust tube feedings and TPN/PPN based on assessed needs  - Assess need for intravenous fluids  - Provide specific nutrition/hydration education as appropriate  - Include patient/family/caregiver in decisions related to nutrition  Outcome: Progressing

## 2023-06-25 NOTE — ASSESSMENT & PLAN NOTE
· History of recurrent UTIs, follows with urology and is on prophylactic Macrobid  · Prior growth of ESBL E  coli and Enterococcus faecalis in 2019, however more recent urine cultures only growing pansensitive E  coli  · Admission UA consistent with cystitis, see above  · Zay Tovar in setting of acute infection  · Urine cultures showed mixed contaminants  · DC Zosyn

## 2023-06-25 NOTE — NURSING NOTE
[facility] from [place] called asking about report  Report given and information related to the patient's reason for being here, treatments performed and medication details were all provided  Status and orientation of patient leaving our facuilty was stated and received by the nurse  Nurse ended report with all questions answered

## 2023-06-25 NOTE — DISCHARGE INSTR - AVS FIRST PAGE
Follow-up with PCP in 1 week  Repeat BMP in 1 week as outpatient  Outpatient PET CT to evaluate pulmonary nodules with PCP

## 2023-06-25 NOTE — ASSESSMENT & PLAN NOTE
Wt Readings from Last 3 Encounters:   06/25/23 69 3 kg (152 lb 12 8 oz)   06/07/23 68 kg (150 lb)   02/28/23 69 4 kg (153 lb)     · Maintained on HCTZ 12 5 mg daily  · Last echo July 2021: LVEF 50 to 55%  G1 DD  Mild to moderate TR    · Patient examines hypervolemic with 3+ pitting edema to bilateral lower extremities and mild pulmonary vascular congestion on chest x-ray  · Give Lasix 20 Mg IV x1 -trend urine output  · I/O and daily weights  · Sodium and fluid restriction  · Continue Lasix 20 mg daily  · Echocardiogram showed ejection fraction of 60-65 % with grade 1 diastolic dysfunction

## 2023-06-25 NOTE — DISCHARGE SUMMARY
"[place]  Discharge- Pablo Doty 1/28/1930, 80 y o  female MRN: [id number]  Unit/Bed#: -01 Encounter: [id number]  Primary Care Provider: Star Abad MD   Date and time admitted to hospital: 6/18/2023  5:24 PM    Diarrhea  Assessment & Plan  Patient with diarrhea and stool for C  difficile was sent  Stool for C  difficile is positive but likely colonization  Diarrhea has resolved  On empiric oral vancomycin    Earache  Assessment & Plan  On Corticosporin eardrops    Pulmonary nodules  Assessment & Plan  CT showed-\" Multiple pulmonary nodules with the largest measuring up to 1 cm in the right lower lobe  Cannot exclude metastatic disease  Consider further evaluation with a PET CT study  \"  Outpatient follow-up with pulmonary and outpatient PET scan  Discussed with patient and daughter    Paroxysmal atrial fibrillation [facility])  Assessment & Plan  · Hx of paroxysmal Afib - last EKG with Aifb 12/2020  · Previously on eliquis as of 2021, however this was stopped shortly after per daughter for unclear reasons  · Rate controlled on admission at 96 bpm, EKG on admit showing sinus rhythm    Acute on chronic diastolic heart failure [facility])  Assessment & Plan  Wt Readings from Last 3 Encounters:   06/25/23 69 3 kg (152 lb 12 8 oz)   06/07/23 68 kg (150 lb)   02/28/23 69 4 kg (153 lb)     · Maintained on HCTZ 12 5 mg daily  · Last echo July 2021: LVEF 50 to 55%  G1 DD  Mild to moderate TR    · Patient examines hypervolemic with 3+ pitting edema to bilateral lower extremities and mild pulmonary vascular congestion on chest x-ray  · Give Lasix 20 Mg IV x1 -trend urine output  · I/O and daily weights  · Sodium and fluid restriction  · Continue Lasix 20 mg daily  · Echocardiogram showed ejection fraction of 60-65 % with grade 1 diastolic dysfunction    Generalized weakness  Assessment & Plan  · Presents to the ED with fall while using walker this afternoon  · Trauma work-up negative  · Neuro exam " on admission with 4-5 strength in bilateral lower extremities, 5 out of strength in bilateral upper extremities -no focal weakness  Endorses numbness in bilateral feet (unchanged from baseline neuropathy per patient)  · Suspect that generalized weakness secondary to cystitis  · Patient complained of right-sided headache and repeat CT head was done on June 21 which is unremarkable  · PT recommended rehab   Patient will be discharged to [facility] for rehab    Hyponatremia  Assessment & Plan  · Na 129 on admission  · Hyponatremia work-up  · Suspect hypervolemia as etiology as patient examines volume overloaded  · Give Lasix 20 Mg IV x1 and monitor urine output  · Sodium this morning is 133  · On fluid restriction  · On lasix  · Outpatient repeat BMP in 1 week    History of recurrent UTI (urinary tract infection)  Assessment & Plan  · History of recurrent UTIs, follows with urology and is on prophylactic Macrobid  · Prior growth of ESBL E  coli and Enterococcus faecalis in 2019, however more recent urine cultures only growing pansensitive E  coli  · Admission UA consistent with cystitis, see above  · Hold Macrobid in setting of acute infection  · Urine cultures showed mixed contaminants  · DC Zosyn    Mixed hyperlipidemia  Assessment & Plan  · Continue home pravastatin    Sepsis without acute organ dysfunction ([facility] [address] )  Assessment & Plan  · SIRS criteria met on admission with tachycardia and tachypnea  · Suspected sources cystitis as seen on UA  · No severe sepsis criteria met  · Patient was started on Zosyn  · Blood cultures are negative to date  · Urine culture showed mixed contaminants  · Completed the course of Zosyn    Gastroesophageal reflux disease without esophagitis  Assessment & Plan  · Placed on formulary equivalent Protonix    Hypertension  Assessment & Plan  · Home regimen: HCTZ 12 5 mg daily  · HCTZ held to allow for IV diuresis as patient examines hypervolemic  · Continue on Lasix    * Cystitis  Assessment & Plan  · UA with innumerable WBCs and moderate bacteria, negative nitrite  · Of note vulva is erythematous and macerated without discharge -suspect secondary to urinary incontinence and use of briefs/pads  · Frequent toileting and avoid utilization of diapers while in bed -should have geoff under her  · Barrier cream to labia   On Zosyn due to prior growth of ESBL and Enterococcus faecalis  Completed the course of antibiotic        Hospital Course:     Tono Maier is a 80 y o  female patient who originally presented to the hospital on   Admission Orders (From admission, onward)     Ordered        06/18/23 [address] [employer] [address]  Once                     due to dysuria and generalized weakness  Patient was admitted with sepsis secondary to UTI, hyponatremia  She was started on Zosyn  Patient was found to have acute on chronic diastolic heart failure and was started on Lasix 20 mg IV twice daily  Patient was placed on fluid restriction  Echocardiogram was done  Patient sodium improved to 133 -134  Patient completed the course of Zosyn  Urine culture showed mixed contaminants  Patient had diarrhea and stool for C  difficile showed positive by PCR but toxin's are negative  Patient is likely a colonizer with no active C  difficile infection  Patient was started on prophylactic oral vancomycin in setting of antibiotics  Patient was seen by physical therapy and  will recommended rehab  Patient is back to baseline and will be discharged to rehab  Patient will be discharged to [facility] nursing facility  Discussed with patient and left voicemail for daughter regarding discharge  On Exam-  Chest-bilateral air entry, clear to auscultation  Abdomen-soft, nontender  Heart-S1 S2 regular  Extremities-no pedal edema or calf tenderness  Neuro-alert awake oriented x3  No focal deficits    Please see above list of diagnoses and related plan for additional information     Follow-up with PCP in 1 week  Repeat BMP in 1 week as outpatient    Condition at Discharge:  good      Discharge instructions/Information to patient and family:   See after visit summary for information provided to patient and family  Provisions for Follow-Up Care:  See after visit summary for information related to follow-up care and any pertinent home health orders  Disposition:     Other [place] [facility] for rehab       Discharge Statement:  I spent 40 minutes discharging the patient  This time was spent on the day of discharge  I had direct contact with the patient on the day of discharge  Greater than 50% of the total time was spent examining patient, answering all patient questions, arranging and discussing plan of care with patient as well as directly providing post-discharge instructions  Additional time then spent on discharge activities  Discharge Medications:  See after visit summary for reconciled discharge medications provided to patient and family        ** Please Note: This note has been constructed using a voice recognition system **

## 2023-06-27 ENCOUNTER — PATIENT OUTREACH (OUTPATIENT)
Dept: CASE MANAGEMENT | Facility: OTHER | Age: 88
End: 2023-06-27

## 2023-06-27 NOTE — PROGRESS NOTES
Outpatient Care Management SHERLYN/SNF Pathway  Discharged 6/25/23 from [facility] to [facility]  Email sent to facility to inform them the patient is on the SHERLYN Pathway and I will be following them during their skilled stay  This Admin Coordinator will continue to monitor via chart review

## 2023-06-29 ENCOUNTER — PATIENT OUTREACH (OUTPATIENT)
Dept: CASE MANAGEMENT | Facility: OTHER | Age: 88
End: 2023-06-29

## 2023-06-29 NOTE — PROGRESS NOTES
Chart review completed  Email sent to facility requesting update on patient  This Admin Coordinator will continue to monitor via chart review throughout episode  Update obtained from realtime the patient is currently admitted to the SNF and is receiving skilled care  Update received the patient is currently at [facility] SNF receiving skilled care no LCD at this time

## 2023-07-06 ENCOUNTER — PATIENT OUTREACH (OUTPATIENT)
Dept: CASE MANAGEMENT | Facility: OTHER | Age: 88
End: 2023-07-06

## 2023-07-06 NOTE — PROGRESS NOTES
Chart review completed. Email sent to facility requesting update on patient. This Admin Coordinator will continue to monitor via chart review throughout episode. Update obtained from realtime the patient is currently admitted to the SNF and is receiving skilled care.

## 2023-07-07 ENCOUNTER — HOME HEALTH ADMISSION (OUTPATIENT)
Dept: HOME HEALTH SERVICES | Facility: HOME HEALTHCARE | Age: 88
End: 2023-07-07
Payer: MEDICARE

## 2023-07-11 ENCOUNTER — PATIENT OUTREACH (OUTPATIENT)
Dept: CASE MANAGEMENT | Facility: OTHER | Age: 88
End: 2023-07-11

## 2023-07-11 DIAGNOSIS — N17.9 AKI (ACUTE KIDNEY INJURY) (HCC): Primary | ICD-10-CM

## 2023-07-11 NOTE — PROGRESS NOTES
Update obtained from realtime the patient is discharging today 7/11/23 to Home with [facility]. A email was sent to the facility requesting discharge instructions. When CM assistant has received the Discharge paperwork CM assistant will attach to this encounter. I have removed myself off of the care team, added the CM to the care team who will follow the patient through the episode, sent the care manager an inbasket notifying them of the SHERLYN/SNF Pathway. Ambulatory referral placed for complex care management. Discharged paperwork attached to this encounter.

## 2023-07-12 ENCOUNTER — TRANSITIONAL CARE MANAGEMENT (OUTPATIENT)
Dept: FAMILY MEDICINE CLINIC | Facility: CLINIC | Age: 88
End: 2023-07-12

## 2023-07-12 ENCOUNTER — PATIENT OUTREACH (OUTPATIENT)
Dept: CASE MANAGEMENT | Facility: OTHER | Age: 88
End: 2023-07-12

## 2023-07-12 ENCOUNTER — HOME CARE VISIT (OUTPATIENT)
Dept: HOME HEALTH SERVICES | Facility: HOME HEALTHCARE | Age: 88
End: 2023-07-12
Payer: MEDICARE

## 2023-07-12 VITALS
WEIGHT: 147 LBS | HEIGHT: 61 IN | HEART RATE: 72 BPM | RESPIRATION RATE: 20 BRPM | TEMPERATURE: 97.7 F | SYSTOLIC BLOOD PRESSURE: 150 MMHG | DIASTOLIC BLOOD PRESSURE: 64 MMHG | BODY MASS INDEX: 27.75 KG/M2 | OXYGEN SATURATION: 98 %

## 2023-07-12 DIAGNOSIS — G60.9 IDIOPATHIC PERIPHERAL NEUROPATHY: ICD-10-CM

## 2023-07-12 DIAGNOSIS — Z87.440 HISTORY OF RECURRENT UTI (URINARY TRACT INFECTION): Primary | ICD-10-CM

## 2023-07-12 PROCEDURE — G0299 HHS/HOSPICE OF RN EA 15 MIN: HCPCS

## 2023-07-12 PROCEDURE — 400013 VN SOC

## 2023-07-12 PROCEDURE — 10330081 VN NO-PAY CLAIM PROCEDURE

## 2023-07-12 RX ORDER — GABAPENTIN 600 MG/1
TABLET ORAL
Qty: 180 TABLET | Refills: 0 | Status: SHIPPED | OUTPATIENT
Start: 2023-07-12

## 2023-07-12 RX ORDER — ATORVASTATIN CALCIUM 10 MG/1
10 TABLET, FILM COATED ORAL DAILY
COMMUNITY
End: 2023-07-18 | Stop reason: SDUPTHER

## 2023-07-12 NOTE — PROGRESS NOTES
Outreach TC to patient for initial care management assessment. I spoke with patient's daughter, Jacky Benson. CG reports that patient is feeling well. CG reports that patient is not exhibiting any s/sx of  any s/sx of complications related to recent hospital stay including dizziness, weakness, diarrhea, falls or pain. CG reports that patient is independent with ADL's. Patient is dependent with IADLs. Patient resides with daughter, Jacky Benson  who assists as needed. Patient did not complete a WHITNEY appointment. CG encouraged to make all follow up appointments appointment. Reviewed medications including dose, schedule, purpose & side effects of  furosemide, nitrofurantoin, Mirabergron, gabapentin, omeprazole, atorvastatin, low dose ASA  with patient. Patient verbalizes understanding. Reviewed future appointments, s/sx to report and how/when to report symptoms to provider vs. 911. CG verbalizes understanding. CG educated on role of CM, contact information for CM. Agrees to additional calls.

## 2023-07-12 NOTE — LETTER
Date: 07/12/23    Dear José Antonio Salazar,   My name is Franky Cam RN, BSN; I am a registered nurse care manager working with [address] [place] [address],[address] [place] [phone]  [phone] management message line     I have been able to reach you , I spoke with Rian Aggarwal on 7/12/23 and would like to set a time next call will be 7/21/23 so that I can talk with you over the phone. My work is to help patients that have complex medical conditions get the care they need. This includes patients who may have been in the hospital or emergency room. I have enclosed information for you. Please call me with any questions you may have. I look forward to meeting with you.   Sincerely,  Franky Cam RN, BSN  [phone]- desk number  Outpatient Care Manager  Copy:  (primary care physician name and address)

## 2023-07-13 ENCOUNTER — HOME CARE VISIT (OUTPATIENT)
Dept: HOME HEALTH SERVICES | Facility: HOME HEALTHCARE | Age: 88
End: 2023-07-13
Payer: MEDICARE

## 2023-07-13 VITALS — OXYGEN SATURATION: 96 % | SYSTOLIC BLOOD PRESSURE: 130 MMHG | DIASTOLIC BLOOD PRESSURE: 64 MMHG | HEART RATE: 70 BPM

## 2023-07-13 PROCEDURE — G0152 HHCP-SERV OF OT,EA 15 MIN: HCPCS

## 2023-07-13 RX ORDER — GABAPENTIN 600 MG/1
600 TABLET ORAL 2 TIMES DAILY
Qty: 180 TABLET | Refills: 0 | OUTPATIENT
Start: 2023-07-13

## 2023-07-17 ENCOUNTER — APPOINTMENT (OUTPATIENT)
Dept: LAB | Facility: HOSPITAL | Age: 88
End: 2023-07-17
Payer: MEDICARE

## 2023-07-17 ENCOUNTER — HOME CARE VISIT (OUTPATIENT)
Dept: HOME HEALTH SERVICES | Facility: HOME HEALTHCARE | Age: 88
End: 2023-07-17
Payer: MEDICARE

## 2023-07-17 VITALS
DIASTOLIC BLOOD PRESSURE: 68 MMHG | TEMPERATURE: 97.8 F | SYSTOLIC BLOOD PRESSURE: 122 MMHG | RESPIRATION RATE: 16 BRPM | OXYGEN SATURATION: 96 % | HEART RATE: 73 BPM

## 2023-07-17 DIAGNOSIS — Z87.440 HISTORY OF RECURRENT UTI (URINARY TRACT INFECTION): ICD-10-CM

## 2023-07-17 LAB
ANION GAP SERPL CALCULATED.3IONS-SCNC: 2 MMOL/L
BUN SERPL-MCNC: 15 MG/DL (ref 5–25)
CALCIUM SERPL-MCNC: 9.1 MG/DL (ref 8.3–10.1)
CHLORIDE SERPL-SCNC: 104 MMOL/L (ref 96–108)
CO2 SERPL-SCNC: 32 MMOL/L (ref 21–32)
CREAT SERPL-MCNC: 1.05 MG/DL (ref 0.6–1.3)
GFR SERPL CREATININE-BSD FRML MDRD: 45 ML/MIN/1.73SQ M
GLUCOSE SERPL-MCNC: 89 MG/DL (ref 65–140)
POTASSIUM SERPL-SCNC: 4.2 MMOL/L (ref 3.5–5.3)
SODIUM SERPL-SCNC: 138 MMOL/L (ref 135–147)

## 2023-07-17 PROCEDURE — G0151 HHCP-SERV OF PT,EA 15 MIN: HCPCS

## 2023-07-17 PROCEDURE — G0299 HHS/HOSPICE OF RN EA 15 MIN: HCPCS

## 2023-07-17 PROCEDURE — G0321 AUDIO-ONLY HHS: HCPCS

## 2023-07-17 PROCEDURE — 80048 BASIC METABOLIC PNL TOTAL CA: CPT

## 2023-07-17 PROCEDURE — 36415 COLL VENOUS BLD VENIPUNCTURE: CPT

## 2023-07-17 PROCEDURE — G0180 MD CERTIFICATION HHA PATIENT: HCPCS | Performed by: FAMILY MEDICINE

## 2023-07-17 PROCEDURE — G0152 HHCP-SERV OF OT,EA 15 MIN: HCPCS

## 2023-07-17 NOTE — CASE COMMUNICATION
Telehealth phonecall to follow up on patient status and offer education. Spoke with daughter and she reports patient is doing well. She is scheduled to see her PCP tomorrow. Instructed on HF disease process, importance of maintaining below 2gm NA and how to read nutrition labels. Instructed on fluid restriction and what is considered a fluid. Per dgtr they are maintaining <50 oz fluids daily. Recommended encouraging patient to intake fl uid amounts close to 50 oz to decrease risk for dehydration and UTI. Educated on ss of UTI and prevention of same. Daughter ordered a scale and it should arrive on 7/19. Instructed how to obtain accurate weights and to notify the doctor of weight gain >3lbs/day or >5lbs/week. Discussed management techniques for SOB and referred to pg 42 in [facility] [place] booklet. Dgtr taught back uses and frequency of Lasix and mirabegron. Notified to call [address] 2 4/7 phone number for any issues or questions.

## 2023-07-18 ENCOUNTER — OFFICE VISIT (OUTPATIENT)
Dept: FAMILY MEDICINE CLINIC | Facility: CLINIC | Age: 88
End: 2023-07-18
Payer: MEDICARE

## 2023-07-18 VITALS
OXYGEN SATURATION: 96 % | TEMPERATURE: 97 F | BODY MASS INDEX: 26.83 KG/M2 | DIASTOLIC BLOOD PRESSURE: 58 MMHG | SYSTOLIC BLOOD PRESSURE: 120 MMHG | HEART RATE: 81 BPM | WEIGHT: 142 LBS

## 2023-07-18 DIAGNOSIS — R30.0 DYSURIA: ICD-10-CM

## 2023-07-18 DIAGNOSIS — K21.9 GASTROESOPHAGEAL REFLUX DISEASE: ICD-10-CM

## 2023-07-18 DIAGNOSIS — E78.2 MIXED HYPERLIPIDEMIA: Primary | ICD-10-CM

## 2023-07-18 DIAGNOSIS — I10 PRIMARY HYPERTENSION: ICD-10-CM

## 2023-07-18 DIAGNOSIS — I50.33 ACUTE ON CHRONIC DIASTOLIC HEART FAILURE (HCC): ICD-10-CM

## 2023-07-18 DIAGNOSIS — N18.31 STAGE 3A CHRONIC KIDNEY DISEASE (HCC): ICD-10-CM

## 2023-07-18 PROCEDURE — 99495 TRANSJ CARE MGMT MOD F2F 14D: CPT | Performed by: FAMILY MEDICINE

## 2023-07-18 RX ORDER — FUROSEMIDE 20 MG/1
20 TABLET ORAL DAILY
Qty: 90 TABLET | Refills: 1 | Status: SHIPPED | OUTPATIENT
Start: 2023-07-18

## 2023-07-18 RX ORDER — OMEPRAZOLE 20 MG/1
20 CAPSULE, DELAYED RELEASE ORAL DAILY
Qty: 90 CAPSULE | Refills: 1 | Status: SHIPPED | OUTPATIENT
Start: 2023-07-18

## 2023-07-18 RX ORDER — ATORVASTATIN CALCIUM 10 MG/1
10 TABLET, FILM COATED ORAL DAILY
Qty: 90 TABLET | Refills: 1 | Status: SHIPPED | OUTPATIENT
Start: 2023-07-18

## 2023-07-18 NOTE — PROGRESS NOTES
[facility]        NAME: Francisco Ceron is a 80 y.o. female  : 1930    MRN: [id number]  DATE: 2023  TIME: 3:34 PM    Assessment and Plan   Mixed hyperlipidemia [E78.2]  1. Mixed hyperlipidemia  furosemide (LASIX) 20 mg tablet      2. Gastroesophageal reflux disease  omeprazole (PriLOSEC) 20 mg delayed release capsule      3. Primary hypertension  atorvastatin (LIPITOR) 10 mg tablet      4. Acute on chronic diastolic heart failure (HCC)  atorvastatin (LIPITOR) 10 mg tablet      5. Stage 3a chronic kidney disease ([address])        6. Dysuria  Urine culture          No problem-specific Assessment & Plan notes found for this encounter. Patient Instructions     Patient Instructions   Continue current medications. Send urine for culture either through [facility] or bring back here to send to hospital lab. Monitor weight at home. Chief Complaint     Chief Complaint   Patient presents with   • Transition of Care Management     tcm         History of Present Illness       TCM Call     Date and time call was made  2023  3:17 PM    Hospital care reviewed  Records reviewed    Patient was hospitialized at  [address] [place] [address]; Other (comment)    Comment  [facility]    Date of Admission  23  admitted ro rehab 2023    Date of discharge  23  discharged from hospital to rehab 2023      Diagnosis  Cystitis    Disposition  Home; Rehabilitation center; Home health services    Were the patients medications reviewed and updated  Yes    Current Symptoms  None  burning when urinating      TCM Call     Post hospital issues  None    Should patient be enrolled in anticoag monitoring? No    Scheduled for follow up?   Yes    Did you obtain your prescribed medications  Yes    Do you need help managing your prescriptions or medications  Yes    Why type of assitance do you need  Daughter sets out medications    Is transportation to your appointment needed  Yes Specify why  daughter brings patient to app. I have advised the patient to call PCP with any new or worsening symptoms    Coletta Schilder, MA. Living Arrangements  Children    Support System  Family    The type of support provided  Emotional; Financial; Physical    Do you have social support  Yes, as much as I need    Are you recieving any outpatient services  No    Are you recieving home care services  Yes    Types of home care services  Nurse visit; Home PT    Are you using any community resources  No    Current waiver services  No    Have you fallen in the last 12 months  Yes    How many times  1    Interperter language line needed  No    Counseling  Patient; Family      Patient for follow-up following hospitalization and nursing home visit. Hospital records were reviewed. Medications were reconciled. The patient is feeling well since being home. Still little fatigue and not quite the energy she had before going into the hospital.  Also notes mild urinary burning. No blood noted. No fever or chills noted. Review of Systems   Review of Systems   Constitutional: Negative for activity change, appetite change, diaphoresis and fatigue. HENT: Negative for congestion, sinus pressure and sore throat. Respiratory: Negative for cough, chest tightness, shortness of breath and wheezing. Cardiovascular: Negative for chest pain, palpitations and leg swelling. Fast or slow heart rate   Gastrointestinal: Negative for abdominal pain, blood in stool, constipation, diarrhea, nausea and vomiting. Genitourinary: Positive for dysuria. Negative for difficulty urinating, frequency, hematuria, vaginal bleeding and vaginal discharge. Musculoskeletal: Negative for arthralgias, gait problem, joint swelling and myalgias. Neurological: Negative for dizziness, light-headedness and headaches. Psychiatric/Behavioral: Negative for agitation, confusion, dysphoric mood and sleep disturbance.  The patient is not nervous/anxious.           Current Medications       Current Outpatient Medications:   •  aspirin (ECOTRIN LOW STRENGTH) 81 mg EC tablet, Take 1 tablet (81 mg total) by mouth daily, Disp: , Rfl: 0  •  atorvastatin (LIPITOR) 10 mg tablet, Take 1 tablet (10 mg total) by mouth daily, Disp: 90 tablet, Rfl: 1  •  Cholecalciferol (VITAMIN D3) 1000 units CAPS, Take 1,000 Units by mouth daily , Disp: , Rfl:   •  Cyanocobalamin (VITAMIN B12 PO), Take 1,000 mcg by mouth in the morning, Disp: , Rfl:   •  furosemide (LASIX) 20 mg tablet, Take 1 tablet (20 mg total) by mouth daily, Disp: 30 tablet, Rfl: 0  •  furosemide (LASIX) 20 mg tablet, Take 1 tablet (20 mg total) by mouth daily, Disp: 90 tablet, Rfl: 1  •  gabapentin (NEURONTIN) 600 MG tablet, Take 1 tablet by mouth twice daily, Disp: 180 tablet, Rfl: 0  •  Mirabegron ER 25 MG TB24, Take 25 mg by mouth in the morning, Disp: 30 tablet, Rfl: 3  •  Multiple Vitamins-Minerals (PRESERVISION AREDS PO), Take by mouth 2 (two) times a day, Disp: , Rfl:   •  nitrofurantoin (MACROBID) 100 mg capsule, Take 1 capsule (100 mg total) by mouth in the morning, Disp: 90 capsule, Rfl: 3  •  omeprazole (PriLOSEC) 20 mg delayed release capsule, Take 1 capsule (20 mg total) by mouth daily, Disp: 90 capsule, Rfl: 1  •  neomycin-polymyxin-hydrocortisone (CORTISPORIN) 0.35%-10,000 units/mL-1% otic suspension, Administer 4 drops to the right ear every 6 (six) hours for 5 days, Disp: 10 mL, Rfl: 0    Current Allergies     Allergies as of 07/18/2023   • (No Known Allergies)            The following portions of the patient's history were reviewed and updated as appropriate: allergies, current medications, past family history, past medical history, past social history, past surgical history and problem list.     Past Medical History:   Diagnosis Date   • SHERLYN (acute kidney injury) ([address]) 5/8/2022   • Balance disorder    • Chronic pain    • GERD (gastroesophageal reflux disease)    • Hard of hearing    • Hyperlipidemia    • Hypertension    • Hyponatremia 12/5/2020   • Low serum cortisol level 12/7/2020   • Neuropathy    • Pneumonia    • Pneumonia due to COVID-19 virus 12/5/2020   • Sepsis ([address]) 5/22/2018   • Tinnitus    • Unspecified adrenocortical insufficiency ([address]) 2/28/2023    [place] to be related to COVID-19 infection. No current symptoms. • UTI (urinary tract infection)        Past Surgical History:   Procedure Laterality Date   • EYE SURGERY     • HYSTERECTOMY     • TONSILLECTOMY         Family History   Problem Relation Age of Onset   • Stroke Mother    • Stroke Father    • Heart disease Daughter    • Substance Abuse Neg Hx    • Mental illness Neg Hx          Medications have been verified. Objective   /58 (BP Location: Left arm, Patient Position: Sitting, Cuff Size: Standard)   Pulse 81   Temp (!) 97 °F (36.1 °C) (Tympanic)   Wt 64.4 kg (142 lb)   SpO2 96%   BMI 26.83 kg/m²        Physical Exam     Physical Exam  Vitals reviewed. Constitutional:       General: She is not in acute distress. Appearance: Normal appearance. She is well-developed. She is not ill-appearing. Eyes:      Conjunctiva/sclera: Conjunctivae normal.      Pupils: Pupils are equal, round, and reactive to light. Neck:      Thyroid: No thyromegaly. Cardiovascular:      Rate and Rhythm: Normal rate and regular rhythm. Heart sounds: Normal heart sounds. No murmur heard. Pulmonary:      Effort: Pulmonary effort is normal. No respiratory distress. Breath sounds: Normal breath sounds. Abdominal:      General: Bowel sounds are normal. There is no distension. Palpations: Abdomen is soft. There is no splenomegaly. Tenderness: There is no abdominal tenderness. There is no right CVA tenderness or left CVA tenderness. Musculoskeletal:      Cervical back: Normal range of motion and neck supple. Right lower leg: No edema. Left lower leg: No edema.    Neurological:      Mental Status: She is alert.

## 2023-07-18 NOTE — PATIENT INSTRUCTIONS
Continue current medications. Send urine for culture either through [facility] or bring back here to send to hospital lab. Monitor weight at home.

## 2023-07-19 ENCOUNTER — HOME CARE VISIT (OUTPATIENT)
Dept: HOME HEALTH SERVICES | Facility: HOME HEALTHCARE | Age: 88
End: 2023-07-19
Payer: MEDICARE

## 2023-07-19 VITALS — HEART RATE: 68 BPM | OXYGEN SATURATION: 92 % | DIASTOLIC BLOOD PRESSURE: 72 MMHG | SYSTOLIC BLOOD PRESSURE: 146 MMHG

## 2023-07-19 PROCEDURE — G0151 HHCP-SERV OF PT,EA 15 MIN: HCPCS

## 2023-07-19 PROCEDURE — 87077 CULTURE AEROBIC IDENTIFY: CPT | Performed by: FAMILY MEDICINE

## 2023-07-19 PROCEDURE — 87086 URINE CULTURE/COLONY COUNT: CPT | Performed by: FAMILY MEDICINE

## 2023-07-19 PROCEDURE — 87186 SC STD MICRODIL/AGAR DIL: CPT | Performed by: FAMILY MEDICINE

## 2023-07-20 ENCOUNTER — HOME CARE VISIT (OUTPATIENT)
Dept: HOME HEALTH SERVICES | Facility: HOME HEALTHCARE | Age: 88
End: 2023-07-20
Payer: MEDICARE

## 2023-07-20 VITALS
DIASTOLIC BLOOD PRESSURE: 72 MMHG | TEMPERATURE: 97.9 F | SYSTOLIC BLOOD PRESSURE: 150 MMHG | OXYGEN SATURATION: 99 % | HEART RATE: 65 BPM | RESPIRATION RATE: 16 BRPM

## 2023-07-20 PROCEDURE — G0152 HHCP-SERV OF OT,EA 15 MIN: HCPCS

## 2023-07-20 PROCEDURE — G0299 HHS/HOSPICE OF RN EA 15 MIN: HCPCS

## 2023-07-22 LAB
BACTERIA UR CULT: ABNORMAL
BACTERIA UR CULT: ABNORMAL

## 2023-07-24 ENCOUNTER — HOME CARE VISIT (OUTPATIENT)
Dept: HOME HEALTH SERVICES | Facility: HOME HEALTHCARE | Age: 88
End: 2023-07-24
Payer: MEDICARE

## 2023-07-24 ENCOUNTER — TELEPHONE (OUTPATIENT)
Dept: FAMILY MEDICINE CLINIC | Facility: CLINIC | Age: 88
End: 2023-07-24

## 2023-07-24 VITALS
TEMPERATURE: 97.4 F | SYSTOLIC BLOOD PRESSURE: 130 MMHG | BODY MASS INDEX: 28.15 KG/M2 | RESPIRATION RATE: 16 BRPM | DIASTOLIC BLOOD PRESSURE: 60 MMHG | OXYGEN SATURATION: 94 % | HEART RATE: 79 BPM | WEIGHT: 149 LBS

## 2023-07-24 VITALS — SYSTOLIC BLOOD PRESSURE: 132 MMHG | DIASTOLIC BLOOD PRESSURE: 62 MMHG

## 2023-07-24 DIAGNOSIS — N30.00 ACUTE CYSTITIS WITHOUT HEMATURIA: Primary | ICD-10-CM

## 2023-07-24 PROCEDURE — G0299 HHS/HOSPICE OF RN EA 15 MIN: HCPCS

## 2023-07-24 PROCEDURE — G0152 HHCP-SERV OF OT,EA 15 MIN: HCPCS

## 2023-07-24 RX ORDER — CEPHALEXIN 500 MG/1
500 CAPSULE ORAL 3 TIMES DAILY
Qty: 21 CAPSULE | Refills: 0 | Status: SHIPPED | OUTPATIENT
Start: 2023-07-24 | End: 2023-07-31

## 2023-07-24 NOTE — RESULT ENCOUNTER NOTE
Call patient with lab result-culture positive.  I sent RX for Cephalexin X 7 days to [facility]

## 2023-07-24 NOTE — TELEPHONE ENCOUNTER
----- Message from Adryan Rocha MD sent at 7/24/2023 11:21 AM EDT -----  Call patient with lab result-culture positive.  I sent RX for Cephalexin X 7 days to [facility]

## 2023-07-24 NOTE — TELEPHONE ENCOUNTER
Pts urine test came back abnormal and is in a lot of discomfort. Wondering if something can be prescribed. If possible, pt would like this sent to the [facility] [address] MARIELOS GUIDRY in [address] [place] [address] [place]     Would like a call back at [phone].

## 2023-07-25 ENCOUNTER — HOME CARE VISIT (OUTPATIENT)
Dept: HOME HEALTH SERVICES | Facility: HOME HEALTHCARE | Age: 88
End: 2023-07-25
Payer: MEDICARE

## 2023-07-25 PROCEDURE — G0151 HHCP-SERV OF PT,EA 15 MIN: HCPCS

## 2023-07-27 ENCOUNTER — PATIENT OUTREACH (OUTPATIENT)
Dept: CASE MANAGEMENT | Facility: OTHER | Age: 88
End: 2023-07-27

## 2023-07-27 ENCOUNTER — HOME CARE VISIT (OUTPATIENT)
Dept: HOME HEALTH SERVICES | Facility: HOME HEALTHCARE | Age: 88
End: 2023-07-27
Payer: MEDICARE

## 2023-07-27 VITALS
RESPIRATION RATE: 16 BRPM | DIASTOLIC BLOOD PRESSURE: 60 MMHG | BODY MASS INDEX: 28.15 KG/M2 | WEIGHT: 149 LBS | TEMPERATURE: 97.8 F | HEART RATE: 75 BPM | SYSTOLIC BLOOD PRESSURE: 130 MMHG | OXYGEN SATURATION: 96 %

## 2023-07-27 PROCEDURE — G0299 HHS/HOSPICE OF RN EA 15 MIN: HCPCS

## 2023-07-27 NOTE — PROGRESS NOTES
Outreach TC to patient for CM assessment. No answer to call. Left message on voicemail requesting a return call from patient to [address] [place], [place] [address]SILVINA yancey; Outpatient Care Manager @ [phone]. Second unanswered call to patient. Chart review reveals that patient has lorena discharged from PT and OT services through [address] [facility].

## 2023-07-27 NOTE — PROGRESS NOTES
Outreach TC to patient for CM assessment. No answer to call. Left message on voicemail requesting a return call from patient [facility]; Outpatient Care Manager @ [phone]. First unanswered call to patient.

## 2023-07-31 ENCOUNTER — TELEPHONE (OUTPATIENT)
Dept: FAMILY MEDICINE CLINIC | Facility: CLINIC | Age: 88
End: 2023-07-31

## 2023-07-31 ENCOUNTER — HOME CARE VISIT (OUTPATIENT)
Dept: HOME HEALTH SERVICES | Facility: HOME HEALTHCARE | Age: 88
End: 2023-07-31
Payer: MEDICARE

## 2023-07-31 DIAGNOSIS — N76.0 ACUTE VAGINITIS: Primary | ICD-10-CM

## 2023-07-31 DIAGNOSIS — N30.00 ACUTE CYSTITIS WITHOUT HEMATURIA: ICD-10-CM

## 2023-07-31 RX ORDER — FLUCONAZOLE 150 MG/1
150 TABLET ORAL ONCE
Qty: 1 TABLET | Refills: 0 | Status: SHIPPED | OUTPATIENT
Start: 2023-07-31 | End: 2023-07-31

## 2023-07-31 NOTE — TELEPHONE ENCOUNTER
Per daughter/patient: patient is experiencing vaginal itching. Patient daughter states symptoms have improved since starting antibiotic but has not fully cleared up.

## 2023-07-31 NOTE — TELEPHONE ENCOUNTER
Pt is still having a UTI. Having burning/ discomfort when she goes. Wants to know if something else can get prescribed. If possible pt wants the medication sent to the [facility] in [place]. [phone].

## 2023-08-03 ENCOUNTER — HOME CARE VISIT (OUTPATIENT)
Dept: HOME HEALTH SERVICES | Facility: HOME HEALTHCARE | Age: 88
End: 2023-08-03
Payer: MEDICARE

## 2023-08-03 VITALS
TEMPERATURE: 98 F | DIASTOLIC BLOOD PRESSURE: 66 MMHG | HEART RATE: 74 BPM | OXYGEN SATURATION: 97 % | RESPIRATION RATE: 16 BRPM | SYSTOLIC BLOOD PRESSURE: 120 MMHG

## 2023-08-03 PROCEDURE — G0299 HHS/HOSPICE OF RN EA 15 MIN: HCPCS

## 2023-08-03 RX ORDER — CEPHALEXIN 500 MG/1
CAPSULE ORAL
Qty: 21 CAPSULE | Refills: 0 | OUTPATIENT
Start: 2023-08-03

## 2023-08-03 NOTE — TELEPHONE ENCOUNTER
Spoke with daughter, patient is still experiencing discomfort with urination, burning. Legs are swollen.      Visiting nurse coming out to patient today 08/03/2023, patient does have appt 08/04/2023 with Yohana Cruz NP. Declined appt for today 08/03/2023.     Sending to Dr Mustafa as FYI as he is provider in office.

## 2023-08-04 ENCOUNTER — OFFICE VISIT (OUTPATIENT)
Dept: FAMILY MEDICINE CLINIC | Facility: CLINIC | Age: 88
End: 2023-08-04
Payer: MEDICARE

## 2023-08-04 VITALS
HEART RATE: 73 BPM | WEIGHT: 154 LBS | SYSTOLIC BLOOD PRESSURE: 138 MMHG | BODY MASS INDEX: 29.1 KG/M2 | TEMPERATURE: 96.1 F | OXYGEN SATURATION: 97 % | DIASTOLIC BLOOD PRESSURE: 69 MMHG

## 2023-08-04 DIAGNOSIS — R60.0 LOWER LEG EDEMA: ICD-10-CM

## 2023-08-04 DIAGNOSIS — R30.0 DYSURIA: ICD-10-CM

## 2023-08-04 DIAGNOSIS — R39.11 URINARY HESITANCY: Primary | ICD-10-CM

## 2023-08-04 DIAGNOSIS — E78.2 MIXED HYPERLIPIDEMIA: ICD-10-CM

## 2023-08-04 DIAGNOSIS — R82.79 POSITIVE URINE CULTURE: ICD-10-CM

## 2023-08-04 PROCEDURE — 99214 OFFICE O/P EST MOD 30 MIN: CPT | Performed by: NURSE PRACTITIONER

## 2023-08-04 RX ORDER — NICOTINE 14MG/24HR
PATCH, TRANSDERMAL 24 HOURS TRANSDERMAL
COMMUNITY
Start: 2023-07-07

## 2023-08-04 RX ORDER — GLYCERIN .002; .002; .01 MG/MG; MG/MG; MG/MG
SOLUTION/ DROPS OPHTHALMIC
COMMUNITY
Start: 2023-06-30

## 2023-08-04 RX ORDER — AMOXICILLIN AND CLAVULANATE POTASSIUM 875; 125 MG/1; MG/1
1 TABLET, FILM COATED ORAL EVERY 12 HOURS SCHEDULED
Qty: 20 TABLET | Refills: 0 | Status: SHIPPED | OUTPATIENT
Start: 2023-08-04 | End: 2023-08-14

## 2023-08-04 NOTE — PROGRESS NOTES
[facility] Medical        NAME: José Antonio Salazar is a 80 y.o. female  : 1930    MRN: [id number]  DATE: 2023  TIME: 3:26 PM    Assessment and Plan   Urinary hesitancy [R39.11]  1. Urinary hesitancy        2. Positive urine culture  amoxicillin-clavulanate (AUGMENTIN) 875-125 mg per tablet      3. Dysuria        4. Lower leg edema              Patient Instructions     Patient Instructions   Augmentin as ordered for UTI  Increase Lasix to 40 mg daily x 2-3 days for leg edema, call if no improvement. Elevated legs and wear compression stockings  If no improvement with urinary hesitancy f/up with your urologist  Call with problems/concerns          Chief Complaint     Chief Complaint   Patient presents with   • Urinary Tract Infection         History of Present Illness       C/o urinary burning, hesitancy. Recent urine culture was positive and she was treated with a 7 day course of Keflex which she states did not improve symptoms. She was also treated with diflucan for vaginal itching which did resolve. She also c/o leg swelling. She is wearing her compression stockings. Denies chest pain, no shortness of breath. She takes lasix 20 mg daily. Review of Systems   Review of Systems   Constitutional: Negative for activity change and fever. Eyes: Negative for visual disturbance. Respiratory: Negative for chest tightness, shortness of breath and wheezing. Cardiovascular: Positive for leg swelling. Negative for chest pain and palpitations. Gastrointestinal: Negative for abdominal pain. Genitourinary: Positive for decreased urine volume, difficulty urinating, dysuria and urgency. Negative for flank pain, hematuria and pelvic pain. Skin: Negative for color change and pallor. Neurological: Negative for dizziness, weakness and headaches.          Current Medications       Current Outpatient Medications:   •  amoxicillin-clavulanate (AUGMENTIN) 875-125 mg per tablet, Take 1 tablet by mouth every 12 (twelve) hours for 10 days, Disp: 20 tablet, Rfl: 0  •  Artificial Tears 0.2-0.2-1 % SOLN, , Disp: , Rfl:   •  aspirin (ECOTRIN LOW STRENGTH) 81 mg EC tablet, Take 1 tablet (81 mg total) by mouth daily, Disp: , Rfl: 0  •  atorvastatin (LIPITOR) 10 mg tablet, Take 1 tablet (10 mg total) by mouth daily, Disp: 90 tablet, Rfl: 1  •  Cholecalciferol (VITAMIN D3) 1000 units CAPS, Take 1,000 Units by mouth daily , Disp: , Rfl:   •  Cyanocobalamin (VITAMIN B12 PO), Take 1,000 mcg by mouth in the morning, Disp: , Rfl:   •  furosemide (LASIX) 20 mg tablet, Take 1 tablet (20 mg total) by mouth daily, Disp: 90 tablet, Rfl: 1  •  gabapentin (NEURONTIN) 600 MG tablet, Take 1 tablet by mouth twice daily, Disp: 180 tablet, Rfl: 0  •  Mirabegron ER 25 MG TB24, Take 25 mg by mouth in the morning, Disp: 30 tablet, Rfl: 3  •  Multiple Vitamins-Minerals (PRESERVISION AREDS PO), Take by mouth 2 (two) times a day, Disp: , Rfl:   •  nitrofurantoin (MACROBID) 100 mg capsule, Take 1 capsule (100 mg total) by mouth in the morning, Disp: 90 capsule, Rfl: 3  •  omeprazole (PriLOSEC) 20 mg delayed release capsule, Take 1 capsule (20 mg total) by mouth daily, Disp: 90 capsule, Rfl: 1  •  Saccharomyces boulardii (Probiotic) 250 MG CAPS, , Disp: , Rfl:   •  furosemide (LASIX) 20 mg tablet, Take 1 tablet (20 mg total) by mouth daily, Disp: 30 tablet, Rfl: 0  •  neomycin-polymyxin-hydrocortisone (CORTISPORIN) 0.35%-10,000 units/mL-1% otic suspension, Administer 4 drops to the right ear every 6 (six) hours for 5 days, Disp: 10 mL, Rfl: 0    Current Allergies     Allergies as of 08/04/2023   • (No Known Allergies)            The following portions of the patient's history were reviewed and updated as appropriate: allergies, current medications, past family history, past medical history, past social history, past surgical history and problem list.     Past Medical History:   Diagnosis Date   • SHERLYN (acute kidney injury) ([address] [facility]) 5/8/2022   • Balance disorder    • Chronic pain    • GERD (gastroesophageal reflux disease)    • Hard of hearing    • Hyperlipidemia    • Hypertension    • Hyponatremia 12/5/2020   • Low serum cortisol level 12/7/2020   • Neuropathy    • Pneumonia    • Pneumonia due to COVID-19 virus 12/5/2020   • Sepsis ([address]) 5/22/2018   • Tinnitus    • Unspecified adrenocortical insufficiency ([address]) 2/28/2023    [place] to be related to COVID-19 infection. No current symptoms. • UTI (urinary tract infection)        Past Surgical History:   Procedure Laterality Date   • EYE SURGERY     • HYSTERECTOMY     • TONSILLECTOMY         Family History   Problem Relation Age of Onset   • Stroke Mother    • Stroke Father    • Heart disease Daughter    • Substance Abuse Neg Hx    • Mental illness Neg Hx          Medications have been verified. Objective   /69 (BP Location: Left arm, Patient Position: Sitting, Cuff Size: Standard)   Pulse 73   Temp (!) 96.1 °F (35.6 °C) (Tympanic)   Wt 69.9 kg (154 lb)   SpO2 97%   BMI 29.10 kg/m²        Physical Exam     Physical Exam  Vitals and nursing note reviewed. Constitutional:       General: She is not in acute distress. Appearance: Normal appearance. She is not ill-appearing. HENT:      Head: Normocephalic. Eyes:      Extraocular Movements: Extraocular movements intact. Cardiovascular:      Rate and Rhythm: Normal rate and regular rhythm. Heart sounds: Normal heart sounds. No murmur heard. No friction rub. No gallop. Pulmonary:      Effort: Pulmonary effort is normal. No respiratory distress. Breath sounds: Normal breath sounds. No wheezing or rhonchi. Chest:      Chest wall: No tenderness. Abdominal:      Palpations: Abdomen is soft. Tenderness: There is no abdominal tenderness. Musculoskeletal:      Right lower leg: Edema present. Left lower leg: Edema present. Skin:     General: Skin is warm and dry. Coloration: Skin is not pale. Findings: No erythema. Neurological:      Mental Status: She is alert and oriented to person, place, and time.    Psychiatric:         Mood and Affect: Mood normal.         Behavior: Behavior normal.

## 2023-08-04 NOTE — PATIENT INSTRUCTIONS
Augmentin as ordered for UTI  Increase Lasix to 40 mg daily x 2-3 days for leg edema, call if no improvement.   Elevated legs and wear compression stockings  If no improvement with urinary hesitancy f/up with your urologist  Call with problems/concerns

## 2023-08-07 ENCOUNTER — HOME CARE VISIT (OUTPATIENT)
Dept: HOME HEALTH SERVICES | Facility: HOME HEALTHCARE | Age: 88
End: 2023-08-07
Payer: MEDICARE

## 2023-08-07 VITALS
SYSTOLIC BLOOD PRESSURE: 126 MMHG | DIASTOLIC BLOOD PRESSURE: 60 MMHG | BODY MASS INDEX: 29.29 KG/M2 | OXYGEN SATURATION: 99 % | RESPIRATION RATE: 16 BRPM | WEIGHT: 155 LBS | TEMPERATURE: 99 F | HEART RATE: 82 BPM

## 2023-08-07 PROCEDURE — G0300 HHS/HOSPICE OF LPN EA 15 MIN: HCPCS

## 2023-08-07 RX ORDER — FUROSEMIDE 20 MG/1
20 TABLET ORAL DAILY
Qty: 90 TABLET | Refills: 0 | Status: SHIPPED | OUTPATIENT
Start: 2023-08-07 | End: 2023-08-09

## 2023-08-09 DIAGNOSIS — E78.2 MIXED HYPERLIPIDEMIA: ICD-10-CM

## 2023-08-09 RX ORDER — FUROSEMIDE 20 MG/1
20 TABLET ORAL DAILY
Qty: 90 TABLET | Refills: 0 | Status: SHIPPED | OUTPATIENT
Start: 2023-08-09

## 2023-08-10 ENCOUNTER — PATIENT OUTREACH (OUTPATIENT)
Dept: CASE MANAGEMENT | Facility: OTHER | Age: 88
End: 2023-08-10

## 2023-08-10 ENCOUNTER — TELEPHONE (OUTPATIENT)
Dept: FAMILY MEDICINE CLINIC | Facility: CLINIC | Age: 88
End: 2023-08-10

## 2023-08-10 ENCOUNTER — HOME CARE VISIT (OUTPATIENT)
Dept: HOME HEALTH SERVICES | Facility: HOME HEALTHCARE | Age: 88
End: 2023-08-10
Payer: MEDICARE

## 2023-08-10 VITALS
HEART RATE: 80 BPM | RESPIRATION RATE: 16 BRPM | OXYGEN SATURATION: 96 % | DIASTOLIC BLOOD PRESSURE: 74 MMHG | SYSTOLIC BLOOD PRESSURE: 140 MMHG | TEMPERATURE: 98 F

## 2023-08-10 PROCEDURE — G0299 HHS/HOSPICE OF RN EA 15 MIN: HCPCS

## 2023-08-10 NOTE — TELEPHONE ENCOUNTER
Today pt is overall in general not feeling good. NO appetite, very tired and weight is 155 ( 1 pound heavier than last week) . Pt still has swelling in legs. Pt's lungs are clear but seems short of breath. Vitals are stable. Pt bowel movements are more frequent as well. Wanted to know if there should be any med adjustments or any other changes to care.     [facility] Fax # [phone]

## 2023-08-10 NOTE — PROGRESS NOTES
Message left with patient's son with Outreach Program information and call back number for Tolu Simons, Care Manager.     Patient has home health care and was seen on 8/7/2023 in PCP office for continued symptoms of UTI

## 2023-08-14 ENCOUNTER — HOME CARE VISIT (OUTPATIENT)
Dept: HOME HEALTH SERVICES | Facility: HOME HEALTHCARE | Age: 88
End: 2023-08-14
Payer: MEDICARE

## 2023-08-14 VITALS
OXYGEN SATURATION: 99 % | WEIGHT: 155 LBS | DIASTOLIC BLOOD PRESSURE: 68 MMHG | HEART RATE: 66 BPM | RESPIRATION RATE: 16 BRPM | SYSTOLIC BLOOD PRESSURE: 140 MMHG | BODY MASS INDEX: 29.29 KG/M2 | TEMPERATURE: 97.9 F

## 2023-08-14 PROCEDURE — G0299 HHS/HOSPICE OF RN EA 15 MIN: HCPCS

## 2023-08-14 NOTE — TELEPHONE ENCOUNTER
[facility] calls in today that patient is weighing back at 155lbs, with legs +2, states that legs feel tight and uncomfortable. Nurse asking is additional days of increasing lasix?

## 2023-08-17 ENCOUNTER — HOME CARE VISIT (OUTPATIENT)
Dept: HOME HEALTH SERVICES | Facility: HOME HEALTHCARE | Age: 88
End: 2023-08-17
Payer: MEDICARE

## 2023-08-17 VITALS
DIASTOLIC BLOOD PRESSURE: 64 MMHG | SYSTOLIC BLOOD PRESSURE: 140 MMHG | WEIGHT: 155 LBS | BODY MASS INDEX: 29.29 KG/M2 | TEMPERATURE: 98 F | RESPIRATION RATE: 16 BRPM | HEART RATE: 70 BPM | OXYGEN SATURATION: 97 %

## 2023-08-17 PROCEDURE — G0299 HHS/HOSPICE OF RN EA 15 MIN: HCPCS

## 2023-08-22 ENCOUNTER — HOSPITAL ENCOUNTER (EMERGENCY)
Facility: HOSPITAL | Age: 88
Discharge: HOME | End: 2023-08-22
Attending: EMERGENCY MEDICINE
Payer: MEDICARE

## 2023-08-22 ENCOUNTER — APPOINTMENT (OUTPATIENT)
Dept: RADIOLOGY | Facility: HOSPITAL | Age: 88
End: 2023-08-22
Payer: MEDICARE

## 2023-08-22 ENCOUNTER — HOME CARE VISIT (OUTPATIENT)
Dept: HOME HEALTH SERVICES | Facility: HOME HEALTHCARE | Age: 88
End: 2023-08-22
Payer: MEDICARE

## 2023-08-22 VITALS
WEIGHT: 158 LBS | OXYGEN SATURATION: 96 % | BODY MASS INDEX: 29.08 KG/M2 | HEART RATE: 69 BPM | RESPIRATION RATE: 20 BRPM | SYSTOLIC BLOOD PRESSURE: 154 MMHG | TEMPERATURE: 98.4 F | DIASTOLIC BLOOD PRESSURE: 84 MMHG | HEIGHT: 62 IN

## 2023-08-22 VITALS
RESPIRATION RATE: 16 BRPM | SYSTOLIC BLOOD PRESSURE: 142 MMHG | HEART RATE: 80 BPM | BODY MASS INDEX: 29.85 KG/M2 | TEMPERATURE: 98.1 F | DIASTOLIC BLOOD PRESSURE: 62 MMHG | OXYGEN SATURATION: 96 % | WEIGHT: 158 LBS

## 2023-08-22 DIAGNOSIS — R60.0 BILATERAL LOWER EXTREMITY EDEMA: Primary | ICD-10-CM

## 2023-08-22 DIAGNOSIS — I50.33 ACUTE ON CHRONIC DIASTOLIC HEART FAILURE (HCC): ICD-10-CM

## 2023-08-22 LAB
2HR DELTA HS TROPONIN: 0 NG/L
ALBUMIN SERPL BCP-MCNC: 4.1 G/DL (ref 3.5–5)
ALP SERPL-CCNC: 67 U/L (ref 34–104)
ALT SERPL W P-5'-P-CCNC: 8 U/L (ref 7–52)
ANION GAP SERPL CALCULATED.3IONS-SCNC: 5 MMOL/L
AST SERPL W P-5'-P-CCNC: 15 U/L (ref 13–39)
ATRIAL RATE: 72 BPM
BASOPHILS # BLD AUTO: 0.03 THOUSANDS/ÂΜL (ref 0–0.1)
BASOPHILS NFR BLD AUTO: 0 % (ref 0–1)
BILIRUB SERPL-MCNC: 0.36 MG/DL (ref 0.2–1)
BILIRUB UR QL STRIP: NEGATIVE
BNP SERPL-MCNC: 145 PG/ML (ref 0–100)
BUN SERPL-MCNC: 16 MG/DL (ref 5–25)
CALCIUM SERPL-MCNC: 9.8 MG/DL (ref 8.4–10.2)
CARDIAC TROPONIN I PNL SERPL HS: 7 NG/L
CARDIAC TROPONIN I PNL SERPL HS: 7 NG/L
CHLORIDE SERPL-SCNC: 100 MMOL/L (ref 96–108)
CLARITY UR: CLEAR
CO2 SERPL-SCNC: 35 MMOL/L (ref 21–32)
COLOR UR: YELLOW
CREAT SERPL-MCNC: 0.95 MG/DL (ref 0.6–1.3)
EOSINOPHIL # BLD AUTO: 0.28 THOUSAND/ÂΜL (ref 0–0.61)
EOSINOPHIL NFR BLD AUTO: 3 % (ref 0–6)
ERYTHROCYTE [DISTWIDTH] IN BLOOD BY AUTOMATED COUNT: 13.4 % (ref 11.6–15.1)
GFR SERPL CREATININE-BSD FRML MDRD: 51 ML/MIN/1.73SQ M
GLUCOSE SERPL-MCNC: 83 MG/DL (ref 65–140)
GLUCOSE UR STRIP-MCNC: NEGATIVE MG/DL
HCT VFR BLD AUTO: 36 % (ref 34.8–46.1)
HGB BLD-MCNC: 11 G/DL (ref 11.5–15.4)
HGB UR QL STRIP.AUTO: NEGATIVE
IMM GRANULOCYTES # BLD AUTO: 0.03 THOUSAND/UL (ref 0–0.2)
IMM GRANULOCYTES NFR BLD AUTO: 0 % (ref 0–2)
KETONES UR STRIP-MCNC: NEGATIVE MG/DL
LEUKOCYTE ESTERASE UR QL STRIP: NEGATIVE
LYMPHOCYTES # BLD AUTO: 1.8 THOUSANDS/ÂΜL (ref 0.6–4.47)
LYMPHOCYTES NFR BLD AUTO: 22 % (ref 14–44)
MCH RBC QN AUTO: 28.1 PG (ref 26.8–34.3)
MCHC RBC AUTO-ENTMCNC: 30.6 G/DL (ref 31.4–37.4)
MCV RBC AUTO: 92 FL (ref 82–98)
MONOCYTES # BLD AUTO: 0.74 THOUSAND/ÂΜL (ref 0.17–1.22)
MONOCYTES NFR BLD AUTO: 9 % (ref 4–12)
NEUTROPHILS # BLD AUTO: 5.39 THOUSANDS/ÂΜL (ref 1.85–7.62)
NEUTS SEG NFR BLD AUTO: 66 % (ref 43–75)
NITRITE UR QL STRIP: NEGATIVE
NRBC BLD AUTO-RTO: 0 /100 WBCS
P AXIS: 62 DEGREES
PH UR STRIP.AUTO: 7 [PH]
PLATELET # BLD AUTO: 183 THOUSANDS/UL (ref 149–390)
PMV BLD AUTO: 9 FL (ref 8.9–12.7)
POTASSIUM SERPL-SCNC: 4.1 MMOL/L (ref 3.5–5.3)
PR INTERVAL: 186 MS
PROT SERPL-MCNC: 7.3 G/DL (ref 6.4–8.4)
PROT UR STRIP-MCNC: NEGATIVE MG/DL
QRS AXIS: 82 DEGREES
QRSD INTERVAL: 130 MS
QT INTERVAL: 406 MS
QTC INTERVAL: 444 MS
RBC # BLD AUTO: 3.91 MILLION/UL (ref 3.81–5.12)
SODIUM SERPL-SCNC: 140 MMOL/L (ref 135–147)
SP GR UR STRIP.AUTO: 1.01 (ref 1–1.03)
T WAVE AXIS: -17 DEGREES
UROBILINOGEN UR STRIP-ACNC: <2 MG/DL
VENTRICULAR RATE: 72 BPM
WBC # BLD AUTO: 8.27 THOUSAND/UL (ref 4.31–10.16)

## 2023-08-22 PROCEDURE — 93010 ELECTROCARDIOGRAM REPORT: CPT | Performed by: INTERNAL MEDICINE

## 2023-08-22 PROCEDURE — 85025 COMPLETE CBC W/AUTO DIFF WBC: CPT | Performed by: EMERGENCY MEDICINE

## 2023-08-22 PROCEDURE — G0299 HHS/HOSPICE OF RN EA 15 MIN: HCPCS

## 2023-08-22 PROCEDURE — 99285 EMERGENCY DEPT VISIT HI MDM: CPT | Performed by: EMERGENCY MEDICINE

## 2023-08-22 PROCEDURE — 99285 EMERGENCY DEPT VISIT HI MDM: CPT

## 2023-08-22 PROCEDURE — 96374 THER/PROPH/DIAG INJ IV PUSH: CPT

## 2023-08-22 PROCEDURE — 83880 ASSAY OF NATRIURETIC PEPTIDE: CPT | Performed by: EMERGENCY MEDICINE

## 2023-08-22 PROCEDURE — 36415 COLL VENOUS BLD VENIPUNCTURE: CPT

## 2023-08-22 PROCEDURE — 81003 URINALYSIS AUTO W/O SCOPE: CPT | Performed by: EMERGENCY MEDICINE

## 2023-08-22 PROCEDURE — 80053 COMPREHEN METABOLIC PANEL: CPT | Performed by: EMERGENCY MEDICINE

## 2023-08-22 PROCEDURE — 93005 ELECTROCARDIOGRAM TRACING: CPT

## 2023-08-22 PROCEDURE — 71046 X-RAY EXAM CHEST 2 VIEWS: CPT

## 2023-08-22 PROCEDURE — 84484 ASSAY OF TROPONIN QUANT: CPT | Performed by: EMERGENCY MEDICINE

## 2023-08-22 RX ORDER — TORSEMIDE 20 MG/1
20 TABLET ORAL 2 TIMES DAILY
Qty: 60 TABLET | Refills: 0 | Status: SHIPPED | OUTPATIENT
Start: 2023-08-22 | End: 2023-08-28 | Stop reason: SDUPTHER

## 2023-08-22 RX ORDER — FUROSEMIDE 10 MG/ML
40 INJECTION INTRAMUSCULAR; INTRAVENOUS ONCE
Status: COMPLETED | OUTPATIENT
Start: 2023-08-22 | End: 2023-08-22

## 2023-08-22 RX ADMIN — FUROSEMIDE 40 MG: 10 INJECTION, SOLUTION INTRAMUSCULAR; INTRAVENOUS at 14:31

## 2023-08-22 NOTE — ED TRIAGE NOTES
Pt has visiting nurses following hospitalization 1 month ago. Pt noted by [facility] to have increase in weight and retention of fluid with edema working its way up her legs; now above her knees. Pt SOB with minimal exertion.

## 2023-08-22 NOTE — DISCHARGE INSTRUCTIONS
Have the repeat blood draws drawn in 1 week.     Follow-up with your primary care provider in 10 days    Return to the ER if worse

## 2023-08-22 NOTE — ED PROVIDER NOTES
History  Chief Complaint   Patient presents with   • Edema     80year-old female presents for the evaluation of bilateral lower extremity edema that has been progressing over several weeks. The patient was hospitalized about 6 weeks ago and then transition to a skilled nursing facility for 3 weeks and then home. The daughter states that the patient has had an 11 pound weight gain in the past 2 weeks despite having her Lasix dose mildly increased to 30 mg a day. The patient states that she has some exertional dyspnea. Denies any orthopnea. The patient denies any associated chest pain. Patient denies fevers or chills. Prior to Admission Medications   Prescriptions Last Dose Informant Patient Reported? Taking? Artificial Tears 0.2-0.2-1 % SOLN   Yes No   Cholecalciferol (VITAMIN D3) 1000 units CAPS  Self Yes No   Sig: Take 1,000 Units by mouth daily    Cyanocobalamin (VITAMIN B12 PO)  Self Yes No   Sig: Take 1,000 mcg by mouth in the morning   Mirabegron ER 25 MG TB24   No No   Sig: Take 25 mg by mouth in the morning   Multiple Vitamins-Minerals (PRESERVISION AREDS PO)  Self Yes No   Sig: Take by mouth 2 (two) times a day   Saccharomyces boulardii (Probiotic) 250 MG CAPS   Yes No   aspirin (ECOTRIN LOW STRENGTH) 81 mg EC tablet  Self No No   Sig: Take 1 tablet (81 mg total) by mouth daily   atorvastatin (LIPITOR) 10 mg tablet   No No   Sig: Take 1 tablet (10 mg total) by mouth daily   furosemide (LASIX) 20 mg tablet   No No   Sig: Take 1 tablet (20 mg total) by mouth daily   furosemide (LASIX) 20 mg tablet   Yes No   Sig: Take 20 mg by mouth daily.  8/15, 8/16, 8/17 take 2 pills  for 40 mg  and then 1.5 mg daily   Indications: Edema   gabapentin (NEURONTIN) 600 MG tablet   No No   Sig: Take 1 tablet by mouth twice daily   neomycin-polymyxin-hydrocortisone (CORTISPORIN) 0.35%-10,000 units/mL-1% otic suspension   No No   Sig: Administer 4 drops to the right ear every 6 (six) hours for 5 days nitrofurantoin (MACROBID) 100 mg capsule   No No   Sig: Take 1 capsule (100 mg total) by mouth in the morning   omeprazole (PriLOSEC) 20 mg delayed release capsule   No No   Sig: Take 1 capsule (20 mg total) by mouth daily      Facility-Administered Medications: None       Past Medical History:   Diagnosis Date   • SHERLYN (acute kidney injury) ([address]) 5/8/2022   • Balance disorder    • Chronic pain    • GERD (gastroesophageal reflux disease)    • Hard of hearing    • Hyperlipidemia    • Hypertension    • Hyponatremia 12/5/2020   • Low serum cortisol level 12/7/2020   • Neuropathy    • Pneumonia    • Pneumonia due to COVID-19 virus 12/5/2020   • Sepsis ([address]) 5/22/2018   • Tinnitus    • Unspecified adrenocortical insufficiency ([address]) 2/28/2023    [place] to be related to COVID-19 infection. No current symptoms. • UTI (urinary tract infection)        Past Surgical History:   Procedure Laterality Date   • EYE SURGERY     • HYSTERECTOMY     • TONSILLECTOMY         Family History   Problem Relation Age of Onset   • Stroke Mother    • Stroke Father    • Heart disease Daughter    • Substance Abuse Neg Hx    • Mental illness Neg Hx      I have reviewed and agree with the history as documented. E-Cigarette/Vaping   • E-Cigarette Use Never User      E-Cigarette/Vaping Substances   • Nicotine No    • THC No    • CBD No    • Flavoring No    • Other No    • Unknown No      Social History     Tobacco Use   • Smoking status: Never     Passive exposure: Never   • Smokeless tobacco: Never   Vaping Use   • Vaping Use: Never used   Substance Use Topics   • Alcohol use: Not Currently     Comment: rarely   • Drug use: No       Review of Systems    Physical Exam  Physical Exam  Vitals and nursing note reviewed. Constitutional:       General: She is not in acute distress. Appearance: She is well-developed. HENT:      Head: Normocephalic and atraumatic.       Right Ear: External ear normal.      Left Ear: External ear normal.   Eyes: General: No scleral icterus. Conjunctiva/sclera: Conjunctivae normal.      Pupils: Pupils are equal, round, and reactive to light. Cardiovascular:      Rate and Rhythm: Normal rate and regular rhythm. Heart sounds: Normal heart sounds. Pulmonary:      Effort: Pulmonary effort is normal. No respiratory distress. Breath sounds: Normal breath sounds. No rales. Abdominal:      General: Bowel sounds are normal.      Palpations: Abdomen is soft. Tenderness: There is no abdominal tenderness. There is no guarding or rebound. Musculoskeletal:         General: Normal range of motion. Cervical back: Normal range of motion. Right lower leg: Edema present. Left lower leg: Edema present. Skin:     General: Skin is warm and dry. Findings: No rash. Neurological:      Mental Status: She is alert and oriented to person, place, and time.          Vital Signs  ED Triage Vitals [08/22/23 1159]   Temperature Pulse Respirations Blood Pressure SpO2   98.4 °F (36.9 °C) 75 20 154/84 97 %      Temp Source Heart Rate Source Patient Position - Orthostatic VS BP Location FiO2 (%)   Temporal Monitor -- -- --      Pain Score       --           Vitals:    08/22/23 1159 08/22/23 1415   BP: 154/84    Pulse: 75 69         Visual Acuity      ED Medications  Medications   furosemide (LASIX) injection 40 mg (40 mg Intravenous Given 8/22/23 1431)       Diagnostic Studies  Results Reviewed     Procedure Component Value Units Date/Time    HS Troponin I 2hr [[id number]]  (Normal) Collected: 08/22/23 1430    Lab Status: Final result Specimen: Blood from Arm, Left Updated: 08/22/23 1515     hs TnI 2hr 7 ng/L      Delta 2hr hsTnI 0 ng/L     UA (URINE) with reflex to Scope [[id number]] Collected: 08/22/23 1459    Lab Status: Final result Specimen: Urine, Clean Catch Updated: 08/22/23 1504     Color, UA Yellow     Clarity, UA Clear     Specific Gravity, UA 1.010     pH, UA 7.0     Leukocytes, UA Negative Nitrite, UA Negative     Protein, UA Negative mg/dl      Glucose, UA Negative mg/dl      Ketones, UA Negative mg/dl      Urobilinogen, UA <2.0 mg/dl      Bilirubin, UA Negative     Occult Blood, UA Negative    HS Troponin 0hr (reflex protocol) [[id number]]  (Normal) Collected: 08/22/23 1214    Lab Status: Final result Specimen: Blood Updated: 08/22/23 1240     hs TnI 0hr 7 ng/L     B-Type Natriuretic Peptide(BNP) [[id number]]  (Abnormal) Collected: 08/22/23 1214    Lab Status: Final result Specimen: Blood Updated: 08/22/23 1239      pg/mL     Comprehensive metabolic panel [[id number]]  (Abnormal) Collected: 08/22/23 1215    Lab Status: Final result Specimen: Blood Updated: 08/22/23 1236     Sodium 140 mmol/L      Potassium 4.1 mmol/L      Chloride 100 mmol/L      CO2 35 mmol/L      ANION GAP 5 mmol/L      BUN 16 mg/dL      Creatinine 0.95 mg/dL      Glucose 83 mg/dL      Calcium 9.8 mg/dL      AST 15 U/L      ALT 8 U/L      Alkaline Phosphatase 67 U/L      Total Protein 7.3 g/dL      Albumin 4.1 g/dL      Total Bilirubin 0.36 mg/dL      eGFR 51 ml/min/1.73sq m     Narrative:      [place] guidelines for Chronic Kidney Disease (CKD):   •  Stage 1 with normal or high GFR (GFR > 90 mL/min/1.73 square meters)  •  Stage 2 Mild CKD (GFR = 60-89 mL/min/1.73 square meters)  •  Stage 3A Moderate CKD (GFR = 45-59 mL/min/1.73 square meters)  •  Stage 3B Moderate CKD (GFR = 30-44 mL/min/1.73 square meters)  •  Stage 4 Severe CKD (GFR = 15-29 mL/min/1.73 square meters)  •  Stage 5 End Stage CKD (GFR <15 mL/min/1.73 square meters)  Note: GFR calculation is accurate only with a steady state creatinine    CBC and differential [[id number]]  (Abnormal) Collected: 08/22/23 1215    Lab Status: Final result Specimen: Blood Updated: 08/22/23 1218     WBC 8.27 Thousand/uL      RBC 3.91 Million/uL      Hemoglobin 11.0 g/dL      Hematocrit 36.0 %      MCV 92 fL      MCH 28.1 pg      MCHC 30.6 g/dL      RDW 13.4 %      MPV 9.0 fL      Platelets 505 Thousands/uL      nRBC 0 /100 WBCs      Neutrophils Relative 66 %      Immat GRANS % 0 %      Lymphocytes Relative 22 %      Monocytes Relative 9 %      Eosinophils Relative 3 %      Basophils Relative 0 %      Neutrophils Absolute 5.39 Thousands/µL      Immature Grans Absolute 0.03 Thousand/uL      Lymphocytes Absolute 1.80 Thousands/µL      Monocytes Absolute 0.74 Thousand/µL      Eosinophils Absolute 0.28 Thousand/µL      Basophils Absolute 0.03 Thousands/µL                  XR chest 2 views   Final Result by Bhumika Salazar MD (08/22 1233)      No acute cardiopulmonary disease. Workstation performed: [id number]                    Procedures  Procedures         ED Course  ED Course as of 08/22/23 2041 Tue Aug 22, 2023   1338 Message sent to cardiology   [phone] Discussed with Dr. Vanita Kulkarni from cardiology who advised single dose of IV diuretics and then change to toresimide 20 BID with repeat BMP in 1 week and follow up within 10 days                               SBIRT 22yo+    Flowsheet Row Most Recent Value   Initial Alcohol Screen: US AUDIT-C     1. How often do you have a drink containing alcohol? 0 Filed at: 08/22/2023 1350   2. How many drinks containing alcohol do you have on a typical day you are drinking? 0 Filed at: 08/22/2023 1350   3a. Male UNDER 65: How often do you have five or more drinks on one occasion? 0 Filed at: 08/22/2023 1350   3b. FEMALE Any Age, or MALE 65+: How often do you have 4 or more drinks on one occassion? 0 Filed at: 08/22/2023 1350   Audit-C Score 0 Filed at: 08/22/2023 1350   KRISTY: How many times in the past year have you. .. Used an illegal drug or used a prescription medication for non-medical reasons?  Never Filed at: 08/22/2023 1350                    Medical Decision Making  Lateral lower extremity edema, acute on chronic congestive heart failure, anemia, arrhythmia, renal failure  Plan for chest x-ray, EKG and laboratories  Diagnostics reviewed. I discussed the case with cardiology with the plan for single dose of IV diuretic in the emergency department with change in diuretics and close outpatient follow-up with repeat laboratories. Patient had good volume of diuresis after IV Lasix in the emergency department. I discussed the plan for medication change and outpatient follow-up with the patient and daughter who are agreeable with the plan. The patient (and any family present) verbalized understanding of the discharge instructions and warnings that would necessitate return to the Emergency Department. All questions were answered prior to discharge. Amount and/or Complexity of Data Reviewed  External Data Reviewed:      Details: Recent home care notes reviewed  Labs: ordered. Decision-making details documented in ED Course. Radiology: ordered. Risk  Prescription drug management. Disposition  Final diagnoses:   Bilateral lower extremity edema   Acute on chronic diastolic heart failure ([address])     Time reflects when diagnosis was documented in both MDM as applicable and the Disposition within this note     Time User Action Codes Description Comment    8/22/2023  3:25 PM Wendy Morocho Add [R60.0] Bilateral lower extremity edema     8/22/2023  3:25 PM Wendy Morocho Add [I50.33] Acute on chronic diastolic heart failure [facility])       ED Disposition     ED Disposition   Discharge    Condition   Stable    Date/Time   Tue Aug 22, 2023  3:25 PM    Comment   Gerardine Forget discharge to home/self care.                Follow-up Information     Follow up With Specialties Details Why Contact Info Additional [phone] Langston MD [facility] Medicine Schedule an appointment as soon as possible for a visit in 10 days For further evaluation, to follow up on abnormal test [facility] [address] Emergency Department Emergency Medicine Go to  If symptoms worsen [address] [address]  [address] [facility] Emergency Department, [address] [address], [place], [address]    [facility] Cardiology [facility] Cardiology Schedule an appointment as soon as possible for a visit  For further evaluation [address] [address]  [address] Cardiology [facility], [address], [place], [place], [address] [facility]          Discharge Medication List as of 8/22/2023  3:29 PM      START taking these medications    Details   torsemide (DEMADEX) 20 mg tablet Take 1 tablet (20 mg total) by mouth 2 (two) times a day, Starting Tue 8/22/2023, Until Thu 9/21/2023, Normal         CONTINUE these medications which have NOT CHANGED    Details   Artificial Tears 0.2-0.2-1 % SOLN Starting Fri 6/30/2023, Historical Med      aspirin (ECOTRIN LOW STRENGTH) 81 mg EC tablet Take 1 tablet (81 mg total) by mouth daily, Starting Tue 12/11/2018, OTC      atorvastatin (LIPITOR) 10 mg tablet Take 1 tablet (10 mg total) by mouth daily, Starting Tue 7/18/2023, Normal      Cholecalciferol (VITAMIN D3) 1000 units CAPS Take 1,000 Units by mouth daily , Historical Med      Cyanocobalamin (VITAMIN B12 PO) Take 1,000 mcg by mouth in the morning, Historical Med      gabapentin (NEURONTIN) 600 MG tablet Take 1 tablet by mouth twice daily, Normal      Mirabegron ER 25 MG TB24 Take 25 mg by mouth in the morning, Starting Wed 6/7/2023, Normal      Multiple Vitamins-Minerals (PRESERVISION AREDS PO) Take by mouth 2 (two) times a day, Historical Med      neomycin-polymyxin-hydrocortisone (CORTISPORIN) 0.35%-10,000 units/mL-1% otic suspension Administer 4 drops to the right ear every 6 (six) hours for 5 days, Starting Sun 6/25/2023, Until Fri 6/30/2023, No Print      nitrofurantoin (MACROBID) 100 mg capsule Take 1 capsule (100 mg total) by mouth in the morning, Starting Wed 6/7/2023, Normal      omeprazole (PriLOSEC) 20 mg delayed release capsule Take 1 capsule (20 mg total) by mouth daily, Starting Tue 7/18/2023, Normal      Saccharomyces boulardii (Probiotic) 250 MG CAPS Starting Fri 7/7/2023, Historical Med         STOP taking these medications       furosemide (LASIX) 20 mg tablet Comments:   Reason for Stopping:         furosemide (LASIX) 20 mg tablet Comments:   Reason for Stopping:               Outpatient Discharge Orders   Basic metabolic panel   Standing Status: Future Standing Exp.  Date: 08/22/24       PDMP Review       Value Time User    PDMP Reviewed  Yes 6/25/2023 11:54 AM Nadine Hanley MD          ED Provider  Electronically Signed by           Ki Louie DO  08/22/23 2041

## 2023-08-23 ENCOUNTER — PATIENT OUTREACH (OUTPATIENT)
Dept: CASE MANAGEMENT | Facility: OTHER | Age: 88
End: 2023-08-23

## 2023-08-23 ENCOUNTER — VBI (OUTPATIENT)
Dept: FAMILY MEDICINE CLINIC | Facility: CLINIC | Age: 88
End: 2023-08-23

## 2023-08-23 NOTE — TELEPHONE ENCOUNTER
08/23/23 2:16 PM    Patient contacted post ED visit, first outreach attempt made. Message was left for patient to return a call to the VBI Department at [facility]: Phone [phone]. Thank you.   Nelsy White MA  [facility]

## 2023-08-23 NOTE — LETTER
[address] [facility],[address]  [address] [address]    Date: 08/28/23  [facility]uch[facility] Luna [facility] [address]    Dear Devante Player:                                                                                                                              Thank you for choosing [facility] emergency department for care. Your primary care provider wants to make sure that your ongoing medical care is being addressed. If you require follow up care as a result of your emergency department visit, there are a few things the practice would like you to know. As part of the network's continuing commitment to caring for our patients, we have added more same day appointments and have extended office hours to meet your medical needs. After hours, on-call physicians are available via your primary care provider's main office line. We encourage you to contact our office prior to seeking treatment to discuss your symptoms with the medical staff. Together, we can determine the correct course of action. A majority of non-emergent conditions such as: common cold, flu-like symptoms, fevers, strains/sprains, dislocations, minor burns, cuts and animal bites can be treated at [facility] facilities. Diagnostic testing is available at some sites. Of course, if you are experiencing a life threatening medical emergency call 911 or proceed directly to the nearest emergency room.     Your nearest [facility] facility is conveniently located at:    [address]  [address] [facility], [address]  [address] [facility] [address] offered at most [facility] your spot online at [email or web address] or on the [address] [place] [address] Drive    Sincerely,    [address],[address]  Dept: [phone]

## 2023-08-23 NOTE — PROGRESS NOTES
Outreach TC to patient for CM assessment. No answer to call. Left message on voicemail requesting a return call from [facility] to [facility]; Outpatient Care Manager @ [phone]. Third unswered call to patient. Chart review completed.

## 2023-08-24 ENCOUNTER — HOME CARE VISIT (OUTPATIENT)
Dept: HOME HEALTH SERVICES | Facility: HOME HEALTHCARE | Age: 88
End: 2023-08-24
Payer: MEDICARE

## 2023-08-24 VITALS
SYSTOLIC BLOOD PRESSURE: 130 MMHG | RESPIRATION RATE: 16 BRPM | WEIGHT: 154 LBS | BODY MASS INDEX: 28.17 KG/M2 | HEART RATE: 65 BPM | OXYGEN SATURATION: 95 % | TEMPERATURE: 97.9 F | DIASTOLIC BLOOD PRESSURE: 70 MMHG

## 2023-08-24 PROCEDURE — G0299 HHS/HOSPICE OF RN EA 15 MIN: HCPCS

## 2023-08-24 NOTE — TELEPHONE ENCOUNTER
08/24/23 2:49 PM    Patient contacted post ED visit, second outreach attempt made. Message was left for patient to return a call to the VBI Department at [facility]: Phone [phone]. Thank you.   Joleen Taveras MA  [facility]

## 2023-08-25 NOTE — TELEPHONE ENCOUNTER
08/25/23 3:15 PM    Patient contacted post ED visit, third outreach attempt made. Message was left for patient to return a call to either the VBI Department at [facility]: Phone [phone] or the PCP office. Thank you.   Fadi Jay MA  [facility]

## 2023-08-28 ENCOUNTER — OFFICE VISIT (OUTPATIENT)
Dept: FAMILY MEDICINE CLINIC | Facility: CLINIC | Age: 88
End: 2023-08-28
Payer: MEDICARE

## 2023-08-28 VITALS
RESPIRATION RATE: 18 BRPM | TEMPERATURE: 97.1 F | OXYGEN SATURATION: 96 % | BODY MASS INDEX: 28.89 KG/M2 | HEART RATE: 75 BPM | HEIGHT: 61 IN | WEIGHT: 153 LBS

## 2023-08-28 DIAGNOSIS — R60.0 BILATERAL LOWER EXTREMITY EDEMA: ICD-10-CM

## 2023-08-28 DIAGNOSIS — I50.33 ACUTE ON CHRONIC DIASTOLIC HEART FAILURE (HCC): ICD-10-CM

## 2023-08-28 DIAGNOSIS — Z00.00 MEDICARE ANNUAL WELLNESS VISIT, SUBSEQUENT: Primary | ICD-10-CM

## 2023-08-28 DIAGNOSIS — I48.0 PAROXYSMAL ATRIAL FIBRILLATION (HCC): ICD-10-CM

## 2023-08-28 DIAGNOSIS — K21.9 GASTROESOPHAGEAL REFLUX DISEASE WITHOUT ESOPHAGITIS: ICD-10-CM

## 2023-08-28 DIAGNOSIS — H35.3211 EXUDATIVE AGE-RELATED MACULAR DEGENERATION OF RIGHT EYE WITH ACTIVE CHOROIDAL NEOVASCULARIZATION (HCC): ICD-10-CM

## 2023-08-28 PROCEDURE — 99214 OFFICE O/P EST MOD 30 MIN: CPT | Performed by: FAMILY MEDICINE

## 2023-08-28 PROCEDURE — G0439 PPPS, SUBSEQ VISIT: HCPCS | Performed by: FAMILY MEDICINE

## 2023-08-28 RX ORDER — TORSEMIDE 20 MG/1
20 TABLET ORAL 2 TIMES DAILY
Qty: 60 TABLET | Refills: 3 | Status: SHIPPED | OUTPATIENT
Start: 2023-08-28 | End: 2023-09-27

## 2023-08-28 NOTE — TELEPHONE ENCOUNTER
08/28/23 12:48 PM    Patient contacted post ED visit, phone outreaches were unsuccessful and a MyChart letter has been sent to the patient as follow-up. Thank you.   Agusto Dorsey MA  [facility]

## 2023-08-28 NOTE — PROGRESS NOTES
GERD-symptoms are stable. History of macular degeneration-continues to see cardiology   Assessment and Plan:     Continue current medications. I will call with lab results as they come in tomorrow. I would have you rechecked in 6 to 8 weeks either here or with new PCP. Problem List Items Addressed This Visit        Digestive    Gastroesophageal reflux disease without esophagitis       Cardiovascular and Mediastinum    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)    Acute on chronic diastolic heart failure (HCC)    Relevant Medications    torsemide (DEMADEX) 20 mg tablet    Other Relevant Orders    Ambulatory Referral to Cardiology    Paroxysmal atrial fibrillation [facility])    Relevant Orders    Ambulatory Referral to Cardiology   Other Visit Diagnoses     Medicare annual wellness visit, subsequent    -  Primary    Bilateral lower extremity edema        Relevant Medications    torsemide (DEMADEX) 20 mg tablet        BMI Counseling: Body mass index is 28.91 kg/m². The BMI is above normal. Nutrition recommendations include decreasing portion sizes and moderation in carbohydrate intake. No pharmacotherapy was ordered. Rationale for BMI follow-up plan is due to patient being overweight or obese. Preventive health issues were discussed with patient, and age appropriate screening tests were ordered as noted in patient's After Visit Summary. Personalized health advice and appropriate referrals for health education or preventive services given if needed, as noted in patient's After Visit Summary. History of Present Illness:     Patient presents for a Medicare Wellness Visit    Patient for follow-up in ER and Medicare wellness. Gone to the ER with increasing leg edema. Had been given IV medication to help with diuresis. We do still decreasing slowly. Switch from furosemide to torsemide. She is taking 20 mg twice daily.   Has seen  Giuseppe-cardiology-in the past but no recent visits. He does not really describe shortness of breath. She does have a history of atrial fibrillation and diagnosed with congestive heart failure in the past.  GERD-symptoms are stable. Macular degeneration continues to take PreserVision. Patient Care Team:  Marychuy Louis MD as PCP - General  Dionisio Morales RN as RN Care Manager     Review of Systems:     Review of Systems   Constitutional: Negative for activity change, appetite change, diaphoresis and fatigue. HENT: Negative for congestion, sinus pressure and sore throat. Respiratory: Negative for cough, chest tightness, shortness of breath and wheezing. Cardiovascular: Positive for leg swelling. Negative for chest pain and palpitations. Fast or slow heart rate   Gastrointestinal: Negative for abdominal pain, blood in stool, constipation, diarrhea, nausea and vomiting. Genitourinary: Negative for difficulty urinating, dysuria, frequency and hematuria. Musculoskeletal: Positive for gait problem. Negative for arthralgias, joint swelling and myalgias. Neurological: Negative for dizziness, light-headedness and headaches. Psychiatric/Behavioral: Negative for agitation, confusion, dysphoric mood and sleep disturbance. The patient is not nervous/anxious.          Problem List:     Patient Active Problem List   Diagnosis   • Hypertension   • Neuropathy   • Gastroesophageal reflux disease without esophagitis   • Vitamin B 12 deficiency   • Sepsis without acute organ dysfunction [facility])   • Fall   • Arthropathy   • Mixed hyperlipidemia   • Venous insufficiency   • 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)   • Acute pain of left shoulder   • Change in mental status   • History of recurrent UTI (urinary tract infection)   • Hyponatremia   • Generalized weakness   • Acute on chronic diastolic heart failure (HCC)   • Paroxysmal atrial fibrillation ([address])   • Cystitis   • Pulmonary nodules   • Earache   • Diarrhea   • Stage 3a chronic kidney disease (HCC)      Past Medical and Surgical History:     Past Medical History:   Diagnosis Date   • SHERLYN (acute kidney injury) ([address]) 5/8/2022   • Balance disorder    • Chronic pain    • GERD (gastroesophageal reflux disease)    • Hard of hearing    • Hyperlipidemia    • Hypertension    • Hyponatremia 12/5/2020   • Low serum cortisol level 12/7/2020   • Neuropathy    • Pneumonia    • Pneumonia due to COVID-19 virus 12/5/2020   • Sepsis ([address]) 5/22/2018   • Tinnitus    • Unspecified adrenocortical insufficiency ([address]) 2/28/2023    [place] to be related to COVID-19 infection. No current symptoms. • UTI (urinary tract infection)      Past Surgical History:   Procedure Laterality Date   • EYE SURGERY     • HYSTERECTOMY     • TONSILLECTOMY        Family History:     Family History   Problem Relation Age of Onset   • Stroke Mother    • Stroke Father    • Heart disease Daughter    • Substance Abuse Neg Hx    • Mental illness Neg Hx       Social History:     Social History     Socioeconomic History   • Marital status:       Spouse name: None   • Number of children: None   • Years of education: None   • Highest education level: None   Occupational History   • None   Tobacco Use   • Smoking status: Never     Passive exposure: Never   • Smokeless tobacco: Never   Vaping Use   • Vaping Use: Never used   Substance and Sexual Activity   • Alcohol use: Not Currently     Comment: rarely   • Drug use: No   • Sexual activity: Not Currently   Other Topics Concern   • None   Social History Narrative   • None     Social Determinants of Health     Financial Resource Strain: Low Risk  (8/28/2023)    Overall Financial Resource Strain (CARDIA)    • Difficulty of Paying Living Expenses: Not very hard   Food Insecurity: No Food Insecurity (6/19/2023)    Hunger Vital Sign    • Worried About Running Out of Food in the Last Year: Never true    • Ran Out of Food in the Last Year: Never true   Transportation Needs: No Transportation Needs (8/28/2023)    PRAPARE - Transportation    • Lack of Transportation (Medical): No    • Lack of Transportation (Non-Medical): No   Physical Activity: Not on file   Stress: Not on file   Social Connections: Not on file   Intimate Partner Violence: Not on file   Housing Stability: Low Risk  (6/19/2023)    Housing Stability Vital Sign    • Unable to Pay for Housing in the Last Year: No    • Number of Places Lived in the Last Year: 1    • Unstable Housing in the Last Year: No      Medications and Allergies:     Current Outpatient Medications   Medication Sig Dispense Refill   • torsemide (DEMADEX) 20 mg tablet Take 1 tablet (20 mg total) by mouth 2 (two) times a day 60 tablet 3   • Artificial Tears 0.2-0.2-1 % SOLN      • aspirin (ECOTRIN LOW STRENGTH) 81 mg EC tablet Take 1 tablet (81 mg total) by mouth daily  0   • atorvastatin (LIPITOR) 10 mg tablet Take 1 tablet (10 mg total) by mouth daily 90 tablet 1   • Cholecalciferol (VITAMIN D3) 1000 units CAPS Take 1,000 Units by mouth daily      • Cyanocobalamin (VITAMIN B12 PO) Take 1,000 mcg by mouth in the morning     • gabapentin (NEURONTIN) 600 MG tablet Take 1 tablet by mouth twice daily 180 tablet 0   • Mirabegron ER 25 MG TB24 Take 25 mg by mouth in the morning 30 tablet 3   • Multiple Vitamins-Minerals (PRESERVISION AREDS PO) Take by mouth 2 (two) times a day     • neomycin-polymyxin-hydrocortisone (CORTISPORIN) 0.35%-10,000 units/mL-1% otic suspension Administer 4 drops to the right ear every 6 (six) hours for 5 days 10 mL 0   • nitrofurantoin (MACROBID) 100 mg capsule Take 1 capsule (100 mg total) by mouth in the morning 90 capsule 3   • omeprazole (PriLOSEC) 20 mg delayed release capsule Take 1 capsule (20 mg total) by mouth daily 90 capsule 1   • Saccharomyces boulardii (Probiotic) 250 MG CAPS        No current facility-administered medications for this visit. No Known Allergies   Immunizations:     Immunization History   Administered Date(s) Administered   • COVID-19 PFIZER VACCINE 0.3 ML IM 04/19/2021, 05/13/2021   • Influenza Split High Dose Preservative Free IM 09/09/2016   • Influenza, high dose seasonal 0.7 mL 11/27/2018, 09/11/2020   • Pneumococcal Conjugate 13-Valent 09/09/2016   • Pneumococcal Conjugate Vaccine 20-valent (Pcv20), Polysace 08/25/2022   • Pneumococcal Polysaccharide PPV23 04/23/2013      Health Maintenance: There are no preventive care reminders to display for this patient. Topic Date Due   • COVID-19 Vaccine (3 - Pfizer series) 07/08/2021   • Influenza Vaccine (1) 09/01/2023      Medicare Screening Tests and Risk Assessments:     Gavin Haq is here for her Subsequent Wellness visit. Health Risk Assessment:   Patient rates overall health as fair. Patient feels that their physical health rating is slightly worse. Patient is very satisfied with their life. Eyesight was rated as slightly worse. Hearing was rated as slightly worse. Patient feels that their emotional and mental health rating is much better. Patients states they are never, rarely angry. Patient states they are sometimes unusually tired/fatigued. Pain experienced in the last 7 days has been none. Patient states that she has experienced no weight loss or gain in last 6 months. Fall Risk Screening: In the past year, patient has experienced: history of falling in past year    Number of falls: 2 or more  Injured during fall?: No    Feels unsteady when standing or walking?: Yes    Worried about falling?: No      Urinary Incontinence Screening:   Patient has leaked urine accidently in the last six months. Home Safety:  Patient has trouble with stairs inside or outside of their home. Patient has working smoke alarms and has no working carbon monoxide detector. Home safety hazards include: none. Nutrition:   Current diet is Regular.      Medications:   Patient is currently taking over-the-counter supplements. OTC medications include: vitamin b 12. Patient is not able to manage medications. Activities of Daily Living (ADLs)/Instrumental Activities of Daily Living (IADLs):   Walk and transfer into and out of bed and chair?: Yes  Dress and groom yourself?: Yes    Bathe or shower yourself?: Yes    Feed yourself? Yes  Do your laundry/housekeeping?: Yes  Manage your money, pay your bills and track your expenses?: Yes  Make your own meals?: Yes    Do your own shopping?: Yes    Advance Care Planning:   Living will: Yes    Durable POA for healthcare: Yes    Advanced directive: Yes      Cognitive Screening:   Provider or family/friend/caregiver concerned regarding cognition?: No    PREVENTIVE SCREENINGS      Cardiovascular Screening:    General: Screening Not Indicated and History Lipid Disorder      Diabetes Screening:     General: Screening Current      Colorectal Cancer Screening:     General: Screening Not Indicated      Breast Cancer Screening:     General: Screening Not Indicated      Cervical Cancer Screening:    General: Screening Not Indicated      Osteoporosis Screening:    General: Screening Not Indicated      Abdominal Aortic Aneurysm (AAA) Screening:        General: Screening Not Indicated      Lung Cancer Screening:     General: Screening Not Indicated    Screening, Brief Intervention, and Referral to Treatment (SBIRT)    Screening    Typical number of drinks in a week: 0    Brief Intervention  Alcohol & drug use screenings were reviewed. No concerns regarding substance use disorder identified. Annual Depression Screening  Time spent screening and evaluating the patient for depression during today's encounter was 5 minutes. No results found. Physical Exam:     Pulse 75   Temp (!) 97.1 °F (36.2 °C) (Temporal)   Resp 18   Ht 5' 1" (1.549 m)   Wt 69.4 kg (153 lb)   SpO2 96%   BMI 28.91 kg/m²     Physical Exam  Vitals and nursing note reviewed. Constitutional:       General: She is not in acute distress. Appearance: She is not ill-appearing. HENT:      Head: Normocephalic and atraumatic. Right Ear: Tympanic membrane, ear canal and external ear normal.      Left Ear: Tympanic membrane, ear canal and external ear normal.      Nose: Nose normal.      Mouth/Throat:      Mouth: Mucous membranes are moist.      Pharynx: No posterior oropharyngeal erythema. Eyes:      General:         Right eye: No discharge. Left eye: No discharge. Extraocular Movements: Extraocular movements intact. Conjunctiva/sclera: Conjunctivae normal.      Pupils: Pupils are equal, round, and reactive to light. Neck:      Thyroid: No thyromegaly. Vascular: No carotid bruit. Trachea: No tracheal deviation. Cardiovascular:      Rate and Rhythm: Normal rate and regular rhythm. Heart sounds: Normal heart sounds. No murmur heard. Pulmonary:      Effort: Pulmonary effort is normal. No respiratory distress. Breath sounds: Normal breath sounds. No wheezing. Musculoskeletal:      Cervical back: Normal range of motion and neck supple. Right lower leg: Edema present. Left lower leg: Edema present. Lymphadenopathy:      Cervical: No cervical adenopathy. Skin:     Findings: No rash. Neurological:      Mental Status: She is alert and oriented to person, place, and time.    Psychiatric:         Mood and Affect: Mood normal.         Behavior: Behavior normal.          Gurvinder Atkinson MD

## 2023-08-28 NOTE — PATIENT INSTRUCTIONS
Continue current medications. I will call with lab results as they come in tomorrow. I would have you rechecked in 6 to 8 weeks either here or with new PCP. Medicare Preventive Visit Patient Instructions  Thank you for completing your Welcome to Medicare Visit or Medicare Annual Wellness Visit today. Your next wellness visit will be due in one year (8/28/2024). The screening/preventive services that you may require over the next 5-10 years are detailed below. Some tests may not apply to you based off risk factors and/or age. Screening tests ordered at today's visit but not completed yet may show as past due. Also, please note that scanned in results may not display below. Preventive Screenings:  Service Recommendations Previous Testing/Comments   Colorectal Cancer Screening  * Colonoscopy    * Fecal Occult Blood Test (FOBT)/Fecal Immunochemical Test (FIT)  * Fecal DNA/Cologuard Test  * Flexible Sigmoidoscopy Age: 43-73 years old   Colonoscopy: every 10 years (may be performed more frequently if at higher risk)  OR  FOBT/FIT: every 1 year  OR  Cologuard: every 3 years  OR  Sigmoidoscopy: every 5 years  Screening may be recommended earlier than age 39 if at higher risk for colorectal cancer. Also, an individualized decision between you and your healthcare provider will decide whether screening between the ages of 77-80 would be appropriate. Colonoscopy: Not on file  FOBT/FIT: Not on file  Cologuard: Not on file  Sigmoidoscopy: Not on file          Breast Cancer Screening Age: 36 years old  Frequency: every 1-2 years  Not required if history of left and right mastectomy Mammogram: Not on file        Cervical Cancer Screening Between the ages of 21-29, pap smear recommended once every 3 years. Between the ages of 32-69, can perform pap smear with HPV co-testing every 5 years.    Recommendations may differ for women with a history of total hysterectomy, cervical cancer, or abnormal pap smears in past. Pap Smear: Not on file        Hepatitis C Screening Once for adults born between [address] [place] [address]  More frequently in patients at high risk for Hepatitis C Hep C Antibody: Not on file        Diabetes Screening 1-2 times per year if you're at risk for diabetes or have pre-diabetes Fasting glucose: No results in last 5 years (No results in last 5 years)  A1C: No results in last 5 years (No results in last 5 years)      Cholesterol Screening Once every 5 years if you don't have a lipid disorder. May order more often based on risk factors. Lipid panel: 04/05/2023          Other Preventive Screenings Covered by Medicare:  Abdominal Aortic Aneurysm (AAA) Screening: covered once if your at risk. You're considered to be at risk if you have a family history of AAA. Lung Cancer Screening: covers low dose CT scan once per year if you meet all of the following conditions: (1) Age 48-67; (2) No signs or symptoms of lung cancer; (3) Current smoker or have quit smoking within the last 15 years; (4) You have a tobacco smoking history of at least 20 pack years (packs per day multiplied by number of years you smoked); (5) You get a written order from a healthcare provider. Glaucoma Screening: covered annually if you're considered high risk: (1) You have diabetes OR (2) Family history of glaucoma OR (3)  aged 48 and older OR (3)  American aged 72 and older  Osteoporosis Screening: covered every 2 years if you meet one of the following conditions: (1) You're estrogen deficient and at risk for osteoporosis based off medical history and other findings; (2) Have a vertebral abnormality; (3) On glucocorticoid therapy for more than 3 months; (4) Have primary hyperparathyroidism; (5) On osteoporosis medications and need to assess response to drug therapy. Last bone density test (DXA Scan): Not on file. HIV Screening: covered annually if you're between the age of 14-79.  Also covered annually if you are younger than 13 and older than 72 with risk factors for HIV infection. For pregnant patients, it is covered up to 3 times per pregnancy. Immunizations:  Immunization Recommendations   Influenza Vaccine Annual influenza vaccination during flu season is recommended for all persons aged >= 6 months who do not have contraindications   Pneumococcal Vaccine   * Pneumococcal conjugate vaccine = PCV13 (Prevnar 13), PCV15 (Vaxneuvance), PCV20 (Prevnar 20)  * Pneumococcal polysaccharide vaccine = PPSV23 (Pneumovax) Adults 20-63 years old: 1-3 doses may be recommended based on certain risk factors  Adults 72 years old: 1-2 doses may be recommended based off what pneumonia vaccine you previously received   Hepatitis B Vaccine 3 dose series if at intermediate or high risk (ex: diabetes, end stage renal disease, liver disease)   Tetanus (Td) Vaccine - COST NOT COVERED BY MEDICARE PART B Following completion of primary series, a booster dose should be given every 10 years to maintain immunity against tetanus. Td may also be given as tetanus wound prophylaxis. Tdap Vaccine - COST NOT COVERED BY MEDICARE PART B Recommended at least once for all adults. For pregnant patients, recommended with each pregnancy. Shingles Vaccine (Shingrix) - COST NOT COVERED BY MEDICARE PART B  2 shot series recommended in those aged 48 and above     Health Maintenance Due:  There are no preventive care reminders to display for this patient. Immunizations Due:      Topic Date Due    COVID-19 Vaccine (3 - Pfizer series) 07/08/2021    Influenza Vaccine (1) 09/01/2023     Advance Directives   What are advance directives? Advance directives are legal documents that state your wishes and plans for medical care. These plans are made ahead of time in case you lose your ability to make decisions for yourself. Advance directives can apply to any medical decision, such as the treatments you want, and if you want to donate organs.    What are the types of advance directives? There are many types of advance directives, and each state has rules about how to use them. You may choose a combination of any of the following:  Living will: This is a written record of the treatment you want. You can also choose which treatments you do not want, which to limit, and which to stop at a certain time. This includes surgery, medicine, IV fluid, and tube feedings. Durable power of  for [facility]): This is a written record that states who you want to make healthcare choices for you when you are unable to make them for yourself. This person, called a proxy, is usually a family member or a friend. You may choose more than 1 proxy. Do not resuscitate (DNR) order:  A DNR order is used in case your heart stops beating or you stop breathing. It is a request not to have certain forms of treatment, such as CPR. A DNR order may be included in other types of advance directives. Medical directive: This covers the care that you want if you are in a coma, near death, or unable to make decisions for yourself. You can list the treatments you want for each condition. Treatment may include pain medicine, surgery, blood transfusions, dialysis, IV or tube feedings, and a ventilator (breathing machine). Values history: This document has questions about your views, beliefs, and how you feel and think about life. This information can help others choose the care that you would choose. Why are advance directives important? An advance directive helps you control your care. Although spoken wishes may be used, it is better to have your wishes written down. Spoken wishes can be misunderstood, or not followed. Treatments may be given even if you do not want them. An advance directive may make it easier for your family to make difficult choices about your care. Urinary Incontinence   Urinary incontinence (UI)  is when you lose control of your bladder.  UI develops because your bladder cannot store or empty urine properly. The 3 most common types of UI are stress incontinence, urge incontinence, or both. Medicines:   May be given to help strengthen your bladder control. Report any side effects of medication to your healthcare provider. Do pelvic muscle exercises often:  Your pelvic muscles help you stop urinating. Squeeze these muscles tight for 5 seconds, then relax for 5 seconds. Gradually work up to squeezing for 10 seconds. Do 3 sets of 15 repetitions a day, or as directed. This will help strengthen your pelvic muscles and improve bladder control. Train your bladder:  Go to the bathroom at set times, such as every 2 hours, even if you do not feel the urge to go. You can also try to hold your urine when you feel the urge to go. For example, hold your urine for 5 minutes when you feel the urge to go. As that becomes easier, hold your urine for 10 minutes. Self-care:   Keep a UI record. Write down how often you leak urine and how much you leak. Make a note of what you were doing when you leaked urine. Drink liquids as directed. You may need to limit the amount of liquid you drink to help control your urine leakage. Do not drink any liquid right before you go to bed. Limit or do not have drinks that contain caffeine or alcohol. Prevent constipation. Eat a variety of high-fiber foods. Good examples are high-fiber cereals, beans, vegetables, and whole-grain breads. Walking is the best way to trigger your intestines to have a bowel movement. Exercise regularly and maintain a healthy weight. Weight loss and exercise will decrease pressure on your bladder and help you control your leakage. Use a catheter as directed  to help empty your bladder. A catheter is a tiny, plastic tube that is put into your bladder to drain your urine. Go to behavior therapy as directed. Behavior therapy may be used to help you learn to control your urge to urinate.     Weight Management   Why it is important to manage your weight:  Being overweight increases your risk of health conditions such as heart disease, high blood pressure, type 2 diabetes, and certain types of cancer. It can also increase your risk for osteoarthritis, sleep apnea, and other respiratory problems. Aim for a slow, steady weight loss. Even a small amount of weight loss can lower your risk of health problems. How to lose weight safely:  A safe and healthy way to lose weight is to eat fewer calories and get regular exercise. You can lose up about 1 pound a week by decreasing the number of calories you eat by 500 calories each day. Healthy meal plan for weight management:  A healthy meal plan includes a variety of foods, contains fewer calories, and helps you stay healthy. A healthy meal plan includes the following:  Eat whole-grain foods more often. A healthy meal plan should contain fiber. Fiber is the part of grains, fruits, and vegetables that is not broken down by your body. Whole-grain foods are healthy and provide extra fiber in your diet. Some examples of whole-grain foods are whole-wheat breads and pastas, oatmeal, brown rice, and bulgur. Eat a variety of vegetables every day. Include dark, leafy greens such as spinach, kale, fidel greens, and mustard greens. Eat yellow and orange vegetables such as carrots, sweet potatoes, and winter squash. Eat a variety of fruits every day. Choose fresh or canned fruit (canned in its own juice or light syrup) instead of juice. Fruit juice has very little or no fiber. Eat low-fat dairy foods. Drink fat-free (skim) milk or 1% milk. Eat fat-free yogurt and low-fat cottage cheese. Try low-fat cheeses such as mozzarella and other reduced-fat cheeses. Choose meat and other protein foods that are low in fat. Choose beans or other legumes such as split peas or lentils. Choose fish, skinless poultry (chicken or turkey), or lean cuts of red meat (beef or pork).  Before you cook meat or poultry, cut off any visible fat. Use less fat and oil. Try baking foods instead of frying them. Add less fat, such as margarine, sour cream, regular salad dressing and mayonnaise to foods. Eat fewer high-fat foods. Some examples of high-fat foods include french fries, doughnuts, ice cream, and cakes. Eat fewer sweets. Limit foods and drinks that are high in sugar. This includes candy, cookies, regular soda, and sweetened drinks. Exercise:  Exercise at least 30 minutes per day on most days of the week. Some examples of exercise include walking, biking, dancing, and swimming. You can also fit in more physical activity by taking the stairs instead of the elevator or parking farther away from stores. Ask your healthcare provider about the best exercise plan for you. © Copyright [employer] 2018 Information is for End User's use only and may not be sold, redistributed or otherwise used for commercial purposes.  All illustrations and images included in CareNotes® are the copyrighted property of [employer] or  [employer]

## 2023-08-29 ENCOUNTER — HOME CARE VISIT (OUTPATIENT)
Dept: HOME HEALTH SERVICES | Facility: HOME HEALTHCARE | Age: 88
End: 2023-08-29
Payer: MEDICARE

## 2023-08-29 ENCOUNTER — APPOINTMENT (OUTPATIENT)
Dept: LAB | Facility: HOSPITAL | Age: 88
End: 2023-08-29
Payer: MEDICARE

## 2023-08-29 VITALS
SYSTOLIC BLOOD PRESSURE: 120 MMHG | BODY MASS INDEX: 28.93 KG/M2 | OXYGEN SATURATION: 94 % | DIASTOLIC BLOOD PRESSURE: 52 MMHG | WEIGHT: 153.1 LBS | RESPIRATION RATE: 16 BRPM | HEART RATE: 65 BPM | TEMPERATURE: 97.8 F

## 2023-08-29 DIAGNOSIS — R60.0 BILATERAL LOWER EXTREMITY EDEMA: ICD-10-CM

## 2023-08-29 LAB
ANION GAP SERPL CALCULATED.3IONS-SCNC: 7 MMOL/L
BUN SERPL-MCNC: 28 MG/DL (ref 5–25)
CALCIUM SERPL-MCNC: 9.1 MG/DL (ref 8.4–10.2)
CHLORIDE SERPL-SCNC: 97 MMOL/L (ref 96–108)
CO2 SERPL-SCNC: 36 MMOL/L (ref 21–32)
CREAT SERPL-MCNC: 1.1 MG/DL (ref 0.6–1.3)
GFR SERPL CREATININE-BSD FRML MDRD: 43 ML/MIN/1.73SQ M
GLUCOSE SERPL-MCNC: 93 MG/DL (ref 65–140)
POTASSIUM SERPL-SCNC: 3.8 MMOL/L (ref 3.5–5.3)
SODIUM SERPL-SCNC: 140 MMOL/L (ref 135–147)

## 2023-08-29 PROCEDURE — G0299 HHS/HOSPICE OF RN EA 15 MIN: HCPCS

## 2023-08-29 PROCEDURE — 80048 BASIC METABOLIC PNL TOTAL CA: CPT

## 2023-08-29 PROCEDURE — 36415 COLL VENOUS BLD VENIPUNCTURE: CPT

## 2023-08-31 ENCOUNTER — TELEPHONE (OUTPATIENT)
Dept: FAMILY MEDICINE CLINIC | Facility: CLINIC | Age: 88
End: 2023-08-31

## 2023-08-31 ENCOUNTER — HOME CARE VISIT (OUTPATIENT)
Dept: HOME HEALTH SERVICES | Facility: HOME HEALTHCARE | Age: 88
End: 2023-08-31
Payer: MEDICARE

## 2023-08-31 VITALS
RESPIRATION RATE: 16 BRPM | BODY MASS INDEX: 28.53 KG/M2 | DIASTOLIC BLOOD PRESSURE: 68 MMHG | OXYGEN SATURATION: 96 % | WEIGHT: 151 LBS | SYSTOLIC BLOOD PRESSURE: 124 MMHG | HEART RATE: 69 BPM | TEMPERATURE: 98.3 F

## 2023-08-31 PROCEDURE — G0299 HHS/HOSPICE OF RN EA 15 MIN: HCPCS

## 2023-09-05 ENCOUNTER — PATIENT OUTREACH (OUTPATIENT)
Dept: CASE MANAGEMENT | Facility: OTHER | Age: 88
End: 2023-09-05

## 2023-09-05 NOTE — PROGRESS NOTES
Outpatient Care Management Note:  In basket referral for the Better You program received. Chart review completed. Chart review completed for the following sections:  • Recent Vital Signs  • Allergies/Contradictions  • Medication Review   • History   • SDOH   • Problem List  • Immunizations  • Past hospitalizations and major procedures, including surgery  • Significant past illnesses and treatment history including: History and Physical, Other provider notes, PT, OT, ST, Medications/Infusions/Transfusions, Diet/Fluid restrictions and lab and radiology results  • Relevant past medications related to the patient's condition    Does not currently qualify for BYP. Complex episode is active, outreach has been attempted.

## 2023-09-07 ENCOUNTER — HOME CARE VISIT (OUTPATIENT)
Dept: HOME HEALTH SERVICES | Facility: HOME HEALTHCARE | Age: 88
End: 2023-09-07
Payer: MEDICARE

## 2023-09-07 VITALS
SYSTOLIC BLOOD PRESSURE: 130 MMHG | BODY MASS INDEX: 29.1 KG/M2 | HEART RATE: 69 BPM | TEMPERATURE: 97.7 F | OXYGEN SATURATION: 96 % | DIASTOLIC BLOOD PRESSURE: 70 MMHG | WEIGHT: 154 LBS | RESPIRATION RATE: 16 BRPM

## 2023-09-07 PROCEDURE — G0299 HHS/HOSPICE OF RN EA 15 MIN: HCPCS

## 2023-09-25 ENCOUNTER — PATIENT OUTREACH (OUTPATIENT)
Dept: CASE MANAGEMENT | Facility: OTHER | Age: 88
End: 2023-09-25

## 2023-09-25 NOTE — PROGRESS NOTES
Multiple outreach attempts with no answer. Unable to reach letter sent. Patient does not met requirements for Better You program. Episode closed. I removed myself from the care team and the care coordination note has been updated.

## 2023-09-27 DIAGNOSIS — G60.9 IDIOPATHIC PERIPHERAL NEUROPATHY: ICD-10-CM

## 2023-09-27 RX ORDER — GABAPENTIN 600 MG/1
600 TABLET ORAL 2 TIMES DAILY
Qty: 180 TABLET | Refills: 0 | Status: SHIPPED | OUTPATIENT
Start: 2023-09-27

## 2023-09-27 NOTE — TELEPHONE ENCOUNTER
Per patients request via fax, pt would like a 90 day supply of Gabapentin sent to [facility] DR MARIEL[facility] GUIDRY in [address] [place] [address].

## 2023-10-06 DIAGNOSIS — N32.81 OAB (OVERACTIVE BLADDER): ICD-10-CM

## 2023-10-06 NOTE — TELEPHONE ENCOUNTER
Reason for call:   [x] Refill   [] Prior Auth  [] Other:     Office:   [] PCP/Provider -   [x] Speciality/Provider - [facility]    Medication: mirabegron er 25mg    Dose/Frequency: 1 tab daily    Quantity: 30    Pharmacy: [facility]    Does the patient have enough for 3 days?    [x] Yes   [] No - Send as HP to POD

## 2024-01-06 DIAGNOSIS — I50.33 ACUTE ON CHRONIC DIASTOLIC HEART FAILURE (HCC): ICD-10-CM

## 2024-01-06 DIAGNOSIS — R60.0 BILATERAL LOWER EXTREMITY EDEMA: ICD-10-CM

## 2024-01-06 DIAGNOSIS — I10 PRIMARY HYPERTENSION: ICD-10-CM

## 2024-01-06 DIAGNOSIS — G60.9 IDIOPATHIC PERIPHERAL NEUROPATHY: ICD-10-CM

## 2024-01-08 RX ORDER — ATORVASTATIN CALCIUM 10 MG/1
10 TABLET, FILM COATED ORAL DAILY
Qty: 90 TABLET | Refills: 0 | Status: SHIPPED | OUTPATIENT
Start: 2024-01-08

## 2024-01-08 RX ORDER — GABAPENTIN 600 MG/1
600 TABLET ORAL 2 TIMES DAILY
Qty: 180 TABLET | Refills: 0 | Status: SHIPPED | OUTPATIENT
Start: 2024-01-08

## 2024-01-08 RX ORDER — TORSEMIDE 20 MG/1
20 TABLET ORAL 2 TIMES DAILY
Qty: 60 TABLET | Refills: 0 | Status: SHIPPED | OUTPATIENT
Start: 2024-01-08

## 2024-02-06 DIAGNOSIS — R60.0 BILATERAL LOWER EXTREMITY EDEMA: ICD-10-CM

## 2024-02-06 DIAGNOSIS — K21.9 GASTROESOPHAGEAL REFLUX DISEASE: ICD-10-CM

## 2024-02-06 DIAGNOSIS — I50.33 ACUTE ON CHRONIC DIASTOLIC HEART FAILURE (HCC): ICD-10-CM

## 2024-02-06 RX ORDER — TORSEMIDE 20 MG/1
20 TABLET ORAL 2 TIMES DAILY
Qty: 60 TABLET | Refills: 0 | Status: SHIPPED | OUTPATIENT
Start: 2024-02-06

## 2024-02-06 RX ORDER — OMEPRAZOLE 20 MG/1
20 CAPSULE, DELAYED RELEASE ORAL DAILY
Qty: 90 CAPSULE | Refills: 0 | Status: SHIPPED | OUTPATIENT
Start: 2024-02-06

## 2024-03-06 DIAGNOSIS — I50.33 ACUTE ON CHRONIC DIASTOLIC HEART FAILURE (HCC): ICD-10-CM

## 2024-03-06 DIAGNOSIS — R60.0 BILATERAL LOWER EXTREMITY EDEMA: ICD-10-CM

## 2024-03-06 RX ORDER — TORSEMIDE 20 MG/1
20 TABLET ORAL 2 TIMES DAILY
Qty: 60 TABLET | Refills: 0 | Status: SHIPPED | OUTPATIENT
Start: 2024-03-06

## 2024-03-21 NOTE — ED PROVIDER NOTES
History  Chief Complaint   Patient presents with    Shortness of Breath     Pt c/o productive cough with "brown" sputum and worsening SOB x 1 week, additionally c/o right ear pain       This is an 80-year-old female who presents with cough productive for brown sputum low-grade fever and shortness of breath increasing over the past week she also has some pain and drainage from the right year        History provided by:  Patient and relative  Shortness of Breath   Severity:  Moderate  Onset quality:  Gradual  Duration:  1 week  Timing:  Constant  Progression:  Worsening  Chronicity:  New  Relieved by:  Nothing  Worsened by:  Exertion  Associated symptoms: chest pain ( only with coughing), cough, fever and sputum production    Risk factors: no tobacco use        Prior to Admission Medications   Prescriptions Last Dose Informant Patient Reported? Taking? cholecalciferol (VITAMIN D3) 400 units tablet   Yes No   Sig: Take 400 Units by mouth daily   cyanocobalamin 100 MCG tablet   Yes No   Sig: Take 100 mcg by mouth daily   gabapentin (NEURONTIN) 800 mg tablet   No No   Sig: Take 1 tablet (800 mg total) by mouth 2 (two) times a day   hydrochlorothiazide (HYDRODIURIL) 12 5 mg tablet   No No   Sig: Take 1 tablet (12 5 mg total) by mouth daily   omeprazole (PriLOSEC) 20 mg delayed release capsule   No No   Sig: Take 1 capsule (20 mg total) by mouth daily   pravastatin (PRAVACHOL) 20 mg tablet   No No   Sig: Take 1 tablet (20 mg total) by mouth daily      Facility-Administered Medications: None       Past Medical History:   Diagnosis Date    Balance disorder     GERD (gastroesophageal reflux disease)     Hard of hearing     Hyperlipidemia     Hypertension     Neuropathy     Tinnitus        Past Surgical History:   Procedure Laterality Date    EYE SURGERY      HYSTERECTOMY         History reviewed  No pertinent family history  I have reviewed and agree with the history as documented      Social History   Substance Use Topics    Smoking status: Never Smoker    Smokeless tobacco: Never Used    Alcohol use Yes      Comment: rarely        Review of Systems   Constitutional: Positive for fatigue and fever  Respiratory: Positive for cough, sputum production and shortness of breath  Cardiovascular: Positive for chest pain ( only with coughing)  Neurological: Positive for weakness  All other systems reviewed and are negative  Physical Exam  Physical Exam   Constitutional: She is oriented to person, place, and time  She appears well-developed  HENT:   Head: Normocephalic and atraumatic  Left Ear: External ear normal    Nose: Nose normal    Mouth/Throat: Oropharynx is clear and moist    Right ear drainage   Eyes: EOM are normal  Pupils are equal, round, and reactive to light  Neck: Normal range of motion  Neck supple  No tracheal deviation present  Cardiovascular: Normal rate, regular rhythm and intact distal pulses  Exam reveals no gallop and no friction rub  No murmur heard  Pulmonary/Chest: No stridor  No respiratory distress  She has wheezes  She has no rales  Decreased breath sounds bilateral   Abdominal: Soft  Bowel sounds are normal  She exhibits no distension  There is no tenderness  There is no rebound and no guarding  Musculoskeletal: Normal range of motion  She exhibits no edema, tenderness or deformity  Neurological: She is alert and oriented to person, place, and time  No cranial nerve deficit  Skin: Skin is warm and dry  No rash noted  She is not diaphoretic  Psychiatric: She has a normal mood and affect  Her behavior is normal    Nursing note and vitals reviewed        Vital Signs  ED Triage Vitals [05/21/18 1323]   Temperature Pulse Respirations Blood Pressure SpO2   100 2 °F (37 9 °C) 92 18 (!) 199/83 97 %      Temp Source Heart Rate Source Patient Position - Orthostatic VS BP Location FiO2 (%)   Tympanic Monitor Lying Right arm --      Pain Score       5           Vitals: 05/21/18 1323 05/21/18 1415 05/21/18 1430   BP: (!) 199/83 164/77 158/71   Pulse: 92 84 94   Patient Position - Orthostatic VS: Lying         Visual Acuity  Visual Acuity      Most Recent Value   L Pupil Size (mm)  3   R Pupil Size (mm)  3          ED Medications  Medications   azithromycin (ZITHROMAX) oral suspension 500 mg (not administered)   ipratropium-albuterol (DUO-NEB) 0 5-2 5 mg/3 mL inhalation solution 3 mL (3 mL Nebulization Given 5/21/18 1402)   sodium chloride 0 9 % bolus 1,000 mL (0 mL Intravenous Stopped 5/21/18 1534)       Diagnostic Studies  Results Reviewed     Procedure Component Value Units Date/Time    Comprehensive metabolic panel [[id number]]  (Abnormal) Collected:  05/21/18 1353    Lab Status:  Final result Specimen:  Blood from Arm, Right Updated:  05/21/18 1440     Sodium 140 mmol/L      Potassium 3 8 mmol/L      Chloride 100 mmol/L      CO2 34 (H) mmol/L      Anion Gap 6 mmol/L      BUN 16 mg/dL      Creatinine 0 75 mg/dL      Glucose 97 mg/dL      Calcium 9 6 mg/dL      AST 22 U/L      ALT 17 U/L      Alkaline Phosphatase 51 U/L      Total Protein 7 9 g/dL      Albumin 3 5 g/dL      Total Bilirubin 0 30 mg/dL      eGFR 71 ml/min/1 73sq m     Narrative:         National Kidney Disease Education Program recommendations are as follows:  GFR calculation is accurate only with a steady state creatinine  Chronic Kidney disease less than 60 ml/min/1 73 sq  meters  Kidney failure less than 15 ml/min/1 73 sq  meters  B-type natriuretic peptide [[id number]]  (Normal) Collected:  05/21/18 1353    Lab Status:  Final result Specimen:  Blood from Arm, Right Updated:  05/21/18 1440     NT-proBNP 321 pg/mL     Lactic acid x2 Q2h [[id number]]  (Normal) Collected:  05/21/18 1405    Lab Status:  Final result Specimen:  Blood from Hand, Right Updated:  05/21/18 1434     LACTIC ACID 1 4 mmol/L     Narrative:         Result may be elevated if tourniquet was used during collection      Troponin I [[id number]] (Normal) Collected:  05/21/18 1353    Lab Status:  Final result Specimen:  Blood from Arm, Right Updated:  05/21/18 1434     Troponin I <0 02 ng/mL     Narrative:         [employer] Chemistry analyzer 99% cutoff is > 0 04 ng/mL in network labs    o cTnI 99% cutoff is useful only when applied to patients in the clinical setting of myocardial ischemia  o cTnI 99% cutoff should be interpreted in the context of clinical history, ECG findings and possibly cardiac imaging to establish correct diagnosis  o cTnI 99% cutoff may be suggestive but clearly not indicative of a coronary event without the clinical setting of myocardial ischemia  Protime-INR [[id number]]  (Normal) Collected:  05/21/18 1353    Lab Status:  Final result Specimen:  Blood from Arm, Right Updated:  05/21/18 1427     Protime 13 8 seconds      INR 1 12    APTT [[id number]]  (Abnormal) Collected:  05/21/18 1353    Lab Status:  Final result Specimen:  Blood from Arm, Right Updated:  05/21/18 1427     PTT 43 (H) seconds     CBC and differential [[id number]]  (Abnormal) Collected:  05/21/18 1353    Lab Status:  Final result Specimen:  Blood from Arm, Right Updated:  05/21/18 1413     WBC 14 24 (H) Thousand/uL      RBC 4 44 Million/uL      Hemoglobin 12 5 g/dL      Hematocrit 39 2 %      MCV 88 fL      MCH 28 2 pg      MCHC 31 9 g/dL      RDW 14 1 %      MPV 9 2 fL      Platelets 408 Thousands/uL      Neutrophils Relative 75 %      Lymphocytes Relative 13 (L) %      Monocytes Relative 11 %      Eosinophils Relative 1 %      Basophils Relative 0 %      Neutrophils Absolute 10 74 (H) Thousands/µL      Lymphocytes Absolute 1 91 Thousands/µL      Monocytes Absolute 1 51 (H) Thousand/µL      Eosinophils Absolute 0 07 Thousand/µL      Basophils Absolute 0 01 Thousands/µL     Blood culture - Set 2 [[id number]] Collected:  05/21/18 1353    Lab Status:   In process Specimen:  Blood from Arm, Right Updated:  05/21/18 1410    Blood culture - Set 1 [[id number]] Collected:  05/21/18 1405    Lab Status:   In process Specimen:  Blood from Hand, Right Updated:  05/21/18 1410    Lactic acid x2 Q2h [[id number]]     Lab Status:  No result Specimen:  Blood     UA w Reflex to Microscopic w Reflex to Culture [[id number]]     Lab Status:  No result Specimen:  Urine                  XR chest 2 views   ED Interpretation by Amalia Saldivar DO (05/21 1523)   No acute infiltrate                 Procedures  ECG 12 Lead Documentation  Date/Time: 5/21/2018 3:16 PM  Performed by: Lucrecia Morales  Authorized by: Lucrecia Morales     ECG reviewed by me, the ED Provider: yes    Patient location:  ED  Rhythm:     Rhythm: sinus rhythm    Conduction:     Conduction: abnormal      Abnormal conduction: complete RBBB    T waves:     T waves: non-specific             Phone Contacts  ED Phone Contact    ED Course         HEART Risk Score      Most Recent Value   History  0 Filed at: 05/21/2018 1523   ECG  1 Filed at: 05/21/2018 1523   Age  2 Filed at: 05/21/2018 1523   Risk Factors  1 Filed at: 05/21/2018 1523   Troponin  0 Filed at: 05/21/2018 1523   Heart Score Risk Calculator   History  0 Filed at: 05/21/2018 1523   ECG  1 Filed at: 05/21/2018 1523   Age  2 Filed at: 05/21/2018 1523   Risk Factors  1 Filed at: 05/21/2018 1523   Troponin  0 Filed at: 05/21/2018 1523   HEART Score  4 Filed at: 05/21/2018 1523   HEART Score  4 Filed at: 05/21/2018 1523                            MDM  Number of Diagnoses or Management Options  Diagnosis management comments:  Shortness of breath differential includes bronchitis pneumonia congestive heart failure will check EKG chest x-ray labs for further evaluation       Amount and/or Complexity of Data Reviewed  Clinical lab tests: ordered  Tests in the radiology section of CPT®: ordered      CritCare Time    Disposition  Final diagnoses:   Bronchitis   Right otitis externa     Time reflects when diagnosis was documented in both MDM as applicable and the Disposition within this note     Time User Action Codes Description Comment    5/21/2018  3:32 PM Ritulico Kemp Add [J40] Bronchitis     5/21/2018  3:32 PM [facility] [H60 91] Right otitis externa       ED Disposition     ED Disposition Condition Comment    Discharge  Milo Edmondcarolyn discharge to home/self care  Condition at discharge: Stable        Follow-up Information     Follow up With Specialties Details Why Alireza Boone MD Family Medicine In 1 week  [address]  [address] [address]  [address] [place] [id number]            Patient's Medications   Discharge Prescriptions    AZITHROMYCIN (ZITHROMAX) 250 MG TABLET    Take 2 tablets today then 1 tablet daily x 4 days       Start Date: 5/21/2018 End Date: 5/25/2018       Order Dose: --       Quantity: 6 tablet    Refills: 0    CIPROFLOXACIN-HYDROCORTISONE (CIPRO HC OTIC) OTIC SUSPENSION    Administer 3 drops to the right ear 2 (two) times a day for 7 days       Start Date: 5/21/2018 End Date: 5/28/2018       Order Dose: 3 drops       Quantity: 10 mL    Refills: 0     No discharge procedures on file      ED Provider  Electronically Signed by           Hannah Snyder DO  05/21/18 4726

## 2024-04-06 DIAGNOSIS — I10 PRIMARY HYPERTENSION: ICD-10-CM

## 2024-04-06 DIAGNOSIS — N32.81 OAB (OVERACTIVE BLADDER): ICD-10-CM

## 2024-04-06 DIAGNOSIS — I50.33 ACUTE ON CHRONIC DIASTOLIC HEART FAILURE (HCC): ICD-10-CM

## 2024-04-06 DIAGNOSIS — R60.0 BILATERAL LOWER EXTREMITY EDEMA: ICD-10-CM

## 2024-04-06 DIAGNOSIS — G60.9 IDIOPATHIC PERIPHERAL NEUROPATHY: ICD-10-CM

## 2024-04-08 ENCOUNTER — OFFICE VISIT (OUTPATIENT)
Dept: FAMILY MEDICINE CLINIC | Facility: CLINIC | Age: 89
End: 2024-04-08
Payer: MEDICARE

## 2024-04-08 VITALS — OXYGEN SATURATION: 99 % | SYSTOLIC BLOOD PRESSURE: 128 MMHG | DIASTOLIC BLOOD PRESSURE: 80 MMHG

## 2024-04-08 DIAGNOSIS — R60.0 BILATERAL LOWER EXTREMITY EDEMA: ICD-10-CM

## 2024-04-08 DIAGNOSIS — L03.115 CELLULITIS OF RIGHT LOWER EXTREMITY: Primary | ICD-10-CM

## 2024-04-08 PROCEDURE — 99214 OFFICE O/P EST MOD 30 MIN: CPT | Performed by: NURSE PRACTITIONER

## 2024-04-08 PROCEDURE — G2211 COMPLEX E/M VISIT ADD ON: HCPCS | Performed by: NURSE PRACTITIONER

## 2024-04-08 RX ORDER — GABAPENTIN 600 MG/1
600 TABLET ORAL 2 TIMES DAILY
Qty: 180 TABLET | Refills: 0 | Status: SHIPPED | OUTPATIENT
Start: 2024-04-08

## 2024-04-08 RX ORDER — CEPHALEXIN 500 MG/1
500 CAPSULE ORAL 3 TIMES DAILY
Qty: 30 CAPSULE | Refills: 0 | Status: SHIPPED | OUTPATIENT
Start: 2024-04-08 | End: 2024-04-18

## 2024-04-08 RX ORDER — ATORVASTATIN CALCIUM 10 MG/1
10 TABLET, FILM COATED ORAL DAILY
Qty: 90 TABLET | Refills: 0 | Status: SHIPPED | OUTPATIENT
Start: 2024-04-08

## 2024-04-08 RX ORDER — TORSEMIDE 20 MG/1
20 TABLET ORAL 2 TIMES DAILY
Qty: 60 TABLET | Refills: 0 | Status: SHIPPED | OUTPATIENT
Start: 2024-04-08

## 2024-04-08 RX ORDER — MIRABEGRON 25 MG/1
25 TABLET, FILM COATED, EXTENDED RELEASE ORAL EVERY MORNING
Qty: 30 TABLET | Refills: 5 | Status: SHIPPED | OUTPATIENT
Start: 2024-04-08

## 2024-04-08 NOTE — PROGRESS NOTES
[facility]        NAME: Janice Yung is a 89 y.o. female  : 1930    MRN: [id number]  DATE: 2024  TIME: 3:34 PM    Assessment and Plan   Cellulitis of right lower extremity [L03.115]  1. Cellulitis of right lower extremity  cephalexin (KEFLEX) 500 mg capsule      2. Bilateral lower extremity edema              Patient Instructions     Patient Instructions   Keflex as ordered for RLE cellulitis.  Elevate legs above the heart.  Continue torsemide as prescribed.  Keep open blister on RLE clean and dry, apply antibiotic ointment and cover.  If you develop red streaking or fever, increased swelling, chest pain, shortness of breath go to the ED for further evaluation.        Chief Complaint     Chief Complaint   Patient presents with    Leg Swelling         History of Present Illness       C/o b/l swelling in both legs x 2 weeks, right leg is red, had a large blister that broke. Hx of CHF, Afib, HTN. BP is stable. She takes torsemide. No difficulty urinating. Denies chest pain, no shortness of breath. She has not been able to get compression socks on.        Review of Systems   Review of Systems   Constitutional:  Negative for activity change and diaphoresis.   Respiratory:  Negative for chest tightness, shortness of breath and wheezing.    Cardiovascular:  Positive for leg swelling. Negative for chest pain.   Genitourinary:  Negative for decreased urine volume and difficulty urinating.   Skin:  Positive for color change (RLE).   Neurological:  Negative for dizziness.         Current Medications       Current Outpatient Medications:     Artificial Tears 0.2-0.2-1 % SOLN, , Disp: , Rfl:     aspirin (ECOTRIN LOW STRENGTH) 81 mg EC tablet, Take 1 tablet (81 mg total) by mouth daily, Disp: , Rfl: 0    atorvastatin (LIPITOR) 10 mg tablet, Take 1 tablet by mouth once daily, Disp: 90 tablet, Rfl: 0    cephalexin (KEFLEX) 500 mg capsule, Take 1 capsule (500 mg total) by mouth 3 (three)  times a day for 10 days, Disp: 30 capsule, Rfl: 0    Cholecalciferol (VITAMIN D3) 1000 units CAPS, Take 1,000 Units by mouth daily , Disp: , Rfl:     Cyanocobalamin (VITAMIN B12 PO), Take 1,000 mcg by mouth in the morning, Disp: , Rfl:     gabapentin (NEURONTIN) 600 MG tablet, Take 1 tablet by mouth twice daily, Disp: 180 tablet, Rfl: 0    Multiple Vitamins-Minerals (PRESERVISION AREDS PO), Take by mouth 2 (two) times a day, Disp: , Rfl:     nitrofurantoin (MACROBID) 100 mg capsule, Take 1 capsule (100 mg total) by mouth in the morning, Disp: 90 capsule, Rfl: 3    omeprazole (PriLOSEC) 20 mg delayed release capsule, Take 1 capsule by mouth once daily, Disp: 90 capsule, Rfl: 0    Saccharomyces boulardii (Probiotic) 250 MG CAPS, , Disp: , Rfl:     torsemide (DEMADEX) 20 mg tablet, Take 1 tablet by mouth twice daily, Disp: 60 tablet, Rfl: 0    Mirabegron ER (Myrbetriq) 25 MG TB24, TAKE 1 TABLET BY MOUTH IN THE MORNING, Disp: 30 tablet, Rfl: 5    neomycin-polymyxin-hydrocortisone (CORTISPORIN) 0.35%-10,000 units/mL-1% otic suspension, Administer 4 drops to the right ear every 6 (six) hours for 5 days, Disp: 10 mL, Rfl: 0    Current Allergies     Allergies as of 04/08/2024    (No Known Allergies)            The following portions of the patient's history were reviewed and updated as appropriate: allergies, current medications, past family history, past medical history, past social history, past surgical history and problem list.     Past Medical History:   Diagnosis Date    SHERLYN (acute kidney injury) ([facility]) 5/8/2022    Balance disorder     Chronic pain     GERD (gastroesophageal reflux disease)     Hard of hearing     Hyperlipidemia     Hypertension     Hyponatremia 12/5/2020    Low serum cortisol level 12/7/2020    Neuropathy     Pneumonia     Pneumonia due to COVID-19 virus 12/5/2020    Sepsis ([facility]) 5/22/2018    Tinnitus     Unspecified adrenocortical insufficiency ([facility]) 2/28/2023    Felt to be related to COVID-19  infection.  No current symptoms.    UTI (urinary tract infection)        Past Surgical History:   Procedure Laterality Date    EYE SURGERY      HYSTERECTOMY      TONSILLECTOMY         Family History   Problem Relation Age of Onset    Stroke Mother     Stroke Father     Heart disease Daughter     Substance Abuse Neg Hx     Mental illness Neg Hx          Medications have been verified.        Objective   /80 (BP Location: Left arm, Patient Position: Sitting, Cuff Size: Standard)   SpO2 99%        Physical Exam     Physical Exam  Vitals and nursing note reviewed.   Constitutional:       General: She is not in acute distress.     Appearance: Normal appearance. She is not ill-appearing.   HENT:      Head: Normocephalic.   Eyes:      Extraocular Movements: Extraocular movements intact.   Cardiovascular:      Rate and Rhythm: Normal rate and regular rhythm.      Heart sounds: Normal heart sounds.   Pulmonary:      Effort: Pulmonary effort is normal. No respiratory distress.      Breath sounds: Normal breath sounds. No wheezing.   Chest:      Chest wall: No tenderness.   Musculoskeletal:      Right lower leg: Edema present.      Left lower leg: Edema present.      Comments: B/l lower extremity edema. Cellulitis RLE-erythema and warmth, open blister noted.       Skin:     General: Skin is warm and dry.      Findings: Erythema present.   Neurological:      Mental Status: She is alert and oriented to person, place, and time.   Psychiatric:         Mood and Affect: Mood normal.         Behavior: Behavior normal.

## 2024-04-18 ENCOUNTER — TELEPHONE (OUTPATIENT)
Dept: FAMILY MEDICINE CLINIC | Facility: CLINIC | Age: 89
End: 2024-04-18

## 2024-04-18 DIAGNOSIS — L03.115 CELLULITIS OF RIGHT LOWER EXTREMITY: ICD-10-CM

## 2024-04-18 RX ORDER — CEPHALEXIN 500 MG/1
500 CAPSULE ORAL 3 TIMES DAILY
Qty: 30 CAPSULE | Refills: 0 | Status: SHIPPED | OUTPATIENT
Start: 2024-04-18 | End: 2024-04-28

## 2024-04-18 NOTE — TELEPHONE ENCOUNTER
Call from patients daughter, Windy, saw REAL Muller on 4/08/24 for cellulitis Right LE and taking Keflex.      Is definitely improved, however feels maybe she should have another round of antibiotics.      Would like ordered to [place] [facility]    Advised will have Dr. Mustafa review    Edgar Mustafa MD  You   OK to resend same Rx     Rx sent to [facility]

## 2024-05-03 NOTE — PATIENT INSTRUCTIONS
5/3/2024    From the office of:   Yfn Roberts MD   [facility]  [address]  [place] [address]  Phone: [phone]    In regards to Clara Davis, :  1938    Chief Complaint   Patient presents with    Nursing Home       [facility]    HISTORY OF PRESENT ILLNESS: 86-year-old female is seen for initial nursing home evaluation at [facility] on 5/3/2024.  History is obtained from prior chart records, discussion with facility nursing staff as well as interview and exam of the patient.    Recent hospitalization at [facility] from  - 2024 due to recurrent falls.  She did experience acute onset of low back pain and she was found to have an acute T12 compression fracture and likely subacute compression fractures of T11 and L1.  She was treated with dexamethasone taper currently at 2 mg daily to complete her course of therapy on .  She is currently on lidocaine patch and oxycodone 5 mg every 6 hours as needed.  She does note that at rest her pain is 7/10 in intensity but with any kind of movement increases to 10/10 in intensity.  She denies any radicular component of her pain.    She does have a history of chronic systolic CHF and has been on bisoprolol 5 mg daily, Entresto 24-26 twice daily and bumetanide 1 mg daily.  She does have chronic kidney disease stage IV.  She has had a prior history of a DVT and has been on chronic warfarin anticoagulation.  In addition, she does have paroxysmal atrial fibrillation and her rate has been controlled with bisoprolol and she remains on the chronic warfarin anticoagulation as above.    She has had a recent history of C. difficile colitis.  She did complete her course of antibiotic therapy but she continues to have diarrhea and she was placed back on a vancomycin taper to complete her course of therapy on .    Review of systems: In general, she denies fevers chills or sweats.  Her appetite is  Keflex as ordered for RLE cellulitis.  Elevate legs above the heart.  Continue torsemide as prescribed.  Keep open blister on RLE clean and dry, apply antibiotic ointment and cover.  If you develop red streaking or fever, increased swelling, chest pain, shortness of breath go to the ED for further evaluation.   diminished.  Her pain levels as above.  She denies cough or sputum production.  No shortness of breath.  She denies chest pain or palpitations.  History of CHF as well as paroxysmal atrial fibrillation.  She denies nausea or vomiting.  No abdominal pain.  Her diarrhea has been improving.  She denies any urinary tract symptoms.  She reports that her mood is stable, denying depression or anxiety.  She has been in therapy requiring assistance for transfers.  Otherwise the remainder of a complete review of systems is negative except as noted above.    Social history: She had lived alone in her own apartment.  She was a previous smoker but she quit 60 years ago.  Occasional ethanol intake.    Family history: Mother  age 46 of polycystic kidney and liver disease.  Father  age 61 of an MI.    Patient Active Problem List   Diagnosis    Generalized anxiety disorder    Carotid artery stenosis    Urinary incontinence    HTN (hypertension)    Vocal cord dysfunction    Gastroesophageal reflux disease    Secondary hyperparathyroidism  (CMD)    Mixed hyperlipidemia    Obstructive sleep apnea syndrome    Carotid stenosis, bilateral    Prediabetes    Vaginal dryness    Obesity (BMI 30-39.9)    Left shoulder pain    RAD (reactive airway disease) (CMD)    Acute deep vein thrombosis (DVT) of other specified vein of right lower extremity  (CMD)    Encounter for therapeutic drug monitoring    Long term (current) use of anticoagulants    Factor V Leiden  (CMD)    History of DVT (deep vein thrombosis)    Medicare annual wellness visit, subsequent    Long term current use of anticoagulant therapy    Hepatic cyst    Liver mass    Factor 5 Leiden mutation, heterozygous  (CMD)    Cervical spondylosis    Generalized weakness    History of asthma    Non-intractable vomiting    Person under investigation for COVID-19    Viral infection    Deep vein thrombosis  (CMD)    Hypoxia    HFrEF (heart failure with reduced ejection fraction)  (CMD)     Acute diarrhea    Atrial fibrillation  (CMD)    Dilated cardiomyopathy  (CMD)    Positive PPD    Syncope and collapse    Bilateral carotid artery stenosis    Chronic kidney disease (CKD), stage IV (severe)  (CMD)    Diverticulitis of colon    Chronic obstructive pulmonary disease  (CMD)    C. difficile diarrhea    Moderate episode of recurrent major depressive disorder  (CMD)       I have reviewed the patient's medications and allergies, with the most recent medication list reviewed on her snf chart.    REVIEW OF SYSTEMS:  All other Review of Systems  negative except as documented in the history of present illness.    Physical Examination:  Pleasant obese white female in no acute distress at rest.  /64.  Pulse 49.  Temp 97.8.  92% on room air.  Weight 176.  HEENT-sclera anicteric.  Extraocular muscles grossly intact.  Lips appear moist.  Good dentition.  Posterior pharynx is clear without exudate.  Neck-no thyroid enlargement or carotid bruits.  No adenopathy.  No JVD.  Lungs are clear bilaterally.  Back she has some minor spinal percussion tenderness in the lower thoracic region consistent with known history of compression fracture.  Heart appears regular slightly bradycardic with 2-3/6 systolic murmur.  Abdomen is obese but not distended.  Bowel sounds are normal.  No masses or hepatosplenomegaly.  She has no lower extremity edema.  No calf pain or tenderness.  No palpable cords.  She is alert and oriented x 3.  She appears euthymic and appropriate.    Clara was seen today for nursing home.    Diagnoses and all orders for this visit:    Recurrent falls    Compression fracture of T12 vertebra with routine healing, subsequent encounter    Chronic systolic CHF (congestive heart failure)  (CMD)    Paroxysmal atrial fibrillation  (CMD)    Essential hypertension    Personal history of DVT (deep vein thrombosis)    Chronic anticoagulation    History of Clostridioides difficile  colitis    Gastroesophageal reflux disease, unspecified whether esophagitis present    Hyperparathyroidism, secondary  (CMD)    Chronic kidney disease (CKD), stage IV (severe)  (CMD)          Plan: Regarding her recurrent falls, she did have an acute T12 fracture and likely subacute T11 and L1 fractures.  She will complete her course of dexamethasone as scheduled on 5/4.  Will continue oxycodone as needed and scheduled daily lidocaine patch.  She will continue in therapy for further gait training, transfers and mobility.  Due to the increased pain from her compression fracture, will start her on calcitonin nasal spray 1 spray alternate nostrils daily.    Regarding her chronic systolic CHF, she appears well compensated.  Will continue her bisoprolol, Entresto 24-26 twice daily and bumetanide 1 mg daily.  Will get a BMP in 1 week's time.    Regarding her paroxysmal atrial fibrillation, her rate is controlled with bisoprolol.  She has chronic warfarin anticoagulation.    Regarding her hypertension, her blood pressure is well-controlled.  Will continue to monitor on her current regimen.    She has a prior history of a DVT.  Will continue her warfarin anticoagulation as scheduled.    Regarding her history of C. difficile colitis, will continue her vancomycin taper as scheduled.    Regarding her GERD, will continue sodium bicarbonate twice a day and omeprazole 20 mg daily.    Regarding her secondary hyperparathyroidism, she was recently started on Cinacalcet 30 mg every other day.  Will continue to monitor.    She does have chronic kidney disease stage IV.  Most recent creatinine 1.96.  Will await her follow-up in 1 week's time.    Yfn Roberts MD   Electronically signed

## 2024-05-04 ENCOUNTER — APPOINTMENT (EMERGENCY)
Dept: RADIOLOGY | Facility: HOSPITAL | Age: 89
DRG: 871 | End: 2024-05-04
Payer: MEDICARE

## 2024-05-04 ENCOUNTER — HOSPITAL ENCOUNTER (INPATIENT)
Facility: HOSPITAL | Age: 89
LOS: 3 days | Discharge: HOME HEALTH | DRG: 871 | End: 2024-05-07
Attending: EMERGENCY MEDICINE | Admitting: FAMILY MEDICINE
Payer: MEDICARE

## 2024-05-04 DIAGNOSIS — A41.9 SEPSIS (HCC): ICD-10-CM

## 2024-05-04 DIAGNOSIS — J18.9 PNEUMONIA: Primary | ICD-10-CM

## 2024-05-04 LAB
2HR DELTA HS TROPONIN: 4 NG/L
4HR DELTA HS TROPONIN: 4 NG/L
ALBUMIN SERPL BCP-MCNC: 3.9 G/DL (ref 3.5–5)
ALP SERPL-CCNC: 113 U/L (ref 34–104)
ALT SERPL W P-5'-P-CCNC: 29 U/L (ref 7–52)
ANION GAP SERPL CALCULATED.3IONS-SCNC: 10 MMOL/L (ref 4–13)
APTT PPP: 38 SECONDS (ref 23–37)
AST SERPL W P-5'-P-CCNC: 37 U/L (ref 13–39)
BASOPHILS # BLD AUTO: 0.05 THOUSANDS/ÂΜL (ref 0–0.1)
BASOPHILS NFR BLD AUTO: 0 % (ref 0–1)
BILIRUB SERPL-MCNC: 0.84 MG/DL (ref 0.2–1)
BNP SERPL-MCNC: 135 PG/ML (ref 0–100)
BUN SERPL-MCNC: 20 MG/DL (ref 5–25)
CALCIUM SERPL-MCNC: 9.2 MG/DL (ref 8.4–10.2)
CARDIAC TROPONIN I PNL SERPL HS: 33 NG/L
CARDIAC TROPONIN I PNL SERPL HS: 37 NG/L
CARDIAC TROPONIN I PNL SERPL HS: 37 NG/L
CHLORIDE SERPL-SCNC: 101 MMOL/L (ref 96–108)
CO2 SERPL-SCNC: 33 MMOL/L (ref 21–32)
CREAT SERPL-MCNC: 1.17 MG/DL (ref 0.6–1.3)
EOSINOPHIL # BLD AUTO: 0 THOUSAND/ÂΜL (ref 0–0.61)
EOSINOPHIL NFR BLD AUTO: 0 % (ref 0–6)
ERYTHROCYTE [DISTWIDTH] IN BLOOD BY AUTOMATED COUNT: 13.8 % (ref 11.6–15.1)
FLUAV RNA RESP QL NAA+PROBE: NEGATIVE
FLUBV RNA RESP QL NAA+PROBE: NEGATIVE
GFR SERPL CREATININE-BSD FRML MDRD: 39 ML/MIN/1.73SQ M
GLUCOSE SERPL-MCNC: 137 MG/DL (ref 65–140)
HCT VFR BLD AUTO: 38 % (ref 34.8–46.1)
HGB BLD-MCNC: 11.7 G/DL (ref 11.5–15.4)
IMM GRANULOCYTES # BLD AUTO: 0.3 THOUSAND/UL (ref 0–0.2)
IMM GRANULOCYTES NFR BLD AUTO: 1 % (ref 0–2)
INR PPP: 1.18 (ref 0.84–1.19)
LACTATE SERPL-SCNC: 2 MMOL/L (ref 0.5–2)
LYMPHOCYTES # BLD AUTO: 1.07 THOUSANDS/ÂΜL (ref 0.6–4.47)
LYMPHOCYTES NFR BLD AUTO: 5 % (ref 14–44)
MCH RBC QN AUTO: 27.5 PG (ref 26.8–34.3)
MCHC RBC AUTO-ENTMCNC: 30.8 G/DL (ref 31.4–37.4)
MCV RBC AUTO: 89 FL (ref 82–98)
MONOCYTES # BLD AUTO: 1.45 THOUSAND/ÂΜL (ref 0.17–1.22)
MONOCYTES NFR BLD AUTO: 7 % (ref 4–12)
NEUTROPHILS # BLD AUTO: 19.34 THOUSANDS/ÂΜL (ref 1.85–7.62)
NEUTS SEG NFR BLD AUTO: 87 % (ref 43–75)
NRBC BLD AUTO-RTO: 0 /100 WBCS
PLATELET # BLD AUTO: 203 THOUSANDS/UL (ref 149–390)
PMV BLD AUTO: 9.4 FL (ref 8.9–12.7)
POTASSIUM SERPL-SCNC: 3.7 MMOL/L (ref 3.5–5.3)
PROCALCITONIN SERPL-MCNC: 1.17 NG/ML
PROT SERPL-MCNC: 7.2 G/DL (ref 6.4–8.4)
PROTHROMBIN TIME: 15.4 SECONDS (ref 11.6–14.5)
RBC # BLD AUTO: 4.25 MILLION/UL (ref 3.81–5.12)
RSV RNA RESP QL NAA+PROBE: NEGATIVE
SARS-COV-2 RNA RESP QL NAA+PROBE: NEGATIVE
SODIUM SERPL-SCNC: 144 MMOL/L (ref 135–147)
WBC # BLD AUTO: 22.21 THOUSAND/UL (ref 4.31–10.16)

## 2024-05-04 PROCEDURE — 36415 COLL VENOUS BLD VENIPUNCTURE: CPT | Performed by: EMERGENCY MEDICINE

## 2024-05-04 PROCEDURE — 83605 ASSAY OF LACTIC ACID: CPT | Performed by: EMERGENCY MEDICINE

## 2024-05-04 PROCEDURE — 84145 PROCALCITONIN (PCT): CPT | Performed by: EMERGENCY MEDICINE

## 2024-05-04 PROCEDURE — 85730 THROMBOPLASTIN TIME PARTIAL: CPT | Performed by: EMERGENCY MEDICINE

## 2024-05-04 PROCEDURE — 84484 ASSAY OF TROPONIN QUANT: CPT | Performed by: EMERGENCY MEDICINE

## 2024-05-04 PROCEDURE — 85025 COMPLETE CBC W/AUTO DIFF WBC: CPT | Performed by: EMERGENCY MEDICINE

## 2024-05-04 PROCEDURE — 0241U HB NFCT DS VIR RESP RNA 4 TRGT: CPT | Performed by: EMERGENCY MEDICINE

## 2024-05-04 PROCEDURE — 93005 ELECTROCARDIOGRAM TRACING: CPT

## 2024-05-04 PROCEDURE — 99285 EMERGENCY DEPT VISIT HI MDM: CPT

## 2024-05-04 PROCEDURE — 83880 ASSAY OF NATRIURETIC PEPTIDE: CPT | Performed by: EMERGENCY MEDICINE

## 2024-05-04 PROCEDURE — 87040 BLOOD CULTURE FOR BACTERIA: CPT | Performed by: EMERGENCY MEDICINE

## 2024-05-04 PROCEDURE — 80053 COMPREHEN METABOLIC PANEL: CPT | Performed by: EMERGENCY MEDICINE

## 2024-05-04 PROCEDURE — 96374 THER/PROPH/DIAG INJ IV PUSH: CPT

## 2024-05-04 PROCEDURE — 71045 X-RAY EXAM CHEST 1 VIEW: CPT

## 2024-05-04 PROCEDURE — 84484 ASSAY OF TROPONIN QUANT: CPT | Performed by: FAMILY MEDICINE

## 2024-05-04 PROCEDURE — 99223 1ST HOSP IP/OBS HIGH 75: CPT | Performed by: FAMILY MEDICINE

## 2024-05-04 PROCEDURE — 99285 EMERGENCY DEPT VISIT HI MDM: CPT | Performed by: EMERGENCY MEDICINE

## 2024-05-04 PROCEDURE — 85610 PROTHROMBIN TIME: CPT | Performed by: EMERGENCY MEDICINE

## 2024-05-04 RX ORDER — ACETAMINOPHEN 160 MG/5ML
2 SUSPENSION, ORAL (FINAL DOSE FORM) ORAL ONCE
Status: COMPLETED | OUTPATIENT
Start: 2024-05-04 | End: 2024-05-04

## 2024-05-04 RX ORDER — TORSEMIDE 20 MG/1
20 TABLET ORAL 2 TIMES DAILY
Status: DISCONTINUED | OUTPATIENT
Start: 2024-05-04 | End: 2024-05-07 | Stop reason: HOSPADM

## 2024-05-04 RX ORDER — CEFTRIAXONE 1 G/50ML
1000 INJECTION, SOLUTION INTRAVENOUS EVERY 24 HOURS
Status: DISCONTINUED | OUTPATIENT
Start: 2024-05-05 | End: 2024-05-07 | Stop reason: HOSPADM

## 2024-05-04 RX ORDER — GABAPENTIN 300 MG/1
300 CAPSULE ORAL 2 TIMES DAILY
Status: DISCONTINUED | OUTPATIENT
Start: 2024-05-04 | End: 2024-05-07 | Stop reason: HOSPADM

## 2024-05-04 RX ORDER — ATORVASTATIN CALCIUM 10 MG/1
10 TABLET, FILM COATED ORAL DAILY
Status: DISCONTINUED | OUTPATIENT
Start: 2024-05-04 | End: 2024-05-07 | Stop reason: HOSPADM

## 2024-05-04 RX ORDER — ENOXAPARIN SODIUM 100 MG/ML
30 INJECTION SUBCUTANEOUS DAILY
Status: DISCONTINUED | OUTPATIENT
Start: 2024-05-04 | End: 2024-05-07 | Stop reason: HOSPADM

## 2024-05-04 RX ORDER — PANTOPRAZOLE SODIUM 20 MG/1
20 TABLET, DELAYED RELEASE ORAL
Status: DISCONTINUED | OUTPATIENT
Start: 2024-05-05 | End: 2024-05-07 | Stop reason: HOSPADM

## 2024-05-04 RX ORDER — CEFTRIAXONE 1 G/50ML
1000 INJECTION, SOLUTION INTRAVENOUS ONCE
Status: COMPLETED | OUTPATIENT
Start: 2024-05-04 | End: 2024-05-04

## 2024-05-04 RX ADMIN — AZITHROMYCIN MONOHYDRATE 500 MG: 500 INJECTION, POWDER, LYOPHILIZED, FOR SOLUTION INTRAVENOUS at 14:46

## 2024-05-04 RX ADMIN — ATORVASTATIN CALCIUM 10 MG: 10 TABLET, FILM COATED ORAL at 17:25

## 2024-05-04 RX ADMIN — Medication 1000 UNITS: at 17:25

## 2024-05-04 RX ADMIN — TORSEMIDE 20 MG: 20 TABLET ORAL at 17:25

## 2024-05-04 RX ADMIN — GABAPENTIN 300 MG: 300 CAPSULE ORAL at 17:25

## 2024-05-04 RX ADMIN — SODIUM CHLORIDE 500 ML: 0.9 INJECTION, SOLUTION INTRAVENOUS at 13:36

## 2024-05-04 RX ADMIN — CEFTRIAXONE 1000 MG: 1 INJECTION, SOLUTION INTRAVENOUS at 13:36

## 2024-05-04 RX ADMIN — ASPIRIN 81 MG: 81 TABLET, COATED ORAL at 17:25

## 2024-05-04 RX ADMIN — ENOXAPARIN SODIUM 30 MG: 30 INJECTION SUBCUTANEOUS at 17:24

## 2024-05-04 NOTE — PLAN OF CARE
Problem: Potential for Falls  Goal: Patient will remain free of falls  Description: INTERVENTIONS:  - Educate patient/family on patient safety including physical limitations  - Instruct patient to call for assistance with activity   - Consult OT/PT to assist with strengthening/mobility   - Keep Call bell within reach  - Keep bed low and locked with side rails adjusted as appropriate  - Keep care items and personal belongings within reach  - Initiate and maintain comfort rounds  - Make Fall Risk Sign visible to staff  - Offer Toileting every 2 Hours, in advance of need  - Initiate/Maintain bed/ chair alarm  - Apply yellow socks and bracelet for high fall risk patients  - Consider moving patient to room near nurses station  Outcome: Progressing     Problem: Prexisting or High Potential for Compromised Skin Integrity  Goal: Skin integrity is maintained or improved  Description: INTERVENTIONS:  - Identify patients at risk for skin breakdown  - Assess and monitor skin integrity  - Assess and monitor nutrition and hydration status  - Monitor labs   - Assess for incontinence   - Turn and reposition patient  - Assist with mobility/ambulation  - Relieve pressure over bony prominences  - Avoid friction and shearing  - Provide appropriate hygiene as needed including keeping skin clean and dry  - Evaluate need for skin moisturizer/barrier cream  - Collaborate with interdisciplinary team   - Patient/family teaching  - Consider wound care consult   Outcome: Progressing

## 2024-05-04 NOTE — ED PROVIDER NOTES
History  Chief Complaint   Patient presents with    Shortness of Breath     Pt to ED c/o shortness of breath inreased over past few days. Fever started this morning. Denies pain, feels weaker than her normal     History from patient and paramedics as well as medical records.  Past medical history chronic pain GERD CHF cellulitis in her legs hypertension hyponatremia pneumonia COVID previous sepsis hysterectomy tonsillectomy.  Patient brought in by ambulance from home where she lives with her daughter.  She has been feeling fatigue ill fevers and nausea since yesterday.  She has been getting over cellulitis on her leg worse on the left side.  She recently finished a course of antibiotics for that.  Her daughter has been putting Vaseline on her legs which seems to be helping.  The swelling has decreased.  She denies any chest pain she felt a little short of breath when she got up to get into the ambulance vehicle.  Temp was 100.4 paramedics gave her 1 g of Tylenol sat was 90% on room air they placed her on 2 L oxygen.        Prior to Admission Medications   Prescriptions Last Dose Informant Patient Reported? Taking?   Artificial Tears 0.2-0.2-1 % SOLN   Yes No   Cholecalciferol (VITAMIN D3) 1000 units CAPS  Self Yes No   Sig: Take 1,000 Units by mouth daily    Cyanocobalamin (VITAMIN B12 PO)  Self Yes No   Sig: Take 1,000 mcg by mouth in the morning   Mirabegron ER (Myrbetriq) 25 MG TB24   No No   Sig: TAKE 1 TABLET BY MOUTH IN THE MORNING   Multiple Vitamins-Minerals (PRESERVISION AREDS PO)  Self Yes No   Sig: Take by mouth 2 (two) times a day   Saccharomyces boulardii (Probiotic) 250 MG CAPS   Yes No   aspirin (ECOTRIN LOW STRENGTH) 81 mg EC tablet  Self No No   Sig: Take 1 tablet (81 mg total) by mouth daily   atorvastatin (LIPITOR) 10 mg tablet   No No   Sig: Take 1 tablet by mouth once daily   gabapentin (NEURONTIN) 600 MG tablet   No No   Sig: Take 1 tablet by mouth twice daily    neomycin-polymyxin-hydrocortisone (CORTISPORIN) 0.35%-10,000 units/mL-1% otic suspension   No No   Sig: Administer 4 drops to the right ear every 6 (six) hours for 5 days   nitrofurantoin (MACROBID) 100 mg capsule   No No   Sig: Take 1 capsule (100 mg total) by mouth in the morning   omeprazole (PriLOSEC) 20 mg delayed release capsule   No No   Sig: Take 1 capsule by mouth once daily   torsemide (DEMADEX) 20 mg tablet   No No   Sig: Take 1 tablet by mouth twice daily      Facility-Administered Medications: None       Past Medical History:   Diagnosis Date    SHERLYN (acute kidney injury) ([facility]) 5/8/2022    Balance disorder     Chronic pain     GERD (gastroesophageal reflux disease)     Hard of hearing     Hyperlipidemia     Hypertension     Hyponatremia 12/5/2020    Low serum cortisol level 12/7/2020    Neuropathy     Pneumonia     Pneumonia due to COVID-19 virus 12/5/2020    Sepsis ([facility]) 5/22/2018    Tinnitus     Unspecified adrenocortical insufficiency ([facility]) 2/28/2023    Felt to be related to COVID-19 infection.  No current symptoms.    UTI (urinary tract infection)        Past Surgical History:   Procedure Laterality Date    EYE SURGERY      HYSTERECTOMY      TONSILLECTOMY         Family History   Problem Relation Age of Onset    Stroke Mother     Stroke Father     Heart disease Daughter     Substance Abuse Neg Hx     Mental illness Neg Hx      I have reviewed and agree with the history as documented.    E-Cigarette/Vaping    E-Cigarette Use Never User      E-Cigarette/Vaping Substances    Nicotine No     THC No     CBD No     Flavoring No     Other No     Unknown No      Social History     Tobacco Use    Smoking status: Never     Passive exposure: Never    Smokeless tobacco: Never   Vaping Use    Vaping status: Never Used   Substance Use Topics    Alcohol use: Not Currently     Comment: rarely    Drug use: No       Review of Systems   Constitutional:  Positive for chills and fever.   HENT:  Negative for ear pain  and sore throat.    Eyes:  Negative for pain and visual disturbance.   Respiratory:  Positive for cough and shortness of breath.    Cardiovascular:  Negative for chest pain and palpitations.   Gastrointestinal:  Positive for nausea. Negative for abdominal pain, constipation, diarrhea and vomiting.   Genitourinary:  Negative for dysuria and hematuria.   Musculoskeletal:  Negative for arthralgias and back pain.   Skin:  Negative for color change and rash.   Neurological:  Negative for seizures and syncope.   All other systems reviewed and are negative.      Physical Exam  Physical Exam  Vitals and nursing note reviewed.   Constitutional:       General: She is not in acute distress.     Appearance: She is well-developed. She is ill-appearing.   HENT:      Head: Normocephalic and atraumatic.   Eyes:      Extraocular Movements: Extraocular movements intact.      Conjunctiva/sclera: Conjunctivae normal.      Pupils: Pupils are equal, round, and reactive to light.   Cardiovascular:      Rate and Rhythm: Normal rate and regular rhythm.      Heart sounds: No murmur heard.  Pulmonary:      Effort: Pulmonary effort is normal. No respiratory distress.      Breath sounds: Decreased breath sounds present.   Chest:      Chest wall: No tenderness.   Abdominal:      Palpations: Abdomen is soft.      Tenderness: There is no abdominal tenderness.   Musculoskeletal:         General: No swelling.      Cervical back: Neck supple.      Right lower leg: Edema present.      Left lower leg: Edema present.      Comments: Small blister broke on left lower leg - minimal erythema   Skin:     General: Skin is warm and dry.      Capillary Refill: Capillary refill takes less than 2 seconds.   Neurological:      General: No focal deficit present.      Mental Status: She is alert and oriented to person, place, and time.      Comments: Generalized weakness   Psychiatric:         Mood and Affect: Mood normal.         Vital Signs  ED Triage Vitals    Temperature Pulse Respirations Blood Pressure SpO2   05/04/24 1234 05/04/24 1234 05/04/24 1234 05/04/24 1234 05/04/24 1234   100.3 °F (37.9 °C) 102 20 121/60 93 %      Temp Source Heart Rate Source Patient Position - Orthostatic VS BP Location FiO2 (%)   05/04/24 1234 05/04/24 1330 05/04/24 1234 05/04/24 1234 --   Oral Monitor Sitting Left arm       Pain Score       05/04/24 1234       No Pain           Vitals:    05/04/24 1234 05/04/24 1330   BP: 121/60 101/51   Pulse: 102 79   Patient Position - Orthostatic VS: Sitting Sitting         Visual Acuity      ED Medications  Medications   cefTRIAXone (ROCEPHIN) IVPB (premix in dextrose) 1,000 mg 50 mL (1,000 mg Intravenous New Bag 5/4/24 1336)   azithromycin (ZITHROMAX) 500 mg in sodium chloride 0.9% 250mL IVPB 500 mg (0 mg Intravenous Hold 5/4/24 1339)   sodium chloride 0.9 % bolus 500 mL (500 mL Intravenous New Bag 5/4/24 1336)   acetaminophen (FOR EMS ONLY) (TYLENOL) oral suspension 1,300 mg (0 mg Does not apply Given to EMS 5/4/24 1238)       Diagnostic Studies  Results Reviewed       Procedure Component Value Units Date/Time    FLU/RSV/COVID - if FLU/RSV clinically relevant [[id number]]  (Normal) Collected: 05/04/24 1242    Lab Status: Final result Specimen: Nares from Nose Updated: 05/04/24 1339     SARS-CoV-2 Negative     INFLUENZA A PCR Negative     INFLUENZA B PCR Negative     RSV PCR Negative    Narrative:      FOR PEDIATRIC PATIENTS - copy/paste COVID Guidelines URL to browser: [email or web address]    SARS-CoV-2 assay is a Nucleic Acid Amplification assay intended for the  qualitative detection of nucleic acid from SARS-CoV-2 in nasopharyngeal  swabs. Results are for the presumptive identification of SARS-CoV-2 RNA.    Positive results are indicative of infection with SARS-CoV-2, the virus  causing COVID-19, but do not rule out bacterial infection or co-infection  with other viruses. Laboratories within  the [place] and its  territories are required to report all positive results to the appropriate  public health authorities. Negative results do not preclude SARS-CoV-2  infection and should not be used as the sole basis for treatment or other  patient management decisions. Negative results must be combined with  clinical observations, patient history, and epidemiological information.  This test has not been FDA cleared or approved.    This test has been authorized by FDA under an Emergency Use Authorization  (EUA). This test is only authorized for the duration of time the  declaration that circumstances exist justifying the authorization of the  emergency use of an in vitro diagnostic tests for detection of SARS-CoV-2  virus and/or diagnosis of COVID-19 infection under section 564(b)(1) of  the Act, 21 U.S.C. 360bbb-3(b)(1), unless the authorization is terminated  or revoked sooner. The test has been validated but independent review by FDA  and CLIA is pending.    Test performed using [employer] Gene[employer]pert: This RT-PCR assay targets N2,  a region unique to SARS-CoV-2. A conserved region in the E-gene was chosen  for pan-Sarbecovirus detection which includes SARS-CoV-2.    According to CMS-2020-01-R, this platform meets the definition of high-throughput technology.    B-Type Natriuretic Peptide(BNP) [[id number]] Collected: 05/04/24 1243    Lab Status: In process Specimen: Blood from Arm, Left Updated: 05/04/24 1332    Procalcitonin [[id number]]  (Abnormal) Collected: 05/04/24 1243    Lab Status: Final result Specimen: Blood from Arm, Left Updated: 05/04/24 1316     Procalcitonin 1.17 ng/ml     HS Troponin I 2hr [[id number]]     Lab Status: No result Specimen: Blood     HS Troponin 0hr (reflex protocol) [[id number]]  (Normal) Collected: 05/04/24 1243    Lab Status: Final result Specimen: Blood from Arm, Left Updated: 05/04/24 1315     hs TnI 0hr 33 ng/L     Comprehensive metabolic panel [[id number]]  (Abnormal)  Collected: 05/04/24 1243    Lab Status: Final result Specimen: Blood from Arm, Left Updated: 05/04/24 1309     Sodium 144 mmol/L      Potassium 3.7 mmol/L      Chloride 101 mmol/L      CO2 33 mmol/L      ANION GAP 10 mmol/L      BUN 20 mg/dL      Creatinine 1.17 mg/dL      Glucose 137 mg/dL      Calcium 9.2 mg/dL      AST 37 U/L      ALT 29 U/L      Alkaline Phosphatase 113 U/L      Total Protein 7.2 g/dL      Albumin 3.9 g/dL      Total Bilirubin 0.84 mg/dL      eGFR 39 ml/min/1.73sq m     Narrative:      National Kidney Disease Foundation guidelines for Chronic Kidney Disease (CKD):     Stage 1 with normal or high GFR (GFR > 90 mL/min/1.73 square meters)    Stage 2 Mild CKD (GFR = 60-89 mL/min/1.73 square meters)    Stage 3A Moderate CKD (GFR = 45-59 mL/min/1.73 square meters)    Stage 3B Moderate CKD (GFR = 30-44 mL/min/1.73 square meters)    Stage 4 Severe CKD (GFR = 15-29 mL/min/1.73 square meters)    Stage 5 End Stage CKD (GFR <15 mL/min/1.73 square meters)  Note: GFR calculation is accurate only with a steady state creatinine    Lactic acid [[id number]]  (Normal) Collected: 05/04/24 1243    Lab Status: Final result Specimen: Blood from Arm, Left Updated: 05/04/24 1308     LACTIC ACID 2.0 mmol/L     Narrative:      Result may be elevated if tourniquet was used during collection.    Protime-INR [[id number]]  (Abnormal) Collected: 05/04/24 1243    Lab Status: Final result Specimen: Blood from Arm, Left Updated: 05/04/24 1306     Protime 15.4 seconds      INR 1.18    APTT [[id number]]  (Abnormal) Collected: 05/04/24 1243    Lab Status: Final result Specimen: Blood from Arm, Left Updated: 05/04/24 1306     PTT 38 seconds     CBC and differential [[id number]]  (Abnormal) Collected: 05/04/24 1243    Lab Status: Final result Specimen: Blood from Arm, Left Updated: 05/04/24 1252     WBC 22.21 Thousand/uL      RBC 4.25 Million/uL      Hemoglobin 11.7 g/dL      Hematocrit 38.0 %      MCV 89 fL      MCH 27.5 pg      MCHC  30.8 g/dL      RDW 13.8 %      MPV 9.4 fL      Platelets 203 Thousands/uL      nRBC 0 /100 WBCs      Segmented % 87 %      Immature Grans % 1 %      Lymphocytes % 5 %      Monocytes % 7 %      Eosinophils Relative 0 %      Basophils Relative 0 %      Absolute Neutrophils 19.34 Thousands/µL      Absolute Immature Grans 0.30 Thousand/uL      Absolute Lymphocytes 1.07 Thousands/µL      Absolute Monocytes 1.45 Thousand/µL      Eosinophils Absolute 0.00 Thousand/µL      Basophils Absolute 0.05 Thousands/µL     Blood culture #2 [[id number]] Collected: 05/04/24 1243    Lab Status: In process Specimen: Blood from Arm, Left Updated: 05/04/24 1249    Blood culture #1 [[id number]] Collected: 05/04/24 1242    Lab Status: In process Specimen: Blood from Arm, Right Updated: 05/04/24 1249    UA w Reflex to Microscopic w Reflex to Culture [[id number]]     Lab Status: No result Specimen: Urine                    XR chest portable   ED Interpretation by Hira Hale DO (05/04 1326)   Right lower lung infiltrate interpreted by me                 Procedures  ECG 12 Lead Documentation Only    Date/Time: 5/4/2024 1:37 PM    Performed by: Hira Hale DO  Authorized by: Hira Hale DO    Indications / Diagnosis:  Sob fever fatigue  ECG reviewed by me, the ED Provider: yes    Patient location:  ED  Previous ECG:     Previous ECG:  Compared to current    Comparison ECG info:  8-23    Similarity:  Changes noted  Interpretation:     Interpretation: non-specific    Rate:     ECG rate:  99    ECG rate assessment: normal    Rhythm:     Rhythm: sinus rhythm    Ectopy:     Ectopy: none    QRS:     QRS axis:  Normal    QRS intervals:  Normal  Conduction:     Conduction: abnormal      Abnormal conduction: complete LBBB    ST segments:     ST segments:  Non-specific    Depression:  V4, V5 and V6  Comments:      This EKG was interpreted by me.           ED Course  ED Course as of 05/04/24 1344   Sat May 04, 2024   1332 Didn't take morning meds yet.   Paramedics gave 500 saline bolus.  We will give her another 500 ml saline bolus                            Initial Sepsis Screening       Row Name 05/04/24 1328                Is the patient's history suggestive of a new or worsening infection? Yes (Proceed)  -MEGHAN        Suspected source of infection pneumonia  -MEGHAN        Indicate SIRS criteria Leukocytosis (WBC > 13872 IJL) OR Leukopenia (WBC <4000 IJL) OR Bandemia (WBC >10% bands);Tachycardia > 90 bpm  -MEGHAN        Are two or more of the above signs & symptoms of infection both present and new to the patient? Yes (Proceed)  -MEGHAN        Assess for evidence of organ dysfunction: Are any of the below criteria present within 6 hours of suspected infection and SIRS criteria that are NOT considered to be chronic conditions? --                  User Key  (r) = Recorded By, (t) = Taken By, (c) = Cosigned By      Initials Name Provider Type    MEGHAN Hale DO Physician                    SBIRT 22yo+      Flowsheet Row Most Recent Value   Initial Alcohol Screen: US AUDIT-C     1. How often do you have a drink containing alcohol? 0 Filed at: 05/04/2024 1235   2. How many drinks containing alcohol do you have on a typical day you are drinking?  0 Filed at: 05/04/2024 1235   3a. Male UNDER 65: How often do you have five or more drinks on one occasion? 0 Filed at: 05/04/2024 1235   3b. FEMALE Any Age, or MALE 65+: How often do you have 4 or more drinks on one occassion? 0 Filed at: 05/04/2024 1235   Audit-C Score 0 Filed at: 05/04/2024 1235   KRISTY: How many times in the past year have you...    Used an illegal drug or used a prescription medication for non-medical reasons? Never Filed at: 05/04/2024 1235                      Medical Decision Making  Differential includes infection possibly pneumonia cellulitis UTI consider sepsis less likely severe with stable vital signs and no end-organ damage.  Other differential coronary syndrome CHF gastroenteritis renal insufficiency  dehydration viral syndrome electrolyte abnormality.    Amount and/or Complexity of Data Reviewed  Labs: ordered.  Radiology: ordered and independent interpretation performed.    Risk  OTC drugs.  Prescription drug management.  Decision regarding hospitalization.             Disposition  Final diagnoses:   Pneumonia - acute right side   Sepsis (HCC)     Time reflects when diagnosis was documented in both MDM as applicable and the Disposition within this note       Time User Action Codes Description Comment    5/4/2024  1:40 PM Hira Hale [J18.9] Pneumonia     5/4/2024  1:40 PM Hira Hale [J18.9] Pneumonia acute right side    5/4/2024  1:40 PM Hira Hale [A41.9] Sepsis (HCC)           ED Disposition       ED Disposition   Admit    Condition   Stable    Date/Time   Sat May 4, 2024 1340    Comment   Case was discussed with BARBARA Shankar and the patient's admission status was agreed to be Admission Status: inpatient status to the service of Dr. BARBARA Shankar .               Follow-up Information    None         Patient's Medications   Discharge Prescriptions    No medications on file       No discharge procedures on file.    PDMP Review         Value Time User    PDMP Reviewed  Yes 6/25/2023 11:54 AM Cheng Lane MD            ED Provider  Electronically Signed by             Hira Hale DO  05/04/24 6323

## 2024-05-04 NOTE — SEPSIS NOTE
Sepsis Note   Janice Yung 89 y.o. female MRN: [id number]  Unit/Bed#: ED 01 Encounter: [id number]       Initial Sepsis Screening       Row Name 05/04/24 1328                Is the patient's history suggestive of a new or worsening infection? Yes (Proceed)  -MEGHAN        Suspected source of infection pneumonia  -MEGHAN        Indicate SIRS criteria Leukocytosis (WBC > 52750 IJL) OR Leukopenia (WBC <4000 IJL) OR Bandemia (WBC >10% bands);Tachycardia > 90 bpm  -MEGHAN        Are two or more of the above signs & symptoms of infection both present and new to the patient? Yes (Proceed)  -MEGHAN        Assess for evidence of organ dysfunction: Are any of the below criteria present within 6 hours of suspected infection and SIRS criteria that are NOT considered to be chronic conditions? --                  User Key  (r) = Recorded By, (t) = Taken By, (c) = Cosigned By      Initials Name Provider Type    MEGHANERROL Hale DO Physician                        There is no height or weight on file to calculate BMI.  Wt Readings from Last 1 Encounters:   09/07/23 69.9 kg (154 lb)        Ideal body weight: 47.8 kg (105 lb 6.1 oz)  Adjusted ideal body weight: 56.6 kg (124 lb 13.2 oz)

## 2024-05-04 NOTE — ASSESSMENT & PLAN NOTE
Presented with fever, cough, shortness of breath, CXR with evidence of moderate opacity in the right base likely pneumonia, and large hiatal hernia  In ED pulse ox was 90, placed on 2 L nasal cannula; on evaluation patient was saturating in 98-99s - Has been with adequate saturations off abx   Procal trend   1.17 - 2.34    Plan:   Started on ceftriaxone, and azithromycin in the ED, continue day2  F/u urinary antigens  Blood culture pending  Continue CBC and procal in the a.m.

## 2024-05-04 NOTE — PROGRESS NOTES
[facility]  Progress Note  Name: Janice Yung I  MRN: [id number]  Unit/Bed#: -01 I Date of Admission: 5/4/2024   Date of Service: 5/4/2024 I Hospital Day: 0    Assessment/Plan   Sepsis without acute organ dysfunction (HCC)  Assessment & Plan  Met sepsis criteria with fever 100.3, white blood cells 22.2, pneumonia as a possible source  -Received 500 bolus in ED  -Will hold off on further IV fluids for now  -Procalcitonin at 1.17  -Received dose of ceftriaxone and azithromycin in ED, will continue same for now  -Blood culture pending    * Pneumonia  Assessment & Plan  Presented with fever, cough, shortness of breath, chest x-ray possible right lower lobe pneumonia-no official read  -In ED pulse ox was 90, placed on 2 L nasal cannula; on evaluation patient was saturating in 98-99s, will observe off oxygen  -Started on ceftriaxone azithromycin in ED, will continue same for now  -Continue to trend CBC, will recheck procalcitonin in the morning  -Blood culture pending    Stage 3a chronic kidney disease (HCC)  Assessment & Plan  Lab Results   Component Value Date    EGFR 39 05/04/2024    EGFR 43 08/29/2023    EGFR 51 08/22/2023    CREATININE 1.17 05/04/2024    CREATININE 1.10 08/29/2023    CREATININE 0.95 08/22/2023   Creatinine stable, with nephrotoxic medications  -Home regimen of gabapentin adjusted to 300 mg twice daily from 600 mg twice daily  -Continue to monitor BMP    Paroxysmal atrial fibrillation (HCC)  Assessment & Plan  Only on aspirin, not on anticoagulation  -Sinus rhythm on EKG    Gastroesophageal reflux disease without esophagitis  Assessment & Plan  Continue PPI, changed to formularly pantoprazole 20 mg    Neuropathy  Assessment & Plan  On gabapentin 600 mg twice daily  -Given patient's age and CKD, will decrease to 300 mg twice daily    Hypertension  Assessment & Plan  Blood pressure is well-controlled  -On torsemide 20 mg twice daily for bilateral leg swelling, continue  same monitor blood pressure         VTE Prophylaxis: Enoxaparin (Lovenox)  / reason for no mechanical VTE prophylaxis active infection and bilateral leg swelling    Code Status: Level 3   POLST: There is no POLST form on file for this patient (pre-hospital)  Discussion with family: discussed with the patient    Anticipated Length of Stay:  Patient will be admitted on an Inpatient basis with an anticipated length of stay of  > 2 midnights.   Justification for Hospital Stay: pneumonia, requires IV antibiotics    Total Time for Visit, including Counseling / Coordination of Care: 45 minutes.  Greater than 50% of this total time spent on direct patient counseling and coordination of care.    Chief Complaint:   Shortness of breath and cough for few days    History of Present Illness:    Janice Yung is a 89 y.o. female who presents with complaints of shortness of breath and cough for few days and fever started this morning.  She denies chest pain, trouble urinating, moving bowel or other complaints.  Ambulates at home using the walker.    Review of Systems:    Review of Systems   Constitutional:  Negative for chills, diaphoresis, fatigue and fever.   HENT:  Negative for congestion, ear discharge, ear pain, mouth sores, rhinorrhea, sore throat and trouble swallowing.    Eyes:  Negative for photophobia, pain and discharge.   Respiratory:  Positive for cough and shortness of breath. Negative for chest tightness and wheezing.    Cardiovascular:  Positive for leg swelling. Negative for chest pain and palpitations.   Gastrointestinal:  Negative for abdominal distention, abdominal pain, blood in stool, constipation, diarrhea and nausea.   Genitourinary:  Negative for difficulty urinating and frequency.   Musculoskeletal:  Negative for joint swelling and neck stiffness.   Skin:  Negative for color change, pallor and rash.   Neurological:  Negative for dizziness, syncope, numbness and headaches.       Past Medical and Surgical  History:     Past Medical History:   Diagnosis Date    SHERLYN (acute kidney injury) ([facility]) 5/8/2022    Balance disorder     Chronic pain     GERD (gastroesophageal reflux disease)     Hard of hearing     Hyperlipidemia     Hypertension     Hyponatremia 12/5/2020    Low serum cortisol level 12/7/2020    Neuropathy     Pneumonia     Pneumonia due to COVID-19 virus 12/5/2020    Sepsis ([facility]) 5/22/2018    Tinnitus     Unspecified adrenocortical insufficiency ([facility]) 2/28/2023    Felt to be related to COVID-19 infection.  No current symptoms.    UTI (urinary tract infection)        Past Surgical History:   Procedure Laterality Date    EYE SURGERY      HYSTERECTOMY      TONSILLECTOMY         Meds/Allergies:    Prior to Admission medications    Medication Sig Start Date End Date Taking? Authorizing Provider   Artificial Tears 0.2-0.2-1 % SOLN  6/30/23  Yes Historical Provider, MD   aspirin (ECOTRIN LOW STRENGTH) 81 mg EC tablet Take 1 tablet (81 mg total) by mouth daily 12/11/18  Yes Kenneth Castillo MD   atorvastatin (LIPITOR) 10 mg tablet Take 1 tablet by mouth once daily 4/8/24  Yes REAL Coronado   Cholecalciferol (VITAMIN D3) 1000 units CAPS Take 1,000 Units by mouth daily    Yes Historical Provider, MD   Cyanocobalamin (VITAMIN B12 PO) Take 1,000 mcg by mouth in the morning   Yes Historical Provider, MD   gabapentin (NEURONTIN) 600 MG tablet Take 1 tablet by mouth twice daily 4/8/24  Yes REAL Coronado   Mirabegron ER (Myrbetriq) 25 MG TB24 TAKE 1 TABLET BY MOUTH IN THE MORNING 4/8/24  Yes REAL La   neomycin-polymyxin-hydrocortisone (CORTISPORIN) 0.35%-10,000 units/mL-1% otic suspension Administer 4 drops to the right ear every 6 (six) hours for 5 days 6/25/23 5/4/24 Yes Cheng Lane MD   omeprazole (PriLOSEC) 20 mg delayed release capsule Take 1 capsule by mouth once daily 2/6/24  Yes Edgar Mustafa MD   Saccharomyces boulardii (Probiotic) 250 MG CAPS  7/7/23  Yes Historical Provider, MD  "  torsemide (DEMADEX) 20 mg tablet Take 1 tablet by mouth twice daily 4/8/24  Yes REAL Coronado   Multiple Vitamins-Minerals (PRESERVISION AREDS PO) Take by mouth 2 (two) times a day    Historical Provider, MD   nitrofurantoin (MACROBID) 100 mg capsule Take 1 capsule (100 mg total) by mouth in the morning 6/7/23   REAL La     I have reviewed home medications with patient personally.    Allergies: No Known Allergies    Social History:     Marital Status:    Occupation: retired  Patient Pre-hospital Living Situation: Home with daughter  Patient Pre-hospital Level of Mobility: Using walker  Patient Pre-hospital Diet Restrictions: None  Substance Use History:   Social History     Substance and Sexual Activity   Alcohol Use Not Currently    Comment: rarely     Social History     Tobacco Use   Smoking Status Never    Passive exposure: Never   Smokeless Tobacco Never     Social History     Substance and Sexual Activity   Drug Use No       Family History:    non-contributory    Physical Exam:     Vitals:   Blood Pressure: 124/54 (05/04/24 1727)  Pulse: 74 (05/04/24 1727)  Temperature: (!) 96.8 °F (36 °C) (05/04/24 1419)  Temp Source: Temporal (05/04/24 1419)  Respirations: 20 (05/04/24 1419)  Height: 5' 3\" (160 cm) (05/04/24 1419)  Weight - Scale: 58.7 kg (129 lb 6.6 oz) (05/04/24 1419)  SpO2: 95 % (05/04/24 1727)    Physical Exam  Constitutional:       General: She is not in acute distress.     Appearance: She is well-developed.   HENT:      Head: Normocephalic and atraumatic.   Eyes:      General: No scleral icterus.     Pupils: Pupils are equal, round, and reactive to light.   Cardiovascular:      Rate and Rhythm: Normal rate and regular rhythm.      Heart sounds: Normal heart sounds. No murmur heard.     No friction rub. No gallop.   Pulmonary:      Effort: Pulmonary effort is normal. No respiratory distress.      Breath sounds: Rales (on the right) present. No wheezing.   Chest:      " Chest wall: No tenderness.   Abdominal:      General: Bowel sounds are normal. There is no distension.      Palpations: Abdomen is soft.      Tenderness: There is no abdominal tenderness. There is no rebound.   Musculoskeletal:         General: No tenderness or deformity. Normal range of motion.      Cervical back: Normal range of motion and neck supple.   Lymphadenopathy:      Cervical: No cervical adenopathy.   Skin:     General: Skin is warm and dry.      Coloration: Skin is not pale.      Findings: No erythema or rash.      Comments: B/l leg swelling +2, mild redness on the left lower extremity with healing scabbing wound   Neurological:      Mental Status: She is alert and oriented to person, place, and time.           Additional Data:     Lab Results: I have personally reviewed pertinent reports.      Results from last 7 days   Lab Units 05/04/24  1243   WBC Thousand/uL 22.21*   HEMOGLOBIN g/dL 11.7   HEMATOCRIT % 38.0   PLATELETS Thousands/uL 203   SEGS PCT % 87*   LYMPHO PCT % 5*   MONO PCT % 7   EOS PCT % 0     Results from last 7 days   Lab Units 05/04/24  1243   POTASSIUM mmol/L 3.7   CHLORIDE mmol/L 101   CO2 mmol/L 33*   BUN mg/dL 20   CREATININE mg/dL 1.17   CALCIUM mg/dL 9.2   ALK PHOS U/L 113*   ALT U/L 29   AST U/L 37     Results from last 7 days   Lab Units 05/04/24  1243   INR  1.18               Imaging: I have personally reviewed pertinent reports.      XR chest portable   ED Interpretation by Hira Hale DO (05/04 1326)   Right lower lung infiltrate interpreted by me          EKG, Pathology, and Other Studies Reviewed on Admission:   EKG: normal sinus rhythm    Allscripts / Epic Records Reviewed: Yes     ** Please Note: This note has been constructed using a voice recognition system. **

## 2024-05-04 NOTE — ASSESSMENT & PLAN NOTE
Lab Results   Component Value Date    EGFR 39 05/04/2024    EGFR 43 08/29/2023    EGFR 51 08/22/2023    CREATININE 1.17 05/04/2024    CREATININE 1.10 08/29/2023    CREATININE 0.95 08/22/2023   Creatinine stable, avoid nephrotoxic medications  Home regimen of gabapentin adjusted to 300 mg twice daily from 600 mg twice daily  Continue to monitor BMP

## 2024-05-04 NOTE — ASSESSMENT & PLAN NOTE
On gabapentin 600 mg twice daily  Given patient's age and CKD, will decrease to 300 mg twice daily

## 2024-05-04 NOTE — H&P
[facility] hospital  H&P  Name: Janice Yung 89 y.o. female I MRN: [id number]  Unit/Bed#: -01 I Date of Admission: 5/4/2024   Date of Service: 5/4/2024 I Hospital Day: 0        Assessment/Plan   Sepsis without acute organ dysfunction (HCC)  Assessment & Plan  Met sepsis criteria with fever 100.3, white blood cells 22.2, pneumonia as a possible source  -Received 500 bolus in ED  -Will hold off on further IV fluids for now  -Procalcitonin at 1.17  -Received dose of ceftriaxone and azithromycin in ED, will continue same for now  -Blood culture pending     * Pneumonia  Assessment & Plan  Presented with fever, cough, shortness of breath, chest x-ray possible right lower lobe pneumonia-no official read  -In ED pulse ox was 90, placed on 2 L nasal cannula; on evaluation patient was saturating in 98-99s, will observe off oxygen  -Started on ceftriaxone azithromycin in ED, will continue same for now  -Continue to trend CBC, will recheck procalcitonin in the morning  -Blood culture pending     Stage 3a chronic kidney disease (HCC)  Assessment & Plan        Lab Results   Component Value Date     EGFR 39 05/04/2024     EGFR 43 08/29/2023     EGFR 51 08/22/2023     CREATININE 1.17 05/04/2024     CREATININE 1.10 08/29/2023     CREATININE 0.95 08/22/2023   Creatinine stable, with nephrotoxic medications  -Home regimen of gabapentin adjusted to 300 mg twice daily from 600 mg twice daily  -Continue to monitor BMP     Paroxysmal atrial fibrillation (HCC)  Assessment & Plan  Only on aspirin, not on anticoagulation  -Sinus rhythm on EKG     Gastroesophageal reflux disease without esophagitis  Assessment & Plan  Continue PPI, changed to formularly pantoprazole 20 mg     Neuropathy  Assessment & Plan  On gabapentin 600 mg twice daily  -Given patient's age and CKD, will decrease to 300 mg twice daily     Hypertension  Assessment & Plan  Blood pressure is well-controlled  -On torsemide 20 mg twice daily for  bilateral leg swelling, continue same monitor blood pressure              VTE Prophylaxis: Enoxaparin (Lovenox)  / reason for no mechanical VTE prophylaxis active infection and bilateral leg swelling    Code Status: Level 3   POLST: There is no POLST form on file for this patient (pre-hospital)  Discussion with family: discussed with the patient     Anticipated Length of Stay:  Patient will be admitted on an Inpatient basis with an anticipated length of stay of  > 2 midnights.   Justification for Hospital Stay: pneumonia, requires IV antibiotics     Total Time for Visit, including Counseling / Coordination of Care: 45 minutes.  Greater than 50% of this total time spent on direct patient counseling and coordination of care.     Chief Complaint:   Shortness of breath and cough for few days     History of Present Illness:     Janice Yung is a 89 y.o. female who presents with complaints of shortness of breath and cough for few days and fever started this morning.  She denies chest pain, trouble urinating, moving bowel or other complaints.  Ambulates at home using the walker.     Review of Systems:     Review of Systems   Constitutional:  Negative for chills, diaphoresis, fatigue and fever.   HENT:  Negative for congestion, ear discharge, ear pain, mouth sores, rhinorrhea, sore throat and trouble swallowing.    Eyes:  Negative for photophobia, pain and discharge.   Respiratory:  Positive for cough and shortness of breath. Negative for chest tightness and wheezing.    Cardiovascular:  Positive for leg swelling. Negative for chest pain and palpitations.   Gastrointestinal:  Negative for abdominal distention, abdominal pain, blood in stool, constipation, diarrhea and nausea.   Genitourinary:  Negative for difficulty urinating and frequency.   Musculoskeletal:  Negative for joint swelling and neck stiffness.   Skin:  Negative for color change, pallor and rash.   Neurological:  Negative for dizziness, syncope, numbness and  headaches.         Past Medical and Surgical History:      Medical History        Past Medical History:   Diagnosis Date    SHERLYN (acute kidney injury) ([facility]) 5/8/2022    Balance disorder      Chronic pain      GERD (gastroesophageal reflux disease)      Hard of hearing      Hyperlipidemia      Hypertension      Hyponatremia 12/5/2020    Low serum cortisol level 12/7/2020    Neuropathy      Pneumonia      Pneumonia due to COVID-19 virus 12/5/2020    Sepsis ([facility]) 5/22/2018    Tinnitus      Unspecified adrenocortical insufficiency ([facility]) 2/28/2023     Felt to be related to COVID-19 infection.  No current symptoms.    UTI (urinary tract infection)              Surgical History         Past Surgical History:   Procedure Laterality Date    EYE SURGERY        HYSTERECTOMY        TONSILLECTOMY                Meds/Allergies:             Prior to Admission medications    Medication Sig Start Date End Date Taking? Authorizing Provider   Artificial Tears 0.2-0.2-1 % SOLN   6/30/23   Yes Historical Provider, MD   aspirin (ECOTRIN LOW STRENGTH) 81 mg EC tablet Take 1 tablet (81 mg total) by mouth daily 12/11/18   Yes Kenneth Castillo MD   atorvastatin (LIPITOR) 10 mg tablet Take 1 tablet by mouth once daily 4/8/24   Yes REAL Coronado   Cholecalciferol (VITAMIN D3) 1000 units CAPS Take 1,000 Units by mouth daily      Yes Historical Provider, MD   Cyanocobalamin (VITAMIN B12 PO) Take 1,000 mcg by mouth in the morning     Yes Historical Provider, MD   gabapentin (NEURONTIN) 600 MG tablet Take 1 tablet by mouth twice daily 4/8/24   Yes REAL Coronado   Mirabegron ER (Myrbetriq) 25 MG TB24 TAKE 1 TABLET BY MOUTH IN THE MORNING 4/8/24   Yes REAL La   neomycin-polymyxin-hydrocortisone (CORTISPORIN) 0.35%-10,000 units/mL-1% otic suspension Administer 4 drops to the right ear every 6 (six) hours for 5 days 6/25/23 5/4/24 Yes Cheng Lane MD   omeprazole (PriLOSEC) 20 mg delayed release capsule Take 1  "capsule by mouth once daily 2/6/24   Yes Edgar Mustafa MD   Saccharomyces boulardii (Probiotic) 250 MG CAPS   7/7/23   Yes Historical Provider, MD   torsemide (DEMADEX) 20 mg tablet Take 1 tablet by mouth twice daily 4/8/24   Yes REAL Coronado   Multiple Vitamins-Minerals (PRESERVISION AREDS PO) Take by mouth 2 (two) times a day       Historical Provider, MD   nitrofurantoin (MACROBID) 100 mg capsule Take 1 capsule (100 mg total) by mouth in the morning 6/7/23     REAL La      I have reviewed home medications with patient personally.     Allergies: No Known Allergies     Social History:     Marital Status:    Occupation: retired  Patient Pre-hospital Living Situation: Home with daughter  Patient Pre-hospital Level of Mobility: Using walker  Patient Pre-hospital Diet Restrictions: None  Substance Use History:   Social History           Substance and Sexual Activity   Alcohol Use Not Currently     Comment: rarely      Social History           Tobacco Use   Smoking Status Never    Passive exposure: Never   Smokeless Tobacco Never      Social History          Substance and Sexual Activity   Drug Use No         Family History:     non-contributory     Physical Exam:      Vitals:   Blood Pressure: 124/54 (05/04/24 1727)  Pulse: 74 (05/04/24 1727)  Temperature: (!) 96.8 °F (36 °C) (05/04/24 1419)  Temp Source: Temporal (05/04/24 1419)  Respirations: 20 (05/04/24 1419)  Height: 5' 3\" (160 cm) (05/04/24 1419)  Weight - Scale: 58.7 kg (129 lb 6.6 oz) (05/04/24 1419)  SpO2: 95 % (05/04/24 1727)     Physical Exam  Constitutional:       General: She is not in acute distress.     Appearance: She is well-developed.   HENT:      Head: Normocephalic and atraumatic.   Eyes:      General: No scleral icterus.     Pupils: Pupils are equal, round, and reactive to light.   Cardiovascular:      Rate and Rhythm: Normal rate and regular rhythm.      Heart sounds: Normal heart sounds. No murmur heard.     " No friction rub. No gallop.   Pulmonary:      Effort: Pulmonary effort is normal. No respiratory distress.      Breath sounds: Rales (on the right) present. No wheezing.   Chest:      Chest wall: No tenderness.   Abdominal:      General: Bowel sounds are normal. There is no distension.      Palpations: Abdomen is soft.      Tenderness: There is no abdominal tenderness. There is no rebound.   Musculoskeletal:         General: No tenderness or deformity. Normal range of motion.      Cervical back: Normal range of motion and neck supple.   Lymphadenopathy:      Cervical: No cervical adenopathy.   Skin:     General: Skin is warm and dry.      Coloration: Skin is not pale.      Findings: No erythema or rash.      Comments: B/l leg swelling +2, mild redness on the left lower extremity with healing scabbing wound   Neurological:      Mental Status: She is alert and oriented to person, place, and time.               Additional Data:      Lab Results: I have personally reviewed pertinent reports.            Results from last 7 days   Lab Units 05/04/24  1243   WBC Thousand/uL 22.21*   HEMOGLOBIN g/dL 11.7   HEMATOCRIT % 38.0   PLATELETS Thousands/uL 203   SEGS PCT % 87*   LYMPHO PCT % 5*   MONO PCT % 7   EOS PCT % 0           Results from last 7 days   Lab Units 05/04/24  1243   POTASSIUM mmol/L 3.7   CHLORIDE mmol/L 101   CO2 mmol/L 33*   BUN mg/dL 20   CREATININE mg/dL 1.17   CALCIUM mg/dL 9.2   ALK PHOS U/L 113*   ALT U/L 29   AST U/L 37           Results from last 7 days   Lab Units 05/04/24  1243   INR   1.18                 Imaging: I have personally reviewed pertinent reports.       XR chest portable   ED Interpretation by Hira Hale DO (05/04 1326)   Right lower lung infiltrate interpreted by me             EKG, Pathology, and Other Studies Reviewed on Admission:   EKG: normal sinus rhythm     Allscripts / Epic Records Reviewed: Yes      ** Please Note: This note has been constructed using a voice recognition  system. **

## 2024-05-04 NOTE — ASSESSMENT & PLAN NOTE
Blood pressure is well-controlled  Continue on home torsemide 20 mg twice daily for bilateral leg swelling  Continue to monitor

## 2024-05-04 NOTE — ASSESSMENT & PLAN NOTE
Met sepsis criteria with fever 100.3, white blood cells 22.2, pneumonia as source   Received 500 bolus in ED  Will hold off on further IV fluids for now  Procalcitonin at 1.17  Received dose of ceftriaxone and azithromycin in ED, will continue same for now  Blood culture pending

## 2024-05-05 PROBLEM — R13.10 SWALLOWING DIFFICULTY: Status: ACTIVE | Noted: 2024-05-05

## 2024-05-05 LAB
ANION GAP SERPL CALCULATED.3IONS-SCNC: 6 MMOL/L (ref 4–13)
BACTERIA UR QL AUTO: ABNORMAL /HPF
BASOPHILS # BLD AUTO: 0.04 THOUSANDS/ÂΜL (ref 0–0.1)
BASOPHILS NFR BLD AUTO: 0 % (ref 0–1)
BILIRUB UR QL STRIP: NEGATIVE
BUN SERPL-MCNC: 23 MG/DL (ref 5–25)
CALCIUM SERPL-MCNC: 8.7 MG/DL (ref 8.4–10.2)
CHLORIDE SERPL-SCNC: 105 MMOL/L (ref 96–108)
CLARITY UR: CLEAR
CO2 SERPL-SCNC: 32 MMOL/L (ref 21–32)
COLOR UR: YELLOW
CREAT SERPL-MCNC: 1.07 MG/DL (ref 0.6–1.3)
EOSINOPHIL # BLD AUTO: 0.02 THOUSAND/ÂΜL (ref 0–0.61)
EOSINOPHIL NFR BLD AUTO: 0 % (ref 0–6)
ERYTHROCYTE [DISTWIDTH] IN BLOOD BY AUTOMATED COUNT: 14 % (ref 11.6–15.1)
GFR SERPL CREATININE-BSD FRML MDRD: 44 ML/MIN/1.73SQ M
GLUCOSE SERPL-MCNC: 98 MG/DL (ref 65–140)
GLUCOSE UR STRIP-MCNC: NEGATIVE MG/DL
HCT VFR BLD AUTO: 32.9 % (ref 34.8–46.1)
HGB BLD-MCNC: 10 G/DL (ref 11.5–15.4)
HGB UR QL STRIP.AUTO: NEGATIVE
HYALINE CASTS #/AREA URNS LPF: ABNORMAL /LPF
IMM GRANULOCYTES # BLD AUTO: 0.21 THOUSAND/UL (ref 0–0.2)
IMM GRANULOCYTES NFR BLD AUTO: 1 % (ref 0–2)
KETONES UR STRIP-MCNC: NEGATIVE MG/DL
LEUKOCYTE ESTERASE UR QL STRIP: ABNORMAL
LYMPHOCYTES # BLD AUTO: 1.87 THOUSANDS/ÂΜL (ref 0.6–4.47)
LYMPHOCYTES NFR BLD AUTO: 9 % (ref 14–44)
MAGNESIUM SERPL-MCNC: 1.9 MG/DL (ref 1.9–2.7)
MCH RBC QN AUTO: 27.2 PG (ref 26.8–34.3)
MCHC RBC AUTO-ENTMCNC: 30.4 G/DL (ref 31.4–37.4)
MCV RBC AUTO: 89 FL (ref 82–98)
MONOCYTES # BLD AUTO: 1.44 THOUSAND/ÂΜL (ref 0.17–1.22)
MONOCYTES NFR BLD AUTO: 7 % (ref 4–12)
NEUTROPHILS # BLD AUTO: 17.64 THOUSANDS/ÂΜL (ref 1.85–7.62)
NEUTS SEG NFR BLD AUTO: 83 % (ref 43–75)
NITRITE UR QL STRIP: NEGATIVE
NON-SQ EPI CELLS URNS QL MICRO: ABNORMAL /HPF
NRBC BLD AUTO-RTO: 0 /100 WBCS
PH UR STRIP.AUTO: 6 [PH]
PLATELET # BLD AUTO: 182 THOUSANDS/UL (ref 149–390)
PMV BLD AUTO: 9.8 FL (ref 8.9–12.7)
POTASSIUM SERPL-SCNC: 3.5 MMOL/L (ref 3.5–5.3)
PROCALCITONIN SERPL-MCNC: 2.34 NG/ML
PROT UR STRIP-MCNC: NEGATIVE MG/DL
RBC # BLD AUTO: 3.68 MILLION/UL (ref 3.81–5.12)
RBC #/AREA URNS AUTO: ABNORMAL /HPF
SODIUM SERPL-SCNC: 143 MMOL/L (ref 135–147)
SP GR UR STRIP.AUTO: 1.01 (ref 1–1.03)
UROBILINOGEN UR STRIP-ACNC: <2 MG/DL
WBC # BLD AUTO: 21.22 THOUSAND/UL (ref 4.31–10.16)
WBC #/AREA URNS AUTO: ABNORMAL /HPF

## 2024-05-05 PROCEDURE — 85025 COMPLETE CBC W/AUTO DIFF WBC: CPT | Performed by: FAMILY MEDICINE

## 2024-05-05 PROCEDURE — 80048 BASIC METABOLIC PNL TOTAL CA: CPT | Performed by: FAMILY MEDICINE

## 2024-05-05 PROCEDURE — 84145 PROCALCITONIN (PCT): CPT | Performed by: FAMILY MEDICINE

## 2024-05-05 PROCEDURE — 83735 ASSAY OF MAGNESIUM: CPT | Performed by: FAMILY MEDICINE

## 2024-05-05 PROCEDURE — 92610 EVALUATE SWALLOWING FUNCTION: CPT

## 2024-05-05 PROCEDURE — 81001 URINALYSIS AUTO W/SCOPE: CPT | Performed by: FAMILY MEDICINE

## 2024-05-05 PROCEDURE — 87449 NOS EACH ORGANISM AG IA: CPT

## 2024-05-05 PROCEDURE — 99232 SBSQ HOSP IP/OBS MODERATE 35: CPT

## 2024-05-05 RX ADMIN — ATORVASTATIN CALCIUM 10 MG: 10 TABLET, FILM COATED ORAL at 09:39

## 2024-05-05 RX ADMIN — TORSEMIDE 20 MG: 20 TABLET ORAL at 09:39

## 2024-05-05 RX ADMIN — PANTOPRAZOLE SODIUM 20 MG: 20 TABLET, DELAYED RELEASE ORAL at 05:33

## 2024-05-05 RX ADMIN — GABAPENTIN 300 MG: 300 CAPSULE ORAL at 09:39

## 2024-05-05 RX ADMIN — GABAPENTIN 300 MG: 300 CAPSULE ORAL at 17:51

## 2024-05-05 RX ADMIN — ASPIRIN 81 MG: 81 TABLET, COATED ORAL at 09:39

## 2024-05-05 RX ADMIN — TORSEMIDE 20 MG: 20 TABLET ORAL at 17:51

## 2024-05-05 RX ADMIN — AZITHROMYCIN MONOHYDRATE 500 MG: 500 INJECTION, POWDER, LYOPHILIZED, FOR SOLUTION INTRAVENOUS at 15:15

## 2024-05-05 RX ADMIN — Medication 1000 UNITS: at 09:39

## 2024-05-05 RX ADMIN — ENOXAPARIN SODIUM 30 MG: 30 INJECTION SUBCUTANEOUS at 09:39

## 2024-05-05 RX ADMIN — CEFTRIAXONE 1000 MG: 1 INJECTION, SOLUTION INTRAVENOUS at 14:17

## 2024-05-05 NOTE — PLAN OF CARE
Problem: Potential for Falls  Goal: Patient will remain free of falls  Description: INTERVENTIONS:  - Educate patient/family on patient safety including physical limitations  - Instruct patient to call for assistance with activity   - Consult OT/PT to assist with strengthening/mobility   - Keep Call bell within reach  - Keep bed low and locked with side rails adjusted as appropriate  - Keep care items and personal belongings within reach  - Initiate and maintain comfort rounds  - Make Fall Risk Sign visible to staff  - Offer Toileting every x Hours, in advance of need  - Initiate/Maintain bedalarm  - Obtain necessary fall risk management equipment: x  - Apply yellow socks and bracelet for high fall risk patients  - Consider moving patient to room near nurses station  Outcome: Progressing     Problem: Prexisting or High Potential for Compromised Skin Integrity  Goal: Skin integrity is maintained or improved  Description: INTERVENTIONS:  - Identify patients at risk for skin breakdown  - Assess and monitor skin integrity  - Assess and monitor nutrition and hydration status  - Monitor labs   - Assess for incontinence   - Turn and reposition patient  - Assist with mobility/ambulation  - Relieve pressure over bony prominences  - Avoid friction and shearing  - Provide appropriate hygiene as needed including keeping skin clean and dry  - Evaluate need for skin moisturizer/barrier cream  - Collaborate with interdisciplinary team   - Patient/family teaching  - Consider wound care consult   Outcome: Progressing     Problem: Nutrition/Hydration-ADULT  Goal: Nutrient/Hydration intake appropriate for improving, restoring or maintaining nutritional needs  Description: Monitor and assess patient's nutrition/hydration status for malnutrition. Collaborate with interdisciplinary team and initiate plan and interventions as ordered.  Monitor patient's weight and dietary intake as ordered or per policy. Utilize nutrition screening tool  and intervene as necessary. Determine patient's food preferences and provide high-protein, high-caloric foods as appropriate.     INTERVENTIONS:  - Monitor oral intake, urinary output, labs, and treatment plans  - Assess nutrition and hydration status and recommend course of action  - Evaluate amount of meals eaten  - Assist patient with eating if necessary   - Allow adequate time for meals  - Recommend/ encourage appropriate diets, oral nutritional supplements, and vitamin/mineral supplements  - Order, calculate, and assess calorie counts as needed  - Recommend, monitor, and adjust tube feedings and TPN/PPN based on assessed needs  - Assess need for intravenous fluids  - Provide specific nutrition/hydration education as appropriate  - Include patient/family/caregiver in decisions related to nutrition  Outcome: Progressing

## 2024-05-05 NOTE — PROGRESS NOTES
[facility]  Progress Note  Name: Janice Yung I  MRN: [id number]  Unit/Bed#: -01 I Date of Admission: 5/4/2024   Date of Service: 5/5/2024 I Hospital Day: 1    Assessment/Plan   * Pneumonia  Assessment & Plan  Presented with fever, cough, shortness of breath, CXR with evidence of moderate opacity in the right base likely pneumonia, and large hiatal hernia  In ED pulse ox was 90, placed on 2 L nasal cannula; on evaluation patient was saturating in 98-99s, will observe off oxygen  Procal trend   1.17 - 2.34    Plan:   Started on ceftriaxone, and azithromycin in the ED, continue  F/u urinary antigens  Blood culture pending  Continue CBC and procal in the a.m.    Stage 3a chronic kidney disease (HCC)  Assessment & Plan  Lab Results   Component Value Date    EGFR 39 05/04/2024    EGFR 43 08/29/2023    EGFR 51 08/22/2023    CREATININE 1.17 05/04/2024    CREATININE 1.10 08/29/2023    CREATININE 0.95 08/22/2023   Creatinine stable, avoid nephrotoxic medications  Home regimen of gabapentin adjusted to 300 mg twice daily from 600 mg twice daily  Continue to monitor BMP    Paroxysmal atrial fibrillation (HCC)  Assessment & Plan  Only on aspirin, not on anticoagulation  Sinus rhythm on EKG    Sepsis without acute organ dysfunction (HCC)  Assessment & Plan  Met sepsis criteria with fever 100.3, white blood cells 22.2, pneumonia as source   Received 500 bolus in ED  Will hold off on further IV fluids for now  Procalcitonin at 1.17  Received dose of ceftriaxone and azithromycin in ED, will continue same for now  Blood culture pending    Gastroesophageal reflux disease without esophagitis  Assessment & Plan  Continue PPI, changed to formularly pantoprazole 20 mg    Neuropathy  Assessment & Plan  On gabapentin 600 mg twice daily  Given patient's age and CKD, will decrease to 300 mg twice daily    Hypertension  Assessment & Plan  Blood pressure is well-controlled  Continue on home torsemide 20 mg  "twice daily for bilateral leg swelling  Continue to monitor          VTE Pharmacologic Prophylaxis: VTE Score: 5 High Risk (Score >/= 5) - Pharmacological DVT Prophylaxis Ordered: enoxaparin (Lovenox). Sequential Compression Devices Ordered.    Mobility:   Basic Mobility Inpatient Raw Score: 12  -HLM Goal: 4: Move to chair/commode  JH-HLM Achieved: 2: Bed activities/Dependent transfer  JH-HLM Goal NOT achieved. Continue with multidisciplinary rounding and encourage appropriate mobility to improve upon JH-HLM goals.    Patient Centered Rounds: I performed bedside rounds with nursing staff today.   Discussions with Specialists or Other Care Team Provider: None    Education and Discussions with Family / Patient: Updated  (daughter) at bedside.    Total Time Spent on Date of Encounter in care of patient: This time was spent on one or more of the following: performing physical exam; counseling and coordination of care; obtaining or reviewing history; documenting in the medical record; reviewing/ordering tests, medications or procedures; communicating with other healthcare professionals and discussing with patient's family/caregivers.    Current Length of Stay: 1 day(s)  Current Patient Status: Inpatient   Certification Statement: The patient will continue to require additional inpatient hospital stay due to IV abx  Discharge Plan: Anticipate discharge in 24-48 hrs to discharge location to be determined pending rehab evaluations.    Code Status: Level 3 - DNAR and DNI    Subjective:   Seen and examined. No acute events overnight. States she is feeling \"fine.\" Offers no additional complaints at this time.     Objective:     Vitals:   Temp (24hrs), Av.1 °F (36.7 °C), Min:96.8 °F (36 °C), Max:100.3 °F (37.9 °C)    Temp:  [96.8 °F (36 °C)-100.3 °F (37.9 °C)] 97.3 °F (36.3 °C)  HR:  [] 71  Resp:  [20] 20  BP: (101-132)/(46-60) 132/52  SpO2:  [93 %-98 %] 95 %  Body mass index is 22.92 kg/m².     Input " and Output Summary (last 24 hours):     Intake/Output Summary (Last 24 hours) at 5/5/2024 1228  Last data filed at 5/5/2024 1101  Gross per 24 hour   Intake 910 ml   Output 750 ml   Net 160 ml       Physical Exam:   Physical Exam  Constitutional:       General: She is not in acute distress.  HENT:      Mouth/Throat:      Mouth: Mucous membranes are moist.   Eyes:      Conjunctiva/sclera: Conjunctivae normal.   Cardiovascular:      Rate and Rhythm: Normal rate.      Heart sounds: Normal heart sounds.   Pulmonary:      Effort: Pulmonary effort is normal.      Breath sounds: No wheezing, rhonchi or rales.   Abdominal:      Palpations: Abdomen is soft.   Musculoskeletal:      Right lower leg: Edema present.      Left lower leg: Edema present.   Skin:     General: Skin is warm and dry.   Neurological:      Mental Status: Mental status is at baseline.        Additional Data:     Labs:  Results from last 7 days   Lab Units 05/05/24  0533   WBC Thousand/uL 21.22*   HEMOGLOBIN g/dL 10.0*   HEMATOCRIT % 32.9*   PLATELETS Thousands/uL 182   SEGS PCT % 83*   LYMPHO PCT % 9*   MONO PCT % 7   EOS PCT % 0     Results from last 7 days   Lab Units 05/05/24  0533 05/04/24  1243   SODIUM mmol/L 143 144   POTASSIUM mmol/L 3.5 3.7   CHLORIDE mmol/L 105 101   CO2 mmol/L 32 33*   BUN mg/dL 23 20   CREATININE mg/dL 1.07 1.17   ANION GAP mmol/L 6 10   CALCIUM mg/dL 8.7 9.2   ALBUMIN g/dL  --  3.9   TOTAL BILIRUBIN mg/dL  --  0.84   ALK PHOS U/L  --  113*   ALT U/L  --  29   AST U/L  --  37   GLUCOSE RANDOM mg/dL 98 137     Results from last 7 days   Lab Units 05/04/24  1243   INR  1.18             Results from last 7 days   Lab Units 05/05/24  0533 05/04/24  1243   LACTIC ACID mmol/L  --  2.0   PROCALCITONIN ng/ml 2.34* 1.17*       Lines/Drains:  Invasive Devices       Peripheral Intravenous Line  Duration             Peripheral IV 05/04/24 Proximal;Right;Ventral (anterior) Forearm 1 day    Peripheral IV 05/04/24 Left;Proximal;Ventral  (anterior) Forearm <1 day                          Imaging: Reviewed radiology reports from this admission including: chest xray    Recent Cultures (last 7 days):   Results from last 7 days   Lab Units 05/04/24  1243 05/04/24  1242   BLOOD CULTURE  Received in Microbiology Lab. Culture in Progress. Received in Microbiology Lab. Culture in Progress.       Last 24 Hours Medication List:   Current Facility-Administered Medications   Medication Dose Route Frequency Provider Last Rate    aspirin  81 mg Oral Daily Gabbie Ennis MD      atorvastatin  10 mg Oral Daily Gabbie Ennis MD      azithromycin  500 mg Intravenous Q24H Gabbie Ennis MD      cefTRIAXone  1,000 mg Intravenous Q24H Gabbie Ennis MD      Cholecalciferol  1,000 Units Oral Daily Gabbie Ennis MD      enoxaparin  30 mg Subcutaneous Daily Gabbie Ennis MD      gabapentin  300 mg Oral BID Gabbie Ennis MD      Mirabegron ER  25 mg Oral QAM Gabbie Ennis MD      pantoprazole  20 mg Oral Early Morning Gabbie Ennis MD      torsemide  20 mg Oral BID Gabbie Ennis MD          Today, Patient Was Seen By: Rhea Blakcwell PA-C    **Please Note: This note may have been constructed using a voice recognition system.**

## 2024-05-05 NOTE — SPEECH THERAPY NOTE
Speech Language/Pathology    Speech-Language Pathology Bedside Swallow Evaluation      Patient Name: Janice Yung    Today's Date: 5/5/2024     Problem List  Principal Problem:    Pneumonia  Active Problems:    Hypertension    Neuropathy    Gastroesophageal reflux disease without esophagitis    Sepsis without acute organ dysfunction (HCC)    Paroxysmal atrial fibrillation (HCC)    Stage 3a chronic kidney disease (HCC)      Past Medical History  Past Medical History:   Diagnosis Date    SHERLYN (acute kidney injury) (HCC) 5/8/2022    Balance disorder     Chronic pain     GERD (gastroesophageal reflux disease)     Hard of hearing     Hyperlipidemia     Hypertension     Hyponatremia 12/5/2020    Low serum cortisol level 12/7/2020    Neuropathy     Pneumonia     Pneumonia due to COVID-19 virus 12/5/2020    Sepsis (HCC) 5/22/2018    Tinnitus     Unspecified adrenocortical insufficiency ([facility]) 2/28/2023    Felt to be related to COVID-19 infection.  No current symptoms.    UTI (urinary tract infection)        Past Surgical History  Past Surgical History:   Procedure Laterality Date    EYE SURGERY      HYSTERECTOMY      TONSILLECTOMY         Summary   Pt presented with s/s mild oropharyngeal dysphagia, appears function for limited materials assessed today (pt w/ poor bolus acceptance/poor appetite). Pt breaks all material into small pieces then retrieves small bites. Mildly prolonged munching mastication of all, ultimately effective for bolus break down. No significant oral residue. Swallows suspected fairly prompt. NO overt s/s aspiration today. Pt endorses globus sensation, ?pharyngeal vs esophageal and occasional cough w/ PO. Pt reports coughing noted more when head in posterior position drinking from a cup. Education initiated on strategies to optimize swallow safety, including maintaining neutral head position, slow rate, and alternating bites/sips. Pt understanding and agreeable, verbalizes understanding to notify her  medical team if s/s aspiration occur. Given pt's poor appetite/limited intake at baseline, consider liberalizing diet w/ use of strategies to optimize safety.     Risk/s for Aspiration: Mild risk, age, respiratory status      Recommended Diet: regular diet and thin liquids   Recommended Form of Meds: whole with puree   Aspiration precautions and swallowing strategies: upright posture, small bites/sips, quiet environment (tv off, limit talking, door closed, etc.), and alternating bites and sips  Other Recommendations: Continue frequent oral care        Current Medical Status  Pt is a 89 y.o. female who presented to  [facility]  with complaints of shortness of breath and cough for few days and fever started this morning. She denies chest pain, trouble urinating, moving bowel or other complaints. Ambulates at home using the walker     Current Precautions:    Aspiration  Contact      Allergies:  No known food allergies    Past medical history:  Please see H&P for details    Special Studies:  CXR 5/4: Moderate opacity in the right base, likely pneumonia.     This corresponds with the preliminary interpretation by the emergency department clinician.     Large hiatal hernia.      Social/Education/Vocational Hx:  Pt lives with family    Swallow Information   Current Risks for Dysphagia & Aspiration:  age  Current Symptoms/Concerns:  ? RL pna  Current Diet: regular diet and thin liquids   Baseline Diet: regular diet, thin liquids, and pt w/ limited preference/poor intake      Baseline Assessment   Behavior/Cognition: alert  Speech/Language Status: able to participate in conversation and able to follow commands  Patient Positioning: upright in chair  Pain Status/Interventions/Response to Interventions: No report of or nonverbal indications of pain.       Swallow Mechanism Exam  Facial: symmetrical  Labial: WFL  Lingual: WFL  Velum: symmetrical  Mandible: adequate ROM  Dentition: upper dentures  Vocal  "quality:clear/adequate   Volitional Cough: strong/productive   Respiratory Status: on RA        Consistencies Assessed and Performance   Consistencies Administered: thin liquids, puree, soft solids, and hard solids (x1 bite of all solids)     Oral Stage: mild  Pt retrieves small bites of all, breaking larger pieces to be smaller and then biting off a portion. Mildly prolonged munching mastication of retrieved material though ultimately effective. Adequate strength for lingual transfers. No significant oral residue. No overt s/s reduced oral control.    Pharyngeal Stage: mild  Swallow Mechanics:  Swallowing initiation appeared fairly prompt/?mild delay.  Laryngeal rise was palpated and judged to be fair.   No coughing, throat clearing, change in vocal quality or respiratory status noted today.     Esophageal Concerns: globus sensation    Strategies and Efficacy: -    Summary and Recommendations (see above)    Results Reviewed with: patient, RN, PA, and family     Treatment Recommended: Yes     Frequency of treatment: As able/appropriate across larger sample, ?MBS     Patient Stated Goal: \"Peanut butter crackers and pepsi\"     Dysphagia LTG  -Patient will demonstrate safe and effective oral intake (without overt s/s significant oral/pharyngeal dysphagia including s/s penetration or aspiration) for the highest appropriate diet level.     Short Term Goals:    -Patient will comply with a Video/Modified Barium Swallow study for more complete assessment of swallowing anatomy/physiology/aspiration risk and to assess efficacy of treatment techniques so as to best guide treatment plan    Re: Compensatory Strategies  -Patient will demonstrate the ability to adequately self-monitor swallowing skills and perform appropriate compensatory techniques to reduce overt s/sx of penetration/aspiration to safely tolerate least restrictive food/liquid consistencies.          Speech Therapy Prognosis   Prognosis: good    Prognosis " Considerations: age, medical status, prior medical history, and cognitive status

## 2024-05-06 LAB
ANION GAP SERPL CALCULATED.3IONS-SCNC: 6 MMOL/L (ref 4–13)
ATRIAL RATE: 99 BPM
BUN SERPL-MCNC: 20 MG/DL (ref 5–25)
CALCIUM SERPL-MCNC: 8.3 MG/DL (ref 8.4–10.2)
CHLORIDE SERPL-SCNC: 102 MMOL/L (ref 96–108)
CO2 SERPL-SCNC: 33 MMOL/L (ref 21–32)
CREAT SERPL-MCNC: 1.06 MG/DL (ref 0.6–1.3)
ERYTHROCYTE [DISTWIDTH] IN BLOOD BY AUTOMATED COUNT: 14 % (ref 11.6–15.1)
GFR SERPL CREATININE-BSD FRML MDRD: 45 ML/MIN/1.73SQ M
GLUCOSE SERPL-MCNC: 100 MG/DL (ref 65–140)
HCT VFR BLD AUTO: 31.2 % (ref 34.8–46.1)
HGB BLD-MCNC: 9.5 G/DL (ref 11.5–15.4)
L PNEUMO1 AG UR QL IA.RAPID: NEGATIVE
MCH RBC QN AUTO: 27.2 PG (ref 26.8–34.3)
MCHC RBC AUTO-ENTMCNC: 30.4 G/DL (ref 31.4–37.4)
MCV RBC AUTO: 89 FL (ref 82–98)
P AXIS: 46 DEGREES
PLATELET # BLD AUTO: 172 THOUSANDS/UL (ref 149–390)
PMV BLD AUTO: 9.5 FL (ref 8.9–12.7)
POTASSIUM SERPL-SCNC: 3.6 MMOL/L (ref 3.5–5.3)
PR INTERVAL: 198 MS
QRS AXIS: 46 DEGREES
QRSD INTERVAL: 132 MS
QT INTERVAL: 368 MS
QTC INTERVAL: 472 MS
RBC # BLD AUTO: 3.49 MILLION/UL (ref 3.81–5.12)
S PNEUM AG UR QL: NEGATIVE
SODIUM SERPL-SCNC: 141 MMOL/L (ref 135–147)
T WAVE AXIS: -78 DEGREES
VENTRICULAR RATE: 99 BPM
WBC # BLD AUTO: 15.29 THOUSAND/UL (ref 4.31–10.16)

## 2024-05-06 PROCEDURE — 80048 BASIC METABOLIC PNL TOTAL CA: CPT

## 2024-05-06 PROCEDURE — 99232 SBSQ HOSP IP/OBS MODERATE 35: CPT | Performed by: INTERNAL MEDICINE

## 2024-05-06 PROCEDURE — 93010 ELECTROCARDIOGRAM REPORT: CPT | Performed by: INTERNAL MEDICINE

## 2024-05-06 PROCEDURE — 92526 ORAL FUNCTION THERAPY: CPT

## 2024-05-06 PROCEDURE — 85027 COMPLETE CBC AUTOMATED: CPT

## 2024-05-06 PROCEDURE — 97163 PT EVAL HIGH COMPLEX 45 MIN: CPT

## 2024-05-06 RX ADMIN — Medication 1000 UNITS: at 08:55

## 2024-05-06 RX ADMIN — AZITHROMYCIN MONOHYDRATE 500 MG: 500 INJECTION, POWDER, LYOPHILIZED, FOR SOLUTION INTRAVENOUS at 14:06

## 2024-05-06 RX ADMIN — TORSEMIDE 20 MG: 20 TABLET ORAL at 17:11

## 2024-05-06 RX ADMIN — GABAPENTIN 300 MG: 300 CAPSULE ORAL at 17:11

## 2024-05-06 RX ADMIN — GABAPENTIN 300 MG: 300 CAPSULE ORAL at 08:55

## 2024-05-06 RX ADMIN — ASPIRIN 81 MG: 81 TABLET, COATED ORAL at 08:55

## 2024-05-06 RX ADMIN — ATORVASTATIN CALCIUM 10 MG: 10 TABLET, FILM COATED ORAL at 08:55

## 2024-05-06 RX ADMIN — PANTOPRAZOLE SODIUM 20 MG: 20 TABLET, DELAYED RELEASE ORAL at 06:03

## 2024-05-06 RX ADMIN — CEFTRIAXONE 1000 MG: 1 INJECTION, SOLUTION INTRAVENOUS at 12:43

## 2024-05-06 RX ADMIN — ENOXAPARIN SODIUM 30 MG: 30 INJECTION SUBCUTANEOUS at 08:55

## 2024-05-06 RX ADMIN — TORSEMIDE 20 MG: 20 TABLET ORAL at 08:55

## 2024-05-06 NOTE — SPEECH THERAPY NOTE
"Speech Language/Pathology    Speech/Language Pathology Progress Note    Patient Name: Janice Yung  Today's Date: 5/6/2024     Problem List  Principal Problem:    Pneumonia  Active Problems:    Hypertension    Neuropathy    Gastroesophageal reflux disease without esophagitis    Sepsis without acute organ dysfunction (HCC)    Paroxysmal atrial fibrillation (HCC)    Stage 3a chronic kidney disease (HCC)       Past Medical History  Past Medical History:   Diagnosis Date    SHERLYN (acute kidney injury) ([facility]) 5/8/2022    Balance disorder     Chronic pain     GERD (gastroesophageal reflux disease)     Hard of hearing     Hyperlipidemia     Hypertension     Hyponatremia 12/5/2020    Low serum cortisol level 12/7/2020    Neuropathy     Pneumonia     Pneumonia due to COVID-19 virus 12/5/2020    Sepsis (HCC) 5/22/2018    Tinnitus     Unspecified adrenocortical insufficiency ([facility]) 2/28/2023    Felt to be related to COVID-19 infection.  No current symptoms.    UTI (urinary tract infection)         Past Surgical History  Past Surgical History:   Procedure Laterality Date    EYE SURGERY      HYSTERECTOMY      TONSILLECTOMY           Subjective:  Pt OOB in chair, setting up lunch tray.   \"I think I've been doing okay\"     Current Diet:  Regular/thin     Objective:  Pt seen for dx dysphagia tx w/ meal tray of fruit, cottage cheese and thin liquids (pt has been choosing soft selections). Adequate bite strength for material on tray. Efficient mastication and oral organization. Brisk appearing lingual transfers without significant residue. Pt takes single straw sips of thin liquids. No overt s/s aspiration  w/ solid material. Belching noted w/ carbonated drink followed by delayed throat clear x1. ?retropulsion. Pt does endorse coughing w/ PO at home, ?ongoing aspiration events. S/w pt re: potential MBS if medically indicated - pt states she would be agreeable. Will discuss w/ provider.     Assessment:  No overt s/s aspiration directly " timed w/ swallows today, though pt does endorse events at home. Pt may benefit from MBS to further assess.     Plan/Recommendations:  Continue current diet  Frequent/thorough oral care  ST to f/u as able/appropriate

## 2024-05-06 NOTE — PHYSICAL THERAPY NOTE
Physical Therapy Evaluation:    2 forms of pt ID verified:name,birthdate and pt ID sofie    Patient's Name: Janice Yung    Admitting Diagnosis  Pneumonia [J18.9]  SOB (shortness of breath) [R06.02]  Sepsis (Former[facility]) [A41.9]    Problem List  Patient Active Problem List   Diagnosis    Hypertension    Neuropathy    Gastroesophageal reflux disease without esophagitis    Vitamin B 12 deficiency    Sepsis without acute organ dysfunction (HCC)    Pneumonia    Fall    Arthropathy    Mixed hyperlipidemia    Venous insufficiency    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 eye with active choroidal neovascularization ([facility])    Acute pain of left shoulder    Change in mental status    History of recurrent UTI (urinary tract infection)    Hyponatremia    Generalized weakness    Acute on chronic diastolic heart failure (HCC)    Paroxysmal atrial fibrillation ([facility])    Cystitis    Pulmonary nodules    Earache    Diarrhea    Stage 3a chronic kidney disease ([facility])       Past Medical History  Past Medical History:   Diagnosis Date    SHERLYN (acute kidney injury) (Former[facility]) 5/8/2022    Balance disorder     Chronic pain     GERD (gastroesophageal reflux disease)     Hard of hearing     Hyperlipidemia     Hypertension     Hyponatremia 12/5/2020    Low serum cortisol level 12/7/2020    Neuropathy     Pneumonia     Pneumonia due to COVID-19 virus 12/5/2020    Sepsis ([facility]) 5/22/2018    Tinnitus     Unspecified adrenocortical insufficiency ([facility]) 2/28/2023    Felt to be related to COVID-19 infection.  No current symptoms.    UTI (urinary tract infection)        Past Surgical History  Past Surgical History:   Procedure Laterality Date    EYE SURGERY      HYSTERECTOMY      TONSILLECTOMY            05/06/24 1242   PT Last Visit   PT Visit Date 05/06/24   Note Type   Note type Evaluation   Pain Assessment   Pain Assessment Tool 0-10   Pain Score No Pain   Restrictions/Precautions   Other Precautions Contact/isolation;Chair Alarm;Bed  "Alarm;Multiple lines;Telemetry;Fall Risk;Hard of hearing   Home Living   Type of Home House   Home Layout One level;Performs ADLs on one level;Able to live on main level with bedroom/bathroom;Stairs to enter without rails  (2 BRENDA with LANDING in between)   Home Equipment Cane;Walker  (rollator, SPC and RW->pt uses rollator for mobility PTA; use of lift chair for transfers)   Additional Comments Pt lives with daughter in first floor setup home, 2 BRENDA with LANDING inbetween, pt reports x1-2 falls in the past 12 months, needs A for IADLs, (I) with ADL and mobility with use of rollator PTA. Pts daughter A with transportation, cooking, cleaning, laundry   Prior Function   Level of [place] Independent with ADLs;Independent with functional mobility;Needs assistance with IADLS   Lives With Daughter  (available to A upon being DC from the hospital)   Receives Help From Family   IADLs Family/Friend/Other provides transportation;Family/Friend/Other provides meals;Family/Friend/Other provides medication management   Falls in the last 6 months 0  (pt reports x1-2 falls in the past 12 months)   Vocational Retired   General   Additional Pertinent History PNA, (+)large hiatal hernia,fever,cough,SOB; PMH of PAF, sepsis,HTN,neuropathy,CHF,cellulitis LE   Family/Caregiver Present Yes  (pts daughter)   Cognition   Overall Cognitive Status WFL   Arousal/Participation Cooperative   Orientation Level Oriented X4   Following Commands Follows one step commands with increased time or repetition   Comments 2* very [place] and slow mobility   Subjective   Subjective Pt sitting upright in chair resting comfortably; pts daughter present during therapy intervention; pt willing and agreeable to work with PT and to participate in therapy intervention; pt is very [place];\"I just want to get better and to go home\".   RLE Assessment   RLE Assessment   (at least 3+/5 grossly throughout)   LLE Assessment   LLE Assessment   (at least 3+/5 grossly " throughout)   Coordination   Movements are Fluid and Coordinated 0   Coordination and Movement Description ataxic and unsteady gait and movement pattern, slow audrey, slow mobility and movement,shuffling gait pattern with forward flexed posture, dec BLE step length   Sensation WFL   Light Touch   RLE Light Touch Impaired  (PMH of neuropathy)   LLE Light Touch Impaired  (PMH of neuropathy)   Bed Mobility   Supine to Sit Unable to assess  (pt in static sit pre and post mobility)   Transfers   Sit to Stand 4  Minimal assistance   Additional items Assist x 1;Armrests;Increased time required;Verbal cues   Stand to Sit 4  Minimal assistance   Additional items Assist x 1;Armrests;Increased time required;Verbal cues  ((+)premature stand->sit transfer with twisting motion and leaving RW to the side; needed A for RW to be used at all times during upright mobility and body to remain within RW during gait and transfers)   Ambulation/Elevation   Gait pattern Narrow TITA;Forward Flexion;Shuffling;Inconsistent audrey;Foward flexed;Short stride;Ataxia   Gait Assistance 5  Supervision   Additional items Assist x 1;Verbal cues   Assistive Device Rolling walker   Distance 40 feet with use of RW on tile and hardwood katheryn; SOB during minimal exertion and RPE score of 6->RA with [id number]%   Balance   Static Sitting Fair +  (chair alarm intact prior to leaving pts room)   Dynamic Sitting Poor +   Static Standing Poor +   Dynamic Standing Fair   Ambulatory Fair   Endurance Deficit   Endurance Deficit Yes   Endurance Deficit Description SOB with minimal exertion; RPE score 6   Activity Tolerance   Activity Tolerance Patient limited by fatigue  (fair)   Nurse Made Aware yes   Assessment   Prognosis Fair   Problem List Decreased strength;Decreased endurance;Impaired balance;Decreased mobility;Decreased safety awareness;Impaired judgement;Impaired hearing;Decreased skin integrity   Assessment Pt is a 89 yo female admitted to [facility] 2* PNA,  fever, cough and SOB. Pt lives with daugther in first floor setup,use of rollator for mobility,reports x1-2 falls in the past 12 months,need A for IADLs from daughter and reports being (I) with mobility with use of rollator and ADLs. Pt performs sponge bath (I)ly in first floor BR PTA. Pt currently is not at functional mobility baseline, needs A for mobility with use of RW,ataxic and unsteady gait and movement pattern, very [place], multiple lines, masimo monitoring, currently on contact precautions. Pt demonstrates minimal deficits during functional mobility and gait 2* dec endurance, dec balance, dec BLE strength, ataxic and unsteady gait and movement patterns and needs min AX1 for TT and S for GT with use of RW. Pt would cont to benefit from skilled inpt PT services to maximize functional independence and to dec caregiver burden upon being DC from the hospital.   Barriers to Discharge Inaccessible home environment  (BRENDA)   Goals   Patient Goals to get stronger with better balance   [facility] Expiration Date 05/16/24   Short Term Goal #1 in 7-10 days: (1) Pt will be able to ambulate greater than 100 feet with use of RW on various surfaces needing S to mod (I) level of A in order to A pt to return to PLOF, (2) activity tolerance:45 mins/45mins, (3) pt will be able to perform sit to stand transfers needing mod (I) level of A to and from various surfaces consistently in order to return to PLOF, (4) pt will be able to perform BM needing mod (I) level of A to A pt to return to PLOF, (5) (I) with BLE therapeutic ex HEP in various positions to A pt to inc balance,strength,mobility,endurance  (6) inc balance 1/2 grade in order to dec fall risk, (7) pt will be able to go up and down 2 single curb steps with use of walker needing min to S level of A in order to navigate RBENDA as able and as needed prior to D/C, (8) cont to provide pt and pt family education for safe D/C planning, (9) inc BLE strength 1/2 to 1 full grade in order to A  pt to inc balance,strength,mobility,endurance   PT Treatment Day 0   Plan   Treatment/Interventions Functional transfer training;LE strengthening/ROM;Elevations;Therapeutic exercise;Endurance training;Patient/family training;Equipment eval/education;Bed mobility;Gait training;Continued evaluation;Spoke to nursing;Family   PT Frequency 3-5x/wk   Discharge Recommendation   Rehab Resource Intensity Level, PT III (Minimum Resource Intensity)   Equipment Recommended Walker  (pt owns walker at home for use)   AM-PAC Basic Mobility Inpatient   Turning in Flat Bed Without Bedrails 3   Lying on Back to Sitting on Edge of Flat Bed Without Bedrails 3   Moving Bed to Chair 3   Standing Up From Chair Using Arms 3   Walk in Room 3   Climb 3-5 Stairs With Railing 2   Basic Mobility Inpatient Raw Score 17   Basic Mobility Standardized Score 39.67   [facility] Highest Level Of Mobility   -HLM Goal 5: Stand one or more mins   -HLM Achieved 7: Walk 25 feet or more         @Apryl Durant, PT, DPT@

## 2024-05-06 NOTE — ASSESSMENT & PLAN NOTE
Presented with fever, cough, shortness of breath, CXR with evidence of moderate opacity in the right base likely pneumonia, and large hiatal hernia  In ED pulse ox was 90, placed on 2 L nasal cannula; on evaluation patient was saturating in 98-99s - Has been with adequate saturations off abx   Procal trend   1.17 - 2.34    Plan:   Started on ceftriaxone, and azithromycin in the ED, continue day2  Urinary antigens are negative  Blood culture negative to date  Continue CBC and procal in the a.m.

## 2024-05-06 NOTE — PLAN OF CARE
Problem: Potential for Falls  Goal: Patient will remain free of falls  Description: INTERVENTIONS:  - Educate patient/family on patient safety including physical limitations  - Instruct patient to call for assistance with activity   - Consult OT/PT to assist with strengthening/mobility   - Keep Call bell within reach  - Keep bed low and locked with side rails adjusted as appropriate  - Keep care items and personal belongings within reach  - Initiate and maintain comfort rounds  - Make Fall Risk Sign visible to staff  - Offer Toileting every 2 Hours, in advance of need  - Initiate/Maintain bed/chair alarm  - Obtain necessary fall risk management equipment: yellow bracelet and yellow socks   - Apply yellow socks and bracelet for high fall risk patients  - Consider moving patient to room near nurses station  Outcome: Progressing     Problem: Prexisting or High Potential for Compromised Skin Integrity  Goal: Skin integrity is maintained or improved  Description: INTERVENTIONS:  - Identify patients at risk for skin breakdown  - Assess and monitor skin integrity  - Assess and monitor nutrition and hydration status  - Monitor labs   - Assess for incontinence   - Turn and reposition patient  - Assist with mobility/ambulation  - Relieve pressure over bony prominences  - Avoid friction and shearing  - Provide appropriate hygiene as needed including keeping skin clean and dry  - Evaluate need for skin moisturizer/barrier cream  - Collaborate with interdisciplinary team   - Patient/family teaching  - Consider wound care consult   Outcome: Progressing     Problem: Nutrition/Hydration-ADULT  Goal: Nutrient/Hydration intake appropriate for improving, restoring or maintaining nutritional needs  Description: Monitor and assess patient's nutrition/hydration status for malnutrition. Collaborate with interdisciplinary team and initiate plan and interventions as ordered.  Monitor patient's weight and dietary intake as ordered or per  policy. Utilize nutrition screening tool and intervene as necessary. Determine patient's food preferences and provide high-protein, high-caloric foods as appropriate.     INTERVENTIONS:  - Monitor oral intake, urinary output, labs, and treatment plans  - Assess nutrition and hydration status and recommend course of action  - Evaluate amount of meals eaten  - Assist patient with eating if necessary   - Allow adequate time for meals  - Recommend/ encourage appropriate diets, oral nutritional supplements, and vitamin/mineral supplements  - Order, calculate, and assess calorie counts as needed  - Recommend, monitor, and adjust tube feedings and TPN/PPN based on assessed needs  - Assess need for intravenous fluids  - Provide specific nutrition/hydration education as appropriate  - Include patient/family/caregiver in decisions related to nutrition  Outcome: Progressing

## 2024-05-06 NOTE — PROGRESS NOTES
[facility]  Progress Note  Name: Janice Yung I  MRN: [id number]  Unit/Bed#: -01 I Date of Admission: 5/4/2024   Date of Service: 5/6/2024 I Hospital Day: 2    Assessment/Plan   Stage 3a chronic kidney disease (HCC)  Assessment & Plan  Lab Results   Component Value Date    EGFR 45 05/06/2024    EGFR 44 05/05/2024    EGFR 39 05/04/2024    CREATININE 1.06 05/06/2024    CREATININE 1.07 05/05/2024    CREATININE 1.17 05/04/2024   Creatinine stable, avoid nephrotoxic medications  Home regimen of gabapentin adjusted to 300 mg twice daily from 600 mg twice daily  Continue to monitor BMP    Paroxysmal atrial fibrillation (HCC)  Assessment & Plan  Only on aspirin, not on anticoagulation  Sinus rhythm on EKG    Sepsis without acute organ dysfunction (HCC)  Assessment & Plan  Met sepsis criteria with fever 100.3, white blood cells 22.2, pneumonia as source   Received 500 bolus in ED  Will hold off on further IV fluids for now  Procalcitonin at 1.17  Received dose of ceftriaxone and azithromycin in ED, will continue same for now  Blood culture to date  Trend procalcitonin, WBC and fever curve    Gastroesophageal reflux disease without esophagitis  Assessment & Plan  Continue PPI, changed to formularly pantoprazole 20 mg    Neuropathy  Assessment & Plan  On gabapentin 600 mg twice daily  Given patient's age and CKD, will decrease to 300 mg twice daily    Hypertension  Assessment & Plan  Blood pressure is well-controlled  Continue on home torsemide 20 mg twice daily for bilateral leg swelling  Continue to monitor     * Pneumonia  Assessment & Plan  Presented with fever, cough, shortness of breath, CXR with evidence of moderate opacity in the right base likely pneumonia, and large hiatal hernia  In ED pulse ox was 90, placed on 2 L nasal cannula; on evaluation patient was saturating in 98-99s - Has been with adequate saturations off abx   Procal trend   1.17 - 2.34    Plan:   Started on  "ceftriaxone, and azithromycin in the ED, continue day2  Urinary antigens are negative  Blood culture negative to date  Continue CBC and procal in the a.m.             Labs & Imaging: I have personally reviewed pertinent reports.      VTE Prophylaxis: in place.    Code Status:   Level 3 - DNAR and DNI    Patient Centered Rounds: I have performed bedside rounds with nursing staff today.    Mobility:   Basic Mobility Inpatient Raw Score: 16  JH-HLM Goal: 5: Stand one or more mins  JH-HLM Achieved: 7: Walk 25 feet or more  JH-HLM Goal NOT achieved. Continue with multidisciplinary rounding and encourage appropriate mobility to improve upon JH-HLM goals.    Discussions with Specialists or Other Care Team Provider: Case management    Education and Discussions with Family / Patient: Daughter at bedside    Total Time Spent on Date of Encounter in care of patient: 35 mins. This time was spent on one or more of the following: performing physical exam; counseling and coordination of care; obtaining or reviewing history; documenting in the medical record; reviewing/ordering tests, medications or procedures; communicating with other healthcare professionals and discussing with patient's family/caregivers.    Current Length of Stay: 2 day(s)    Current Patient Status: Inpatient   Certification Statement: The patient will continue to require additional inpatient hospital stay due to see my assessment and plan.     Subjective:   Patient is seen and examined at bedside.  Denies any new complaints.  Afebrile  All other ROS are negative.    Objective:    Vitals: Blood pressure 145/69, pulse 77, temperature 97.6 °F (36.4 °C), temperature source Temporal, resp. rate 16, height 5' 3\" (1.6 m), weight 58.7 kg (129 lb 6.6 oz), SpO2 94%.,Body mass index is 22.92 kg/m².  SPO2 RA Rest      Flowsheet Row ED to Hosp-Admission (Current) from 5/4/2024 in [facility] Med Surg Unit   SpO2 94 %   SpO2 Activity At Rest   O2 Device " None (Room air)   O2 Flow Rate --          I&O:   Intake/Output Summary (Last 24 hours) at 5/6/2024 1344  Last data filed at 5/6/2024 1101  Gross per 24 hour   Intake 606 ml   Output 400 ml   Net 206 ml       Physical Exam:    General- Alert, sitting in chair. Not in any acute distress.  Neck- Supple, No JVD  CVS- regular, S1 and S2 normal  Chest- Bilateral Air entry, No rhochi, crackles or wheezing present.  Abdomen- soft, nontender, not distended, no guarding or rigidity, BS+  Extremities-  No pedal edema, No calf tenderness                         Normal ROM in all extremities.  CNS-   Alert, awake and orientedx3. No focal deficits present.    Invasive Devices       Peripheral Intravenous Line  Duration             Peripheral IV 05/04/24 Proximal;Right;Ventral (anterior) Forearm 2 days                          Social History  reviewed  Family History   Problem Relation Age of Onset    Stroke Mother     Stroke Father     Heart disease Daughter     Substance Abuse Neg Hx     Mental illness Neg Hx     reviewed    Meds:  Current Facility-Administered Medications   Medication Dose Route Frequency Provider Last Rate Last Admin    aspirin (ECOTRIN LOW STRENGTH) EC tablet 81 mg  81 mg Oral Daily Gabbie Ennis MD   81 mg at 05/06/24 0855    atorvastatin (LIPITOR) tablet 10 mg  10 mg Oral Daily Gabbie Ennis MD   10 mg at 05/06/24 0855    azithromycin (ZITHROMAX) 500 mg in sodium chloride 0.9% 250mL IVPB 500 mg  500 mg Intravenous Q24H Gabbie Ennis MD   500 mg at 05/05/24 1515    cefTRIAXone (ROCEPHIN) IVPB (premix in dextrose) 1,000 mg 50 mL  1,000 mg Intravenous Q24H Gabbie Ennis  mL/hr at 05/06/24 1243 1,000 mg at 05/06/24 1243    Cholecalciferol (VITAMIN D3) tablet 1,000 Units  1,000 Units Oral Daily Gabbie Ennis MD   1,000 Units at 05/06/24 0855    enoxaparin (LOVENOX) subcutaneous injection 30 mg  30 mg Subcutaneous Daily Gabbie Ennis MD   30 mg at 05/06/24 0855    gabapentin (NEURONTIN) capsule 300  mg  300 mg Oral BID Gabbie Ennis MD   300 mg at 05/06/24 0855    Mirabegron ER TB24 25 mg  25 mg Oral QAM Gabbie Ennis MD        pantoprazole (PROTONIX) EC tablet 20 mg  20 mg Oral Early Morning Gabbie Ennis MD   20 mg at 05/06/24 0603    torsemide (DEMADEX) tablet 20 mg  20 mg Oral BID Gabbie Ennis MD   20 mg at 05/06/24 0855      Medications Prior to Admission   Medication    Artificial Tears 0.2-0.2-1 % SOLN    aspirin (ECOTRIN LOW STRENGTH) 81 mg EC tablet    atorvastatin (LIPITOR) 10 mg tablet    Cholecalciferol (VITAMIN D3) 1000 units CAPS    Cyanocobalamin (VITAMIN B12 PO)    gabapentin (NEURONTIN) 600 MG tablet    Mirabegron ER (Myrbetriq) 25 MG TB24    neomycin-polymyxin-hydrocortisone (CORTISPORIN) 0.35%-10,000 units/mL-1% otic suspension    omeprazole (PriLOSEC) 20 mg delayed release capsule    Saccharomyces boulardii (Probiotic) 250 MG CAPS    torsemide (DEMADEX) 20 mg tablet    Multiple Vitamins-Minerals (PRESERVISION AREDS PO)    nitrofurantoin (MACROBID) 100 mg capsule       Labs:  Results from last 7 days   Lab Units 05/06/24  0534 05/05/24  0533 05/04/24  1243   WBC Thousand/uL 15.29* 21.22* 22.21*   HEMOGLOBIN g/dL 9.5* 10.0* 11.7   HEMATOCRIT % 31.2* 32.9* 38.0   PLATELETS Thousands/uL 172 182 203   SEGS PCT %  --  83* 87*   LYMPHO PCT %  --  9* 5*   MONO PCT %  --  7 7   EOS PCT %  --  0 0     Results from last 7 days   Lab Units 05/06/24  0534 05/05/24  0533 05/04/24  1243   POTASSIUM mmol/L 3.6 3.5 3.7   CHLORIDE mmol/L 102 105 101   CO2 mmol/L 33* 32 33*   BUN mg/dL 20 23 20   CREATININE mg/dL 1.06 1.07 1.17   CALCIUM mg/dL 8.3* 8.7 9.2   ALK PHOS U/L  --   --  113*   ALT U/L  --   --  29   AST U/L  --   --  37     Lab Results   Component Value Date    TROPONINI 0.04 12/06/2020    TROPONINI 0.04 12/05/2020    TROPONINI 0.04 12/05/2020    CKTOTAL 46 12/05/2020    CKTOTAL 112 01/26/2014     Results from last 7 days   Lab Units 05/04/24  1243   INR  1.18     Lab Results   Component Value  Date    BLOODCX No Growth at 24 hrs. 05/04/2024    BLOODCX No Growth at 24 hrs. 05/04/2024    BLOODCX No Growth After 5 Days. 06/18/2023    BLOODCX No Growth After 5 Days. 06/18/2023    URINECX >100,000 cfu/ml Klebsiella pneumoniae (A) 07/19/2023    URINECX 20,000-29,000 cfu/ml Klebsiella pneumoniae (A) 07/19/2023    URINECX 30,000-39,000 cfu/ml 06/18/2023    SPUTUMCULTUR 1+ Growth of Pseudomonas aeruginosa (A) 05/23/2018    SPUTUMCULTUR 1+ Growth of 05/23/2018         Imaging:  Results for orders placed during the hospital encounter of 05/04/24    XR chest portable    Narrative  XR CHEST PORTABLE    INDICATION: sob.    COMPARISON: CXR 8/22/2023 and chest CT 6/18/2023.    FINDINGS:    Moderate opacity in the right base. No pneumothorax or pleural effusion.    Normal heart size. Large hiatal hernia.    Bones are unremarkable for age. Calcification of the left rotator cuff insertion which can produce calcific tendinitis.    Normal upper abdomen.    Impression  Moderate opacity in the right base, likely pneumonia.    This corresponds with the preliminary interpretation by the emergency department clinician.    Large hiatal hernia.        Workstation performed: [id number]    Results for orders placed during the hospital encounter of 08/22/23    XR chest 2 views    Narrative  CHEST    INDICATION:   Chest Pain.    COMPARISON: 6/21/2023    EXAM PERFORMED/VIEWS:  XR CHEST PA & LATERAL      FINDINGS:    Cardiomediastinal silhouette appears unremarkable.    The lungs are clear. Persistent large hiatal hernia. No pneumothorax or pleural effusion.    Osseous structures appear within normal limits for patient age.    Impression  No acute cardiopulmonary disease.            Workstation performed: [id number]      Last 24 Hours Medication List:   Current Facility-Administered Medications   Medication Dose Route Frequency Provider Last Rate    aspirin  81 mg Oral Daily Gabbie Ennis MD      atorvastatin  10 mg Oral Daily  Gabbie Ennis MD      azithromycin  500 mg Intravenous Q24H Gabbie Ennis MD      cefTRIAXone  1,000 mg Intravenous Q24H Gabbie Ennis MD 1,000 mg (05/06/24 1243)    Cholecalciferol  1,000 Units Oral Daily Gabbie Ennis MD      enoxaparin  30 mg Subcutaneous Daily Gabbie Ennis MD      gabapentin  300 mg Oral BID Gabbie Ennis MD      Mirabegron ER  25 mg Oral QAM Gabbie Ennis MD      pantoprazole  20 mg Oral Early Morning Gabbie Ennis MD      torsemide  20 mg Oral BID Gabbie Ennis MD          Today, Patient Was Seen By: Cheng Lane MD    ** Please Note: Dictation voice to text software may have been used in the creation of this document. **

## 2024-05-06 NOTE — ASSESSMENT & PLAN NOTE
Lab Results   Component Value Date    EGFR 45 05/06/2024    EGFR 44 05/05/2024    EGFR 39 05/04/2024    CREATININE 1.06 05/06/2024    CREATININE 1.07 05/05/2024    CREATININE 1.17 05/04/2024   Creatinine stable, avoid nephrotoxic medications  Home regimen of gabapentin adjusted to 300 mg twice daily from 600 mg twice daily  Continue to monitor BMP

## 2024-05-06 NOTE — ASSESSMENT & PLAN NOTE
Met sepsis criteria with fever 100.3, white blood cells 22.2, pneumonia as source   Received 500 bolus in ED  Will hold off on further IV fluids for now  Procalcitonin at 1.17  Received dose of ceftriaxone and azithromycin in ED, will continue same for now  Blood culture to date  Trend procalcitonin, WBC and fever curve

## 2024-05-06 NOTE — PLAN OF CARE
Problem: PHYSICAL THERAPY ADULT  Goal: Performs mobility at highest level of function for planned discharge setting.  See evaluation for individualized goals.  Description: Treatment/Interventions: Functional transfer training, LE strengthening/ROM, Elevations, Therapeutic exercise, Endurance training, Patient/family training, Equipment eval/education, Bed mobility, Gait training, Continued evaluation, Spoke to nursing, Family  Equipment Recommended: Walker (pt owns walker at home for use)       See flowsheet documentation for full assessment, interventions and recommendations.  5/6/2024 1422 by Apryl Durant, PT  Note: Prognosis: Fair  Problem List: Decreased strength, Decreased endurance, Impaired balance, Decreased mobility, Decreased safety awareness, Impaired judgement, Impaired hearing, Decreased skin integrity  Assessment: Pt is a 89 yo female admitted to [facility] 2* PNA, fever, cough and SOB. Pt lives with daugther in first floor setup,use of rollator for mobility,reports x1-2 falls in the past 12 months,need A for IADLs from daughter and reports being (I) with mobility with use of rollator and ADLs. Pt performs sponge bath (I)ly in first floor BR PTA. Pt currently is not at functional mobility baseline, needs A for mobility with use of RW,ataxic and unsteady gait and movement pattern, very [employer], multiple lines, masimo monitoring, currently on contact precautions. Pt demonstrates minimal deficits during functional mobility and gait 2* dec endurance, dec balance, dec BLE strength, ataxic and unsteady gait and movement patterns and needs min AX1 for TT and S for GT with use of RW. Pt would cont to benefit from skilled inpt PT services to maximize functional independence and to dec caregiver burden upon being DC from the hospital.  Barriers to Discharge: Inaccessible home environment (BRENDA)     Rehab Resource Intensity Level, PT: III (Minimum Resource Intensity)    See flowsheet documentation for full  assessment.     5/6/2024 1422 by Apryl Durant, PT  Note: Prognosis: Fair  Problem List: Decreased strength, Decreased endurance, Impaired balance, Decreased mobility, Decreased safety awareness, Impaired judgement, Impaired hearing, Decreased skin integrity  Assessment: Pt is a 89 yo female admitted to [facility] 2* PNA, fever, cough and SOB. Pt lives with daugther in first floor setup,use of rollator for mobility,reports x1-2 falls in the past 12 months,need A for IADLs from daughter and reports being (I) with mobility with use of rollator and ADLs. Pt performs sponge bath (I)ly in first floor BR PTA. Pt currently is not at functional mobility baseline, needs A for mobility with use of RW,ataxic and unsteady gait and movement pattern, very [place], multiple lines, masimo monitoring, currently on contact precautions. Pt demonstrates minimal deficits during functional mobility and gait 2* dec endurance, dec balance, dec BLE strength, ataxic and unsteady gait and movement patterns and needs min AX1 for TT and S for GT with use of RW. Pt would cont to benefit from skilled inpt PT services to maximize functional independence and to dec caregiver burden upon being DC from the hospital.  Barriers to Discharge: Inaccessible home environment (BRENDA)     Rehab Resource Intensity Level, PT: III (Minimum Resource Intensity)    See flowsheet documentation for full assessment.

## 2024-05-07 ENCOUNTER — HOME CARE VISIT (OUTPATIENT)
Dept: HOME HEALTH SERVICES | Facility: HOME HEALTHCARE | Age: 89
End: 2024-05-07

## 2024-05-07 ENCOUNTER — HOME HEALTH ADMISSION (OUTPATIENT)
Dept: HOME HEALTH SERVICES | Facility: HOME HEALTHCARE | Age: 89
End: 2024-05-07
Payer: MEDICARE

## 2024-05-07 ENCOUNTER — APPOINTMENT (INPATIENT)
Dept: RADIOLOGY | Facility: HOSPITAL | Age: 89
DRG: 871 | End: 2024-05-07
Payer: MEDICARE

## 2024-05-07 VITALS
HEIGHT: 63 IN | RESPIRATION RATE: 22 BRPM | TEMPERATURE: 96.6 F | OXYGEN SATURATION: 95 % | HEART RATE: 66 BPM | DIASTOLIC BLOOD PRESSURE: 59 MMHG | BODY MASS INDEX: 22.93 KG/M2 | SYSTOLIC BLOOD PRESSURE: 140 MMHG | WEIGHT: 129.41 LBS

## 2024-05-07 LAB
ANION GAP SERPL CALCULATED.3IONS-SCNC: 7 MMOL/L (ref 4–13)
BASOPHILS # BLD AUTO: 0.02 THOUSANDS/ÂΜL (ref 0–0.1)
BASOPHILS NFR BLD AUTO: 0 % (ref 0–1)
BUN SERPL-MCNC: 19 MG/DL (ref 5–25)
CALCIUM SERPL-MCNC: 8.9 MG/DL (ref 8.4–10.2)
CHLORIDE SERPL-SCNC: 101 MMOL/L (ref 96–108)
CO2 SERPL-SCNC: 33 MMOL/L (ref 21–32)
CREAT SERPL-MCNC: 0.97 MG/DL (ref 0.6–1.3)
EOSINOPHIL # BLD AUTO: 0.09 THOUSAND/ÂΜL (ref 0–0.61)
EOSINOPHIL NFR BLD AUTO: 1 % (ref 0–6)
ERYTHROCYTE [DISTWIDTH] IN BLOOD BY AUTOMATED COUNT: 13.9 % (ref 11.6–15.1)
GFR SERPL CREATININE-BSD FRML MDRD: 50 ML/MIN/1.73SQ M
GLUCOSE SERPL-MCNC: 105 MG/DL (ref 65–140)
HCT VFR BLD AUTO: 32.7 % (ref 34.8–46.1)
HGB BLD-MCNC: 10.1 G/DL (ref 11.5–15.4)
IMM GRANULOCYTES # BLD AUTO: 0.09 THOUSAND/UL (ref 0–0.2)
IMM GRANULOCYTES NFR BLD AUTO: 1 % (ref 0–2)
LYMPHOCYTES # BLD AUTO: 1.85 THOUSANDS/ÂΜL (ref 0.6–4.47)
LYMPHOCYTES NFR BLD AUTO: 16 % (ref 14–44)
MCH RBC QN AUTO: 27.2 PG (ref 26.8–34.3)
MCHC RBC AUTO-ENTMCNC: 30.9 G/DL (ref 31.4–37.4)
MCV RBC AUTO: 88 FL (ref 82–98)
MONOCYTES # BLD AUTO: 1.11 THOUSAND/ÂΜL (ref 0.17–1.22)
MONOCYTES NFR BLD AUTO: 10 % (ref 4–12)
NEUTROPHILS # BLD AUTO: 8.12 THOUSANDS/ÂΜL (ref 1.85–7.62)
NEUTS SEG NFR BLD AUTO: 72 % (ref 43–75)
NRBC BLD AUTO-RTO: 0 /100 WBCS
PLATELET # BLD AUTO: 190 THOUSANDS/UL (ref 149–390)
PMV BLD AUTO: 9.6 FL (ref 8.9–12.7)
POTASSIUM SERPL-SCNC: 3.5 MMOL/L (ref 3.5–5.3)
PROCALCITONIN SERPL-MCNC: 0.98 NG/ML
RBC # BLD AUTO: 3.71 MILLION/UL (ref 3.81–5.12)
SODIUM SERPL-SCNC: 141 MMOL/L (ref 135–147)
WBC # BLD AUTO: 11.28 THOUSAND/UL (ref 4.31–10.16)

## 2024-05-07 PROCEDURE — 92611 MOTION FLUOROSCOPY/SWALLOW: CPT

## 2024-05-07 PROCEDURE — 80048 BASIC METABOLIC PNL TOTAL CA: CPT | Performed by: INTERNAL MEDICINE

## 2024-05-07 PROCEDURE — 84145 PROCALCITONIN (PCT): CPT | Performed by: INTERNAL MEDICINE

## 2024-05-07 PROCEDURE — 74230 X-RAY XM SWLNG FUNCJ C+: CPT

## 2024-05-07 PROCEDURE — 99239 HOSP IP/OBS DSCHRG MGMT >30: CPT | Performed by: INTERNAL MEDICINE

## 2024-05-07 PROCEDURE — 85025 COMPLETE CBC W/AUTO DIFF WBC: CPT | Performed by: INTERNAL MEDICINE

## 2024-05-07 PROCEDURE — 92526 ORAL FUNCTION THERAPY: CPT

## 2024-05-07 RX ORDER — CEFADROXIL 500 MG/1
500 CAPSULE ORAL EVERY 12 HOURS SCHEDULED
Qty: 8 CAPSULE | Refills: 0 | Status: SHIPPED | OUTPATIENT
Start: 2024-05-07 | End: 2024-05-11

## 2024-05-07 RX ORDER — POTASSIUM CHLORIDE 20 MEQ/1
40 TABLET, EXTENDED RELEASE ORAL ONCE
Status: COMPLETED | OUTPATIENT
Start: 2024-05-07 | End: 2024-05-07

## 2024-05-07 RX ADMIN — PANTOPRAZOLE SODIUM 20 MG: 20 TABLET, DELAYED RELEASE ORAL at 05:40

## 2024-05-07 RX ADMIN — POTASSIUM CHLORIDE 40 MEQ: 1500 TABLET, EXTENDED RELEASE ORAL at 12:10

## 2024-05-07 RX ADMIN — ASPIRIN 81 MG: 81 TABLET, COATED ORAL at 08:45

## 2024-05-07 RX ADMIN — CEFTRIAXONE 1000 MG: 1 INJECTION, SOLUTION INTRAVENOUS at 12:32

## 2024-05-07 RX ADMIN — ATORVASTATIN CALCIUM 10 MG: 10 TABLET, FILM COATED ORAL at 08:45

## 2024-05-07 RX ADMIN — TORSEMIDE 20 MG: 20 TABLET ORAL at 08:45

## 2024-05-07 RX ADMIN — GABAPENTIN 300 MG: 300 CAPSULE ORAL at 08:45

## 2024-05-07 RX ADMIN — Medication 1000 UNITS: at 08:46

## 2024-05-07 RX ADMIN — ENOXAPARIN SODIUM 30 MG: 30 INJECTION SUBCUTANEOUS at 08:46

## 2024-05-07 NOTE — ASSESSMENT & PLAN NOTE
Lab Results   Component Value Date    EGFR 50 05/07/2024    EGFR 45 05/06/2024    EGFR 44 05/05/2024    CREATININE 0.97 05/07/2024    CREATININE 1.06 05/06/2024    CREATININE 1.07 05/05/2024   Creatinine stable, avoid nephrotoxic medications  Home regimen of gabapentin adjusted to 300 mg twice daily from 600 mg twice daily  Stable at discharge

## 2024-05-07 NOTE — ASSESSMENT & PLAN NOTE
Presented with fever, cough, shortness of breath, CXR with evidence of moderate opacity in the right base likely pneumonia, and large hiatal hernia  In ED pulse ox was 90, placed on 2 L nasal cannula; on evaluation patient was saturating in 98-99s - Has been with adequate saturations off abx   Procal trend   1.17 - 2.34    Plan:   Started on ceftriaxone, and azithromycin in the ED, continue day2  Urinary antigens are negative  Blood culture negative to date  Patient will be discharged home on Duricef to complete the course  Patient is saturating well on room air  Patient had VBS study done and cleared by speech for discharge

## 2024-05-07 NOTE — DISCHARGE SUMMARY
[facility]  Discharge- Janice Yung 1/28/1930, 89 y.o. female MRN: [id number]  Unit/Bed#: -01 Encounter: [id number]  Primary Care Provider: Edgar Mustafa MD   Date and time admitted to hospital: 5/4/2024 12:31 PM    Stage 3a chronic kidney disease (HCC)  Assessment & Plan  Lab Results   Component Value Date    EGFR 50 05/07/2024    EGFR 45 05/06/2024    EGFR 44 05/05/2024    CREATININE 0.97 05/07/2024    CREATININE 1.06 05/06/2024    CREATININE 1.07 05/05/2024   Creatinine stable, avoid nephrotoxic medications  Home regimen of gabapentin adjusted to 300 mg twice daily from 600 mg twice daily  Stable at discharge    Paroxysmal atrial fibrillation (HCC)  Assessment & Plan  Only on aspirin, not on anticoagulation  Sinus rhythm on EKG    Sepsis without acute organ dysfunction (HCC)  Assessment & Plan  Met sepsis criteria with fever 100.3, white blood cells 22.2, pneumonia as source   Received 500 bolus in ED  Will hold off on further IV fluids for now  Procalcitonin at 1.17  Received dose of ceftriaxone and azithromycin in ED, will continue same for now  Blood culture negative to date  Trend procalcitonin, WBC and fever curve--improving  Patient is hemodynamically stable for discharge on Duricef to complete the course.  Patient will be discharged home with home care services.    Gastroesophageal reflux disease without esophagitis  Assessment & Plan  Continue PPI, changed to formularly pantoprazole 20 mg    Neuropathy  Assessment & Plan  On gabapentin 600 mg twice daily  Given patient's age and CKD, will decrease to 300 mg twice daily    Hypertension  Assessment & Plan  Blood pressure is well-controlled  Continue on home torsemide 20 mg twice daily for bilateral leg swelling  Continue to monitor     * Pneumonia  Assessment & Plan  Presented with fever, cough, shortness of breath, CXR with evidence of moderate opacity in the right base likely pneumonia, and large hiatal hernia  In ED  pulse ox was 90, placed on 2 L nasal cannula; on evaluation patient was saturating in 98-99s - Has been with adequate saturations off abx   Procal trend   1.17 - 2.34    Plan:   Started on ceftriaxone, and azithromycin in the ED, continue day2  Urinary antigens are negative  Blood culture negative to date  Patient will be discharged home on Duricef to complete the course  Patient is saturating well on room air  Patient had VBS study done and cleared by speech for discharge      Hospital Course:     Janice Yung is a 89 y.o. female patient who originally presented to the hospital on   Admission Orders (From admission, onward)       Ordered        05/04/24 1340  INPATIENT ADMISSION  Once                         due to cough and shortness of breath.  Patient was admitted with sepsis secondary to pneumonia started on Rocephin and Zithromax.  Patient cultures are negative to date.  Patient white count is trending down and she is feeling much better patient is saturating well on room air.  Patient was seen by speech and underwent VBS and was educated by speech regarding swallowing instructions.  Patient will be discharged on Duricef to complete the course.  Patient is hemodynamically stable for discharge.  Discussed with patient and daughter and they are in agreement with the discharge plan  On Exam-  Chest-bilateral air entry, clear to auscultation  Abdomen-soft, nontender  Heart-S1 S2 regular  Extremities-no pedal edema or calf tenderness  Neuro-alert awake oriented x3.  No focal deficits    Please see above list of diagnoses and related plan for additional information.   Follow-up with PCP as outpatient    Condition at Discharge:  good      Discharge instructions/Information to patient and family:   See after visit summary for information provided to patient and family.      Provisions for Follow-Up Care:  See after visit summary for information related to follow-up care and any pertinent home health orders.       Disposition:     Home with VNA Services (Reminder: Complete face to face encounter)       Discharge Statement:  I spent 40 minutes discharging the patient. This time was spent on the day of discharge. I had direct contact with the patient on the day of discharge. Greater than 50% of the total time was spent examining patient, answering all patient questions, arranging and discussing plan of care with patient as well as directly providing post-discharge instructions.  Additional time then spent on discharge activities.    Discharge Medications:  See after visit summary for reconciled discharge medications provided to patient and family.      ** Please Note: This note has been constructed using a voice recognition system **

## 2024-05-07 NOTE — CASE MANAGEMENT
Case Management Assessment & Discharge Planning Note    Patient name Janice Yung  Location /-01 MRN [id number]  : 1930 Date 2024       Current Admission Date: 2024  Current Admission Diagnosis:Pneumonia   Patient Active Problem List    Diagnosis Date Noted    Stage 3a chronic kidney disease (HCC) 2023    Earache 2023    Diarrhea 2023    Pulmonary nodules 2023    Cystitis 2023    Paroxysmal atrial fibrillation (HCC) 2022    Generalized weakness 2022    Acute on chronic diastolic heart failure (HCC) 2022    Hyponatremia 2020    Acute pain of left shoulder 2020    History of recurrent UTI (urinary tract infection) 2020    Change in mental status     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) 2019    Pneumonia 2018    Fall 2018    Sepsis without acute organ dysfunction (HCC) 2018    Neuropathy 2016    Vitamin B 12 deficiency 2016    Venous insufficiency 2016    Hypertension 2015    Gastroesophageal reflux disease without esophagitis 2015    Arthropathy 2015    Mixed hyperlipidemia 2015      LOS (days): 3  Geometric Mean LOS (GMLOS) (days):   Days to GMLOS:     OBJECTIVE:    Risk of Unplanned Readmission Score: 12.61         Current admission status: Inpatient       Preferred Pharmacy:   [facility] - [address]  [address]  [place] [address]  Phone: [phone] Fax: [phone]    Primary Care Provider: Edgar Mustafa MD    Primary Insurance: MEDICARE  Secondary Insurance:     ASSESSMENT:  Active Health Care Proxies       Deyanira Lemus [facility] Representative - Child   Primary Phone: [phone] (Mobile)  Home Phone: [phone]                 Advance Directives  Does patient have a Health Care POA?: Yes  Does patient have Advance  Directives?: Yes  Advance Directives: Living will, Power of  for health care  Primary Contact: Brianna Lemus         Readmission Root Cause  30 Day Readmission: No    Patient Information  Admitted from:: Home  Mental Status: Alert  During Assessment patient was accompanied by: Daughter  Assessment information provided by:: Patient, Daughter  Primary Caregiver: Self  Support Systems: Family members, Self, Daughter  County of Residence: [place]  What city do you live in?: [place]  Home entry access options. Select all that apply.: Stairs  Number of steps to enter home.: 2  Type of Current Residence: 2 story home  Upon entering residence, is there a bedroom on the main floor (no further steps)?: Yes  Upon entering residence, is there a bathroom on the main floor (no further steps)?: Yes  Living Arrangements: Lives w/ Daughter    Activities of Daily Living Prior to Admission  Functional Status: Independent  Completes ADLs independently?: Yes  Ambulates independently?: Yes  Does patient use assisted devices?: Yes  Assisted Devices (DME) used: Walker  Does patient currently own DME?: Yes  What DME does the patient currently own?: Walker, Straight Cane  Does patient have a history of Outpatient Therapy (PT/OT)?: No  Does the patient have a history of Short-Term Rehab?: Yes ([facility])  Does patient have a history of HHC?: Yes ([facility])  Does patient currently have HHC?: No         Patient Information Continued  Income Source: Pension/group home  Does patient have prescription coverage?: Yes  Does patient receive dialysis treatments?: No  Does patient have a history of substance abuse?: No    PHQ 2/9 Screening   Reviewed PHQ 2/9 Depression Screening Score?: No    Means of Transportation  Means of Transport to Appts:: Family transport      Social Determinants of Health (SDOH)      Flowsheet Row Most Recent Value   Housing Stability    In the last 12 months, was there a time when you were not able to pay the  mortgage or rent on time? N   In the last 12 months, how many places have you lived? 1   In the last 12 months, was there a time when you did not have a steady place to sleep or slept in a shelter (including now)? N   Transportation Needs    In the past 12 months, has lack of transportation kept you from medical appointments or from getting medications? no   In the past 12 months, has lack of transportation kept you from meetings, work, or from getting things needed for daily living? No   Food Insecurity    Within the past 12 months, you worried that your food would run out before you got the money to buy more. Never true   Within the past 12 months, the food you bought just didn't last and you didn't have money to get more. Never true   Utilities    In the past 12 months has the electric, gas, oil, or water company threatened to shut off services in your home? No            DISCHARGE DETAILS:    Discharge planning discussed with:: Pt and pt's alixrBrianna  Freedom of Choice: Yes  Comments - Freedom of Choice: Prefer [facility] for HH  CM contacted family/caregiver?: Yes  Were Treatment Team discharge recommendations reviewed with patient/caregiver?: Yes  Did patient/caregiver verbalize understanding of patient care needs?: Yes  Were patient/caregiver advised of the risks associated with not following Treatment Team discharge recommendations?: Yes    Contacts  Patient Contacts: Deyanira Lemus (Child) [phone] (Mobile)  Relationship to Patient:: Family  Contact Method: In Person  Reason/Outcome: Discharge Planning, Continuity of Care, Emergency Contact, Referral    Requested Home Health Care         Is the patient interested in HHC at discharge?: Yes  Home Health Discipline requested:: Occupational Therapy, Physical Therapy  Home Health Agency Name:: [facility]  HHA External Referral Reason (only applicable if external HHA name selected): Patient has established relationship with provider  Home Health Follow-Up  Provider:: PCP  Home Health Services Needed:: Evaluate Functional Status and Safety, Gait/ADL Training, Strengthening/Theraputic Exercises to Improve Function  Homebound Criteria Met:: Uses an Assist Device (i.e. cane, walker, etc)  Supporting Clincal Findings:: Limited Endurance    DME Referral Provided  Referral made for DME?: No    Other Referral/Resources/Interventions Provided:  Interventions: C         Treatment Team Recommendation: Home with Home Health Care  Discharge Destination Plan:: Home with Home Health Care  Transport at Discharge : Family                                      Additional Comments: CM met with pt and her dtr to discuss role of CM and any needs prior to discharge. Pt lives w/ dtr in 2SH w/ 2STE and 1st flr setup. Indp PTA. Owns/uses RW, SPC. Pt has hx STR through [facility] and HH through [facility]. Pt and dtr prefer [facility]. Therapy is recommending HH and they are in agreement with CM making referrals. Referral made in Aidin. No hx OP PT/DA/MH. Pt prefers to use [facility] pharmacy. Dtr will transport at discharge.

## 2024-05-07 NOTE — CASE MANAGEMENT
Case Management Discharge Planning Note    Patient name Janice Yung  Location /-01 MRN [id number]  : 1930 Date 2024       Current Admission Date: 2024  Current Admission Diagnosis:Pneumonia   Patient Active Problem List    Diagnosis Date Noted    Stage 3a chronic kidney disease (HCC) 2023    Earache 2023    Diarrhea 2023    Pulmonary nodules 2023    Cystitis 2023    Paroxysmal atrial fibrillation (HCC) 2022    Generalized weakness 2022    Acute on chronic diastolic heart failure (HCC) 2022    Hyponatremia 2020    Acute pain of left shoulder 2020    History of recurrent UTI (urinary tract infection) 2020    Change in mental status     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) 2019    Pneumonia 2018    Fall 2018    Sepsis without acute organ dysfunction (HCC) 2018    Neuropathy 2016    Vitamin B 12 deficiency 2016    Venous insufficiency 2016    Hypertension 2015    Gastroesophageal reflux disease without esophagitis 2015    Arthropathy 2015    Mixed hyperlipidemia 2015      LOS (days): 3  Geometric Mean LOS (GMLOS) (days):   Days to GMLOS:     OBJECTIVE:  Risk of Unplanned Readmission Score: 13.32         Current admission status: Inpatient   Preferred Pharmacy:   [facility] [place] - [address]  [address]  [place] [address]  Phone: [phone] Fax: [phone]    Primary Care Provider: Edgar Mustafa MD    Primary Insurance: MEDICARE  Secondary Insurance:     DISCHARGE DETAILS:        CM was notified by MD that following VBS, pt is recommended ST at home. CM updated order in Epic.                        Requested Home Health Care         Home Health Discipline requested:: Occupational Therapy, Physical Therapy, Speech Language Pathology

## 2024-05-07 NOTE — SPEECH THERAPY NOTE
"Speech Language/Pathology    Speech/Language Pathology Progress Note    Patient Name: Janice Yung  Today's Date: 5/7/2024     Problem List  Principal Problem:    Pneumonia  Active Problems:    Hypertension    Neuropathy    Gastroesophageal reflux disease without esophagitis    Sepsis without acute organ dysfunction (HCC)    Paroxysmal atrial fibrillation (HCC)    Stage 3a chronic kidney disease (HCC)       Past Medical History  Past Medical History:   Diagnosis Date    SHERLYN (acute kidney injury) (HCC) 5/8/2022    Balance disorder     Chronic pain     GERD (gastroesophageal reflux disease)     Hard of hearing     Hyperlipidemia     Hypertension     Hyponatremia 12/5/2020    Low serum cortisol level 12/7/2020    Neuropathy     Pneumonia     Pneumonia due to COVID-19 virus 12/5/2020    Sepsis (HCC) 5/22/2018    Tinnitus     Unspecified adrenocortical insufficiency (HCC) 2/28/2023    Felt to be related to COVID-19 infection.  No current symptoms.    UTI (urinary tract infection)         Past Surgical History  Past Surgical History:   Procedure Laterality Date    EYE SURGERY      HYSTERECTOMY      TONSILLECTOMY           Subjective:  Pt OOB in chair, daughter present. \"We tried to look at the pictures but we don't know what we're looking at\"    Current Diet:  Regular/thin     Objective:  Pt seen for dx dysphagia tx f/u. MBS findings thoroughly reviewed w/ pt and pt's daughter w/ use of images to aid in understanding. Education provided on anatomy/physiology of the swallow and pt specific identified impairments including delayed swallow initiation and reduced/weak airway protection. Further education provided on risks/benefits of diet modifications (potential reduced aspiration risk and subsequent medical implications, increased retention, potential dehydration, etc.) vs risks/benefits of continuing baseline diet w/ use of strategies to optimize swallow safety and understanding/acceptance of any potential risks. Pt/pt's " daughter w/ preference/agreeable to continuation of current diet w/ use of strategies. Introduced options for recommended strategies of chin tuck vs effortful swallow. Pt w/ preference for chin tuck strategy, practiced without liquid. Pt seen w/ meal tray of fish, sweet potato and thin liquids. Pt requires intermittent prompting for use of chin tuck strategy. No overt s/s aspiration w/ use of strategy. Cough x1 when head positioned posteriorly, no subsequent signs w/ use of strategy. Pt demonstrates understanding that aspiration risk remains even w/ strategy use, accepting of risk and agreeable to minimize risk of infection w/ use of strategies. Pt w/ good recall of oral care recommendations.     Assessment:  Pt/pt's daughter w/ good understanding of MBS findings, current aspiration risk, and rationale for use of strategies to minimize risk. No overt s/s aspiration when strategies utilized.     Plan/Recommendations:  Continue current diet  Frequent/thorough oral care  Consider OP ST for pharyngeal strengthening and carryover of stratefies

## 2024-05-07 NOTE — PROGRESS NOTES
Patient:    MRN:  [id number]    Aidin Request ID:  [id number]    Level of care reserved:  Home Health Agency    Partner Reserved:  [facility], [place] [address] [phone]    Clinical needs requested:    Geography searched:  [id number]    Start of Service:    Request sent:  8:55am EDT on 5/7/2024 by Kimberly Arizmendi    Partner reserved:  8:58am EDT on 5/7/2024 by Kimberly Arizmendi    Choice list shared:  8:58am EDT on 5/7/2024 by Kimberly Arizmendi

## 2024-05-07 NOTE — CASE MANAGEMENT
Case Management Discharge Planning Note    Patient name Janice Yung  Location /-01 MRN [id number]  : 1930 Date 2024       Current Admission Date: 2024  Current Admission Diagnosis:Pneumonia   Patient Active Problem List    Diagnosis Date Noted    Stage 3a chronic kidney disease (HCC) 2023    Earache 2023    Diarrhea 2023    Pulmonary nodules 2023    Cystitis 2023    Paroxysmal atrial fibrillation (HCC) 2022    Generalized weakness 2022    Acute on chronic diastolic heart failure (HCC) 2022    Hyponatremia 2020    Acute pain of left shoulder 2020    History of recurrent UTI (urinary tract infection) 2020    Change in mental status     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) 2019    Pneumonia 2018    Fall 2018    Sepsis without acute organ dysfunction (HCC) 2018    Neuropathy 2016    Vitamin B 12 deficiency 2016    Venous insufficiency 2016    Hypertension 2015    Gastroesophageal reflux disease without esophagitis 2015    Arthropathy 2015    Mixed hyperlipidemia 2015      LOS (days): 3  Geometric Mean LOS (GMLOS) (days):   Days to GMLOS:     OBJECTIVE:  Risk of Unplanned Readmission Score: 12.61         Current admission status: Inpatient   Preferred Pharmacy:   [facility] - [address]  [address]  [place] [address]  Phone: [phone] Fax: [phone]    Primary Care Provider: Edgar Mustafa MD    Primary Insurance: MEDICARE  Secondary Insurance:     DISCHARGE DETAILS:    Discharge planning discussed with:: Pt and pt's dtr, Brianna  Freedom of Choice: Yes  Comments - Freedom of Choice: Prefer [facility] for HH  CM contacted family/caregiver?: Yes  Were Treatment Team discharge recommendations reviewed with patient/caregiver?: Yes  Did patient/caregiver  verbalize understanding of patient care needs?: Yes  Were patient/caregiver advised of the risks associated with not following Treatment Team discharge recommendations?: Yes    Contacts  Patient Contacts: Deyanira Lemus (Child) [phone] (Mobile)  Relationship to Patient:: Family  Contact Method: In Person  Reason/Outcome: Discharge Planning, Continuity of Care, Emergency Contact, Referral    Requested Home Health Care         Is the patient interested in [facility] at discharge?: Yes  Home Health Discipline requested:: Occupational Therapy, Physical Therapy  Home Health Agency Name:: [facility]  HHA External Referral Reason (only applicable if external HHA name selected): Patient has established relationship with provider  Home Health Follow-Up Provider:: PCP  Home Health Services Needed:: Evaluate Functional Status and Safety, Gait/ADL Training, Strengthening/Theraputic Exercises to Improve Function  Homebound Criteria Met:: Uses an Assist Device (i.e. cane, walker, etc)  Supporting Clincal Findings:: Limited Endurance    DME Referral Provided  Referral made for DME?: No    Other Referral/Resources/Interventions Provided:  Interventions: C         Treatment Team Recommendation: Home with Home Health Care  Discharge Destination Plan:: Home with Home Health Care  Transport at Discharge : Family         [facility] accepted pt in [place] and CM reserved them.

## 2024-05-07 NOTE — CASE MANAGEMENT
Case Management Discharge Planning Note    Patient name Janice Yung  Location /-01 MRN [id number]  : 1930 Date 2024       Current Admission Date: 2024  Current Admission Diagnosis:Pneumonia   Patient Active Problem List    Diagnosis Date Noted    Stage 3a chronic kidney disease (HCC) 2023    Earache 2023    Diarrhea 2023    Pulmonary nodules 2023    Cystitis 2023    Paroxysmal atrial fibrillation (HCC) 2022    Generalized weakness 2022    Acute on chronic diastolic heart failure (HCC) 2022    Hyponatremia 2020    Acute pain of left shoulder 2020    History of recurrent UTI (urinary tract infection) 2020    Change in mental status     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) 2019    Pneumonia 2018    Fall 2018    Sepsis without acute organ dysfunction (HCC) 2018    Neuropathy 2016    Vitamin B 12 deficiency 2016    Venous insufficiency 2016    Hypertension 2015    Gastroesophageal reflux disease without esophagitis 2015    Arthropathy 2015    Mixed hyperlipidemia 2015      LOS (days): 3  Geometric Mean LOS (GMLOS) (days):   Days to GMLOS:     OBJECTIVE:  Risk of Unplanned Readmission Score: 13.32         Current admission status: Inpatient   Preferred Pharmacy:   [facility] - [address]  [address]  [place] [address]  Phone: [phone] Fax: [phone]    Primary Care Provider: Edgar Mustafa MD    Primary Insurance: MEDICARE  Secondary Insurance:     DISCHARGE DETAILS:     IMM reviewed with patient's caregiver, patient's caregiver agrees with discharge determination.         Requested Home Health Care         Home Health Discipline requested:: Occupational Therapy, Physical Therapy, Speech Language Pathology                                                        IMM Given (Date):: 05/07/24 (1:30p)  IMM Given to:: Family  Family notified:: Deyanira Levin

## 2024-05-07 NOTE — PROCEDURES
Video Swallow Study      Patient Name: Janice Yung  Today's Date: 5/7/2024        Past Medical History  Past Medical History:   Diagnosis Date    SHERLYN (acute kidney injury) ([facility]) 5/8/2022    Balance disorder     Chronic pain     GERD (gastroesophageal reflux disease)     Hard of hearing     Hyperlipidemia     Hypertension     Hyponatremia 12/5/2020    Low serum cortisol level 12/7/2020    Neuropathy     Pneumonia     Pneumonia due to COVID-19 virus 12/5/2020    Sepsis ([facility]) 5/22/2018    Tinnitus     Unspecified adrenocortical insufficiency ([facility]) 2/28/2023    Felt to be related to COVID-19 infection.  No current symptoms.    UTI (urinary tract infection)         Past Surgical History  Past Surgical History:   Procedure Laterality Date    EYE SURGERY      HYSTERECTOMY      TONSILLECTOMY         Modified (Video) Barium Swallow Study    Summary:  Images are on PACS for review.     Pt presents w/ mild oropharyngeal dysphagia pau by mildly prolonged mastication and slowed oral organization/transfer. Reduced tongue base retraction w/ mild oral residue though pt is able to recollect and clear residue. Swallows are delayed w/ bolus head reaching the pyriform sinuses. Reduced hyo-laryngeal elevation, incomplete epiglottic inversion, and incomplete laryngeal vestibule closure result in recurrent penetration w/ nectar thick and thin liquids w/ epiglottic undercoating. Eventual trace aspiration x1 w/ sequential cup sips of thin (no sensory response). Strategies for effortful swallow and chin tuck effective in improving airway protection.  PES relaxation is also constricted w/ ?CP prominence.       Recommendations:  Diet: Regular   Liquids: Thin   Meds: Whole/puree  Strategies: chin tuck, effortful swallow   Frequent oral care  Upright position  F/u ST tx: Yes   Therapy Prognosis: Fair   Prognosis considerations: Age, motivation for tx  Aspiration Precautions  Reflux Precautions  Consider  "consult with:  -   Repeat MBS as necessary  If a dedicated assessment of the esophagus is desired, consider esophagram/barium swallow or EGD.      Goals:  Pt will tolerate least restrictive diet w/out s/s aspiration or oral/pharyngeal difficulties.      Patient's goal: \"Remember, I'm 89\"       H&P/pertinent provider notes: (PMH noted above)  Janice Yung is a 89 y.o. female who presents with complaints of shortness of breath and cough for few days and fever started this morning. She denies chest pain, trouble urinating, moving bowel or other complaints. Ambulates at home using the walker.     Special Studies:  CXR 5/4: Moderate opacity in the right base, likely pneumonia.     This corresponds with the preliminary interpretation by the emergency department clinician.     Large hiatal hernia.       Previous VBS:  No       Does the pt have pain? No   If yes, was nursing made aware/was it addressed? N/A     Swallow Mechanism Exam  Facial: symmetrical  Labial: WFL  Lingual: WFL  Velum: symmetrical  Mandible: adequate ROM  Dentition: adequate  Vocal quality:clear/adequate   Volitional Cough: strong/productive   Respiratory Status: on RA      Swallow Information   Current Risks for Dysphagia & Aspiration: known history of dysphagia  Current Symptoms/Concerns: coughing with po  Current Diet: regular diet and thin liquids   Baseline Diet: regular diet and thin liquids      Consistencies Administered:  Pt was viewed sitting upright in the lateral view (AP views unable to be obtained). Trials administered were otherwise consistent with MBSImP Validated Protocol: 5-mL thin liquid x2, 20-mL cup sip thin, 40-mL sequential swallow thin, 5-mL nectar thick, 20-mL cup sip nectar thick, 40-mL sequential swallow nectar thick, 5-mL Honey thick, 5-mL pudding, ½ cookie coated with 3-mL pudding. Pt was also given thin liquids by straw, as well as a barium tablet with pudding      Oral Impairment:  Lip Closure: complete   Tongue Control " During Bolus Hold: fair   Bolus Preparation/Mastication: mild prolonged   Bolus Transport/Lingual Motion: slowed   Oral Residue: mild residue, appropriate recollection for secondary precautions   Initiation of the Pharyngeal Swallow: delayed w/ bolus head to the pyriforms     Pharyngeal Impairment:  Soft Palate Elevation: complete   Laryngeal Elevation: incomplete   Anterior Hyoid Excursion: reduced   Epiglottic Movement: inconsistent movement  Laryngeal Vestibular Closure: incomplete   Pharyngeal Stripping Wave: complete   PES Opening: narrowed, CP prominence   Tongue Base Retraction: trace contrast between BOT and PPW   Pharyngeal Residue: mild vallecular     Screening of Esophageal Impairment   Esophageal Clearance: slow motility       Penetration/Aspiration:  Thin: penetration w/ epiglottic undercoating w/ tsp and single cup sip (PAS 3), trace aspiration w/ sequential sips (PAS 8)   Nectar: penetration w/ epiglottic undercoating w/ single cup sip and sequential sips (PAS 3)   Honey: no penetration/aspiration (PAS 1)   Puree: no penetration/aspiration (PAS 1)   Solid: no penetration/aspiration (PAS 1)   Response to Aspiration: silent   Strategies/Efficacy: -     8-Point Penetration-Aspiration Scale   1 Material does not enter the airway   2 Material enters the airway, remains above the vocal folds, and is ejected  from the  airway    3 Material enters the airway, remains above the vocal folds, and is not ejected from the airway   4 Material enters the airway, contacts the vocal folds, and is ejected from the airway   5 Material enters the airway, contacts the vocal folds, and is not ejected from the airway    6 Material enters the airway, passes below the vocal folds and is ejected into the larynx or out of the airway    7 Material enters the airway, passes below the vocal folds, and is not ejected from the trachea despite effort    8 Material enters the airway, passes below the vocal folds, and no effort is made  to eject

## 2024-05-07 NOTE — OCCUPATIONAL THERAPY NOTE
Occupational Therapy Cancellation    Patient Name: Janice Yung  Today's Date: 5/7/2024 05/07/24 1043   OT Last Visit   OT Visit Date 05/07/24   Note Type   Note type Cancelled Session   Cancel Reasons Refusal   Additional Comments OT orders and chart reviewed. KATY Chaves states pt is a stand-by assist. Attempted to evaluate pt this session; pt states she is leaving today and will be getting home therapy. Declines need for OT evaluation at this time. Pt was evaluated by PT yesterday and recommended for level 3. Spoke to LINDY Harris who states home OT has already been initiated. Recommend pt to receive Home OT evaluation if she is discharged today. Will reattempt OT evaluation tomorrow if pt remains inpatient.     Shilpi Dai MS, OTR/L

## 2024-05-07 NOTE — ASSESSMENT & PLAN NOTE
Met sepsis criteria with fever 100.3, white blood cells 22.2, pneumonia as source   Received 500 bolus in ED  Will hold off on further IV fluids for now  Procalcitonin at 1.17  Received dose of ceftriaxone and azithromycin in ED, will continue same for now  Blood culture negative to date  Trend procalcitonin, WBC and fever curve--improving  Patient is hemodynamically stable for discharge on Duricef to complete the course.  Patient will be discharged home with home care services.

## 2024-05-08 ENCOUNTER — TRANSITIONAL CARE MANAGEMENT (OUTPATIENT)
Dept: FAMILY MEDICINE CLINIC | Facility: CLINIC | Age: 89
End: 2024-05-08

## 2024-05-08 DIAGNOSIS — Z71.89 COMPLEX CARE COORDINATION: Primary | ICD-10-CM

## 2024-05-09 ENCOUNTER — HOME CARE VISIT (OUTPATIENT)
Dept: HOME HEALTH SERVICES | Facility: HOME HEALTHCARE | Age: 89
End: 2024-05-09
Payer: MEDICARE

## 2024-05-09 VITALS — HEART RATE: 80 BPM | SYSTOLIC BLOOD PRESSURE: 128 MMHG | DIASTOLIC BLOOD PRESSURE: 58 MMHG | OXYGEN SATURATION: 98 %

## 2024-05-09 PROCEDURE — 10330081 VN NO-PAY CLAIM PROCEDURE

## 2024-05-09 PROCEDURE — G0151 HHCP-SERV OF PT,EA 15 MIN: HCPCS

## 2024-05-09 PROCEDURE — 400013 VN SOC

## 2024-05-10 ENCOUNTER — PATIENT OUTREACH (OUTPATIENT)
Dept: FAMILY MEDICINE CLINIC | Facility: CLINIC | Age: 89
End: 2024-05-10

## 2024-05-10 ENCOUNTER — HOME CARE VISIT (OUTPATIENT)
Dept: HOME HEALTH SERVICES | Facility: HOME HEALTHCARE | Age: 89
End: 2024-05-10
Payer: MEDICARE

## 2024-05-10 LAB
BACTERIA BLD CULT: NORMAL
BACTERIA BLD CULT: NORMAL

## 2024-05-10 PROCEDURE — G0321 AUDIO-ONLY HHS: HCPCS

## 2024-05-10 NOTE — PROGRESS NOTES
Outpatient Care Management Note:    New [facility] referral received. Patient was admitted from 5/4-5/7/24 with pneumonia. She complained of fever, cough and shortness of breath. She was treated with Rocephin and Zithromax and improved. She is to follow up with her PCP and will have [facility]: physical, occupational and speech services.     LINDY attempted to call Janice, but message states call can not go through.     LINDY called Janice's daughter, Deyanira. She is listed on patient's communication form. She feels her mother is improving. She is sleeping more than usual. She is scheduled to Dr Mustafa on Monday. She is being seen by [facility] for therapies.     Deyanira states that she still has a slight cough but is not bringing up any secretions.  She does not think she has been running any fevers, but they do not have a thermometer.     Deyanira declined completing a med review. She did  the antibiotics and states that they have all medications.     Deyanira declined ongoing outreach and my contact information. She will call the PCP office with any questions or concerns.

## 2024-05-13 ENCOUNTER — OFFICE VISIT (OUTPATIENT)
Dept: FAMILY MEDICINE CLINIC | Facility: CLINIC | Age: 89
End: 2024-05-13
Payer: MEDICARE

## 2024-05-13 VITALS
BODY MASS INDEX: 30.43 KG/M2 | HEART RATE: 88 BPM | SYSTOLIC BLOOD PRESSURE: 144 MMHG | HEIGHT: 60 IN | DIASTOLIC BLOOD PRESSURE: 71 MMHG | TEMPERATURE: 98.4 F | WEIGHT: 155 LBS | OXYGEN SATURATION: 93 %

## 2024-05-13 DIAGNOSIS — H35.3211 EXUDATIVE AGE-RELATED MACULAR DEGENERATION OF RIGHT EYE WITH ACTIVE CHOROIDAL NEOVASCULARIZATION (HCC): ICD-10-CM

## 2024-05-13 DIAGNOSIS — I50.33 ACUTE ON CHRONIC DIASTOLIC HEART FAILURE (HCC): ICD-10-CM

## 2024-05-13 DIAGNOSIS — G60.9 IDIOPATHIC PERIPHERAL NEUROPATHY: ICD-10-CM

## 2024-05-13 DIAGNOSIS — I10 PRIMARY HYPERTENSION: ICD-10-CM

## 2024-05-13 DIAGNOSIS — I73.9 PERIPHERAL VASCULAR DISEASE, UNSPECIFIED (HCC): ICD-10-CM

## 2024-05-13 DIAGNOSIS — Z76.89 ENCOUNTER FOR SUPPORT AND COORDINATION OF TRANSITION OF CARE: Primary | ICD-10-CM

## 2024-05-13 DIAGNOSIS — F03.90 DEMENTIA WITHOUT BEHAVIORAL DISTURBANCE, PSYCHOTIC DISTURBANCE, MOOD DISTURBANCE, OR ANXIETY, UNSPECIFIED DEMENTIA SEVERITY, UNSPECIFIED DEMENTIA TYPE (HCC): ICD-10-CM

## 2024-05-13 DIAGNOSIS — R60.0 BILATERAL LOWER EXTREMITY EDEMA: ICD-10-CM

## 2024-05-13 DIAGNOSIS — J18.9 PNEUMONIA DUE TO INFECTIOUS ORGANISM, UNSPECIFIED LATERALITY, UNSPECIFIED PART OF LUNG: ICD-10-CM

## 2024-05-13 DIAGNOSIS — K21.9 GASTROESOPHAGEAL REFLUX DISEASE: ICD-10-CM

## 2024-05-13 PROCEDURE — 99496 TRANSJ CARE MGMT HIGH F2F 7D: CPT | Performed by: NURSE PRACTITIONER

## 2024-05-13 RX ORDER — TORSEMIDE 20 MG/1
20 TABLET ORAL 2 TIMES DAILY
Qty: 60 TABLET | Refills: 2 | Status: SHIPPED | OUTPATIENT
Start: 2024-05-13

## 2024-05-13 RX ORDER — GABAPENTIN 600 MG/1
600 TABLET ORAL 2 TIMES DAILY
Qty: 180 TABLET | Refills: 0 | Status: SHIPPED | OUTPATIENT
Start: 2024-05-13

## 2024-05-13 RX ORDER — OMEPRAZOLE 20 MG/1
20 CAPSULE, DELAYED RELEASE ORAL DAILY
Qty: 90 CAPSULE | Refills: 0 | Status: SHIPPED | OUTPATIENT
Start: 2024-05-13

## 2024-05-13 RX ORDER — ATORVASTATIN CALCIUM 10 MG/1
10 TABLET, FILM COATED ORAL DAILY
Qty: 90 TABLET | Refills: 1 | Status: SHIPPED | OUTPATIENT
Start: 2024-05-13

## 2024-05-13 NOTE — PROGRESS NOTES
Assessment & Plan     1. Encounter for support and coordination of transition of care    2. Pneumonia due to infectious organism, unspecified laterality, unspecified part of lung    -Given IV ABX in hospital. Discharged home on Duricef which patient completed. Pulse Ox on RA 93%    3. Acute on chronic diastolic heart failure (HCC)  -     torsemide (DEMADEX) 20 mg tablet; Take 1 tablet (20 mg total) by mouth 2 (two) times a day  -     atorvastatin (LIPITOR) 10 mg tablet; Take 1 tablet (10 mg total) by mouth daily  -Condition stable on medications.    4. Bilateral lower extremity edema  -     torsemide (DEMADEX) 20 mg tablet; Take 1 tablet (20 mg total) by mouth 2 (two) times a day    5. Gastroesophageal reflux disease  -     omeprazole (PriLOSEC) 20 mg delayed release capsule; Take 1 capsule (20 mg total) by mouth daily    6. Idiopathic peripheral neuropathy  -     gabapentin (NEURONTIN) 600 MG tablet; Take 1 tablet (600 mg total) by mouth 2 (two) times a day    7. Primary hypertension  -     atorvastatin (LIPITOR) 10 mg tablet; Take 1 tablet (10 mg total) by mouth daily    -BP is controlled.    8. Dementia without behavioral disturbance, psychotic disturbance, mood disturbance, or anxiety, unspecified dementia severity, unspecified dementia type (HCC)    9. Peripheral vascular disease, unspecified (HCC)    10. 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)         Subjective     Transitional Care Management Review:   Janice Yung is a 89 y.o. female here for TCM follow up.     During the TCM phone call patient stated:  TCM Call       Date and time call was made  5/8/2024  2:17 PM    Hospital care reviewed  Records reviewed    Patient was hospitialized at  [facility]    Date of Admission  05/04/24    Date of discharge  05/07/24    Diagnosis  Pneumonia    Disposition  Home; Home health services    Were the patients medications  reviewed and updated  Yes    Current Symptoms  Fatigue    Fatigue severity  Mild          TCM Call       Post hospital issues  None    Should patient be enrolled in anticoag monitoring?  No    Scheduled for follow up?  Yes    Did you obtain your prescribed medications  Yes    Do you need help managing your prescriptions or medications  Yes    Why type of assitance do you need  Daughter sets out medications    Is transportation to your appointment needed  Yes    Specify why  daughter brings patient to appts.    I have advised the patient to call PCP with any new or worsening symptoms  Dara MELGOZA MA.    Living Arrangements  Children    Support System  Family    The type of support provided  Emotional; Financial; Physical    Do you have social support  Yes, as much as I need    Are you recieving any outpatient services  No    Are you recieving home care services  Yes    Types of home care services  Nurse visit; Home PT    Are you using any community resources  No    Current waiver services  No    Have you fallen in the last 12 months  Yes    How many times  1    Interperter language line needed  No    Counseling  Patient; Family          TCM-hospitalized 5/4-5/7 with pneumonia. Given IV ABX in hospital. Was discharged home with oral ABX. Patient states doing well. No shortness of breath. Pulse Ox on RA is 93%. Will have home physical therapy.      Review of Systems   Constitutional:  Negative for activity change and fever.   Respiratory:  Negative for chest tightness, shortness of breath and wheezing.    Cardiovascular:  Negative for chest pain, palpitations and leg swelling.   Gastrointestinal:  Negative for abdominal pain.   Skin:  Negative for color change and pallor.   Neurological:  Negative for dizziness.       Objective     /71 (BP Location: Left arm, Patient Position: Sitting, Cuff Size: Standard)   Pulse 88   Temp 98.4 °F (36.9 °C) (Tympanic)   Ht 5' (1.524 m)   Wt 70.3 kg (155 lb)   SpO2 93%   BMI  30.27 kg/m²      Physical Exam  Vitals and nursing note reviewed.   Constitutional:       General: She is not in acute distress.     Appearance: Normal appearance. She is not ill-appearing.   HENT:      Head: Normocephalic.   Eyes:      Extraocular Movements: Extraocular movements intact.   Cardiovascular:      Rate and Rhythm: Normal rate and regular rhythm.      Heart sounds: Normal heart sounds.   Pulmonary:      Effort: Pulmonary effort is normal. No respiratory distress.      Breath sounds: Normal breath sounds. No wheezing or rhonchi.   Chest:      Chest wall: No tenderness.   Skin:     General: Skin is warm and dry.      Coloration: Skin is not pale.      Findings: No erythema.   Neurological:      Mental Status: She is alert and oriented to person, place, and time.   Psychiatric:         Mood and Affect: Mood normal.         Behavior: Behavior normal.       Medications have been reviewed by provider in current encounter    REAL Coronado

## 2024-05-14 ENCOUNTER — HOME CARE VISIT (OUTPATIENT)
Dept: HOME HEALTH SERVICES | Facility: HOME HEALTHCARE | Age: 89
End: 2024-05-14
Payer: MEDICARE

## 2024-05-14 PROCEDURE — G0151 HHCP-SERV OF PT,EA 15 MIN: HCPCS

## 2024-05-15 ENCOUNTER — HOME CARE VISIT (OUTPATIENT)
Dept: HOME HEALTH SERVICES | Facility: HOME HEALTHCARE | Age: 89
End: 2024-05-15
Payer: MEDICARE

## 2024-05-15 PROCEDURE — G0153 HHCP-SVS OF S/L PATH,EA 15MN: HCPCS

## 2024-05-16 ENCOUNTER — HOME CARE VISIT (OUTPATIENT)
Dept: HOME HEALTH SERVICES | Facility: HOME HEALTHCARE | Age: 89
End: 2024-05-16
Payer: MEDICARE

## 2024-05-16 VITALS — DIASTOLIC BLOOD PRESSURE: 62 MMHG | SYSTOLIC BLOOD PRESSURE: 128 MMHG

## 2024-05-16 PROCEDURE — G0151 HHCP-SERV OF PT,EA 15 MIN: HCPCS

## 2024-05-20 ENCOUNTER — HOME CARE VISIT (OUTPATIENT)
Dept: HOME HEALTH SERVICES | Facility: HOME HEALTHCARE | Age: 89
End: 2024-05-20
Payer: MEDICARE

## 2024-05-20 PROCEDURE — G0153 HHCP-SVS OF S/L PATH,EA 15MN: HCPCS

## 2024-05-21 ENCOUNTER — HOME CARE VISIT (OUTPATIENT)
Dept: HOME HEALTH SERVICES | Facility: HOME HEALTHCARE | Age: 89
End: 2024-05-21
Payer: MEDICARE

## 2024-05-21 VITALS — DIASTOLIC BLOOD PRESSURE: 60 MMHG | SYSTOLIC BLOOD PRESSURE: 132 MMHG

## 2024-05-21 PROCEDURE — G0151 HHCP-SERV OF PT,EA 15 MIN: HCPCS

## 2024-05-23 ENCOUNTER — HOME CARE VISIT (OUTPATIENT)
Dept: HOME HEALTH SERVICES | Facility: HOME HEALTHCARE | Age: 89
End: 2024-05-23
Payer: MEDICARE

## 2024-05-23 VITALS — SYSTOLIC BLOOD PRESSURE: 128 MMHG | DIASTOLIC BLOOD PRESSURE: 60 MMHG

## 2024-05-23 PROCEDURE — G0151 HHCP-SERV OF PT,EA 15 MIN: HCPCS

## 2024-06-03 PROBLEM — J18.9 PNEUMONIA: Status: RESOLVED | Noted: 2018-11-26 | Resolved: 2024-06-03

## 2024-06-17 DIAGNOSIS — Z87.440 HISTORY OF RECURRENT UTIS: ICD-10-CM

## 2024-06-17 RX ORDER — NITROFURANTOIN 25; 75 MG/1; MG/1
100 CAPSULE ORAL EVERY MORNING
Qty: 90 CAPSULE | Refills: 0 | Status: SHIPPED | OUTPATIENT
Start: 2024-06-17

## 2024-07-10 ENCOUNTER — OFFICE VISIT (OUTPATIENT)
Dept: FAMILY MEDICINE CLINIC | Facility: CLINIC | Age: 89
End: 2024-07-10
Payer: MEDICARE

## 2024-07-10 VITALS
OXYGEN SATURATION: 95 % | DIASTOLIC BLOOD PRESSURE: 70 MMHG | HEART RATE: 78 BPM | SYSTOLIC BLOOD PRESSURE: 138 MMHG | WEIGHT: 159 LBS | BODY MASS INDEX: 31.05 KG/M2

## 2024-07-10 DIAGNOSIS — N30.00 ACUTE CYSTITIS WITHOUT HEMATURIA: Primary | ICD-10-CM

## 2024-07-10 DIAGNOSIS — Z87.440 HISTORY OF RECURRENT UTIS: ICD-10-CM

## 2024-07-10 PROCEDURE — 99214 OFFICE O/P EST MOD 30 MIN: CPT | Performed by: FAMILY MEDICINE

## 2024-07-10 PROCEDURE — G2211 COMPLEX E/M VISIT ADD ON: HCPCS | Performed by: FAMILY MEDICINE

## 2024-07-10 RX ORDER — SULFAMETHOXAZOLE AND TRIMETHOPRIM 800; 160 MG/1; MG/1
1 TABLET ORAL EVERY 12 HOURS SCHEDULED
Qty: 20 TABLET | Refills: 0 | Status: SHIPPED | OUTPATIENT
Start: 2024-07-10 | End: 2024-07-20

## 2024-07-10 NOTE — PROGRESS NOTES
Assessment/Plan:    No problem-specific Assessment & Plan notes found for this encounter.       Diagnoses and all orders for this visit:    Acute cystitis without hematuria  -     sulfamethoxazole-trimethoprim (BACTRIM DS) 800-160 mg per tablet; Take 1 tablet by mouth every 12 (twelve) hours for 10 days    History of recurrent UTIs          Subjective:   Chief Complaint   Patient presents with    Urinary Frequency    Possible UTI    Leg Swelling        Patient ID: Janice Yung is a 89 y.o. female.    Urinary Frequency   Associated symptoms include frequency. Pertinent negatives include no nausea or vomiting.       The following portions of the patient's history were reviewed and updated as appropriate: allergies, current medications, past family history, past medical history, past social history, past surgical history and problem list.    Review of Systems   Constitutional:  Negative for fatigue, fever and unexpected weight change.   HENT:  Negative for congestion, sinus pain and sore throat.    Eyes:  Negative for visual disturbance.   Respiratory:  Negative for shortness of breath and wheezing.    Cardiovascular:  Negative for chest pain and palpitations.   Gastrointestinal:  Negative for abdominal pain, nausea and vomiting.   Genitourinary:  Positive for frequency.   Musculoskeletal: Negative.  Negative for arthralgias and myalgias.   Neurological:  Negative for syncope, weakness and numbness.   Psychiatric/Behavioral: Negative.  Negative for confusion, dysphoric mood and suicidal ideas.          Objective:  Vitals:    07/10/24 1324   BP: 138/70   BP Location: Left arm   Patient Position: Sitting   Cuff Size: Standard   Pulse: 78   SpO2: 95%   Weight: 72.1 kg (159 lb)      Physical Exam  Constitutional:       Appearance: She is well-developed.   HENT:      Right Ear: Ear canal normal. Tympanic membrane is not injected.      Left Ear: Ear canal normal. Tympanic membrane is not injected.      Nose: Nose normal.    Eyes:      General:         Right eye: No discharge.         Left eye: No discharge.      Conjunctiva/sclera: Conjunctivae normal.      Pupils: Pupils are equal, round, and reactive to light.   Neck:      Thyroid: No thyromegaly.   Cardiovascular:      Rate and Rhythm: Normal rate and regular rhythm.      Heart sounds: Normal heart sounds. No murmur heard.  Pulmonary:      Effort: Pulmonary effort is normal. No respiratory distress.      Breath sounds: Normal breath sounds. No wheezing.   Abdominal:      General: Bowel sounds are normal. There is no distension.      Palpations: Abdomen is soft.      Tenderness: There is no abdominal tenderness.   Musculoskeletal:         General: Normal range of motion.      Cervical back: Normal range of motion and neck supple.   Lymphadenopathy:      Cervical: No cervical adenopathy.   Skin:     General: Skin is warm and dry.   Neurological:      Mental Status: She is alert and oriented to person, place, and time. She is not disoriented.      Sensory: No sensory deficit.      Motor: No weakness.      Coordination: Coordination normal.      Gait: Gait normal.      Deep Tendon Reflexes: Reflexes are normal and symmetric.   Psychiatric:         Speech: Speech normal.         Behavior: Behavior normal.         Thought Content: Thought content normal.         Judgment: Judgment normal.

## 2024-08-05 ENCOUNTER — TELEPHONE (OUTPATIENT)
Age: 89
End: 2024-08-05

## 2024-08-05 ENCOUNTER — APPOINTMENT (OUTPATIENT)
Dept: LAB | Facility: HOSPITAL | Age: 89
End: 2024-08-05
Payer: MEDICARE

## 2024-08-05 DIAGNOSIS — R82.90 CLOUDY URINE: Primary | ICD-10-CM

## 2024-08-05 DIAGNOSIS — R33.9 INCOMPLETE EMPTYING OF BLADDER: ICD-10-CM

## 2024-08-05 LAB

## 2024-08-05 PROCEDURE — 87186 SC STD MICRODIL/AGAR DIL: CPT

## 2024-08-05 PROCEDURE — 81001 URINALYSIS AUTO W/SCOPE: CPT

## 2024-08-05 PROCEDURE — 87086 URINE CULTURE/COLONY COUNT: CPT

## 2024-08-05 PROCEDURE — 87077 CULTURE AEROBIC IDENTIFY: CPT

## 2024-08-05 NOTE — TELEPHONE ENCOUNTER
With approval from PCP, orders placed for urine to be tested, spoke with daughter, she will bring to [facility]'s lab.

## 2024-08-05 NOTE — TELEPHONE ENCOUNTER
Patient's daughter (Deyanira) called she is having urinary burning, urine is cloudy and incomplete emptying.  Started again about a week ago. She collected a urine sample at home and wanted to see if she can take sample to lab for urine test.  I was not sure if lab will take specimen already collected?    Thank you

## 2024-08-07 ENCOUNTER — TELEPHONE (OUTPATIENT)
Dept: FAMILY MEDICINE CLINIC | Facility: CLINIC | Age: 89
End: 2024-08-07

## 2024-08-07 DIAGNOSIS — N30.00 ACUTE CYSTITIS WITHOUT HEMATURIA: Primary | ICD-10-CM

## 2024-08-07 RX ORDER — CIPROFLOXACIN 500 MG/1
500 TABLET, FILM COATED ORAL EVERY 12 HOURS SCHEDULED
Qty: 14 TABLET | Refills: 1 | Status: SHIPPED | OUTPATIENT
Start: 2024-08-07 | End: 2024-08-14

## 2024-08-07 NOTE — TELEPHONE ENCOUNTER
----- Message from Edgar Mustafa MD sent at 8/7/2024  8:12 AM EDT -----  + UTI-----looks like only cult done w/o sensitivities----did not know that was even possible--TW sent cipro  ----- Message -----  From: Lab, Background User  Sent: 8/5/2024   7:32 PM EDT  To: Edgar Mustafa MD

## 2024-08-07 NOTE — TELEPHONE ENCOUNTER
Patient's daughter called in regarding UTI. I informed her that PCP has sent prescription to [facility] pharmacy in [place].

## 2024-08-08 LAB
BACTERIA UR CULT: ABNORMAL
BACTERIA UR CULT: ABNORMAL

## 2024-08-08 NOTE — TELEPHONE ENCOUNTER
Sensitivities came back;  Edgar Mustafa MD  P [facility] Clinical  Take cipro

## 2024-08-08 NOTE — TELEPHONE ENCOUNTER
Spoke to patients daughter, confirmed communication consents, advised the scanned consent was out of date by three days, however daughter does have patients permission to view her mychart, and office had attempted to reach out to daughter several times.   Updated Windy with the written results from provider on +UTI, and the cipro abx treatment course. Also checked [facility] for any interactions between the cipro and dairy/calcium(non-supplemental) as patient likes to take the abx with pudding cup of yogurt. Gave the indicated possible interaction and advised to also ask pharmacist as it may decrease the effectiveness of medication.   Windy reported that our patient is a little better today, however still having frequent urination and painful burning, making her very uncomfortable.

## 2024-08-12 DIAGNOSIS — R60.0 BILATERAL LOWER EXTREMITY EDEMA: ICD-10-CM

## 2024-08-12 DIAGNOSIS — I50.33 ACUTE ON CHRONIC DIASTOLIC HEART FAILURE (HCC): ICD-10-CM

## 2024-08-12 DIAGNOSIS — K21.9 GASTROESOPHAGEAL REFLUX DISEASE: ICD-10-CM

## 2024-08-13 RX ORDER — TORSEMIDE 20 MG/1
20 TABLET ORAL 2 TIMES DAILY
Qty: 60 TABLET | Refills: 5 | Status: SHIPPED | OUTPATIENT
Start: 2024-08-13

## 2024-08-26 ENCOUNTER — TELEPHONE (OUTPATIENT)
Age: 89
End: 2024-08-26

## 2024-08-26 DIAGNOSIS — L03.119 CELLULITIS OF LOWER EXTREMITY, UNSPECIFIED LATERALITY: Primary | ICD-10-CM

## 2024-08-26 RX ORDER — CEPHALEXIN 500 MG/1
500 CAPSULE ORAL 3 TIMES DAILY
Qty: 21 CAPSULE | Refills: 0 | Status: SHIPPED | OUTPATIENT
Start: 2024-08-26 | End: 2024-09-02

## 2024-08-26 NOTE — TELEPHONE ENCOUNTER
Pts daughter, Deyanira, called stating that cellulitis is back on pts legs.  She states Yohana told her to call for antibiotics if it starts up again.    [facility] - [address] - [address]. [phone]     Please advise Deyanira if any problems [phone]

## 2024-08-26 NOTE — TELEPHONE ENCOUNTER
Antibiotic sent to pharmacy. If cellulitis does not improve or comes back again would need to be seen in office for evaluation.

## 2024-09-09 ENCOUNTER — OFFICE VISIT (OUTPATIENT)
Dept: FAMILY MEDICINE CLINIC | Facility: CLINIC | Age: 89
End: 2024-09-09
Payer: MEDICARE

## 2024-09-09 ENCOUNTER — HOSPITAL ENCOUNTER (INPATIENT)
Facility: HOSPITAL | Age: 89
LOS: 1 days | Discharge: HOME HEALTH | DRG: 291 | End: 2024-09-11
Attending: EMERGENCY MEDICINE | Admitting: INTERNAL MEDICINE
Payer: MEDICARE

## 2024-09-09 ENCOUNTER — APPOINTMENT (EMERGENCY)
Dept: RADIOLOGY | Facility: HOSPITAL | Age: 89
DRG: 291 | End: 2024-09-09
Payer: MEDICARE

## 2024-09-09 VITALS
OXYGEN SATURATION: 98 % | HEIGHT: 61 IN | HEART RATE: 68 BPM | TEMPERATURE: 96.4 F | BODY MASS INDEX: 30.96 KG/M2 | SYSTOLIC BLOOD PRESSURE: 132 MMHG | WEIGHT: 164 LBS | DIASTOLIC BLOOD PRESSURE: 82 MMHG

## 2024-09-09 DIAGNOSIS — R06.00 DYSPNEA: ICD-10-CM

## 2024-09-09 DIAGNOSIS — R60.0 PERIPHERAL EDEMA: ICD-10-CM

## 2024-09-09 DIAGNOSIS — R60.1 ANASARCA: Primary | ICD-10-CM

## 2024-09-09 DIAGNOSIS — R60.0 BILATERAL LEG EDEMA: Primary | ICD-10-CM

## 2024-09-09 DIAGNOSIS — I50.9 CHF (CONGESTIVE HEART FAILURE) (HCC): ICD-10-CM

## 2024-09-09 DIAGNOSIS — R60.0 BILATERAL LOWER EXTREMITY EDEMA: ICD-10-CM

## 2024-09-09 DIAGNOSIS — R60.0 BILATERAL LEG EDEMA: ICD-10-CM

## 2024-09-09 DIAGNOSIS — R60.0 LOCALIZED EDEMA: ICD-10-CM

## 2024-09-09 DIAGNOSIS — R26.2 AMBULATORY DYSFUNCTION: ICD-10-CM

## 2024-09-09 DIAGNOSIS — I50.33 ACUTE ON CHRONIC DIASTOLIC HEART FAILURE (HCC): ICD-10-CM

## 2024-09-09 LAB
2HR DELTA HS TROPONIN: 3 NG/L
ALBUMIN SERPL BCG-MCNC: 3.5 G/DL (ref 3.5–5)
ALP SERPL-CCNC: 68 U/L (ref 34–104)
ALT SERPL W P-5'-P-CCNC: 5 U/L (ref 7–52)
ANION GAP SERPL CALCULATED.3IONS-SCNC: 8 MMOL/L (ref 4–13)
AST SERPL W P-5'-P-CCNC: 14 U/L (ref 13–39)
ATRIAL RATE: 75 BPM
BASOPHILS # BLD AUTO: 0.03 THOUSANDS/ÂΜL (ref 0–0.1)
BASOPHILS NFR BLD AUTO: 0 % (ref 0–1)
BILIRUB SERPL-MCNC: 0.31 MG/DL (ref 0.2–1)
BNP SERPL-MCNC: 93 PG/ML (ref 0–100)
BUN SERPL-MCNC: 19 MG/DL (ref 5–25)
CALCIUM SERPL-MCNC: 8.8 MG/DL (ref 8.4–10.2)
CARDIAC TROPONIN I PNL SERPL HS: 12 NG/L
CARDIAC TROPONIN I PNL SERPL HS: 9 NG/L
CHLORIDE SERPL-SCNC: 104 MMOL/L (ref 96–108)
CO2 SERPL-SCNC: 29 MMOL/L (ref 21–32)
CREAT SERPL-MCNC: 0.86 MG/DL (ref 0.6–1.3)
EOSINOPHIL # BLD AUTO: 0.17 THOUSAND/ÂΜL (ref 0–0.61)
EOSINOPHIL NFR BLD AUTO: 2 % (ref 0–6)
ERYTHROCYTE [DISTWIDTH] IN BLOOD BY AUTOMATED COUNT: 14.3 % (ref 11.6–15.1)
GFR SERPL CREATININE-BSD FRML MDRD: 58 ML/MIN/1.73SQ M
GLUCOSE SERPL-MCNC: 113 MG/DL (ref 65–140)
HCT VFR BLD AUTO: 38 % (ref 34.8–46.1)
HGB BLD-MCNC: 11.7 G/DL (ref 11.5–15.4)
IMM GRANULOCYTES # BLD AUTO: 0.04 THOUSAND/UL (ref 0–0.2)
IMM GRANULOCYTES NFR BLD AUTO: 1 % (ref 0–2)
LYMPHOCYTES # BLD AUTO: 1.85 THOUSANDS/ÂΜL (ref 0.6–4.47)
LYMPHOCYTES NFR BLD AUTO: 22 % (ref 14–44)
MCH RBC QN AUTO: 28.3 PG (ref 26.8–34.3)
MCHC RBC AUTO-ENTMCNC: 30.8 G/DL (ref 31.4–37.4)
MCV RBC AUTO: 92 FL (ref 82–98)
MONOCYTES # BLD AUTO: 0.77 THOUSAND/ÂΜL (ref 0.17–1.22)
MONOCYTES NFR BLD AUTO: 9 % (ref 4–12)
NEUTROPHILS # BLD AUTO: 5.55 THOUSANDS/ÂΜL (ref 1.85–7.62)
NEUTS SEG NFR BLD AUTO: 66 % (ref 43–75)
NRBC BLD AUTO-RTO: 0 /100 WBCS
P AXIS: 52 DEGREES
PLATELET # BLD AUTO: 206 THOUSANDS/UL (ref 149–390)
PMV BLD AUTO: 9.3 FL (ref 8.9–12.7)
POTASSIUM SERPL-SCNC: 3.5 MMOL/L (ref 3.5–5.3)
PR INTERVAL: 252 MS
PROT SERPL-MCNC: 6.3 G/DL (ref 6.4–8.4)
QRS AXIS: 26 DEGREES
QRSD INTERVAL: 130 MS
QT INTERVAL: 420 MS
QTC INTERVAL: 469 MS
RBC # BLD AUTO: 4.13 MILLION/UL (ref 3.81–5.12)
SODIUM SERPL-SCNC: 141 MMOL/L (ref 135–147)
T WAVE AXIS: 9 DEGREES
TSH SERPL DL<=0.05 MIU/L-ACNC: 2.61 UIU/ML (ref 0.45–4.5)
VENTRICULAR RATE: 75 BPM
WBC # BLD AUTO: 8.41 THOUSAND/UL (ref 4.31–10.16)

## 2024-09-09 PROCEDURE — 93010 ELECTROCARDIOGRAM REPORT: CPT | Performed by: INTERNAL MEDICINE

## 2024-09-09 PROCEDURE — 99213 OFFICE O/P EST LOW 20 MIN: CPT | Performed by: NURSE PRACTITIONER

## 2024-09-09 PROCEDURE — G2211 COMPLEX E/M VISIT ADD ON: HCPCS | Performed by: NURSE PRACTITIONER

## 2024-09-09 PROCEDURE — 83880 ASSAY OF NATRIURETIC PEPTIDE: CPT | Performed by: EMERGENCY MEDICINE

## 2024-09-09 PROCEDURE — 96374 THER/PROPH/DIAG INJ IV PUSH: CPT

## 2024-09-09 PROCEDURE — 93005 ELECTROCARDIOGRAM TRACING: CPT

## 2024-09-09 PROCEDURE — 71045 X-RAY EXAM CHEST 1 VIEW: CPT

## 2024-09-09 PROCEDURE — 99223 1ST HOSP IP/OBS HIGH 75: CPT | Performed by: INTERNAL MEDICINE

## 2024-09-09 PROCEDURE — 84484 ASSAY OF TROPONIN QUANT: CPT | Performed by: EMERGENCY MEDICINE

## 2024-09-09 PROCEDURE — 99285 EMERGENCY DEPT VISIT HI MDM: CPT | Performed by: EMERGENCY MEDICINE

## 2024-09-09 PROCEDURE — 36415 COLL VENOUS BLD VENIPUNCTURE: CPT | Performed by: EMERGENCY MEDICINE

## 2024-09-09 PROCEDURE — 99285 EMERGENCY DEPT VISIT HI MDM: CPT

## 2024-09-09 PROCEDURE — 84443 ASSAY THYROID STIM HORMONE: CPT

## 2024-09-09 PROCEDURE — 80053 COMPREHEN METABOLIC PANEL: CPT | Performed by: EMERGENCY MEDICINE

## 2024-09-09 PROCEDURE — 85025 COMPLETE CBC W/AUTO DIFF WBC: CPT | Performed by: EMERGENCY MEDICINE

## 2024-09-09 PROCEDURE — 84484 ASSAY OF TROPONIN QUANT: CPT

## 2024-09-09 RX ORDER — FUROSEMIDE 10 MG/ML
40 INJECTION INTRAMUSCULAR; INTRAVENOUS ONCE
Status: DISCONTINUED | OUTPATIENT
Start: 2024-09-09 | End: 2024-09-09

## 2024-09-09 RX ORDER — PANTOPRAZOLE SODIUM 40 MG/1
40 TABLET, DELAYED RELEASE ORAL
Status: DISCONTINUED | OUTPATIENT
Start: 2024-09-10 | End: 2024-09-11 | Stop reason: HOSPADM

## 2024-09-09 RX ORDER — ATORVASTATIN CALCIUM 10 MG/1
10 TABLET, FILM COATED ORAL DAILY
Status: DISCONTINUED | OUTPATIENT
Start: 2024-09-10 | End: 2024-09-11 | Stop reason: HOSPADM

## 2024-09-09 RX ORDER — FUROSEMIDE 10 MG/ML
80 INJECTION INTRAMUSCULAR; INTRAVENOUS
Status: DISCONTINUED | OUTPATIENT
Start: 2024-09-10 | End: 2024-09-11

## 2024-09-09 RX ORDER — GABAPENTIN 300 MG/1
300 CAPSULE ORAL 3 TIMES DAILY
Status: DISCONTINUED | OUTPATIENT
Start: 2024-09-09 | End: 2024-09-11 | Stop reason: HOSPADM

## 2024-09-09 RX ORDER — ACETAMINOPHEN 325 MG/1
650 TABLET ORAL EVERY 6 HOURS PRN
Status: DISCONTINUED | OUTPATIENT
Start: 2024-09-09 | End: 2024-09-11 | Stop reason: HOSPADM

## 2024-09-09 RX ORDER — HEPARIN SODIUM 5000 [USP'U]/ML
5000 INJECTION, SOLUTION INTRAVENOUS; SUBCUTANEOUS EVERY 8 HOURS SCHEDULED
Status: DISCONTINUED | OUTPATIENT
Start: 2024-09-09 | End: 2024-09-11 | Stop reason: HOSPADM

## 2024-09-09 RX ORDER — FUROSEMIDE 10 MG/ML
40 INJECTION INTRAMUSCULAR; INTRAVENOUS ONCE
Status: COMPLETED | OUTPATIENT
Start: 2024-09-09 | End: 2024-09-09

## 2024-09-09 RX ORDER — LABETALOL HYDROCHLORIDE 5 MG/ML
10 INJECTION, SOLUTION INTRAVENOUS EVERY 6 HOURS PRN
Status: DISCONTINUED | OUTPATIENT
Start: 2024-09-09 | End: 2024-09-11 | Stop reason: HOSPADM

## 2024-09-09 RX ORDER — NITROFURANTOIN 25; 75 MG/1; MG/1
100 CAPSULE ORAL EVERY MORNING
Status: DISCONTINUED | OUTPATIENT
Start: 2024-09-10 | End: 2024-09-09

## 2024-09-09 RX ADMIN — GABAPENTIN 300 MG: 300 CAPSULE ORAL at 21:34

## 2024-09-09 RX ADMIN — FUROSEMIDE 40 MG: 10 INJECTION, SOLUTION INTRAVENOUS at 15:51

## 2024-09-09 RX ADMIN — FUROSEMIDE 40 MG: 10 INJECTION, SOLUTION INTRAMUSCULAR; INTRAVENOUS at 18:26

## 2024-09-09 RX ADMIN — HEPARIN SODIUM 5000 UNITS: 5000 INJECTION, SOLUTION INTRAVENOUS; SUBCUTANEOUS at 21:34

## 2024-09-09 NOTE — ED PROVIDER NOTES
History  Chief Complaint   Patient presents with    Leg Swelling     Pt to ED after PCP appointment for cellulitis of legs. PCP sent pt to ED for eval of CHF exacerbation.     Hx from patient and daughter who she lives with, hx also from medical records.  [facility] chf, c/o 1 week now worsening bilateral leg swelling, some blisters.  Tried course of abx for cellulitis but did not help.  Also has mild sob and wheezing over last few days.  No other recent change in meds.  Difficulty ambulating from swelling in addition to chronic peripheral neuropathy        Prior to Admission Medications   Prescriptions Last Dose Informant Patient Reported? Taking?   Artificial Tears 0.2-0.2-1 % SOLN   Yes No   Sig: Administer 1 drop to both eyes 3 (three) times a day   Cholecalciferol (VITAMIN D3) 1000 units CAPS  Self Yes No   Sig: Take 1,000 Units by mouth daily    Cyanocobalamin (VITAMIN B12 PO)  Self Yes No   Sig: Take 1,000 mcg by mouth in the morning   Mirabegron ER (Myrbetriq) 25 MG TB24   No No   Sig: TAKE 1 TABLET BY MOUTH IN THE MORNING   Multiple Vitamins-Minerals (PRESERVISION AREDS PO) Not Taking Self Yes No   Sig: Take 1 tablet by mouth 2 (two) times a day   Patient not taking: Reported on 9/9/2024   Saccharomyces boulardii (Probiotic) 250 MG CAPS   Yes No   Sig: pt reports not taking    Patient not taking: Reported on 5/13/2024   aspirin (ECOTRIN LOW STRENGTH) 81 mg EC tablet  Self No No   Sig: Take 1 tablet (81 mg total) by mouth daily   atorvastatin (LIPITOR) 10 mg tablet   No No   Sig: Take 1 tablet (10 mg total) by mouth daily   gabapentin (NEURONTIN) 600 MG tablet   No No   Sig: Take 1 tablet (600 mg total) by mouth 2 (two) times a day   neomycin-polymyxin-hydrocortisone (CORTISPORIN) 0.35%-10,000 units/mL-1% otic suspension   No No   Sig: Administer 4 drops to the right ear every 6 (six) hours for 5 days   Patient not taking: Reported on 5/10/2024   nitrofurantoin (MACROBID) 100 mg capsule   No No   Sig: Take 1  capsule by mouth in the morning   omeprazole (PriLOSEC) 20 mg delayed release capsule   No No   Sig: Take 1 capsule by mouth once daily   torsemide (DEMADEX) 20 mg tablet   No No   Sig: Take 1 tablet by mouth twice daily      Facility-Administered Medications: None       Past Medical History:   Diagnosis Date    SHERLYN (acute kidney injury) ([facility]) 05/08/2022    Balance disorder     CHF (congestive heart failure) ([facility])     Chronic pain     GERD (gastroesophageal reflux disease)     Hard of hearing     Hyperlipidemia     Hypertension     Hyponatremia 12/05/2020    Low serum cortisol level 12/07/2020    Neuropathy     Pneumonia     Pneumonia due to COVID-19 virus 12/05/2020    Sepsis ([facility]) 05/22/2018    Tinnitus     Unspecified adrenocortical insufficiency ([facility]) 02/28/2023    Felt to be related to COVID-19 infection.  No current symptoms.    UTI (urinary tract infection)        Past Surgical History:   Procedure Laterality Date    EYE SURGERY      HYSTERECTOMY      TONSILLECTOMY         Family History   Problem Relation Age of Onset    Stroke Mother     Stroke Father     Heart disease Daughter     Substance Abuse Neg Hx     Mental illness Neg Hx      I have reviewed and agree with the history as documented.    E-Cigarette/Vaping    E-Cigarette Use Never User      E-Cigarette/Vaping Substances    Nicotine No     THC No     CBD No     Flavoring No     Other No     Unknown No      Social History     Tobacco Use    Smoking status: Never     Passive exposure: Never    Smokeless tobacco: Never   Vaping Use    Vaping status: Never Used   Substance Use Topics    Alcohol use: Not Currently     Comment: rarely    Drug use: No       Review of Systems   Constitutional:  Negative for chills and fever.   HENT:  Negative for rhinorrhea and sore throat.    Respiratory:  Negative for shortness of breath.    Cardiovascular:  Negative for chest pain.   Gastrointestinal:  Negative for constipation, diarrhea, nausea and vomiting.    Genitourinary:  Negative for dysuria and frequency.   Skin:  Negative for rash.   All other systems reviewed and are negative.      Physical Exam  Physical Exam  Vitals and nursing note reviewed.   Constitutional:       Appearance: She is well-developed. She is ill-appearing.   HENT:      Head: Normocephalic and atraumatic.      Right Ear: External ear normal.      Left Ear: External ear normal.      Nose: Nose normal.   Eyes:      Conjunctiva/sclera: Conjunctivae normal.      Pupils: Pupils are equal, round, and reactive to light.   Cardiovascular:      Rate and Rhythm: Normal rate and regular rhythm.      Heart sounds: Normal heart sounds.   Pulmonary:      Effort: Pulmonary effort is normal. No respiratory distress.      Breath sounds: Examination of the right-middle field reveals decreased breath sounds. Examination of the left-middle field reveals decreased breath sounds. Examination of the right-lower field reveals decreased breath sounds and rales. Examination of the left-lower field reveals decreased breath sounds and rales. Decreased breath sounds and rales present. No wheezing.   Abdominal:      General: Bowel sounds are normal. There is no distension.      Palpations: Abdomen is soft.      Tenderness: There is no abdominal tenderness.   Musculoskeletal:         General: Swelling present. No deformity. Normal range of motion.      Cervical back: Normal range of motion and neck supple. No spinous process tenderness.      Right lower le+ Edema present.      Left lower le+ Edema present.      Right foot: Swelling present. Normal pulse.      Left foot: Swelling present. Normal pulse.   Skin:     General: Skin is warm and dry.      Findings: No erythema or rash.   Neurological:      General: No focal deficit present.      Mental Status: She is alert.      GCS: GCS eye subscore is 4. GCS verbal subscore is 5. GCS motor subscore is 6.      Sensory: Sensory deficit present.   Psychiatric:         Mood  and Affect: Mood normal.         Vital Signs  ED Triage Vitals   Temperature Pulse Respirations Blood Pressure SpO2   09/09/24 1525 09/09/24 1525 09/09/24 1525 09/09/24 1525 09/09/24 1525   98.7 °F (37.1 °C) 80 22 165/72 96 %      Temp Source Heart Rate Source Patient Position - Orthostatic VS BP Location FiO2 (%)   09/09/24 1525 09/09/24 1525 09/09/24 1656 09/09/24 1525 --   Temporal Monitor Lying Right arm       Pain Score       09/09/24 1525       No Pain           Vitals:    09/09/24 1525 09/09/24 1656   BP: 165/72 (!) 196/88   Pulse: 80 83   Patient Position - Orthostatic VS:  Lying         Visual Acuity      ED Medications  Medications   furosemide (LASIX) injection 40 mg (40 mg Intravenous Given 9/9/24 1551)       Diagnostic Studies  Results Reviewed       Procedure Component Value Units Date/Time    HS Troponin I 4hr [[id number]]     Lab Status: No result Specimen: Blood     HS Troponin I 2hr [[id number]]     Lab Status: No result Specimen: Blood     HS Troponin 0hr (reflex protocol) [[id number]]  (Normal) Collected: 09/09/24 1542    Lab Status: Final result Specimen: Blood from Arm, Left Updated: 09/09/24 1608     hs TnI 0hr 9 ng/L     B-Type Natriuretic Peptide(BNP) [[id number]]  (Normal) Collected: 09/09/24 1542    Lab Status: Final result Specimen: Blood from Arm, Left Updated: 09/09/24 1608     BNP 93 pg/mL     Comprehensive metabolic panel [[id number]]  (Abnormal) Collected: 09/09/24 1542    Lab Status: Final result Specimen: Blood from Arm, Left Updated: 09/09/24 1604     Sodium 141 mmol/L      Potassium 3.5 mmol/L      Chloride 104 mmol/L      CO2 29 mmol/L      ANION GAP 8 mmol/L      BUN 19 mg/dL      Creatinine 0.86 mg/dL      Glucose 113 mg/dL      Calcium 8.8 mg/dL      AST 14 U/L      ALT 5 U/L      Alkaline Phosphatase 68 U/L      Total Protein 6.3 g/dL      Albumin 3.5 g/dL      Total Bilirubin 0.31 mg/dL      eGFR 58 ml/min/1.73sq m     Narrative:      [employer]  guidelines for Chronic Kidney Disease (CKD):     Stage 1 with normal or high GFR (GFR > 90 mL/min/1.73 square meters)    Stage 2 Mild CKD (GFR = 60-89 mL/min/1.73 square meters)    Stage 3A Moderate CKD (GFR = 45-59 mL/min/1.73 square meters)    Stage 3B Moderate CKD (GFR = 30-44 mL/min/1.73 square meters)    Stage 4 Severe CKD (GFR = 15-29 mL/min/1.73 square meters)    Stage 5 End Stage CKD (GFR <15 mL/min/1.73 square meters)  Note: GFR calculation is accurate only with a steady state creatinine    CBC and differential [[id number]]  (Abnormal) Collected: 09/09/24 1542    Lab Status: Final result Specimen: Blood from Arm, Left Updated: 09/09/24 1546     WBC 8.41 Thousand/uL      RBC 4.13 Million/uL      Hemoglobin 11.7 g/dL      Hematocrit 38.0 %      MCV 92 fL      MCH 28.3 pg      MCHC 30.8 g/dL      RDW 14.3 %      MPV 9.3 fL      Platelets 206 Thousands/uL      nRBC 0 /100 WBCs      Segmented % 66 %      Immature Grans % 1 %      Lymphocytes % 22 %      Monocytes % 9 %      Eosinophils Relative 2 %      Basophils Relative 0 %      Absolute Neutrophils 5.55 Thousands/µL      Absolute Immature Grans 0.04 Thousand/uL      Absolute Lymphocytes 1.85 Thousands/µL      Absolute Monocytes 0.77 Thousand/µL      Eosinophils Absolute 0.17 Thousand/µL      Basophils Absolute 0.03 Thousands/µL                    X-ray chest 1 view portable   ED Interpretation by Hira Hale DO (09/09 1612)   No acute abnl, no ptx, effusion, infiltrate, no obvious rib fracture, no widened mediastinum.  Interpreted by me                         Procedures  Procedures         ED Course                                               Medical Decision Making  Diff includes acute CHF, anasarca, peripheral edema, failure to thrive, will check for renal insufficiency, coronary syndrome, less likely PE.  Also consider ambulatory dysfunction    Amount and/or Complexity of Data Reviewed  Labs: ordered.  Radiology: ordered and independent interpretation  performed.    Risk  Prescription drug management.  Decision regarding hospitalization.                 Disposition  Final diagnoses:   Anasarca   Peripheral edema   Dyspnea   Ambulatory dysfunction   CHF (congestive heart failure) (HCC)     Time reflects when diagnosis was documented in both MDM as applicable and the Disposition within this note       Time User Action Codes Description Comment    9/9/2024  4:45 PM Hira Hale [R60.1] Anasarca     9/9/2024  4:45 PM Hira Hale [R60.0] Peripheral edema     9/9/2024  4:46 PM Hira Hale [R06.00] Dyspnea     9/9/2024  4:46 PM Hira Hale [R26.2] Ambulatory dysfunction     9/9/2024  4:46 PM Hira Hale [I50.9] CHF (congestive heart failure) ([facility])           ED Disposition       ED Disposition   Admit    Condition   Stable    Date/Time   Mon Sep 9, 2024  4:45 PM    Comment   Case was discussed with Domingo and the patient's admission status was agreed to be Admission Status: observation status to the service of Dr. Lane .               Follow-up Information    None         Patient's Medications   Discharge Prescriptions    No medications on file       No discharge procedures on file.    PDMP Review         Value Time User    PDMP Reviewed  Yes 5/7/2024  1:16 PM Cheng Lane MD            ED Provider  Electronically Signed by             Hira Hale DO  09/09/24 5823

## 2024-09-09 NOTE — ASSESSMENT & PLAN NOTE
Chronically maintained on macrobid 100 mg daily as prophylaxis for UTI however this is contraindicated based on her renal function per pharmacy  Will HOLD for now as we will be diuresing  Recommend outpatient follow up with urology for further adjustment  Not meeting SIRS, no urinary sx currently

## 2024-09-09 NOTE — ASSESSMENT & PLAN NOTE
b/l lower extremity edema blisters present. treated for lower extremity cellulitis 2 weeks ago no improvement. Both legs are very painful and swollen, has increased shortness of breath and wheezing. She has a Hx of CHF. Patient was sent to ED for evaluation of CHF exacerbation.

## 2024-09-09 NOTE — PROGRESS NOTES
Ambulatory Visit  Name: Janice Yung      : 1930      MRN: [id number]  Encounter Provider: REAL Coronado  Encounter Date: 2024   Encounter department: [facility]    Assessment & Plan   1. Medicare annual wellness visit, subsequent     Preventive health issues were discussed with patient, and age appropriate screening tests were ordered as noted in patient's After Visit Summary. Personalized health advice and appropriate referrals for health education or preventive services given if needed, as noted in patient's After Visit Summary.    History of Present Illness   {Disappearing Hyperlinks I Encounters * My Last Note * Since Last Visit * History :24848}  HPI   Patient Care Team:  Edgar Mustafa MD as PCP - General (Family Medicine)    Review of Systems  Medical History Reviewed by provider this encounter:       Annual Wellness Visit Questionnaire   Janice is here for her Subsequent Wellness visit. Last Medicare Wellness visit information reviewed, patient interviewed and updates made to the record today.      Health Risk Assessment:   Patient rates overall health as fair. Patient feels that their physical health rating is slightly worse. Patient is satisfied with their life. Eyesight was rated as slightly worse. Hearing was rated as slightly worse. Patient feels that their emotional and mental health rating is slightly worse. Patients states they are sometimes angry. Patient states they are often unusually tired/fatigued. Pain experienced in the last 7 days has been some. Patient's pain rating has been 5/10. Patient states that she has experienced weight loss or gain in last 6 months.     Depression Screening:   PHQ-2 Score: 1      Fall Risk Screening:   In the past year, patient has experienced: no history of falling in past year      Urinary Incontinence Screening:   Patient has leaked urine accidently in the last six months.     Home Safety:  Patient has trouble  with stairs inside or outside of their home. Patient has working smoke alarms and has working carbon monoxide detector. Home safety hazards include: none.     Nutrition:   Current diet is Regular.     Medications:   Patient is not currently taking any over-the-counter supplements. Patient is able to manage medications.     Activities of Daily Living (ADLs)/Instrumental Activities of Daily Living (IADLs):   Walk and transfer into and out of bed and chair?: Yes  Dress and groom yourself?: Yes    Bathe or shower yourself?: Yes    Feed yourself? Yes  Do your laundry/housekeeping?: Yes  Manage your money, pay your bills and track your expenses?: Yes  Make your own meals?: Yes    Do your own shopping?: Yes    Previous Hospitalizations:   Any hospitalizations or ED visits within the last 12 months?: Yes    How many hospitalizations have you had in the last year?: 1-2    PREVENTIVE SCREENINGS      Cardiovascular Screening:    General: Screening Not Indicated and History Lipid Disorder      Diabetes Screening:     General: Screening Current      Colorectal Cancer Screening:     General: Screening Not Indicated      Cervical Cancer Screening:    General: Screening Not Indicated      Lung Cancer Screening:     General: Screening Not Indicated    Screening, Brief Intervention, and Referral to Treatment (SBIRT)    Screening  Typical number of drinks in a day: 0  Typical number of drinks in a week: 0  Interpretation: Low risk drinking behavior.    Single Item Drug Screening:  How often have you used an illegal drug (including marijuana) or a prescription medication for non-medical reasons in the past year? never    Single Item Drug Screen Score: 0  Interpretation: Negative screen for possible drug use disorder    Social Determinants of Health     Financial Resource Strain: Low Risk  (8/28/2023)    Overall Financial Resource Strain (CARDI)     Difficulty of Paying Living Expenses: Not very hard   Food Insecurity: No Food  "Insecurity (9/9/2024)    Hunger Vital Sign     Worried About Running Out of Food in the Last Year: Never true     Ran Out of Food in the Last Year: Never true   Transportation Needs: No Transportation Needs (9/9/2024)    PRAPARE - Transportation     Lack of Transportation (Medical): No     Lack of Transportation (Non-Medical): No   Housing Stability: Low Risk  (9/9/2024)    Housing Stability Vital Sign     Unable to Pay for Housing in the Last Year: No     Number of Times Moved in the Last Year: 0     Homeless in the Last Year: No   Utilities: Not At Risk (9/9/2024)    [facility] Utilities     Threatened with loss of utilities: No     No results found.    Objective   {Disappearing Hyperlinks   Review Vitals * Enter New Vitals * Results Review * Labs * Imaging * Cardiology * Procedures * Lung Cancer Screening :03802}  /82   Pulse 68   Temp (!) 96.4 °F (35.8 °C)   Ht 5' 0.5\" (1.537 m)   Wt 74.4 kg (164 lb)   SpO2 98%   BMI 31.50 kg/m²     Physical Exam  {Administrative / Billing Section (Optional):47854}    "

## 2024-09-09 NOTE — H&P
[facility]  H&P  Name: Janice Yung 89 y.o. female I MRN: [id number]  Unit/Bed#: -[facility] I Date of Admission: 9/9/2024   Date of Service: 9/9/2024 I Hospital Day: 0      Assessment & Plan   * Acute on chronic diastolic heart failure (HCC)  Assessment & Plan  Wt Readings from Last 3 Encounters:   09/09/24 74.4 kg (164 lb)   09/09/24 74.4 kg (164 lb)   07/10/24 72.1 kg (159 lb)     Presents with 2 day hx of acute on chronic bilateral LE edema + increased SOB/BRIAN and audible wheeze per daughter  Stable on RA on admission however with significant pitting edema    CXR pending final read, appears mildly congested  BNP 98   Prior Echo 6/2023: EF 60-65%  Home diuretic: torsemide 20 mg BID (daughter denies missed doses)  Unclear baseline weight (159 lbs during outpatient visit in July 2024)  165 lbs on admission   S/P IV 40 mg x 1 in ED  Consult cardiology  Additional IV lasix 40 mg tonight, start IV lasix 80 mg BID tomorrow   Strict I/O + daily weights   Venous duplex to r/o DVT  Ace wrap for compression   Update Echo  Salt and fluid restriction     Stage 3a chronic kidney disease (HCC)  Assessment & Plan  Lab Results   Component Value Date    EGFR 58 09/09/2024    EGFR 50 05/07/2024    EGFR 45 05/06/2024    CREATININE 0.86 09/09/2024    CREATININE 0.97 05/07/2024    CREATININE 1.06 05/06/2024     Cr is currently at baseline   Monitor BMP closely with diuresis    Paroxysmal atrial fibrillation (HCC)  Assessment & Plan  Not on AC at baseline, aspirin only   Not on any BB  NSR on admission     History of recurrent UTI (urinary tract infection)  Assessment & Plan  Chronically maintained on macrobid 100 mg daily as prophylaxis for UTI however this is contraindicated based on her renal function per pharmacy  Will HOLD for now as we will be diuresing  Recommend outpatient follow up with urology for further adjustment  Not meeting SIRS, no urinary sx currently     Mixed hyperlipidemia  Assessment &  Plan  Continue statin    Gastroesophageal reflux disease without esophagitis  Assessment & Plan  Continue PPI    Hypertension  Assessment & Plan  Monitor SBP on IV diuresis   Holding home torsemide   Elevated in ED           VTE Pharmacologic Prophylaxis:   Moderate Risk (Score 3-4) - Pharmacological DVT Prophylaxis Ordered: heparin.  Code Status: Level 3 - DNAR and DNI   Discussion with family: Updated  (daughter) at bedside.    Anticipated Length of Stay: Patient will be admitted on an observation basis with an anticipated length of stay of less than 2 midnights secondary to IV diuresis.    Total Time Spent on Date of Encounter in care of patient: 60 mins. This time was spent on one or more of the following: performing physical exam; counseling and coordination of care; obtaining or reviewing history; documenting in the medical record; reviewing/ordering tests, medications or procedures; communicating with other healthcare professionals and discussing with patient's family/caregivers.    Chief Complaint: increased LE edema + worsening SOB    History of Present Illness:  Janice Yung is a 89 y.o. female with a PMH of CKD, chronic diastolic CHF, HLD, HTN, PAF not on AC, GERD, recurrent UTI who presents with worsening SOB and acute on chronic pitting edema of bilateral LE. Per daughter patient chronically has some deconditioning/SOB after exertion however patient has been increasingly winded with minimal activity (ie. getting dressed). Daughter also notes audible wheeze which is new for pt, no hx of COPD or tobacco use. Patient does have chronic LE edema however daughter notes this is significantly worse than baseline as edema is extended beyond knees and patient appears to have blistering of skin on anterior shins. No hx of blood clots or missed torsemide doses. Patient herself denies CP, fevers/chills, abdominal sx, urinary sx, or other complaints. Stable on RA on admission.     Review of  Systems:  Review of Systems   Constitutional:  Positive for activity change and fatigue. Negative for chills and fever.   HENT:  Negative for congestion, rhinorrhea and sore throat.    Eyes:  Negative for visual disturbance.   Respiratory:  Positive for chest tightness, shortness of breath and wheezing. Negative for cough.    Cardiovascular:  Positive for leg swelling. Negative for chest pain and palpitations.   Gastrointestinal:  Negative for abdominal pain, constipation, diarrhea, nausea and vomiting.   Genitourinary:  Negative for difficulty urinating, dysuria, frequency, hematuria and urgency.   Musculoskeletal:  Negative for arthralgias, back pain and myalgias.   Skin:  Negative for rash and wound.   Neurological:  Negative for dizziness, light-headedness and headaches.   Psychiatric/Behavioral:          Baseline with mild dementia and poor hearing    All other systems reviewed and are negative.       Past Medical and Surgical History:   Past Medical History:   Diagnosis Date    SHERLYN (acute kidney injury) ([facility]) 05/08/2022    Balance disorder     CHF (congestive heart failure) ([facility])     Chronic pain     GERD (gastroesophageal reflux disease)     Hard of hearing     Hyperlipidemia     Hypertension     Hyponatremia 12/05/2020    Low serum cortisol level 12/07/2020    Neuropathy     Pneumonia     Pneumonia due to COVID-19 virus 12/05/2020    Sepsis ([facility]) 05/22/2018    Tinnitus     Unspecified adrenocortical insufficiency ([facility]) 02/28/2023    Felt to be related to COVID-19 infection.  No current symptoms.    UTI (urinary tract infection)        Past Surgical History:   Procedure Laterality Date    EYE SURGERY      HYSTERECTOMY      TONSILLECTOMY         Meds/Allergies:  Prior to Admission medications    Medication Sig Start Date End Date Taking? Authorizing Provider   Artificial Tears 0.2-0.2-1 % SOLN Administer 1 drop to both eyes 3 (three) times a day 6/30/23   Historical Provider, MD   aspirin (ECOTRIN LOW  STRENGTH) 81 mg EC tablet Take 1 tablet (81 mg total) by mouth daily 12/11/18   Kenneth Castillo MD   atorvastatin (LIPITOR) 10 mg tablet Take 1 tablet (10 mg total) by mouth daily 5/13/24   REAL Coronado   Cholecalciferol (VITAMIN D3) 1000 units CAPS Take 1,000 Units by mouth daily     Historical Provider, MD   Cyanocobalamin (VITAMIN B12 PO) Take 1,000 mcg by mouth in the morning    Historical Provider, MD   gabapentin (NEURONTIN) 600 MG tablet Take 1 tablet (600 mg total) by mouth 2 (two) times a day 5/13/24   REAL Coronado   Mirabegron ER (Myrbetriq) 25 MG TB24 TAKE 1 TABLET BY MOUTH IN THE MORNING 4/8/24   REAL La   Multiple Vitamins-Minerals (PRESERVISION AREDS PO) Take 1 tablet by mouth 2 (two) times a day  Patient not taking: Reported on 9/9/2024    Historical Provider, MD   neomycin-polymyxin-hydrocortisone (CORTISPORIN) 0.35%-10,000 units/mL-1% otic suspension Administer 4 drops to the right ear every 6 (six) hours for 5 days  Patient not taking: Reported on 5/10/2024 6/25/23 5/4/24  Cheng Lane MD   nitrofurantoin (MACROBID) 100 mg capsule Take 1 capsule by mouth in the morning 6/17/24   Marcie Heredia PA-C   omeprazole (PriLOSEC) 20 mg delayed release capsule Take 1 capsule by mouth once daily 8/13/24   REAL Coronado   Saccharomyces boulardii (Probiotic) 250 MG CAPS pt reports not taking   Patient not taking: Reported on 5/13/2024 7/7/23   Historical Provider, MD   torsemide (DEMADEX) 20 mg tablet Take 1 tablet by mouth twice daily 8/13/24   REAL Coronado     I have reviewed home medications with patient family member.    Allergies: No Known Allergies    Social History:  Marital Status:    Occupation: none   Patient Pre-hospital Living Situation: Home, With other family member: daughter   Patient Pre-hospital Level of Mobility: walks with walker  Patient Pre-hospital Diet Restrictions: Dysphagia hx in the past   Substance Use History:   Social  "History     Substance and Sexual Activity   Alcohol Use Not Currently    Comment: rarely     Social History     Tobacco Use   Smoking Status Never    Passive exposure: Never   Smokeless Tobacco Never     Social History     Substance and Sexual Activity   Drug Use No       Family History:  Family History   Problem Relation Age of Onset    Stroke Mother     Stroke Father     Heart disease Daughter     Substance Abuse Neg Hx     Mental illness Neg Hx        Physical Exam:     Vitals:   Blood Pressure: (!) 196/88 (09/09/24 1656)  Pulse: 83 (09/09/24 1656)  Temperature: 98.7 °F (37.1 °C) (09/09/24 1525)  Temp Source: Temporal (09/09/24 1525)  Respirations: 18 (09/09/24 1656)  Height: 5' 0.05\" (152.5 cm) (09/09/24 1525)  Weight - Scale: 74.4 kg (164 lb) (09/09/24 1525)  SpO2: 98 % (09/09/24 1656)    Physical Exam  Vitals and nursing note reviewed.   Constitutional:       General: She is not in acute distress.     Appearance: She is well-developed. She is obese. She is ill-appearing.   HENT:      Head: Normocephalic and atraumatic.   Eyes:      General:         Right eye: No discharge.         Left eye: No discharge.      Extraocular Movements: Extraocular movements intact.      Conjunctiva/sclera: Conjunctivae normal.   Cardiovascular:      Rate and Rhythm: Normal rate and regular rhythm.      Heart sounds: No murmur heard.  Pulmonary:      Effort: Pulmonary effort is normal. No respiratory distress.      Breath sounds: Wheezing and rales present. No rhonchi.      Comments: Stable on RA, no increased WOB or tachypnea, does have rales with mild cardiac wheeze on expiration  Abdominal:      General: Bowel sounds are normal. There is no distension.      Palpations: Abdomen is soft.      Tenderness: There is no abdominal tenderness.   Musculoskeletal:      Cervical back: Neck supple.      Right lower leg: Edema present.      Left lower leg: Edema present.      Comments: Severe bilateral pitting edema    Skin:     General: " "Skin is warm and dry.      Capillary Refill: Capillary refill takes less than 2 seconds.   Neurological:      General: No focal deficit present.      Mental Status: She is alert. Mental status is at baseline.      Cranial Nerves: No cranial nerve deficit.   Psychiatric:         Mood and Affect: Mood normal.         Behavior: Behavior normal.          Additional Data:     Lab Results:  Results from last 7 days   Lab Units 09/09/24  1542   WBC Thousand/uL 8.41   HEMOGLOBIN g/dL 11.7   HEMATOCRIT % 38.0   PLATELETS Thousands/uL 206   SEGS PCT % 66   LYMPHO PCT % 22   MONO PCT % 9   EOS PCT % 2     Results from last 7 days   Lab Units 09/09/24  1542   SODIUM mmol/L 141   POTASSIUM mmol/L 3.5   CHLORIDE mmol/L 104   CO2 mmol/L 29   BUN mg/dL 19   CREATININE mg/dL 0.86   ANION GAP mmol/L 8   CALCIUM mg/dL 8.8   ALBUMIN g/dL 3.5   TOTAL BILIRUBIN mg/dL 0.31   ALK PHOS U/L 68   ALT U/L 5*   AST U/L 14   GLUCOSE RANDOM mg/dL 113             No results found for: \"HGBA1C\"        Lines/Drains:  Invasive Devices       Peripheral Intravenous Line  Duration             Peripheral IV 09/09/24 Left;Proximal;Ventral (anterior) Forearm <1 day                        Imaging: Personally reviewed the following imaging: chest xray  X-ray chest 1 view portable   ED Interpretation by Hira Hale DO (09/09 1612)   No acute abnl, no ptx, effusion, infiltrate, no obvious rib fracture, no widened mediastinum.  Interpreted by me               VAS VENOUS DUPLEX - LOWER LIMB BILATERAL    (Results Pending)       EKG and Other Studies Reviewed on Admission:   EKG: Personally Reviewed. NSR. HR 75, known LBBB.    ** Please Note: This note has been constructed using a voice recognition system. **    "

## 2024-09-09 NOTE — ASSESSMENT & PLAN NOTE
Wt Readings from Last 3 Encounters:   09/09/24 74.4 kg (164 lb)   09/09/24 74.4 kg (164 lb)   07/10/24 72.1 kg (159 lb)     Presents with 2 day hx of acute on chronic bilateral LE edema + increased SOB/BRIAN and audible wheeze per daughter  Stable on RA on admission however with significant pitting edema    CXR pending final read, appears mildly congested  BNP 98   Prior Echo 6/2023: EF 60-65%  Home diuretic: torsemide 20 mg BID (daughter denies missed doses)  Unclear baseline weight (159 lbs during outpatient visit in July 2024)  165 lbs on admission   S/P IV 40 mg x 1 in ED  Consult cardiology  Additional IV lasix 40 mg tonight, start IV lasix 80 mg BID tomorrow   Strict I/O + daily weights   Venous duplex to r/o DVT  Ace wrap for compression   Update Echo  Salt and fluid restriction

## 2024-09-09 NOTE — ASSESSMENT & PLAN NOTE
Lab Results   Component Value Date    EGFR 58 09/09/2024    EGFR 50 05/07/2024    EGFR 45 05/06/2024    CREATININE 0.86 09/09/2024    CREATININE 0.97 05/07/2024    CREATININE 1.06 05/06/2024     Cr is currently at baseline   Monitor BMP closely with diuresis

## 2024-09-09 NOTE — PLAN OF CARE
Problem: Potential for Falls  Goal: Patient will remain free of falls  Description: INTERVENTIONS:  - Educate patient/family on patient safety including physical limitations  - Instruct patient to call for assistance with activity   - Consult OT/PT to assist with strengthening/mobility   - Keep Call bell within reach  - Keep bed low and locked with side rails adjusted as appropriate  - Keep care items and personal belongings within reach  - Initiate and maintain comfort rounds  - Make Fall Risk Sign visible to staff  - Offer Toileting every 2 Hours, in advance of need  - Initiate/Maintain bed alarm  - Obtain necessary fall risk management equipment: RW  - Apply yellow socks and bracelet for high fall risk patients  - Consider moving patient to room near nurses station  Outcome: Progressing     Problem: PAIN - ADULT  Goal: Verbalizes/displays adequate comfort level or baseline comfort level  Description: Interventions:  - Encourage patient to monitor pain and request assistance  - Assess pain using appropriate pain scale  - Administer analgesics based on type and severity of pain and evaluate response  - Implement non-pharmacological measures as appropriate and evaluate response  - Consider cultural and social influences on pain and pain management  - Notify physician/advanced practitioner if interventions unsuccessful or patient reports new pain  Outcome: Progressing

## 2024-09-09 NOTE — PROGRESS NOTES
Ambulatory Visit  Name: Janice Yung      : 1930      MRN: [id number]  Encounter Provider: REAL Coronado  Encounter Date: 2024   Encounter department: [facility]    Assessment & Plan   1. Bilateral leg edema  Assessment & Plan:  b/l lower extremity edema blisters present. treated for lower extremity cellulitis 2 weeks ago no improvement. Both legs are very painful and swollen, has increased shortness of breath and wheezing. She has a Hx of CHF. Patient was sent to ED for evaluation of CHF exacerbation.  2. Acute on chronic diastolic heart failure (HCC)       History of Present Illness     89 y.o. female with history of chf, afib  presents with b/l lower extremity edema. She was treated for lower extremity cellulitis 2 weeks ago. Both legs are very painful and swollen, has increased shortness of breath and wheezing. Denies chest pain. She takes torsemide bid. She is here with her daughter.        Review of Systems   Constitutional:  Positive for activity change. Negative for diaphoresis and fever.   Eyes:  Negative for visual disturbance.   Respiratory:  Positive for shortness of breath and wheezing. Negative for cough and chest tightness.    Cardiovascular:  Positive for leg swelling. Negative for chest pain.   Gastrointestinal:  Negative for abdominal pain and nausea.   Genitourinary:  Negative for difficulty urinating.   Neurological:  Negative for dizziness and headaches.   Psychiatric/Behavioral:  Negative for dysphoric mood.      No current facility-administered medications on file prior to visit.     Current Outpatient Medications on File Prior to Visit   Medication Sig Dispense Refill    Artificial Tears 0.2-0.2-1 % SOLN Administer 1 drop to both eyes 3 (three) times a day      aspirin (ECOTRIN LOW STRENGTH) 81 mg EC tablet Take 1 tablet (81 mg total) by mouth daily  0    atorvastatin (LIPITOR) 10 mg tablet Take 1 tablet (10 mg total) by mouth daily 90  "tablet 1    Cholecalciferol (VITAMIN D3) 1000 units CAPS Take 1,000 Units by mouth daily       Cyanocobalamin (VITAMIN B12 PO) Take 1,000 mcg by mouth in the morning      gabapentin (NEURONTIN) 600 MG tablet Take 1 tablet (600 mg total) by mouth 2 (two) times a day 180 tablet 0    Mirabegron ER (Myrbetriq) 25 MG TB24 TAKE 1 TABLET BY MOUTH IN THE MORNING 30 tablet 5    Multiple Vitamins-Minerals (PRESERVISION AREDS PO) Take 1 tablet by mouth 2 (two) times a day (Patient not taking: Reported on 9/9/2024)      neomycin-polymyxin-hydrocortisone (CORTISPORIN) 0.35%-10,000 units/mL-1% otic suspension Administer 4 drops to the right ear every 6 (six) hours for 5 days (Patient not taking: Reported on 5/10/2024) 10 mL 0    nitrofurantoin (MACROBID) 100 mg capsule Take 1 capsule by mouth in the morning 90 capsule 0    omeprazole (PriLOSEC) 20 mg delayed release capsule Take 1 capsule by mouth once daily 90 capsule 1    Saccharomyces boulardii (Probiotic) 250 MG CAPS pt reports not taking  (Patient not taking: Reported on 5/13/2024)      torsemide (DEMADEX) 20 mg tablet Take 1 tablet by mouth twice daily 60 tablet 5      Objective     /82   Pulse 68   Temp (!) 96.4 °F (35.8 °C)   Ht 5' 0.5\" (1.537 m)   Wt 74.4 kg (164 lb)   SpO2 98%   BMI 31.50 kg/m²     Physical Exam  Constitutional:       General: She is not in acute distress.     Appearance: She is ill-appearing. She is not diaphoretic.   HENT:      Head: Normocephalic.   Eyes:      Extraocular Movements: Extraocular movements intact.   Pulmonary:      Effort: No respiratory distress.      Breath sounds: Wheezing present.   Musculoskeletal:      Cervical back: Normal range of motion.      Right lower leg: Edema present.      Left lower leg: Edema present.   Skin:     General: Skin is warm and dry.   Neurological:      Mental Status: She is oriented to person, place, and time.      Motor: Weakness (generalized) present.   Psychiatric:         Mood and Affect: " Mood normal.         Behavior: Behavior normal.

## 2024-09-10 ENCOUNTER — APPOINTMENT (OUTPATIENT)
Dept: NON INVASIVE DIAGNOSTICS | Facility: HOSPITAL | Age: 89
DRG: 291 | End: 2024-09-10
Payer: MEDICARE

## 2024-09-10 DIAGNOSIS — R60.0 BILATERAL LEG EDEMA: Primary | ICD-10-CM

## 2024-09-10 LAB
ALBUMIN SERPL BCG-MCNC: 3.5 G/DL (ref 3.5–5)
ALP SERPL-CCNC: 76 U/L (ref 34–104)
ALT SERPL W P-5'-P-CCNC: 6 U/L (ref 7–52)
ANION GAP SERPL CALCULATED.3IONS-SCNC: 3 MMOL/L (ref 4–13)
AORTIC ROOT: 3 CM
AORTIC VALVE MEAN VELOCITY: 10.6 M/S
APICAL FOUR CHAMBER EJECTION FRACTION: 64 %
ASCENDING AORTA: 2.5 CM
AST SERPL W P-5'-P-CCNC: 15 U/L (ref 13–39)
AV AREA BY CONTINUOUS VTI: 2.5 CM2
AV AREA PEAK VELOCITY: 2.2 CM2
AV LVOT MEAN GRADIENT: 1 MMHG
AV LVOT PEAK GRADIENT: 3 MMHG
AV MEAN GRADIENT: 5 MMHG
AV PEAK GRADIENT: 10 MMHG
AV VALVE AREA: 2.47 CM2
AV VELOCITY RATIO: 0.58
BASOPHILS # BLD AUTO: 0.03 THOUSANDS/ÂΜL (ref 0–0.1)
BASOPHILS NFR BLD AUTO: 0 % (ref 0–1)
BILIRUB SERPL-MCNC: 0.52 MG/DL (ref 0.2–1)
BSA FOR ECHO PROCEDURE: 1.71 M2
BUN SERPL-MCNC: 16 MG/DL (ref 5–25)
CALCIUM SERPL-MCNC: 9.3 MG/DL (ref 8.4–10.2)
CHLORIDE SERPL-SCNC: 101 MMOL/L (ref 96–108)
CO2 SERPL-SCNC: 41 MMOL/L (ref 21–32)
CREAT SERPL-MCNC: 0.83 MG/DL (ref 0.6–1.3)
DOP CALC AO PEAK VEL: 1.56 M/S
DOP CALC AO VTI: 32.32 CM
DOP CALC LVOT AREA: 3.8 CM2
DOP CALC LVOT CARDIAC INDEX: 3.73 L/MIN/M2
DOP CALC LVOT CARDIAC OUTPUT: 6.37 L/MIN
DOP CALC LVOT DIAMETER: 2.2 CM
DOP CALC LVOT PEAK VEL VTI: 20.98 CM
DOP CALC LVOT PEAK VEL: 0.91 M/S
DOP CALC LVOT STROKE INDEX: 46.2 ML/M2
DOP CALC LVOT STROKE VOLUME: 79.71
E WAVE DECELERATION TIME: 157 MS
E/A RATIO: 0.94
EOSINOPHIL # BLD AUTO: 0.17 THOUSAND/ÂΜL (ref 0–0.61)
EOSINOPHIL NFR BLD AUTO: 2 % (ref 0–6)
ERYTHROCYTE [DISTWIDTH] IN BLOOD BY AUTOMATED COUNT: 14.4 % (ref 11.6–15.1)
FRACTIONAL SHORTENING: 25 (ref 28–44)
GFR SERPL CREATININE-BSD FRML MDRD: 60 ML/MIN/1.73SQ M
GLUCOSE SERPL-MCNC: 94 MG/DL (ref 65–140)
HCT VFR BLD AUTO: 37.1 % (ref 34.8–46.1)
HGB BLD-MCNC: 11.7 G/DL (ref 11.5–15.4)
IMM GRANULOCYTES # BLD AUTO: 0.03 THOUSAND/UL (ref 0–0.2)
IMM GRANULOCYTES NFR BLD AUTO: 0 % (ref 0–2)
INTERVENTRICULAR SEPTUM IN DIASTOLE (PARASTERNAL SHORT AXIS VIEW): 1.3 CM
INTERVENTRICULAR SEPTUM: 1.3 CM (ref 0.6–1.1)
LAAS-AP2: 14.5 CM2
LAAS-AP4: 19.3 CM2
LEFT ATRIUM SIZE: 3.5 CM
LEFT ATRIUM VOLUME (MOD BIPLANE): 41 ML
LEFT ATRIUM VOLUME INDEX (MOD BIPLANE): 24 ML/M2
LEFT INTERNAL DIMENSION IN SYSTOLE: 1.8 CM (ref 2.1–4)
LEFT VENTRICLE DIASTOLIC VOLUME (MOD BIPLANE): 40 ML
LEFT VENTRICLE DIASTOLIC VOLUME INDEX (MOD BIPLANE): 23.4 ML/M2
LEFT VENTRICLE SYSTOLIC VOLUME (MOD BIPLANE): 15 ML
LEFT VENTRICLE SYSTOLIC VOLUME INDEX (MOD BIPLANE): 8.8 ML/M2
LEFT VENTRICULAR INTERNAL DIMENSION IN DIASTOLE: 2.4 CM (ref 3.5–6)
LEFT VENTRICULAR POSTERIOR WALL IN END DIASTOLE: 1 CM
LEFT VENTRICULAR STROKE VOLUME: 11 ML
LV EF: 63 %
LVSV (TEICH): 11 ML
LYMPHOCYTES # BLD AUTO: 1.74 THOUSANDS/ÂΜL (ref 0.6–4.47)
LYMPHOCYTES NFR BLD AUTO: 19 % (ref 14–44)
MCH RBC QN AUTO: 28.2 PG (ref 26.8–34.3)
MCHC RBC AUTO-ENTMCNC: 31.5 G/DL (ref 31.4–37.4)
MCV RBC AUTO: 89 FL (ref 82–98)
MONOCYTES # BLD AUTO: 1.04 THOUSAND/ÂΜL (ref 0.17–1.22)
MONOCYTES NFR BLD AUTO: 11 % (ref 4–12)
MV E'TISSUE VEL-LAT: 7 CM/S
MV E'TISSUE VEL-SEP: 3 CM/S
MV PEAK A VEL: 1.29 M/S
MV PEAK E VEL: 121 CM/S
MV STENOSIS PRESSURE HALF TIME: 46 MS
MV VALVE AREA P 1/2 METHOD: 4.78
NEUTROPHILS # BLD AUTO: 6.2 THOUSANDS/ÂΜL (ref 1.85–7.62)
NEUTS SEG NFR BLD AUTO: 68 % (ref 43–75)
NRBC BLD AUTO-RTO: 0 /100 WBCS
PLATELET # BLD AUTO: 196 THOUSANDS/UL (ref 149–390)
PMV BLD AUTO: 9.4 FL (ref 8.9–12.7)
POTASSIUM SERPL-SCNC: 3.5 MMOL/L (ref 3.5–5.3)
PROT SERPL-MCNC: 6.3 G/DL (ref 6.4–8.4)
RA PRESSURE ESTIMATED: 8 MMHG
RBC # BLD AUTO: 4.15 MILLION/UL (ref 3.81–5.12)
RIGHT ATRIUM AREA SYSTOLE A4C: 10.7 CM2
RIGHT VENTRICLE ID DIMENSION: 2.5 CM
RV PSP: 43 MMHG
SINOTUBULAR JUNCTION: 2.3 CM
SL CV LEFT ATRIUM LENGTH A2C: 5.6 CM
SL CV LV EF: 70
SL CV PED ECHO LEFT VENTRICLE DIASTOLIC VOLUME (MOD BIPLANE) 2D: 21 ML
SL CV PED ECHO LEFT VENTRICLE SYSTOLIC VOLUME (MOD BIPLANE) 2D: 10 ML
SL CV SINUS OF VALSALVA 2D: 3 CM
SODIUM SERPL-SCNC: 145 MMOL/L (ref 135–147)
STJ: 2.3 CM
TR MAX PG: 35 MMHG
TR PEAK VELOCITY: 3 M/S
TRICUSPID ANNULAR PLANE SYSTOLIC EXCURSION: 2.3 CM
TRICUSPID VALVE PEAK REGURGITATION VELOCITY: 2.97 M/S
WBC # BLD AUTO: 9.21 THOUSAND/UL (ref 4.31–10.16)

## 2024-09-10 PROCEDURE — 93306 TTE W/DOPPLER COMPLETE: CPT

## 2024-09-10 PROCEDURE — 92610 EVALUATE SWALLOWING FUNCTION: CPT

## 2024-09-10 PROCEDURE — 93306 TTE W/DOPPLER COMPLETE: CPT | Performed by: INTERNAL MEDICINE

## 2024-09-10 PROCEDURE — 97535 SELF CARE MNGMENT TRAINING: CPT

## 2024-09-10 PROCEDURE — 99232 SBSQ HOSP IP/OBS MODERATE 35: CPT

## 2024-09-10 PROCEDURE — 97167 OT EVAL HIGH COMPLEX 60 MIN: CPT

## 2024-09-10 PROCEDURE — 97116 GAIT TRAINING THERAPY: CPT

## 2024-09-10 PROCEDURE — 80053 COMPREHEN METABOLIC PANEL: CPT

## 2024-09-10 PROCEDURE — 97163 PT EVAL HIGH COMPLEX 45 MIN: CPT

## 2024-09-10 PROCEDURE — 99222 1ST HOSP IP/OBS MODERATE 55: CPT | Performed by: INTERNAL MEDICINE

## 2024-09-10 PROCEDURE — 93970 EXTREMITY STUDY: CPT | Performed by: SURGERY

## 2024-09-10 PROCEDURE — 85025 COMPLETE CBC W/AUTO DIFF WBC: CPT

## 2024-09-10 PROCEDURE — 93970 EXTREMITY STUDY: CPT

## 2024-09-10 RX ADMIN — HEPARIN SODIUM 5000 UNITS: 5000 INJECTION, SOLUTION INTRAVENOUS; SUBCUTANEOUS at 14:08

## 2024-09-10 RX ADMIN — GABAPENTIN 300 MG: 300 CAPSULE ORAL at 18:31

## 2024-09-10 RX ADMIN — HEPARIN SODIUM 5000 UNITS: 5000 INJECTION, SOLUTION INTRAVENOUS; SUBCUTANEOUS at 22:12

## 2024-09-10 RX ADMIN — HEPARIN SODIUM 5000 UNITS: 5000 INJECTION, SOLUTION INTRAVENOUS; SUBCUTANEOUS at 05:59

## 2024-09-10 RX ADMIN — EMPAGLIFLOZIN 10 MG: 10 TABLET, FILM COATED ORAL at 15:29

## 2024-09-10 RX ADMIN — ASPIRIN 81 MG: 81 TABLET, COATED ORAL at 08:10

## 2024-09-10 RX ADMIN — PANTOPRAZOLE SODIUM 40 MG: 40 TABLET, DELAYED RELEASE ORAL at 05:59

## 2024-09-10 RX ADMIN — FUROSEMIDE 80 MG: 10 INJECTION, SOLUTION INTRAMUSCULAR; INTRAVENOUS at 08:11

## 2024-09-10 RX ADMIN — GABAPENTIN 300 MG: 300 CAPSULE ORAL at 22:12

## 2024-09-10 RX ADMIN — ATORVASTATIN CALCIUM 10 MG: 10 TABLET, FILM COATED ORAL at 08:10

## 2024-09-10 RX ADMIN — DEXTRAN 70, GLYCERIN, HYPROMELLOSE 1 DROP: 1; 2; 3 SOLUTION/ DROPS OPHTHALMIC at 08:20

## 2024-09-10 RX ADMIN — GABAPENTIN 300 MG: 300 CAPSULE ORAL at 08:11

## 2024-09-10 RX ADMIN — FUROSEMIDE 80 MG: 10 INJECTION, SOLUTION INTRAMUSCULAR; INTRAVENOUS at 18:31

## 2024-09-10 RX ADMIN — DEXTRAN 70, GLYCERIN, HYPROMELLOSE 1 DROP: 1; 2; 3 SOLUTION/ DROPS OPHTHALMIC at 18:32

## 2024-09-10 NOTE — ASSESSMENT & PLAN NOTE
Lab Results   Component Value Date    EGFR 60 09/10/2024    EGFR 58 09/09/2024    EGFR 50 05/07/2024    CREATININE 0.83 09/10/2024    CREATININE 0.86 09/09/2024    CREATININE 0.97 05/07/2024     Cr is currently at baseline   Monitor BMP closely with diuresis

## 2024-09-10 NOTE — ASSESSMENT & PLAN NOTE
Wt Readings from Last 3 Encounters:   09/10/24 74 kg (163 lb 3.2 oz)   09/09/24 74.4 kg (164 lb)   07/10/24 72.1 kg (159 lb)     Presents with 2 day hx of acute on chronic bilateral LE edema + increased SOB/BRIAN and audible wheeze per daughter  Stable on RA on admission however with significant pitting edema    BNP 98   Prior Echo 6/2023: EF 60-65%  Home diuretic: torsemide 20 mg BID (daughter denies missed doses)  Unclear baseline weight (159 lbs during outpatient visit in July 2024)  165 lbs on admission   S/P IV 40 mg x 1 in ED  Consult cardiology  On IV lasix 80 mg BID --> edema significantly improved, hopeful transition to PO tomorrow  Strict I/O + daily weights --> (-1900 cc this am)  Venous duplex to r/o DVT  Ace wrap for compression   Update Echo  Salt and fluid restriction

## 2024-09-10 NOTE — OCCUPATIONAL THERAPY NOTE
Occupational Therapy Cx Note     Patient Name: Janice Yung  Today's Date: 9/10/2024  Problem List  Principal Problem:    Acute on chronic diastolic heart failure (HCC)  Active Problems:    Hypertension    Gastroesophageal reflux disease without esophagitis    Mixed hyperlipidemia    History of recurrent UTI (urinary tract infection)    Paroxysmal atrial fibrillation (HCC)    Stage 3a chronic kidney disease (HCC)              09/10/24 0817   OT Last Visit   OT Visit Date 09/10/24   Note Type   Note type Cancelled Session   Cancel Reasons Other   Additional Comments Pt pending BLE venous duplex to r/o DVT. Will hold & f/u as able & appropriate       Alexsandra Rainey, OTR/L

## 2024-09-10 NOTE — PHYSICAL THERAPY NOTE
PHYSICAL THERAPY EVALUATION NOTE      Patient Name: Janice Yung  Today's Date: 9/10/2024    AGE:   89 y.o.  Mrn:   [id number]  ADMIT DX:  Anasarca [R60.1]  CHF (congestive heart failure) ([facility]) [I50.9]  Peripheral edema [R60.0]  Dyspnea [R06.00]  Ambulatory dysfunction [R26.2]    Past Medical History:   Diagnosis Date    SHERLYN (acute kidney injury) ([facility]) 2022    Balance disorder     CHF (congestive heart failure) ([facility])     Chronic pain     GERD (gastroesophageal reflux disease)     Hard of hearing     Hyperlipidemia     Hypertension     Hyponatremia 2020    Low serum cortisol level 2020    Neuropathy     Pneumonia     Pneumonia due to COVID-19 virus 2020    Sepsis ([facility]) 2018    Tinnitus     Unspecified adrenocortical insufficiency ([facility]) 2023    Felt to be related to COVID-19 infection.  No current symptoms.    UTI (urinary tract infection)      Length Of Stay: 0  PHYSICAL THERAPY EVALUATION :   Patient's identity confirmed via 2 patient identifiers (full name and ) at start of session       09/10/24 1417   PT Last Visit   PT Visit Date 09/10/24   Note Type   Note type Evaluation   Pain Assessment   Pain Assessment Tool 0-10   Pain Score No Pain   Restrictions/Precautions   Weight Bearing Precautions Per Order No   Other Precautions Cognitive;Chair Alarm;Bed Alarm;Fall Risk;Hard of hearing;Contact/isolation   Home Living   Type of Home House   Home Layout Two level;Able to live on main level with bedroom/bathroom  (1+1 BRENDA, FFSU)   Home Equipment Walker;Cane   Additional Comments Pt reports using RW at all times   Prior Function   Level of [place] Independent with ADLs;Independent with functional mobility   Lives With Daughter   Receives Help From Family   IADLs Family/Friend/Other provides transportation;Family/Friend/Other provides meals;Family/Friend/Other provides medication management    Falls in the last 6 months 0   Vocational Retired   General   Family/Caregiver Present No   Cognition   Overall Cognitive Status Impaired   Arousal/Participation Alert   Orientation Level Oriented X4   Following Commands Follows one step commands with increased time or repetition   Comments Pt cooperative and agreeable, patient was placed on purewick overnight and needs lots of encouragement and education to perform SPT to commode or ambulate to bathroom rather than use purewick at this time   RLE Assessment   RLE Assessment WFL   Strength RLE   RLE Overall Strength 3+/5   LLE Assessment   LLE Assessment WFL   Strength LLE   LLE Overall Strength 3+/5   Bed Mobility   Supine to Sit 5  Supervision   Additional items Assist x 1   Transfers   Sit to Stand 5  Supervision   Stand to Sit 5  Supervision   Toilet transfer 5  Supervision   Additional items Standard toilet   Ambulation/Elevation   Gait pattern Decreased foot clearance;Short stride   Gait Assistance 5  Supervision   Additional items Assist x 1   Assistive Device Rolling walker   Distance 30 ft   Balance   Static Sitting Good   Dynamic Sitting Fair +   Static Standing Fair   Dynamic Standing Fair   Ambulatory Fair -   Activity Tolerance   Activity Tolerance Patient tolerated treatment well   Medical Staff Made Aware LINDY Abdullahi   Nurse Made Aware KATY Cabral   Assessment   Problem List Decreased strength;Decreased endurance;Impaired balance;Decreased mobility;Decreased cognition   Assessment Janice Yung is a 89 y.o. Female who presents to [facility] on 9/9/[facility]24 from home w/ c/o BLE swelling and diagnosis of cellulitis, acute on chronic CHF. Orders for PT eval and treat received. Pt presents w/ comorbidities of HTN, GERD, history of recurrent UTI, Afib, CKD Stage 3, macular degeneration. At baseline, pt mobilizes modified I w/ RW, and reports 0 falls in the last 6 months. Upon evaluation, pt presents w/ the following deficits: weakness, impaired balance,  impaired vision, and decreased endurance. Upon eval, pt requires supervision for bed mobility, supervision for transfers, and supervision for gait. Based on this PT evaluation today, patient's discharge recommendation is for Level III - Low Rehab Resource Intensity. During this admission, pt would benefit from continued skilled inpatient PT in the acute care setting in order to address the abovementioned deficits to maximize function and mobility before DC from acute care.   Goals   Patient Goals to keep using the [employer]   STG Expiration Date 09/20/24   Short Term Goal #1 Patient will: Perform all bed mobility tasks modified independent to improve pt's independence w/ repositioning for decrease risk of skin breakdown, Perform all transfers modified independent consistently from various height surfaces in order to improve I w/ engagement w/ real-world environments/situations, Ambulate at least 100 ft. with roller walker modified independent w/o LOB to facilitate return and engagement w/ previous living environment, and Navigate 1 stairs w/ supervision with unilateral handrail to either improve independence w/ entering home and/or so patient can fully access living areas in home   PT Treatment Day 0   Plan   Treatment/Interventions Functional transfer training;LE strengthening/ROM;Therapeutic exercise;Elevations;Cognitive reorientation;Endurance training;Patient/family training;Equipment eval/education;Bed mobility;Gait training   PT Frequency 2-3x/wk   Discharge Recommendation   Rehab Resource Intensity Level, PT III (Minimum Resource Intensity)   AM-PAC Basic Mobility Inpatient   Turning in Flat Bed Without Bedrails 3   Lying on Back to Sitting on Edge of Flat Bed Without Bedrails 3   Moving Bed to Chair 3   Standing Up From Chair Using Arms 3   Walk in Room 3   Climb 3-5 Stairs With Railing 3   Basic Mobility Inpatient Raw Score 18   Basic Mobility Standardized Score 41.05   [facility] Highest Level Of Mobility    JH-HLM Goal 6: Walk 10 steps or more   JH-HLM Achieved 7: Walk 25 feet or more   Additional Treatment Session   Start Time 1432   End Time 1442   Treatment Assessment Patient seated OOB on toilet. She requires S for STS from toilet. She is then able to perform all her own toileting and dressing activities w/ supervision w/ no BUE support without LOB. She is then able to ambulate 10 ft w/ RW w/ supervision without LOB. She is seated OOB in recliner chair at end of session. Spent time educating her on importance of getting up to commode or toileting to urinate, understanding she has to urinate more as she's being diuresed, but it's important for her to maintain her mobility and what continence she has, as the purewick can condition you to just go.   Equipment Use RW   Additional Treatment Day 1   End of Consult   Patient Position at End of Consult Bedside chair;Bed/Chair alarm activated;All needs within reach         The patient's AM-PAC Basic Mobility Inpatient Short Form Raw Score is 18, Standardized Score is 41.05. A standardized score greater than 38.32 (raw score of 16) suggests the patient may benefit from discharge to home which may not coincide with above PT recommendations. However please refer to therapist recommendation for discharge planning given other factors that may influence destination.    Pt would benefit from skilled inpatient PT during this admission in order to facilitate progress towards goals to maximize functional independence    Andie Martines PT, DPT

## 2024-09-10 NOTE — SPEECH THERAPY NOTE
"Speech Language/Pathology  Speech-Language Pathology Bedside Swallow Evaluation      Patient Name: Janice Yung    Today's Date: 9/10/2024     Problem List  Principal Problem:    Acute on chronic diastolic heart failure (HCC)  Active Problems:    Hypertension    Gastroesophageal reflux disease without esophagitis    Mixed hyperlipidemia    History of recurrent UTI (urinary tract infection)    Paroxysmal atrial fibrillation ([facility])    Stage 3a chronic kidney disease ([facility])      Past Medical History  Past Medical History:   Diagnosis Date    SHERLYN (acute kidney injury) ([facility]) 05/08/2022    Balance disorder     CHF (congestive heart failure) ([facility])     Chronic pain     GERD (gastroesophageal reflux disease)     Hard of hearing     Hyperlipidemia     Hypertension     Hyponatremia 12/05/2020    Low serum cortisol level 12/07/2020    Neuropathy     Pneumonia     Pneumonia due to COVID-19 virus 12/05/2020    Sepsis ([facility]) 05/22/2018    Tinnitus     Unspecified adrenocortical insufficiency ([facility]) 02/28/2023    Felt to be related to COVID-19 infection.  No current symptoms.    UTI (urinary tract infection)        Past Surgical History  Past Surgical History:   Procedure Laterality Date    EYE SURGERY      HYSTERECTOMY      TONSILLECTOMY         Summary   Pt presents w/ min-mild oral dysphagia, suspected mild pharyngeal dysphagia per descriptions below and recent VFSS.     Complete labial seal for retrieval and containment of all materials, no anterior bolus loss present. Prolonged rotary mastication of regular textures. Adequate rotary mastication of dental soft textures w/ complete bolus breakdown and slowed bolus transfer. No oral residue noted. Suspected delayed swallow initiation and reduced hyolaryngeal elevation to palpation. No overt s/s of aspiration at this time. Of note, pt could not recollect use of strategy given during prior/recent VFSS though once SLP provided education regarding VFSS pt then stated \"oh yes I do " "remember that\" and demonstrated independence with utilization of chin tuck throughout remainder of swallow evaluation.     Education initiated w/ pt regarding strategies to optimize swallow safety including frequent/thorough oral care and to notify medical staff if s/s of aspiration arise. Pt verbalized understanding and agreement, denies questions or concerns at this time.     Pt w/ increased risk of aspiration 2/2 current admission for SOB, h/o dysphagia, and multiple medical co-morbidities. SLP to f/u for diet tolerance as able and appropriate.       Recommended Diet: soft/level 3 diet and thin liquids   Recommended Form of Meds: as tolerated    Aspiration precautions and swallowing strategies: upright posture, only feed when fully alert, slow rate of feeding, and small bites/sips  Other Recommendations: Continue frequent oral care        Current Medical Status  Pt is a 89 y.o. female with a PMH of CKD, chronic diastolic CHF, HLD, HTN, PAF not on AC, GERD, recurrent UTI who presents with worsening SOB and acute on chronic pitting edema of bilateral LE. Per daughter patient chronically has some deconditioning/SOB after exertion however patient has been increasingly winded with minimal activity (ie. getting dressed). Daughter also notes audible wheeze which is new for pt, no hx of COPD or tobacco use. Patient does have chronic LE edema however daughter notes this is significantly worse than baseline as edema is extended beyond knees and patient appears to have blistering of skin on anterior shins. No hx of blood clots or missed torsemide doses. Patient herself denies CP, fevers/chills, abdominal sx, urinary sx, or other complaints. Stable on RA on admission.     Current Precautions:  Fall Contact     Allergies:  No known food allergies    Past medical history:  Please see H&P for details    Special Studies:  9/9/24 X-ray chest 1 view portable:  No acute cardiopulmonary disease.     History of VFSS:  5/7/24 VFSS " "  Summary:  Images are on PACS for review.      Pt presents w/ mild oropharyngeal dysphagia pau by mildly prolonged mastication and slowed oral organization/transfer. Reduced tongue base retraction w/ mild oral residue though pt is able to recollect and clear residue. Swallows are delayed w/ bolus head reaching the pyriform sinuses. Reduced hyo-laryngeal elevation, incomplete epiglottic inversion, and incomplete laryngeal vestibule closure result in recurrent penetration w/ nectar thick and thin liquids w/ epiglottic undercoating. Eventual trace aspiration x1 w/ sequential cup sips of thin (no sensory response). Strategies for effortful swallow and chin tuck effective in improving airway protection.  PES relaxation is also constricted w/ ?CP prominence.         Recommendations:  Diet: Regular   Liquids: Thin   Meds: Whole/puree  Strategies: chin tuck, effortful swallow     Social/Education/Vocational Hx:  Pt lives with family    Swallow Information   Current Diet: mechanically altered/level 2 diet and thin liquids   Baseline Diet: soft/level 3 diet and thin liquids- per pt report - pt states harder solids are more difficult, gave example of a \"raw carrot\" pt stated she \"wouln't be able to chew that\"       Baseline Assessment   Behavior/Cognition: alert  Speech/Language Status: able to participate in conversation and able to follow commands  Patient Positioning: upright in bed  Pain Status/Interventions/Response to Interventions: No report of or nonverbal indications of pain.       Oral Mechanism Exam  Facial: symmetrical  Labial: WFL  Lingual: WFL  Velum: symmetrical  Mandible: adequate ROM  Dentition: full upper dentures, partial lower dentures- pt reports not wearing lower dentures as they recent \"broke\" and she has not been able to \"fix them yet\"   Vocal quality:clear/adequate   Volitional Cough: strong/productive   Respiratory Status: on RA       Consistencies Assessed and Performance   Consistencies " "Administered: thin liquids, mechanical soft solids, soft solids, and hard solids    Oral Stage:   Complete labial seal for retrieval and containment of all materials, no anterior bolus loss present. Prolonged rotary mastication of regular textures. Adequate rotary mastication of dental soft textures w/ complete bolus breakdown and slowed bolus transfer. No oral residue noted.     Pharyngeal Stage:   Suspected delayed swallow initiation and reduced hyolaryngeal elevation to palpation. No overt s/s of aspiration at this time. Of note, pt could not recollect use of strategy given during prior/recent VFSS though once SLP provided education regarding VFSS pt then stated \"oh yes I do remember that\" and demonstrated independence with utilization of chin tuck throughout remainder of swallow evaluation.     Esophageal Concerns: none reported, slowed motility per recent VFSS 5/7/24       Summary and Recommendations (see above)    Results Reviewed with: patient, RN, and MD     Treatment Recommended: as able and appropriate for diet tolerance      Dysphagia LTG  -Patient will demonstrate safe and effective oral intake (without overt s/s significant oral/pharyngeal dysphagia including s/s penetration or aspiration) for the highest appropriate diet level.     Short Term Goals:  -Pt will tolerate Dysphagia 3/advanced (dental soft) diet and thin liquid with no significant s/s oral or pharyngeal dysphagia across 1-3 diagnostic session/s.    -Patient will tolerate trials of upgraded food and/or liquid texture with no significant s/s of oral or pharyngeal dysphagia including aspiration across 1-3 diagnostic sessions     Speech Therapy Prognosis   Prognosis: good/fair     Prognosis Considerations: age, medical status, and prior medical history    "

## 2024-09-10 NOTE — PLAN OF CARE
Problem: Potential for Falls  Goal: Patient will remain free of falls  Description: INTERVENTIONS:  - Educate patient/family on patient safety including physical limitations  - Instruct patient to call for assistance with activity   - Consult OT/PT to assist with strengthening/mobility   - Keep Call bell within reach  - Keep bed low and locked with side rails adjusted as appropriate  - Keep care items and personal belongings within reach  - Initiate and maintain comfort rounds  - Make Fall Risk Sign visible to staff  - Offer Toileting every 2 Hours, in advance of need  - Initiate/Maintain bed/chair alarm  - Obtain necessary fall risk management equipment: yellow bracelet/socks  - Apply yellow socks and bracelet for high fall risk patients  - Consider moving patient to room near nurses station  Outcome: Progressing     Problem: SAFETY ADULT  Goal: Maintain or return to baseline ADL function  Description: INTERVENTIONS:  -  Assess patient's ability to carry out ADLs; assess patient's baseline for ADL function and identify physical deficits which impact ability to perform ADLs (bathing, care of mouth/teeth, toileting, grooming, dressing, etc.)  - Assess/evaluate cause of self-care deficits   - Assess range of motion  - Assess patient's mobility; develop plan if impaired  - Assess patient's need for assistive devices and provide as appropriate  - Encourage maximum independence but intervene and supervise when necessary  - Involve family in performance of ADLs  - Assess for home care needs following discharge   - Consider OT consult to assist with ADL evaluation and planning for discharge  - Provide patient education as appropriate  Outcome: Progressing      PT A/O X2-3, FOLLOWS DIRECTION, 96% ON 3L NC, LUNGS DIMINISHED, RHONCHI, EXP WHEEZE, [address] NEBS, PRODUCTIVE COUGH, USING YANKAUER, AFIB/FREQ PVC, NP GENERALIZED EDEMA, SCDS ON, NEPRO AT 50CC/250 WATER FLUSHES PER DOBHOFF, NPO, VOIDING PER PURE WICK, INCONTINENT, EDEMA TO SCROTUM, ACCUCHECK PER PROTOCOL FOR HYPOGLYCEMIA, HELD LEVEMIR IN EVENING, GENERALIZED WEAKNESS, MEPILEX X2 TO SACRUM, TURNED Q 2 HOURS, C/O OF SACRAL PAIN, GIVEN TYLENOL, IV MERREM, LABS IN AM, INSTRUCTED PT ON POC   0322 - HYDRALAZINE 10MG PRN IV GIVEN FOR /77, PT UP MOST OF NIGHT, NOT SLEEPING    Problem: RESPIRATORY - ADULT  Goal: Achieves optimal ventilation and oxygenation  Description: INTERVENTIONS:  - Assess for changes in respiratory status  - Assess for changes in mentation and behavior  - Position to facilitate oxygenation and minimize respiratory effort  - Oxygen supplementation based on oxygen saturation or ABGs  - Provide Smoking Cessation handout, if applicable  - Encourage broncho-pulmonary hygiene including cough, deep breathe, Incentive Spirometry  - Assess the need for suctioning and perform as needed  - Assess and instruct to report SOB or any respiratory difficulty  - Respiratory Therapy support as indicated  - Manage/alleviate anxiety  - Monitor for signs/symptoms of CO2 retention  Outcome: Progressing

## 2024-09-10 NOTE — CASE MANAGEMENT
Case Management Discharge Planning Note    Patient name Janice Yung  Location /-01 MRN [id number]  : 1930 Date 9/10/2024       Current Admission Date: 2024  Current Admission Diagnosis:Acute on chronic diastolic heart failure (HCC)   Patient Active Problem List    Diagnosis Date Noted Date Diagnosed    Bilateral leg edema 2024     Dementia without behavioral disturbance, psychotic disturbance, mood disturbance, or anxiety, unspecified dementia severity, unspecified dementia type (HCC) 2024     Peripheral vascular disease, unspecified (HCC) 2024     Stage 3a chronic kidney disease (HCC) 2023     Earache 2023     Diarrhea 2023     Pulmonary nodules 2023     Cystitis 2023     Paroxysmal atrial fibrillation (HCC) 2022     Generalized weakness 2022     Acute on chronic diastolic heart failure (HCC) 2022     Hyponatremia 2020     Acute pain of left shoulder 2020     History of recurrent UTI (urinary tract infection) 2020     Change in mental status      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) 2019     Fall 2018     Sepsis without acute organ dysfunction (HCC) 2018     Neuropathy 2016     Vitamin B 12 deficiency 2016     Venous insufficiency 2016     Hypertension 2015     Gastroesophageal reflux disease without esophagitis 2015     Arthropathy 2015     Mixed hyperlipidemia 2015       LOS (days): 0  Geometric Mean LOS (GMLOS) (days):   Days to GMLOS:     OBJECTIVE:            Current admission status: Inpatient   Preferred Pharmacy:   [id number]  [address] - [address]  [address] [address]  Phone: [phone] Fax: [phone]    Primary Care Provider: Edgar Mustafa MD    Primary Insurance: MEDICARE  Secondary Insurance:     DISCHARGE DETAILS:         CM consulted to determine cost of Jardiance for patient. CM placed call to pt's pharmacy and was informed that the cost would be $15. CM relayed same to provider via secure chat.

## 2024-09-10 NOTE — OCCUPATIONAL THERAPY NOTE
Occupational Therapy Evaluation & Tx     Patient Name: Janice Yung  Today's Date: 9/10/2024  Problem List  Principal Problem:    Acute on chronic diastolic heart failure ([facility])  Active Problems:    Hypertension    Gastroesophageal reflux disease without esophagitis    Mixed hyperlipidemia    History of recurrent UTI (urinary tract infection)    Paroxysmal atrial fibrillation ([facility])    Stage 3a chronic kidney disease ([facility])    Past Medical History  Past Medical History:   Diagnosis Date    SHERLYN (acute kidney injury) ([facility]) 05/08/2022    Balance disorder     CHF (congestive heart failure) ([facility])     Chronic pain     GERD (gastroesophageal reflux disease)     Hard of hearing     Hyperlipidemia     Hypertension     Hyponatremia 12/05/2020    Low serum cortisol level 12/07/2020    Neuropathy     Pneumonia     Pneumonia due to COVID-19 virus 12/05/2020    Sepsis ([facility]) 05/22/2018    Tinnitus     Unspecified adrenocortical insufficiency ([facility]) 02/28/2023    Felt to be related to COVID-19 infection.  No current symptoms.    UTI (urinary tract infection)      Past Surgical History  Past Surgical History:   Procedure Laterality Date    EYE SURGERY      HYSTERECTOMY      TONSILLECTOMY             09/10/24 1443   OT Last Visit   OT Visit Date 09/10/24   Note Type   Note type Evaluation   Pain Assessment   Pain Assessment Tool 0-10   Pain Score No Pain   Restrictions/Precautions   Weight Bearing Precautions Per Order No   Other Precautions Chair Alarm;Bed Alarm;Cognitive;Fall Risk;Fluid restriction   Home Living   Type of Home House   Home Layout Two level;Performs ADLs on one level;Able to live on main level with bedroom/bathroom;1/2 bath on main level  (1+1 BRENDA)   Home Equipment Walker;Cane   Additional Comments RW at all times PTA, sleeps in recliner. Sponge bathes   Prior Function   Level of [place] Independent with ADLs;Independent with functional mobility;Needs assistance with IADLS   Lives With Daughter   Receives  "Help From Family   IADLs Family/Friend/Other provides transportation;Family/Friend/Other provides meals;Family/Friend/Other provides medication management   Falls in the last 6 months 0   Vocational Retired   General   Family/Caregiver Present No   Subjective   Subjective \"But I'm so comfortable\"   ADL   Eating Assistance 7  Independent   Grooming Assistance 5  Supervision/Setup   UB Bathing Assistance 5  Supervision/Setup   LB Bathing Assistance 4  Minimal Assistance   UB Dressing Assistance 5  Supervision/Setup   LB Dressing Assistance 3  Moderate Assistance   LB Dressing Deficit Thread RLE into underwear;Thread LLE into underwear;Thread RLE into pants;Thread LLE into pants  (Pt able to complete clothing management over hips in stance with S)   Toileting Assistance  5  Supervision/Setup   Bed Mobility   Supine to Sit 5  Supervision   Additional items Bedrails;HOB elevated;Increased time required;Verbal cues   Transfers   Sit to Stand 5  Supervision   Additional items Bedrails   Stand to Sit 5  Supervision   Additional items Armrests   Functional Mobility   Functional Mobility 5  Supervision   Additional Comments Functional household distance   Additional items Rolling walker   Balance   Static Sitting Good   Dynamic Sitting Fair +   Static Standing Fair   Dynamic Standing Fair   Ambulatory Fair   Activity Tolerance   Activity Tolerance Patient tolerated treatment well   Medical Staff Made Aware PT Andie   Nurse Made Aware KATY SARMIENTO Assessment   RUE Assessment WFL   LUE Assessment   LUE Assessment WFL   Cognition   Overall Cognitive Status Impaired   Arousal/Participation Alert   Attention Attends with cues to redirect   Orientation Level Oriented X4   Memory Decreased recall of precautions   Following Commands Follows one step commands with increased time or repetition   Assessment   Limitation Decreased ADL status;Decreased endurance;Decreased self-care trans;Decreased high-level ADLs   Prognosis Good "   Assessment Pt is a 89 y.o. female seen for OT evaluation at [facility], admitted 9/9/2024 w/ Acute on chronic diastolic heart failure (HCC). OT completed extensive review of pt's medical and social history. Comorbidities affecting pt's functional performance at time of assessment include: HTN, h/o UTI, PAF, neuropathy, dementia. Personal factors affecting pt at time of IE include: steps to enter environment, difficulty performing ADLS, difficulty performing IADLS , and environment. Prior to admission, pt was living with her dtr in a 2SH with BRENDA and a first floor s/u.  Pt was I w/  ADLS and A w/ IADLS, (-) drove, & required use of RW PTA. Upon evaluation: Pt requires S for bed mobility, S for functional transfers, S for functional mobility, S for UB ADLs and Min-Mod A for LB ADLS 2* the following deficits impacting occupational performance: weakness, decreased strength, decreased balance, decreased tolerance, and impaired memory. Full objective findings from OT assessment regarding body systems outlined above. Pt to benefit from continued skilled OT tx while in the hospital to address deficits as defined above and maximize level of functional independence w/ ADL's and functional mobility. Occupational Performance areas to address include: bathing/shower, toilet hygiene, dressing, functional mobility, and clothing management. Based on findings, pt is of high complexity. The patient's raw score on the AM-PAC Daily Activity inpatient short form is 18, standardized score is 38.66, less than 39.4. Patients at this level are likely to benefit from DC to post-acute rehabilitation services. However, please refer to therapist recommendation for discharge planning given other factors that may influence destination. At this time, OT recommendations at time of discharge are DC with Level III - Low Rehab Resource Intensity resources.   Goals   Patient Goals Pt wants to keep using the purewick   Plan   Treatment  Interventions ADL retraining;Functional transfer training;Endurance training;Equipment evaluation/education;Patient/family training;Compensatory technique education;Continued evaluation   Goal Expiration Date 09/20/24   OT Treatment Day 0   OT Frequency 2-3x/wk   Discharge Recommendation   Rehab Resource Intensity Level, OT III (Minimum Resource Intensity)   AM-PAC Daily Activity Inpatient   Lower Body Dressing 2   Bathing 3   Toileting 3   Upper Body Dressing 3   Grooming 3   Eating 4   Daily Activity Raw Score 18   Daily Activity Standardized Score (Calc for Raw Score >=11) 38.66   AM-PAC Applied Cognition Inpatient   Following a Speech/Presentation 3   Understanding Ordinary Conversation 4   Taking Medications 3   Remembering Where Things Are Placed or Put Away 3   Remembering List of 4-5 Errands 2   Taking Care of Complicated Tasks 2   Applied Cognition Raw Score 17   Applied Cognition Standardized Score 36.52   Additional Treatment Session   Start Time 1430   End Time 1443   Treatment Assessment Pt performed sit <> stand on standard toilet with S & use of grab bars. Pt able to complete clothing management up & down with supervision. Pt performed lyn-care with supervision wihile seated on commmode. Pt able to mobilize back to recliner with S & RW. Pt performed stand > sit txfer in recliner with S & armrests. Thorough discussion with pt regarding DCing use of purewick & encouraged pt to use toilet/BSC with staff. Pt did not appear to happy but verbalizes understanding. Pt left seated in recliner with all needs in reach, RN aware, (+) chair alarm.   End of Consult   Education Provided Yes   Patient Position at End of Consult Bedside chair;Bed/Chair alarm activated;All needs within reach   Nurse Communication Nurse aware of consult     Pt will achieve the following goals within 10 days.    *Pt will complete LB bathing and dressing with S & DME PRN.    *Pt will complete toileting w/ Mod I w/ G hygiene/thoroughness  using DME PRN    *Pt will perform functional transfers with on/off all surfaces with Mod I using DME as needed w/ G balance/safety.    *Pt will improve functional mobility during ADL/IADL/leisure tasks to Mod I using DME as needed w/ G balance/safety.     *Pt will improve standing balance to G for 8-10 minutes during purposeful activity w/ Mod I & G endurance.     *Pt will demonstrate G carryover of pt/caregiver education and training as appropriate w/ Mod I w/o cues w/ good tolerance      Alexsandra Rainey, OTR/L

## 2024-09-10 NOTE — PLAN OF CARE
Problem: PHYSICAL THERAPY ADULT  Goal: Performs mobility at highest level of function for planned discharge setting.  See evaluation for individualized goals.  Description: Treatment/Interventions: Functional transfer training, LE strengthening/ROM, Therapeutic exercise, Elevations, Cognitive reorientation, Endurance training, Patient/family training, Equipment eval/education, Bed mobility, Gait training          See flowsheet documentation for full assessment, interventions and recommendations.  9/10/2024 1554 by Andie Martines, PT  Note:    Problem List: Decreased strength, Decreased endurance, Impaired balance, Decreased mobility, Decreased cognition  Assessment: Janice Yung is a 89 y.o. Female who presents to [facility] on 9/9/2024 from home w/ c/o BLE swelling and diagnosis of cellulitis, acute on chronic CHF. Orders for PT eval and treat received. Pt presents w/ comorbidities of HTN, GERD, history of recurrent UTI, Afib, CKD Stage 3, macular degeneration. At baseline, pt mobilizes modified I w/ RW, and reports 0 falls in the last 6 months. Upon evaluation, pt presents w/ the following deficits: weakness, impaired balance, impaired vision, and decreased endurance. Upon eval, pt requires supervision for bed mobility, supervision for transfers, and supervision for gait. Based on this PT evaluation today, patient's discharge recommendation is for Level III - Low Rehab Resource Intensity. During this admission, pt would benefit from continued skilled inpatient PT in the acute care setting in order to address the abovementioned deficits to maximize function and mobility before DC from acute care.        Rehab Resource Intensity Level, PT: III (Minimum Resource Intensity)    See flowsheet documentation for full assessment.         Consent (Nose)/Introductory Paragraph: The rationale for Mohs was explained to the patient and consent was obtained. The risks, benefits and alternatives to therapy were discussed in detail. Specifically, the risks of nasal deformity, changes in the flow of air through the nose, infection, scarring, bleeding, prolonged wound healing, incomplete removal, allergy to anesthesia, nerve injury and recurrence were addressed. Prior to the procedure, the treatment site was clearly identified and confirmed by the patient. All components of Universal Protocol/PAUSE Rule completed.

## 2024-09-10 NOTE — CONSULTS
Consult - Cardiology   Janice Yung 89 y.o. female MRN: [id number]  Unit/Bed#: -01 Encounter: [id number]            ASSESSMENT:  LE edema  Chronic HFpEF  Paroxysmal atrial fibrillation  Hypertension  Hyperlipidemia  CKD 3  Recurrent UTI    PLAN/ DISCUSSION:     Janice is very mildly volume overloaded at present time. Her lower extremity edema is significantly improved on IV Lasix 80 mg twice daily. Reports that she is almost back to baseline.  Recommend 1 more dose of IV Lasix 80 mg and will switch her to PO meds starting tomorrow.  Plan to increase her standing diuretics by adding Aldactone 25 mg and Jardiance 10 mg daily if affordable. 30 day Rx sent to her primary pharmacy for price check. Case management assistance appreciated.  Salt restrictions, daily standing weights, strict I/O monitoring. Check BMP tomorrow and again in 72 hours post discharge to monitor serum lytes and renal function.  Awaiting interval TTE which is ordered and pending.  Will arrange close cardiology follow-up in office post-discharge.         History of Present Illness:  Janice Yung is a 89 y.o.  female with PMH with history of chronic HFpEF, paroxysmal atrial fibrillation who presents with progressive leg swelling.  Patient known to cardiologist Dr. Chin however has not followed with cardiology clinic since 2021.  He has been maintained on torsemide 20 mg twice daily which she reports compliance with.  Started noticing leg swelling over the past few days. She did note of increased work of breathing as well prior to coming to the ED for evaluation. BNP 93. Troponins 9/12. EKG on admission shows SR with first-degree AVB, LBBB. CXR with no acute cardiopulmonary disease.      Past Medical History:        Past Medical History:   Diagnosis Date    SHERLYN (acute kidney injury) ([facility]) 05/08/2022    Balance disorder     CHF (congestive heart failure) ([facility])     Chronic pain     GERD (gastroesophageal reflux disease)     Hard of hearing      Hyperlipidemia     Hypertension     Hyponatremia 12/05/2020    Low serum cortisol level 12/07/2020    Neuropathy     Pneumonia     Pneumonia due to COVID-19 virus 12/05/2020    Sepsis ([facility]) 05/22/2018    Tinnitus     Unspecified adrenocortical insufficiency ([facility]) 02/28/2023    Felt to be related to COVID-19 infection.  No current symptoms.    UTI (urinary tract infection)       Past Surgical History:   Procedure Laterality Date    EYE SURGERY      HYSTERECTOMY      TONSILLECTOMY          Allergy:        No Known Allergies    Medications:       Prior to Admission medications    Medication Sig Start Date End Date Taking? Authorizing Provider   Artificial Tears 0.2-0.2-1 % SOLN Administer 1 drop to both eyes 3 (three) times a day 6/30/23   Historical Provider, MD   aspirin (ECOTRIN LOW STRENGTH) 81 mg EC tablet Take 1 tablet (81 mg total) by mouth daily 12/11/18   Kenneth Castillo MD   atorvastatin (LIPITOR) 10 mg tablet Take 1 tablet (10 mg total) by mouth daily 5/13/24   REAL Coronado   Cholecalciferol (VITAMIN D3) 1000 units CAPS Take 1,000 Units by mouth daily     Historical Provider, MD   Cyanocobalamin (VITAMIN B12 PO) Take 1,000 mcg by mouth in the morning    Historical Provider, MD   gabapentin (NEURONTIN) 600 MG tablet Take 1 tablet (600 mg total) by mouth 2 (two) times a day 5/13/24   REAL Coronado   Mirabegron ER (Myrbetriq) 25 MG TB24 TAKE 1 TABLET BY MOUTH IN THE MORNING 4/8/24   REAL La   Multiple Vitamins-Minerals (PRESERVISION AREDS PO) Take 1 tablet by mouth 2 (two) times a day  Patient not taking: Reported on 9/9/2024    Historical Provider, MD   neomycin-polymyxin-hydrocortisone (CORTISPORIN) 0.35%-10,000 units/mL-1% otic suspension Administer 4 drops to the right ear every 6 (six) hours for 5 days  Patient not taking: Reported on 5/10/2024 6/25/23 5/4/24  Cheng Lane MD   nitrofurantoin (MACROBID) 100 mg capsule Take 1 capsule by mouth in the morning 6/17/24    Marcie Heredia PA-C   omeprazole (PriLOSEC) 20 mg delayed release capsule Take 1 capsule by mouth once daily 8/13/24   REAL Coronado   Saccharomyces boulardii (Probiotic) 250 MG CAPS pt reports not taking   Patient not taking: Reported on 5/13/2024 7/7/23   Historical Provider, MD   torsemide (DEMADEX) 20 mg tablet Take 1 tablet by mouth twice daily 8/13/24   REAL Coronado       Family History:     Family History   Problem Relation Age of Onset    Stroke Mother     Stroke Father     Heart disease Daughter     Substance Abuse Neg Hx     Mental illness Neg Hx         Social History:       Social History     Socioeconomic History    Marital status:      Spouse name: None    Number of children: None    Years of education: None    Highest education level: None   Occupational History    None   Tobacco Use    Smoking status: Never     Passive exposure: Never    Smokeless tobacco: Never   Vaping Use    Vaping status: Never Used   Substance and Sexual Activity    Alcohol use: Not Currently     Comment: rarely    Drug use: No    Sexual activity: Not Currently   Other Topics Concern    None   Social History Narrative    None     Social Determinants of Health     Financial Resource Strain: Low Risk  (8/28/2023)    Overall Financial Resource Strain (CARDIA)     Difficulty of Paying Living Expenses: Not very hard   Food Insecurity: No Food Insecurity (9/9/2024)    Hunger Vital Sign     Worried About Running Out of Food in the Last Year: Never true     Ran Out of Food in the Last Year: Never true   Transportation Needs: No Transportation Needs (9/9/2024)    PRAPARE - Transportation     Lack of Transportation (Medical): No     Lack of Transportation (Non-Medical): No   Physical Activity: Not on file   Stress: Not on file   Social Connections: Not on file   Intimate Partner Violence: Not on file   Housing Stability: Low Risk  (9/9/2024)    Housing Stability Vital Sign     Unable to Pay for Housing in the Last  Year: No     Number of Times Moved in the Last Year: 0     Homeless in the Last Year: No       Weights/BMI:    Wt Readings from Last 3 Encounters:   09/10/24 74 kg (163 lb 2.3 oz)   09/09/24 74.4 kg (164 lb)   07/10/24 72.1 kg (159 lb)   , Body mass index is 31.86 kg/m².      ROS:  14 point ROS negative except as outlined above  Remainder review of systems is negative    Exam:  General: Alert, oriented and in no acute distress, cooperative  Head: Normocephalic, atraumatic.  Eyes: PERRLA. No icterus. Normal Conjunctiva.   Oropharynx: Moist and normal-appearing mucosa  Neck: Supple, symmetrical, trachea midline, JVD not appreciated.   Heart: RRR, no murmur, rub or gallop, S1 & S2 normal   Lungs: Normal air entry, lungs clear to auscultation and no rales, rhonchi or wheezing   Abdomen: Flat, normal findings: bowel sounds normal and soft, non-tender  Lower Limbs:  +1 pitting edema, 2+ peripheral pulses, capillary refill within normal limits  Musculoskeletal: ROM grossly normal

## 2024-09-10 NOTE — CASE MANAGEMENT
Case Management Assessment & Discharge Planning Note    Patient name Janice Yung  Location /-01 MRN [id number]  : 1930 Date 9/10/2024       Current Admission Date: 2024  Current Admission Diagnosis:Acute on chronic diastolic heart failure (HCC)   Patient Active Problem List    Diagnosis Date Noted Date Diagnosed    Bilateral leg edema 2024     Dementia without behavioral disturbance, psychotic disturbance, mood disturbance, or anxiety, unspecified dementia severity, unspecified dementia type (HCC) 2024     Peripheral vascular disease, unspecified (HCC) 2024     Stage 3a chronic kidney disease (HCC) 2023     Earache 2023     Diarrhea 2023     Pulmonary nodules 2023     Cystitis 2023     Paroxysmal atrial fibrillation (HCC) 2022     Generalized weakness 2022     Acute on chronic diastolic heart failure (HCC) 2022     Hyponatremia 2020     Acute pain of left shoulder 2020     History of recurrent UTI (urinary tract infection) 2020     Change in mental status      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) 2019     Fall 2018     Sepsis without acute organ dysfunction (HCC) 2018     Neuropathy 2016     Vitamin B 12 deficiency 2016     Venous insufficiency 2016     Hypertension 2015     Gastroesophageal reflux disease without esophagitis 2015     Arthropathy 2015     Mixed hyperlipidemia 2015       LOS (days): 0  Geometric Mean LOS (GMLOS) (days):   Days to GMLOS:     OBJECTIVE:              Current admission status: Observation       Preferred Pharmacy:   [id number]  [address] [address] - [address]  [place] [address]  Phone: [phone] Fax: [phone]    Primary Care Provider: Edgar Mustafa MD    Primary Insurance: MEDICARE  Secondary Insurance:      ASSESSMENT:  Active Health Care Proxies       Deyanira Lemus [facility] Care Representative - Child   Primary Phone: [phone] (Mobile)  Home Phone: [phone]                 Advance Directives  Does patient have a Health Care POA?: Yes  Does patient have Advance Directives?: Yes  Advance Directives: Living will, Power of  for health care  Primary Contact: Brianna Lemus dtr         Readmission Root Cause  30 Day Readmission: No    Patient Information  Admitted from:: Home  Mental Status: Alert  During Assessment patient was accompanied by: Not accompanied during assessment  Assessment information provided by:: Patient  Primary Caregiver: Self  Support Systems: Family members, Self, Daughter  County of Residence: [place]  What city do you live in?: [place]  Home entry access options. Select all that apply.: Stairs  Number of steps to enter home.: 2  Type of Current Residence: 2 story home  Upon entering residence, is there a bedroom on the main floor (no further steps)?: Yes  Upon entering residence, is there a bathroom on the main floor (no further steps)?: Yes  Living Arrangements: Lives w/ Daughter    Activities of Daily Living Prior to Admission  Functional Status: Independent  Completes ADLs independently?: Yes  Ambulates independently?: Yes  Does patient use assisted devices?: Yes  Assisted Devices (DME) used: Straight Cane, Walker  Does patient currently own DME?: Yes  What DME does the patient currently own?: Straight Cane, Walker  Does patient have a history of Outpatient Therapy (PT/OT)?: No  Does the patient have a history of Short-Term Rehab?: Yes ([facility])  Does patient have a history of HHC?: Yes ([facility])  Does patient currently have HHC?: No         Patient Information Continued  Income Source: Pension/prison  Does patient have prescription coverage?: Yes  Does patient receive dialysis treatments?: No  Does patient have a history of substance abuse?: No  Does patient  have a history of Mental Health Diagnosis?: No         Means of Transportation  Means of Transport to [facility]:: Family transport      Social Determinants of Health (SDOH)      Flowsheet Row Most Recent Value   Housing Stability    In the last 12 months, was there a time when you were not able to pay the mortgage or rent on time? N   At any time in the past 12 months, were you homeless or living in a shelter (including now)? N   Transportation Needs    In the past 12 months, has lack of transportation kept you from medical appointments or from getting medications? no   In the past 12 months, has lack of transportation kept you from meetings, work, or from getting things needed for daily living? No   Food Insecurity    Within the past 12 months, you worried that your food would run out before you got the money to buy more. Never true   Within the past 12 months, the food you bought just didn't last and you didn't have money to get more. Never true   Utilities    In the past 12 months has the electric, gas, oil, or water company threatened to shut off services in your home? No            DISCHARGE DETAILS:    Discharge planning discussed with:: Patient  Freedom of Choice: Yes     CM contacted family/caregiver?: Yes  Were Treatment Team discharge recommendations reviewed with patient/caregiver?: Yes  Did patient/caregiver verbalize understanding of patient care needs?: Yes  Were patient/caregiver advised of the risks associated with not following Treatment Team discharge recommendations?: Yes    Contacts  Patient Contacts: Deyanira Lemus (Child) [phone] (Mobile)  Relationship to Patient:: Family  Contact Method: Phone  Phone Number: Deyanira Lemus (Child) [phone] (Mobile)  Reason/Outcome: Discharge Planning, Continuity of Care, Emergency Contact, Referral    Requested Home Health Care         Is the patient interested in HHC at discharge?: No    DME Referral Provided  Referral made for DME?: No               Treatment Team Recommendation: Home  Discharge Destination Plan:: Home  Transport at Discharge : Family                                      Additional Comments: CM met with pt to discuss role of CM and any needs prior to discharge. Pt resides w/ dtr in 2SH w/ 2STE and 1st flr setup. Indp PTA. Owns/uses SPC, RW. Hx STR through  and HH through [facility] and [facility]. No  hx OP PT/DA/MH. Pt prefers to use [facility] pharmacy. Dtr will transport for discharge.

## 2024-09-10 NOTE — SPEECH THERAPY NOTE
Speech Language/Pathology  Consult received, chart reviewed. SLP attempted clinical swallow evaluation though pt currently obtaining bedside venous duplex. SLP to monitor chart and complete clinical swallow evaluation as able and appropriate.

## 2024-09-10 NOTE — PLAN OF CARE
Problem: OCCUPATIONAL THERAPY ADULT  Goal: Performs self-care activities at highest level of function for planned discharge setting.  See evaluation for individualized goals.  Description: Treatment Interventions: ADL retraining, Functional transfer training, Endurance training, Equipment evaluation/education, Patient/family training, Compensatory technique education, Continued evaluation          See flowsheet documentation for full assessment, interventions and recommendations.   Note: Limitation: Decreased ADL status, Decreased endurance, Decreased self-care trans, Decreased high-level ADLs  Prognosis: Good  Assessment: Pt is a 89 y.o. female seen for OT evaluation at [facility], admitted 9/9/2024 w/ Acute on chronic diastolic heart failure (HCC). OT completed extensive review of pt's medical and social history. Comorbidities affecting pt's functional performance at time of assessment include: HTN, h/o UTI, PAF, neuropathy, dementia. Personal factors affecting pt at time of IE include: steps to enter environment, difficulty performing ADLS, difficulty performing IADLS , and environment. Prior to admission, pt was living with her dtr in a 2SH with [facility] and a first floor s/u.  Pt was I w/  ADLS and A w/ IADLS, (-) drove, & required use of RW PTA. Upon evaluation: Pt requires S for bed mobility, S for functional transfers, S for functional mobility, S for UB ADLs and Min-Mod A for LB ADLS 2* the following deficits impacting occupational performance: weakness, decreased strength, decreased balance, decreased tolerance, and impaired memory. Full objective findings from OT assessment regarding body systems outlined above. Pt to benefit from continued skilled OT tx while in the hospital to address deficits as defined above and maximize level of functional independence w/ ADL's and functional mobility. Occupational Performance areas to address include: bathing/shower, toilet hygiene, dressing, functional  mobility, and clothing management. Based on findings, pt is of high complexity. The patient's raw score on the AM-PAC Daily Activity inpatient short form is 18, standardized score is 38.66, less than 39.4. Patients at this level are likely to benefit from DC to post-acute rehabilitation services. However, please refer to therapist recommendation for discharge planning given other factors that may influence destination. At this time, OT recommendations at time of discharge are DC with Level III - Low Rehab Resource Intensity resources.     Rehab Resource Intensity Level, OT: III (Minimum Resource Intensity)

## 2024-09-11 ENCOUNTER — HOME CARE VISIT (OUTPATIENT)
Dept: HOME HEALTH SERVICES | Facility: HOME HEALTHCARE | Age: 89
End: 2024-09-11

## 2024-09-11 ENCOUNTER — HOME HEALTH ADMISSION (OUTPATIENT)
Dept: HOME HEALTH SERVICES | Facility: HOME HEALTHCARE | Age: 89
End: 2024-09-11
Payer: MEDICARE

## 2024-09-11 VITALS
WEIGHT: 162.04 LBS | RESPIRATION RATE: 18 BRPM | SYSTOLIC BLOOD PRESSURE: 120 MMHG | OXYGEN SATURATION: 90 % | TEMPERATURE: 97.9 F | HEIGHT: 60 IN | BODY MASS INDEX: 31.81 KG/M2 | DIASTOLIC BLOOD PRESSURE: 51 MMHG | HEART RATE: 70 BPM

## 2024-09-11 LAB
ANION GAP SERPL CALCULATED.3IONS-SCNC: 6 MMOL/L (ref 4–13)
BUN SERPL-MCNC: 16 MG/DL (ref 5–25)
CALCIUM SERPL-MCNC: 9 MG/DL (ref 8.4–10.2)
CHLORIDE SERPL-SCNC: 99 MMOL/L (ref 96–108)
CO2 SERPL-SCNC: 38 MMOL/L (ref 21–32)
CREAT SERPL-MCNC: 0.99 MG/DL (ref 0.6–1.3)
GFR SERPL CREATININE-BSD FRML MDRD: 48 ML/MIN/1.73SQ M
GLUCOSE SERPL-MCNC: 94 MG/DL (ref 65–140)
MAGNESIUM SERPL-MCNC: 1.9 MG/DL (ref 1.9–2.7)
POTASSIUM SERPL-SCNC: 3.5 MMOL/L (ref 3.5–5.3)
SODIUM SERPL-SCNC: 143 MMOL/L (ref 135–147)

## 2024-09-11 PROCEDURE — 83735 ASSAY OF MAGNESIUM: CPT | Performed by: PHYSICIAN ASSISTANT

## 2024-09-11 PROCEDURE — 99232 SBSQ HOSP IP/OBS MODERATE 35: CPT | Performed by: INTERNAL MEDICINE

## 2024-09-11 PROCEDURE — 99239 HOSP IP/OBS DSCHRG MGMT >30: CPT | Performed by: PHYSICIAN ASSISTANT

## 2024-09-11 PROCEDURE — 80048 BASIC METABOLIC PNL TOTAL CA: CPT

## 2024-09-11 RX ORDER — TORSEMIDE 20 MG/1
20 TABLET ORAL DAILY
Start: 2024-09-23

## 2024-09-11 RX ORDER — SPIRONOLACTONE 25 MG/1
25 TABLET ORAL DAILY
Status: DISCONTINUED | OUTPATIENT
Start: 2024-09-11 | End: 2024-09-11 | Stop reason: HOSPADM

## 2024-09-11 RX ORDER — SPIRONOLACTONE 25 MG/1
25 TABLET ORAL DAILY
Qty: 30 TABLET | Refills: 0 | Status: SHIPPED | OUTPATIENT
Start: 2024-09-12 | End: 2024-09-17 | Stop reason: SDUPTHER

## 2024-09-11 RX ADMIN — PANTOPRAZOLE SODIUM 40 MG: 40 TABLET, DELAYED RELEASE ORAL at 06:06

## 2024-09-11 RX ADMIN — HEPARIN SODIUM 5000 UNITS: 5000 INJECTION, SOLUTION INTRAVENOUS; SUBCUTANEOUS at 06:06

## 2024-09-11 RX ADMIN — DEXTRAN 70, GLYCERIN, HYPROMELLOSE 1 DROP: 1; 2; 3 SOLUTION/ DROPS OPHTHALMIC at 08:13

## 2024-09-11 RX ADMIN — EMPAGLIFLOZIN 10 MG: 10 TABLET, FILM COATED ORAL at 08:13

## 2024-09-11 RX ADMIN — SPIRONOLACTONE 25 MG: 25 TABLET ORAL at 09:00

## 2024-09-11 RX ADMIN — ASPIRIN 81 MG: 81 TABLET, COATED ORAL at 08:13

## 2024-09-11 RX ADMIN — GABAPENTIN 300 MG: 300 CAPSULE ORAL at 08:13

## 2024-09-11 RX ADMIN — ATORVASTATIN CALCIUM 10 MG: 10 TABLET, FILM COATED ORAL at 08:13

## 2024-09-11 NOTE — PLAN OF CARE
Problem: Potential for Falls  Goal: Patient will remain free of falls  Description: INTERVENTIONS:  - Educate patient/family on patient safety including physical limitations  - Instruct patient to call for assistance with activity   - Consult OT/PT to assist with strengthening/mobility   - Keep Call bell within reach  - Keep bed low and locked with side rails adjusted as appropriate  - Keep care items and personal belongings within reach  - Initiate and maintain comfort rounds  - Make Fall Risk Sign visible to staff  - Apply yellow socks and bracelet for high fall risk patients  - Consider moving patient to room near nurses station  Outcome: Adequate for Discharge     Problem: PAIN - ADULT  Goal: Verbalizes/displays adequate comfort level or baseline comfort level  Description: Interventions:  - Encourage patient to monitor pain and request assistance  - Assess pain using appropriate pain scale  - Administer analgesics based on type and severity of pain and evaluate response  - Implement non-pharmacological measures as appropriate and evaluate response  - Consider cultural and social influences on pain and pain management  - Notify physician/advanced practitioner if interventions unsuccessful or patient reports new pain  Outcome: Adequate for Discharge     Problem: INFECTION - ADULT  Goal: Absence or prevention of progression during hospitalization  Description: INTERVENTIONS:  - Assess and monitor for signs and symptoms of infection  - Monitor lab/diagnostic results  - Monitor all insertion sites, i.e. indwelling lines, tubes, and drains  - Monitor endotracheal if appropriate and nasal secretions for changes in amount and color  - [place] appropriate cooling/warming therapies per order  - Administer medications as ordered  - Instruct and encourage patient and family to use good hand hygiene technique  - Identify and instruct in appropriate isolation precautions for identified infection/condition  Outcome:  Adequate for Discharge  Goal: Absence of fever/infection during neutropenic period  Description: INTERVENTIONS:  - Monitor WBC    Outcome: Adequate for Discharge     Problem: SAFETY ADULT  Goal: Patient will remain free of falls  Description: INTERVENTIONS:  - Educate patient/family on patient safety including physical limitations  - Instruct patient to call for assistance with activity   - Consult OT/PT to assist with strengthening/mobility   - Keep Call bell within reach  - Keep bed low and locked with side rails adjusted as appropriate  - Keep care items and personal belongings within reach  - Initiate and maintain comfort rounds  - Make Fall Risk Sign visible to staff  - Apply yellow socks and bracelet for high fall risk patients  - Consider moving patient to room near nurses station  Outcome: Adequate for Discharge  Goal: Maintain or return to baseline ADL function  Description: INTERVENTIONS:  -  Assess patient's ability to carry out ADLs; assess patient's baseline for ADL function and identify physical deficits which impact ability to perform ADLs (bathing, care of mouth/teeth, toileting, grooming, dressing, etc.)  - Assess/evaluate cause of self-care deficits   - Assess range of motion  - Assess patient's mobility; develop plan if impaired  - Assess patient's need for assistive devices and provide as appropriate  - Encourage maximum independence but intervene and supervise when necessary  - Involve family in performance of ADLs  - Assess for home care needs following discharge   - Consider OT consult to assist with ADL evaluation and planning for discharge  - Provide patient education as appropriate  Outcome: Adequate for Discharge  Goal: Maintains/Returns to pre admission functional level  Description: INTERVENTIONS:  - Perform AM-PAC 6 Click Basic Mobility/ Daily Activity assessment daily.  - Set and communicate daily mobility goal to care team and patient/family/caregiver.   - Collaborate with  rehabilitation services on mobility goals if consulted  - Out of bed for toileting  - Record patient progress and toleration of activity level   Outcome: Adequate for Discharge     Problem: DISCHARGE PLANNING  Goal: Discharge to home or other facility with appropriate resources  Description: INTERVENTIONS:  - Identify barriers to discharge w/patient and caregiver  - Arrange for needed discharge resources and transportation as appropriate  - Identify discharge learning needs (meds, wound care, etc.)  - Arrange for interpretive services to assist at discharge as needed  - Refer to Case Management Department for coordinating discharge planning if the patient needs post-hospital services based on physician/advanced practitioner order or complex needs related to functional status, cognitive ability, or social support system  Outcome: Adequate for Discharge     Problem: Knowledge Deficit  Goal: Patient/family/caregiver demonstrates understanding of disease process, treatment plan, medications, and discharge instructions  Description: Complete learning assessment and assess knowledge base.  Interventions:  - Provide teaching at level of understanding  - Provide teaching via preferred learning methods  Outcome: Adequate for Discharge     Problem: Prexisting or High Potential for Compromised Skin Integrity  Goal: Skin integrity is maintained or improved  Description: INTERVENTIONS:  - Identify patients at risk for skin breakdown  - Assess and monitor skin integrity  - Assess and monitor nutrition and hydration status  - Monitor labs   - Assess for incontinence   - Turn and reposition patient  - Assist with mobility/ambulation  - Relieve pressure over bony prominences  - Avoid friction and shearing  - Provide appropriate hygiene as needed including keeping skin clean and dry  - Evaluate need for skin moisturizer/barrier cream  - Collaborate with interdisciplinary team   - Patient/family teaching  - Consider wound care consult    Outcome: Adequate for Discharge

## 2024-09-11 NOTE — ASSESSMENT & PLAN NOTE
Wt Readings from Last 3 Encounters:   09/11/24 73.5 kg (162 lb 0.6 oz)   09/09/24 74.4 kg (164 lb)   07/10/24 72.1 kg (159 lb)     Presents with 2 day hx of acute on chronic bilateral LE edema + increased SOB/BRIAN and audible wheeze per daughter  Stable on RA on admission however with significant pitting edema    BNP 98   Prior Echo 6/2023: EF 60-65%  Home diuretic: torsemide 20 mg BID (daughter denies missed doses)  Unclear baseline weight (159 lbs during outpatient visit in July 2024)  165 lbs on admission   S/P IV 40 mg x 1 in ED  Consult cardiology  On IV lasix 80 mg BID --> edema significantly improved, transition to spironolactone 25 mg daily  Salt and fluid restriction   Updated echocardiogram with EF of 70%, grade 2 diastolic dysfunction  Torsemide is on hold until patient sees cardiologist.  Cardiology will set up appointment with the patient in 1 to 2 weeks.

## 2024-09-11 NOTE — PROGRESS NOTES
Cardiology Progress Note   Janice Yung 89 y.o. female MRN: [id number]    Unit/Bed#: -01 Encounter: [id number]      Assessment:  LE edema  Chronic HFpEF  Paroxysmal atrial fibrillation  Hypertension  Hyperlipidemia  CKD 3  Recurrent UTI    Wt today: 162lbs < 164lbs on admit  I/O last 24: -940mL. Net -3.2L since admission with IV lasix 80mg BID.     IP TTE 9/10/2024: EF 70%, G2DD, mild AS, RVSP 43mmHg.     Plan:  C/O leg cramping this AM. Check MG level; replenish if <2  Switch IV lasix with Aldactone 25mg QD   Jardiance 10mg added yesterday, affordable @ $15 monthly  Hold loop diuretic at this point and follow up in office within 1 week. If wts trending up. Will look to start torsemide 20mg QD.    BMP recommended in 72 post d/c  Stable cardiac wise for discharge    Subjective:   Feels well. Leg edema completely gone but developed cramping this AM. No issues with breathing. No chest pain or discomfort.     Objective:     Vitals: Blood pressure 120/51, pulse 70, temperature 97.9 °F (36.6 °C), resp. rate 18, height 5' (1.524 m), weight 73.5 kg (162 lb 0.6 oz), SpO2 90%., Body mass index is 31.65 kg/m².,   Orthostatic Blood Pressures      Flowsheet Row Most Recent Value   Blood Pressure 120/51 filed at 09/11/2024 0715   Patient Position - Orthostatic VS Lying filed at 09/10/2024 0620              Intake/Output Summary (Last 24 hours) at 9/11/2024 0826  Last data filed at 9/11/2024 0501  Gross per 24 hour   Intake 660 ml   Output 1600 ml   Net -940 ml         Physical Exam:    GEN: Janice Yung appears well, alert and oriented x 3, pleasant and cooperative   HEENT: Sclera anicteric, conjunctivae pink, mucous membranes moist. Oropharynx clear.   NECK: supple, no significant JVD, Trachea midline, no thyromegaly.   HEART: regular rhythm, normal S1 and S2, no murmurs, clicks, gallops or rubs   LUNGS: clear to auscultation bilaterally; no wheezes, rales, or rhonchi. No increased work of breathing or signs of  respiratory distress.   ABDOMEN: Soft, nontender, nondistended  EXTREMITIES: Skin warm and well perfused, no clubbing, cyanosis, or edema.  NEURO: No focal findings. Normal speech. Mood and affect normal.   SKIN: Normal without suspicious lesions on exposed skin.      Medications:      Current Facility-Administered Medications:     acetaminophen (TYLENOL) tablet 650 mg, 650 mg, Oral, Q6H PRN, Shilpi Spicer PA-C    Artificial Tears Op Soln 1 drop, 1 drop, Both Eyes, TID, MARIA A Hernandez-C, 1 drop at 09/11/24 0813    aspirin (ECOTRIN LOW STRENGTH) EC tablet 81 mg, 81 mg, Oral, Daily, MARIA A Hernandez-C, 81 mg at 09/11/24 0813    atorvastatin (LIPITOR) tablet 10 mg, 10 mg, Oral, Daily, MARIA A Hernandez-C, 10 mg at 09/11/24 0813    Empagliflozin (JARDIANCE) tablet 10 mg, 10 mg, Oral, Daily, Yoan Mina PA-C, 10 mg at 09/11/24 0813    gabapentin (NEURONTIN) capsule 300 mg, 300 mg, Oral, TID, MARIA A Hernandez-C, 300 mg at 09/11/24 0813    heparin (porcine) subcutaneous injection 5,000 Units, 5,000 Units, Subcutaneous, Q8H DONNA, Shilpi Spicer PA-C, 5,000 Units at 09/11/24 0606    labetalol (NORMODYNE) injection 10 mg, 10 mg, Intravenous, Q6H PRN, Shilpi Spicer PA-C    pantoprazole (PROTONIX) EC tablet 40 mg, 40 mg, Oral, Early Morning, Shilpi Spicer PA-C, 40 mg at 09/11/24 0606    spironolactone (ALDACTONE) tablet 25 mg, 25 mg, Oral, Daily, Yoan Mina PA-C     Labs & Results:        Results from last 7 days   Lab Units 09/10/24  0511 09/09/24  1542   WBC Thousand/uL 9.21 8.41   HEMOGLOBIN g/dL 11.7 11.7   HEMATOCRIT % 37.1 38.0   PLATELETS Thousands/uL 196 206         Results from last 7 days   Lab Units 09/11/24  0445 09/10/24  0511 09/09/24  1542   POTASSIUM mmol/L 3.5 3.5 3.5   CHLORIDE mmol/L 99 101 104   CO2 mmol/L 38* 41* 29   BUN mg/dL 16 16 19   CREATININE mg/dL 0.99 0.83 0.86   CALCIUM mg/dL 9.0 9.3 8.8    ALK PHOS U/L  --  76 68   ALT U/L  --  6* 5*   AST U/L  --  15 14

## 2024-09-11 NOTE — CASE MANAGEMENT
Case Management Discharge Planning Note    Patient name Janice Yung  Location /-01 MRN [id number]  : 1930 Date 2024       Current Admission Date: 2024  Current Admission Diagnosis:Acute on chronic diastolic heart failure (HCC)   Patient Active Problem List    Diagnosis Date Noted Date Diagnosed    Bilateral leg edema 2024     Dementia without behavioral disturbance, psychotic disturbance, mood disturbance, or anxiety, unspecified dementia severity, unspecified dementia type (HCC) 2024     Peripheral vascular disease, unspecified (HCC) 2024     Stage 3a chronic kidney disease (HCC) 2023     Earache 2023     Diarrhea 2023     Pulmonary nodules 2023     Cystitis 2023     Paroxysmal atrial fibrillation (HCC) 2022     Generalized weakness 2022     Acute on chronic diastolic heart failure (HCC) 2022     Hyponatremia 2020     Acute pain of left shoulder 2020     History of recurrent UTI (urinary tract infection) 2020     Change in mental status      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) 2019     Fall 2018     Sepsis without acute organ dysfunction (HCC) 2018     Neuropathy 2016     Vitamin B 12 deficiency 2016     Venous insufficiency 2016     Hypertension 2015     Gastroesophageal reflux disease without esophagitis 2015     Arthropathy 2015     Mixed hyperlipidemia 2015       LOS (days): 1  Geometric Mean LOS (GMLOS) (days): 3.9  Days to GMLOS:3.1     OBJECTIVE:  Risk of Unplanned Readmission Score: 13.6         Current admission status: Inpatient   Preferred Pharmacy:   [id number]  [place] [address] - [address]  [address] [address]  Phone: [phone] Fax: [phone]    Primary Care Provider: Edgar Mustafa MD    Primary Insurance:  MEDICARE  Secondary Insurance:     DISCHARGE DETAILS:                                Requested Home Health Care         Home Health Agency Name:: [facility]

## 2024-09-11 NOTE — PLAN OF CARE
Problem: Potential for Falls  Goal: Patient will remain free of falls  Description: INTERVENTIONS:  - Educate patient/family on patient safety including physical limitations  - Instruct patient to call for assistance with activity   - Consult OT/PT to assist with strengthening/mobility   - Keep Call bell within reach  - Keep bed low and locked with side rails adjusted as appropriate  - Keep care items and personal belongings within reach  - Initiate and maintain comfort rounds  - Make Fall Risk Sign visible to staff  - Offer Toileting every 2 Hours, in advance of need  - Initiate/Maintain alarm  - Obtain necessary fall risk management equipment:   - Apply yellow socks and bracelet for high fall risk patients  - Consider moving patient to room near nurses station  Outcome: Progressing     Problem: PAIN - ADULT  Goal: Verbalizes/displays adequate comfort level or baseline comfort level  Description: Interventions:  - Encourage patient to monitor pain and request assistance  - Assess pain using appropriate pain scale  - Administer analgesics based on type and severity of pain and evaluate response  - Implement non-pharmacological measures as appropriate and evaluate response  - Consider cultural and social influences on pain and pain management  - Notify physician/advanced practitioner if interventions unsuccessful or patient reports new pain  Outcome: Progressing     Problem: INFECTION - ADULT  Goal: Absence or prevention of progression during hospitalization  Description: INTERVENTIONS:  - Assess and monitor for signs and symptoms of infection  - Monitor lab/diagnostic results  - Monitor all insertion sites, i.e. indwelling lines, tubes, and drains  - Monitor endotracheal if appropriate and nasal secretions for changes in amount and color  - [place] appropriate cooling/warming therapies per order  - Administer medications as ordered  - Instruct and encourage patient and family to use good hand hygiene  technique  - Identify and instruct in appropriate isolation precautions for identified infection/condition  Outcome: Progressing  Goal: Absence of fever/infection during neutropenic period  Description: INTERVENTIONS:  - Monitor WBC    Outcome: Progressing     Problem: SAFETY ADULT  Goal: Patient will remain free of falls  Description: INTERVENTIONS:  - Educate patient/family on patient safety including physical limitations  - Instruct patient to call for assistance with activity   - Consult OT/PT to assist with strengthening/mobility   - Keep Call bell within reach  - Keep bed low and locked with side rails adjusted as appropriate  - Keep care items and personal belongings within reach  - Initiate and maintain comfort rounds  - Make Fall Risk Sign visible to staff  - Offer Toileting every 2 Hours, in advance of need  - Initiate/Maintain alarm  - Obtain necessary fall risk management equipment:   - Apply yellow socks and bracelet for high fall risk patients  - Consider moving patient to room near nurses station  Outcome: Progressing  Goal: Maintain or return to baseline ADL function  Description: INTERVENTIONS:  -  Assess patient's ability to carry out ADLs; assess patient's baseline for ADL function and identify physical deficits which impact ability to perform ADLs (bathing, care of mouth/teeth, toileting, grooming, dressing, etc.)  - Assess/evaluate cause of self-care deficits   - Assess range of motion  - Assess patient's mobility; develop plan if impaired  - Assess patient's need for assistive devices and provide as appropriate  - Encourage maximum independence but intervene and supervise when necessary  - Involve family in performance of ADLs  - Assess for home care needs following discharge   - Consider OT consult to assist with ADL evaluation and planning for discharge  - Provide patient education as appropriate  Outcome: Progressing  Goal: Maintains/Returns to pre admission functional level  Description:  INTERVENTIONS:  - Perform AM-PAC 6 Click Basic Mobility/ Daily Activity assessment daily.  - Set and communicate daily mobility goal to care team and patient/family/caregiver.   - Collaborate with rehabilitation services on mobility goals if consulted  - Perform Range of Motion 3 times a day.  - Reposition patient every 2 hours.  - Dangle patient 3 times a day  - Stand patient 3 times a day  - Ambulate patient 3 times a day  - Out of bed to chair 3 times a day   - Out of bed for meals 3 times a day  - Out of bed for toileting  - Record patient progress and toleration of activity level   Outcome: Progressing     Problem: DISCHARGE PLANNING  Goal: Discharge to home or other facility with appropriate resources  Description: INTERVENTIONS:  - Identify barriers to discharge w/patient and caregiver  - Arrange for needed discharge resources and transportation as appropriate  - Identify discharge learning needs (meds, wound care, etc.)  - Arrange for interpretive services to assist at discharge as needed  - Refer to Case Management Department for coordinating discharge planning if the patient needs post-hospital services based on physician/advanced practitioner order or complex needs related to functional status, cognitive ability, or social support system  Outcome: Progressing     Problem: Knowledge Deficit  Goal: Patient/family/caregiver demonstrates understanding of disease process, treatment plan, medications, and discharge instructions  Description: Complete learning assessment and assess knowledge base.  Interventions:  - Provide teaching at level of understanding  - Provide teaching via preferred learning methods  Outcome: Progressing     Problem: Prexisting or High Potential for Compromised Skin Integrity  Goal: Skin integrity is maintained or improved  Description: INTERVENTIONS:  - Identify patients at risk for skin breakdown  - Assess and monitor skin integrity  - Assess and monitor nutrition and hydration  status  - Monitor labs   - Assess for incontinence   - Turn and reposition patient  - Assist with mobility/ambulation  - Relieve pressure over bony prominences  - Avoid friction and shearing  - Provide appropriate hygiene as needed including keeping skin clean and dry  - Evaluate need for skin moisturizer/barrier cream  - Collaborate with interdisciplinary team   - Patient/family teaching  - Consider wound care consult   Outcome: Progressing

## 2024-09-11 NOTE — ASSESSMENT & PLAN NOTE
Lab Results   Component Value Date    EGFR 48 09/11/2024    EGFR 60 09/10/2024    EGFR 58 09/09/2024    CREATININE 0.99 09/11/2024    CREATININE 0.83 09/10/2024    CREATININE 0.86 09/09/2024     Cr is currently at baseline   Repeat BMP 1 week with cardiologist

## 2024-09-11 NOTE — PROGRESS NOTES
Cardiology Follow Up    Janice Yung  1/28/1930  [id number]  [facility] [place] [id number] [facility]  [facility] [place] [address] [phone]    No diagnosis found.    Interval History:   Ms Janice Yepez was admitted to [facility] on 9/09 - 9/11/24 with acute on chronic HFpEF.  Janice presented to the ED with progressive shortness of breath and lower extremity edema.    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)    Acute pain of left shoulder    Change in mental status    History of recurrent UTI (urinary tract infection)    Hyponatremia    Generalized weakness    Acute on chronic diastolic heart failure (HCC)    Paroxysmal atrial fibrillation (HCC)    Cystitis    Pulmonary nodules    Earache    Diarrhea    Stage 3a chronic kidney disease (HCC)    Dementia without behavioral disturbance, psychotic disturbance, mood disturbance, or anxiety, unspecified dementia severity, unspecified dementia type ([facility])    Peripheral vascular disease, unspecified (HCC)    Bilateral leg edema     Past Medical History:   Diagnosis Date    SHERLYN (acute kidney injury) ([facility]) 05/08/2022    Balance disorder     CHF (congestive heart failure) ([facility])     Chronic pain     GERD (gastroesophageal reflux disease)     Hard of hearing     Hyperlipidemia     Hypertension     Hyponatremia 12/05/2020    Low serum cortisol level 12/07/2020    Neuropathy     Pneumonia     Pneumonia due to COVID-19 virus 12/05/2020    Sepsis ([facility])  05/22/2018    Tinnitus     Unspecified adrenocortical insufficiency (HCC) 02/28/2023    Felt to be related to COVID-19 infection.  No current symptoms.    UTI (urinary tract infection)      Social History     Socioeconomic History    Marital status:      Spouse name: Not on file    Number of children: Not on file    Years of education: Not on file    Highest education level: Not on file   Occupational History    Not on file   Tobacco Use    Smoking status: Never     Passive exposure: Never    Smokeless tobacco: Never   Vaping Use    Vaping status: Never Used   Substance and Sexual Activity    Alcohol use: Not Currently     Comment: rarely    Drug use: No    Sexual activity: Not Currently   Other Topics Concern    Not on file   Social History Narrative    Not on file     Social Determinants of Health     Financial Resource Strain: Low Risk  (8/28/2023)    Overall Financial Resource Strain (CARDIA)     Difficulty of Paying Living Expenses: Not very hard   Food Insecurity: No Food Insecurity (9/10/2024)    Hunger Vital Sign     Worried About Running Out of Food in the Last Year: Never true     Ran Out of Food in the Last Year: Never true   Transportation Needs: No Transportation Needs (9/10/2024)    PRAPARE - Transportation     Lack of Transportation (Medical): No     Lack of Transportation (Non-Medical): No   Physical Activity: Not on file   Stress: Not on file   Social Connections: Not on file   Intimate Partner Violence: Not on file   Housing Stability: Low Risk  (9/10/2024)    Housing Stability Vital Sign     Unable to Pay for Housing in the Last Year: No     Number of Times Moved in the Last Year: 0     Homeless in the Last Year: No      Family History   Problem Relation Age of Onset    Stroke Mother     Stroke Father     Heart disease Daughter     Substance Abuse Neg Hx     Mental illness Neg Hx      Past Surgical History:   Procedure Laterality Date    EYE SURGERY      HYSTERECTOMY      TONSILLECTOMY          Current Outpatient Medications:     Artificial Tears 0.2-0.2-1 % SOLN, Administer 1 drop to both eyes 3 (three) times a day, Disp: , Rfl:     aspirin (ECOTRIN LOW STRENGTH) 81 mg EC tablet, Take 1 tablet (81 mg total) by mouth daily, Disp: , Rfl: 0    atorvastatin (LIPITOR) 10 mg tablet, Take 1 tablet (10 mg total) by mouth daily, Disp: 90 tablet, Rfl: 1    Cholecalciferol (VITAMIN D3) 1000 units CAPS, Take 1,000 Units by mouth daily , Disp: , Rfl:     Cyanocobalamin (VITAMIN B12 PO), Take 1,000 mcg by mouth in the morning, Disp: , Rfl:     Empagliflozin (Jardiance) 10 MG TABS tablet, Take 1 tablet (10 mg total) by mouth every morning, Disp: 30 tablet, Rfl: 0    gabapentin (NEURONTIN) 600 MG tablet, Take 1 tablet (600 mg total) by mouth 2 (two) times a day, Disp: 180 tablet, Rfl: 0    Mirabegron ER (Myrbetriq) 25 MG TB24, TAKE 1 TABLET BY MOUTH IN THE MORNING, Disp: 30 tablet, Rfl: 5    omeprazole (PriLOSEC) 20 mg delayed release capsule, Take 1 capsule by mouth once daily, Disp: 90 capsule, Rfl: 1    [START ON 9/12/2024] spironolactone (ALDACTONE) 25 mg tablet, Take 1 tablet (25 mg total) by mouth daily, Disp: 30 tablet, Rfl: 0    [START ON 9/23/2024] torsemide (DEMADEX) 20 mg tablet, Take 1 tablet (20 mg total) by mouth daily DO NOT START BEFORE CARDIOLOGIST CALLS YOU Do not start before September 23, 2024., Disp: , Rfl:   No current facility-administered medications for this visit.    Facility-Administered Medications Ordered in Other Visits:     acetaminophen (TYLENOL) tablet 650 mg, 650 mg, Oral, Q6H PRN, Shilpi Spicer PA-C    Artificial Tears Op Soln 1 drop, 1 drop, Both Eyes, TID, Shilpi Spicer PA-C, 1 drop at 09/11/24 0813    aspirin (ECOTRIN LOW STRENGTH) EC tablet 81 mg, 81 mg, Oral, Daily, Shilpi Spicer PA-C, 81 mg at 09/11/24 0813    atorvastatin (LIPITOR) tablet 10 mg, 10 mg, Oral, Daily, Shilpi Spicer PA-C, 10 mg at 09/11/24 0813     Empagliflozin (JARDIANCE) tablet 10 mg, 10 mg, Oral, Daily, Yoan Mina PA-C, 10 mg at 09/11/24 0813    gabapentin (NEURONTIN) capsule 300 mg, 300 mg, Oral, TID, Shilpi Spicer PA-C, 300 mg at 09/11/24 0813    heparin (porcine) subcutaneous injection 5,000 Units, 5,000 Units, Subcutaneous, Q8H DONNA, Shilpi Spicer PA-C, 5,000 Units at 09/11/24 0606    labetalol (NORMODYNE) injection 10 mg, 10 mg, Intravenous, Q6H PRN, Shilpi Spicer PA-C    pantoprazole (PROTONIX) EC tablet 40 mg, 40 mg, Oral, Early Morning, Shilpi Spicer PA-C, 40 mg at 09/11/24 0606    spironolactone (ALDACTONE) tablet 25 mg, 25 mg, Oral, Daily, Yoan Mina PA-C, 25 mg at 09/11/24 0900  No Known Allergies    Labs:{Recent labs:18484}  Imaging: Echo complete w/ contrast if indicated    Result Date: 9/10/2024  Narrative:   Left Ventricle: Left ventricular cavity size is small. Wall thickness is mildly increased. The left ventricular ejection fraction is 70% by visual estimation. Systolic function is vigorous. Wall motion is normal. Diastolic function is moderately abnormal, consistent with grade II (pseudonormal) relaxation.   Right Ventricle: Right ventricular cavity size is normal. Systolic function is normal.   Aortic Valve: There is mild stenosis. The aortic valve peak velocity is 1.56 m/s. The aortic valve mean gradient is 5 mmHg. The aortic valve area is 2.47 cm2. The aortic valve velocity is increased due to stenosis but lower than expected due to the presence of decreased flow.   Tricuspid Valve: The right ventricular systolic pressure is mildly elevated. The estimated right ventricular systolic pressure is 43.00 mmHg.      VAS VENOUS DUPLEX - LOWER LIMB BILATERAL    Result Date: 9/10/2024  Narrative:  [facility] REPORT [facility]INICAL: Indications: Localized edema [R60.0].  Patient presents with bilateral lower extremity edema and pain for a while. Operative History: no reported  cardiovascular surgeries   CONCLUSION:  Impression: RIGHT LOWER LIMB: No evidence of acute or chronic deep vein thrombosis. No evidence of superficial thrombophlebitis noted. Doppler evaluation shows a normal response to augmentation maneuvers. Popliteal, posterior tibial and anterior tibial arterial Doppler waveform's are triphasic.  LEFT LOWER LIMB: No evidence of acute or chronic deep vein thrombosis. No evidence of superficial thrombophlebitis noted. Doppler evaluation shows a normal response to augmentation maneuvers. Popliteal, posterior tibial and anterior tibial arterial Doppler waveform's are triphasic.  Technical findings were faxed to the patient's chart.  Tech Note: Limited compressions were performed due to patient's pain.  Technically difficult/limited study. Some segments may be poorly visualized on today's exam.  SIGNATURE: Electronically Signed by: ARLENE CLIFFORD MD on 2024-09-10 12:28:13 PM    X-ray chest 1 view portable    Result Date: 9/10/2024  Narrative: XR CHEST PORTABLE INDICATION: CHF. COMPARISON: 05/04/2024 FINDINGS: Clear lungs. No pneumothorax or pleural effusion. Cardiomegaly. Large hiatal hernia. Pulmonary vessels are normal. Bones are unremarkable for age. Calcific tendinitis in the left shoulder. Normal upper abdomen.     Impression: No acute cardiopulmonary disease. Workstation performed: [id number]       Review of Systems:  Review of Systems    Physical Exam:  Physical Exam    Discussion/Summary:***

## 2024-09-11 NOTE — ASSESSMENT & PLAN NOTE
Blood pressure reviewed and acceptable, 120/51 this morning  Torsemide on hold  Monitor BP with Aldactone  Follow-up with family doctor

## 2024-09-11 NOTE — CASE MANAGEMENT
Case Management Discharge Planning Note    Patient name Janice Yung  Location /-01 MRN [id number]  : 1930 Date 2024       Current Admission Date: 2024  Current Admission Diagnosis:Acute on chronic diastolic heart failure (HCC)   Patient Active Problem List    Diagnosis Date Noted Date Diagnosed    Bilateral leg edema 2024     Dementia without behavioral disturbance, psychotic disturbance, mood disturbance, or anxiety, unspecified dementia severity, unspecified dementia type (HCC) 2024     Peripheral vascular disease, unspecified (HCC) 2024     Stage 3a chronic kidney disease (HCC) 2023     Earache 2023     Diarrhea 2023     Pulmonary nodules 2023     Cystitis 2023     Paroxysmal atrial fibrillation (HCC) 2022     Generalized weakness 2022     Acute on chronic diastolic heart failure (HCC) 2022     Hyponatremia 2020     Acute pain of left shoulder 2020     History of recurrent UTI (urinary tract infection) 2020     Change in mental status      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  [address] [address]  Phone: [phone] Fax: [phone]    Primary Care Provider: Edgar Mustafa MD    Primary Insurance:  MEDICARE  Secondary Insurance:     DISCHARGE DETAILS:     CM spoke w/ Deyanira moreira about therapy's recommendation of HH and Deyanira was in agreement. CM reviewed pt choice list and they selected [facility]. [facility] reserved in [place].  IMM reviewed with patient's caregiver, patient's caregiver agrees with discharge determination.      Requested Home Health Care         Home Health Agency Name:: [facility]    IMM Given (Date):: 09/11/24 (1016am)  IMM Given to:: Family  Family notified:: Deyanira Lemus dtr

## 2024-09-11 NOTE — DISCHARGE SUMMARY
Discharge Summary - Hospitalist   Name: Janice Yung 89 y.o. female I MRN: [id number]  Unit/Bed#: -01 I Date of Admission: 9/9/2024   Date of Service: 9/11/2024 I Hospital Day: 1     Assessment & Plan  Acute on chronic diastolic heart failure (HCC)  Wt Readings from Last 3 Encounters:   09/11/24 73.5 kg (162 lb 0.6 oz)   09/09/24 74.4 kg (164 lb)   07/10/24 72.1 kg (159 lb)     Presents with 2 day hx of acute on chronic bilateral LE edema + increased SOB/BRIAN and audible wheeze per daughter  Stable on RA on admission however with significant pitting edema    BNP 98   Prior Echo 6/2023: EF 60-65%  Home diuretic: torsemide 20 mg BID (daughter denies missed doses)  Unclear baseline weight (159 lbs during outpatient visit in July 2024)  165 lbs on admission   S/P IV 40 mg x 1 in ED  Consult cardiology  On IV lasix 80 mg BID --> edema significantly improved, transition to spironolactone 25 mg daily  Salt and fluid restriction   Updated echocardiogram with EF of 70%, grade 2 diastolic dysfunction  Torsemide is on hold until patient sees cardiologist.  Cardiology will set up appointment with the patient in 1 to 2 weeks.  Hypertension  Blood pressure reviewed and acceptable, 120/51 this morning  Torsemide on hold  Monitor BP with Aldactone  Follow-up with family doctor  Gastroesophageal reflux disease without esophagitis  Continue PPI  Mixed hyperlipidemia  Continue statin  Paroxysmal atrial fibrillation (HCC)  Not on AC at baseline, aspirin only   Not on any BB  NSR on admission   Stage 3a chronic kidney disease (HCC)  Lab Results   Component Value Date    EGFR 48 09/11/2024    EGFR 60 09/10/2024    EGFR 58 09/09/2024    CREATININE 0.99 09/11/2024    CREATININE 0.83 09/10/2024    CREATININE 0.86 09/09/2024     Cr is currently at baseline   Repeat BMP 1 week with cardiologist     Medical Problems       Resolved Problems  Date Reviewed: 9/10/2024   None       Discharging Physician / Practitioner: Kris Mishra,  PA-UMA  PCP: Edgar Mustafa MD  Admission Date:   Admission Orders (From admission, onward)       Ordered        09/10/24 1528  INPATIENT ADMISSION  Once            09/09/24 1646  Place in Observation  Once                          Discharge Date: 09/11/24    Consultations During Hospital Stay:  Cardiology     Procedures Performed:   None     Significant Findings / Test Results:   Echocardiogram EF 70%, grade 2 diastolic dysfunction    Incidental Findings:   none     Test Results Pending at Discharge (will require follow up):   none     Outpatient Tests Requested:  BMP 1 week    Complications:  none    Reason for Admission: CHF exac    Hospital Course:   Janice Yung is a 89 y.o. female patient who originally presented to the hospital on 9/9/2024 due to leg swelling.  She noted increased shortness of breath with leg swelling and wheezing per daughter.  She had significant pitting edema upon admission, was diuresed with IV Lasix 80 mg twice daily.  She did show rapid clinical improvement, stable creatinine.  Leg swelling is resolved at this point but does have some leg cramping from the resolution of edema.  Torsemide is to be placed on hold.  She was started on Aldactone 25 mg daily.  Echocardiogram was updated as above.  She will have outpatient follow-up with cardiologist, continued salt and fluid restriction and recommended to weigh yourself every day.  She is in stable condition presently for discharge with home health care as set up by case management.    Hospital Course: No notes on file    Please see above list of diagnoses and related plan for additional information.     Condition at Discharge: stable    Discharge Day Visit / Exam:   Subjective: Having some cramping in her legs but no swelling.  No shortness of breath.  Was able to eat breakfast this morning.  Excited that she is able to go home.  Vitals: Blood Pressure: 120/51 (09/11/24 0715)  Pulse: 70 (09/11/24 0715)  Temperature: 97.9 °F (36.6 °C)  (09/10/24 2312)  Temp Source: Temporal (09/10/24 0620)  Respirations: 18 (09/10/24 0620)  Height: 5' (152.4 cm) (09/10/24 1252)  Weight - Scale: 73.5 kg (162 lb 0.6 oz) (09/11/24 0606)  SpO2: 90 % (09/11/24 0715)  Exam:   Physical Exam  Vitals and nursing note reviewed.   Constitutional:       General: She is not in acute distress.     Appearance: She is well-developed. She is not ill-appearing or diaphoretic.   HENT:      Head: Normocephalic and atraumatic.      Mouth/Throat:      Mouth: Oropharynx is clear and moist. Mucous membranes are moist.   Eyes:      General: No scleral icterus.     Extraocular Movements: EOM normal.      Pupils: Pupils are equal, round, and reactive to light.   Cardiovascular:      Rate and Rhythm: Normal rate and regular rhythm.      Heart sounds: Normal heart sounds. No murmur heard.  Pulmonary:      Effort: Pulmonary effort is normal. No respiratory distress.      Breath sounds: Normal breath sounds. No wheezing or rales.   Abdominal:      General: Bowel sounds are normal. There is no distension.      Palpations: Abdomen is soft.      Tenderness: There is no abdominal tenderness. There is no guarding or rebound.   Musculoskeletal:         General: No deformity.      Cervical back: Neck supple.      Right lower leg: No edema.      Left lower leg: No edema.   Skin:     General: Skin is warm and dry.      Capillary Refill: Capillary refill takes less than 2 seconds.      Coloration: Skin is not jaundiced or pale.      Findings: No erythema or rash.   Neurological:      General: No focal deficit present.      Mental Status: She is alert and oriented to person, place, and time. Mental status is at baseline.   Psychiatric:         Mood and Affect: Mood and affect normal.          Discussion with Family: Updated  (daughter) via phone.    Discharge instructions/Information to patient and family:   See after visit summary for information provided to patient and family.       Provisions for Follow-Up Care:  See after visit summary for information related to follow-up care and any pertinent home health orders.      Mobility at time of Discharge:   Basic Mobility Inpatient Raw Score: 18  JH-HLM Goal: 6: Walk 10 steps or more  JH-HLM Achieved: 4: Move to chair/commode  HLM Goal NOT achieved. Continue to encourage mobility in post discharge setting.     Disposition:   Home with VNA Services (Reminder: Complete face to face encounter)    Planned Readmission: no    Discharge Medications:  See after visit summary for reconciled discharge medications provided to patient and/or family.      Administrative Statements   Discharge Statement:  I have spent a total time of 35 minutes in caring for this patient on the day of the visit/encounter. >30 minutes of time was spent on: Diagnostic results, Instructions for management, Patient and family education, Importance of tx compliance, Counseling / Coordination of care, Documenting in the medical record, and Communicating with other healthcare professionals .    **Please Note: This note may have been constructed using a voice recognition system**

## 2024-09-11 NOTE — PROGRESS NOTES
Patient:    MRN:  [id number]    Aidin Request ID:  [id number]    Level of care reserved:  Home Health Agency    Partner Reserved:  [facility], [place] [address] [phone]    Clinical needs requested:    Geography searched:  [id number]    Start of Service:    Request sent:  9:15am EDT on 9/11/2024 by Kimberly Arizmendi    Partner reserved:  10:13am EDT on 9/11/2024 by Kimberly Arizmendi    Choice list shared:  10:12am EDT on 9/11/2024 by Kimberly Arizmendi   No

## 2024-09-12 ENCOUNTER — HOME CARE VISIT (OUTPATIENT)
Dept: HOME HEALTH SERVICES | Facility: HOME HEALTHCARE | Age: 89
End: 2024-09-12
Payer: MEDICARE

## 2024-09-12 ENCOUNTER — PATIENT OUTREACH (OUTPATIENT)
Dept: CASE MANAGEMENT | Facility: OTHER | Age: 89
End: 2024-09-12

## 2024-09-12 ENCOUNTER — TRANSITIONAL CARE MANAGEMENT (OUTPATIENT)
Dept: FAMILY MEDICINE CLINIC | Facility: CLINIC | Age: 89
End: 2024-09-12

## 2024-09-12 ENCOUNTER — TELEMEDICINE (OUTPATIENT)
Dept: CARDIOLOGY CLINIC | Facility: CLINIC | Age: 89
End: 2024-09-12
Payer: MEDICARE

## 2024-09-12 VITALS
OXYGEN SATURATION: 96 % | HEIGHT: 60 IN | HEART RATE: 71 BPM | DIASTOLIC BLOOD PRESSURE: 70 MMHG | BODY MASS INDEX: 31.57 KG/M2 | WEIGHT: 160.8 LBS | SYSTOLIC BLOOD PRESSURE: 126 MMHG

## 2024-09-12 VITALS
OXYGEN SATURATION: 96 % | SYSTOLIC BLOOD PRESSURE: 126 MMHG | HEART RATE: 71 BPM | TEMPERATURE: 97.7 F | RESPIRATION RATE: 16 BRPM | DIASTOLIC BLOOD PRESSURE: 72 MMHG

## 2024-09-12 DIAGNOSIS — I10 HYPERTENSION, UNSPECIFIED TYPE: ICD-10-CM

## 2024-09-12 DIAGNOSIS — I50.33 ACUTE ON CHRONIC DIASTOLIC HEART FAILURE (HCC): ICD-10-CM

## 2024-09-12 DIAGNOSIS — I50.32 CHRONIC HEART FAILURE WITH PRESERVED EJECTION FRACTION (HCC): Primary | ICD-10-CM

## 2024-09-12 DIAGNOSIS — N18.31 STAGE 3A CHRONIC KIDNEY DISEASE (HCC): ICD-10-CM

## 2024-09-12 DIAGNOSIS — E78.2 MIXED HYPERLIPIDEMIA: ICD-10-CM

## 2024-09-12 DIAGNOSIS — Z71.89 COORDINATION OF COMPLEX CARE: Primary | ICD-10-CM

## 2024-09-12 PROCEDURE — 10330081 VN NO-PAY CLAIM PROCEDURE

## 2024-09-12 PROCEDURE — 99442 PR PHYS/QHP TELEPHONE EVALUATION 11-20 MIN: CPT | Performed by: NURSE PRACTITIONER

## 2024-09-12 PROCEDURE — 400013 VN SOC

## 2024-09-12 PROCEDURE — G0299 HHS/HOSPICE OF RN EA 15 MIN: HCPCS

## 2024-09-12 NOTE — PATIENT INSTRUCTIONS
Maintain a 2 gram daily sodium diet and 1500 ml daily fluid restriction.  Check daily weights.  If you gained 3 pounds in one day, 5 pounds in one week, or experience worsening shortness of breath or increasing lower leg swelling.  Please call the heart failure office at [phone].  Please bring a  list of your current medications and daily weights to the office visit

## 2024-09-12 NOTE — PROGRESS NOTES
Outpatient Care Management Note:    New heart failure referral received. Patient was hospitalized from 9/9-9/11/24 with acute on chronic diastolic heart failure. She presented with lower extremity edema, increased shortness of breath and wheezing. She was treated with IV lasix and transitioned to spironalactone.  Her torsemide is on hold until she speaks with cardiology.  She is have a BMP completed by 9/18 and follow up with PCP (9/17) and cardiology (9/12). She will have  VNA: nursing, physical and occupational therapy.     Janice had a telemed visit with cardiology today.  She is to use torsemide as needed for weight gain of 3 lbs, worsening lower extremity swellling or shortness of breath.     CM called Janice and spoke with her daughter, Deyanira.  Deyanira is listed on patient's communication form.     Following low sodium diet:  We reviewed that Janice should be following a low salt diet and limiting her salt to <2000 mg/day.  Deyanira usually prepares frozen meals. We reviewed that these are routinely high in salt. We reviewed how to read food labels. Deyanira will monitor her salt intake. We reviewed foods high in salt to be avoided or limited.   Following fluid restriction:  We reviewed that Janice is to limit her fluid intake to <1500 cc/day or 50 ounces.  Deyanira does not feel is drinking anywhere close to 50 ounces.   Hospital discharge weight:  162 lbs.    Weighing daily:  yes         Weight log: will start keeping a log  1st home weight:   160.8 lbs      Weight today: 160.8 lbs  Monitoring symptoms: Will monitor for any increased swelling, shortness of breath, decreased activity tolerance or orthopnea and will call cardiology with any increased symptoms.   Any current symptoms: denies any symptoms currently and noted that her leg swelling is improved.   When to call provider: She can use torsemide as needed for weight gain of 3 lbs. She is aware to call provider if weight increases by 3 lb in 1 day or 5 lb  in 1 week.   Medications reviewed:  declined stating she just did it with VNA.  We did review new med changes.   Knows name of diuretic: yes   Escalation plan: Reviewed. See above  HF education reviewed/reinforced including low sodium diet, fluid restriction, activity, symptoms of decompensation and when and who to call.   Cardiology follow up appointment: Was today  PCP follow up appointment:9/17  Transportation: provided by [facility] health care agency: [facility]  Start of Care Date: 9/12/24    Deyanira declined ongoing outreach. I did give her my contact information and she will call with any questions.

## 2024-09-12 NOTE — ASSESSMENT & PLAN NOTE
Lab Results   Component Value Date    EGFR 48 09/11/2024    EGFR 60 09/10/2024    EGFR 58 09/09/2024    CREATININE 0.99 09/11/2024    CREATININE 0.83 09/10/2024    CREATININE 0.86 09/09/2024

## 2024-09-12 NOTE — PROGRESS NOTES
Virtual Brief Visit  Name: Janice Yung      : 1930      MRN: [id number]  Encounter Provider: REAL Jama  Encounter Date: 2024   Encounter department: [facility] [place]    This Visit is being completed by telephone. The Patient is located at Home and in the following state in which I hold an active license [place]    The patient was identified by name and date of birth. Janice Yung was informed that this is a telemedicine visit and that the visit is being conducted through Telephone.  My office door was closed. No one else was in the room.  She acknowledged consent and understanding of privacy and security of the video platform. The patient has agreed to participate and understands they can discontinue the visit at any time.    Patient is aware this is a billable service.     Assessment & Plan  Chronic heart failure with preserved ejection fraction (HCC)  Wt Readings from Last 3 Encounters:   24 72.9 kg (160 lb 12.8 oz)   24 73.5 kg (162 lb 0.6 oz)   24 74.4 kg (164 lb)   NYHA class III stage C-weight at home 168.8 pounds.  Lower extremity edema improved.  Continue on Aldactone 25 mg daily, Jardiance 10 mg daily, instructed to take torsemide 20 mg daily only if needed for weight gain 3 pounds in 1 day lower extremity edema or worsening shortness of breath.  I have advised a 2 g sodium diet reading food labels consuming food 10% or less per serving.  Strict fluid 1800 cc daily.  Continue with daily weights.       Hypertension, unspecified type  Controlled on Aldactone 25 mg daily, DASH diet       Mixed hyperlipidemia  23   HDL 46 LDL 98  Continue Lipitor 10 mg daily       Stage 3a chronic kidney disease (HCC)  Lab Results   Component Value Date    EGFR 48 2024    EGFR 60 09/10/2024    EGFR 58 2024    CREATININE 0.99 2024    CREATININE 0.83 09/10/2024    CREATININE 0.86 2024              History of Present  Illness   HPI  Ms Janice Yepez was admitted to [facility] on 9/09 - 9/11/24 with acute on chronic HFpEF.  Janice presented to the ED with progressive shortness of breath and lower extremity edema.  She was diuresed with IV Lasix 80 mg twice daily with significant improvement in lower extremity edema.  She was transition to spironolactone 25 mg daily.  Torsemide held.  TTE showed LVEF 70% grade 2 pseudonormal relaxation.  Right ventricular size systolic function normal.  Mild aortic valve stenosis.  Right ventricular systolic pressure mildly elevated.  43 mmHg.  Janice was discharged home instructed follow-up with cardiology.    Janice was discharged yesterday.  Due to her age that is difficult to get out of her home.  It was requested she have a telephone virtual visit via her daughter Pita.  Today's office visit conducted via her daughter Pita.  According to Pita  her mother lower extremity edema has significantly improved.  Her weight at home is 160.8 pounds.  Janice admits to fatigue and is sleeping in a recliner.  She denies worsening dyspnea chest pain palpitation lightheadedness or dizziness.  She apparently is eating foods higher in sodium such as frozen dinners.VNA is coming twice a week.  Lab studies ordered for next week.       Medical History   Primary Cardiologist Dr Chin   Hypertension  Hyperlipidemia 4/05/23   HDL 46 LDL 98  GERD  CKD III baseline creat  0.99 GFR 48     Visit Time  Total Visit Duration: 20 min

## 2024-09-13 ENCOUNTER — HOME CARE VISIT (OUTPATIENT)
Dept: HOME HEALTH SERVICES | Facility: HOME HEALTHCARE | Age: 89
End: 2024-09-13
Payer: MEDICARE

## 2024-09-13 VITALS — SYSTOLIC BLOOD PRESSURE: 128 MMHG | HEART RATE: 88 BPM | DIASTOLIC BLOOD PRESSURE: 72 MMHG | OXYGEN SATURATION: 90 %

## 2024-09-13 PROCEDURE — G0151 HHCP-SERV OF PT,EA 15 MIN: HCPCS

## 2024-09-16 ENCOUNTER — TELEPHONE (OUTPATIENT)
Dept: FAMILY MEDICINE CLINIC | Facility: CLINIC | Age: 89
End: 2024-09-16

## 2024-09-16 ENCOUNTER — HOME CARE VISIT (OUTPATIENT)
Dept: HOME HEALTH SERVICES | Facility: HOME HEALTHCARE | Age: 89
End: 2024-09-16
Payer: MEDICARE

## 2024-09-16 VITALS
RESPIRATION RATE: 16 BRPM | HEART RATE: 70 BPM | SYSTOLIC BLOOD PRESSURE: 150 MMHG | DIASTOLIC BLOOD PRESSURE: 70 MMHG | OXYGEN SATURATION: 98 % | TEMPERATURE: 97.7 F

## 2024-09-16 PROCEDURE — G0299 HHS/HOSPICE OF RN EA 15 MIN: HCPCS

## 2024-09-16 PROCEDURE — G0180 MD CERTIFICATION HHA PATIENT: HCPCS | Performed by: INTERNAL MEDICINE

## 2024-09-16 NOTE — CASE COMMUNICATION
Hi, just wanted to give an update on Janice Yung - tagging all of you because you've either just seen her recently or are about to see her.    At todays visit she said she's been extremely fatigued/tired since coming home, she sleeps most of the day, hard to keep her eyes open. Her edema is still +2 pitting in BLE and her weight has gone from 160.8lb on Friday 9/13 to 163lb today. She is watching her salt and is not eating a lot.  He r vitals today were /70, HR 71, O2 98%, Resp 16. Lungs clear. No dizziness or changes in breathing, no worsening dyspnea.  She is on the Heart Failure  Program.   I'm doing labwork on Wednesday 9/18 for a BMP - should anything else be added to that??   I let the pt and her daughter know that if there are any further questions/concerns I would request that  the PCP or Cardiology office reach out by phone and discuss it further  w them.     Thank you,  Matilde Carbone RN w the VNA

## 2024-09-16 NOTE — TELEPHONE ENCOUNTER
Tried to call pt. Wanted to confirm appointment and insurance for tomorrow. Phone not in service.

## 2024-09-16 NOTE — CASE COMMUNICATION
Pt has torsemide 20 mg at home and the script says not to take until cardiology tells her on 9/23/24.  Daughter will give her a dose tomorrow morning.  Pt will have BMP drawn on Wednesday, do you want any other labs?

## 2024-09-16 NOTE — Clinical Note
Thanks so much!    ----- Message -----  From: REAL Jama  Sent: 9/16/2024   5:02 PM EDT  To: Matilde Carbone RN      Thank you .  Her daughter was instructed to give her a dose of lasix for a weight gain of 3 pounds in a day. I will have our Nurse call Her.  Kaelyn  ----- Message -----  From: Matilde Carbone RN  Sent: 9/16/2024   4:04 PM EDT  To: REAL Jama; Edgar Mustafa MD; *    Hi, just wanted to give an update on Janice Yung - tagging all of you because you've either just seen her recently or are about to see her.    At todays visit she said she's been extremely fatigued/tired since coming home, she sleeps most of the day, hard to keep her eyes open. Her edema is still +2 pitting in BLE and her weight has gone from 160.8lb on Friday 9/13 to 163lb today. She is watching her salt and is not eating a lot.  Her vitals today were /70, HR 71, O2 98%, Resp 16. Lungs clear. No dizziness or changes in breathing, no worsening dyspnea.  She is on the Heart Failure  Program.   I'm doing labwork on Wednesday 9/18 for a BMP - should anything else be added to that??   I let the pt and her daughter know that if there are any further questions/concerns I would request that  the PCP or Cardiology office reach out by phone and discuss it further w them.     Thank you,  Matilde Carbone RN w the VNA

## 2024-09-17 ENCOUNTER — HOME CARE VISIT (OUTPATIENT)
Dept: HOME HEALTH SERVICES | Facility: HOME HEALTHCARE | Age: 89
End: 2024-09-17
Payer: MEDICARE

## 2024-09-17 ENCOUNTER — OFFICE VISIT (OUTPATIENT)
Dept: FAMILY MEDICINE CLINIC | Facility: CLINIC | Age: 89
End: 2024-09-17
Payer: MEDICARE

## 2024-09-17 VITALS
OXYGEN SATURATION: 97 % | HEART RATE: 68 BPM | DIASTOLIC BLOOD PRESSURE: 74 MMHG | SYSTOLIC BLOOD PRESSURE: 120 MMHG | WEIGHT: 163 LBS | BODY MASS INDEX: 31.83 KG/M2

## 2024-09-17 DIAGNOSIS — E87.6 HYPOKALEMIA: ICD-10-CM

## 2024-09-17 DIAGNOSIS — F03.90 DEMENTIA WITHOUT BEHAVIORAL DISTURBANCE, PSYCHOTIC DISTURBANCE, MOOD DISTURBANCE, OR ANXIETY, UNSPECIFIED DEMENTIA SEVERITY, UNSPECIFIED DEMENTIA TYPE (HCC): ICD-10-CM

## 2024-09-17 DIAGNOSIS — Z87.440 HISTORY OF RECURRENT UTI (URINARY TRACT INFECTION): ICD-10-CM

## 2024-09-17 DIAGNOSIS — I50.9 CHF (CONGESTIVE HEART FAILURE) (HCC): ICD-10-CM

## 2024-09-17 DIAGNOSIS — N30.00 ACUTE CYSTITIS WITHOUT HEMATURIA: Primary | ICD-10-CM

## 2024-09-17 DIAGNOSIS — R60.0 BILATERAL LEG EDEMA: ICD-10-CM

## 2024-09-17 DIAGNOSIS — N18.31 STAGE 3A CHRONIC KIDNEY DISEASE (HCC): ICD-10-CM

## 2024-09-17 DIAGNOSIS — I50.33 ACUTE ON CHRONIC DIASTOLIC HEART FAILURE (HCC): ICD-10-CM

## 2024-09-17 PROCEDURE — 99495 TRANSJ CARE MGMT MOD F2F 14D: CPT | Performed by: FAMILY MEDICINE

## 2024-09-17 RX ORDER — SPIRONOLACTONE 25 MG/1
25 TABLET ORAL DAILY
Qty: 30 TABLET | Refills: 5 | Status: SHIPPED | OUTPATIENT
Start: 2024-09-17 | End: 2024-10-17

## 2024-09-17 RX ORDER — POTASSIUM CHLORIDE 750 MG/1
10 TABLET, EXTENDED RELEASE ORAL DAILY
Qty: 90 TABLET | Refills: 3 | Status: SHIPPED | OUTPATIENT
Start: 2024-09-17

## 2024-09-17 RX ORDER — SULFAMETHOXAZOLE/TRIMETHOPRIM 800-160 MG
1 TABLET ORAL EVERY 12 HOURS SCHEDULED
Qty: 20 TABLET | Refills: 2 | Status: SHIPPED | OUTPATIENT
Start: 2024-09-17 | End: 2024-09-27

## 2024-09-17 NOTE — PROGRESS NOTES
Assessment/Plan:    No problem-specific Assessment & Plan notes found for this encounter.       Diagnoses and all orders for this visit:    Acute cystitis without hematuria  -     sulfamethoxazole-trimethoprim (BACTRIM DS) 800-160 mg per tablet; Take 1 tablet by mouth every 12 (twelve) hours for 10 days    Acute on chronic diastolic heart failure (HCC)    Dementia without behavioral disturbance, psychotic disturbance, mood disturbance, or anxiety, unspecified dementia severity, unspecified dementia type (HCC)    Stage 3a chronic kidney disease (HCC)    History of recurrent UTI (urinary tract infection)    Bilateral leg edema  -     Empagliflozin (Jardiance) 10 MG TABS tablet; Take 1 tablet (10 mg total) by mouth every morning    CHF (congestive heart failure) (formerly [facility])  -     spironolactone (ALDACTONE) 25 mg tablet; Take 1 tablet (25 mg total) by mouth daily          Subjective:   Chief Complaint   Patient presents with    Transition of Care Management    Fatigue        Patient ID: Janice Yung is a 89 y.o. female.    Fatigue  Associated symptoms include fatigue. Pertinent negatives include no abdominal pain, arthralgias, chest pain, congestion, fever, myalgias, nausea, numbness, sore throat, vomiting or weakness.       The following portions of the patient's history were reviewed and updated as appropriate: allergies, current medications, past family history, past medical history, past social history, past surgical history and problem list.    Review of Systems   Constitutional:  Positive for fatigue. Negative for fever and unexpected weight change.   HENT:  Negative for congestion, sinus pain and sore throat.    Eyes:  Negative for visual disturbance.   Respiratory:  Negative for shortness of breath and wheezing.    Cardiovascular:  Negative for chest pain and palpitations.   Gastrointestinal:  Negative for abdominal pain, nausea and vomiting.   Musculoskeletal: Negative.  Negative for arthralgias and myalgias.    Neurological:  Negative for syncope, weakness and numbness.   Psychiatric/Behavioral: Negative.  Negative for confusion, dysphoric mood and suicidal ideas.          Objective:  Vitals:    09/17/24 1228   BP: 120/74   BP Location: Left arm   Patient Position: Sitting   Cuff Size: Standard   Pulse: 68   SpO2: 97%   Weight: 73.9 kg (163 lb)      Physical Exam  Constitutional:       Appearance: She is well-developed.   HENT:      Right Ear: Ear canal normal. Tympanic membrane is not injected.      Left Ear: Ear canal normal. Tympanic membrane is not injected.      Nose: Nose normal.   Eyes:      General:         Right eye: No discharge.         Left eye: No discharge.      Conjunctiva/sclera: Conjunctivae normal.      Pupils: Pupils are equal, round, and reactive to light.   Neck:      Thyroid: No thyromegaly.   Cardiovascular:      Rate and Rhythm: Normal rate and regular rhythm.      Heart sounds: Normal heart sounds. No murmur heard.  Pulmonary:      Effort: Pulmonary effort is normal. No respiratory distress.      Breath sounds: Normal breath sounds. No wheezing.   Abdominal:      General: Bowel sounds are normal. There is no distension.      Palpations: Abdomen is soft.      Tenderness: There is no abdominal tenderness.   Musculoskeletal:         General: Normal range of motion.      Cervical back: Normal range of motion and neck supple.   Lymphadenopathy:      Cervical: No cervical adenopathy.   Skin:     General: Skin is warm and dry.   Neurological:      Mental Status: She is alert and oriented to person, place, and time. She is not disoriented.      Sensory: No sensory deficit.      Motor: No weakness.      Coordination: Coordination normal.      Gait: Gait normal.      Deep Tendon Reflexes: Reflexes are normal and symmetric.   Psychiatric:         Speech: Speech normal.         Behavior: Behavior normal.         Thought Content: Thought content normal.         Judgment: Judgment normal.

## 2024-09-18 ENCOUNTER — HOME CARE VISIT (OUTPATIENT)
Dept: HOME HEALTH SERVICES | Facility: HOME HEALTHCARE | Age: 89
End: 2024-09-18
Payer: MEDICARE

## 2024-09-18 ENCOUNTER — LAB REQUISITION (OUTPATIENT)
Dept: LAB | Facility: HOSPITAL | Age: 89
End: 2024-09-18
Payer: MEDICARE

## 2024-09-18 VITALS
OXYGEN SATURATION: 97 % | HEART RATE: 70 BPM | RESPIRATION RATE: 16 BRPM | TEMPERATURE: 97.6 F | DIASTOLIC BLOOD PRESSURE: 70 MMHG | SYSTOLIC BLOOD PRESSURE: 134 MMHG

## 2024-09-18 DIAGNOSIS — I50.33 ACUTE ON CHRONIC DIASTOLIC (CONGESTIVE) HEART FAILURE (HCC): ICD-10-CM

## 2024-09-18 LAB
ANION GAP SERPL CALCULATED.3IONS-SCNC: 10 MMOL/L (ref 4–13)
BUN SERPL-MCNC: 17 MG/DL (ref 5–25)
CALCIUM SERPL-MCNC: 9.2 MG/DL (ref 8.4–10.2)
CHLORIDE SERPL-SCNC: 103 MMOL/L (ref 96–108)
CO2 SERPL-SCNC: 29 MMOL/L (ref 21–32)
CREAT SERPL-MCNC: 1 MG/DL (ref 0.6–1.3)
GFR SERPL CREATININE-BSD FRML MDRD: 48 ML/MIN/1.73SQ M
GLUCOSE SERPL-MCNC: 123 MG/DL (ref 65–140)
POTASSIUM SERPL-SCNC: 4.3 MMOL/L (ref 3.5–5.3)
SODIUM SERPL-SCNC: 142 MMOL/L (ref 135–147)

## 2024-09-18 PROCEDURE — G0299 HHS/HOSPICE OF RN EA 15 MIN: HCPCS

## 2024-09-18 PROCEDURE — 80048 BASIC METABOLIC PNL TOTAL CA: CPT | Performed by: PHYSICIAN ASSISTANT

## 2024-09-19 ENCOUNTER — PATIENT OUTREACH (OUTPATIENT)
Dept: CASE MANAGEMENT | Facility: OTHER | Age: 89
End: 2024-09-19

## 2024-09-19 ENCOUNTER — HOME CARE VISIT (OUTPATIENT)
Dept: HOME HEALTH SERVICES | Facility: HOME HEALTHCARE | Age: 89
End: 2024-09-19
Payer: MEDICARE

## 2024-09-19 PROCEDURE — G0157 HHC PT ASSISTANT EA 15: HCPCS

## 2024-09-19 NOTE — PROGRESS NOTES
Outpatient Care Management Note:    Chart review for heart failure patient: Janice saw her PCP on 9/17.  He started her on bactrim X 10 days for cystitis and on potassium chloride.  She continues to be followed by LUKAS BOUDREAUX.

## 2024-09-20 ENCOUNTER — HOME CARE VISIT (OUTPATIENT)
Dept: HOME HEALTH SERVICES | Facility: HOME HEALTHCARE | Age: 89
End: 2024-09-20
Payer: MEDICARE

## 2024-09-20 VITALS
DIASTOLIC BLOOD PRESSURE: 70 MMHG | RESPIRATION RATE: 16 BRPM | OXYGEN SATURATION: 99 % | TEMPERATURE: 97.6 F | SYSTOLIC BLOOD PRESSURE: 118 MMHG | HEART RATE: 70 BPM

## 2024-09-20 PROCEDURE — G0299 HHS/HOSPICE OF RN EA 15 MIN: HCPCS

## 2024-09-22 VITALS
RESPIRATION RATE: 16 BRPM | DIASTOLIC BLOOD PRESSURE: 65 MMHG | HEART RATE: 78 BPM | OXYGEN SATURATION: 94 % | SYSTOLIC BLOOD PRESSURE: 110 MMHG | TEMPERATURE: 97.5 F | WEIGHT: 163.13 LBS | BODY MASS INDEX: 31.86 KG/M2

## 2024-09-23 ENCOUNTER — NURSE TRIAGE (OUTPATIENT)
Age: 89
End: 2024-09-23

## 2024-09-23 ENCOUNTER — HOME CARE VISIT (OUTPATIENT)
Dept: HOME HEALTH SERVICES | Facility: HOME HEALTHCARE | Age: 89
End: 2024-09-23
Payer: MEDICARE

## 2024-09-23 VITALS
SYSTOLIC BLOOD PRESSURE: 136 MMHG | OXYGEN SATURATION: 98 % | TEMPERATURE: 97.6 F | HEART RATE: 84 BPM | RESPIRATION RATE: 18 BRPM | DIASTOLIC BLOOD PRESSURE: 74 MMHG

## 2024-09-23 PROCEDURE — G0299 HHS/HOSPICE OF RN EA 15 MIN: HCPCS

## 2024-09-23 NOTE — TELEPHONE ENCOUNTER
Chief Complaint: weight gain    History of Present Illness (HPI): Matilde GAYTAN home health nurse calls to report weight gain.  Weight on 9/12 was 160 lbs.  On 9/20 weight was 163.8 lbs.  Today weight is 165.8 lbs.  She states pt has chronic +2 pitting edema to legs.  Pt feels her legs are heavier and she is having small blisters pop on legs.      VS/Weight: per Matilde stable     Pain: No    Risk Factors: Heart Failure    Recent Testing: Echo and Labs    Medicine: torsemide 20 mg daily     Upcoming Office Visit: Yes 10/2    Last Office Visit: 9/19    Additional Comments: please contact pt's daughter with instructions - Pita phone # [phone]     Please advise.

## 2024-09-23 NOTE — TELEPHONE ENCOUNTER
"Reason for Disposition  • Weight gain >3 lbs (2.3 kgs) in one day/5 lbs in a week or less/ 5 lbs over target weight  Weight loss < 5 lbs decrease in a week or less    Answer Assessement - Initial Assessment Questions  1. MAIN CONCERN OR SYMPTOM:  \"What is your main concern right now?\" \"What questions do you have?\" \"What's the main symptom you're worried about?\" (e.g., breathing difficulty, ankle swelling, weight gain.)   Weight gain  2. ONSET: \"When did the weight gain start?\"   9/20  3. BREATHING DIFFICULTY: \"Are you having any difficulty breathing?\" If Yes, ask: \"How bad is it?\"  (e.g., none, mild, moderate, severe)    - MILD: No SOB at rest, mild SOB with walking, speaks normally in sentences, able to lie down, no retractions, pulse < 100.   - MODERATE: SOB at rest, SOB with minimal exertion and prefers to sit, cannot lie down flat, speaks in phrases, mild retractions, audible wheezing, pulse 100-120.   - SEVERE: Very SOB at rest, speaks in single words, struggling to breathe, sitting hunched forward, retractions, pulse > 120  Denies   4. BETTER-SAME-WORSE: \"Are you getting better, staying the same, or getting worse compared to the day you were discharged?\"    Worse   5. HOSPITALIZATION: \"How long were you hospitalized?\" (e.g., days)   9/9-9/11; 2 days  6. DISCHARGE DATE: \"What date were you discharged from the hospital?\"   9/11  7. DISCHARGE DOCTOR: \"Who is the main doctor taking care of you now?\"   Kaelyn NOBLE  8. DISCHARGE APPOINTMENT: \"Have you scheduled a follow-up discharge appointment with your doctor?\"  Yes  9. DISCHARGE MEDICATIONS: \"Did the physician who discharged you order any new medications for you to use?\" If Yes, ask: \"Have you filled the prescription and started taking the medication?\"   Yes torsemide 20 mg daily   10. WEIGHT - DISCHARGE:  \"Do you know your weight when you were discharged from the hospital?\"    Unsure   12. WEIGHT - CURRENT:  \"What is your current weight?\"   165.8 " "lbs  13. OTHER SYMPTOMS: \"Do you have any other symptoms?\" (e.g., depression, weakness)   Worsening leg edema  14. Blood pressure and heart rate?   Stable per Matilde    Protocols used: Heart Failure Post-Hospitalization Follow-Up Call-ADULT-[place]    "

## 2024-09-23 NOTE — TELEPHONE ENCOUNTER
Returned call to patient's daughter,  Pita, & read her Kaelyn Ross's orders & instructions.    Pita verbalized understanding.    Advised Pita to call office on Thursday, 9/26, & to update patient's symptoms. Pita verbalized understanding.

## 2024-09-24 ENCOUNTER — HOME CARE VISIT (OUTPATIENT)
Dept: HOME HEALTH SERVICES | Facility: HOME HEALTHCARE | Age: 89
End: 2024-09-24
Payer: MEDICARE

## 2024-09-24 VITALS
OXYGEN SATURATION: 95 % | TEMPERATURE: 97.1 F | RESPIRATION RATE: 16 BRPM | HEART RATE: 75 BPM | SYSTOLIC BLOOD PRESSURE: 118 MMHG | DIASTOLIC BLOOD PRESSURE: 67 MMHG

## 2024-09-24 PROCEDURE — G0157 HHC PT ASSISTANT EA 15: HCPCS

## 2024-09-25 ENCOUNTER — TELEPHONE (OUTPATIENT)
Dept: CARDIOLOGY CLINIC | Facility: CLINIC | Age: 89
End: 2024-09-25

## 2024-09-25 ENCOUNTER — HOME CARE VISIT (OUTPATIENT)
Dept: HOME HEALTH SERVICES | Facility: HOME HEALTHCARE | Age: 89
End: 2024-09-25
Payer: MEDICARE

## 2024-09-25 VITALS
DIASTOLIC BLOOD PRESSURE: 68 MMHG | SYSTOLIC BLOOD PRESSURE: 110 MMHG | TEMPERATURE: 97.5 F | OXYGEN SATURATION: 98 % | HEART RATE: 75 BPM | RESPIRATION RATE: 16 BRPM

## 2024-09-25 PROCEDURE — G0299 HHS/HOSPICE OF RN EA 15 MIN: HCPCS

## 2024-09-25 NOTE — TELEPHONE ENCOUNTER
Wait is down to 159.6 today. She was 160.8 at discharge. Leg edema improved. Today is the 3rd day of torsemide 20 mg bid then she will decrease to 20 mg qd.    Advised to call the office with rapid wt gain and /or worsening of CHF symptoms. Advised that we always have someone on call and nurses to  answer the phone after hours.  Verbally understood.    Also look for signs of dehydration since pt does not drink or eat much.    Daughter will continue to monitor and call if cardiac issues. Pt has appt on 10/1/24 in [facility] office.

## 2024-09-26 ENCOUNTER — PATIENT OUTREACH (OUTPATIENT)
Dept: CASE MANAGEMENT | Facility: OTHER | Age: 89
End: 2024-09-26

## 2024-09-26 ENCOUNTER — HOME CARE VISIT (OUTPATIENT)
Dept: HOME HEALTH SERVICES | Facility: HOME HEALTHCARE | Age: 89
End: 2024-09-26
Payer: MEDICARE

## 2024-09-26 PROCEDURE — G0151 HHCP-SERV OF PT,EA 15 MIN: HCPCS

## 2024-09-26 NOTE — PROGRESS NOTES
Outpatient Care Management Note:    Chart review for heart failure patient: 9/23 LUKAS BOUDREAUX RN called cardiology to report increased weight gain. Weight increased over 5 lb since 9/12. Cardiology increased her torsemide to 20 mg BID X 3 days and then return to torsemide 20 mg daily. Cardiology is monitoring weight changes and [facility] continues to follow patient.

## 2024-09-27 VITALS — OXYGEN SATURATION: 93 % | HEART RATE: 72 BPM | SYSTOLIC BLOOD PRESSURE: 122 MMHG | DIASTOLIC BLOOD PRESSURE: 66 MMHG

## 2024-09-30 ENCOUNTER — HOME CARE VISIT (OUTPATIENT)
Dept: HOME HEALTH SERVICES | Facility: HOME HEALTHCARE | Age: 89
End: 2024-09-30
Payer: MEDICARE

## 2024-09-30 ENCOUNTER — TELEPHONE (OUTPATIENT)
Age: 89
End: 2024-09-30

## 2024-09-30 VITALS
SYSTOLIC BLOOD PRESSURE: 128 MMHG | DIASTOLIC BLOOD PRESSURE: 70 MMHG | OXYGEN SATURATION: 97 % | TEMPERATURE: 97.6 F | HEART RATE: 71 BPM | RESPIRATION RATE: 16 BRPM

## 2024-09-30 DIAGNOSIS — N30.00 ACUTE CYSTITIS WITHOUT HEMATURIA: Primary | ICD-10-CM

## 2024-09-30 PROCEDURE — G0299 HHS/HOSPICE OF RN EA 15 MIN: HCPCS

## 2024-09-30 RX ORDER — NITROFURANTOIN 25; 75 MG/1; MG/1
CAPSULE ORAL
Qty: 90 CAPSULE | Refills: 3 | Status: SHIPPED | OUTPATIENT
Start: 2024-09-30 | End: 2024-10-10 | Stop reason: SDUPTHER

## 2024-09-30 NOTE — TELEPHONE ENCOUNTER
Patients daughter called to report that during the last visit from [facility] follow up she was given the choice to go on a 10 day supply of antibiotic or be given a daily tablet to prevent her UTI. Patients daughter called to confirm that her mother has decided to go on the daily macrobid 100 mg tab. Please confirm with provider prior to sending and call the patient/patient daughter to confirm completion

## 2024-09-30 NOTE — PROGRESS NOTES
Cardiology Office Follow Up  Janice Yung  1/28/1930  [id number]      ASSESSMENT:  Chronic HFpEF  Hypertension  Hyperlipidemia    PLAN/ DISCUSSION:  Chronic HFpEF  She appears fairly euvolemic today with stable lower extremity edema and weight down 6 to 7 pounds from prior visit  Continue Jardiance, spironolactone, torsemide 20 daily  Recent BMP showed stable renal function and electrolytes  Hypertension  Blood pressure is well-controlled today  Continue spironolactone    Interval History/ HPI:   89-year-old female here for 2-week follow-up.  She was hospitalized earlier in September 2024 for congestive heart failure.  Since her discharge she has had a telemedicine visit and at that time was having worsening fluid retention.  She was restarted on torsemide with tapering dose and has done well.  Her weight is gradually going down.  She still has some swelling in the legs but is significantly better than before.  She notes that it continues to get better.     Vitals:  /58 (BP Location: Left arm, Patient Position: Sitting, Cuff Size: Standard)   Pulse 70   Ht 5' (1.524 m)   Wt 70.8 kg (156 lb)   BMI 30.47 kg/m²      Past Medical History:   Diagnosis Date    SHERLYN (acute kidney injury) ([facility]) 05/08/2022    Balance disorder     CHF (congestive heart failure) ([facility])     Chronic pain     GERD (gastroesophageal reflux disease)     Hard of hearing     Hyperlipidemia     Hypertension     Hyponatremia 12/05/2020    Low serum cortisol level 12/07/2020    Neuropathy     Pneumonia     Pneumonia due to COVID-19 virus 12/05/2020    Sepsis ([facility]) 05/22/2018    Tinnitus     Unspecified adrenocortical insufficiency ([facility]) 02/28/2023    Felt to be related to COVID-19 infection.  No current symptoms.    UTI (urinary tract infection)      Social History     Socioeconomic History    Marital status:      Spouse name: Not on file    Number of children: Not on file    Years of education: Not on file    Highest education level: Not  on file   Occupational History    Not on file   Tobacco Use    Smoking status: Never     Passive exposure: Never    Smokeless tobacco: Never   Vaping Use    Vaping status: Never Used   Substance and Sexual Activity    Alcohol use: Not Currently     Comment: rarely    Drug use: No    Sexual activity: Not Currently   Other Topics Concern    Not on file   Social History Narrative    Not on file     Social Determinants of Health     Financial Resource Strain: Low Risk  (8/28/2023)    Overall Financial Resource Strain (CARDIA)     Difficulty of Paying Living Expenses: Not very hard   Food Insecurity: No Food Insecurity (9/10/2024)    Hunger Vital Sign     Worried About Running Out of Food in the Last Year: Never true     Ran Out of Food in the Last Year: Never true   Transportation Needs: No Transportation Needs (9/10/2024)    PRAPARE - Transportation     Lack of Transportation (Medical): No     Lack of Transportation (Non-Medical): No   Physical Activity: Not on file   Stress: Not on file   Social Connections: Not on file   Intimate Partner Violence: Not on file   Housing Stability: Low Risk  (9/10/2024)    Housing Stability Vital Sign     Unable to Pay for Housing in the Last Year: No     Number of Times Moved in the Last Year: 0     Homeless in the Last Year: No      Family History   Problem Relation Age of Onset    Stroke Mother     Stroke Father     Heart disease Daughter     Substance Abuse Neg Hx     Mental illness Neg Hx      Past Surgical History:   Procedure Laterality Date    EYE SURGERY      HYSTERECTOMY      TONSILLECTOMY         Current Outpatient Medications:     Artificial Tears 0.2-0.2-1 % SOLN, Administer 1 drop to both eyes as needed (dry eyes ) 1 drop to both eyes prn daily for dry eyes  (Patient not taking: Reported on 9/17/2024), Disp: , Rfl:     aspirin (ECOTRIN LOW STRENGTH) 81 mg EC tablet, Take 1 tablet (81 mg total) by mouth daily, Disp: , Rfl: 0    atorvastatin (LIPITOR) 10 mg tablet, Take 1  tablet (10 mg total) by mouth daily, Disp: 90 tablet, Rfl: 1    Cholecalciferol (VITAMIN D3) 1000 units CAPS, Take 1,000 Units by mouth daily , Disp: , Rfl:     Cyanocobalamin (VITAMIN B12 PO), Take 1,000 mcg by mouth in the morning, Disp: , Rfl:     Empagliflozin (Jardiance) 10 MG TABS tablet, Take 1 tablet (10 mg total) by mouth every morning, Disp: 30 tablet, Rfl: 5    gabapentin (NEURONTIN) 600 MG tablet, Take 1 tablet (600 mg total) by mouth 2 (two) times a day, Disp: 180 tablet, Rfl: 0    Mirabegron ER (Myrbetriq) 25 MG TB24, TAKE 1 TABLET BY MOUTH IN THE MORNING, Disp: 30 tablet, Rfl: 5    omeprazole (PriLOSEC) 20 mg delayed release capsule, Take 1 capsule by mouth once daily, Disp: 90 capsule, Rfl: 1    potassium chloride (Klor-Con M10) 10 mEq tablet, Take 1 tablet (10 mEq total) by mouth daily, Disp: 90 tablet, Rfl: 3    spironolactone (ALDACTONE) 25 mg tablet, Take 1 tablet (25 mg total) by mouth daily, Disp: 30 tablet, Rfl: 5    torsemide (DEMADEX) 20 mg tablet, Take 1 tablet (20 mg total) by mouth daily DO NOT START BEFORE CARDIOLOGIST CALLS YOU Do not start before September 23, 2024., Disp: , Rfl:       Review of Systems:  Review of Systems   Constitutional:  Negative for chills and fever.   HENT:  Negative for ear pain and sore throat.    Eyes:  Negative for pain and visual disturbance.   Respiratory:  Negative for cough and shortness of breath.    Cardiovascular:  Positive for leg swelling. Negative for chest pain and palpitations.   Gastrointestinal:  Negative for abdominal pain and vomiting.   Genitourinary:  Negative for dysuria and hematuria.   Musculoskeletal:  Negative for arthralgias and back pain.   Skin:  Negative for color change and rash.   Neurological:  Negative for seizures and syncope.   All other systems reviewed and are negative.        Physical Exam:  Physical Exam  Constitutional:       General: She is not in acute distress.     Appearance: Normal appearance. She is not  ill-appearing.   HENT:      Head: Normocephalic and atraumatic.      Right Ear: External ear normal.      Left Ear: External ear normal.      Mouth/Throat:      Pharynx: No oropharyngeal exudate or posterior oropharyngeal erythema.   Eyes:      General:         Right eye: No discharge.         Left eye: No discharge.      Conjunctiva/sclera: Conjunctivae normal.      Pupils: Pupils are equal, round, and reactive to light.   Cardiovascular:      Rate and Rhythm: Normal rate and regular rhythm.      Pulses: Normal pulses.      Heart sounds: Normal heart sounds. No murmur heard.     No friction rub. No gallop.   Pulmonary:      Effort: Pulmonary effort is normal.      Breath sounds: Normal breath sounds. No wheezing, rhonchi or rales.   Abdominal:      General: Abdomen is flat. There is no distension.      Palpations: Abdomen is soft.      Tenderness: There is no abdominal tenderness.   Musculoskeletal:         General: Swelling present. No tenderness or deformity. Normal range of motion.      Cervical back: Normal range of motion.      Comments: 1-2+ pitting edema bilaterally   Skin:     General: Skin is warm and dry.   Neurological:      General: No focal deficit present.      Mental Status: She is alert and oriented to person, place, and time.         This note was completed in part utilizing M-Modal Fluency Direct Software.  Grammatical errors, random word insertions, spelling mistakes, and incomplete sentences can be an occasional consequence of this system secondary to software limitations, ambient noise, and hardware issues.  If you have any questions or concerns about the content, text, or information contained within the body of this dictation, please contact the provider for clarification.

## 2024-10-01 ENCOUNTER — OFFICE VISIT (OUTPATIENT)
Dept: CARDIOLOGY CLINIC | Facility: CLINIC | Age: 89
End: 2024-10-01
Payer: MEDICARE

## 2024-10-01 VITALS
DIASTOLIC BLOOD PRESSURE: 58 MMHG | WEIGHT: 156 LBS | SYSTOLIC BLOOD PRESSURE: 128 MMHG | HEIGHT: 60 IN | HEART RATE: 70 BPM | BODY MASS INDEX: 30.63 KG/M2

## 2024-10-01 DIAGNOSIS — I50.32 CHRONIC DIASTOLIC CONGESTIVE HEART FAILURE (HCC): Primary | ICD-10-CM

## 2024-10-01 PROCEDURE — 99214 OFFICE O/P EST MOD 30 MIN: CPT | Performed by: PHYSICIAN ASSISTANT

## 2024-10-02 ENCOUNTER — HOME CARE VISIT (OUTPATIENT)
Dept: HOME HEALTH SERVICES | Facility: HOME HEALTHCARE | Age: 89
End: 2024-10-02
Payer: MEDICARE

## 2024-10-02 PROCEDURE — G0151 HHCP-SERV OF PT,EA 15 MIN: HCPCS

## 2024-10-03 ENCOUNTER — PATIENT OUTREACH (OUTPATIENT)
Dept: CASE MANAGEMENT | Facility: OTHER | Age: 89
End: 2024-10-03

## 2024-10-03 ENCOUNTER — HOME CARE VISIT (OUTPATIENT)
Dept: HOME HEALTH SERVICES | Facility: HOME HEALTHCARE | Age: 89
End: 2024-10-03
Payer: MEDICARE

## 2024-10-03 VITALS
DIASTOLIC BLOOD PRESSURE: 74 MMHG | SYSTOLIC BLOOD PRESSURE: 132 MMHG | TEMPERATURE: 97.6 F | HEART RATE: 80 BPM | RESPIRATION RATE: 16 BRPM | OXYGEN SATURATION: 98 %

## 2024-10-03 PROCEDURE — G0299 HHS/HOSPICE OF RN EA 15 MIN: HCPCS

## 2024-10-03 NOTE — PROGRESS NOTES
Outpatient Care Management Note:    Chart review for heart failure patient: Janice saw cardiology on 10/1: her weight was noted to be gradually going down. She still had some leg swelling but it was noted to be significantly better than before.  She continues to be followed by LUKAS BOUDREAUX.  No recent utilization.

## 2024-10-04 ENCOUNTER — HOME CARE VISIT (OUTPATIENT)
Dept: HOME HEALTH SERVICES | Facility: HOME HEALTHCARE | Age: 89
End: 2024-10-04
Payer: MEDICARE

## 2024-10-04 VITALS — OXYGEN SATURATION: 97 % | SYSTOLIC BLOOD PRESSURE: 122 MMHG | DIASTOLIC BLOOD PRESSURE: 70 MMHG | HEART RATE: 71 BPM

## 2024-10-04 PROCEDURE — G0151 HHCP-SERV OF PT,EA 15 MIN: HCPCS

## 2024-10-06 VITALS — OXYGEN SATURATION: 95 % | SYSTOLIC BLOOD PRESSURE: 126 MMHG | HEART RATE: 76 BPM | DIASTOLIC BLOOD PRESSURE: 70 MMHG

## 2024-10-07 DIAGNOSIS — G60.9 IDIOPATHIC PERIPHERAL NEUROPATHY: ICD-10-CM

## 2024-10-07 DIAGNOSIS — N32.81 OAB (OVERACTIVE BLADDER): ICD-10-CM

## 2024-10-07 RX ORDER — MIRABEGRON 25 MG/1
25 TABLET, FILM COATED, EXTENDED RELEASE ORAL EVERY MORNING
Qty: 30 TABLET | Refills: 5 | Status: ON HOLD | OUTPATIENT
Start: 2024-10-07

## 2024-10-08 RX ORDER — GABAPENTIN 600 MG/1
600 TABLET ORAL 2 TIMES DAILY
Qty: 180 TABLET | Refills: 1 | Status: ON HOLD | OUTPATIENT
Start: 2024-10-08

## 2024-10-09 ENCOUNTER — TELEPHONE (OUTPATIENT)
Dept: FAMILY MEDICINE CLINIC | Facility: CLINIC | Age: 89
End: 2024-10-09

## 2024-10-09 ENCOUNTER — HOME CARE VISIT (OUTPATIENT)
Dept: HOME HEALTH SERVICES | Facility: HOME HEALTHCARE | Age: 89
End: 2024-10-09
Payer: MEDICARE

## 2024-10-09 VITALS
DIASTOLIC BLOOD PRESSURE: 70 MMHG | SYSTOLIC BLOOD PRESSURE: 122 MMHG | TEMPERATURE: 97.5 F | RESPIRATION RATE: 18 BRPM | WEIGHT: 152 LBS | HEART RATE: 69 BPM | OXYGEN SATURATION: 98 % | BODY MASS INDEX: 29.69 KG/M2

## 2024-10-09 PROCEDURE — G0299 HHS/HOSPICE OF RN EA 15 MIN: HCPCS

## 2024-10-09 RX ORDER — GABAPENTIN 600 MG/1
600 TABLET ORAL 2 TIMES DAILY
Qty: 180 TABLET | Refills: 1 | Status: ON HOLD | OUTPATIENT
Start: 2024-10-09

## 2024-10-10 DIAGNOSIS — N30.00 ACUTE CYSTITIS WITHOUT HEMATURIA: Primary | ICD-10-CM

## 2024-10-10 RX ORDER — NITROFURANTOIN 25; 75 MG/1; MG/1
100 CAPSULE ORAL DAILY
Qty: 90 CAPSULE | Refills: 3 | Status: ON HOLD | OUTPATIENT
Start: 2024-10-10

## 2024-10-11 ENCOUNTER — PATIENT OUTREACH (OUTPATIENT)
Dept: CASE MANAGEMENT | Facility: OTHER | Age: 89
End: 2024-10-11

## 2024-10-11 NOTE — PROGRESS NOTES
Outpatient Care Management Note:    Chart review for heart failure patient: No recent utilization. 30 day episode ends today.  CM will close referral on Monday.

## 2024-10-14 ENCOUNTER — PATIENT OUTREACH (OUTPATIENT)
Dept: CASE MANAGEMENT | Facility: OTHER | Age: 89
End: 2024-10-14

## 2024-10-14 NOTE — PROGRESS NOTES
Outpatient Care Management Note:    Chart review for heart failure patient: Patient has been out of the hospital > 30 days. Episode closed.

## 2024-10-15 NOTE — PROGRESS NOTES
Ambulatory Visit  Name: Janice Yung      : 1930      MRN: [id number]  Encounter Provider: Leah Lino PA-C  Encounter Date: 10/16/2024   Encounter department: [facility] UROLOGY [place]  Assessment & Plan  OAB (overactive bladder)  Currently on Myrbetriq 25 mg daily, unsure if this assisted with overall urinary pattern  Previously on trospium but discontinued the medication due to side effects i.e. tremors  Patient wishes to continue this Myrbetriq  Refill as needed per PCP  PVR 0 mL today in office, emptying well  Reports urinary frequency and passive incontinence, has wore depends for multiple years  Continue to monitor on an annual basis    Orders:    POCT Measure PVR    Dysuria  Currently on Macrobid 100 mg daily as a prophylactic measure  Refill as needed per PCP  Recent UTI 2024 positive for Enterococcus and Pseudomonas bacteria  Treated with a 7-day course of Keflex, symptoms improved  Reports worsening dysuria at all times over the last 3 weeks  Patient's daughter does not believe this is a UTI as she usually exhibits altered mental status  Previously on vaginal estrogen cream which did not help  Is unable to place vaginal applicators  Refer to [facility] for physical exam findings  Etiology--repeat UTI, yeast infection, vaginal atrophy, contact dermatitis, dry skin  Patient was recently treated with a dose of antibiotics and has been started on Jardiance which can increase her risk for yeast infections, clotrimazole cream will be sent to the patient's pharmacy to apply twice daily to see if this assists  P.o. fungal agents could also be trialed if this is not helpful  Continue to monitor closely  Follow-up in 6 months for repeat evaluation  Orders:    clotrimazole (LOTRIMIN) 1 % cream; Apply topically 2 (two) times a day    Stage 3a chronic kidney disease (HCC)  Lab Results   Component Value Date    EGFR 48 2024    EGFR 48 2024    EGFR 60 09/10/2024    CREATININE  1.00 09/18/2024    CREATININE 0.99 09/11/2024    CREATININE 0.83 09/10/2024   Continue to maintain adequate hydration  Creatinine remained stable overall  Patient is emptying well    History of Present Illness     Janice Yung is a 89 y.o. female who presents to the office with her daughter in follow-up to discuss ongoing OAB and worsening dysuria.  Patient was recently treated for UTI in August, urine culture returned positive for Enterococcus and Pseudomonas bacteria.  She was treated with a 7-day course of Keflex and reports symptoms overall improved.  She currently takes Macrobid 100 mg daily as a prophylactic measure per PCP.      Shortly after completing the antibiotic course, she developed worsening dysuria which she reports is constant not just when she urinates.  The pain is described as sharp making it difficult for her to sit.  She reports noticing bumps while wiping herself that extend from the urethra all the way back to the rectum.  Patient is worried about a cancerous etiology, her daughter believes this is contact dermatitis versus diaper rash.  Possible repeat UTI, but unlikely as patient's daughter states she usually exhibits altered mental status and has been doing well overall.  Patient was recently initiated on Jardiance which can lead to yeast infections, wears Depends at baseline for urinary incontinence, and has been on multiple antibiotics--all of these can lead to yeast/fungal infections.  Unfortunately, urine sample is unable to be collected at this time.  PVR 0 mL today in office, no signs of urinary retention.  Patient states she has noticed bumps surrounding her external genitalia while wiping, she is concerned for cancer.  Patient does have visiting nurses, but no one has completed a physical exam.  In office, the external genitalia is erythematous and inflamed.  There is no signs of masses and/or lesions.  No vaginal discharge appreciated.  No signs of bleeding and/or open wounds.   2 areas of Candida versus dry skin (white in appearance) visualized at the 3:00 and 9 o'clock position.  Recommend trialing a course of antifungal cream to see if this assists with the patient's overall symptoms.  Patient's daughter will keep us updated on how the patient is feeling.  We can also trial some oral antifungals if the cream is too difficult to apply.  If altered mental status returns, outpatient urine studies will be ordered.  Plan to follow-up in 6 months for reevaluation of patient's symptoms.      History obtained from : patient and patient's POA  Review of Systems   Constitutional:  Negative for activity change, chills, fatigue and fever.   Respiratory:  Negative for apnea, cough and shortness of breath.    Cardiovascular:  Negative for chest pain and leg swelling.   Gastrointestinal:  Negative for abdominal distention, abdominal pain, constipation, diarrhea, nausea and vomiting.   Genitourinary:  Positive for dysuria, frequency, urgency and vaginal pain. Negative for difficulty urinating, flank pain, hematuria, pelvic pain, vaginal bleeding and vaginal discharge.        Passive incontinence   Musculoskeletal:  Positive for gait problem (walker at baseline). Negative for arthralgias and back pain.   Neurological:  Positive for weakness (generalized). Negative for dizziness and headaches.   Psychiatric/Behavioral: Negative.     All other systems reviewed and are negative.    Medical History Reviewed by provider this encounter:       Current Outpatient Medications on File Prior to Visit   Medication Sig Dispense Refill    Artificial Tears 0.2-0.2-1 % SOLN Administer 1 drop to both eyes as needed (dry eyes ) 1 drop to both eyes prn daily for dry eyes      aspirin (ECOTRIN LOW STRENGTH) 81 mg EC tablet Take 1 tablet (81 mg total) by mouth daily  0    atorvastatin (LIPITOR) 10 mg tablet Take 1 tablet (10 mg total) by mouth daily 90 tablet 1    Cholecalciferol (VITAMIN D3) 1000 units CAPS Take 1,000 Units  by mouth daily       Cyanocobalamin (VITAMIN B12 PO) Take 1,000 mcg by mouth in the morning      Empagliflozin (Jardiance) 10 MG TABS tablet Take 1 tablet (10 mg total) by mouth every morning 30 tablet 5    gabapentin (NEURONTIN) 600 MG tablet Take 1 tablet by mouth twice daily 180 tablet 1    Multiple Vitamins-Minerals (PRESERVISION AREDS PO) Take by mouth      Myrbetriq 25 MG TB24 TAKE 1 TABLET BY MOUTH IN THE MORNING 30 tablet 5    nitrofurantoin (MACROBID) 100 mg capsule Take 1 capsule (100 mg total) by mouth daily 1 cap PO daily for UTI prevention 90 capsule 3    omeprazole (PriLOSEC) 20 mg delayed release capsule Take 1 capsule by mouth once daily 90 capsule 1    spironolactone (ALDACTONE) 25 mg tablet Take 1 tablet (25 mg total) by mouth daily 30 tablet 5    torsemide (DEMADEX) 20 mg tablet Take 1 tablet (20 mg total) by mouth daily DO NOT START BEFORE CARDIOLOGIST CALLS YOU Do not start before September 23, 2024.      gabapentin (NEURONTIN) 600 MG tablet Take 1 tablet (600 mg total) by mouth 2 (two) times a day (Patient not taking: Reported on 10/16/2024) 180 tablet 1    potassium chloride (Klor-Con M10) 10 mEq tablet Take 1 tablet (10 mEq total) by mouth daily 90 tablet 3     No current facility-administered medications on file prior to visit.      Social History     Tobacco Use    Smoking status: Never     Passive exposure: Never    Smokeless tobacco: Never   Vaping Use    Vaping status: Never Used   Substance and Sexual Activity    Alcohol use: Not Currently     Comment: rarely    Drug use: No    Sexual activity: Not Currently           Objective     There were no vitals taken for this visit.  Physical Exam  Vitals and nursing note reviewed. Exam conducted with a chaperone present.   Constitutional:       General: She is not in acute distress.     Appearance: Normal appearance. She is well-developed. She is not ill-appearing.   HENT:      Head: Normocephalic and atraumatic.   Eyes:       "Conjunctiva/sclera: Conjunctivae normal.   Cardiovascular:      Rate and Rhythm: Normal rate and regular rhythm.      Heart sounds: No murmur heard.  Pulmonary:      Effort: Pulmonary effort is normal. No respiratory distress.      Breath sounds: Normal breath sounds.   Abdominal:      General: Abdomen is flat. There is no distension.      Palpations: Abdomen is soft.      Tenderness: There is no abdominal tenderness.   Genitourinary:     Vagina: No vaginal discharge.          Comments: External genitalia erythematous, reports pain to palpation in multiple areas.  A few areas of white Candida versus dry skin appreciated (locations marked above).  No signs of abnormal masses/lesions.  No vaginal discharge.  No abnormal bleeding.  Musculoskeletal:         General: No swelling.      Cervical back: Neck supple.   Skin:     General: Skin is warm and dry.      Capillary Refill: Capillary refill takes less than 2 seconds.   Neurological:      Mental Status: She is alert.   Psychiatric:         Mood and Affect: Mood normal.       Results  No results found for: \"PSA\"  Lab Results   Component Value Date    GLUCOSE 95 04/08/2019    CALCIUM 9.2 09/18/2024     12/20/2016    K 4.3 09/18/2024    CO2 29 09/18/2024     09/18/2024    BUN 17 09/18/2024    CREATININE 1.00 09/18/2024     Lab Results   Component Value Date    WBC 9.21 09/10/2024    HGB 11.7 09/10/2024    HCT 37.1 09/10/2024    MCV 89 09/10/2024     09/10/2024       Office Urine Dip  No results found for this or any previous visit (from the past 1 hour(s)).]    Administrative Statements   I have spent a total time of 47 minutes in caring for this patient on the day of the visit/encounter including Diagnostic results, Prognosis, Risks and benefits of tx options, Instructions for management, Patient and family education, Importance of tx compliance, Risk factor reductions, Impressions, Counseling / Coordination of care, Documenting in the medical record, " Reviewing / ordering tests, medicine, procedures  , and Obtaining or reviewing history  .

## 2024-10-16 ENCOUNTER — OFFICE VISIT (OUTPATIENT)
Dept: UROLOGY | Facility: HOSPITAL | Age: 89
End: 2024-10-16

## 2024-10-16 VITALS
DIASTOLIC BLOOD PRESSURE: 82 MMHG | SYSTOLIC BLOOD PRESSURE: 126 MMHG | HEART RATE: 73 BPM | OXYGEN SATURATION: 97 % | HEIGHT: 60 IN | BODY MASS INDEX: 29.69 KG/M2

## 2024-10-16 DIAGNOSIS — R30.0 DYSURIA: ICD-10-CM

## 2024-10-16 DIAGNOSIS — N32.81 OAB (OVERACTIVE BLADDER): Primary | ICD-10-CM

## 2024-10-16 DIAGNOSIS — N18.31 STAGE 3A CHRONIC KIDNEY DISEASE (HCC): ICD-10-CM

## 2024-10-16 LAB — POST-VOID RESIDUAL VOLUME, ML POC: 0 ML

## 2024-10-16 RX ORDER — CONJUGATED ESTROGENS 0.62 MG/G
1 CREAM VAGINAL 3 TIMES WEEKLY
Qty: 30 G | Refills: 0 | Status: CANCELLED | OUTPATIENT
Start: 2024-10-16

## 2024-10-16 RX ORDER — CLOTRIMAZOLE 1 %
CREAM (GRAM) TOPICAL 2 TIMES DAILY
Qty: 14 G | Refills: 0 | Status: SHIPPED | OUTPATIENT
Start: 2024-10-16

## 2024-10-17 ENCOUNTER — TELEPHONE (OUTPATIENT)
Age: 89
End: 2024-10-17

## 2024-10-17 DIAGNOSIS — R30.0 DYSURIA: Primary | ICD-10-CM

## 2024-10-17 PROBLEM — N32.81 OAB (OVERACTIVE BLADDER): Status: ACTIVE | Noted: 2024-10-17

## 2024-10-17 NOTE — ASSESSMENT & PLAN NOTE
Currently on Myrbetriq 25 mg daily, unsure if this assisted with overall urinary pattern  Previously on trospium but discontinued the medication due to side effects i.e. tremors  Patient wishes to continue this Myrbetriq  Refill as needed per PCP  PVR 0 mL today in office, emptying well  Reports urinary frequency and passive incontinence, has wore depends for multiple years  Continue to monitor on an annual basis    Orders:    POCT Measure PVR

## 2024-10-17 NOTE — TELEPHONE ENCOUNTER
Pt's daughter Windy martell stated that pt is still in pain and requested an oral med to help the pain.     Please review

## 2024-10-17 NOTE — ASSESSMENT & PLAN NOTE
Currently on Macrobid 100 mg daily as a prophylactic measure  Refill as needed per PCP  Recent UTI August 2024 positive for Enterococcus and Pseudomonas bacteria  Treated with a 7-day course of Keflex, symptoms improved  Reports worsening dysuria at all times over the last 3 weeks  Patient's daughter does not believe this is a UTI as she usually exhibits altered mental status  Previously on vaginal estrogen cream which did not help  Is unable to place vaginal applicators  Refer to \[facility] [place] [facility]\"" for physical exam findings  Etiology--repeat UTI, yeast infection, vaginal atrophy, contact dermatitis, dry skin  Patient was recently treated with a dose of antibiotics and has been started on Jardiance which can increase her risk for yeast infections, clotrimazole cream will be sent to the patient's pharmacy to apply twice daily to see if this assists  P.o. fungal agents could also be trialed if this is not helpful  Continue to monitor closely  Follow-up in 6 months for repeat evaluation  Orders:    clotrimazole (LOTRIMIN) 1 % cream; Apply topically 2 (two) times a day

## 2024-10-17 NOTE — ASSESSMENT & PLAN NOTE
Lab Results   Component Value Date    EGFR 48 09/18/2024    EGFR 48 09/11/2024    EGFR 60 09/10/2024    CREATININE 1.00 09/18/2024    CREATININE 0.99 09/11/2024    CREATININE 0.83 09/10/2024   Continue to maintain adequate hydration  Creatinine remained stable overall  Patient is emptying well

## 2024-10-18 RX ORDER — FLUCONAZOLE 200 MG/1
200 TABLET ORAL DAILY
Qty: 3 TABLET | Refills: 0 | Status: SHIPPED | OUTPATIENT
Start: 2024-10-18 | End: 2024-10-24

## 2024-10-18 NOTE — TELEPHONE ENCOUNTER
Oral fluconazole sent to pharmacy. Take 1 tablet x3 days. If in one week symptoms persists. I would repeat urine testing to rule out infection. If no active infection the ongoing burning is likely a skin reaction / dry skin and Vaseline is the best option for treatment.

## 2024-10-18 NOTE — TELEPHONE ENCOUNTER
Patient has been applying the Lotrimin medication for 3 days,  worsening symptoms, burning when sitting, standing and urinating    Daughter requesting an oral medication be sent to [facility] on file    Discussed probiotics and increasing water

## 2024-10-19 ENCOUNTER — APPOINTMENT (EMERGENCY)
Dept: CT IMAGING | Facility: HOSPITAL | Age: 89
DRG: 683 | End: 2024-10-19
Payer: MEDICARE

## 2024-10-19 ENCOUNTER — HOSPITAL ENCOUNTER (INPATIENT)
Facility: HOSPITAL | Age: 89
LOS: 5 days | Discharge: HOME HEALTH | DRG: 683 | End: 2024-10-24
Attending: EMERGENCY MEDICINE | Admitting: STUDENT IN AN ORGANIZED HEALTH CARE EDUCATION/TRAINING PROGRAM
Payer: MEDICARE

## 2024-10-19 DIAGNOSIS — R91.8 PULMONARY NODULES/LESIONS, MULTIPLE: ICD-10-CM

## 2024-10-19 DIAGNOSIS — I50.33 ACUTE ON CHRONIC DIASTOLIC HEART FAILURE (HCC): ICD-10-CM

## 2024-10-19 DIAGNOSIS — F03.90 DEMENTIA WITHOUT BEHAVIORAL DISTURBANCE, PSYCHOTIC DISTURBANCE, MOOD DISTURBANCE, OR ANXIETY, UNSPECIFIED DEMENTIA SEVERITY, UNSPECIFIED DEMENTIA TYPE (HCC): ICD-10-CM

## 2024-10-19 DIAGNOSIS — I73.9 PERIPHERAL VASCULAR DISEASE, UNSPECIFIED (HCC): ICD-10-CM

## 2024-10-19 DIAGNOSIS — N39.0 UTI (URINARY TRACT INFECTION): Primary | ICD-10-CM

## 2024-10-19 DIAGNOSIS — R60.0 BILATERAL LOWER EXTREMITY EDEMA: ICD-10-CM

## 2024-10-19 DIAGNOSIS — I50.9 CHF (CONGESTIVE HEART FAILURE) (HCC): ICD-10-CM

## 2024-10-19 DIAGNOSIS — R91.8 PULMONARY NODULES: ICD-10-CM

## 2024-10-19 DIAGNOSIS — R33.9 URINARY RETENTION: ICD-10-CM

## 2024-10-19 DIAGNOSIS — G62.9 NEUROPATHY: ICD-10-CM

## 2024-10-19 DIAGNOSIS — N17.9 AKI (ACUTE KIDNEY INJURY) (HCC): ICD-10-CM

## 2024-10-19 LAB
2HR DELTA HS TROPONIN: 1 NG/L
ALBUMIN SERPL BCG-MCNC: 4.2 G/DL (ref 3.5–5)
ALP SERPL-CCNC: 83 U/L (ref 34–104)
ALT SERPL W P-5'-P-CCNC: 7 U/L (ref 7–52)
ANION GAP SERPL CALCULATED.3IONS-SCNC: 8 MMOL/L (ref 4–13)
AST SERPL W P-5'-P-CCNC: 17 U/L (ref 13–39)
ATRIAL RATE: 80 BPM
BACTERIA UR QL AUTO: ABNORMAL /HPF
BASOPHILS # BLD AUTO: 0.03 THOUSANDS/ΜL (ref 0–0.1)
BASOPHILS NFR BLD AUTO: 0 % (ref 0–1)
BILIRUB SERPL-MCNC: 0.47 MG/DL (ref 0.2–1)
BILIRUB UR QL STRIP: NEGATIVE
BUN SERPL-MCNC: 27 MG/DL (ref 5–25)
CALCIUM SERPL-MCNC: 9.8 MG/DL (ref 8.4–10.2)
CARDIAC TROPONIN I PNL SERPL HS: 10 NG/L
CARDIAC TROPONIN I PNL SERPL HS: 9 NG/L
CHLORIDE SERPL-SCNC: 102 MMOL/L (ref 96–108)
CLARITY UR: ABNORMAL
CO2 SERPL-SCNC: 32 MMOL/L (ref 21–32)
COLOR UR: YELLOW
CREAT SERPL-MCNC: 1.43 MG/DL (ref 0.6–1.3)
EOSINOPHIL # BLD AUTO: 0.05 THOUSAND/ΜL (ref 0–0.61)
EOSINOPHIL NFR BLD AUTO: 0 % (ref 0–6)
ERYTHROCYTE [DISTWIDTH] IN BLOOD BY AUTOMATED COUNT: 14 % (ref 11.6–15.1)
GFR SERPL CREATININE-BSD FRML MDRD: 31 ML/MIN/1.73SQ M
GLUCOSE SERPL-MCNC: 96 MG/DL (ref 65–140)
GLUCOSE UR STRIP-MCNC: ABNORMAL MG/DL
HCT VFR BLD AUTO: 38.9 % (ref 34.8–46.1)
HGB BLD-MCNC: 11.9 G/DL (ref 11.5–15.4)
HGB UR QL STRIP.AUTO: ABNORMAL
IMM GRANULOCYTES # BLD AUTO: 0.06 THOUSAND/UL (ref 0–0.2)
IMM GRANULOCYTES NFR BLD AUTO: 1 % (ref 0–2)
KETONES UR STRIP-MCNC: NEGATIVE MG/DL
LACTATE SERPL-SCNC: 1.8 MMOL/L (ref 0.5–2)
LEUKOCYTE ESTERASE UR QL STRIP: ABNORMAL
LIPASE SERPL-CCNC: 29 U/L (ref 11–82)
LYMPHOCYTES # BLD AUTO: 2.2 THOUSANDS/ΜL (ref 0.6–4.47)
LYMPHOCYTES NFR BLD AUTO: 19 % (ref 14–44)
MCH RBC QN AUTO: 28.1 PG (ref 26.8–34.3)
MCHC RBC AUTO-ENTMCNC: 30.6 G/DL (ref 31.4–37.4)
MCV RBC AUTO: 92 FL (ref 82–98)
MONOCYTES # BLD AUTO: 1.16 THOUSAND/ΜL (ref 0.17–1.22)
MONOCYTES NFR BLD AUTO: 10 % (ref 4–12)
NEUTROPHILS # BLD AUTO: 7.85 THOUSANDS/ΜL (ref 1.85–7.62)
NEUTS SEG NFR BLD AUTO: 70 % (ref 43–75)
NITRITE UR QL STRIP: NEGATIVE
NON-SQ EPI CELLS URNS QL MICRO: ABNORMAL /HPF
NRBC BLD AUTO-RTO: 0 /100 WBCS
OTHER STN SPEC: ABNORMAL
P AXIS: 43 DEGREES
PH UR STRIP.AUTO: 6 [PH]
PLATELET # BLD AUTO: 237 THOUSANDS/UL (ref 149–390)
PMV BLD AUTO: 9.7 FL (ref 8.9–12.7)
POTASSIUM SERPL-SCNC: 4.8 MMOL/L (ref 3.5–5.3)
PR INTERVAL: 246 MS
PROT SERPL-MCNC: 7.4 G/DL (ref 6.4–8.4)
PROT UR STRIP-MCNC: ABNORMAL MG/DL
QRS AXIS: 71 DEGREES
QRSD INTERVAL: 138 MS
QT INTERVAL: 402 MS
QTC INTERVAL: 463 MS
RBC # BLD AUTO: 4.24 MILLION/UL (ref 3.81–5.12)
RBC #/AREA URNS AUTO: ABNORMAL /HPF
SODIUM SERPL-SCNC: 142 MMOL/L (ref 135–147)
SP GR UR STRIP.AUTO: <1.005 (ref 1–1.03)
T WAVE AXIS: -56 DEGREES
TRANS CELLS #/AREA URNS HPF: ABNORMAL /[HPF]
UROBILINOGEN UR STRIP-ACNC: <2 MG/DL
VENTRICULAR RATE: 80 BPM
WBC # BLD AUTO: 11.35 THOUSAND/UL (ref 4.31–10.16)
WBC #/AREA URNS AUTO: ABNORMAL /HPF

## 2024-10-19 PROCEDURE — 99285 EMERGENCY DEPT VISIT HI MDM: CPT

## 2024-10-19 PROCEDURE — 84484 ASSAY OF TROPONIN QUANT: CPT | Performed by: EMERGENCY MEDICINE

## 2024-10-19 PROCEDURE — 96365 THER/PROPH/DIAG IV INF INIT: CPT

## 2024-10-19 PROCEDURE — 81001 URINALYSIS AUTO W/SCOPE: CPT | Performed by: EMERGENCY MEDICINE

## 2024-10-19 PROCEDURE — 36415 COLL VENOUS BLD VENIPUNCTURE: CPT | Performed by: EMERGENCY MEDICINE

## 2024-10-19 PROCEDURE — 96375 TX/PRO/DX INJ NEW DRUG ADDON: CPT

## 2024-10-19 PROCEDURE — 93005 ELECTROCARDIOGRAM TRACING: CPT

## 2024-10-19 PROCEDURE — 83690 ASSAY OF LIPASE: CPT | Performed by: EMERGENCY MEDICINE

## 2024-10-19 PROCEDURE — 99285 EMERGENCY DEPT VISIT HI MDM: CPT | Performed by: EMERGENCY MEDICINE

## 2024-10-19 PROCEDURE — 85025 COMPLETE CBC W/AUTO DIFF WBC: CPT | Performed by: EMERGENCY MEDICINE

## 2024-10-19 PROCEDURE — 51798 US URINE CAPACITY MEASURE: CPT

## 2024-10-19 PROCEDURE — 83605 ASSAY OF LACTIC ACID: CPT | Performed by: EMERGENCY MEDICINE

## 2024-10-19 PROCEDURE — 99223 1ST HOSP IP/OBS HIGH 75: CPT | Performed by: INTERNAL MEDICINE

## 2024-10-19 PROCEDURE — 87086 URINE CULTURE/COLONY COUNT: CPT | Performed by: EMERGENCY MEDICINE

## 2024-10-19 PROCEDURE — 74177 CT ABD & PELVIS W/CONTRAST: CPT

## 2024-10-19 PROCEDURE — 80053 COMPREHEN METABOLIC PANEL: CPT | Performed by: EMERGENCY MEDICINE

## 2024-10-19 RX ORDER — PANTOPRAZOLE SODIUM 20 MG/1
20 TABLET, DELAYED RELEASE ORAL
Status: DISCONTINUED | OUTPATIENT
Start: 2024-10-20 | End: 2024-10-24 | Stop reason: HOSPADM

## 2024-10-19 RX ORDER — VANCOMYCIN HYDROCHLORIDE 750 MG/150ML
10 INJECTION, SOLUTION INTRAVENOUS DAILY PRN
Status: DISCONTINUED | OUTPATIENT
Start: 2024-10-19 | End: 2024-10-22

## 2024-10-19 RX ORDER — MAGNESIUM HYDROXIDE/ALUMINUM HYDROXICE/SIMETHICONE 120; 1200; 1200 MG/30ML; MG/30ML; MG/30ML
30 SUSPENSION ORAL EVERY 6 HOURS PRN
Status: DISCONTINUED | OUTPATIENT
Start: 2024-10-19 | End: 2024-10-24 | Stop reason: HOSPADM

## 2024-10-19 RX ORDER — CLOTRIMAZOLE 1 %
CREAM (GRAM) TOPICAL 2 TIMES DAILY
Status: DISCONTINUED | OUTPATIENT
Start: 2024-10-19 | End: 2024-10-19

## 2024-10-19 RX ORDER — FLUCONAZOLE 100 MG/1
100 TABLET ORAL ONCE
Status: COMPLETED | OUTPATIENT
Start: 2024-10-19 | End: 2024-10-19

## 2024-10-19 RX ORDER — ONDANSETRON 2 MG/ML
4 INJECTION INTRAMUSCULAR; INTRAVENOUS EVERY 6 HOURS PRN
Status: DISCONTINUED | OUTPATIENT
Start: 2024-10-19 | End: 2024-10-24 | Stop reason: HOSPADM

## 2024-10-19 RX ORDER — HEPARIN SODIUM 5000 [USP'U]/ML
5000 INJECTION, SOLUTION INTRAVENOUS; SUBCUTANEOUS EVERY 8 HOURS SCHEDULED
Status: DISCONTINUED | OUTPATIENT
Start: 2024-10-20 | End: 2024-10-24 | Stop reason: HOSPADM

## 2024-10-19 RX ORDER — ACETAMINOPHEN 325 MG/1
650 TABLET ORAL EVERY 6 HOURS PRN
Status: DISCONTINUED | OUTPATIENT
Start: 2024-10-19 | End: 2024-10-24 | Stop reason: HOSPADM

## 2024-10-19 RX ORDER — CEFEPIME HYDROCHLORIDE 2 G/50ML
2000 INJECTION, SOLUTION INTRAVENOUS EVERY 24 HOURS
Status: DISCONTINUED | OUTPATIENT
Start: 2024-10-20 | End: 2024-10-22

## 2024-10-19 RX ORDER — CEFEPIME HYDROCHLORIDE 2 G/50ML
2000 INJECTION, SOLUTION INTRAVENOUS ONCE
Status: COMPLETED | OUTPATIENT
Start: 2024-10-19 | End: 2024-10-19

## 2024-10-19 RX ORDER — GABAPENTIN 300 MG/1
300 CAPSULE ORAL 2 TIMES DAILY
Status: DISCONTINUED | OUTPATIENT
Start: 2024-10-19 | End: 2024-10-24 | Stop reason: HOSPADM

## 2024-10-19 RX ORDER — ATORVASTATIN CALCIUM 10 MG/1
10 TABLET, FILM COATED ORAL
Status: DISCONTINUED | OUTPATIENT
Start: 2024-10-20 | End: 2024-10-24 | Stop reason: HOSPADM

## 2024-10-19 RX ORDER — SODIUM CHLORIDE, SODIUM GLUCONATE, SODIUM ACETATE, POTASSIUM CHLORIDE, MAGNESIUM CHLORIDE, SODIUM PHOSPHATE, DIBASIC, AND POTASSIUM PHOSPHATE .53; .5; .37; .037; .03; .012; .00082 G/100ML; G/100ML; G/100ML; G/100ML; G/100ML; G/100ML; G/100ML
75 INJECTION, SOLUTION INTRAVENOUS CONTINUOUS
Status: DISPENSED | OUTPATIENT
Start: 2024-10-19 | End: 2024-10-20

## 2024-10-19 RX ORDER — SENNOSIDES 8.6 MG
1 TABLET ORAL
Status: DISCONTINUED | OUTPATIENT
Start: 2024-10-19 | End: 2024-10-24 | Stop reason: HOSPADM

## 2024-10-19 RX ADMIN — FLUCONAZOLE 100 MG: 100 TABLET ORAL at 22:15

## 2024-10-19 RX ADMIN — MORPHINE SULFATE 2 MG: 2 INJECTION, SOLUTION INTRAMUSCULAR; INTRAVENOUS at 17:14

## 2024-10-19 RX ADMIN — GABAPENTIN 300 MG: 300 CAPSULE ORAL at 22:15

## 2024-10-19 RX ADMIN — CEFEPIME HYDROCHLORIDE 2000 MG: 2 INJECTION, SOLUTION INTRAVENOUS at 20:11

## 2024-10-19 RX ADMIN — SODIUM CHLORIDE, SODIUM GLUCONATE, SODIUM ACETATE, POTASSIUM CHLORIDE, MAGNESIUM CHLORIDE, SODIUM PHOSPHATE, DIBASIC, AND POTASSIUM PHOSPHATE 75 ML/HR: .53; .5; .37; .037; .03; .012; .00082 INJECTION, SOLUTION INTRAVENOUS at 22:15

## 2024-10-19 RX ADMIN — VANCOMYCIN HYDROCHLORIDE 1500 MG: 1 INJECTION, POWDER, LYOPHILIZED, FOR SOLUTION INTRAVENOUS at 22:14

## 2024-10-19 RX ADMIN — IOHEXOL 80 ML: 350 INJECTION, SOLUTION INTRAVENOUS at 17:30

## 2024-10-19 NOTE — ED PROVIDER NOTES
Time reflects when diagnosis was documented in both MDM as applicable and the Disposition within this note       Time User Action Codes Description Comment    10/19/2024  9:00 PM Adán Duggan [N39.0] UTI (urinary tract infection)     10/19/2024  9:00 PM Adán Duggan [R33.9] Urinary retention     10/19/2024  9:00 PM Adán Duggan [N17.9] SHERLYN (acute kidney injury) ([facility])     10/19/2024  9:01 PM Adán Duggan [R91.8] Pulmonary nodules/lesions, multiple           ED Disposition       ED Disposition   Admit    Condition   Stable    Date/Time   Sat Oct 19, 2024  9:00 PM    Comment                  Assessment & Plan       Medical Decision Making  Examination of the external genitalia showed leakage of urine.  There were some white spots which could represent candidiasis.  No inflammation or other lesions.  No vaginal bleeding.  Patient had significant abdominal pain on examination.  Imaging was negative for obstructive uropathy, AAA, acute diverticulitis, acute appendicitis, acute cholecystitis, small bowel obstruction, or other surgical pathology.  She did have a distended bladder with some wall thickening.  Urinalysis was consistent with UTI.  After urination, bladder scan had approximately 300 mL of urine.  I suspected patient was retaining urine.  A Hernandez catheter was placed.  Urine was strained.  Her abdominal pain and vaginal pain resolved.  She is not septic.  Her urine, however, grew out Pseudomonas last time.  She will require IV antibiotics.  She has already been treated as an outpatient without relief.  Consulted with hospitalist for admission.    Incidental pulmonary nodules were seen on imaging that are suspicious for malignancy.  Patient and daughter were made aware these.  Hospitalist was made aware these.    Amount and/or Complexity of Data Reviewed  Independent Historian:      Details: Daughter  External Data Reviewed: labs and notes.  Labs: ordered. Decision-making details  documented in ED Course.  Radiology: ordered. Decision-making details documented in ED Course.  ECG/medicine tests: ordered and independent interpretation performed. Decision-making details documented in ED Course.  Discussion of management or test interpretation with external provider(s): Hospitalist    Risk  Prescription drug management.  Decision regarding hospitalization.             Medications   morphine injection 2 mg (2 mg Intravenous Given 10/19/24 1714)   iohexol (OMNIPAQUE) 350 MG/ML injection (MULTI-DOSE) 80 mL (80 mL Intravenous Given 10/19/24 1730)   cefepime (MAXIPIME) IVPB (premix in dextrose) 2,000 mg 50 mL (2,000 mg Intravenous New Bag 10/19/24 2011)       ED Risk Strat Scores                           SBIRT 22yo+      Flowsheet Row Most Recent Value   Initial Alcohol Screen: US AUDIT-C     1. How often do you have a drink containing alcohol? 0 Filed at: 10/19/2024 1717   2. How many drinks containing alcohol do you have on a typical day you are drinking?  0 Filed at: 10/19/2024 1717   3a. Male UNDER 65: How often do you have five or more drinks on one occasion? 0 Filed at: 10/19/2024 1717   3b. FEMALE Any Age, or MALE 65+: How often do you have 4 or more drinks on one occassion? 0 Filed at: 10/19/2024 1717   Audit-C Score 0 Filed at: 10/19/2024 1717   KRISTY: How many times in the past year have you...    Used an illegal drug or used a prescription medication for non-medical reasons? Never Filed at: 10/19/2024 1717                            History of Present Illness       Chief Complaint   Patient presents with    Medical Problem     Pt seen by urologist two days ago and given medicine for yeast infection. Per pt she was feeling better but now has worsen pain.        Past Medical History:   Diagnosis Date    SHERLYN (acute kidney injury) ([facility]) 05/08/2022    Balance disorder     CHF (congestive heart failure) ([facility])     Chronic pain     GERD (gastroesophageal reflux disease)     Hard of hearing      Hyperlipidemia     Hypertension     Hyponatremia 12/05/2020    Low serum cortisol level 12/07/2020    Neuropathy     Pneumonia     Pneumonia due to COVID-19 virus 12/05/2020    Sepsis (HCC) 05/22/2018    Tinnitus     Unspecified adrenocortical insufficiency ([facility]) 02/28/2023    Felt to be related to COVID-19 infection.  No current symptoms.    UTI (urinary tract infection)       Past Surgical History:   Procedure Laterality Date    EYE SURGERY      HYSTERECTOMY      TONSILLECTOMY        Family History   Problem Relation Age of Onset    Stroke Mother     Stroke Father     Heart disease Daughter     Substance Abuse Neg Hx     Mental illness Neg Hx       Social History     Tobacco Use    Smoking status: Never     Passive exposure: Never    Smokeless tobacco: Never   Vaping Use    Vaping status: Never Used   Substance Use Topics    Alcohol use: Not Currently     Comment: rarely    Drug use: No      E-Cigarette/Vaping    E-Cigarette Use Never User       E-Cigarette/Vaping Substances    Nicotine No     THC No     CBD No     Flavoring No     Other No     Unknown No       I have reviewed and agree with the history as documented.     Patient is a 89-year-old female.  She is status post hysterectomy.  She was admitted last month for CHF/fluid overload.  She was diuresed.  It does not sound like she was on antibiotics.  Shortly after discharge she developed burning vaginal pain.  She was seen by her primary MD and treated for cystitis.  She was treated with Bactrim.  Urine cultures had grown out Pseudomonas and Enterococcus back in August.  She did not receive relief from the burning vaginal pain with the antibiotics.  She was referred to urology.  She was treated with a cream, presumably antifungal.  This helped transiently.  Subsequently she was treated with a 3-day course of medication that was probably Diflucan.  She presents to the emergency room because she has continued burning vaginal pain.  She feels she needs to  urinate.  She feels urine is leaking out.  She has dysuria.  No hematuria.  No vaginal discharge or bleeding.  No fever or chills.  No nausea or vomiting.  No diarrhea or constipation.  No chest pain.  No hematemesis, hematochezia or melena.        Review of Systems   Constitutional:  Negative for chills and fever.   HENT:  Negative for rhinorrhea and sore throat.    Eyes:  Negative for pain, redness and visual disturbance.   Respiratory:  Negative for cough and shortness of breath.    Cardiovascular:  Positive for leg swelling. Negative for chest pain.   Gastrointestinal:  Positive for abdominal pain. Negative for diarrhea and vomiting.   Endocrine: Negative for polydipsia and polyuria.   Genitourinary:  Positive for dysuria and vaginal pain. Negative for frequency, hematuria, vaginal bleeding and vaginal discharge.   Musculoskeletal:  Negative for back pain and neck pain.   Skin:  Negative for rash and wound.   Allergic/Immunologic: Negative for immunocompromised state.   Neurological:  Negative for weakness, numbness and headaches.   Hematological:  Does not bruise/bleed easily.   Psychiatric/Behavioral:  Negative for hallucinations and suicidal ideas.    All other systems reviewed and are negative.          Objective       ED Triage Vitals   Temperature Pulse Blood Pressure Respirations SpO2 Patient Position - Orthostatic VS   10/19/24 1607 10/19/24 1606 10/19/24 1606 10/19/24 1606 10/19/24 1606 10/19/24 1721   98.2 °F (36.8 °C) 74 (!) 180/81 18 98 % Lying      Temp Source Heart Rate Source BP Location FiO2 (%) Pain Score    10/19/24 1607 10/19/24 1800 10/19/24 1721 -- 10/19/24 1714    Oral Monitor Right arm  8      Vitals      Date and Time Temp Pulse SpO2 Resp BP Pain Score FACES Pain Rating User   10/19/24 2030 -- 90 96 % 16 127/60 -- -- KM   10/19/24 1830 -- 80 96 % 17 167/76 -- -- KM   10/19/24 1800 -- 81 96 % 14 165/76 -- -- RN   10/19/24 1721 -- 71 96 % 16 149/67 -- -- KP   10/19/24 1714 -- -- -- -- -- 8  --    10/19/24 1607 98.2 °F (36.8 °C) -- -- -- -- -- -- MEGHAN   10/19/24 1606 -- 74 98 % 18 180/81 -- -- MEGHAN            Physical Exam  Vitals reviewed.   Constitutional:       General: She is not in acute distress.  HENT:      Head: Normocephalic and atraumatic.      Nose: Nose normal.      Mouth/Throat:      Mouth: Mucous membranes are moist.   Eyes:      General:         Right eye: No discharge.         Left eye: No discharge.      Conjunctiva/sclera: Conjunctivae normal.   Cardiovascular:      Rate and Rhythm: Normal rate and regular rhythm.      Pulses: Normal pulses.      Heart sounds: Normal heart sounds. No murmur heard.     No friction rub. No gallop.   Pulmonary:      Effort: Pulmonary effort is normal. No respiratory distress.      Breath sounds: Normal breath sounds. No stridor. No wheezing, rhonchi or rales.   Abdominal:      General: Bowel sounds are normal. There is distension.      Palpations: Abdomen is soft.      Tenderness: There is abdominal tenderness. There is no right CVA tenderness, left CVA tenderness, guarding or rebound.      Comments: There is diffuse abdominal tenderness and distention.   Musculoskeletal:         General: No swelling, tenderness, deformity or signs of injury. Normal range of motion.      Cervical back: Normal range of motion and neck supple. No rigidity.      Right lower leg: No edema.      Left lower leg: No edema.      Comments: No calf tenderness or unilateral leg swelling.   Skin:     General: Skin is warm and dry.      Coloration: Skin is not jaundiced.      Findings: No rash.   Neurological:      General: No focal deficit present.      Mental Status: She is alert and oriented to person, place, and time.      Sensory: No sensory deficit.      Motor: Motor function is intact.   Psychiatric:         Mood and Affect: Mood normal.         Behavior: Behavior normal.         Results Reviewed       Procedure Component Value Units Date/Time    Urine Microscopic [[id number]]   (Abnormal) Collected: 10/19/24 1919    Lab Status: Final result Specimen: Urine, Clean Catch Updated: 10/19/24 2012     RBC, UA 1-2 /hpf      WBC, UA Innumerable /hpf      Epithelial Cells Occasional /hpf      Bacteria, UA Moderate /hpf      OTHER OBSERVATIONS WBCs Clumped     Transitional Epithelial Cells Occasional    Urine culture [[id number]] Collected: 10/19/24 1919    Lab Status: In process Specimen: Urine, Clean Catch Updated: 10/19/24 2011    UA w Reflex to Microscopic w Reflex to Culture [[id number]]  (Abnormal) Collected: 10/19/24 1919    Lab Status: Final result Specimen: Urine, Clean Catch Updated: 10/19/24 1941     Color, UA Yellow     Clarity, UA Cloudy     Specific Gravity, UA <1.005     pH, UA 6.0     Leukocytes, UA Large     Nitrite, UA Negative     Protein, UA Trace mg/dl      Glucose,  (3/10%) mg/dl      Ketones, UA Negative mg/dl      Urobilinogen, UA <2.0 mg/dl      Bilirubin, UA Negative     Occult Blood, UA Trace    HS Troponin I 2hr [[id number]]  (Normal) Collected: 10/19/24 1857    Lab Status: Final result Specimen: Blood from Arm, Right Updated: 10/19/24 1932     hs TnI 2hr 10 ng/L      Delta 2hr hsTnI 1 ng/L     HS Troponin I 4hr [[id number]]     Lab Status: No result Specimen: Blood     HS Troponin 0hr (reflex protocol) [[id number]]  (Normal) Collected: 10/19/24 1643    Lab Status: Final result Specimen: Blood from Arm, Right Updated: 10/19/24 1721     hs TnI 0hr 9 ng/L     Comprehensive metabolic panel [[id number]]  (Abnormal) Collected: 10/19/24 1643    Lab Status: Final result Specimen: Blood from Arm, Right Updated: 10/19/24 1720     Sodium 142 mmol/L      Potassium 4.8 mmol/L      Chloride 102 mmol/L      CO2 32 mmol/L      ANION GAP 8 mmol/L      BUN 27 mg/dL      Creatinine 1.43 mg/dL      Glucose 96 mg/dL      Calcium 9.8 mg/dL      AST 17 U/L      ALT 7 U/L      Alkaline Phosphatase 83 U/L      Total Protein 7.4 g/dL      Albumin 4.2 g/dL      Total Bilirubin 0.47 mg/dL      eGFR  31 ml/min/1.73sq m     Narrative:      National Kidney Disease Foundation guidelines for Chronic Kidney Disease (CKD):     Stage 1 with normal or high GFR (GFR > 90 mL/min/1.73 square meters)    Stage 2 Mild CKD (GFR = 60-89 mL/min/1.73 square meters)    Stage 3A Moderate CKD (GFR = 45-59 mL/min/1.73 square meters)    Stage 3B Moderate CKD (GFR = 30-44 mL/min/1.73 square meters)    Stage 4 Severe CKD (GFR = 15-29 mL/min/1.73 square meters)    Stage 5 End Stage CKD (GFR <15 mL/min/1.73 square meters)  Note: GFR calculation is accurate only with a steady state creatinine    Lipase [[id number]]  (Normal) Collected: 10/19/24 1643    Lab Status: Final result Specimen: Blood from Arm, Right Updated: 10/19/24 1720     Lipase 29 u/L     Lactic acid, plasma (w/reflex if result > 2.0) [[id number]]  (Normal) Collected: 10/19/24 1643    Lab Status: Final result Specimen: Blood from Arm, Right Updated: 10/19/24 1720     LACTIC ACID 1.8 mmol/L     Narrative:      Result may be elevated if tourniquet was used during collection.    CBC and differential [[id number]]  (Abnormal) Collected: 10/19/24 1643    Lab Status: Final result Specimen: Blood from Arm, Right Updated: 10/19/24 1658     WBC 11.35 Thousand/uL      RBC 4.24 Million/uL      Hemoglobin 11.9 g/dL      Hematocrit 38.9 %      MCV 92 fL      MCH 28.1 pg      MCHC 30.6 g/dL      RDW 14.0 %      MPV 9.7 fL      Platelets 237 Thousands/uL      nRBC 0 /100 WBCs      Segmented % 70 %      Immature Grans % 1 %      Lymphocytes % 19 %      Monocytes % 10 %      Eosinophils Relative 0 %      Basophils Relative 0 %      Absolute Neutrophils 7.85 Thousands/µL      Absolute Immature Grans 0.06 Thousand/uL      Absolute Lymphocytes 2.20 Thousands/µL      Absolute Monocytes 1.16 Thousand/µL      Eosinophils Absolute 0.05 Thousand/µL      Basophils Absolute 0.03 Thousands/µL             CT abdomen pelvis with contrast   Final Interpretation by Osmany Castro MD ([id number])       Mild bladder wall thickening considering degree of distention, correlate with urinalysis for possible cystitis.      Partially visualized lower lung fields demonstrate numerous pulmonary nodules, increasing in size from June 2023 exam as described.   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   No evidence of primary malignancy or metastatic disease in the abdomen/pelvis.      Large hiatal hernia.      Findings were discussed with Dr. Duggan at 7:30 p.m. on 10/19/2024         Workstation performed: [id number]             ECG 12 Lead Documentation Only    Date/Time: 10/19/2024 6:11 PM    Performed by: Adán Duggan MD  Authorized by: Adán Duggan MD    ECG reviewed by me, the ED Provider: yes    Patient location:  ED  Comments:      Normal sinus rhythm with first-degree AV block.  Left bundle branch block.  Abnormal EKG.      ED Medication and Procedure Management   Prior to Admission Medications   Prescriptions Last Dose Informant Patient Reported? Taking?   Artificial Tears 0.2-0.2-1 % SOLN  Child Yes No   Sig: Administer 1 drop to both eyes as needed (dry eyes ) 1 drop to both eyes prn daily for dry eyes   Cholecalciferol (VITAMIN D3) 1000 units CAPS  Child Yes No   Sig: Take 1,000 Units by mouth daily    Cyanocobalamin (VITAMIN B12 PO)  Child Yes No   Sig: Take 1,000 mcg by mouth in the morning   Empagliflozin (Jardiance) 10 MG TABS tablet  Child No No   Sig: Take 1 tablet (10 mg total) by mouth every morning   Multiple Vitamins-Minerals (PRESERVISION AREDS PO)  Child Yes No   Sig: Take by mouth   Myrbetriq 25 MG TB24  Child No No   Sig: TAKE 1 TABLET BY MOUTH IN THE MORNING   aspirin (ECOTRIN LOW STRENGTH) 81 mg EC tablet  Child No No   Sig: Take 1 tablet (81 mg total) by mouth daily   atorvastatin (LIPITOR) 10 mg tablet  Child No No   Sig: Take 1 tablet (10 mg total) by mouth daily   clotrimazole (LOTRIMIN) 1 % cream   No No   Sig: Apply topically 2 (two) times a day   fluconazole (DIFLUCAN) 200 mg tablet    No No   Sig: Take 1 tablet (200 mg total) by mouth daily for 3 days   gabapentin (NEURONTIN) 600 MG tablet  Child No No   Sig: Take 1 tablet by mouth twice daily   gabapentin (NEURONTIN) 600 MG tablet  Child No No   Sig: Take 1 tablet (600 mg total) by mouth 2 (two) times a day   Patient not taking: Reported on 10/16/2024   nitrofurantoin (MACROBID) 100 mg capsule  Child No No   Sig: Take 1 capsule (100 mg total) by mouth daily 1 cap PO daily for UTI prevention   omeprazole (PriLOSEC) 20 mg delayed release capsule  Child No No   Sig: Take 1 capsule by mouth once daily   potassium chloride (Klor-Con M10) 10 mEq tablet  Child No No   Sig: Take 1 tablet (10 mEq total) by mouth daily   spironolactone (ALDACTONE) 25 mg tablet  Child No No   Sig: Take 1 tablet (25 mg total) by mouth daily   torsemide (DEMADEX) 20 mg tablet  Child No No   Sig: Take 1 tablet (20 mg total) by mouth daily DO NOT START BEFORE CARDIOLOGIST CALLS YOU Do not start before September 23, 2024.      Facility-Administered Medications: None     Patient's Medications   Discharge Prescriptions    No medications on file     No discharge procedures on file.  ED SEPSIS DOCUMENTATION   Time reflects when diagnosis was documented in both MDM as applicable and the Disposition within this note       Time User Action Codes Description Comment    10/19/2024  9:00 PM Adán Duggan [N39.0] UTI (urinary tract infection)     10/19/2024  9:00 PM Adán Duggan [R33.9] Urinary retention     10/19/2024  9:00 PM Adán Duggan [N17.9] SHERLYN (acute kidney injury) ([facility])     10/19/2024  9:01 PM Adán Duggan [R91.8] Pulmonary nodules/lesions, multiple                  Adán Duggan MD  10/20/24 1616

## 2024-10-20 LAB
ALBUMIN SERPL BCG-MCNC: 3.5 G/DL (ref 3.5–5)
ALP SERPL-CCNC: 71 U/L (ref 34–104)
ALT SERPL W P-5'-P-CCNC: 4 U/L (ref 7–52)
ANION GAP SERPL CALCULATED.3IONS-SCNC: 9 MMOL/L (ref 4–13)
AST SERPL W P-5'-P-CCNC: 11 U/L (ref 13–39)
BASOPHILS # BLD AUTO: 0.03 THOUSANDS/ΜL (ref 0–0.1)
BASOPHILS NFR BLD AUTO: 0 % (ref 0–1)
BILIRUB SERPL-MCNC: 0.42 MG/DL (ref 0.2–1)
BUN SERPL-MCNC: 27 MG/DL (ref 5–25)
CALCIUM SERPL-MCNC: 8.6 MG/DL (ref 8.4–10.2)
CHLORIDE SERPL-SCNC: 102 MMOL/L (ref 96–108)
CO2 SERPL-SCNC: 29 MMOL/L (ref 21–32)
CREAT SERPL-MCNC: 1.26 MG/DL (ref 0.6–1.3)
EOSINOPHIL # BLD AUTO: 0.08 THOUSAND/ΜL (ref 0–0.61)
EOSINOPHIL NFR BLD AUTO: 1 % (ref 0–6)
ERYTHROCYTE [DISTWIDTH] IN BLOOD BY AUTOMATED COUNT: 14 % (ref 11.6–15.1)
GFR SERPL CREATININE-BSD FRML MDRD: 36 ML/MIN/1.73SQ M
GLUCOSE SERPL-MCNC: 90 MG/DL (ref 65–140)
HCT VFR BLD AUTO: 34.3 % (ref 34.8–46.1)
HGB BLD-MCNC: 10.6 G/DL (ref 11.5–15.4)
IMM GRANULOCYTES # BLD AUTO: 0.06 THOUSAND/UL (ref 0–0.2)
IMM GRANULOCYTES NFR BLD AUTO: 1 % (ref 0–2)
LYMPHOCYTES # BLD AUTO: 1.78 THOUSANDS/ΜL (ref 0.6–4.47)
LYMPHOCYTES NFR BLD AUTO: 19 % (ref 14–44)
MAGNESIUM SERPL-MCNC: 2 MG/DL (ref 1.9–2.7)
MCH RBC QN AUTO: 28.3 PG (ref 26.8–34.3)
MCHC RBC AUTO-ENTMCNC: 30.9 G/DL (ref 31.4–37.4)
MCV RBC AUTO: 92 FL (ref 82–98)
MONOCYTES # BLD AUTO: 1.01 THOUSAND/ΜL (ref 0.17–1.22)
MONOCYTES NFR BLD AUTO: 11 % (ref 4–12)
NEUTROPHILS # BLD AUTO: 6.59 THOUSANDS/ΜL (ref 1.85–7.62)
NEUTS SEG NFR BLD AUTO: 68 % (ref 43–75)
NRBC BLD AUTO-RTO: 0 /100 WBCS
PHOSPHATE SERPL-MCNC: 4.5 MG/DL (ref 2.3–4.1)
PLATELET # BLD AUTO: 209 THOUSANDS/UL (ref 149–390)
PMV BLD AUTO: 9.4 FL (ref 8.9–12.7)
POTASSIUM SERPL-SCNC: 3.9 MMOL/L (ref 3.5–5.3)
PROT SERPL-MCNC: 6.3 G/DL (ref 6.4–8.4)
RBC # BLD AUTO: 3.75 MILLION/UL (ref 3.81–5.12)
SODIUM SERPL-SCNC: 140 MMOL/L (ref 135–147)
VANCOMYCIN SERPL-MCNC: 16.8 UG/ML (ref 10–20)
WBC # BLD AUTO: 9.55 THOUSAND/UL (ref 4.31–10.16)

## 2024-10-20 PROCEDURE — 80202 ASSAY OF VANCOMYCIN: CPT | Performed by: PHYSICIAN ASSISTANT

## 2024-10-20 PROCEDURE — 85025 COMPLETE CBC W/AUTO DIFF WBC: CPT | Performed by: PHYSICIAN ASSISTANT

## 2024-10-20 PROCEDURE — 84100 ASSAY OF PHOSPHORUS: CPT | Performed by: PHYSICIAN ASSISTANT

## 2024-10-20 PROCEDURE — 99223 1ST HOSP IP/OBS HIGH 75: CPT | Performed by: PHYSICIAN ASSISTANT

## 2024-10-20 PROCEDURE — 99232 SBSQ HOSP IP/OBS MODERATE 35: CPT | Performed by: INTERNAL MEDICINE

## 2024-10-20 PROCEDURE — 87081 CULTURE SCREEN ONLY: CPT | Performed by: INTERNAL MEDICINE

## 2024-10-20 PROCEDURE — 83735 ASSAY OF MAGNESIUM: CPT | Performed by: PHYSICIAN ASSISTANT

## 2024-10-20 PROCEDURE — 80053 COMPREHEN METABOLIC PANEL: CPT | Performed by: PHYSICIAN ASSISTANT

## 2024-10-20 RX ORDER — SODIUM CHLORIDE, SODIUM GLUCONATE, SODIUM ACETATE, POTASSIUM CHLORIDE, MAGNESIUM CHLORIDE, SODIUM PHOSPHATE, DIBASIC, AND POTASSIUM PHOSPHATE .53; .5; .37; .037; .03; .012; .00082 G/100ML; G/100ML; G/100ML; G/100ML; G/100ML; G/100ML; G/100ML
75 INJECTION, SOLUTION INTRAVENOUS CONTINUOUS
Status: DISPENSED | OUTPATIENT
Start: 2024-10-20 | End: 2024-10-20

## 2024-10-20 RX ADMIN — Medication 1000 UNITS: at 08:06

## 2024-10-20 RX ADMIN — ASPIRIN 81 MG: 81 TABLET, COATED ORAL at 08:06

## 2024-10-20 RX ADMIN — HEPARIN SODIUM 5000 UNITS: 5000 INJECTION, SOLUTION INTRAVENOUS; SUBCUTANEOUS at 05:53

## 2024-10-20 RX ADMIN — HEPARIN SODIUM 5000 UNITS: 5000 INJECTION, SOLUTION INTRAVENOUS; SUBCUTANEOUS at 21:05

## 2024-10-20 RX ADMIN — ATORVASTATIN CALCIUM 10 MG: 10 TABLET, FILM COATED ORAL at 17:22

## 2024-10-20 RX ADMIN — CYANOCOBALAMIN TAB 500 MCG 1000 MCG: 500 TAB at 08:06

## 2024-10-20 RX ADMIN — GABAPENTIN 300 MG: 300 CAPSULE ORAL at 21:05

## 2024-10-20 RX ADMIN — SODIUM CHLORIDE, SODIUM GLUCONATE, SODIUM ACETATE, POTASSIUM CHLORIDE, MAGNESIUM CHLORIDE, SODIUM PHOSPHATE, DIBASIC, AND POTASSIUM PHOSPHATE 75 ML/HR: .53; .5; .37; .037; .03; .012; .00082 INJECTION, SOLUTION INTRAVENOUS at 13:41

## 2024-10-20 RX ADMIN — HEPARIN SODIUM 5000 UNITS: 5000 INJECTION, SOLUTION INTRAVENOUS; SUBCUTANEOUS at 14:53

## 2024-10-20 RX ADMIN — GABAPENTIN 300 MG: 300 CAPSULE ORAL at 08:06

## 2024-10-20 RX ADMIN — PANTOPRAZOLE SODIUM 20 MG: 20 TABLET, DELAYED RELEASE ORAL at 05:53

## 2024-10-20 RX ADMIN — MICONAZOLE NITRATE 200 MG: 200 SUPPOSITORY VAGINAL at 21:15

## 2024-10-20 RX ADMIN — CEFEPIME HYDROCHLORIDE 2000 MG: 2 INJECTION, SOLUTION INTRAVENOUS at 21:04

## 2024-10-20 NOTE — PROGRESS NOTES
Janice Yung is a 89 y.o. female who is currently ordered Vancomycin IV with management by the Pharmacy Consult service.  Relevant clinical data and objective / subjective history reviewed.  Vancomycin Assessment:  Indication and Goal AUC/Trough: Urinary tract infection (goal -600, trough >10)  Clinical Status: stable  Micro:   Pending  Renal Function:  SCr: 1.26 mg/dL  CrCl: 24.9 mL/min  Renal replacement: Not on dialysis  Days of Therapy: 2  Current Dose: 750mg daily PRN if level </=15  Vancomycin Plan: random level resulted 16.8. Since the level is>15, no dose will be given today.  New Dosing: no change  Next Level: 10/21/24 at 0600  Renal Function Monitoring: Daily BMP and UOP  Pharmacy will continue to follow closely for s/sx of nephrotoxicity, infusion reactions and appropriateness of therapy.  BMP and CBC will be ordered per protocol. We will continue to follow the patient’s culture results and clinical progress daily. Also, cefepime will be adjusted if necessary.    Eric Noel, Pharmacist, PharmD, BCPS

## 2024-10-20 NOTE — ASSESSMENT & PLAN NOTE
"Home regimen: torsemide 20mg daily and aldactone 25mg daily   Hold in setting of SHERLYN   /56   Pulse 69   Temp (!) 96.4 °F (35.8 °C)   Resp 18   Ht 5' 2\" (1.575 m)   Wt 69.4 kg (153 lb)   SpO2 95%   BMI 27.98 kg/m²     "

## 2024-10-20 NOTE — CONSULTS
Consultation - Infectious Disease   Janice Yung 89 y.o. female MRN: [id number]  Unit/Bed#: -01 Encounter: [id number]      Assessment & Plan     Urinary Retention    Not clear to me she has a UTI.  Her UA is +, we are awaiting the urine culture.  Okay to continue the vanco and cefepime for now.  Macrobid not an ideal agent for prophylaxis in patient her age.       - continue vanco and cefepime  - follow cbc and cmp to monitor for toxicies while on antibiotics  - follow wbc and fever curve as markers of clinical improvement or deterioration   - f/u cultures  - de-escalate when cultures are back    D/W primary team     History of Present Illness   Physician Requesting Consult: Lilo Ceron MD  Reason for Consult / Principal Problem: UTI     HPI: Janice Yung is a 89 y.o. year old female with dementia, AFIB, known enlarging pulmonary nodules, recurrent UTI's, last cultures documented here were in August (enteoroccus and pseudomonas).  She is on prophylactic macrobid.  Patient admitted with supra-pubic pain.  Patient no fever, WBC 11,000 no L shift.  She had a garcia placed and felt better.  Her UA was + and she was started on cefepime and vancomycin.  She is feeling well today, no sp pain, no fevers overnight.  She has a garcia catheter.      Inpatient consult to Infectious Diseases  Consult performed by: Eber Mcdowell MD  Consult ordered by: Nova Cardona PA-C          ROS: 12 systems reviewed, remainder is neg.    Historical Information   Past Medical History:   Diagnosis Date    SHERLYN (acute kidney injury) (Formerly [facility]) 05/08/2022    Balance disorder     CHF (congestive heart failure) (Formerly [facility])     Chronic pain     GERD (gastroesophageal reflux disease)     Hard of hearing     Hyperlipidemia     Hypertension     Hyponatremia 12/05/2020    Low serum cortisol level 12/07/2020    Neuropathy     Pneumonia     Pneumonia due to COVID-19 virus 12/05/2020    Sepsis (Former[facility]) 05/22/2018    Tinnitus     Unspecified adrenocortical  insufficiency (HCC) 02/28/2023    Felt to be related to COVID-19 infection.  No current symptoms.    UTI (urinary tract infection)      Past Surgical History:   Procedure Laterality Date    EYE SURGERY      HYSTERECTOMY      TONSILLECTOMY       Social History   Social History     Substance and Sexual Activity   Alcohol Use Not Currently    Comment: rarely     Social History     Substance and Sexual Activity   Drug Use No     Social History     Tobacco Use   Smoking Status Never    Passive exposure: Never   Smokeless Tobacco Never     Family History   Problem Relation Age of Onset    Stroke Mother     Stroke Father     Heart disease Daughter     Substance Abuse Neg Hx     Mental illness Neg Hx        Meds/Allergies   MEDS: reviewed       Current Facility-Administered Medications:     acetaminophen (TYLENOL) tablet 650 mg, 650 mg, Oral, Q6H PRN, Nova Cardona PA-C    aluminum-magnesium hydroxide-simethicone (MAALOX) oral suspension 30 mL, 30 mL, Oral, Q6H PRN, Nova Cardona PA-C    aspirin (ECOTRIN LOW STRENGTH) EC tablet 81 mg, 81 mg, Oral, Daily, Nova Cardona PA-C, 81 mg at 10/20/24 0806    atorvastatin (LIPITOR) tablet 10 mg, 10 mg, Oral, After Dinner, Nova Cardona PA-C    cefepime (MAXIPIME) IVPB (premix in dextrose) 2,000 mg 50 mL, 2,000 mg, Intravenous, Q24H, Nova Cardona PA-C    Cholecalciferol (VITAMIN D3) tablet 1,000 Units, 1,000 Units, Oral, Daily, MARIA A Nassar-C, 1,000 Units at 10/20/24 0806    cyanocobalamin (VITAMIN B-12) tablet 1,000 mcg, 1,000 mcg, Oral, Daily, Nova Cardona PA-C, 1,000 mcg at 10/20/24 0806    gabapentin (NEURONTIN) capsule 300 mg, 300 mg, Oral, BID, MARIA A Nassar-C, 300 mg at 10/20/24 0806    heparin (porcine) subcutaneous injection 5,000 Units, 5,000 Units, Subcutaneous, Q8H DONNA, MARIA A Nassar-C, 5,000 Units at 10/20/24 0553    miconazole (MONISTAT) external vaginal cream, , Topical, BID PRN **AND** miconazole (MONISTAT)  vaginal suppository 200 mg, 200 mg, Vaginal, HS, Nova Cardona PA-C    multi-electrolyte (PLASMALYTE-A/ISOLYTE-S PH 7.4) IV solution, 75 mL/hr, Intravenous, Continuous, Lilo Ceron MD, Last Rate: 75 mL/hr at 10/20/24 1043, 75 mL/hr at 10/20/24 1043    ondansetron (ZOFRAN) injection 4 mg, 4 mg, Intravenous, Q6H PRN, Nova Cardona PA-C    pantoprazole (PROTONIX) EC tablet 20 mg, 20 mg, Oral, Early Morning, Nova Cardona PA-C, 20 mg at 10/20/24 0553    senna (SENOKOT) tablet 8.6 mg, 1 tablet, Oral, HS PRN, Nova Cardona PA-C    vancomycin (VANCOCIN) IVPB (premix in dextrose) 750 mg 150 mL, 10 mg/kg, Intravenous, Daily PRN, Nova Cardona PA-C    No Known Allergies      Intake/Output Summary (Last 24 hours) at 10/20/2024 1155  Last data filed at 10/20/2024 0805  Gross per 24 hour   Intake 180 ml   Output 850 ml   Net -670 ml       PE:  GEN: NAD  VSS, Tmax:98  HEENT: anicteric   NECK: supple  CARDIAC: rrr s1s2  LUNGS: cta bilaterally  ABDOMEN: soft nt/nd + BS  EXTREMITIES:  no edema  : + hernandez     Invasive Devices:   Peripheral IV 10/19/24 Right Antecubital (Active)   Site Assessment WDL;Clean;Dry;Intact 10/20/24 0815   Dressing Type Transparent 10/20/24 0815   Line Status Infusing;Flushed 10/20/24 0815   Dressing Status Clean;Dry;Intact 10/20/24 0815   Dressing Change Due 10/23/24 10/19/24 2100   Reason Not Rotated Not due 10/20/24 0815       Urethral Catheter 16 Fr. (Active)   Amt returned on insertion(mL) 400 mL 10/19/24 2009   Reasons to continue Urinary Catheter  Acute urinary retention/obstruction failing urinary retention protocol 10/19/24 2009   Site Assessment Clean;Skin intact 10/19/24 2101   Hernandez Care Done 10/20/24 0843   Collection Container Standard drainage bag 10/19/24 2101   Securement Method Securing device (Describe) 10/19/24 2101   Urethral Irrigation Intake (mL) 0 mL 10/19/24 2101   Output (mL) 350 mL 10/20/24 0601           Lab Results:   Admission on 10/19/2024   Component  Date Value    WBC 10/19/2024 11.35 (H)     RBC 10/19/2024 4.24     Hemoglobin 10/19/2024 11.9     Hematocrit 10/19/2024 38.9     MCV 10/19/2024 92     MCH 10/19/2024 28.1     MCHC 10/19/2024 30.6 (L)     RDW 10/19/2024 14.0     MPV 10/19/2024 9.7     Platelets 10/19/2024 237     nRBC 10/19/2024 0     Segmented % 10/19/2024 70     Immature Grans % 10/19/2024 1     Lymphocytes % 10/19/2024 19     Monocytes % 10/19/2024 10     Eosinophils Relative 10/19/2024 0     Basophils Relative 10/19/2024 0     Absolute Neutrophils 10/19/2024 7.85 (H)     Absolute Immature Grans 10/19/2024 0.06     Absolute Lymphocytes 10/19/2024 2.20     Absolute Monocytes 10/19/2024 1.16     Eosinophils Absolute 10/19/2024 0.05     Basophils Absolute 10/19/2024 0.03     Sodium 10/19/2024 142     Potassium 10/19/2024 4.8     Chloride 10/19/2024 102     CO2 10/19/2024 32     ANION GAP 10/19/2024 8     BUN 10/19/2024 27 (H)     Creatinine 10/19/2024 1.43 (H)     Glucose 10/19/2024 96     Calcium 10/19/2024 9.8     AST 10/19/2024 17     ALT 10/19/2024 7     Alkaline Phosphatase 10/19/2024 83     Total Protein 10/19/2024 7.4     Albumin 10/19/2024 4.2     Total Bilirubin 10/19/2024 0.47     eGFR 10/19/2024 31     Lipase 10/19/2024 29     Color, UA 10/19/2024 Yellow     Clarity, UA 10/19/2024 Cloudy     Specific Gravity, UA 10/19/2024 <1.005 (L)     pH, UA 10/19/2024 6.0     Leukocytes, UA 10/19/2024 Large (A)     Nitrite, UA 10/19/2024 Negative     Protein, UA 10/19/2024 Trace (A)     Glucose, UA 10/19/2024 300 (3/10%) (A)     Ketones, UA 10/19/2024 Negative     Urobilinogen, UA 10/19/2024 <2.0     Bilirubin, UA 10/19/2024 Negative     Occult Blood, UA 10/19/2024 Trace (A)     LACTIC ACID 10/19/2024 1.8     hs TnI 0hr 10/19/2024 9     hs TnI 2hr 10/19/2024 10     Delta 2hr hsTnI 10/19/2024 1     Ventricular Rate 10/19/2024 80     Atrial Rate 10/19/2024 80     MA Interval 10/19/2024 246     QRSD Interval 10/19/2024 138     QT Interval 10/19/2024 402      QTC Interval 10/19/2024 463     P Axis 10/19/2024 43     QRS Axis 10/19/2024 71     T Wave Axis 10/19/2024 -56     RBC, UA 10/19/2024 1-2     WBC, UA 10/19/2024 Innumerable (A)     Epithelial Cells 10/19/2024 Occasional     Bacteria, UA 10/19/2024 Moderate (A)     OTHER OBSERVATIONS 10/19/2024 WBCs Clumped     Transitional Epithelial * 10/19/2024 Occasional     WBC 10/20/2024 9.55     RBC 10/20/2024 3.75 (L)     Hemoglobin 10/20/2024 10.6 (L)     Hematocrit 10/20/2024 34.3 (L)     MCV 10/20/2024 92     MCH 10/20/2024 28.3     MCHC 10/20/2024 30.9 (L)     RDW 10/20/2024 14.0     MPV 10/20/2024 9.4     Platelets 10/20/2024 209     nRBC 10/20/2024 0     Segmented % 10/20/2024 68     Immature Grans % 10/20/2024 1     Lymphocytes % 10/20/2024 19     Monocytes % 10/20/2024 11     Eosinophils Relative 10/20/2024 1     Basophils Relative 10/20/2024 0     Absolute Neutrophils 10/20/2024 6.59     Absolute Immature Grans 10/20/2024 0.06     Absolute Lymphocytes 10/20/2024 1.78     Absolute Monocytes 10/20/2024 1.01     Eosinophils Absolute 10/20/2024 0.08     Basophils Absolute 10/20/2024 0.03     Sodium 10/20/2024 140     Potassium 10/20/2024 3.9     Chloride 10/20/2024 102     CO2 10/20/2024 29     ANION GAP 10/20/2024 9     BUN 10/20/2024 27 (H)     Creatinine 10/20/2024 1.26     Glucose 10/20/2024 90     Calcium 10/20/2024 8.6     AST 10/20/2024 11 (L)     ALT 10/20/2024 4 (L)     Alkaline Phosphatase 10/20/2024 71     Total Protein 10/20/2024 6.3 (L)     Albumin 10/20/2024 3.5     Total Bilirubin 10/20/2024 0.42     eGFR 10/20/2024 36     Vancomycin Rm 10/20/2024 16.8     Magnesium 10/20/2024 2.0     Phosphorus 10/20/2024 4.5 (H)      Imaging Studies:   EKG, Pathology, and Other Studies:     Culture  Lab Results   Component Value Date    BLOODCX No Growth After 5 Days. 05/04/2024    BLOODCX No Growth After 5 Days. 05/04/2024    BLOODCX No Growth After 5 Days. 06/18/2023    BLOODCX No Growth After 5 Days. 06/18/2023     "BLOODCX No Growth After 5 Days. 05/08/2022    BLOODCX No Growth After 5 Days. 05/08/2022    BLOODCX No Growth After 5 Days. 11/26/2018    BLOODCX No Growth After 5 Days. 11/26/2018    BLOODCX No Growth After 5 Days. 05/21/2018    BLOODCX No Growth After 5 Days. 05/21/2018    BLOODCX No Growth After 5 Days. 05/21/2018    BLOODCX No Growth After 5 Days. 05/21/2018     No results found for: \"WOUNDCULT\"  Lab Results   Component Value Date    URINECX >100,000 cfu/ml Pseudomonas aeruginosa (A) 08/05/2024    URINECX 50,000-59,000 cfu/ml Enterococcus faecalis (A) 08/05/2024    URINECX >100,000 cfu/ml Klebsiella pneumoniae (A) 07/19/2023    URINECX 20,000-29,000 cfu/ml Klebsiella pneumoniae (A) 07/19/2023    URINECX 30,000-39,000 cfu/ml 06/18/2023    URINECX >100,000 cfu/ml Escherichia coli (A) 10/22/2019    URINECX >100,000 cfu/ml Escherichia coli (A) 07/05/2019    URINECX 70,000-79,000 cfu/ml Enterococcus faecalis (A) 06/15/2019    URINECX 20,000-29,000 cfu/ml 06/15/2019    URINECX >100,000 cfu/ml - strain 1 Escherichia coli ESBL (A) 04/08/2019    URINECX >100,000 cfu/ml - strain 2 Escherichia coli ESBL (A) 04/08/2019    URINECX >100,000 cfu/ml Escherichia coli ESBL (A) 03/04/2019    URINECX >100,000 cfu/ml Escherichia coli ESBL (A) 11/26/2018    URINECX >100,000 cfu/ml Escherichia coli 07/28/2016     Lab Results   Component Value Date    SPUTUMCULTUR 1+ Growth of Pseudomonas aeruginosa (A) 05/23/2018    SPUTUMCULTUR 1+ Growth of 05/23/2018       Principal Problem:    SHERLYN (acute kidney injury) (HCC)  Active Problems:    Hypertension    Chronic diastolic heart failure (HCC)    Paroxysmal atrial fibrillation (HCC)    Cystitis    Pulmonary nodules    Dementia without behavioral disturbance, psychotic disturbance, mood disturbance, or anxiety, unspecified dementia severity, unspecified dementia type (HCC)    Urinary retention        "

## 2024-10-20 NOTE — PLAN OF CARE
Problem: Potential for Falls  Goal: Patient will remain free of falls  Description: INTERVENTIONS:  - Educate patient/family on patient safety including physical limitations  - Instruct patient to call for assistance with activity   - Consult OT/PT to assist with strengthening/mobility   - Keep Call bell within reach  - Keep bed low and locked with side rails adjusted as appropriate  - Keep care items and personal belongings within reach  - Initiate and maintain comfort rounds  - Make Fall Risk Sign visible to staff  - Offer Toileting every 2 Hours, in advance of need  - Initiate/Maintain alarm  - Obtain necessary fall risk management equipment:   - Apply yellow socks and bracelet for high fall risk patients  - Consider moving patient to room near nurses station  Outcome: Progressing     Problem: PAIN - ADULT  Goal: Verbalizes/displays adequate comfort level or baseline comfort level  Description: Interventions:  - Encourage patient to monitor pain and request assistance  - Assess pain using appropriate pain scale  - Administer analgesics based on type and severity of pain and evaluate response  - Implement non-pharmacological measures as appropriate and evaluate response  - Consider cultural and social influences on pain and pain management  - Notify physician/advanced practitioner if interventions unsuccessful or patient reports new pain  Outcome: Progressing     Problem: INFECTION - ADULT  Goal: Absence or prevention of progression during hospitalization  Description: INTERVENTIONS:  - Assess and monitor for signs and symptoms of infection  - Monitor lab/diagnostic results  - Monitor all insertion sites, i.e. indwelling lines, tubes, and drains  - Monitor endotracheal if appropriate and nasal secretions for changes in amount and color  - [place] appropriate cooling/warming therapies per order  - Administer medications as ordered  - Instruct and encourage patient and family to use good hand hygiene  technique  - Identify and instruct in appropriate isolation precautions for identified infection/condition  Outcome: Progressing  Goal: Absence of fever/infection during neutropenic period  Description: INTERVENTIONS:  - Monitor WBC    Outcome: Progressing     Problem: SAFETY ADULT  Goal: Patient will remain free of falls  Description: INTERVENTIONS:  - Educate patient/family on patient safety including physical limitations  - Instruct patient to call for assistance with activity   - Consult OT/PT to assist with strengthening/mobility   - Keep Call bell within reach  - Keep bed low and locked with side rails adjusted as appropriate  - Keep care items and personal belongings within reach  - Initiate and maintain comfort rounds  - Make Fall Risk Sign visible to staff  - Offer Toileting every 2 Hours, in advance of need  - Initiate/Maintain alarm  - Obtain necessary fall risk management equipment:   - Apply yellow socks and bracelet for high fall risk patients  - Consider moving patient to room near nurses station  Outcome: Progressing  Goal: Maintain or return to baseline ADL function  Description: INTERVENTIONS:  -  Assess patient's ability to carry out ADLs; assess patient's baseline for ADL function and identify physical deficits which impact ability to perform ADLs (bathing, care of mouth/teeth, toileting, grooming, dressing, etc.)  - Assess/evaluate cause of self-care deficits   - Assess range of motion  - Assess patient's mobility; develop plan if impaired  - Assess patient's need for assistive devices and provide as appropriate  - Encourage maximum independence but intervene and supervise when necessary  - Involve family in performance of ADLs  - Assess for home care needs following discharge   - Consider OT consult to assist with ADL evaluation and planning for discharge  - Provide patient education as appropriate  Outcome: Progressing  Goal: Maintains/Returns to pre admission functional level  Description:  INTERVENTIONS:  - Perform AM-PAC 6 Click Basic Mobility/ Daily Activity assessment daily.  - Set and communicate daily mobility goal to care team and patient/family/caregiver.   - Collaborate with rehabilitation services on mobility goals if consulted  - Perform Range of Motion 3 times a day.  - Reposition patient every 2 hours.  - Dangle patient 3 times a day  - Stand patient 3 times a day  - Ambulate patient 3 times a day  - Out of bed to chair 3 times a day   - Out of bed for meals 3 times a day  - Out of bed for toileting  - Record patient progress and toleration of activity level   Outcome: Progressing     Problem: RESPIRATORY - ADULT  Goal: Achieves optimal ventilation and oxygenation  Description: INTERVENTIONS:  - Assess for changes in respiratory status  - Assess for changes in mentation and behavior  - Position to facilitate oxygenation and minimize respiratory effort  - Oxygen administered by appropriate delivery if ordered  - Initiate smoking cessation education as indicated  - Encourage broncho-pulmonary hygiene including cough, deep breathe, Incentive Spirometry  - Assess the need for suctioning and aspirate as needed  - Assess and instruct to report SOB or any respiratory difficulty  - Respiratory Therapy support as indicated  Outcome: Progressing     Problem: GENITOURINARY - ADULT  Goal: Maintains or returns to baseline urinary function  Description: INTERVENTIONS:  - Assess urinary function  - Encourage oral fluids to ensure adequate hydration if ordered  - Administer IV fluids as ordered to ensure adequate hydration  - Administer ordered medications as needed  - Offer frequent toileting  - Follow urinary retention protocol if ordered  Outcome: Progressing  Goal: Urinary catheter remains patent  Description: INTERVENTIONS:  - Assess patency of urinary catheter  - If patient has a chronic garcia, consider changing catheter if non-functioning  - Follow guidelines for intermittent irrigation of  non-functioning urinary catheter  Outcome: Progressing

## 2024-10-20 NOTE — ASSESSMENT & PLAN NOTE
Wt Readings from Last 3 Encounters:   10/20/24 69.4 kg (153 lb)   10/09/24 68.9 kg (152 lb)   10/01/24 70.8 kg (156 lb)   Last echo 9/10/24: LVEF 70%. G2DD  Home diuretic: torsemide 20mg daily, spironolactone 25mg daily, and jardiance 10mg daily   Hold all diuretics at this time as patient examines grossly hypovolemic  Monitor respiratory and volume status closely along with daily BMP to determine when to reintroduce diuretics as pt was admitted for acute diastolic HF in September I/o and daily weights   Liberalized fluid intake for now

## 2024-10-20 NOTE — ASSESSMENT & PLAN NOTE
Baseline Cr 0.8-1.0  Cr on admit 1.43, meeting SHERLYN criteria   Recent Labs     10/19/24  1643 10/20/24  0553   CREATININE 1.43* 1.26   EGFR 31 36     Estimated Creatinine Clearance: 24.9 mL/min (by C-G formula based on SCr of 1.26 mg/dL).   Likely obstructive in nature due to acute urinary retention with component of ATN with ongoing intake of diuretic (torsemide changed to daily rather than PRN 9/23 due to weight gain) and intake of fluconazole    Hold all nephrotoxic drugs   Avoid hypotension   Placed on gentle continuous IVF   Trend BMP daily   Last dose of fluconazole due 10/20 , renally dosed to 100 Mg  Gabapentin renally dosed

## 2024-10-20 NOTE — PROGRESS NOTES
Janice Yung is a 89 y.o. female who is currently ordered Vancomycin IV with management by the Pharmacy Consult service.  Relevant clinical data and objective / subjective history reviewed.  Vancomycin Assessment:  Indication and Goal AUC/Trough: Urinary tract infection (goal -600, trough >10)  Clinical Status:  New  Micro:   Pending  Renal Function:  SCr: 1.43 mg/dL  CrCl: 22 mL/min  Renal replacement: Not on dialysis  Days of Therapy: 1  Current Dose: 1500mg once loading dose  Vancomycin Plan:  New Dosinmg daily PRN if level </=15  Next Level: 10/20/24 at 0600  Renal Function Monitoring: Daily BMP and UOP  Pharmacy will continue to follow closely for s/sx of nephrotoxicity, infusion reactions and appropriateness of therapy.  BMP and CBC will be ordered per protocol. We will continue to follow the patient’s culture results and clinical progress daily.    Eric Noel, Pharmacist, PharmD, BCPS

## 2024-10-20 NOTE — ASSESSMENT & PLAN NOTE
Baseline Cr 0.8-1.0  Cr on admit 1.43, meeting SHERLYN criteria   Likely obstructive in nature due to acute urinary retention with component of ATN with ongoing intake of diuretic (torsemide changed to daily rather than PRN 9/23 due to weight gain) and intake of fluconazole    Hold all nephrotoxic drugs   Avoid hypotension   Placed on gentle continuous IVF   Trend BMP daily   Last dose of fluconazole due today, renally dosed to 100 Mg  Gabapentin renally dosed

## 2024-10-20 NOTE — ASSESSMENT & PLAN NOTE
"CT C/A/P: \"Partially visualized lower lung fields demonstrate numerous pulmonary nodules, increasing in size from June 2023 exam as described. 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 No evidence of primary malignancy or metastatic disease in the abdomen/pelvis.\"  Daughter unsure if they would even pursue treatment for this-she is interested in referral to oncology outpatient to discuss options  "

## 2024-10-20 NOTE — ASSESSMENT & PLAN NOTE
Hx of recurrent UTI - with hx of pseudomonas and enterococcus faecalis   Recently treated with keflex in August   UA with innumerable WBCs and moderate bacteria  Maintained on macrobid for prophylaxis - hold while treating acute UTI   Continue clotrimazole cream started by urology on 10/16-final dose of fluconazole p.o. ordered for this evening, renally reduced  Continue cefepime and add vanc   F/up urine culture

## 2024-10-20 NOTE — CONSULTS
Assessment /Plan:    Assessment  89 y.o. female with  new onset urinary retention. Abdominal pain has improved since garcia catheter inserted.    Plan   -continue Garcia catheter   -continue IV antibiotics  -outpatient follow-up with urology trial of void     SHERLYN (acute kidney injury) (HCC)  Baseline Cr 0.8-1.0  Cr on admit 1.43, meeting SHERLYN criteria   Likely obstructive in nature due to acute urinary retention with component of ATN with ongoing intake of diuretic (torsemide changed to daily rather than PRN 9/23 due to weight gain) and intake of fluconazole    Hold all nephrotoxic drugs   Avoid hypotension   Placed on gentle continuous IVF   Trend BMP daily   Last dose of fluconazole due today, renally dosed to 100 Mg  Gabapentin renally dosed    Cystitis  Hx of recurrent UTI - with hx of pseudomonas and enterococcus faecalis   Recently treated with keflex in August   UA with innumerable WBCs and moderate bacteria  Maintained on macrobid for prophylaxis - hold while treating acute UTI   Continue clotrimazole cream started by urology on 10/16-final dose of fluconazole p.o. ordered for this evening, renally reduced  Continue cefepime and add vanc   F/up urine culture     Urinary retention  Garcia placed in ED with 400cc urine output   Seen by urology on 10/16 with PVR 0   Continue garcia   Urology consulted     Paroxysmal atrial fibrillation (HCC)  Not on AC - on ASA only  Rate controlled on admit without AV joshua blocker     Hypertension  Home regimen: torsemide 20mg daily and aldactone 25mg daily   Hold in setting of SHERLYN     Chronic diastolic heart failure (HCC)      Wt Readings from Last 3 Encounters:   10/19/24 69.4 kg (153 lb)   10/09/24 68.9 kg (152 lb)   10/01/24 70.8 kg (156 lb)   Last echo 9/10/24: LVEF 70%. G2DD  Home diuretic: torsemide 20mg daily, spironolactone 25mg daily, and jardiance 10mg daily   Hold all diuretics at this time as patient examines grossly hypovolemic  Monitor respiratory and volume status  "closely along with daily BMP to determine when to reintroduce diuretics as pt was admitted for acute diastolic HF in September   I/o and daily weights   Liberalized fluid intake for now     Pulmonary nodules  CT C/A/P: \"Partially visualized lower lung fields demonstrate numerous pulmonary nodules, increasing in size from June 2023 exam as described. 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 No evidence of primary malignancy or metastatic disease in the abdomen/pelvis.\"  Daughter unsure if they would even pursue treatment for this-she is interested in referral to oncology outpatient to discuss options    Dementia without behavioral disturbance, psychotic disturbance, mood disturbance, or anxiety, unspecified dementia severity, unspecified dementia type (HCC)  Pleasant and conversational on admission, oriented        Subjective     Janice Yung is a 89 y.o. female who was admitted for urinary retention. She gives history of overactive bladder and recurrent urinary tract infections. Her PMH also includes heart failure, paroxysmal Afib, known pulmonary nodules.    She was last seen in the urology office on 10/16/24 and was recently prescribed Diflucan for vaginal pain/burning. She reports little relief until Hernandez catheter was placed. She says she is more comfortable this AM and finally has her appetite again. No new complains at this time.    Outside records reviewed. Pertinent radiology and labs reviewed.    Review of Systems  Pertinent items are noted in HPI.    Objective   Physicial Exam  General: No acute distress  Eyes: Anicteric, conjunctiva clear  HENT:  Normocephalic, atraumatic  Lungs: Normal work of breathing Resp:  Clear to auscultation bilaterally  CV:  S1 S2, Regular rate and rhythm; no murmur, rub, or gallop.  GI:  Soft, non-tender, non-distended; normal bowel sounds  : Hernandez catheter in place, clear yellow output  Musculoskeletal: No cyanosis, clubbing or edema. Normal ROM.  Skin:   No jaundice, " rashes, or lesions   Psych: Normal affect, good eye contact  Neuro: No gross deficits, AAOx3, speech nl    Recent Labs     10/19/24  1643 10/20/24  0553   WBC 11.35* 9.55   HGB 11.9 10.6*    209   SODIUM 142 140   K 4.8 3.9    102   CO2 32 29   BUN 27* 27*   CREATININE 1.43* 1.26   GLUC 96 90   CALCIUM 9.8 8.6   MG  --  2.0   PHOS  --  4.5*   AST 17 11*   ALT 7 4*   ALKPHOS 83 71   TBILI 0.47 0.42

## 2024-10-20 NOTE — ASSESSMENT & PLAN NOTE
Garcia placed in ED with 400cc urine output   Seen by urology on 10/16 with PVR 0   Continue garcia   Urology consulted

## 2024-10-20 NOTE — ASSESSMENT & PLAN NOTE
Wt Readings from Last 3 Encounters:   10/19/24 69.4 kg (153 lb)   10/09/24 68.9 kg (152 lb)   10/01/24 70.8 kg (156 lb)   Last echo 9/10/24: LVEF 70%. G2DD  Home diuretic: torsemide 20mg daily, spironolactone 25mg daily, and jardiance 10mg daily   Hold all diuretics at this time as patient examines grossly hypovolemic  Monitor respiratory and volume status closely along with daily BMP to determine when to reintroduce diuretics as pt was admitted for acute diastolic HF in September I/o and daily weights   Liberalized fluid intake for now

## 2024-10-20 NOTE — PLAN OF CARE
Problem: Potential for Falls  Goal: Patient will remain free of falls  Description: INTERVENTIONS:  - Educate patient/family on patient safety including physical limitations  - Instruct patient to call for assistance with activity   - Consult OT/PT to assist with strengthening/mobility   - Keep Call bell within reach  - Keep bed low and locked with side rails adjusted as appropriate  - Keep care items and personal belongings within reach  - Initiate and maintain comfort rounds  - Make Fall Risk Sign visible to staff  - Offer Toileting every 2 Hours, in advance of need  - Initiate/Maintain bed/chair alarm  - Obtain necessary fall risk management equipment:    - Apply yellow socks and bracelet for high fall risk patients  - Consider moving patient to room near nurses station  Outcome: Progressing     Problem: PAIN - ADULT  Goal: Verbalizes/displays adequate comfort level or baseline comfort level  Description: Interventions:  - Encourage patient to monitor pain and request assistance  - Assess pain using appropriate pain scale  - Administer analgesics based on type and severity of pain and evaluate response  - Implement non-pharmacological measures as appropriate and evaluate response  - Consider cultural and social influences on pain and pain management  - Notify physician/advanced practitioner if interventions unsuccessful or patient reports new pain  Outcome: Progressing     Problem: INFECTION - ADULT  Goal: Absence or prevention of progression during hospitalization  Description: INTERVENTIONS:  - Assess and monitor for signs and symptoms of infection  - Monitor lab/diagnostic results  - Monitor all insertion sites, i.e. indwelling lines, tubes, and drains  - Monitor endotracheal if appropriate and nasal secretions for changes in amount and color  - Bonham appropriate cooling/warming therapies per order  - Administer medications as ordered  - Instruct and encourage patient and family to use good hand hygiene  technique  - Identify and instruct in appropriate isolation precautions for identified infection/condition  Outcome: Progressing  Goal: Absence of fever/infection during neutropenic period  Description: INTERVENTIONS:  - Monitor WBC    Outcome: Progressing     Problem: SAFETY ADULT  Goal: Patient will remain free of falls  Description: INTERVENTIONS:  - Educate patient/family on patient safety including physical limitations  - Instruct patient to call for assistance with activity   - Consult OT/PT to assist with strengthening/mobility   - Keep Call bell within reach  - Keep bed low and locked with side rails adjusted as appropriate  - Keep care items and personal belongings within reach  - Initiate and maintain comfort rounds  - Make Fall Risk Sign visible to staff  - Offer Toileting every 2 Hours, in advance of need  - Initiate/Maintain bed/chairalarm  - Obtain necessary fall risk management equipment:    - Apply yellow socks and bracelet for high fall risk patients  - Consider moving patient to room near nurses station  Outcome: Progressing  Goal: Maintain or return to baseline ADL function  Description: INTERVENTIONS:  -  Assess patient's ability to carry out ADLs; assess patient's baseline for ADL function and identify physical deficits which impact ability to perform ADLs (bathing, care of mouth/teeth, toileting, grooming, dressing, etc.)  - Assess/evaluate cause of self-care deficits   - Assess range of motion  - Assess patient's mobility; develop plan if impaired  - Assess patient's need for assistive devices and provide as appropriate  - Encourage maximum independence but intervene and supervise when necessary  - Involve family in performance of ADLs  - Assess for home care needs following discharge   - Consider OT consult to assist with ADL evaluation and planning for discharge  - Provide patient education as appropriate  Outcome: Progressing  Goal: Maintains/Returns to pre admission functional  level  Description: INTERVENTIONS:  - Perform AM-PAC 6 Click Basic Mobility/ Daily Activity assessment daily.  - Set and communicate daily mobility goal to care team and patient/family/caregiver.   - Collaborate with rehabilitation services on mobility goals if consulted  - Perform Range of Motion 3 times a day.  - Reposition patient every 2 hours.  - Dangle patient 3 times a day  - Stand patient 3 times a day  - Ambulate patient 3 times a day  - Out of bed to chair 3 times a day   - Out of bed for meals 3 times a day  - Out of bed for toileting  - Record patient progress and toleration of activity level   Outcome: Progressing     Problem: GENITOURINARY - ADULT  Goal: Maintains or returns to baseline urinary function  Description: INTERVENTIONS:  - Assess urinary function  - Encourage oral fluids to ensure adequate hydration if ordered  - Administer IV fluids as ordered to ensure adequate hydration  - Administer ordered medications as needed  - Offer frequent toileting  - Follow urinary retention protocol if ordered  Outcome: Progressing  Goal: Urinary catheter remains patent  Description: INTERVENTIONS:  - Assess patency of urinary catheter  - If patient has a chronic garcia, consider changing catheter if non-functioning  - Follow guidelines for intermittent irrigation of non-functioning urinary catheter  Outcome: Progressing     Problem: Prexisting or High Potential for Compromised Skin Integrity  Goal: Skin integrity is maintained or improved  Description: INTERVENTIONS:  - Identify patients at risk for skin breakdown  - Assess and monitor skin integrity  - Assess and monitor nutrition and hydration status  - Monitor labs   - Assess for incontinence   - Turn and reposition patient  - Assist with mobility/ambulation  - Relieve pressure over bony prominences  - Avoid friction and shearing  - Provide appropriate hygiene as needed including keeping skin clean and dry  - Evaluate need for skin moisturizer/barrier  cream  - Collaborate with interdisciplinary team   - Patient/family teaching  - Consider wound care consult   Outcome: Progressing

## 2024-10-20 NOTE — ASSESSMENT & PLAN NOTE
Garcia placed in ED with 400cc urine output   Seen by urology on 10/16 with PVR 0   Continue garcia   Evaluated by urology, recommended to maintain garcia with outpatient follow up and voiding trial

## 2024-10-20 NOTE — PROGRESS NOTES
"Progress Note - Hospitalist   Name: Janice Yung 89 y.o. female I MRN: [id number]  Unit/Bed#: -01 I Date of Admission: 10/19/2024   Date of Service: 10/20/2024 I Hospital Day: 1    Assessment & Plan  SHERLYN (acute kidney injury) (HCC)  Baseline Cr 0.8-1.0  Cr on admit 1.43, meeting SHERLYN criteria   Recent Labs     10/19/24  1643 10/20/24  0553   CREATININE 1.43* 1.26   EGFR 31 36     Estimated Creatinine Clearance: 24.9 mL/min (by C-G formula based on SCr of 1.26 mg/dL).   Likely obstructive in nature due to acute urinary retention with component of ATN with ongoing intake of diuretic (torsemide changed to daily rather than PRN 9/23 due to weight gain) and intake of fluconazole    Hold all nephrotoxic drugs   Avoid hypotension   Placed on gentle continuous IVF   Trend BMP daily   Last dose of fluconazole due 10/20 , renally dosed to 100 Mg  Gabapentin renally dosed  Cystitis  Hx of recurrent UTI - with hx of pseudomonas and enterococcus faecalis   Recently treated with keflex in August   UA with innumerable WBCs and moderate bacteria  Maintained on macrobid for prophylaxis - hold while treating acute UTI   Continue clotrimazole cream started by urology on 10/16-final dose of fluconazole p.o. ordered for this evening, renally reduced  Continue cefepime and add vanc   F/up urine culture   Urinary retention  Garcia placed in ED with 400cc urine output   Seen by urology on 10/16 with PVR 0   Continue garcia   Evaluated by urology, recommended to maintain garcia with outpatient follow up and voiding trial   Paroxysmal atrial fibrillation (HCC)  Not on AC - on ASA only  Rate controlled on admit without AV joshua blocker   Hypertension  Home regimen: torsemide 20mg daily and aldactone 25mg daily   Hold in setting of SHERLYN   /56   Pulse 69   Temp (!) 96.4 °F (35.8 °C)   Resp 18   Ht 5' 2\" (1.575 m)   Wt 69.4 kg (153 lb)   SpO2 95%   BMI 27.98 kg/m²     Chronic diastolic heart failure (HCC)  Wt Readings from Last 3 " "Encounters:   10/20/24 69.4 kg (153 lb)   10/09/24 68.9 kg (152 lb)   10/01/24 70.8 kg (156 lb)   Last echo 9/10/24: LVEF 70%. G2DD  Home diuretic: torsemide 20mg daily, spironolactone 25mg daily, and jardiance 10mg daily   Hold all diuretics at this time as patient examines grossly hypovolemic  Monitor respiratory and volume status closely along with daily BMP to determine when to reintroduce diuretics as pt was admitted for acute diastolic HF in September   I/o and daily weights   Liberalized fluid intake for now   Pulmonary nodules  CT C/A/P: \"Partially visualized lower lung fields demonstrate numerous pulmonary nodules, increasing in size from June 2023 exam as described. 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 No evidence of primary malignancy or metastatic disease in the abdomen/pelvis.\"  Daughter unsure if they would even pursue treatment for this-she is interested in referral to oncology outpatient to discuss options  Dementia without behavioral disturbance, psychotic disturbance, mood disturbance, or anxiety, unspecified dementia severity, unspecified dementia type (HCC)  Pleasant and conversational on admission, oriented    VTE Pharmacologic Prophylaxis: VTE Score: 4 Moderate Risk (Score 3-4) - Pharmacological DVT Prophylaxis Ordered: heparin.    Mobility:   Basic Mobility Inpatient Raw Score: 18  JH-HLM Goal: 6: Walk 10 steps or more  JH-HLM Achieved: 2: Bed activities/Dependent transfer  JH-HLM Goal achieved. Continue to encourage appropriate mobility.    Patient Centered Rounds: I performed bedside rounds with nursing staff today.   Discussions with Specialists or Other Care Team Provider: yes    Education and Discussions with Family / Patient: Updated  (daughter) via phone.    Current Length of Stay: 1 day(s)  Current Patient Status: Inpatient   Certification Statement: The patient will continue to require additional inpatient hospital stay due to pending eval   Discharge Plan: " Anticipate discharge in 24-48 hrs to home with home services.    Code Status: Level 3 - DNAR and DNI    Subjective   Seen and examined at bedside  Awake and alert  Feels better  Denies abdominal pain     Objective :  Temp:  [96.4 °F (35.8 °C)-98.2 °F (36.8 °C)] 96.4 °F (35.8 °C)  HR:  [69-90] 69  BP: (111-180)/(56-81) 126/56  Resp:  [12-18] 18  SpO2:  [94 %-98 %] 95 %  O2 Device: None (Room air)    Body mass index is 27.98 kg/m².     Input and Output Summary (last 24 hours):     Intake/Output Summary (Last 24 hours) at 10/20/2024 1146  Last data filed at 10/20/2024 0805  Gross per 24 hour   Intake 180 ml   Output 850 ml   Net -670 ml       Physical Exam  Vitals reviewed.   Constitutional:       Comments: frail   HENT:      Head: Atraumatic.      Mouth/Throat:      Mouth: Mucous membranes are dry.   Cardiovascular:      Rate and Rhythm: Normal rate.      Heart sounds: Normal heart sounds.   Pulmonary:      Effort: No respiratory distress.   Abdominal:      General: Bowel sounds are normal.      Tenderness: There is no abdominal tenderness.   Musculoskeletal:      Right lower leg: No edema.      Left lower leg: No edema.      Comments: Venous stasis changes    Skin:     General: Skin is dry.      Capillary Refill: Capillary refill takes less than 2 seconds.      Findings: Erythema present.   Neurological:      Mental Status: She is alert. Mental status is at baseline.   Psychiatric:         Mood and Affect: Mood normal.           Lines/Drains:  Lines/Drains/Airways       Active Status       Name Placement date Placement time Site Days    Urethral Catheter 16 Fr. 10/19/24  2009  --  less than 1                  Urinary Catheter:  Goal for removal:  outpatient voiding trial                  Lab Results: I have reviewed the following results:   Results from last 7 days   Lab Units 10/20/24  0553   WBC Thousand/uL 9.55   HEMOGLOBIN g/dL 10.6*   HEMATOCRIT % 34.3*   PLATELETS Thousands/uL 209   SEGS PCT % 68   LYMPHO PCT %  19   MONO PCT % 11   EOS PCT % 1     Results from last 7 days   Lab Units 10/20/24  0553   SODIUM mmol/L 140   POTASSIUM mmol/L 3.9   CHLORIDE mmol/L 102   CO2 mmol/L 29   BUN mg/dL 27*   CREATININE mg/dL 1.26   ANION GAP mmol/L 9   CALCIUM mg/dL 8.6   ALBUMIN g/dL 3.5   TOTAL BILIRUBIN mg/dL 0.42   ALK PHOS U/L 71   ALT U/L 4*   AST U/L 11*   GLUCOSE RANDOM mg/dL 90                 Results from last 7 days   Lab Units 10/19/24  1643   LACTIC ACID mmol/L 1.8       Recent Cultures (last 7 days):         Imaging Results Review: No pertinent imaging studies reviewed.  Other Study Results Review: No additional pertinent studies reviewed.    Last 24 Hours Medication List:     Current Facility-Administered Medications:     acetaminophen (TYLENOL) tablet 650 mg, Q6H PRN    aluminum-magnesium hydroxide-simethicone (MAALOX) oral suspension 30 mL, Q6H PRN    aspirin (ECOTRIN LOW STRENGTH) EC tablet 81 mg, Daily    atorvastatin (LIPITOR) tablet 10 mg, After Dinner    cefepime (MAXIPIME) IVPB (premix in dextrose) 2,000 mg 50 mL, Q24H    Cholecalciferol (VITAMIN D3) tablet 1,000 Units, Daily    cyanocobalamin (VITAMIN B-12) tablet 1,000 mcg, Daily    gabapentin (NEURONTIN) capsule 300 mg, BID    heparin (porcine) subcutaneous injection 5,000 Units, Q8H DONNA    miconazole (MONISTAT) external vaginal cream, BID PRN **AND** miconazole (MONISTAT) vaginal suppository 200 mg, HS    multi-electrolyte (PLASMALYTE-A/ISOLYTE-S PH 7.4) IV solution, Continuous, Last Rate: 75 mL/hr (10/20/24 1043)    ondansetron (ZOFRAN) injection 4 mg, Q6H PRN    pantoprazole (PROTONIX) EC tablet 20 mg, Early Morning    senna (SENOKOT) tablet 8.6 mg, HS PRN    vancomycin (VANCOCIN) IVPB (premix in dextrose) 750 mg 150 mL, Daily PRN    Administrative Statements   Today, Patient Was Seen By: Lilo Ceron MD  I have spent a total time of 39 minutes in caring for this patient on the day of the visit/encounter including Patient and family education, Counseling /  Coordination of care, Documenting in the medical record, Reviewing / ordering tests, medicine, procedures  , Obtaining or reviewing history  , and Communicating with other healthcare professionals .    **Please Note: This note may have been constructed using a voice recognition system.**

## 2024-10-20 NOTE — H&P
H&P - Hospitalist   Name: Janice Yung 89 y.o. female I MRN: [id number]  Unit/Bed#: -01 I Date of Admission: 10/19/2024   Date of Service: 10/19/2024 I Hospital Day: 0     Assessment & Plan  SHERLYN (acute kidney injury) (HCC)  Baseline Cr 0.8-1.0  Cr on admit 1.43, meeting SHERLYN criteria   Likely obstructive in nature due to acute urinary retention with component of ATN with ongoing intake of diuretic (torsemide changed to daily rather than PRN 9/23 due to weight gain) and intake of fluconazole    Hold all nephrotoxic drugs   Avoid hypotension   Placed on gentle continuous IVF   Trend BMP daily   Last dose of fluconazole due today, renally dosed to 100 Mg  Gabapentin renally dosed  Cystitis  Hx of recurrent UTI - with hx of pseudomonas and enterococcus faecalis   Recently treated with keflex in August   UA with innumerable WBCs and moderate bacteria  Maintained on macrobid for prophylaxis - hold while treating acute UTI   Continue clotrimazole cream started by urology on 10/16-final dose of fluconazole p.o. ordered for this evening, renally reduced  Continue cefepime and add vanc   F/up urine culture   Urinary retention  Garcia placed in ED with 400cc urine output   Seen by urology on 10/16 with PVR 0   Continue garcia   Urology consulted   Paroxysmal atrial fibrillation (HCC)  Not on AC - on ASA only  Rate controlled on admit without AV joshua blocker   Hypertension  Home regimen: torsemide 20mg daily and aldactone 25mg daily   Hold in setting of SHERLYN   Chronic diastolic heart failure (HCC)  Wt Readings from Last 3 Encounters:   10/19/24 69.4 kg (153 lb)   10/09/24 68.9 kg (152 lb)   10/01/24 70.8 kg (156 lb)   Last echo 9/10/24: LVEF 70%. G2DD  Home diuretic: torsemide 20mg daily, spironolactone 25mg daily, and jardiance 10mg daily   Hold all diuretics at this time as patient examines grossly hypovolemic  Monitor respiratory and volume status closely along with daily BMP to determine when to reintroduce diuretics as  "pt was admitted for acute diastolic HF in September   I/o and daily weights   Liberalized fluid intake for now   Pulmonary nodules  CT C/A/P: \"Partially visualized lower lung fields demonstrate numerous pulmonary nodules, increasing in size from June 2023 exam as described. 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 No evidence of primary malignancy or metastatic disease in the abdomen/pelvis.\"  Daughter unsure if they would even pursue treatment for this-she is interested in referral to oncology outpatient to discuss options  Dementia without behavioral disturbance, psychotic disturbance, mood disturbance, or anxiety, unspecified dementia severity, unspecified dementia type (HCC)  Pleasant and conversational on admission, oriented      VTE Pharmacologic Prophylaxis: VTE Score: 4 Moderate Risk (Score 3-4) - Pharmacological DVT Prophylaxis Ordered: heparin.  Code Status: Level 3 - DNAR and DNI   Discussion with family: Updated  (daughter) via phone.    Anticipated Length of Stay: Patient will be admitted on an inpatient basis with an anticipated length of stay of greater than 2 midnights secondary to SHERLYN, cystitis.    History of Present Illness   Chief Complaint: \" I had burning in my vagina\"    Janice Yung is a 89 y.o. female with a PMH of recurrent UTIs, diastolic HF, paroxysmal A-fib, and known pulmonary nodules who presents with severe vaginal burning x 3 days.  Reports she was started on clotrimazole vaginal cream by urology on 10/16 without improvement, so she was started on oral Diflucan and has received 2 doses thus far without improvement.  Denies fevers or chills.  No chest pain or dyspnea.  Reports vaginal burning resolved after placement of Hernandez catheter in the ED.  On admission, patient offers no complaints and reports feeling much better.  Endorses limited oral intake over the last few days.    Review of Systems   Constitutional:  Negative for chills and fever.   HENT:  Negative for " congestion.    Respiratory:  Negative for cough and shortness of breath.    Cardiovascular:  Negative for chest pain and leg swelling.   Gastrointestinal:  Negative for abdominal pain, constipation, diarrhea, nausea and vomiting.   Genitourinary:  Positive for difficulty urinating, dysuria and vaginal pain.   Musculoskeletal:  Positive for gait problem (Walker at baseline).   Neurological:  Negative for weakness and numbness.   All other systems reviewed and are negative.      Historical Information   Past Medical History:   Diagnosis Date    SHERLYN (acute kidney injury) (Formerly [facility]) 05/08/2022    Balance disorder     CHF (congestive heart failure) (Formerly [facility])     Chronic pain     GERD (gastroesophageal reflux disease)     Hard of hearing     Hyperlipidemia     Hypertension     Hyponatremia 12/05/2020    Low serum cortisol level 12/07/2020    Neuropathy     Pneumonia     Pneumonia due to COVID-19 virus 12/05/2020    Sepsis (Formerly [facility]) 05/22/2018    Tinnitus     Unspecified adrenocortical insufficiency (Formerly [facility]) 02/28/2023    Felt to be related to COVID-19 infection.  No current symptoms.    UTI (urinary tract infection)      Past Surgical History:   Procedure Laterality Date    EYE SURGERY      HYSTERECTOMY      TONSILLECTOMY       Social History     Tobacco Use    Smoking status: Never     Passive exposure: Never    Smokeless tobacco: Never   Vaping Use    Vaping status: Never Used   Substance and Sexual Activity    Alcohol use: Not Currently     Comment: rarely    Drug use: No    Sexual activity: Not Currently     E-Cigarette/Vaping    E-Cigarette Use Never User      E-Cigarette/Vaping Substances    Nicotine No     THC No     CBD No     Flavoring No     Other No     Unknown No      Family History   Problem Relation Age of Onset    Stroke Mother     Stroke Father     Heart disease Daughter     Substance Abuse Neg Hx     Mental illness Neg Hx      Social History:  Marital Status:    Occupation: Retired  Patient Pre-hospital Living  Situation: Home, With other family member: Daughter  Patient Pre-hospital Level of Mobility: walks with walker  Patient Pre-hospital Diet Restrictions: Sodium and fluid restriction    Meds/Allergies   I have reviewed home medications with patient family member.  Prior to Admission medications    Medication Sig Start Date End Date Taking? Authorizing Provider   Artificial Tears 0.2-0.2-1 % SOLN Administer 1 drop to both eyes as needed (dry eyes ) 1 drop to both eyes prn daily for dry eyes 6/30/23   Historical Provider, MD   aspirin (ECOTRIN LOW STRENGTH) 81 mg EC tablet Take 1 tablet (81 mg total) by mouth daily 12/11/18   Kenneth Castillo MD   atorvastatin (LIPITOR) 10 mg tablet Take 1 tablet (10 mg total) by mouth daily 5/13/24   REAL Coronado   Cholecalciferol (VITAMIN D3) 1000 units CAPS Take 1,000 Units by mouth daily     Historical Provider, MD   clotrimazole (LOTRIMIN) 1 % cream Apply topically 2 (two) times a day 10/16/24   Leah Lino PA-C   Cyanocobalamin (VITAMIN B12 PO) Take 1,000 mcg by mouth in the morning    Historical Provider, MD   Empagliflozin (Jardiance) 10 MG TABS tablet Take 1 tablet (10 mg total) by mouth every morning 9/17/24   Edgar Mustafa MD   fluconazole (DIFLUCAN) 200 mg tablet Take 1 tablet (200 mg total) by mouth daily for 3 days 10/18/24 10/21/24  Leah Lino PA-C   gabapentin (NEURONTIN) 600 MG tablet Take 1 tablet by mouth twice daily 10/8/24   REAL Coronado   gabapentin (NEURONTIN) 600 MG tablet Take 1 tablet (600 mg total) by mouth 2 (two) times a day  Patient not taking: Reported on 10/16/2024 10/9/24   REAL Coronado   Multiple Vitamins-Minerals (PRESERVISION AREDS PO) Take by mouth    Historical Provider, MD   Myrbetriq 25 MG TB24 TAKE 1 TABLET BY MOUTH IN THE MORNING 10/7/24   REAL La   nitrofurantoin (MACROBID) 100 mg capsule Take 1 capsule (100 mg total) by mouth daily 1 cap PO daily for UTI prevention 10/10/24   Edgar  MD Ramsey   omeprazole (PriLOSEC) 20 mg delayed release capsule Take 1 capsule by mouth once daily 8/13/24   REAL Coronado   potassium chloride (Klor-Con M10) 10 mEq tablet Take 1 tablet (10 mEq total) by mouth daily 9/17/24   Edgar Mustafa MD   spironolactone (ALDACTONE) 25 mg tablet Take 1 tablet (25 mg total) by mouth daily 9/17/24 10/17/24  Edgar Mustafa MD   torsemide (DEMADEX) 20 mg tablet Take 1 tablet (20 mg total) by mouth daily DO NOT START BEFORE CARDIOLOGIST CALLS YOU Do not start before September 23, 2024. 9/23/24   Kris Mishar PA-C     No Known Allergies    Objective :  Temp:  [96.6 °F (35.9 °C)-98.2 °F (36.8 °C)] 96.6 °F (35.9 °C)  HR:  [71-90] 86  BP: (111-180)/(57-81) 111/57  Resp:  [12-18] 15  SpO2:  [94 %-98 %] 96 %  O2 Device: None (Room air)    Physical Exam  Vitals and nursing note reviewed.   Constitutional:       General: She is not in acute distress.     Appearance: Normal appearance. She is not ill-appearing.      Comments: Pleasant and conversational   HENT:      Head: Normocephalic.      Nose: Nose normal.      Mouth/Throat:      Mouth: Mucous membranes are dry.   Eyes:      Extraocular Movements: Extraocular movements intact.      Conjunctiva/sclera: Conjunctivae normal.   Cardiovascular:      Rate and Rhythm: Normal rate and regular rhythm.      Pulses: Normal pulses.      Heart sounds: No murmur heard.  Pulmonary:      Effort: Pulmonary effort is normal.      Breath sounds: Normal breath sounds.   Abdominal:      General: Abdomen is flat.      Palpations: Abdomen is soft.      Tenderness: There is no abdominal tenderness.   Genitourinary:     Comments: Hernandez catheter in place and draining well with yellow urine  Musculoskeletal:         General: Normal range of motion.      Cervical back: Normal range of motion.      Right lower leg: No edema.      Left lower leg: No edema.   Skin:     General: Skin is warm and dry.      Comments: Poor skin turgor   Neurological:       "General: No focal deficit present.      Mental Status: She is alert.   Psychiatric:         Mood and Affect: Mood normal.         Thought Content: Thought content normal.          Lines/Drains:  Lines/Drains/Airways       Active Status       Name Placement date Placement time Site Days    Urethral Catheter 16 Fr. 10/19/24  2009  --  less than 1                  Urinary Catheter:  Goal for removal: N/A- Discharging with Hernandez               Lab Results: I have reviewed the following results:  Results from last 7 days   Lab Units 10/19/24  1643   WBC Thousand/uL 11.35*   HEMOGLOBIN g/dL 11.9   HEMATOCRIT % 38.9   PLATELETS Thousands/uL 237   SEGS PCT % 70   LYMPHO PCT % 19   MONO PCT % 10   EOS PCT % 0     Results from last 7 days   Lab Units 10/19/24  1643   SODIUM mmol/L 142   POTASSIUM mmol/L 4.8   CHLORIDE mmol/L 102   CO2 mmol/L 32   BUN mg/dL 27*   CREATININE mg/dL 1.43*   ANION GAP mmol/L 8   CALCIUM mg/dL 9.8   ALBUMIN g/dL 4.2   TOTAL BILIRUBIN mg/dL 0.47   ALK PHOS U/L 83   ALT U/L 7   AST U/L 17   GLUCOSE RANDOM mg/dL 96             No results found for: \"HGBA1C\"  Results from last 7 days   Lab Units 10/19/24  1643   LACTIC ACID mmol/L 1.8       Imaging Results Review: I reviewed radiology reports from this admission including: CT abdomen/pelvis.  Other Study Results Review: EKG was personally reviewed and my interpretation is: Sinus with left bundle branch block.  Normal QTc...    Administrative Statements       ** Please Note: This note has been constructed using a voice recognition system. **    "

## 2024-10-21 ENCOUNTER — TELEPHONE (OUTPATIENT)
Dept: OTHER | Facility: HOSPITAL | Age: 89
End: 2024-10-21

## 2024-10-21 LAB
ANION GAP SERPL CALCULATED.3IONS-SCNC: 6 MMOL/L (ref 4–13)
ATRIAL RATE: 80 BPM
BASOPHILS # BLD AUTO: 0.03 THOUSANDS/ΜL (ref 0–0.1)
BASOPHILS NFR BLD AUTO: 0 % (ref 0–1)
BUN SERPL-MCNC: 23 MG/DL (ref 5–25)
CALCIUM SERPL-MCNC: 8.6 MG/DL (ref 8.4–10.2)
CHLORIDE SERPL-SCNC: 105 MMOL/L (ref 96–108)
CO2 SERPL-SCNC: 31 MMOL/L (ref 21–32)
CREAT SERPL-MCNC: 1.15 MG/DL (ref 0.6–1.3)
EOSINOPHIL # BLD AUTO: 0.13 THOUSAND/ΜL (ref 0–0.61)
EOSINOPHIL NFR BLD AUTO: 1 % (ref 0–6)
ERYTHROCYTE [DISTWIDTH] IN BLOOD BY AUTOMATED COUNT: 14.2 % (ref 11.6–15.1)
GFR SERPL CREATININE-BSD FRML MDRD: 40 ML/MIN/1.73SQ M
GLUCOSE SERPL-MCNC: 97 MG/DL (ref 65–140)
HCT VFR BLD AUTO: 34.2 % (ref 34.8–46.1)
HGB BLD-MCNC: 10.5 G/DL (ref 11.5–15.4)
IMM GRANULOCYTES # BLD AUTO: 0.04 THOUSAND/UL (ref 0–0.2)
IMM GRANULOCYTES NFR BLD AUTO: 0 % (ref 0–2)
LYMPHOCYTES # BLD AUTO: 1.94 THOUSANDS/ΜL (ref 0.6–4.47)
LYMPHOCYTES NFR BLD AUTO: 20 % (ref 14–44)
MCH RBC QN AUTO: 28.1 PG (ref 26.8–34.3)
MCHC RBC AUTO-ENTMCNC: 30.7 G/DL (ref 31.4–37.4)
MCV RBC AUTO: 91 FL (ref 82–98)
MONOCYTES # BLD AUTO: 1.08 THOUSAND/ΜL (ref 0.17–1.22)
MONOCYTES NFR BLD AUTO: 11 % (ref 4–12)
MRSA NOSE QL CULT: NORMAL
NEUTROPHILS # BLD AUTO: 6.38 THOUSANDS/ΜL (ref 1.85–7.62)
NEUTS SEG NFR BLD AUTO: 68 % (ref 43–75)
NRBC BLD AUTO-RTO: 0 /100 WBCS
P AXIS: 43 DEGREES
PLATELET # BLD AUTO: 199 THOUSANDS/UL (ref 149–390)
PMV BLD AUTO: 9.5 FL (ref 8.9–12.7)
POTASSIUM SERPL-SCNC: 4 MMOL/L (ref 3.5–5.3)
PR INTERVAL: 246 MS
QRS AXIS: 71 DEGREES
QRSD INTERVAL: 138 MS
QT INTERVAL: 402 MS
QTC INTERVAL: 463 MS
RBC # BLD AUTO: 3.74 MILLION/UL (ref 3.81–5.12)
SODIUM SERPL-SCNC: 142 MMOL/L (ref 135–147)
T WAVE AXIS: -56 DEGREES
VANCOMYCIN SERPL-MCNC: 9.3 UG/ML (ref 10–20)
VENTRICULAR RATE: 80 BPM
WBC # BLD AUTO: 9.6 THOUSAND/UL (ref 4.31–10.16)

## 2024-10-21 PROCEDURE — 93010 ELECTROCARDIOGRAM REPORT: CPT | Performed by: INTERNAL MEDICINE

## 2024-10-21 PROCEDURE — 80202 ASSAY OF VANCOMYCIN: CPT | Performed by: STUDENT IN AN ORGANIZED HEALTH CARE EDUCATION/TRAINING PROGRAM

## 2024-10-21 PROCEDURE — 80048 BASIC METABOLIC PNL TOTAL CA: CPT | Performed by: PHYSICIAN ASSISTANT

## 2024-10-21 PROCEDURE — 99232 SBSQ HOSP IP/OBS MODERATE 35: CPT | Performed by: INTERNAL MEDICINE

## 2024-10-21 PROCEDURE — 85025 COMPLETE CBC W/AUTO DIFF WBC: CPT | Performed by: STUDENT IN AN ORGANIZED HEALTH CARE EDUCATION/TRAINING PROGRAM

## 2024-10-21 RX ORDER — VANCOMYCIN HYDROCHLORIDE 750 MG/150ML
15 INJECTION, SOLUTION INTRAVENOUS ONCE
Status: COMPLETED | OUTPATIENT
Start: 2024-10-21 | End: 2024-10-21

## 2024-10-21 RX ORDER — TORSEMIDE 20 MG/1
20 TABLET ORAL DAILY
Status: DISCONTINUED | OUTPATIENT
Start: 2024-10-21 | End: 2024-10-24

## 2024-10-21 RX ADMIN — MICONAZOLE NITRATE 200 MG: 200 SUPPOSITORY VAGINAL at 21:13

## 2024-10-21 RX ADMIN — CYANOCOBALAMIN TAB 500 MCG 1000 MCG: 500 TAB at 10:06

## 2024-10-21 RX ADMIN — Medication 1000 UNITS: at 10:07

## 2024-10-21 RX ADMIN — OLOPATADINE HYDROCHLORIDE OPHTHALMIC: 2 SOLUTION OPHTHALMIC at 21:08

## 2024-10-21 RX ADMIN — GABAPENTIN 300 MG: 300 CAPSULE ORAL at 21:08

## 2024-10-21 RX ADMIN — HEPARIN SODIUM 5000 UNITS: 5000 INJECTION, SOLUTION INTRAVENOUS; SUBCUTANEOUS at 15:59

## 2024-10-21 RX ADMIN — VANCOMYCIN HYDROCHLORIDE 750 MG: 750 INJECTION, SOLUTION INTRAVENOUS at 06:14

## 2024-10-21 RX ADMIN — GABAPENTIN 300 MG: 300 CAPSULE ORAL at 10:06

## 2024-10-21 RX ADMIN — TORSEMIDE 20 MG: 20 TABLET ORAL at 15:59

## 2024-10-21 RX ADMIN — HEPARIN SODIUM 5000 UNITS: 5000 INJECTION, SOLUTION INTRAVENOUS; SUBCUTANEOUS at 05:18

## 2024-10-21 RX ADMIN — ASPIRIN 81 MG: 81 TABLET, COATED ORAL at 10:06

## 2024-10-21 RX ADMIN — CEFEPIME HYDROCHLORIDE 2000 MG: 2 INJECTION, SOLUTION INTRAVENOUS at 19:33

## 2024-10-21 RX ADMIN — PANTOPRAZOLE SODIUM 20 MG: 20 TABLET, DELAYED RELEASE ORAL at 05:17

## 2024-10-21 RX ADMIN — OLOPATADINE HYDROCHLORIDE OPHTHALMIC: 2 SOLUTION OPHTHALMIC at 21:12

## 2024-10-21 RX ADMIN — HEPARIN SODIUM 5000 UNITS: 5000 INJECTION, SOLUTION INTRAVENOUS; SUBCUTANEOUS at 21:08

## 2024-10-21 RX ADMIN — ATORVASTATIN CALCIUM 10 MG: 10 TABLET, FILM COATED ORAL at 18:40

## 2024-10-21 NOTE — ASSESSMENT & PLAN NOTE
"Home regimen: torsemide 20mg daily and aldactone 25mg daily   Hold in setting of SHERLYN   /53   Pulse 64   Temp (!) 97 °F (36.1 °C) (Temporal)   Resp 18   Ht 5' 2\" (1.575 m)   Wt 69.4 kg (153 lb)   SpO2 93%   BMI 27.98 kg/m²   Restart torsemide  "

## 2024-10-21 NOTE — PROGRESS NOTES
"Progress Note - Hospitalist   Name: Janice Yung 89 y.o. female I MRN: [id number]  Unit/Bed#: -01 I Date of Admission: 10/19/2024   Date of Service: 10/21/2024 I Hospital Day: 2    Assessment & Plan  SHERLYN (acute kidney injury) (HCC)  Baseline Cr 0.8-1.0  Cr on admit 1.43, meeting SHERLYN criteria   Recent Labs     10/19/24  1643 10/20/24  0553 10/21/24  0516   CREATININE 1.43* 1.26 1.15   EGFR 31 36 40     Estimated Creatinine Clearance: 27.3 mL/min (by C-G formula based on SCr of 1.15 mg/dL).   Likely obstructive in nature due to acute urinary retention with component of ATN with ongoing intake of diuretic (torsemide changed to daily rather than PRN 9/23 due to weight gain) and intake of fluconazole    Hold all nephrotoxic drugs   Avoid hypotension   Resolved with IV fluids  Last dose of fluconazole due 10/20 , renally dosed to 100 Mg  Gabapentin renally dosed  Cystitis  Hx of recurrent UTI - with hx of pseudomonas and enterococcus faecalis   Recently treated with keflex in August   UA with innumerable WBCs and moderate bacteria  Maintained on macrobid for prophylaxis - hold while treating acute UTI   Continue clotrimazole cream started by urology on 10/16-final dose of fluconazole p.o. ordered for this evening, renally reduced  ID consult appreciated  Continue cefepime and add vanc   F/up urine culture   Urinary retention  Garcia placed in ED with 400cc urine output   Seen by urology on 10/16 with PVR 0   Continue garcia   Evaluated by urology, recommended to maintain garcia with outpatient follow up and voiding trial   Paroxysmal atrial fibrillation (HCC)  Not on AC - on ASA only  Rate controlled on admit without AV joshua blocker   Hypertension  Home regimen: torsemide 20mg daily and aldactone 25mg daily   Hold in setting of SHERLYN   /53   Pulse 64   Temp (!) 97 °F (36.1 °C) (Temporal)   Resp 18   Ht 5' 2\" (1.575 m)   Wt 69.4 kg (153 lb)   SpO2 93%   BMI 27.98 kg/m²   Restart torsemide  Chronic diastolic " "heart failure (HCC)  Wt Readings from Last 3 Encounters:   10/21/24 69.4 kg (153 lb)   10/09/24 68.9 kg (152 lb)   10/01/24 70.8 kg (156 lb)   Last echo 9/10/24: LVEF 70%. G2DD  Home diuretic: torsemide 20mg daily, spironolactone 25mg daily, and jardiance 10mg daily   Monitor respiratory and volume status closely along with daily BMP to determine when to reintroduce diuretics as pt was admitted for acute diastolic HF in September   I/o and daily weights   Liberalized fluid intake for now   Lasix were held.  Will restart on torsemide 20 mg daily.  Pulmonary nodules  CT C/A/P: \"Partially visualized lower lung fields demonstrate numerous pulmonary nodules, increasing in size from June 2023 exam as described. 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 No evidence of primary malignancy or metastatic disease in the abdomen/pelvis.\"  Daughter unsure if they would even pursue treatment for this-she is interested in referral to oncology outpatient to discuss options  Dementia without behavioral disturbance, psychotic disturbance, mood disturbance, or anxiety, unspecified dementia severity, unspecified dementia type (HCC)  Pleasant and conversational on admission, oriented    Labs & Imaging: Results Review Statement: No pertinent imaging studies reviewed.    VTE Prophylaxis: in place.    Code Status:   Level 3 - DNAR and DNI    Patient Centered Rounds: I have performed bedside rounds with nursing staff today.    Mobility:   Basic Mobility Inpatient Raw Score: 18  JH-HLM Goal: 6: Walk 10 steps or more  JH-HLM Achieved: 3: Sit at edge of bed  JH-HLM Goal NOT achieved. Continue with multidisciplinary rounding and encourage appropriate mobility to improve upon JH-HLM goals.    Discussions with Specialists or Other Care Team Provider: ID    Education and Discussions with Family / Patient: Yo Mcmillan    Total Time Spent on Date of Encounter in care of patient: 35 mins. This time was spent on one or more of the following: " "performing physical exam; counseling and coordination of care; obtaining or reviewing history; documenting in the medical record; reviewing/ordering tests, medications or procedures; communicating with other healthcare professionals and discussing with patient's family/caregivers.    Current Length of Stay: 2 day(s)    Current Patient Status: Inpatient   Certification Statement: The patient will continue to require additional inpatient hospital stay due to see my assessment and plan.     Subjective:   Patient is seen and examined at bedside.  No new complaints.  Afebrile  All other ROS are negative.    Objective:    Vitals: Blood pressure 128/53, pulse 64, temperature (!) 97 °F (36.1 °C), temperature source Temporal, resp. rate 18, height 5' 2\" (1.575 m), weight 69.4 kg (153 lb), SpO2 93%.,Body mass index is 27.98 kg/m².  SPO2 RA Rest      Flowsheet Row ED to Hosp-Admission (Current) from 10/19/2024 in [facility] Med [facility] Unit   SpO2 93 %   SpO2 Activity At Rest   O2 Device None (Room air)   O2 Flow Rate --          I&O:   Intake/Output Summary (Last 24 hours) at 10/21/2024 1310  Last data filed at 10/21/2024 0552  Gross per 24 hour   Intake 1535 ml   Output 975 ml   Net 560 ml       Physical Exam:    General- Alert, lying comfortably in bed. Not in any acute distress.  Neck- Supple, No JVD  CVS- regular, S1 and S2 normal  Chest- Bilateral Air entry, No rhochi, crackles or wheezing present.  Abdomen- soft, nontender, not distended, no guarding or rigidity, BS+  Extremities-  No pedal edema, No calf tenderness  CNS-   Alert, awake and orientedx3. No focal deficits present.    Invasive Devices       Peripheral Intravenous Line  Duration             Peripheral IV 10/21/24 Left;Proximal;Ventral (anterior) Forearm <1 day              Drain  Duration             Urethral Catheter 16 Fr. 1 day                          Social History  reviewed  Family History   Problem Relation Age of Onset    Stroke " Mother     Stroke Father     Heart disease Daughter     Substance Abuse Neg Hx     Mental illness Neg Hx     reviewed    Meds:  Current Facility-Administered Medications   Medication Dose Route Frequency Provider Last Rate Last Admin    acetaminophen (TYLENOL) tablet 650 mg  650 mg Oral Q6H PRN Nova Cardona PA-C        aluminum-magnesium hydroxide-simethicone (MAALOX) oral suspension 30 mL  30 mL Oral Q6H PRN JULIANNA NassarC        aspirin (ECOTRIN LOW STRENGTH) EC tablet 81 mg  81 mg Oral Daily JULIANNA NassarC   81 mg at 10/21/24 1006    atorvastatin (LIPITOR) tablet 10 mg  10 mg Oral After Dinner JULIANNA NassarC   10 mg at 10/20/24 1722    cefepime (MAXIPIME) IVPB (premix in dextrose) 2,000 mg 50 mL  2,000 mg Intravenous Q24H Nova Cardona PA-C 100 mL/hr at 10/20/24 2104 2,000 mg at 10/20/24 2104    Cholecalciferol (VITAMIN D3) tablet 1,000 Units  1,000 Units Oral Daily JULIANNA NassarC   1,000 Units at 10/21/24 1007    cyanocobalamin (VITAMIN B-12) tablet 1,000 mcg  1,000 mcg Oral Daily JULIANNA NassarC   1,000 mcg at 10/21/24 1006    gabapentin (NEURONTIN) capsule 300 mg  300 mg Oral BID JULIANNA NassarC   300 mg at 10/21/24 1006    heparin (porcine) subcutaneous injection 5,000 Units  5,000 Units Subcutaneous Q8H [facility] JULIANNA NassarC   5,000 Units at 10/21/24 0518    miconazole (MONISTAT) external vaginal cream   Topical BID PRN Nova Cardona PA-C        And    miconazole (MONISTAT) vaginal suppository 200 mg  200 mg Vaginal HS JULIANNA NassarC   200 mg at 10/20/24 2115    ondansetron (ZOFRAN) injection 4 mg  4 mg Intravenous Q6H PRN JULIANNA NassarC        pantoprazole (PROTONIX) EC tablet 20 mg  20 mg Oral Early Morning JULIANNA NassarC   20 mg at 10/21/24 0517    senna (SENOKOT) tablet 8.6 mg  1 tablet Oral HS PRN Nova Cardona PA-C        vancomycin (VANCOCIN) IVPB (premix in dextrose) 750 mg 150 mL  10 mg/kg  Intravenous Daily PRN Nova Cardona PA-C            Medications Prior to Admission:     Artificial Tears 0.2-0.2-1 % SOLN    aspirin (ECOTRIN LOW STRENGTH) 81 mg EC tablet    atorvastatin (LIPITOR) 10 mg tablet    Cholecalciferol (VITAMIN D3) 1000 units CAPS    clotrimazole (LOTRIMIN) 1 % cream    Cyanocobalamin (VITAMIN B12 PO)    Empagliflozin (Jardiance) 10 MG TABS tablet    fluconazole (DIFLUCAN) 200 mg tablet    gabapentin (NEURONTIN) 600 MG tablet    gabapentin (NEURONTIN) 600 MG tablet    Multiple Vitamins-Minerals (PRESERVISION AREDS PO)    Myrbetriq 25 MG TB24    nitrofurantoin (MACROBID) 100 mg capsule    omeprazole (PriLOSEC) 20 mg delayed release capsule    potassium chloride (Klor-Con M10) 10 mEq tablet    spironolactone (ALDACTONE) 25 mg tablet    torsemide (DEMADEX) 20 mg tablet    Labs:  Results from last 7 days   Lab Units 10/21/24  0516 10/20/24  0553 10/19/24  1643   WBC Thousand/uL 9.60 9.55 11.35*   HEMOGLOBIN g/dL 10.5* 10.6* 11.9   HEMATOCRIT % 34.2* 34.3* 38.9   PLATELETS Thousands/uL 199 209 237   SEGS PCT % 68 68 70   LYMPHO PCT % 20 19 19   MONO PCT % 11 11 10   EOS PCT % 1 1 0     Results from last 7 days   Lab Units 10/21/24  0516 10/20/24  0553 10/19/24  1643   POTASSIUM mmol/L 4.0 3.9 4.8   CHLORIDE mmol/L 105 102 102   CO2 mmol/L 31 29 32   BUN mg/dL 23 27* 27*   CREATININE mg/dL 1.15 1.26 1.43*   CALCIUM mg/dL 8.6 8.6 9.8   ALK PHOS U/L  --  71 83   ALT U/L  --  4* 7   AST U/L  --  11* 17     Lab Results   Component Value Date    TROPONINI 0.04 12/06/2020    TROPONINI 0.04 12/05/2020    TROPONINI 0.04 12/05/2020    CKTOTAL 46 12/05/2020    CKTOTAL 112 01/26/2014         Lab Results   Component Value Date    BLOODCX No Growth After 5 Days. 05/04/2024    BLOODCX No Growth After 5 Days. 05/04/2024    BLOODCX No Growth After 5 Days. 06/18/2023    BLOODCX No Growth After 5 Days. 06/18/2023    URINECX 80,000-89,000 cfu/ml Lactobacillus species (A) 10/19/2024    URINECX 20,000-29,000  cfu/ml Streptococcus species (A) 10/19/2024    URINECX >100,000 cfu/ml Pseudomonas aeruginosa (A) 08/05/2024    URINECX 50,000-59,000 cfu/ml Enterococcus faecalis (A) 08/05/2024    SPUTUMCULTUR 1+ Growth of Pseudomonas aeruginosa (A) 05/23/2018    SPUTUMCULTUR 1+ Growth of 05/23/2018         Imaging:  Results for orders placed during the hospital encounter of 09/09/24    X-ray chest 1 view portable    Narrative  XR CHEST PORTABLE    INDICATION: CHF.    COMPARISON: 05/04/2024    FINDINGS:    Clear lungs. No pneumothorax or pleural effusion.    Cardiomegaly. Large hiatal hernia. Pulmonary vessels are normal.    Bones are unremarkable for age. Calcific tendinitis in the left shoulder.    Normal upper abdomen.    Impression  No acute cardiopulmonary disease.        Workstation performed: [id number]    Results for orders placed during the hospital encounter of 08/22/23    XR chest 2 views    Narrative  CHEST    INDICATION:   Chest Pain.    COMPARISON: 6/21/2023    EXAM PERFORMED/VIEWS:  XR CHEST PA & LATERAL      FINDINGS:    Cardiomediastinal silhouette appears unremarkable.    The lungs are clear. Persistent large hiatal hernia. No pneumothorax or pleural effusion.    Osseous structures appear within normal limits for patient age.    Impression  No acute cardiopulmonary disease.            Workstation performed: [id number]      Last 24 Hours Medication List:   Current Facility-Administered Medications   Medication Dose Route Frequency Provider Last Rate    acetaminophen  650 mg Oral Q6H PRN Nova Cardona PA-C      aluminum-magnesium hydroxide-simethicone  30 mL Oral Q6H PRN Nova Cardona PA-C      aspirin  81 mg Oral Daily Nova Cardona PA-C      atorvastatin  10 mg Oral After Dinner Nova Cardona PA-C      cefepime  2,000 mg Intravenous Q24H Nova Cardona PA-C 2,000 mg (10/20/24 2104)    cholecalciferol  1,000 Units Oral Daily Nova Cardona PA-C      cyanocobalamin  1,000 mcg Oral  Daily Nova Cardona PA-C      gabapentin  300 mg Oral BID Nova Cardona PA-C      heparin (porcine)  5,000 Units Subcutaneous Q8H DONNA Nova Cardona PA-C      miconazole   Topical BID PRN Nova Cardona PA-C      And    miconazole  200 mg Vaginal HS Nova Cardona PA-C      ondansetron  4 mg Intravenous Q6H PRN Nova Cardona PA-C      pantoprazole  20 mg Oral Early Morning Nova Cardona PA-C      senna  1 tablet Oral HS PRN Nova Cardona PA-C      vancomycin  10 mg/kg Intravenous Daily PRN Nova Cardona PA-C          Today, Patient Was Seen By: Cheng Lane MD    ** Please Note: Dictation voice to text software may have been used in the creation of this document. **

## 2024-10-21 NOTE — ASSESSMENT & PLAN NOTE
Wt Readings from Last 3 Encounters:   10/21/24 69.4 kg (153 lb)   10/09/24 68.9 kg (152 lb)   10/01/24 70.8 kg (156 lb)   Last echo 9/10/24: LVEF 70%. G2DD  Home diuretic: torsemide 20mg daily, spironolactone 25mg daily, and jardiance 10mg daily   Monitor respiratory and volume status closely along with daily BMP to determine when to reintroduce diuretics as pt was admitted for acute diastolic HF in September   I/o and daily weights   Liberalized fluid intake for now   Lasix were held.  Will restart on torsemide 20 mg daily.

## 2024-10-21 NOTE — PLAN OF CARE
Problem: Potential for Falls  Goal: Patient will remain free of falls  Description: INTERVENTIONS:  - Educate patient/family on patient safety including physical limitations  - Instruct patient to call for assistance with activity   - Consult OT/PT to assist with strengthening/mobility   - Keep Call bell within reach  - Keep bed low and locked with side rails adjusted as appropriate  - Keep care items and personal belongings within reach  - Initiate and maintain comfort rounds  - Make Fall Risk Sign visible to staff  - Offer Toileting every 2 Hours, in advance of need  - Initiate/Maintain alarm  - Obtain necessary fall risk management equipment  - Apply yellow socks and bracelet for high fall risk patients  - Consider moving patient to room near nurses station  Outcome: Progressing     Problem: PAIN - ADULT  Goal: Verbalizes/displays adequate comfort level or baseline comfort level  Description: Interventions:  - Encourage patient to monitor pain and request assistance  - Assess pain using appropriate pain scale  - Administer analgesics based on type and severity of pain and evaluate response  - Implement non-pharmacological measures as appropriate and evaluate response  - Consider cultural and social influences on pain and pain management  - Notify physician/advanced practitioner if interventions unsuccessful or patient reports new pain  Outcome: Progressing     Problem: INFECTION - ADULT  Goal: Absence or prevention of progression during hospitalization  Description: INTERVENTIONS:  - Assess and monitor for signs and symptoms of infection  - Monitor lab/diagnostic results  - Monitor all insertion sites, i.e. indwelling lines, tubes, and drains  - Monitor endotracheal if appropriate and nasal secretions for changes in amount and color  - [facility] appropriate cooling/warming therapies per order  - Administer medications as ordered  - Instruct and encourage patient and family to use good hand hygiene technique  -  Identify and instruct in appropriate isolation precautions for identified infection/condition  Outcome: Progressing  Goal: Absence of fever/infection during neutropenic period  Description: INTERVENTIONS:  - Monitor WBC    Outcome: Progressing     Problem: SAFETY ADULT  Goal: Patient will remain free of falls  Description: INTERVENTIONS:  - Educate patient/family on patient safety including physical limitations  - Instruct patient to call for assistance with activity   - Consult OT/PT to assist with strengthening/mobility   - Keep Call bell within reach  - Keep bed low and locked with side rails adjusted as appropriate  - Keep care items and personal belongings within reach  - Initiate and maintain comfort rounds  - Make Fall Risk Sign visible to staff  - Offer Toileting every 2 Hours, in advance of need  - Initiate/Maintain alarm  - Obtain necessary fall risk management equipment  - Apply yellow socks and bracelet for high fall risk patients  - Consider moving patient to room near nurses station  Outcome: Progressing  Goal: Maintain or return to baseline ADL function  Description: INTERVENTIONS:  -  Assess patient's ability to carry out ADLs; assess patient's baseline for ADL function and identify physical deficits which impact ability to perform ADLs (bathing, care of mouth/teeth, toileting, grooming, dressing, etc.)  - Assess/evaluate cause of self-care deficits   - Assess range of motion  - Assess patient's mobility; develop plan if impaired  - Assess patient's need for assistive devices and provide as appropriate  - Encourage maximum independence but intervene and supervise when necessary  - Involve family in performance of ADLs  - Assess for home care needs following discharge   - Consider OT consult to assist with ADL evaluation and planning for discharge  - Provide patient education as appropriate  Outcome: Progressing  Goal: Maintains/Returns to pre admission functional level  Description: INTERVENTIONS:  -  Perform AM-PAC 6 Click Basic Mobility/ Daily Activity assessment daily.  - Set and communicate daily mobility goal to care team and patient/family/caregiver.   - Collaborate with rehabilitation services on mobility goals if consulted  - Perform Range of Motion 2 times a day.  - Reposition patient every 2 hours.  - Dangle patient 2 times a day  - Stand patient 2 times a day  - Ambulate patient 2 times a day  - Out of bed to chair 2 times a day   - Out of bed for meals 2 times a day  - Out of bed for toileting  - Record patient progress and toleration of activity level   Outcome: Progressing     Problem: RESPIRATORY - ADULT  Goal: Achieves optimal ventilation and oxygenation  Description: INTERVENTIONS:  - Assess for changes in respiratory status  - Assess for changes in mentation and behavior  - Position to facilitate oxygenation and minimize respiratory effort  - Oxygen administered by appropriate delivery if ordered  - Initiate smoking cessation education as indicated  - Encourage broncho-pulmonary hygiene including cough, deep breathe, Incentive Spirometry  - Assess the need for suctioning and aspirate as needed  - Assess and instruct to report SOB or any respiratory difficulty  - Respiratory Therapy support as indicated  Outcome: Progressing     Problem: GENITOURINARY - ADULT  Goal: Maintains or returns to baseline urinary function  Description: INTERVENTIONS:  - Assess urinary function  - Encourage oral fluids to ensure adequate hydration if ordered  - Administer IV fluids as ordered to ensure adequate hydration  - Administer ordered medications as needed  - Offer frequent toileting  - Follow urinary retention protocol if ordered  Outcome: Progressing  Goal: Urinary catheter remains patent  Description: INTERVENTIONS:  - Assess patency of urinary catheter  - If patient has a chronic garcia, consider changing catheter if non-functioning  - Follow guidelines for intermittent irrigation of non-functioning urinary  catheter  Outcome: Progressing     Problem: Prexisting or High Potential for Compromised Skin Integrity  Goal: Skin integrity is maintained or improved  Description: INTERVENTIONS:  - Identify patients at risk for skin breakdown  - Assess and monitor skin integrity  - Assess and monitor nutrition and hydration status  - Monitor labs   - Assess for incontinence   - Turn and reposition patient  - Assist with mobility/ambulation  - Relieve pressure over bony prominences  - Avoid friction and shearing  - Provide appropriate hygiene as needed including keeping skin clean and dry  - Evaluate need for skin moisturizer/barrier cream  - Collaborate with interdisciplinary team   - Patient/family teaching  - Consider wound care consult   Outcome: Progressing

## 2024-10-21 NOTE — ASSESSMENT & PLAN NOTE
Baseline Cr 0.8-1.0  Cr on admit 1.43, meeting SHERLYN criteria   Recent Labs     10/19/24  1643 10/20/24  0553 10/21/24  0516   CREATININE 1.43* 1.26 1.15   EGFR 31 36 40     Estimated Creatinine Clearance: 27.3 mL/min (by C-G formula based on SCr of 1.15 mg/dL).   Likely obstructive in nature due to acute urinary retention with component of ATN with ongoing intake of diuretic (torsemide changed to daily rather than PRN 9/23 due to weight gain) and intake of fluconazole    Hold all nephrotoxic drugs   Avoid hypotension   Resolved with IV fluids  Last dose of fluconazole due 10/20 , renally dosed to 100 Mg  Gabapentin renally dosed

## 2024-10-21 NOTE — ASSESSMENT & PLAN NOTE
Hx of recurrent UTI - with hx of pseudomonas and enterococcus faecalis   Recently treated with keflex in August   UA with innumerable WBCs and moderate bacteria  Maintained on macrobid for prophylaxis - hold while treating acute UTI   Continue clotrimazole cream started by urology on 10/16-final dose of fluconazole p.o. ordered for this evening, renally reduced  ID consult appreciated  Continue cefepime and add vanc   F/up urine culture

## 2024-10-21 NOTE — PROGRESS NOTES
Janice Yung is a 89 y.o. female who is currently ordered Vancomycin IV with management by the Pharmacy Consult service.  Relevant clinical data and objective / subjective history reviewed.  Vancomycin Assessment:  Indication and Goal AUC/Trough: Urinary tract infection (goal -600, trough >10)  Clinical Status: stable  Micro:     Renal Function:  SCr: 1.15 mg/dL  CrCl: 27.3 mL/min  Renal replacement: Not on dialysis  Days of Therapy: 3  Current Dose: Pulse dosing  Vancomycin Plan:  New Dosinmg today  Next Level: 0600 on 10/22  Renal Function Monitoring: Daily BMP and UOP  Pharmacy will continue to follow closely for s/sx of nephrotoxicity, infusion reactions and appropriateness of therapy.  BMP and CBC will be ordered per protocol. We will continue to follow the patient’s culture results and clinical progress daily.    Brenton Prieto, Pharmacist

## 2024-10-21 NOTE — CASE MANAGEMENT
Case Management Assessment & Discharge Planning Note    Patient name Janice Yung  Location /-01 MRN [id number]  : 1930 Date 10/21/2024       Current Admission Date: 10/19/2024  Current Admission Diagnosis:SHERLYN (acute kidney injury) ([facility])   Patient Active Problem List    Diagnosis Date Noted Date Diagnosed    SHERLYN (acute kidney injury) ([facility]) 10/19/2024     Urinary retention 10/19/2024     OAB (overactive bladder) 10/17/2024     Dysuria 10/17/2024     CHF (congestive heart failure) ([facility]) 2024     Bilateral leg edema 2024     Dementia without behavioral disturbance, psychotic disturbance, mood disturbance, or anxiety, unspecified dementia severity, unspecified dementia type ([facility]) 2024     Peripheral vascular disease, unspecified ([facility]) 2024     Stage 3a chronic kidney disease ([facility]) 2023     Earache 2023     Diarrhea 2023     Pulmonary nodules 2023     Cystitis 2023     Paroxysmal atrial fibrillation ([facility]) 2022     Generalized weakness 2022     Chronic diastolic heart failure ([facility]) 2022     Hyponatremia 2020     Acute pain of left shoulder 2020     History of recurrent UTI (urinary tract infection) 2020     Change in mental status      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 eye with active choroidal neovascularization ([facility]) 2019     Fall 2018     Sepsis without acute organ dysfunction ([facility]) 2018     Neuropathy 2016     Vitamin B 12 deficiency 2016     Venous insufficiency 2016     Hypertension 2015     Gastroesophageal reflux disease without esophagitis 2015     Arthropathy 2015     Mixed hyperlipidemia 2015       LOS (days): 2  Geometric Mean LOS (GMLOS) (days): 3.8  Days to GMLOS:2     OBJECTIVE:    Risk of Unplanned Readmission Score: 20.2         Current admission status: Inpatient       Preferred Pharmacy:   [id number] -  [place] - [address]  [address]  [place] [address]  Phone: [phone] Fax: [phone]    Primary Care Provider: Edgar Mustafa MD    Primary Insurance: MEDICARE  Secondary Insurance:     ASSESSMENT:  Active Health Care Proxies       Deyanira Lemus [facility] Care Representative - Child   Primary Phone: [phone] (Mobile)  Home Phone: [phone]                 Advance Directives  Does patient have a Health Care POA?: Yes  Does patient have Advance Directives?: Yes  Advance Directives: Living will, Power of  for health care  Primary Contact: Dtr Brianna Lemus         Readmission Root Cause  30 Day Readmission: No    Patient Information  Admitted from:: Home  Mental Status: Confused  During Assessment patient was accompanied by: Not accompanied during assessment  Assessment information provided by:: Daughter  Primary Caregiver: Self  Support Systems: Family members, Self, Daughter  County of Residence: [place]  What city do you live in?: [place]  Home entry access options. Select all that apply.: Stairs  Number of steps to enter home.: 2  Type of Current Residence: [address] [place] home  Upon entering residence, is there a bedroom on the main floor (no further steps)?: Yes  Upon entering residence, is there a bathroom on the main floor (no further steps)?: Yes  Living Arrangements: Lives w/ Daughter    Activities of Daily Living Prior to Admission  Functional Status: Independent  Completes ADLs independently?: Yes  Ambulates independently?: Yes  Does patient use assisted devices?: Yes  Assisted Devices (DME) used: Straight Cane, Walker  Does patient currently own DME?: Yes  What DME does the patient currently own?: Straight Cane, Walker  Does patient have a history of Outpatient Therapy (PT/OT)?: No  Does the patient have a history of Short-Term Rehab?: Yes ([facility])  Does patient have a history of HHC?: Yes ([facility])  Does patient currently have HHC?: No         Patient  Information Continued  Income Source: Pension/long-term  Does patient have prescription coverage?: Yes  Does patient receive dialysis treatments?: No  Does patient have a history of substance abuse?: No  Does patient have a history of Mental Health Diagnosis?: No         Means of Transportation  [facility]      Social Determinants of Health (SDOH)      Flowsheet Row Most Recent Value   Housing Stability    In the last 12 months, was there a time when you were not able to pay the mortgage or rent on time? N   In the past 12 months, how many times have you moved where you were living? 0   At any time in the past 12 months, were you homeless or living in a shelter (including now)? N   Transportation Needs    In the past 12 months, has lack of transportation kept you from medical appointments or from getting medications? no   In the past 12 months, has lack of transportation kept you from meetings, work, or from getting things needed for daily living? No   Food Insecurity    Within the past 12 months, you worried that your food would run out before you got the money to buy more. Never true   Within the past 12 months, the food you bought just didn't last and you didn't have money to get more. Never true   Utilities    In the past 12 months has the electric, gas, oil, or water company threatened to shut off services in your home? No            DISCHARGE DETAILS:    Discharge planning discussed with:: Patient's dtr  [place] of Choice: Yes     CM contacted family/caregiver?: Yes  Were Treatment Team discharge recommendations reviewed with patient/caregiver?: Yes  Did patient/caregiver verbalize understanding of patient care needs?: Yes  Were patient/caregiver advised of the risks associated with not following Treatment Team discharge recommendations?: Yes    Contacts  Patient Contacts: Deyanira Lemus (Child) [phone] (Mobile)  Relationship to Patient:: Family  Contact Method:  Phone  Phone Number: Deyanira Lemus (Child) [phone] (Mobile)  Reason/Outcome: Discharge Planning, Continuity of Care, Emergency Contact, Referral    Requested Home Health Care         Is the patient interested in HHC at discharge?: No    DME Referral Provided  Referral made for DME?: No              Treatment Team Recommendation: Home  Discharge Destination Plan:: Home  Transport at Discharge : Family                             IMM Given (Date):: 10/21/24 (1041am)  IMM Given to:: Family  Family notified:: Dtr, Brianna Lemus  Additional Comments: CM spoke w/ dtr, Brianna, as pt was confused. CM discussed role of CM and any needs prior to discharge. Pt resides w/ dtr in 2SH w/ 2STE and 1st flr setup. Indp PTA. Owns/uses SPC, RW. Hx str through [facility] and HH through [facility]. Pt prefers to use [facility] pharmacy. Dtr will transport at discharge.

## 2024-10-21 NOTE — TELEPHONE ENCOUNTER
Patient seen in consultation for UTI and urinary retention.  Has Hernandez catheter.  Please schedule outpatient follow-up for void trial.  Should have AP follow-up sooner than April

## 2024-10-22 PROBLEM — R82.90 ABNORMAL URINALYSIS: Status: ACTIVE | Noted: 2023-06-18

## 2024-10-22 LAB
ANION GAP SERPL CALCULATED.3IONS-SCNC: 6 MMOL/L (ref 4–13)
BACTERIA UR CULT: ABNORMAL
BACTERIA UR CULT: ABNORMAL
BASOPHILS # BLD AUTO: 0.03 THOUSANDS/ΜL (ref 0–0.1)
BASOPHILS NFR BLD AUTO: 0 % (ref 0–1)
BUN SERPL-MCNC: 21 MG/DL (ref 5–25)
CALCIUM SERPL-MCNC: 8.6 MG/DL (ref 8.4–10.2)
CHLORIDE SERPL-SCNC: 104 MMOL/L (ref 96–108)
CO2 SERPL-SCNC: 32 MMOL/L (ref 21–32)
CREAT SERPL-MCNC: 1.22 MG/DL (ref 0.6–1.3)
EOSINOPHIL # BLD AUTO: 0.21 THOUSAND/ΜL (ref 0–0.61)
EOSINOPHIL NFR BLD AUTO: 2 % (ref 0–6)
ERYTHROCYTE [DISTWIDTH] IN BLOOD BY AUTOMATED COUNT: 14.1 % (ref 11.6–15.1)
GFR SERPL CREATININE-BSD FRML MDRD: 38 ML/MIN/1.73SQ M
GLUCOSE SERPL-MCNC: 95 MG/DL (ref 65–140)
HCT VFR BLD AUTO: 34.1 % (ref 34.8–46.1)
HGB BLD-MCNC: 10.6 G/DL (ref 11.5–15.4)
IMM GRANULOCYTES # BLD AUTO: 0.04 THOUSAND/UL (ref 0–0.2)
IMM GRANULOCYTES NFR BLD AUTO: 0 % (ref 0–2)
LYMPHOCYTES # BLD AUTO: 2.12 THOUSANDS/ΜL (ref 0.6–4.47)
LYMPHOCYTES NFR BLD AUTO: 21 % (ref 14–44)
MCH RBC QN AUTO: 28.1 PG (ref 26.8–34.3)
MCHC RBC AUTO-ENTMCNC: 31.1 G/DL (ref 31.4–37.4)
MCV RBC AUTO: 91 FL (ref 82–98)
MONOCYTES # BLD AUTO: 1.06 THOUSAND/ΜL (ref 0.17–1.22)
MONOCYTES NFR BLD AUTO: 11 % (ref 4–12)
NEUTROPHILS # BLD AUTO: 6.67 THOUSANDS/ΜL (ref 1.85–7.62)
NEUTS SEG NFR BLD AUTO: 66 % (ref 43–75)
NRBC BLD AUTO-RTO: 0 /100 WBCS
PLATELET # BLD AUTO: 213 THOUSANDS/UL (ref 149–390)
PMV BLD AUTO: 9.5 FL (ref 8.9–12.7)
POTASSIUM SERPL-SCNC: 3.8 MMOL/L (ref 3.5–5.3)
RBC # BLD AUTO: 3.77 MILLION/UL (ref 3.81–5.12)
SODIUM SERPL-SCNC: 142 MMOL/L (ref 135–147)
VANCOMYCIN SERPL-MCNC: 11.9 UG/ML (ref 10–20)
WBC # BLD AUTO: 10.13 THOUSAND/UL (ref 4.31–10.16)

## 2024-10-22 PROCEDURE — 99232 SBSQ HOSP IP/OBS MODERATE 35: CPT | Performed by: PHYSICIAN ASSISTANT

## 2024-10-22 PROCEDURE — 97530 THERAPEUTIC ACTIVITIES: CPT

## 2024-10-22 PROCEDURE — 80048 BASIC METABOLIC PNL TOTAL CA: CPT | Performed by: PHYSICIAN ASSISTANT

## 2024-10-22 PROCEDURE — 80202 ASSAY OF VANCOMYCIN: CPT | Performed by: PHYSICIAN ASSISTANT

## 2024-10-22 PROCEDURE — 97163 PT EVAL HIGH COMPLEX 45 MIN: CPT

## 2024-10-22 PROCEDURE — 97167 OT EVAL HIGH COMPLEX 60 MIN: CPT

## 2024-10-22 PROCEDURE — 85025 COMPLETE CBC W/AUTO DIFF WBC: CPT | Performed by: STUDENT IN AN ORGANIZED HEALTH CARE EDUCATION/TRAINING PROGRAM

## 2024-10-22 RX ORDER — VANCOMYCIN HYDROCHLORIDE 500 MG/100ML
500 INJECTION, SOLUTION INTRAVENOUS EVERY 24 HOURS
Status: DISCONTINUED | OUTPATIENT
Start: 2024-10-22 | End: 2024-10-22

## 2024-10-22 RX ADMIN — TORSEMIDE 20 MG: 20 TABLET ORAL at 08:25

## 2024-10-22 RX ADMIN — VANCOMYCIN HYDROCHLORIDE 500 MG: 500 INJECTION, SOLUTION INTRAVENOUS at 06:45

## 2024-10-22 RX ADMIN — CYANOCOBALAMIN TAB 500 MCG 1000 MCG: 500 TAB at 08:25

## 2024-10-22 RX ADMIN — HEPARIN SODIUM 5000 UNITS: 5000 INJECTION, SOLUTION INTRAVENOUS; SUBCUTANEOUS at 14:42

## 2024-10-22 RX ADMIN — GABAPENTIN 300 MG: 300 CAPSULE ORAL at 21:12

## 2024-10-22 RX ADMIN — Medication 1000 UNITS: at 08:24

## 2024-10-22 RX ADMIN — HEPARIN SODIUM 5000 UNITS: 5000 INJECTION, SOLUTION INTRAVENOUS; SUBCUTANEOUS at 21:12

## 2024-10-22 RX ADMIN — ACETAMINOPHEN 650 MG: 325 TABLET, FILM COATED ORAL at 21:20

## 2024-10-22 RX ADMIN — HEPARIN SODIUM 5000 UNITS: 5000 INJECTION, SOLUTION INTRAVENOUS; SUBCUTANEOUS at 05:22

## 2024-10-22 RX ADMIN — ACETAMINOPHEN 650 MG: 325 TABLET, FILM COATED ORAL at 09:54

## 2024-10-22 RX ADMIN — ASPIRIN 81 MG: 81 TABLET, COATED ORAL at 08:25

## 2024-10-22 RX ADMIN — PANTOPRAZOLE SODIUM 20 MG: 20 TABLET, DELAYED RELEASE ORAL at 05:22

## 2024-10-22 RX ADMIN — GABAPENTIN 300 MG: 300 CAPSULE ORAL at 08:24

## 2024-10-22 RX ADMIN — ATORVASTATIN CALCIUM 10 MG: 10 TABLET, FILM COATED ORAL at 17:53

## 2024-10-22 NOTE — DISCHARGE SUMMARY
"Discharge Summary - Hospitalist   Name: Janice Yung 89 y.o. female I MRN: [id number]  Unit/Bed#: -01 I Date of Admission: 10/19/2024   Date of Service: 10/24/2024 I Hospital Day: 5     Assessment & Plan  SHERLYN (acute kidney injury) (HCC)  Baseline Cr 0.8-1.0  Cr on admit 1.43, meeting SHERLYN criteria   Recent Labs     10/23/24  0441 10/23/24  1037 10/24/24  0308   CREATININE 1.32* 1.41* 1.14   EGFR 34 31 41     Estimated Creatinine Clearance: 27.7 mL/min (by C-G formula based on SCr of 1.14 mg/dL).   Likely obstructive in nature due to acute urinary retention with component of ATN with ongoing intake of diuretic (torsemide changed to daily rather than PRN 9/23 due to weight gain) and intake of fluconazole    Creatinine improved following holding torsemide and gentle IVF  Monitor BMP as outpatient  Abnormal urinalysis  Hx of recurrent UTI - with hx of pseudomonas and enterococcus faecalis   Recently treated with keflex in August   UA with innumerable WBCs and moderate bacteria  ID consult appreciated - urine cx growing lactobacillus and mixed contaminants.  Discontinue IV cefepime/vancomycin  Okay to monitor off antibiotics  Urinary retention  Garcia placed in ED with 400cc urine output   Seen by urology on 10/16 with PVR 0   Continue garcia   Evaluated by urology -patient with significant discomfort with Garcia catheter, discussed with urology.  Catheter removed 10/23 for void trial, no further episodes of retention.  Okay to discharge without Garcia catheter in place  Paroxysmal atrial fibrillation (HCC)  Not on AC - on ASA only  Rate controlled on admit without AV joshua blocker   Hypertension  Home regimen: torsemide 20mg daily and aldactone 25mg daily   Held in setting of SHERLYN   /53 (BP Location: Right arm)   Pulse 67   Temp 97.8 °F (36.6 °C) (Temporal)   Resp 18   Ht 5' 2\" (1.575 m)   Wt 70 kg (154 lb 5.2 oz)   SpO2 94%   BMI 28.23 kg/m²   Hold torsemide on discharge pending follow-up with " "cardiology/PCP.  Chronic diastolic heart failure (HCC)  Wt Readings from Last 3 Encounters:   10/24/24 70 kg (154 lb 5.2 oz)   10/09/24 68.9 kg (152 lb)   10/01/24 70.8 kg (156 lb)   Last echo 9/10/24: LVEF 70%. G2DD  Home diuretic: torsemide 20mg daily, spironolactone 25mg daily, and jardiance 10mg daily   Torsemide 20 mg daily was restarted 10/21 -held again due to rising creatinine yesterday and poor intake  Recommend follow up with cardiology prior to restarting torsemide  Pulmonary nodules  CT C/A/P: \"Partially visualized lower lung fields demonstrate numerous pulmonary nodules, increasing in size from June 2023 exam as described. 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 No evidence of primary malignancy or metastatic disease in the abdomen/pelvis.\"  Per prior provider, daughter unsure if they would even pursue treatment for this-she is interested in referral to oncology outpatient to discuss options  Dementia without behavioral disturbance, psychotic disturbance, mood disturbance, or anxiety, unspecified dementia severity, unspecified dementia type (HCC)  Pleasant and conversational   Supportive care     Medical Problems       Resolved Problems  Date Reviewed: 10/20/2024   None       Discharging Physician / Practitioner: Katherin Carlson PA-C  PCP: Edgar Mustafa MD  Admission Date:   Admission Orders (From admission, onward)       Ordered        10/19/24 2101  INPATIENT ADMISSION  Once                          Discharge Date: 10/24/24    Consultations During Hospital Stay:  PT/OT  Urology  ID    Procedures Performed:   None    Significant Findings / Test Results:   CT abdomen pelvis: Mild bladder wall thickening considering degree of distention, correlate with urinalysis for possible cystitis.  Partially visualized lower lung fields demonstrate numerous pulmonary nodules, increasing in size from June 2023 exam as described.  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  No evidence of primary malignancy or " metastatic disease in the abdomen/pelvis.  Large hiatal hernia.  Urine culture growing lactobacillus, mixed contaminants      Incidental Findings:   As above   I reviewed the above mentioned incidental findings with the patient and/or family and they expressed understanding.    Test Results Pending at Discharge (will require follow up):   None     Outpatient Tests Requested:  None    Complications:  None    Reason for Admission: SHERLYN    Hospital Course:   Janice Yung is a 89 y.o. female patient who originally presented to the hospital on 10/19/2024 due to dysuria.    Past medical history significant for recurrent UTIs, diastolic heart failure, paroxysmal A-fib, dementia.  Patient presented to the emergency department due to report of dysuria/vaginal burning.  Hernandez catheter was placed in the ED due to urinary retention.  Was started on broad-spectrum IV antibiotics due to concern for urinary tract infection.  Diuretics were initially held on admission, torsemide resumed 10/21.  Urine culture grew lactobacillus and mixed contaminants, not consistent with active UTI per discussion with ID.  Okay to discontinue IV antibiotics.  Of note, patient chronically on Macrobid for prophylaxis.  Initially, patient to be discharged with Hernandez catheter in place and outpatient follow-up with urology for void trial.  However during hospitalization patient noted significant discomfort in regards to catheter and after further discussion with urology, initiated inpatient void trial 10/23.  No further episodes of urinary retention.  Ambulatory referral sent for PT on discharge per CM.  Given intermittent poor intake and rising creatinine after restarting torsemide, would hold on discharge pending follow-up with PCP/cardiology.  Previously was maintained on spironolactone, will continue at this time as appetite has overall improved.   On day of discharge patient was afebrile, hemodynamically stable and verbalized understanding for  "requested outpatient follow-up.    Please see above list of diagnoses and related plan for additional information.     Condition at Discharge: stable    Discharge Day Visit / Exam:   Subjective:  Feels well, hopeful to go home today.  Vitals: Blood Pressure: 117/53 (10/24/24 0744)  Pulse: 67 (10/24/24 0744)  Temperature: 97.8 °F (36.6 °C) (10/24/24 0744)  Temp Source: Temporal (10/24/24 0744)  Respirations: 18 (10/24/24 0744)  Height: 5' 2\" (157.5 cm) (10/19/24 2140)  Weight - Scale: 70 kg (154 lb 5.2 oz) (10/24/24 0600)  SpO2: 94 % (10/24/24 0744)  Physical Exam  Vitals and nursing note reviewed.   Constitutional:       Appearance: Normal appearance.      Comments: No acute distress   HENT:      Head: Normocephalic.   Eyes:      General: No scleral icterus.     Extraocular Movements: Extraocular movements intact.      Conjunctiva/sclera: Conjunctivae normal.   Cardiovascular:      Rate and Rhythm: Normal rate and regular rhythm.   Pulmonary:      Effort: Pulmonary effort is normal.      Breath sounds: Normal breath sounds. No wheezing, rhonchi or rales.   Abdominal:      General: Bowel sounds are normal.      Palpations: Abdomen is soft.      Tenderness: There is no abdominal tenderness. There is no guarding or rebound.   Musculoskeletal:         General: No swelling, tenderness or deformity.      Cervical back: Normal range of motion.      Comments: Able to move upper/lower ext bilaterally, no edema   Skin:     General: Skin is warm and dry.   Neurological:      Mental Status: She is alert. Mental status is at baseline.   Psychiatric:         Mood and Affect: Mood normal.         Speech: Speech normal.         Behavior: Behavior normal.          Discussion with Family: Updated  (daughter) via phone.    Discharge instructions/Information to patient and family:   See after visit summary for information provided to patient and family.      Provisions for Follow-Up Care:  See after visit summary for " information related to follow-up care and any pertinent home health orders.      Mobility at time of Discharge:   Basic Mobility Inpatient Raw Score: 18  JH-HLM Goal: 6: Walk 10 steps or more  JH-HLM Achieved: 6: Walk 10 steps or more  HLM Goal NOT achieved. Continue to encourage mobility in post discharge setting.     Disposition:   Home with [facility] Services (Reminder: Complete face to face encounter)    Planned Readmission: None    Discharge Medications:  See after visit summary for reconciled discharge medications provided to patient and/or family.      Administrative Statements   Discharge Statement:  I have spent a total time of 60 minutes in caring for this patient on the day of the visit/encounter. >30 minutes of time was spent on: Diagnostic results, Instructions for management, Patient and family education, Importance of tx compliance, Counseling / Coordination of care, Documenting in the medical record, Reviewing / ordering tests, medicine, procedures  , and Communicating with other healthcare professionals .    **Please Note: This note may have been constructed using a voice recognition system**

## 2024-10-22 NOTE — PROGRESS NOTES
Patient:    MRN:  [id number]    Aidin Request ID:  [id number]    Level of care reserved:  Home Health Agency    Partner Reserved:  [facility], [place] [address] [phone]    Clinical needs requested:    Geography searched:  [phone]    Start of Service:    Request sent:  10:10am EDT on 10/22/2024 by Kimberly Arizmendi    Partner reserved:  2:25pm EDT on 10/22/2024 by Kimberly Arizmendi    Choice list shared:  2:25pm EDT on 10/22/2024 by Kimberly Arizmendi

## 2024-10-22 NOTE — ASSESSMENT & PLAN NOTE
Hx of recurrent UTI - with hx of pseudomonas and enterococcus faecalis   Recently treated with keflex in August   UA with innumerable WBCs and moderate bacteria  Maintained on macrobid for prophylaxis - hold while treating acute UTI   ID consult appreciated - urine cx growing lactobacillus, mixed contaminants  Monitor off abx per ID

## 2024-10-22 NOTE — ASSESSMENT & PLAN NOTE
"Home regimen: torsemide 20mg daily and aldactone 25mg daily   Hold in setting of SHERLYN   /52 (BP Location: Left arm)   Pulse 63   Temp (!) 96.8 °F (36 °C) (Probe)   Resp 20   Ht 5' 2\" (1.575 m)   Wt 70 kg (154 lb 5.2 oz)   SpO2 94%   BMI 28.23 kg/m²   Restarted torsemide 10/21  "

## 2024-10-22 NOTE — ASSESSMENT & PLAN NOTE
Garcia placed in ED with 400cc urine output   Seen by urology on 10/16 with PVR 0   Continue garcia   Evaluated by urology -patient with significant discomfort with Garcia catheter, discussed with urology.  Catheter removed 10/23 for void trial, no further episodes of retention.  Okay to discharge without Garcia catheter in place

## 2024-10-22 NOTE — PLAN OF CARE
Problem: Potential for Falls  Goal: Patient will remain free of falls  Description: INTERVENTIONS:  - Educate patient/family on patient safety including physical limitations  - Instruct patient to call for assistance with activity   - Consult OT/PT to assist with strengthening/mobility   - Keep Call bell within reach  - Keep bed low and locked with side rails adjusted as appropriate  - Keep care items and personal belongings within reach  - Initiate and maintain comfort rounds  - Make Fall Risk Sign visible to staff  - Offer Toileting every 2 Hours, in advance of need  - Initiate/Maintain alarm  - Obtain necessary fall risk management equipment  - Apply yellow socks and bracelet for high fall risk patients  - Consider moving patient to room near nurses station  Outcome: Progressing     Problem: PAIN - ADULT  Goal: Verbalizes/displays adequate comfort level or baseline comfort level  Description: Interventions:  - Encourage patient to monitor pain and request assistance  - Assess pain using appropriate pain scale  - Administer analgesics based on type and severity of pain and evaluate response  - Implement non-pharmacological measures as appropriate and evaluate response  - Consider cultural and social influences on pain and pain management  - Notify physician/advanced practitioner if interventions unsuccessful or patient reports new pain  Outcome: Progressing     Problem: INFECTION - ADULT  Goal: Absence or prevention of progression during hospitalization  Description: INTERVENTIONS:  - Assess and monitor for signs and symptoms of infection  - Monitor lab/diagnostic results  - Monitor all insertion sites, i.e. indwelling lines, tubes, and drains  - Monitor endotracheal if appropriate and nasal secretions for changes in amount and color  - [place] appropriate cooling/warming therapies per order  - Administer medications as ordered  - Instruct and encourage patient and family to use good hand hygiene technique  -  Identify and instruct in appropriate isolation precautions for identified infection/condition  Outcome: Progressing  Goal: Absence of fever/infection during neutropenic period  Description: INTERVENTIONS:  - Monitor WBC    Outcome: Progressing     Problem: SAFETY ADULT  Goal: Patient will remain free of falls  Description: INTERVENTIONS:  - Educate patient/family on patient safety including physical limitations  - Instruct patient to call for assistance with activity   - Consult OT/PT to assist with strengthening/mobility   - Keep Call bell within reach  - Keep bed low and locked with side rails adjusted as appropriate  - Keep care items and personal belongings within reach  - Initiate and maintain comfort rounds  - Make Fall Risk Sign visible to staff  - Offer Toileting every 2 Hours, in advance of need  - Initiate/Maintain alarm  - Obtain necessary fall risk management equipment  - Apply yellow socks and bracelet for high fall risk patients  - Consider moving patient to room near nurses station  Outcome: Progressing  Goal: Maintain or return to baseline ADL function  Description: INTERVENTIONS:  -  Assess patient's ability to carry out ADLs; assess patient's baseline for ADL function and identify physical deficits which impact ability to perform ADLs (bathing, care of mouth/teeth, toileting, grooming, dressing, etc.)  - Assess/evaluate cause of self-care deficits   - Assess range of motion  - Assess patient's mobility; develop plan if impaired  - Assess patient's need for assistive devices and provide as appropriate  - Encourage maximum independence but intervene and supervise when necessary  - Involve family in performance of ADLs  - Assess for home care needs following discharge   - Consider OT consult to assist with ADL evaluation and planning for discharge  - Provide patient education as appropriate  Outcome: Progressing  Goal: Maintains/Returns to pre admission functional level  Description: INTERVENTIONS:  -  Perform AM-PAC 6 Click Basic Mobility/ Daily Activity assessment daily.  - Set and communicate daily mobility goal to care team and patient/family/caregiver.   - Collaborate with rehabilitation services on mobility goals if consulted  - Perform Range of Motion 3 times a day.  - Reposition patient every 3 hours.  - Dangle patient 3 times a day  - Stand patient 3 times a day  - Ambulate patient 3 times a day  - Out of bed to chair 3 times a day   - Out of bed for meals 3 times a day  - Out of bed for toileting  - Record patient progress and toleration of activity level   Outcome: Progressing     Problem: RESPIRATORY - ADULT  Goal: Achieves optimal ventilation and oxygenation  Description: INTERVENTIONS:  - Assess for changes in respiratory status  - Assess for changes in mentation and behavior  - Position to facilitate oxygenation and minimize respiratory effort  - Oxygen administered by appropriate delivery if ordered  - Initiate smoking cessation education as indicated  - Encourage broncho-pulmonary hygiene including cough, deep breathe, Incentive Spirometry  - Assess the need for suctioning and aspirate as needed  - Assess and instruct to report SOB or any respiratory difficulty  - Respiratory Therapy support as indicated  Outcome: Progressing     Problem: GENITOURINARY - ADULT  Goal: Maintains or returns to baseline urinary function  Description: INTERVENTIONS:  - Assess urinary function  - Encourage oral fluids to ensure adequate hydration if ordered  - Administer IV fluids as ordered to ensure adequate hydration  - Administer ordered medications as needed  - Offer frequent toileting  - Follow urinary retention protocol if ordered  Outcome: Progressing  Goal: Urinary catheter remains patent  Description: INTERVENTIONS:  - Assess patency of urinary catheter  - If patient has a chronic garcia, consider changing catheter if non-functioning  - Follow guidelines for intermittent irrigation of non-functioning urinary  catheter  Outcome: Progressing     Problem: Prexisting or High Potential for Compromised Skin Integrity  Goal: Skin integrity is maintained or improved  Description: INTERVENTIONS:  - Identify patients at risk for skin breakdown  - Assess and monitor skin integrity  - Assess and monitor nutrition and hydration status  - Monitor labs   - Assess for incontinence   - Turn and reposition patient  - Assist with mobility/ambulation  - Relieve pressure over bony prominences  - Avoid friction and shearing  - Provide appropriate hygiene as needed including keeping skin clean and dry  - Evaluate need for skin moisturizer/barrier cream  - Collaborate with interdisciplinary team   - Patient/family teaching  - Consider wound care consult   Outcome: Progressing

## 2024-10-22 NOTE — PHYSICAL THERAPY NOTE
PHYSICAL THERAPY EVALUATION NOTE      Patient Name: Janice Yung  Today's Date: 10/22/2024    AGE:   89 y.o.  Mrn:   [id number]  ADMIT DX:  Urinary retention [R33.9]  UTI (urinary tract infection) [N39.0]  Abdominal pain [R10.9]  Pulmonary nodules/lesions, multiple [R91.8]  SHERLYN (acute kidney injury) ([facility]) [N17.9]    Past Medical History:   Diagnosis Date    SHERLYN (acute kidney injury) ([facility]) 2022    Balance disorder     CHF (congestive heart failure) ([facility])     Chronic pain     GERD (gastroesophageal reflux disease)     Hard of hearing     Hyperlipidemia     Hypertension     Hyponatremia 2020    Low serum cortisol level 2020    Neuropathy     Pneumonia     Pneumonia due to COVID-19 virus 2020    Sepsis ([facility]) 2018    Tinnitus     Unspecified adrenocortical insufficiency ([facility]) 2023    Felt to be related to COVID-19 infection.  No current symptoms.    UTI (urinary tract infection)      Length Of Stay: 3  PHYSICAL THERAPY EVALUATION :   Patient's identity confirmed via 2 patient identifiers (full name and ) at start of session       10/22/24 0936   PT Last Visit   PT Visit Date 10/22/24   Note Type   Note type Evaluation   Pain Assessment   Pain Assessment Tool 0-10   Pain Score 5  (0 at rest)   Pain Location/Orientation Other (Comment)  (urethra, garcia site)   Pain Onset/Description Descriptor: Burning   Patient's Stated Pain Goal No pain   Hospital Pain Intervention(s) Repositioned;Ambulation/increased activity;Cold applied;Emotional support   Restrictions/Precautions   Weight Bearing Precautions Per Order No   Other Precautions Contact/isolation;Cognitive;Chair Alarm;Bed Alarm;Fall Risk;Hard of hearing   Home Living   Type of Home House   Home Layout Two level;Able to live on main level with bedroom/bathroom;1/2 bath on main level;Stairs to enter with rails  (1+1 BRENDA)   Bathroom Shower/Tub   (bird  "bathes on the first floor)   Home Equipment Walker  (Rollator)   Additional Comments Pt reports using RW at all times   Prior Function   Level of Lares Independent with ADLs;Independent with functional mobility   Lives With Daughter   Receives Help From Family   Falls in the last 6 months 0   Vocational Retired   Comments Pt reports sleeping in a recliner at home   General   Family/Caregiver Present No   Cognition   Overall Cognitive Status Impaired   Arousal/Participation Alert   Orientation Level Oriented X4   Following Commands Follows one step commands with increased time or repetition   Comments Pt w/ PMH of dementia\   Subjective   Subjective \"Last time I was here I was just peeing and peeing and peeing so they stuck that sponge in me\"   RLE Assessment   RLE Assessment WFL   Strength RLE   RLE Overall Strength 3+/5   LLE Assessment   LLE Assessment WFL   Strength LLE   LLE Overall Strength 3+/5   Bed Mobility   Supine to Sit 4  Minimal assistance   Additional items Assist x 1   Transfers   Sit to Stand 5  Supervision   Additional items Verbal cues   Stand to Sit 5  Supervision   Additional items Verbal cues   Ambulation/Elevation   Gait pattern Decreased foot clearance;Short stride;Excessively slow   Gait Assistance 5  Supervision   Additional items Assist x 1   Assistive Device Rolling walker   Distance 40 ft   Balance   Static Sitting Fair +   Dynamic Sitting Fair   Static Standing Fair   Ambulatory Fair -   Activity Tolerance   Activity Tolerance Patient limited by pain   Medical Staff Made Aware NADIA Johnson   Nurse Made Aware Raven Unger   Assessment   Problem List Decreased endurance;Impaired balance;Decreased mobility;Pain   Assessment Janice Yung is a 89 y.o. Female who [facility] to [facility] on [facility]24 from home w/ c/o burning during urination and diagnosis of SHERLYN. Orders for PT eval and treat received. Pt presents w/ comorbidities of HTN, CHF, Afib, dementia, urinary retention, " neuropathy, macular degeneration, PVD, OAB. At baseline, pt mobilizes modified I w/ rollator, and reports 0 falls in the last 6 months. Upon evaluation, pt presents w/ the following deficits: weakness, impaired balance, and pain limiting functional mobility. Upon eval, pt requires min A x 1 for bed mobility, supervision for transfers, and supervision for gait. Based on this PT evaluation today, patient's discharge recommendation is for Level III - Low Rehab Resource Intensity. During this admission, pt would benefit from continued skilled inpatient PT in the acute care setting in order to address the abovementioned deficits to maximize function and mobility before DC from acute care.   Goals   Patient Goals to relieve the burning feeling   STG Expiration Date 11/01/24   Short Term Goal #1 Patient will: Perform all bed mobility tasks w/ supervision to improve pt's independence w/ repositioning for decrease risk of skin breakdown, Perform all transfers modified independent consistently from various height surfaces in order to improve I w/ engagement w/ real-world environments/situations, Ambulate at least 100 ft. with roller walker modified independent w/o LOB to facilitate return and engagement w/ previous living environment, Navigate 2 stairs w/ supervision with unilateral handrail to either improve independence w/ entering home and/or so patient can fully access living areas in home, and Increase all balance 1/2 grade to decrease risk for falls   PT Treatment Day 0   Plan   Treatment/Interventions Functional transfer training;LE strengthening/ROM;Therapeutic exercise;Elevations;Endurance training;Cognitive reorientation;Patient/family training;Equipment eval/education;Bed mobility;Gait training   PT Frequency 2-3x/wk   Discharge Recommendation   Rehab Resource Intensity Level, PT III (Minimum Resource Intensity)   Equipment Recommended Walker   AM-PAC Basic Mobility Inpatient   Turning in Flat Bed Without Bedrails  3   Lying on Back to Sitting on Edge of Flat Bed Without Bedrails 3   Moving Bed to Chair 3   Standing Up From Chair Using Arms 3   Walk in Room 3   Climb 3-5 Stairs With Railing 3   Basic Mobility Inpatient Raw Score 18   Basic Mobility Standardized Score 41.05   [facility] Highest Level Of Mobility   -HLM Goal 6: Walk 10 steps or more   -HLM Achieved 7: Walk 25 feet or more   Additional Treatment Session   Start Time 0948   End Time 0958   Treatment Assessment Patient sitting OOB in recliner chair. Reports being unable to tolerate sitting at this time due to burning in urethra at garcia site. KATY Carbajal present and aware. She is able to perform STS from recliner chair w/ supervision, and then perform SPT w/ RW w/ supervision. She does require min A x 1 to return to supine for BLE management. Provided w/ lyn cold pack to assist w/ pain management   Equipment Use RW   Additional Treatment Day 1   End of Consult   Patient Position at End of Consult Supine;Bed/Chair alarm activated;All needs within reach       The patient's AM-PAC Basic Mobility Inpatient Short Form Raw Score is 18, Standardized Score is 41.05. A standardized score greater than 38.32 (raw score of 16) suggests the patient may benefit from discharge to home which may not coincide with above PT recommendations. However please refer to therapist recommendation for discharge planning given other factors that may influence destination.    Pt would benefit from skilled inpatient PT during this admission in order to facilitate progress towards goals to maximize functional independence    Andie Martines PT, DPT

## 2024-10-22 NOTE — CASE MANAGEMENT
Case Management Discharge Planning Note    Patient name Janice Yung  Location /-01 MRN [id number]  : 1930 Date 10/22/2024       Current Admission Date: 10/19/2024  Current Admission Diagnosis:SHERLYN (acute kidney injury) ([facility])   Patient Active Problem List    Diagnosis Date Noted Date Diagnosed    SHERLYN (acute kidney injury) ([facility]) 10/19/2024     Urinary retention 10/19/2024     OAB (overactive bladder) 10/17/2024     Dysuria 10/17/2024     CHF (congestive heart failure) ([facility]) 2024     Bilateral leg edema 2024     Dementia without behavioral disturbance, psychotic disturbance, mood disturbance, or anxiety, unspecified dementia severity, unspecified dementia type ([facility]) 2024     Peripheral vascular disease, unspecified ([facility]) 2024     Stage 3a chronic kidney disease ([facility]) 2023     Earache 2023     Diarrhea 2023     Pulmonary nodules 2023     Abnormal urinalysis 2023     Paroxysmal atrial fibrillation ([facility]) 2022     Generalized weakness 2022     Chronic diastolic heart failure ([facility]) 2022     Hyponatremia 2020     Acute pain of left shoulder 2020     History of recurrent UTI (urinary tract infection) 2020     Change in mental status      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 eye with active choroidal neovascularization ([facility]) 2019     Fall 2018     Sepsis without acute organ dysfunction ([facility]) 2018     Neuropathy 2016     Vitamin B 12 deficiency 2016     Venous insufficiency 2016     Hypertension 2015     Gastroesophageal reflux disease without esophagitis 2015     Arthropathy 2015     Mixed hyperlipidemia 2015       LOS (days): 3  Geometric Mean LOS (GMLOS) (days): 3.8  Days to GMLOS:1.1     OBJECTIVE:  Risk of Unplanned Readmission Score: 20.28         Current admission status: Inpatient   Preferred Pharmacy:   [id number] -  [place] - [address]  [address]  [place] [address]  Phone: [phone] Fax: [phone]    Primary Care Provider: Edgar Mustafa MD    Primary Insurance: MEDICARE  Secondary Insurance:     DISCHARGE DETAILS:        CM spoke with dtr and provided pt choice list of HH agencies. Dtr and pt selected [facility]. [facility] reserved in [place]. Dtr had concerns about pt leaving today and CM relayed them to PA. PA will keep pt tonight for observation off abx and ask nursing to mobilize her.                        Requested Home Health Care         Home Health Agency Name:: [facility]

## 2024-10-22 NOTE — PROGRESS NOTES
"Progress Note - Hospitalist   Name: Janice Yung 89 y.o. female I MRN: [id number]  Unit/Bed#: -01 I Date of Admission: 10/19/2024   Date of Service: 10/22/2024 I Hospital Day: 3     Assessment & Plan  SHERLYN (acute kidney injury) (HCC)  Baseline Cr 0.8-1.0  Cr on admit 1.43, meeting SHERLYN criteria   Recent Labs     10/20/24  0553 10/21/24  0516 10/22/24  0517   CREATININE 1.26 1.15 1.22   EGFR 36 40 38     Estimated Creatinine Clearance: 25.9 mL/min (by C-G formula based on SCr of 1.22 mg/dL).   Likely obstructive in nature due to acute urinary retention with component of ATN with ongoing intake of diuretic (torsemide changed to daily rather than PRN 9/23 due to weight gain) and intake of fluconazole    Hold all nephrotoxic drugs   Avoid hypotension   Resolved with IV fluids  Gabapentin renally dosed  Abnormal urinalysis  Hx of recurrent UTI - with hx of pseudomonas and enterococcus faecalis   Recently treated with keflex in August   UA with innumerable WBCs and moderate bacteria  Maintained on macrobid for prophylaxis - hold while treating acute UTI   ID consult appreciated - urine cx growing lactobacillus, mixed contaminants  Monitor off abx per ID  Urinary retention  Garcia placed in ED with 400cc urine output   Seen by urology on 10/16 with PVR 0   Continue garcia   Evaluated by urology, recommended to maintain garcia with outpatient follow up and voiding trial   Paroxysmal atrial fibrillation (HCC)  Not on AC - on ASA only  Rate controlled on admit without AV joshua blocker   Hypertension  Home regimen: torsemide 20mg daily and aldactone 25mg daily   Hold in setting of SHERLYN   /52 (BP Location: Left arm)   Pulse 63   Temp (!) 96.8 °F (36 °C) (Probe)   Resp 20   Ht 5' 2\" (1.575 m)   Wt 70 kg (154 lb 5.2 oz)   SpO2 94%   BMI 28.23 kg/m²   Restarted torsemide 10/21  Chronic diastolic heart failure (HCC)  Wt Readings from Last 3 Encounters:   10/22/24 70 kg (154 lb 5.2 oz)   10/09/24 68.9 kg (152 lb) " "  10/01/24 70.8 kg (156 lb)   Last echo 9/10/24: LVEF 70%. G2DD  Home diuretic: torsemide 20mg daily, spironolactone 25mg daily, and jardiance 10mg daily   Restart on torsemide 20 mg daily 10/21  Pulmonary nodules  CT C/A/P: \"Partially visualized lower lung fields demonstrate numerous pulmonary nodules, increasing in size from June 2023 exam as described. 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 No evidence of primary malignancy or metastatic disease in the abdomen/pelvis.\"  Daughter unsure if they would even pursue treatment for this-she is interested in referral to oncology outpatient to discuss options  Dementia without behavioral disturbance, psychotic disturbance, mood disturbance, or anxiety, unspecified dementia severity, unspecified dementia type (HCC)  Pleasant and conversational   Supportive care    VTE Pharmacologic Prophylaxis: VTE Score: 4 Moderate Risk (Score 3-4) - Pharmacological DVT Prophylaxis Ordered: heparin.    Mobility:   Basic Mobility Inpatient Raw Score: 18  JH-HLM Goal: 6: Walk 10 steps or more  JH-HLM Achieved: 7: Walk 25 feet or more  JH-HLM Goal achieved. Continue to encourage appropriate mobility.    Patient Centered Rounds: I performed bedside rounds with nursing staff today.   Discussions with Specialists or Other Care Team Provider: TULIO ISRAEL    Education and Discussions with Family / Patient: Updated  (daughter) via phone.    Current Length of Stay: 3 day(s)  Current Patient Status: Inpatient   Certification Statement: The patient will continue to require additional inpatient hospital stay due to monitoring off abx, repeat BMP tomorrow  Discharge Plan: Anticipate discharge tomorrow to home with home services.    Code Status: Level 3 - DNAR and DNI    Subjective   Patient overall feels well today, no events overnight.  Daughter expressed concern as she feels her mother is generally weaker than usual.    Objective :  Temp:  [96.6 °F (35.9 °C)-96.8 °F (36 °C)] 96.8 °F (36 " °C)  HR:  [63-64] 63  BP: (116-127)/(47-52) 127/52  Resp:  [20] 20  SpO2:  [94 %-96 %] 94 %  O2 Device: None (Room air)    Body mass index is 28.23 kg/m².     Input and Output Summary (last 24 hours):     Intake/Output Summary (Last 24 hours) at 10/22/2024 1433  Last data filed at 10/22/2024 1047  Gross per 24 hour   Intake 480 ml   Output 1675 ml   Net -1195 ml       Physical Exam  Vitals and nursing note reviewed.   Constitutional:       Appearance: Normal appearance.      Comments: No acute distress   HENT:      Head: Normocephalic.   Eyes:      General: No scleral icterus.     Extraocular Movements: Extraocular movements intact.      Conjunctiva/sclera: Conjunctivae normal.   Cardiovascular:      Rate and Rhythm: Normal rate and regular rhythm.   Pulmonary:      Effort: Pulmonary effort is normal.      Breath sounds: Normal breath sounds. No wheezing, rhonchi or rales.   Abdominal:      General: Bowel sounds are normal.      Palpations: Abdomen is soft.      Tenderness: There is no abdominal tenderness. There is no guarding or rebound.   Musculoskeletal:         General: No swelling, tenderness or deformity.      Cervical back: Normal range of motion.      Comments: Able to move upper/lower ext bilaterally, no edema   Skin:     General: Skin is warm and dry.   Neurological:      General: No focal deficit present.      Mental Status: She is alert. Mental status is at baseline.   Psychiatric:         Mood and Affect: Mood normal.         Speech: Speech normal.         Behavior: Behavior normal.           Lines/Drains:  Lines/Drains/Airways       Active Status       Name Placement date Placement time Site Days    Urethral Catheter 16 Fr. 10/19/24  2009  --  2                  Urinary Catheter:  Goal for removal: N/A- Discharging with Hernandez                 Lab Results: I have reviewed the following results:   Results from last 7 days   Lab Units 10/22/24  0517   WBC Thousand/uL 10.13   HEMOGLOBIN g/dL 10.6*    HEMATOCRIT % 34.1*   PLATELETS Thousands/uL 213   SEGS PCT % 66   LYMPHO PCT % 21   MONO PCT % 11   EOS PCT % 2     Results from last 7 days   Lab Units 10/22/24  0517 10/21/24  0516 10/20/24  0553   SODIUM mmol/L 142   < > 140   POTASSIUM mmol/L 3.8   < > 3.9   CHLORIDE mmol/L 104   < > 102   CO2 mmol/L 32   < > 29   BUN mg/dL 21   < > 27*   CREATININE mg/dL 1.22   < > 1.26   ANION GAP mmol/L 6   < > 9   CALCIUM mg/dL 8.6   < > 8.6   ALBUMIN g/dL  --   --  3.5   TOTAL BILIRUBIN mg/dL  --   --  0.42   ALK PHOS U/L  --   --  71   ALT U/L  --   --  4*   AST U/L  --   --  11*   GLUCOSE RANDOM mg/dL 95   < > 90    < > = values in this interval not displayed.                 Results from last 7 days   Lab Units 10/19/24  1643   LACTIC ACID mmol/L 1.8       Recent Cultures (last 7 days):   Results from last 7 days   Lab Units 10/19/24  1919   URINE CULTURE  80,000-89,000 cfu/ml Lactobacillus species*  30,000-39,000 cfu/ml       Imaging Results Review: No pertinent imaging studies reviewed.  Other Study Results Review: No additional pertinent studies reviewed.    Last 24 Hours Medication List:     Current Facility-Administered Medications:     acetaminophen (TYLENOL) tablet 650 mg, Q6H PRN    aluminum-magnesium hydroxide-simethicone (MAALOX) oral suspension 30 mL, Q6H PRN    aspirin (ECOTRIN LOW STRENGTH) EC tablet 81 mg, Daily    atorvastatin (LIPITOR) tablet 10 mg, After Dinner    Cholecalciferol (VITAMIN D3) tablet 1,000 Units, Daily    cyanocobalamin (VITAMIN B-12) tablet 1,000 mcg, Daily    gabapentin (NEURONTIN) capsule 300 mg, BID    heparin (porcine) subcutaneous injection 5,000 Units, Q8H DONNA    miconazole (MONISTAT) external vaginal cream, BID PRN **AND** miconazole (MONISTAT) vaginal suppository 200 mg, HS    ondansetron (ZOFRAN) injection 4 mg, Q6H PRN    pantoprazole (PROTONIX) EC tablet 20 mg, Early Morning    senna (SENOKOT) tablet 8.6 mg, HS PRN    torsemide (DEMADEX) tablet 20 mg,  Daily    Administrative Statements   Today, Patient Was Seen By: Katherin Carlson PA-C  I have spent a total time of 40 minutes in caring for this patient on the day of the visit/encounter including Diagnostic results, Instructions for management, Patient and family education, Counseling / Coordination of care, Documenting in the medical record, Reviewing / ordering tests, medicine, procedures  , and Communicating with other healthcare professionals .    **Please Note: This note may have been constructed using a voice recognition system.**

## 2024-10-22 NOTE — ASSESSMENT & PLAN NOTE
Baseline Cr 0.8-1.0  Cr on admit 1.43, meeting SHERLYN criteria   Recent Labs     10/20/24  0553 10/21/24  0516 10/22/24  0517   CREATININE 1.26 1.15 1.22   EGFR 36 40 38     Estimated Creatinine Clearance: 25.9 mL/min (by C-G formula based on SCr of 1.22 mg/dL).   Likely obstructive in nature due to acute urinary retention with component of ATN with ongoing intake of diuretic (torsemide changed to daily rather than PRN 9/23 due to weight gain) and intake of fluconazole    Hold all nephrotoxic drugs   Avoid hypotension   Resolved with IV fluids  Gabapentin renally dosed

## 2024-10-22 NOTE — CASE MANAGEMENT
Case Management Discharge Planning Note    Patient name Janice Yung  Location /-01 MRN [id number]  : 1930 Date 10/22/2024       Current Admission Date: 10/19/2024  Current Admission Diagnosis:SHERLYN (acute kidney injury) ([facility])   Patient Active Problem List    Diagnosis Date Noted Date Diagnosed    SHERLYN (acute kidney injury) ([facility]) 10/19/2024     Urinary retention 10/19/2024     OAB (overactive bladder) 10/17/2024     Dysuria 10/17/2024     CHF (congestive heart failure) ([facility]) 2024     Bilateral leg edema 2024     Dementia without behavioral disturbance, psychotic disturbance, mood disturbance, or anxiety, unspecified dementia severity, unspecified dementia type ([facility]) 2024     Peripheral vascular disease, unspecified ([facility]) 2024     Stage 3a chronic kidney disease ([facility]) 2023     Earache 2023     Diarrhea 2023     Pulmonary nodules 2023     Cystitis 2023     Paroxysmal atrial fibrillation ([facility]) 2022     Generalized weakness 2022     Chronic diastolic heart failure ([facility]) 2022     Hyponatremia 2020     Acute pain of left shoulder 2020     History of recurrent UTI (urinary tract infection) 2020     Change in mental status      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 eye with active choroidal neovascularization ([facility]) 2019     Fall 2018     Sepsis without acute organ dysfunction ([facility]) 2018     Neuropathy 2016     Vitamin B 12 deficiency 2016     Venous insufficiency 2016     Hypertension 2015     Gastroesophageal reflux disease without esophagitis 2015     Arthropathy 2015     Mixed hyperlipidemia 2015       LOS (days): 3  Geometric Mean LOS (GMLOS) (days): 3.8  Days to GMLOS:1.2     OBJECTIVE:  Risk of Unplanned Readmission Score: 20.23         Current admission status: Inpatient   Preferred Pharmacy:   [facility] - [place] -  [address]  [address]  [place] [address]  Phone: [phone] Fax: [phone]    Primary Care Provider: Edgar Mustafa MD    Primary Insurance: MEDICARE  Secondary Insurance:     DISCHARGE DETAILS:     Updated referral to reflect need for SN for garcia care as well.                           Requested Home Health Care         Is the patient interested in HHC at discharge?: Yes  Home Health Discipline requested:: Nursing, Occupational Therapy, Physical Therapy  Home Health Follow-Up Provider:: PCP  Home Health Services Needed:: Evaluate Functional Status and Safety, Gait/ADL Training, Strengthening/Theraputic Exercises to Improve Function, Urinary Incontinence Catheter Management  Homebound Criteria Met:: Uses an Assist Device (i.e. cane, walker, etc)         Other Referral/Resources/Interventions Provided:  Interventions: HHC         Treatment Team Recommendation: Home with Home Health Care  Discharge Destination Plan:: Home with Home Health Care

## 2024-10-22 NOTE — CASE MANAGEMENT
Case Management Discharge Planning Note    Patient name Janice Yung  Location /-01 MRN [id number]  : 1930 Date 10/22/2024       Current Admission Date: 10/19/2024  Current Admission Diagnosis:SHERLYN (acute kidney injury) ([facility])   Patient Active Problem List    Diagnosis Date Noted Date Diagnosed    SHERLYN (acute kidney injury) ([facility]) 10/19/2024     Urinary retention 10/19/2024     OAB (overactive bladder) 10/17/2024     Dysuria 10/17/2024     CHF (congestive heart failure) ([facility]) 2024     Bilateral leg edema 2024     Dementia without behavioral disturbance, psychotic disturbance, mood disturbance, or anxiety, unspecified dementia severity, unspecified dementia type ([facility]) 2024     Peripheral vascular disease, unspecified ([facility]) 2024     Stage 3a chronic kidney disease ([facility]) 2023     Earache 2023     Diarrhea 2023     Pulmonary nodules 2023     Cystitis 2023     Paroxysmal atrial fibrillation ([facility]) 2022     Generalized weakness 2022     Chronic diastolic heart failure ([facility]) 2022     Hyponatremia 2020     Acute pain of left shoulder 2020     History of recurrent UTI (urinary tract infection) 2020     Change in mental status      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 eye with active choroidal neovascularization ([facility]) 2019     Fall 2018     Sepsis without acute organ dysfunction ([facility]) 2018     Neuropathy 2016     Vitamin B 12 deficiency 2016     Venous insufficiency 2016     Hypertension 2015     Gastroesophageal reflux disease without esophagitis 2015     Arthropathy 2015     Mixed hyperlipidemia 2015       LOS (days): 3  Geometric Mean LOS (GMLOS) (days): 3.8  Days to GMLOS:1.3     OBJECTIVE:  Risk of Unplanned Readmission Score: 20.23         Current admission status: Inpatient   Preferred Pharmacy:   [id number] - [place] -  [address]  [address]  [place] [address]  Phone: [phone] Fax: [phone]    Primary Care Provider: Edgar Mustafa MD    Primary Insurance: MEDICARE  Secondary Insurance:     DISCHARGE DETAILS:     Therapy is recommending HH and family is in agreement. Referrals made in [place].                            Requested Home Health Care         Is the patient interested in HHC at discharge?: Yes  Home Health Discipline requested:: Occupational Therapy, Physical Therapy  Home Health Follow-Up Provider:: PCP  Home Health Services Needed:: Evaluate Functional Status and Safety, Gait/ADL Training, Strengthening/Theraputic Exercises to Improve Function  Homebound Criteria Met:: Uses an Assist Device (i.e. cane, walker, etc)         Other Referral/Resources/Interventions Provided:  Interventions: HHC         Treatment Team Recommendation: Home with Home Health Care  Discharge Destination Plan:: Home with Home Health Care

## 2024-10-22 NOTE — ASSESSMENT & PLAN NOTE
"CT C/A/P: \"Partially visualized lower lung fields demonstrate numerous pulmonary nodules, increasing in size from June 2023 exam as described. 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 No evidence of primary malignancy or metastatic disease in the abdomen/pelvis.\"  Per prior provider, daughter unsure if they would even pursue treatment for this-she is interested in referral to oncology outpatient to discuss options  "

## 2024-10-22 NOTE — ASSESSMENT & PLAN NOTE
Baseline Cr 0.8-1.0  Cr on admit 1.43, meeting SHERLYN criteria   Recent Labs     10/23/24  0441 10/23/24  1037 10/24/24  0308   CREATININE 1.32* 1.41* 1.14   EGFR 34 31 41     Estimated Creatinine Clearance: 27.7 mL/min (by C-G formula based on SCr of 1.14 mg/dL).   Likely obstructive in nature due to acute urinary retention with component of ATN with ongoing intake of diuretic (torsemide changed to daily rather than PRN 9/23 due to weight gain) and intake of fluconazole    Creatinine improved following holding torsemide and gentle IVF  Monitor BMP as outpatient

## 2024-10-22 NOTE — ASSESSMENT & PLAN NOTE
Wt Readings from Last 3 Encounters:   10/22/24 70 kg (154 lb 5.2 oz)   10/09/24 68.9 kg (152 lb)   10/01/24 70.8 kg (156 lb)   Last echo 9/10/24: LVEF 70%. G2DD  Home diuretic: torsemide 20mg daily, spironolactone 25mg daily, and jardiance 10mg daily   Monitor respiratory and volume status closely along with daily BMP to determine when to reintroduce diuretics as pt was admitted for acute diastolic HF in September   I/o and daily weights   Liberalized fluid intake for now   Lasix were held.  Will restart on torsemide 20 mg daily.

## 2024-10-22 NOTE — OCCUPATIONAL THERAPY NOTE
Occupational Therapy Evaluation      Janice Doranluis f    10/22/2024    Principal Problem:    SHERLYN (acute kidney injury) ([facility])  Active Problems:    Hypertension    Chronic diastolic heart failure ([facility])    Paroxysmal atrial fibrillation ([facility])    Cystitis    Pulmonary nodules    Dementia without behavioral disturbance, psychotic disturbance, mood disturbance, or anxiety, unspecified dementia severity, unspecified dementia type ([facility])    Urinary retention      Past Medical History:   Diagnosis Date    SHERLYN (acute kidney injury) ([facility]) 05/08/2022    Balance disorder     CHF (congestive heart failure) ([facility])     Chronic pain     GERD (gastroesophageal reflux disease)     Hard of hearing     Hyperlipidemia     Hypertension     Hyponatremia 12/05/2020    Low serum cortisol level 12/07/2020    Neuropathy     Pneumonia     Pneumonia due to COVID-19 virus 12/05/2020    Sepsis ([facility]) 05/22/2018    Tinnitus     Unspecified adrenocortical insufficiency ([facility]) 02/28/2023    Felt to be related to COVID-19 infection.  No current symptoms.    UTI (urinary tract infection)        Past Surgical History:   Procedure Laterality Date    EYE SURGERY      HYSTERECTOMY      TONSILLECTOMY          10/22/24 0957   OT Last Visit   OT Visit Date 10/22/24   Note Type   Note type Evaluation   Pain Assessment   Pain Assessment Tool 0-10   Pain Score 5   Pain Location/Orientation Other (Comment)  (pain from catheter)   Restrictions/Precautions   Weight Bearing Precautions Per Order No   Other Precautions Contact/isolation;Cognitive;Chair Alarm;Bed Alarm;Fall Risk;Hard of hearing   Home Living   Type of Home House   Home Layout Two level;Able to live on main level with bedroom/bathroom;1/2 bath on main level;Stairs to enter with rails  (1+1STE; stays on first floor)   Bathroom Shower/Tub   (sponge bathes at sink on 1st floor)   Home Equipment Walker  (uses at baseline)   Additional Comments Sleeps in recliner at home   Prior Function   Level of [place]  "Independent with ADLs;Independent with functional mobility;Needs assistance with IADLS   Lives With Daughter   Receives Help From Family   IADLs Family/Friend/Other provides transportation;Family/Friend/Other provides meals;Family/Friend/Other provides medication management   Falls in the last 6 months 0   Vocational Retired   Subjective   Subjective \"It burns\" (states it is in her vagina/garcia)   ADL   Eating Assistance 7  Independent   Grooming Assistance 6  Modified Independent   UB Bathing Assistance 6  Modified Independent   LB Bathing Assistance 6  Modified Independent   UB Dressing Assistance 6  Modified independent   LB Dressing Assistance 6  Modified independent   Toileting Assistance  6  Modified independent   Additional Comments Performed dressing at EOB. Sponge bathes at baseline.   Bed Mobility   Supine to Sit 4  Minimal assistance   Additional items Assist x 1   Additional Comments Sleeps in a recliner at baseline   Transfers   Sit to Stand 5  Supervision   Stand to Sit 5  Supervision   Stand pivot 5  Supervision  (RW)   Functional Mobility   Functional Mobility 5  Supervision   Additional items Rolling walker   Activity Tolerance   Activity Tolerance Patient limited by pain   Medical Staff Made Aware PT Andie   Nurse Made Aware KATY SARMIENTO Assessment   RUE Assessment WFL   LUE Assessment   LUE Assessment WFL   Hand Function   Gross Motor Coordination Functional   Fine Motor Coordination Functional   Cognition   Overall Cognitive Status Impaired   Arousal/Participation Alert;Cooperative   Attention Within functional limits   Orientation Level Oriented X4   Memory Decreased short term memory   Following Commands Follows one step commands with increased time or repetition   Comments Pt with dementia diagnosis, but overall very appropriate for age. Family manages IADLs.   Assessment   Prognosis Good   Assessment Pt is a 89 y.o. female seen for OT evaluation at [facility], admitted " 10/19/2024 w/ SHERLYN (acute kidney injury) (HCC).  OT completed extensive review of pt's medical and social history. Comorbidities affecting pt's functional performance at time of assessment include: HTN, CHF, aFib, cystitis, dementia, urinary retention, neuropathy, fall, macular degeneration, generalized weakness, CHF. Prior to admission, pt was living in a 2SH, 2STE, 1st fl setup with daughter, and was independent with ADL, uses rollator, assisted for IADL. Upon evaluation, pt presents to OT at functional baseline. The patient's raw score on the AM-PAC Daily Activity inpatient short form is 24, standardized score is 57.54, greater than 39.4. Patients at this level are likely to benefit from DC to home. Based on findings, pt is of high complexity, due to medical comorbidities. Pt seen as a co-eval with PT due to the patient's co-morbidities, clinically unstable presentation, and present impairments which are a regression from the patient's baseline. At this time, OT recommendations at time of discharge are level 4. No further acute OT needs indicated at this time - Recommend pt continue to be OOB for meals, ambulation to/from BR, perform self care tasks, and mobility in hallway with nursing. D/C from OT caseload with above recommendations.   Goals   Patient Goals get rid of this burning feeling   Plan   OT Frequency Eval only   Discharge Recommendation   Rehab Resource Intensity Level, OT No post-acute rehabilitation needs   AM-PAC Daily Activity Inpatient   Lower Body Dressing 4   Bathing 4   Toileting 4   Upper Body Dressing 4   Grooming 4   Eating 4   Daily Activity Raw Score 24   Daily Activity Standardized Score (Calc for Raw Score >=11) 57.54   AM-PAC Applied Cognition Inpatient   Following a Speech/Presentation 3   Understanding Ordinary Conversation 4   Taking Medications 3   Remembering Where Things Are Placed or Put Away 3   Remembering List of 4-5 Errands 3   Taking Care of Complicated Tasks 3   Applied  Cognition Raw Score 19   Applied Cognition Standardized Score 39.77     Shilpi Dai MS, OTR/L

## 2024-10-22 NOTE — ASSESSMENT & PLAN NOTE
"Home regimen: torsemide 20mg daily and aldactone 25mg daily   Held in setting of SHERLYN   /53 (BP Location: Right arm)   Pulse 67   Temp 97.8 °F (36.6 °C) (Temporal)   Resp 18   Ht 5' 2\" (1.575 m)   Wt 70 kg (154 lb 5.2 oz)   SpO2 94%   BMI 28.23 kg/m²   Hold torsemide on discharge pending follow-up with cardiology/PCP.  "

## 2024-10-22 NOTE — PLAN OF CARE
Problem: PHYSICAL THERAPY ADULT  Goal: Performs mobility at highest level of function for planned discharge setting.  See evaluation for individualized goals.  Description: Treatment/Interventions: Functional transfer training, LE strengthening/ROM, Therapeutic exercise, Elevations, Endurance training, Cognitive reorientation, Patient/family training, Equipment eval/education, Bed mobility, Gait training  Equipment Recommended: Walker       See flowsheet documentation for full assessment, interventions and recommendations.  10/22/2024 1338 by Andie Martines PT  Note:    Problem List: Decreased endurance, Impaired balance, Decreased mobility, Pain  Assessment: Janice Yung is a 89 y.o. Female who presents to [facility] on 10/19/2024 from home w/ c/o burning during urination and diagnosis of SHERLYN. Orders for PT eval and treat received. Pt presents w/ comorbidities of HTN, CHF, Afib, dementia, urinary retention, neuropathy, macular degeneration, PVD, OAB. At baseline, pt mobilizes modified I w/ rollator, and reports 0 falls in the last 6 months. Upon evaluation, pt presents w/ the following deficits: weakness, impaired balance, and pain limiting functional mobility. Upon eval, pt requires min A x 1 for bed mobility, supervision for transfers, and supervision for gait. Based on this PT evaluation today, patient's discharge recommendation is for Level III - Low Rehab Resource Intensity. During this admission, pt would benefit from continued skilled inpatient PT in the acute care setting in order to address the abovementioned deficits to maximize function and mobility before DC from acute care.        Rehab Resource Intensity Level, PT: III (Minimum Resource Intensity)    See flowsheet documentation for full assessment.

## 2024-10-22 NOTE — ASSESSMENT & PLAN NOTE
Wt Readings from Last 3 Encounters:   10/24/24 70 kg (154 lb 5.2 oz)   10/09/24 68.9 kg (152 lb)   10/01/24 70.8 kg (156 lb)   Last echo 9/10/24: LVEF 70%. G2DD  Home diuretic: torsemide 20mg daily, spironolactone 25mg daily, and jardiance 10mg daily   Torsemide 20 mg daily was restarted 10/21 -held again due to rising creatinine yesterday and poor intake  Recommend follow up with cardiology prior to restarting torsemide

## 2024-10-22 NOTE — PROGRESS NOTES
Janice Yung is a 89 y.o. female who is currently ordered Vancomycin IV with management by the Pharmacy Consult service.  Relevant clinical data and objective / subjective history reviewed.  Vancomycin Assessment:  Indication and Goal AUC/Trough: Urinary tract infection (goal -600, trough >10), -600, trough >10  Clinical Status: stable  Micro:     Renal Function:  SCr: 1.22 mg/dL  CrCl: 25.4 mL/min  Renal replacement: Not on dialysis  Days of Therapy: 4  Current Dose: pulse dosing based on daily random level  Vancomycin Plan: Patient is currently on pulse vancomycin dosing due to SHERLYN. Scr improving since admitted and is at 1.22 mg/dL. Random vancomycin level drawn with AM labs this morning is 11.9 ug/ml. Based on today's assessment will change to scheduled vancomycin dosing as follows   New Dosinmg IV Q24H   Estimated AUC: 407 mcg*hr/mL  Estimated Trough: 13.5 mcg/mL  Next Level: With AM labs at 0600 on 10/25/24  Renal Function Monitoring: Daily BMP and UOP  Pharmacy will continue to follow closely for s/sx of nephrotoxicity, infusion reactions and appropriateness of therapy.  BMP and CBC will be ordered per protocol. We will continue to follow the patient’s culture results and clinical progress daily.    Aurelia Christopher, Pharmacist

## 2024-10-22 NOTE — CONSULTS
This patient's vancomycin therapy has been discontinued. Thank you for this consult; pharmacy will sign off now.

## 2024-10-22 NOTE — ASSESSMENT & PLAN NOTE
Baseline Cr 0.8-1.0  Cr on admit 1.43, meeting SHERLYN criteria   Recent Labs     10/20/24  0553 10/21/24  0516 10/22/24  0517   CREATININE 1.26 1.15 1.22   EGFR 36 40 38     Estimated Creatinine Clearance: 25.9 mL/min (by C-G formula based on SCr of 1.22 mg/dL).   Likely obstructive in nature due to acute urinary retention with component of ATN with ongoing intake of diuretic (torsemide changed to daily rather than PRN 9/23 due to weight gain) and intake of fluconazole    Hold all nephrotoxic drugs   Avoid hypotension   Resolved with IV fluids  Last dose of fluconazole due 10/20 , renally dosed to 100 Mg  Gabapentin renally dosed

## 2024-10-22 NOTE — ASSESSMENT & PLAN NOTE
"Home regimen: torsemide 20mg daily and aldactone 25mg daily   Hold in setting of SHELRYN   /52 (BP Location: Left arm)   Pulse 63   Temp (!) 96.8 °F (36 °C) (Probe)   Resp 20   Ht 5' 2\" (1.575 m)   Wt 70 kg (154 lb 5.2 oz)   SpO2 94%   BMI 28.23 kg/m²   Restart torsemide  "

## 2024-10-22 NOTE — PLAN OF CARE
Problem: Potential for Falls  Goal: Patient will remain free of falls  Description: INTERVENTIONS:  - Educate patient/family on patient safety including physical limitations  - Instruct patient to call for assistance with activity   - Consult OT/PT to assist with strengthening/mobility   - Keep Call bell within reach  - Keep bed low and locked with side rails adjusted as appropriate  - Keep care items and personal belongings within reach  - Initiate and maintain comfort rounds  - Make Fall Risk Sign visible to staff  - Offer Toileting every 2 Hours, in advance of need  - Initiate/Maintain alarm  - Obtain necessary fall risk management equipment:   - Apply yellow socks and bracelet for high fall risk patients  - Consider moving patient to room near nurses station  Outcome: Progressing     Problem: PAIN - ADULT  Goal: Verbalizes/displays adequate comfort level or baseline comfort level  Description: Interventions:  - Encourage patient to monitor pain and request assistance  - Assess pain using appropriate pain scale  - Administer analgesics based on type and severity of pain and evaluate response  - Implement non-pharmacological measures as appropriate and evaluate response  - Consider cultural and social influences on pain and pain management  - Notify physician/advanced practitioner if interventions unsuccessful or patient reports new pain  Outcome: Progressing     Problem: INFECTION - ADULT  Goal: Absence or prevention of progression during hospitalization  Description: INTERVENTIONS:  - Assess and monitor for signs and symptoms of infection  - Monitor lab/diagnostic results  - Monitor all insertion sites, i.e. indwelling lines, tubes, and drains  - Monitor endotracheal if appropriate and nasal secretions for changes in amount and color  - [place] appropriate cooling/warming therapies per order  - Administer medications as ordered  - Instruct and encourage patient and family to use good hand hygiene  technique  - Identify and instruct in appropriate isolation precautions for identified infection/condition  Outcome: Progressing  Goal: Absence of fever/infection during neutropenic period  Description: INTERVENTIONS:  - Monitor WBC    Outcome: Progressing     Problem: SAFETY ADULT  Goal: Patient will remain free of falls  Description: INTERVENTIONS:  - Educate patient/family on patient safety including physical limitations  - Instruct patient to call for assistance with activity   - Consult OT/PT to assist with strengthening/mobility   - Keep Call bell within reach  - Keep bed low and locked with side rails adjusted as appropriate  - Keep care items and personal belongings within reach  - Initiate and maintain comfort rounds  - Make Fall Risk Sign visible to staff  - Offer Toileting every 2 Hours, in advance of need  - Initiate/Maintain alarm  - Obtain necessary fall risk management equipment:   - Apply yellow socks and bracelet for high fall risk patients  - Consider moving patient to room near nurses station  Outcome: Progressing  Goal: Maintain or return to baseline ADL function  Description: INTERVENTIONS:  -  Assess patient's ability to carry out ADLs; assess patient's baseline for ADL function and identify physical deficits which impact ability to perform ADLs (bathing, care of mouth/teeth, toileting, grooming, dressing, etc.)  - Assess/evaluate cause of self-care deficits   - Assess range of motion  - Assess patient's mobility; develop plan if impaired  - Assess patient's need for assistive devices and provide as appropriate  - Encourage maximum independence but intervene and supervise when necessary  - Involve family in performance of ADLs  - Assess for home care needs following discharge   - Consider OT consult to assist with ADL evaluation and planning for discharge  - Provide patient education as appropriate  Outcome: Progressing  Goal: Maintains/Returns to pre admission functional level  Description:  INTERVENTIONS:  - Perform AM-PAC 6 Click Basic Mobility/ Daily Activity assessment daily.  - Set and communicate daily mobility goal to care team and patient/family/caregiver.   - Collaborate with rehabilitation services on mobility goals if consulted  - Perform Range of Motion 3 times a day.  - Reposition patient every 2 hours.  - Dangle patient 3 times a day  - Stand patient 3 times a day  - Ambulate patient 3 times a day  - Out of bed to chair 3 times a day   - Out of bed for meals 3 times a day  - Out of bed for toileting  - Record patient progress and toleration of activity level   Outcome: Progressing     Problem: RESPIRATORY - ADULT  Goal: Achieves optimal ventilation and oxygenation  Description: INTERVENTIONS:  - Assess for changes in respiratory status  - Assess for changes in mentation and behavior  - Position to facilitate oxygenation and minimize respiratory effort  - Oxygen administered by appropriate delivery if ordered  - Initiate smoking cessation education as indicated  - Encourage broncho-pulmonary hygiene including cough, deep breathe, Incentive Spirometry  - Assess the need for suctioning and aspirate as needed  - Assess and instruct to report SOB or any respiratory difficulty  - Respiratory Therapy support as indicated  Outcome: Progressing     Problem: GENITOURINARY - ADULT  Goal: Maintains or returns to baseline urinary function  Description: INTERVENTIONS:  - Assess urinary function  - Encourage oral fluids to ensure adequate hydration if ordered  - Administer IV fluids as ordered to ensure adequate hydration  - Administer ordered medications as needed  - Offer frequent toileting  - Follow urinary retention protocol if ordered  Outcome: Progressing  Goal: Urinary catheter remains patent  Description: INTERVENTIONS:  - Assess patency of urinary catheter  - If patient has a chronic garcia, consider changing catheter if non-functioning  - Follow guidelines for intermittent irrigation of  non-functioning urinary catheter  Outcome: Progressing     Problem: Prexisting or High Potential for Compromised Skin Integrity  Goal: Skin integrity is maintained or improved  Description: INTERVENTIONS:  - Identify patients at risk for skin breakdown  - Assess and monitor skin integrity  - Assess and monitor nutrition and hydration status  - Monitor labs   - Assess for incontinence   - Turn and reposition patient  - Assist with mobility/ambulation  - Relieve pressure over bony prominences  - Avoid friction and shearing  - Provide appropriate hygiene as needed including keeping skin clean and dry  - Evaluate need for skin moisturizer/barrier cream  - Collaborate with interdisciplinary team   - Patient/family teaching  - Consider wound care consult   Outcome: Progressing

## 2024-10-22 NOTE — ASSESSMENT & PLAN NOTE
Hx of recurrent UTI - with hx of pseudomonas and enterococcus faecalis   Recently treated with keflex in August   UA with innumerable WBCs and moderate bacteria  ID consult appreciated - urine cx growing lactobacillus and mixed contaminants.  Discontinue IV cefepime/vancomycin  Okay to monitor off antibiotics

## 2024-10-22 NOTE — ASSESSMENT & PLAN NOTE
Wt Readings from Last 3 Encounters:   10/22/24 70 kg (154 lb 5.2 oz)   10/09/24 68.9 kg (152 lb)   10/01/24 70.8 kg (156 lb)   Last echo 9/10/24: LVEF 70%. G2DD  Home diuretic: torsemide 20mg daily, spironolactone 25mg daily, and jardiance 10mg daily   Restart on torsemide 20 mg daily 10/21

## 2024-10-23 LAB
ANION GAP SERPL CALCULATED.3IONS-SCNC: 8 MMOL/L (ref 4–13)
ANION GAP SERPL CALCULATED.3IONS-SCNC: 9 MMOL/L (ref 4–13)
BASOPHILS # BLD AUTO: 0.04 THOUSANDS/ΜL (ref 0–0.1)
BASOPHILS NFR BLD AUTO: 0 % (ref 0–1)
BUN SERPL-MCNC: 24 MG/DL (ref 5–25)
BUN SERPL-MCNC: 24 MG/DL (ref 5–25)
CALCIUM SERPL-MCNC: 8.9 MG/DL (ref 8.4–10.2)
CALCIUM SERPL-MCNC: 9 MG/DL (ref 8.4–10.2)
CHLORIDE SERPL-SCNC: 101 MMOL/L (ref 96–108)
CHLORIDE SERPL-SCNC: 99 MMOL/L (ref 96–108)
CO2 SERPL-SCNC: 30 MMOL/L (ref 21–32)
CO2 SERPL-SCNC: 33 MMOL/L (ref 21–32)
CREAT SERPL-MCNC: 1.32 MG/DL (ref 0.6–1.3)
CREAT SERPL-MCNC: 1.41 MG/DL (ref 0.6–1.3)
EOSINOPHIL # BLD AUTO: 0.24 THOUSAND/ΜL (ref 0–0.61)
EOSINOPHIL NFR BLD AUTO: 2 % (ref 0–6)
ERYTHROCYTE [DISTWIDTH] IN BLOOD BY AUTOMATED COUNT: 13.6 % (ref 11.6–15.1)
GFR SERPL CREATININE-BSD FRML MDRD: 31 ML/MIN/1.73SQ M
GFR SERPL CREATININE-BSD FRML MDRD: 34 ML/MIN/1.73SQ M
GLUCOSE SERPL-MCNC: 102 MG/DL (ref 65–140)
GLUCOSE SERPL-MCNC: 94 MG/DL (ref 65–140)
HCT VFR BLD AUTO: 35.6 % (ref 34.8–46.1)
HGB BLD-MCNC: 11.4 G/DL (ref 11.5–15.4)
IMM GRANULOCYTES # BLD AUTO: 0.1 THOUSAND/UL (ref 0–0.2)
IMM GRANULOCYTES NFR BLD AUTO: 1 % (ref 0–2)
LYMPHOCYTES # BLD AUTO: 2.09 THOUSANDS/ΜL (ref 0.6–4.47)
LYMPHOCYTES NFR BLD AUTO: 20 % (ref 14–44)
MAGNESIUM SERPL-MCNC: 2 MG/DL (ref 1.9–2.7)
MCH RBC QN AUTO: 28.8 PG (ref 26.8–34.3)
MCHC RBC AUTO-ENTMCNC: 32 G/DL (ref 31.4–37.4)
MCV RBC AUTO: 90 FL (ref 82–98)
MONOCYTES # BLD AUTO: 1.13 THOUSAND/ΜL (ref 0.17–1.22)
MONOCYTES NFR BLD AUTO: 11 % (ref 4–12)
NEUTROPHILS # BLD AUTO: 6.99 THOUSANDS/ΜL (ref 1.85–7.62)
NEUTS SEG NFR BLD AUTO: 66 % (ref 43–75)
NRBC BLD AUTO-RTO: 0 /100 WBCS
PLATELET # BLD AUTO: 232 THOUSANDS/UL (ref 149–390)
PMV BLD AUTO: 9.5 FL (ref 8.9–12.7)
POTASSIUM SERPL-SCNC: 4 MMOL/L (ref 3.5–5.3)
POTASSIUM SERPL-SCNC: 4 MMOL/L (ref 3.5–5.3)
RBC # BLD AUTO: 3.96 MILLION/UL (ref 3.81–5.12)
SODIUM SERPL-SCNC: 140 MMOL/L (ref 135–147)
SODIUM SERPL-SCNC: 140 MMOL/L (ref 135–147)
WBC # BLD AUTO: 10.59 THOUSAND/UL (ref 4.31–10.16)

## 2024-10-23 PROCEDURE — 85025 COMPLETE CBC W/AUTO DIFF WBC: CPT | Performed by: STUDENT IN AN ORGANIZED HEALTH CARE EDUCATION/TRAINING PROGRAM

## 2024-10-23 PROCEDURE — 99232 SBSQ HOSP IP/OBS MODERATE 35: CPT | Performed by: PHYSICIAN ASSISTANT

## 2024-10-23 PROCEDURE — 80048 BASIC METABOLIC PNL TOTAL CA: CPT | Performed by: PHYSICIAN ASSISTANT

## 2024-10-23 PROCEDURE — 83735 ASSAY OF MAGNESIUM: CPT | Performed by: PHYSICIAN ASSISTANT

## 2024-10-23 RX ORDER — SODIUM CHLORIDE, SODIUM GLUCONATE, SODIUM ACETATE, POTASSIUM CHLORIDE, MAGNESIUM CHLORIDE, SODIUM PHOSPHATE, DIBASIC, AND POTASSIUM PHOSPHATE .53; .5; .37; .037; .03; .012; .00082 G/100ML; G/100ML; G/100ML; G/100ML; G/100ML; G/100ML; G/100ML
250 INJECTION, SOLUTION INTRAVENOUS ONCE
Status: COMPLETED | OUTPATIENT
Start: 2024-10-23 | End: 2024-10-23

## 2024-10-23 RX ORDER — SODIUM CHLORIDE, SODIUM GLUCONATE, SODIUM ACETATE, POTASSIUM CHLORIDE, MAGNESIUM CHLORIDE, SODIUM PHOSPHATE, DIBASIC, AND POTASSIUM PHOSPHATE .53; .5; .37; .037; .03; .012; .00082 G/100ML; G/100ML; G/100ML; G/100ML; G/100ML; G/100ML; G/100ML
75 INJECTION, SOLUTION INTRAVENOUS CONTINUOUS
Status: DISPENSED | OUTPATIENT
Start: 2024-10-23 | End: 2024-10-23

## 2024-10-23 RX ORDER — LIDOCAINE HYDROCHLORIDE 20 MG/ML
1 JELLY TOPICAL ONCE
Status: COMPLETED | OUTPATIENT
Start: 2024-10-23 | End: 2024-10-23

## 2024-10-23 RX ADMIN — OLOPATADINE HYDROCHLORIDE OPHTHALMIC: 2 SOLUTION OPHTHALMIC at 18:41

## 2024-10-23 RX ADMIN — HEPARIN SODIUM 5000 UNITS: 5000 INJECTION, SOLUTION INTRAVENOUS; SUBCUTANEOUS at 05:04

## 2024-10-23 RX ADMIN — SODIUM CHLORIDE, SODIUM GLUCONATE, SODIUM ACETATE, POTASSIUM CHLORIDE, MAGNESIUM CHLORIDE, SODIUM PHOSPHATE, DIBASIC, AND POTASSIUM PHOSPHATE 250 ML: .53; .5; .37; .037; .03; .012; .00082 INJECTION, SOLUTION INTRAVENOUS at 08:05

## 2024-10-23 RX ADMIN — GABAPENTIN 300 MG: 300 CAPSULE ORAL at 21:16

## 2024-10-23 RX ADMIN — SODIUM CHLORIDE, SODIUM GLUCONATE, SODIUM ACETATE, POTASSIUM CHLORIDE, MAGNESIUM CHLORIDE, SODIUM PHOSPHATE, DIBASIC, AND POTASSIUM PHOSPHATE 75 ML/HR: .53; .5; .37; .037; .03; .012; .00082 INJECTION, SOLUTION INTRAVENOUS at 11:22

## 2024-10-23 RX ADMIN — Medication 1000 UNITS: at 07:53

## 2024-10-23 RX ADMIN — GABAPENTIN 300 MG: 300 CAPSULE ORAL at 07:53

## 2024-10-23 RX ADMIN — ASPIRIN 81 MG: 81 TABLET, COATED ORAL at 07:53

## 2024-10-23 RX ADMIN — ACETAMINOPHEN 650 MG: 325 TABLET, FILM COATED ORAL at 05:04

## 2024-10-23 RX ADMIN — HEPARIN SODIUM 5000 UNITS: 5000 INJECTION, SOLUTION INTRAVENOUS; SUBCUTANEOUS at 13:43

## 2024-10-23 RX ADMIN — CYANOCOBALAMIN TAB 500 MCG 1000 MCG: 500 TAB at 07:52

## 2024-10-23 RX ADMIN — ACETAMINOPHEN 650 MG: 325 TABLET, FILM COATED ORAL at 18:40

## 2024-10-23 RX ADMIN — LIDOCAINE HYDROCHLORIDE 1 APPLICATION: 20 JELLY TOPICAL at 21:17

## 2024-10-23 RX ADMIN — HEPARIN SODIUM 5000 UNITS: 5000 INJECTION, SOLUTION INTRAVENOUS; SUBCUTANEOUS at 21:17

## 2024-10-23 RX ADMIN — ATORVASTATIN CALCIUM 10 MG: 10 TABLET, FILM COATED ORAL at 17:09

## 2024-10-23 RX ADMIN — PANTOPRAZOLE SODIUM 20 MG: 20 TABLET, DELAYED RELEASE ORAL at 05:04

## 2024-10-23 NOTE — ASSESSMENT & PLAN NOTE
Baseline Cr 0.8-1.0  Cr on admit 1.43, meeting SHERLYN criteria   Recent Labs     10/22/24  0517 10/23/24  0441 10/23/24  1037   CREATININE 1.22 1.32* 1.41*   EGFR 38 34 31     Estimated Creatinine Clearance: 22.2 mL/min (A) (by C-G formula based on SCr of 1.41 mg/dL (H)).   Likely obstructive in nature due to acute urinary retention with component of ATN with ongoing intake of diuretic (torsemide changed to daily rather than PRN 9/23 due to weight gain) and intake of fluconazole    Hold all nephrotoxic drugs   Avoid hypotension   Gabapentin renally dosed  Some increase in creatinine 1.41 this AM, pt reports poor intake.  Hold torsemide and start gentle IVF  Trend BMP

## 2024-10-23 NOTE — PLAN OF CARE
Problem: Potential for Falls  Goal: Patient will remain free of falls  Description: INTERVENTIONS:  - Educate patient/family on patient safety including physical limitations  - Instruct patient to call for assistance with activity   - Consult OT/PT to assist with strengthening/mobility   - Keep Call bell within reach  - Keep bed low and locked with side rails adjusted as appropriate  - Keep care items and personal belongings within reach  - Initiate and maintain comfort rounds  - Make Fall Risk Sign visible to staff  - Offer Toileting every 2 Hours, in advance of need  - Initiate/Maintain alarm  - Obtain necessary fall risk management equipment  - Apply yellow socks and bracelet for high fall risk patients  - Consider moving patient to room near nurses station  Outcome: Progressing     Problem: PAIN - ADULT  Goal: Verbalizes/displays adequate comfort level or baseline comfort level  Description: Interventions:  - Encourage patient to monitor pain and request assistance  - Assess pain using appropriate pain scale  - Administer analgesics based on type and severity of pain and evaluate response  - Implement non-pharmacological measures as appropriate and evaluate response  - Consider cultural and social influences on pain and pain management  - Notify physician/advanced practitioner if interventions unsuccessful or patient reports new pain  Outcome: Progressing     Problem: INFECTION - ADULT  Goal: Absence or prevention of progression during hospitalization  Description: INTERVENTIONS:  - Assess and monitor for signs and symptoms of infection  - Monitor lab/diagnostic results  - Monitor all insertion sites, i.e. indwelling lines, tubes, and drains  - Monitor endotracheal if appropriate and nasal secretions for changes in amount and color  - [place] appropriate cooling/warming therapies per order  - Administer medications as ordered  - Instruct and encourage patient and family to use good hand hygiene technique  -  Identify and instruct in appropriate isolation precautions for identified infection/condition  Outcome: Progressing  Goal: Absence of fever/infection during neutropenic period  Description: INTERVENTIONS:  - Monitor WBC    Outcome: Progressing     Problem: SAFETY ADULT  Goal: Patient will remain free of falls  Description: INTERVENTIONS:  - Educate patient/family on patient safety including physical limitations  - Instruct patient to call for assistance with activity   - Consult OT/PT to assist with strengthening/mobility   - Keep Call bell within reach  - Keep bed low and locked with side rails adjusted as appropriate  - Keep care items and personal belongings within reach  - Initiate and maintain comfort rounds  - Make Fall Risk Sign visible to staff  - Offer Toileting every 2 Hours, in advance of need  - Initiate/Maintain alarm  - Obtain necessary fall risk management equipment  - Apply yellow socks and bracelet for high fall risk patients  - Consider moving patient to room near nurses station  Outcome: Progressing  Goal: Maintain or return to baseline ADL function  Description: INTERVENTIONS:  -  Assess patient's ability to carry out ADLs; assess patient's baseline for ADL function and identify physical deficits which impact ability to perform ADLs (bathing, care of mouth/teeth, toileting, grooming, dressing, etc.)  - Assess/evaluate cause of self-care deficits   - Assess range of motion  - Assess patient's mobility; develop plan if impaired  - Assess patient's need for assistive devices and provide as appropriate  - Encourage maximum independence but intervene and supervise when necessary  - Involve family in performance of ADLs  - Assess for home care needs following discharge   - Consider OT consult to assist with ADL evaluation and planning for discharge  - Provide patient education as appropriate  Outcome: Progressing  Goal: Maintains/Returns to pre admission functional level  Description: INTERVENTIONS:  -  Perform AM-PAC 6 Click Basic Mobility/ Daily Activity assessment daily.  - Set and communicate daily mobility goal to care team and patient/family/caregiver.   - Collaborate with rehabilitation services on mobility goals if consulted  - Perform Range of Motion 3 times a day.  - Reposition patient every 3 hours.  - Dangle patient 3 times a day  - Stand patient 3 times a day  - Ambulate patient 3 times a day  - Out of bed to chair 3 times a day   - Out of bed for meals 3 times a day  - Out of bed for toileting  - Record patient progress and toleration of activity level   Outcome: Progressing     Problem: RESPIRATORY - ADULT  Goal: Achieves optimal ventilation and oxygenation  Description: INTERVENTIONS:  - Assess for changes in respiratory status  - Assess for changes in mentation and behavior  - Position to facilitate oxygenation and minimize respiratory effort  - Oxygen administered by appropriate delivery if ordered  - Initiate smoking cessation education as indicated  - Encourage broncho-pulmonary hygiene including cough, deep breathe, Incentive Spirometry  - Assess the need for suctioning and aspirate as needed  - Assess and instruct to report SOB or any respiratory difficulty  - Respiratory Therapy support as indicated  Outcome: Progressing     Problem: GENITOURINARY - ADULT  Goal: Maintains or returns to baseline urinary function  Description: INTERVENTIONS:  - Assess urinary function  - Encourage oral fluids to ensure adequate hydration if ordered  - Administer IV fluids as ordered to ensure adequate hydration  - Administer ordered medications as needed  - Offer frequent toileting  - Follow urinary retention protocol if ordered  Outcome: Progressing  Goal: Urinary catheter remains patent  Description: INTERVENTIONS:  - Assess patency of urinary catheter  - If patient has a chronic garcia, consider changing catheter if non-functioning  - Follow guidelines for intermittent irrigation of non-functioning urinary  catheter  Outcome: Progressing     Problem: Prexisting or High Potential for Compromised Skin Integrity  Goal: Skin integrity is maintained or improved  Description: INTERVENTIONS:  - Identify patients at risk for skin breakdown  - Assess and monitor skin integrity  - Assess and monitor nutrition and hydration status  - Monitor labs   - Assess for incontinence   - Turn and reposition patient  - Assist with mobility/ambulation  - Relieve pressure over bony prominences  - Avoid friction and shearing  - Provide appropriate hygiene as needed including keeping skin clean and dry  - Evaluate need for skin moisturizer/barrier cream  - Collaborate with interdisciplinary team   - Patient/family teaching  - Consider wound care consult   Outcome: Progressing

## 2024-10-23 NOTE — PROGRESS NOTES
"Progress Note - Hospitalist   Name: Janice Yung 89 y.o. female I MRN: [id number]  Unit/Bed#: -01 I Date of Admission: 10/19/2024   Date of Service: 10/23/2024 I Hospital Day: 4     Assessment & Plan  SHERLYN (acute kidney injury) (HCC)  Baseline Cr 0.8-1.0  Cr on admit 1.43, meeting SHERLYN criteria   Recent Labs     10/22/24  0517 10/23/24  0441 10/23/24  1037   CREATININE 1.22 1.32* 1.41*   EGFR 38 34 31     Estimated Creatinine Clearance: 22.2 mL/min (A) (by C-G formula based on SCr of 1.41 mg/dL (H)).   Likely obstructive in nature due to acute urinary retention with component of ATN with ongoing intake of diuretic (torsemide changed to daily rather than PRN 9/23 due to weight gain) and intake of fluconazole    Hold all nephrotoxic drugs   Avoid hypotension   Gabapentin renally dosed  Some increase in creatinine 1.41 this AM, pt reports poor intake.  Hold torsemide and start gentle IVF  Trend BMP  Abnormal urinalysis  Hx of recurrent UTI - with hx of pseudomonas and enterococcus faecalis   Recently treated with keflex in August   UA with innumerable WBCs and moderate bacteria  Maintained on macrobid for prophylaxis - hold while treating acute UTI   ID consult appreciated - urine cx growing lactobacillus, mixed contaminants  Monitor off abx per ID  Urinary retention  Garcia placed in ED with 400cc urine output   Seen by urology on 10/16 with PVR 0   Discussed with urology - pt reporting significant discomfort with garcia catheter.  Per urology, OK for inpatient void trial  Hold myrbetriq on discharge  Paroxysmal atrial fibrillation (HCC)  Not on AC - on ASA only  Rate controlled on admit without AV joshua blocker   Hypertension  Home regimen: torsemide 20mg daily and aldactone 25mg daily   Hold in setting of rising creatinine  BP (!) 122/49   Pulse 58   Temp 97.5 °F (36.4 °C) (Oral)   Resp 20   Ht 5' 2\" (1.575 m)   Wt 69.2 kg (152 lb 8.9 oz)   SpO2 93%   BMI 27.90 kg/m²   Restarted torsemide 10/21 however again " "with rising creatinine, hold for now  Chronic diastolic heart failure (HCC)  Wt Readings from Last 3 Encounters:   10/23/24 69.2 kg (152 lb 8.9 oz)   10/09/24 68.9 kg (152 lb)   10/01/24 70.8 kg (156 lb)   Last echo 9/10/24: LVEF 70%. G2DD  Home diuretic: torsemide 20mg daily, spironolactone 25mg daily, and jardiance 10mg daily   Restarted on torsemide 20 mg daily 10/21 - hold for now given rising creatinine and poor intake  Pulmonary nodules  CT C/A/P: \"Partially visualized lower lung fields demonstrate numerous pulmonary nodules, increasing in size from June 2023 exam as described. 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 No evidence of primary malignancy or metastatic disease in the abdomen/pelvis.\"  Daughter unsure if they would even pursue treatment for this-she is interested in referral to oncology outpatient to discuss options  Dementia without behavioral disturbance, psychotic disturbance, mood disturbance, or anxiety, unspecified dementia severity, unspecified dementia type (HCC)  Pleasant and conversational   Supportive care    VTE Pharmacologic Prophylaxis: VTE Score: 4 Moderate Risk (Score 3-4) - Pharmacological DVT Prophylaxis Ordered: heparin.    Mobility:   Basic Mobility Inpatient Raw Score: 18  JH-HLM Goal: 6: Walk 10 steps or more  JH-HLM Achieved: 4: Move to chair/commode  JH-HLM Goal NOT achieved. Continue with multidisciplinary rounding and encourage appropriate mobility to improve upon JH-HLM goals.    Patient Centered Rounds: I performed bedside rounds with nursing staff today.   Discussions with Specialists or Other Care Team Provider: CM, urology AP    Education and Discussions with Family / Patient: Updated  (daughter) via phone.    Current Length of Stay: 4 day(s)  Current Patient Status: Inpatient   Certification Statement: The patient will continue to require additional inpatient hospital stay due to void trial, monitoring creatinine  Discharge Plan: Anticipate discharge in " 24-48 hrs to home with home services.    Code Status: Level 3 - DNAR and DNI    Subjective   Patient overall feels well but reports significant discomfort related to her garcia catheter.  Denies chest pain/palpitations, shortness of breath, nausea/vomiting, abdominal pain.  Reports poor appetite.    Objective :  Temp:  [96.8 °F (36 °C)-97.8 °F (36.6 °C)] 97.5 °F (36.4 °C)  HR:  [58-67] 58  BP: (113-126)/(46-54) 122/49  Resp:  [17-20] 20  SpO2:  [93 %-94 %] 93 %    Body mass index is 27.9 kg/m².     Input and Output Summary (last 24 hours):     Intake/Output Summary (Last 24 hours) at 10/23/2024 1149  Last data filed at 10/23/2024 0832  Gross per 24 hour   Intake 630 ml   Output 1650 ml   Net -1020 ml       Physical Exam  Vitals and nursing note reviewed.   Constitutional:       Appearance: Normal appearance.      Comments: No acute distress   HENT:      Head: Normocephalic.   Eyes:      General: No scleral icterus.     Extraocular Movements: Extraocular movements intact.      Conjunctiva/sclera: Conjunctivae normal.   Cardiovascular:      Rate and Rhythm: Normal rate and regular rhythm.   Pulmonary:      Effort: Pulmonary effort is normal.      Breath sounds: Normal breath sounds. No wheezing, rhonchi or rales.   Abdominal:      General: Bowel sounds are normal.      Palpations: Abdomen is soft.      Tenderness: There is no abdominal tenderness. There is no guarding or rebound.   Musculoskeletal:         General: No swelling, tenderness or deformity.      Cervical back: Normal range of motion.      Comments: Able to move upper/lower ext bilaterally, no edema   Skin:     General: Skin is warm and dry.   Neurological:      Mental Status: She is alert. Mental status is at baseline.   Psychiatric:         Mood and Affect: Mood normal.         Speech: Speech normal.         Behavior: Behavior normal.           Lines/Drains:              Lab Results: I have reviewed the following results:   Results from last 7 days   Lab  Units 10/23/24  0441   WBC Thousand/uL 10.59*   HEMOGLOBIN g/dL 11.4*   HEMATOCRIT % 35.6   PLATELETS Thousands/uL 232   SEGS PCT % 66   LYMPHO PCT % 20   MONO PCT % 11   EOS PCT % 2     Results from last 7 days   Lab Units 10/23/24  1037 10/21/24  0516 10/20/24  0553   SODIUM mmol/L 140   < > 140   POTASSIUM mmol/L 4.0   < > 3.9   CHLORIDE mmol/L 99   < > 102   CO2 mmol/L 33*   < > 29   BUN mg/dL 24   < > 27*   CREATININE mg/dL 1.41*   < > 1.26   ANION GAP mmol/L 8   < > 9   CALCIUM mg/dL 9.0   < > 8.6   ALBUMIN g/dL  --   --  3.5   TOTAL BILIRUBIN mg/dL  --   --  0.42   ALK PHOS U/L  --   --  71   ALT U/L  --   --  4*   AST U/L  --   --  11*   GLUCOSE RANDOM mg/dL 102   < > 90    < > = values in this interval not displayed.                 Results from last 7 days   Lab Units 10/19/24  1643   LACTIC ACID mmol/L 1.8       Recent Cultures (last 7 days):   Results from last 7 days   Lab Units 10/19/24  1919   URINE CULTURE  80,000-89,000 cfu/ml Lactobacillus species*  30,000-39,000 cfu/ml       Imaging Results Review: No pertinent imaging studies reviewed.  Other Study Results Review: No additional pertinent studies reviewed.    Last 24 Hours Medication List:     Current Facility-Administered Medications:     acetaminophen (TYLENOL) tablet 650 mg, Q6H PRN    aluminum-magnesium hydroxide-simethicone (MAALOX) oral suspension 30 mL, Q6H PRN    aspirin (ECOTRIN LOW STRENGTH) EC tablet 81 mg, Daily    atorvastatin (LIPITOR) tablet 10 mg, After Dinner    Cholecalciferol (VITAMIN D3) tablet 1,000 Units, Daily    cyanocobalamin (VITAMIN B-12) tablet 1,000 mcg, Daily    gabapentin (NEURONTIN) capsule 300 mg, BID    heparin (porcine) subcutaneous injection 5,000 Units, Q8H DONNA    miconazole (MONISTAT) external vaginal cream, BID PRN **AND** [] miconazole (MONISTAT) vaginal suppository 200 mg, HS    multi-electrolyte (PLASMALYTE-A/ISOLYTE-S PH 7.4) IV solution, Continuous, Last Rate: 75 mL/hr (10/23/24 1126)     ondansetron (ZOFRAN) injection 4 mg, Q6H PRN    pantoprazole (PROTONIX) EC tablet 20 mg, Early Morning    senna (SENOKOT) tablet 8.6 mg, HS PRN    [Held by provider] torsemide (DEMADEX) tablet 20 mg, Daily    Administrative Statements   Today, Patient Was Seen By: Katherin Carlson PA-C  I have spent a total time of 35 minutes in caring for this patient on the day of the visit/encounter including Diagnostic results, Patient and family education, Counseling / Coordination of care, Documenting in the medical record, Reviewing / ordering tests, medicine, procedures  , and Communicating with other healthcare professionals .    **Please Note: This note may have been constructed using a voice recognition system.**

## 2024-10-23 NOTE — ASSESSMENT & PLAN NOTE
Wt Readings from Last 3 Encounters:   10/23/24 69.2 kg (152 lb 8.9 oz)   10/09/24 68.9 kg (152 lb)   10/01/24 70.8 kg (156 lb)   Last echo 9/10/24: LVEF 70%. G2DD  Home diuretic: torsemide 20mg daily, spironolactone 25mg daily, and jardiance 10mg daily   Restarted on torsemide 20 mg daily 10/21 - hold for now given rising creatinine and poor intake

## 2024-10-23 NOTE — QUICK NOTE
Pt complaining of discomfort with garcia, mainly when sitting  Chart reviewed, d/w urology AP  We can try voiding trial this afternoon and if successful, pt can leave without garcia  If not, garcia insertions is needed  Unfortunately, pt cannot use pyridium 2/2 kidney disease      Lindsey Bojorquez

## 2024-10-23 NOTE — ASSESSMENT & PLAN NOTE
Garcia placed in ED with 400cc urine output   Seen by urology on 10/16 with PVR 0   Discussed with urology - pt reporting significant discomfort with garcia catheter.  Per urology, OK for inpatient void trial  Hold myrbetriq on discharge

## 2024-10-23 NOTE — ASSESSMENT & PLAN NOTE
"Home regimen: torsemide 20mg daily and aldactone 25mg daily   Hold in setting of rising creatinine  BP (!) 122/49   Pulse 58   Temp 97.5 °F (36.4 °C) (Oral)   Resp 20   Ht 5' 2\" (1.575 m)   Wt 69.2 kg (152 lb 8.9 oz)   SpO2 93%   BMI 27.90 kg/m²   Restarted torsemide 10/21 however again with rising creatinine, hold for now  "

## 2024-10-23 NOTE — PLAN OF CARE
Problem: Potential for Falls  Goal: Patient will remain free of falls  Description: INTERVENTIONS:  - Educate patient/family on patient safety including physical limitations  - Instruct patient to call for assistance with activity   - Consult OT/PT to assist with strengthening/mobility   - Keep Call bell within reach  - Keep bed low and locked with side rails adjusted as appropriate  - Keep care items and personal belongings within reach  - Initiate and maintain comfort rounds  - Make Fall Risk Sign visible to staff  - Offer Toileting every 2 Hours, in advance of need  - Initiate/Maintain alarm  - Obtain necessary fall risk management equipment:   - Apply yellow socks and bracelet for high fall risk patients  - Consider moving patient to room near nurses station  Outcome: Progressing     Problem: PAIN - ADULT  Goal: Verbalizes/displays adequate comfort level or baseline comfort level  Description: Interventions:  - Encourage patient to monitor pain and request assistance  - Assess pain using appropriate pain scale  - Administer analgesics based on type and severity of pain and evaluate response  - Implement non-pharmacological measures as appropriate and evaluate response  - Consider cultural and social influences on pain and pain management  - Notify physician/advanced practitioner if interventions unsuccessful or patient reports new pain  Outcome: Progressing     Problem: INFECTION - ADULT  Goal: Absence or prevention of progression during hospitalization  Description: INTERVENTIONS:  - Assess and monitor for signs and symptoms of infection  - Monitor lab/diagnostic results  - Monitor all insertion sites, i.e. indwelling lines, tubes, and drains  - Monitor endotracheal if appropriate and nasal secretions for changes in amount and color  - [place] appropriate cooling/warming therapies per order  - Administer medications as ordered  - Instruct and encourage patient and family to use good hand hygiene  technique  - Identify and instruct in appropriate isolation precautions for identified infection/condition  Outcome: Progressing  Goal: Absence of fever/infection during neutropenic period  Description: INTERVENTIONS:  - Monitor WBC    Outcome: Progressing     Problem: SAFETY ADULT  Goal: Patient will remain free of falls  Description: INTERVENTIONS:  - Educate patient/family on patient safety including physical limitations  - Instruct patient to call for assistance with activity   - Consult OT/PT to assist with strengthening/mobility   - Keep Call bell within reach  - Keep bed low and locked with side rails adjusted as appropriate  - Keep care items and personal belongings within reach  - Initiate and maintain comfort rounds  - Make Fall Risk Sign visible to staff  - Offer Toileting every 2 Hours, in advance of need  - Initiate/Maintain alarm  - Obtain necessary fall risk management equipment:   - Apply yellow socks and bracelet for high fall risk patients  - Consider moving patient to room near nurses station  Outcome: Progressing  Goal: Maintain or return to baseline ADL function  Description: INTERVENTIONS:  -  Assess patient's ability to carry out ADLs; assess patient's baseline for ADL function and identify physical deficits which impact ability to perform ADLs (bathing, care of mouth/teeth, toileting, grooming, dressing, etc.)  - Assess/evaluate cause of self-care deficits   - Assess range of motion  - Assess patient's mobility; develop plan if impaired  - Assess patient's need for assistive devices and provide as appropriate  - Encourage maximum independence but intervene and supervise when necessary  - Involve family in performance of ADLs  - Assess for home care needs following discharge   - Consider OT consult to assist with ADL evaluation and planning for discharge  - Provide patient education as appropriate  Outcome: Progressing  Goal: Maintains/Returns to pre admission functional level  Description:  INTERVENTIONS:  - Perform AM-PAC 6 Click Basic Mobility/ Daily Activity assessment daily.  - Set and communicate daily mobility goal to care team and patient/family/caregiver.   - Collaborate with rehabilitation services on mobility goals if consulted  - Perform Range of Motion 3 times a day.  - Reposition patient every 2 hours.  - Dangle patient 3 times a day  - Stand patient 3 times a day  - Ambulate patient 3 times a day  - Out of bed to chair 3 times a day   - Out of bed for meals 3 times a day  - Out of bed for toileting  - Record patient progress and toleration of activity level   Outcome: Progressing     Problem: RESPIRATORY - ADULT  Goal: Achieves optimal ventilation and oxygenation  Description: INTERVENTIONS:  - Assess for changes in respiratory status  - Assess for changes in mentation and behavior  - Position to facilitate oxygenation and minimize respiratory effort  - Oxygen administered by appropriate delivery if ordered  - Initiate smoking cessation education as indicated  - Encourage broncho-pulmonary hygiene including cough, deep breathe, Incentive Spirometry  - Assess the need for suctioning and aspirate as needed  - Assess and instruct to report SOB or any respiratory difficulty  - Respiratory Therapy support as indicated  Outcome: Progressing     Problem: GENITOURINARY - ADULT  Goal: Maintains or returns to baseline urinary function  Description: INTERVENTIONS:  - Assess urinary function  - Encourage oral fluids to ensure adequate hydration if ordered  - Administer IV fluids as ordered to ensure adequate hydration  - Administer ordered medications as needed  - Offer frequent toileting  - Follow urinary retention protocol if ordered  Outcome: Progressing  Goal: Urinary catheter remains patent  Description: INTERVENTIONS:  - Assess patency of urinary catheter  - If patient has a chronic garcia, consider changing catheter if non-functioning  - Follow guidelines for intermittent irrigation of  non-functioning urinary catheter  Outcome: Progressing     Problem: Prexisting or High Potential for Compromised Skin Integrity  Goal: Skin integrity is maintained or improved  Description: INTERVENTIONS:  - Identify patients at risk for skin breakdown  - Assess and monitor skin integrity  - Assess and monitor nutrition and hydration status  - Monitor labs   - Assess for incontinence   - Turn and reposition patient  - Assist with mobility/ambulation  - Relieve pressure over bony prominences  - Avoid friction and shearing  - Provide appropriate hygiene as needed including keeping skin clean and dry  - Evaluate need for skin moisturizer/barrier cream  - Collaborate with interdisciplinary team   - Patient/family teaching  - Consider wound care consult   Outcome: Progressing

## 2024-10-24 VITALS
OXYGEN SATURATION: 94 % | SYSTOLIC BLOOD PRESSURE: 117 MMHG | HEART RATE: 67 BPM | HEIGHT: 62 IN | RESPIRATION RATE: 18 BRPM | DIASTOLIC BLOOD PRESSURE: 53 MMHG | WEIGHT: 154.32 LBS | BODY MASS INDEX: 28.4 KG/M2 | TEMPERATURE: 97.8 F

## 2024-10-24 LAB
ANION GAP SERPL CALCULATED.3IONS-SCNC: 7 MMOL/L (ref 4–13)
BASOPHILS # BLD AUTO: 0.05 THOUSANDS/ΜL (ref 0–0.1)
BASOPHILS NFR BLD AUTO: 1 % (ref 0–1)
BUN SERPL-MCNC: 21 MG/DL (ref 5–25)
CALCIUM SERPL-MCNC: 9.2 MG/DL (ref 8.4–10.2)
CHLORIDE SERPL-SCNC: 103 MMOL/L (ref 96–108)
CO2 SERPL-SCNC: 32 MMOL/L (ref 21–32)
CREAT SERPL-MCNC: 1.14 MG/DL (ref 0.6–1.3)
EOSINOPHIL # BLD AUTO: 0.25 THOUSAND/ΜL (ref 0–0.61)
EOSINOPHIL NFR BLD AUTO: 2 % (ref 0–6)
ERYTHROCYTE [DISTWIDTH] IN BLOOD BY AUTOMATED COUNT: 13.5 % (ref 11.6–15.1)
GFR SERPL CREATININE-BSD FRML MDRD: 41 ML/MIN/1.73SQ M
GLUCOSE SERPL-MCNC: 94 MG/DL (ref 65–140)
HCT VFR BLD AUTO: 38.2 % (ref 34.8–46.1)
HGB BLD-MCNC: 12.1 G/DL (ref 11.5–15.4)
IMM GRANULOCYTES # BLD AUTO: 0.12 THOUSAND/UL (ref 0–0.2)
IMM GRANULOCYTES NFR BLD AUTO: 1 % (ref 0–2)
LYMPHOCYTES # BLD AUTO: 2.06 THOUSANDS/ΜL (ref 0.6–4.47)
LYMPHOCYTES NFR BLD AUTO: 19 % (ref 14–44)
MCH RBC QN AUTO: 28.5 PG (ref 26.8–34.3)
MCHC RBC AUTO-ENTMCNC: 31.7 G/DL (ref 31.4–37.4)
MCV RBC AUTO: 90 FL (ref 82–98)
MONOCYTES # BLD AUTO: 1.01 THOUSAND/ΜL (ref 0.17–1.22)
MONOCYTES NFR BLD AUTO: 9 % (ref 4–12)
NEUTROPHILS # BLD AUTO: 7.24 THOUSANDS/ΜL (ref 1.85–7.62)
NEUTS SEG NFR BLD AUTO: 68 % (ref 43–75)
NRBC BLD AUTO-RTO: 0 /100 WBCS
PLATELET # BLD AUTO: 234 THOUSANDS/UL (ref 149–390)
PMV BLD AUTO: 9.4 FL (ref 8.9–12.7)
POTASSIUM SERPL-SCNC: 3.9 MMOL/L (ref 3.5–5.3)
RBC # BLD AUTO: 4.25 MILLION/UL (ref 3.81–5.12)
SODIUM SERPL-SCNC: 142 MMOL/L (ref 135–147)
WBC # BLD AUTO: 10.73 THOUSAND/UL (ref 4.31–10.16)

## 2024-10-24 PROCEDURE — 80048 BASIC METABOLIC PNL TOTAL CA: CPT | Performed by: INTERNAL MEDICINE

## 2024-10-24 PROCEDURE — 99239 HOSP IP/OBS DSCHRG MGMT >30: CPT | Performed by: PHYSICIAN ASSISTANT

## 2024-10-24 PROCEDURE — 85025 COMPLETE CBC W/AUTO DIFF WBC: CPT | Performed by: INTERNAL MEDICINE

## 2024-10-24 RX ORDER — TORSEMIDE 20 MG/1
20 TABLET ORAL DAILY
Start: 2024-10-24

## 2024-10-24 RX ORDER — LIDOCAINE HYDROCHLORIDE 20 MG/ML
1 JELLY TOPICAL ONCE
Status: COMPLETED | OUTPATIENT
Start: 2024-10-24 | End: 2024-10-24

## 2024-10-24 RX ORDER — SPIRONOLACTONE 25 MG/1
25 TABLET ORAL DAILY
Start: 2024-10-24 | End: 2024-11-23

## 2024-10-24 RX ORDER — GABAPENTIN 300 MG/1
300 CAPSULE ORAL 2 TIMES DAILY
Qty: 60 CAPSULE | Refills: 0 | Status: SHIPPED | OUTPATIENT
Start: 2024-10-24

## 2024-10-24 RX ADMIN — HEPARIN SODIUM 5000 UNITS: 5000 INJECTION, SOLUTION INTRAVENOUS; SUBCUTANEOUS at 05:55

## 2024-10-24 RX ADMIN — CYANOCOBALAMIN TAB 500 MCG 1000 MCG: 500 TAB at 08:30

## 2024-10-24 RX ADMIN — Medication 1000 UNITS: at 08:30

## 2024-10-24 RX ADMIN — ACETAMINOPHEN 650 MG: 325 TABLET, FILM COATED ORAL at 04:07

## 2024-10-24 RX ADMIN — GABAPENTIN 300 MG: 300 CAPSULE ORAL at 08:30

## 2024-10-24 RX ADMIN — PANTOPRAZOLE SODIUM 20 MG: 20 TABLET, DELAYED RELEASE ORAL at 05:55

## 2024-10-24 RX ADMIN — ASPIRIN 81 MG: 81 TABLET, COATED ORAL at 08:30

## 2024-10-24 RX ADMIN — LIDOCAINE HYDROCHLORIDE 1 APPLICATION: 20 JELLY TOPICAL at 08:31

## 2024-10-24 RX ADMIN — HEPARIN SODIUM 5000 UNITS: 5000 INJECTION, SOLUTION INTRAVENOUS; SUBCUTANEOUS at 13:19

## 2024-10-24 RX ADMIN — OLOPATADINE HYDROCHLORIDE OPHTHALMIC: 2 SOLUTION OPHTHALMIC at 04:10

## 2024-10-24 NOTE — PLAN OF CARE
Problem: Potential for Falls  Goal: Patient will remain free of falls  Description: INTERVENTIONS:  - Educate patient/family on patient safety including physical limitations  - Instruct patient to call for assistance with activity   - Consult OT/PT to assist with strengthening/mobility   - Keep Call bell within reach  - Keep bed low and locked with side rails adjusted as appropriate  - Keep care items and personal belongings within reach  - Initiate and maintain comfort rounds  - Make Fall Risk Sign visible to staff  - Offer Toileting every  Hours, in advance of need  - Initiate/Maintain alarm  - Obtain necessary fall risk management equipment:   - Apply yellow socks and bracelet for high fall risk patients  - Consider moving patient to room near nurses station  Outcome: Progressing     Problem: PAIN - ADULT  Goal: Verbalizes/displays adequate comfort level or baseline comfort level  Description: Interventions:  - Encourage patient to monitor pain and request assistance  - Assess pain using appropriate pain scale  - Administer analgesics based on type and severity of pain and evaluate response  - Implement non-pharmacological measures as appropriate and evaluate response  - Consider cultural and social influences on pain and pain management  - Notify physician/advanced practitioner if interventions unsuccessful or patient reports new pain  Outcome: Progressing     Problem: INFECTION - ADULT  Goal: Absence or prevention of progression during hospitalization  Description: INTERVENTIONS:  - Assess and monitor for signs and symptoms of infection  - Monitor lab/diagnostic results  - Monitor all insertion sites, i.e. indwelling lines, tubes, and drains  - Monitor endotracheal if appropriate and nasal secretions for changes in amount and color  - [place] appropriate cooling/warming therapies per order  - Administer medications as ordered  - Instruct and encourage patient and family to use good hand hygiene technique  -  Identify and instruct in appropriate isolation precautions for identified infection/condition  Outcome: Progressing  Goal: Absence of fever/infection during neutropenic period  Description: INTERVENTIONS:  - Monitor WBC    Outcome: Progressing     Problem: SAFETY ADULT  Goal: Patient will remain free of falls  Description: INTERVENTIONS:  - Educate patient/family on patient safety including physical limitations  - Instruct patient to call for assistance with activity   - Consult OT/PT to assist with strengthening/mobility   - Keep Call bell within reach  - Keep bed low and locked with side rails adjusted as appropriate  - Keep care items and personal belongings within reach  - Initiate and maintain comfort rounds  - Make Fall Risk Sign visible to staff  - Offer Toileting every  Hours, in advance of need  - Initiate/Maintain alarm  - Obtain necessary fall risk management equipment:   - Apply yellow socks and bracelet for high fall risk patients  - Consider moving patient to room near nurses station  Outcome: Progressing  Goal: Maintain or return to baseline ADL function  Description: INTERVENTIONS:  -  Assess patient's ability to carry out ADLs; assess patient's baseline for ADL function and identify physical deficits which impact ability to perform ADLs (bathing, care of mouth/teeth, toileting, grooming, dressing, etc.)  - Assess/evaluate cause of self-care deficits   - Assess range of motion  - Assess patient's mobility; develop plan if impaired  - Assess patient's need for assistive devices and provide as appropriate  - Encourage maximum independence but intervene and supervise when necessary  - Involve family in performance of ADLs  - Assess for home care needs following discharge   - Consider OT consult to assist with ADL evaluation and planning for discharge  - Provide patient education as appropriate  Outcome: Progressing  Goal: Maintains/Returns to pre admission functional level  Description:  INTERVENTIONS:  - Perform AM-PAC 6 Click Basic Mobility/ Daily Activity assessment daily.  - Set and communicate daily mobility goal to care team and patient/family/caregiver.   - Collaborate with rehabilitation services on mobility goals if consulted  - Perform Range of Motion 3 times a day.  - Reposition patient every 2 hours.  - Dangle patient 3 times a day  - Stand patient 3 times a day  - Ambulate patient 3 times a day  - Out of bed to chair 3 times a day   - Out of bed for meals 3 times a day  - Out of bed for toileting  - Record patient progress and toleration of activity level   Outcome: Progressing     Problem: GENITOURINARY - ADULT  Goal: Maintains or returns to baseline urinary function  Description: INTERVENTIONS:  - Assess urinary function  - Encourage oral fluids to ensure adequate hydration if ordered  - Administer IV fluids as ordered to ensure adequate hydration  - Administer ordered medications as needed  - Offer frequent toileting  - Follow urinary retention protocol if ordered  Outcome: Progressing  Goal: Urinary catheter remains patent  Description: INTERVENTIONS:  - Assess patency of urinary catheter  - If patient has a chronic garcia, consider changing catheter if non-functioning  - Follow guidelines for intermittent irrigation of non-functioning urinary catheter  Outcome: Progressing     Problem: RESPIRATORY - ADULT  Goal: Achieves optimal ventilation and oxygenation  Description: INTERVENTIONS:  - Assess for changes in respiratory status  - Assess for changes in mentation and behavior  - Position to facilitate oxygenation and minimize respiratory effort  - Oxygen administered by appropriate delivery if ordered  - Initiate smoking cessation education as indicated  - Encourage broncho-pulmonary hygiene including cough, deep breathe, Incentive Spirometry  - Assess the need for suctioning and aspirate as needed  - Assess and instruct to report SOB or any respiratory difficulty  - Respiratory  Therapy support as indicated  Outcome: Progressing

## 2024-10-24 NOTE — ASSESSMENT & PLAN NOTE
"Home regimen: torsemide 20mg daily and aldactone 25mg daily   Hold in setting of rising creatinine  /53 (BP Location: Right arm)   Pulse 67   Temp 97.8 °F (36.6 °C) (Temporal)   Resp 18   Ht 5' 2\" (1.575 m)   Wt 70 kg (154 lb 5.2 oz)   SpO2 94%   BMI 28.23 kg/m²   Restarted torsemide 10/21 however again with rising creatinine, hold for now  "

## 2024-10-24 NOTE — ASSESSMENT & PLAN NOTE
Wt Readings from Last 3 Encounters:   10/24/24 70 kg (154 lb 5.2 oz)   10/09/24 68.9 kg (152 lb)   10/01/24 70.8 kg (156 lb)   Last echo 9/10/24: LVEF 70%. G2DD  Home diuretic: torsemide 20mg daily, spironolactone 25mg daily, and jardiance 10mg daily   Restarted on torsemide 20 mg daily 10/21 - hold for now given rising creatinine and poor intake

## 2024-10-24 NOTE — PLAN OF CARE
Problem: Potential for Falls  Goal: Patient will remain free of falls  Description: INTERVENTIONS:  - Educate patient/family on patient safety including physical limitations  - Instruct patient to call for assistance with activity   - Consult OT/PT to assist with strengthening/mobility   - Keep Call bell within reach  - Keep bed low and locked with side rails adjusted as appropriate  - Keep care items and personal belongings within reach  - Initiate and maintain comfort rounds  - Make Fall Risk Sign visible to staff  - Offer Toileting every 2 Hours, in advance of need  - Initiate/Maintain alarm  - Obtain necessary fall risk management equipment  - Apply yellow socks and bracelet for high fall risk patients  - Consider moving patient to room near nurses station  Outcome: Progressing     Problem: PAIN - ADULT  Goal: Verbalizes/displays adequate comfort level or baseline comfort level  Description: Interventions:  - Encourage patient to monitor pain and request assistance  - Assess pain using appropriate pain scale  - Administer analgesics based on type and severity of pain and evaluate response  - Implement non-pharmacological measures as appropriate and evaluate response  - Consider cultural and social influences on pain and pain management  - Notify physician/advanced practitioner if interventions unsuccessful or patient reports new pain  Outcome: Progressing     Problem: INFECTION - ADULT  Goal: Absence or prevention of progression during hospitalization  Description: INTERVENTIONS:  - Assess and monitor for signs and symptoms of infection  - Monitor lab/diagnostic results  - Monitor all insertion sites, i.e. indwelling lines, tubes, and drains  - Monitor endotracheal if appropriate and nasal secretions for changes in amount and color  - [place] appropriate cooling/warming therapies per order  - Administer medications as ordered  - Instruct and encourage patient and family to use good hand hygiene technique  -  Identify and instruct in appropriate isolation precautions for identified infection/condition  Outcome: Progressing  Goal: Absence of fever/infection during neutropenic period  Description: INTERVENTIONS:  - Monitor WBC    Outcome: Progressing     Problem: SAFETY ADULT  Goal: Patient will remain free of falls  Description: INTERVENTIONS:  - Educate patient/family on patient safety including physical limitations  - Instruct patient to call for assistance with activity   - Consult OT/PT to assist with strengthening/mobility   - Keep Call bell within reach  - Keep bed low and locked with side rails adjusted as appropriate  - Keep care items and personal belongings within reach  - Initiate and maintain comfort rounds  - Make Fall Risk Sign visible to staff  - Offer Toileting every 2 Hours, in advance of need  - Initiate/Maintain alarm  - Obtain necessary fall risk management equipment  - Apply yellow socks and bracelet for high fall risk patients  - Consider moving patient to room near nurses station  Outcome: Progressing  Goal: Maintain or return to baseline ADL function  Description: INTERVENTIONS:  -  Assess patient's ability to carry out ADLs; assess patient's baseline for ADL function and identify physical deficits which impact ability to perform ADLs (bathing, care of mouth/teeth, toileting, grooming, dressing, etc.)  - Assess/evaluate cause of self-care deficits   - Assess range of motion  - Assess patient's mobility; develop plan if impaired  - Assess patient's need for assistive devices and provide as appropriate  - Encourage maximum independence but intervene and supervise when necessary  - Involve family in performance of ADLs  - Assess for home care needs following discharge   - Consider OT consult to assist with ADL evaluation and planning for discharge  - Provide patient education as appropriate  Outcome: Progressing  Goal: Maintains/Returns to pre admission functional level  Description: INTERVENTIONS:  -  Perform AM-PAC 6 Click Basic Mobility/ Daily Activity assessment daily.  - Set and communicate daily mobility goal to care team and patient/family/caregiver.   - Collaborate with rehabilitation services on mobility goals if consulted  - Perform Range of Motion 2 times a day.  - Reposition patient every 2 hours.  - Dangle patient 2 times a day  - Stand patient 2 times a day  - Ambulate patient 2 times a day  - Out of bed to chair 2 times a day   - Out of bed for meals 2 times a day  - Out of bed for toileting  - Record patient progress and toleration of activity level   Outcome: Progressing     Problem: RESPIRATORY - ADULT  Goal: Achieves optimal ventilation and oxygenation  Description: INTERVENTIONS:  - Assess for changes in respiratory status  - Assess for changes in mentation and behavior  - Position to facilitate oxygenation and minimize respiratory effort  - Oxygen administered by appropriate delivery if ordered  - Initiate smoking cessation education as indicated  - Encourage broncho-pulmonary hygiene including cough, deep breathe, Incentive Spirometry  - Assess the need for suctioning and aspirate as needed  - Assess and instruct to report SOB or any respiratory difficulty  - Respiratory Therapy support as indicated  Outcome: Progressing     Problem: GENITOURINARY - ADULT  Goal: Maintains or returns to baseline urinary function  Description: INTERVENTIONS:  - Assess urinary function  - Encourage oral fluids to ensure adequate hydration if ordered  - Administer IV fluids as ordered to ensure adequate hydration  - Administer ordered medications as needed  - Offer frequent toileting  - Follow urinary retention protocol if ordered  Outcome: Progressing  Goal: Urinary catheter remains patent  Description: INTERVENTIONS:  - Assess patency of urinary catheter  - If patient has a chronic garcia, consider changing catheter if non-functioning  - Follow guidelines for intermittent irrigation of non-functioning urinary  catheter  Outcome: Progressing     Problem: Prexisting or High Potential for Compromised Skin Integrity  Goal: Skin integrity is maintained or improved  Description: INTERVENTIONS:  - Identify patients at risk for skin breakdown  - Assess and monitor skin integrity  - Assess and monitor nutrition and hydration status  - Monitor labs   - Assess for incontinence   - Turn and reposition patient  - Assist with mobility/ambulation  - Relieve pressure over bony prominences  - Avoid friction and shearing  - Provide appropriate hygiene as needed including keeping skin clean and dry  - Evaluate need for skin moisturizer/barrier cream  - Collaborate with interdisciplinary team   - Patient/family teaching  - Consider wound care consult   Outcome: Progressing

## 2024-10-24 NOTE — CASE MANAGEMENT
Case Management Discharge Planning Note    Patient name Janice Yung  Location /-01 MRN [id number]  : 1930 Date 10/24/2024       Current Admission Date: 10/19/2024  Current Admission Diagnosis:SHERLYN (acute kidney injury) (Formerly [facility])   Patient Active Problem List    Diagnosis Date Noted Date Diagnosed    SHERLYN (acute kidney injury) (Formerly [facility]) 10/19/2024     Urinary retention 10/19/2024     OAB (overactive bladder) 10/17/2024     Dysuria 10/17/2024     CHF (congestive heart failure) (Formerly [facility]) 2024     Bilateral leg edema 2024     Dementia without behavioral disturbance, psychotic disturbance, mood disturbance, or anxiety, unspecified dementia severity, unspecified dementia type ([facility]ly [facility]) 2024     Peripheral vascular disease, unspecified ([facility]ly [facility]) 2024     Stage 3a chronic kidney disease (Formerly [facility]) 2023     Earache 2023     Diarrhea 2023     Pulmonary nodules 2023     Abnormal urinalysis 2023     Paroxysmal atrial fibrillation ([facility]ly [facility]) 2022     Generalized weakness 2022     Chronic diastolic heart failure ([facility]ly [facility]) 2022     Hyponatremia 2020     Acute pain of left shoulder 2020     History of recurrent UTI (urinary tract infection) 2020     Change in mental status      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 eye with active choroidal neovascularization ([facility]ly [facility]) 2019     Fall 2018     Sepsis without acute organ dysfunction ([facility]ly [facility]) 2018     Neuropathy 2016     Vitamin B 12 deficiency 2016     Venous insufficiency 2016     Hypertension 2015     Gastroesophageal reflux disease without esophagitis 2015     Arthropathy 2015     Mixed hyperlipidemia 2015       LOS (days): 5  Geometric Mean LOS (GMLOS) (days): 3.8  Days to GMLOS:-0.8     OBJECTIVE:  Risk of Unplanned Readmission Score: 17.45         Current admission status: Inpatient   Preferred Pharmacy:   [id number] -  [place] - [address]  [address]  [place] [address]  Phone: [phone] Fax: [phone]    Primary Care Provider: Edgar Mustafa MD    Primary Insurance: MEDICARE  Secondary Insurance:     DISCHARGE DETAILS:        Merged with [facility] cannot offer HH as SN is no longer needed. Referral made to  OP PT in the home via [facility].

## 2024-10-24 NOTE — CASE MANAGEMENT
Case Management Discharge Planning Note    Patient name Janice Yung  Location /-01 MRN [id number]  : 1930 Date 10/24/2024       Current Admission Date: 10/19/2024  Current Admission Diagnosis:SHERLYN (acute kidney injury) (Formerly [facility])   Patient Active Problem List    Diagnosis Date Noted Date Diagnosed    SHERLYN (acute kidney injury) (Formerly [facility]) 10/19/2024     Urinary retention 10/19/2024     OAB (overactive bladder) 10/17/2024     Dysuria 10/17/2024     CHF (congestive heart failure) (Formerly [facility]) 2024     Bilateral leg edema 2024     Dementia without behavioral disturbance, psychotic disturbance, mood disturbance, or anxiety, unspecified dementia severity, unspecified dementia type ([facility]) 2024     Peripheral vascular disease, unspecified ([facility]) 2024     Stage 3a chronic kidney disease ([facility]ly [facility]) 2023     Earache 2023     Diarrhea 2023     Pulmonary nodules 2023     Abnormal urinalysis 2023     Paroxysmal atrial fibrillation ([facility]) 2022     Generalized weakness 2022     Chronic diastolic heart failure ([facility]ly [facility]) 2022     Hyponatremia 2020     Acute pain of left shoulder 2020     History of recurrent UTI (urinary tract infection) 2020     Change in mental status      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 eye with active choroidal neovascularization (Formerly [facility]) 2019     Fall 2018     Sepsis without acute organ dysfunction ([facility]) 2018     Neuropathy 2016     Vitamin B 12 deficiency 2016     Venous insufficiency 2016     Hypertension 2015     Gastroesophageal reflux disease without esophagitis 2015     Arthropathy 2015     Mixed hyperlipidemia 2015       LOS (days): 5  Geometric Mean LOS (GMLOS) (days): 3.8  Days to GMLOS:-0.7     OBJECTIVE:  Risk of Unplanned Readmission Score: 17.45         Current admission status: Inpatient   Preferred Pharmacy:   [id number] -  [place] - [address]  [address]  [place] [address]  Phone: [phone] Fax: [phone]    Primary Care Provider: Edgar Mustafa MD    Primary Insurance: MEDICARE  Secondary Insurance:     DISCHARGE DETAILS:        Pt completed voiding trial and does not need a garcia catheter any longer. SN no longer needed. [facility] updated in [facility].                         Requested Home Health Care         Is the patient interested in HHC at discharge?: Yes  Home Health Discipline requested:: Occupational Therapy, Physical Therapy  Home Health Agency Name:: [facility]  Home Health Follow-Up Provider:: PCP  Home Health Services Needed:: Evaluate Functional Status and Safety, Gait/ADL Training, Strengthening/Theraputic Exercises to Improve Function  Homebound Criteria Met:: Uses an Assist Device (i.e. cane, walker, etc)  Supporting Clincal Findings:: Limited Endurance

## 2024-10-24 NOTE — QUICK NOTE
Pt urinating and voiding  PVRs have less 150  No need for garcia currently  OK to be discharged from urology standpoint with Op follow up    Lindsey Bojorquez

## 2024-10-24 NOTE — PROGRESS NOTES
Patient:    MRN:  [id number]    Hardik Request ID:  [id number]    Level of care reserved:  Home Health Agency    Partner Reserved:   [facility] Home, [place] [address] [phone]    Clinical needs requested:    Geography searched:  [phone]    Start of Service:    Request sent:  10:10am EDT on 10/22/2024 by Kimberly Arizmendi    Partner reserved:  12:31pm EDT on 10/24/2024 by Kimberly Arizmendi    Choice list shared:

## 2024-10-24 NOTE — ASSESSMENT & PLAN NOTE
Baseline Cr 0.8-1.0  Cr on admit 1.43, meeting SHERLYN criteria   Recent Labs     10/23/24  0441 10/23/24  1037 10/24/24  0308   CREATININE 1.32* 1.41* 1.14   EGFR 34 31 41     Estimated Creatinine Clearance: 27.7 mL/min (by C-G formula based on SCr of 1.14 mg/dL).   Likely obstructive in nature due to acute urinary retention with component of ATN with ongoing intake of diuretic (torsemide changed to daily rather than PRN 9/23 due to weight gain) and intake of fluconazole    Hold all nephrotoxic drugs   Avoid hypotension   Gabapentin renally dosed  Some increase in creatinine 1.41 this AM, pt reports poor intake.  Hold torsemide and start gentle IVF  Trend BMP

## 2024-10-25 ENCOUNTER — TRANSITIONAL CARE MANAGEMENT (OUTPATIENT)
Dept: FAMILY MEDICINE CLINIC | Facility: CLINIC | Age: 89
End: 2024-10-25

## 2024-10-31 ENCOUNTER — OFFICE VISIT (OUTPATIENT)
Dept: FAMILY MEDICINE CLINIC | Facility: CLINIC | Age: 89
End: 2024-10-31
Payer: MEDICARE

## 2024-10-31 VITALS — WEIGHT: 154 LBS | BODY MASS INDEX: 28.17 KG/M2 | OXYGEN SATURATION: 99 %

## 2024-10-31 DIAGNOSIS — I10 PRIMARY HYPERTENSION: Primary | ICD-10-CM

## 2024-10-31 DIAGNOSIS — R30.0 DYSURIA: ICD-10-CM

## 2024-10-31 DIAGNOSIS — F03.90 DEMENTIA WITHOUT BEHAVIORAL DISTURBANCE, PSYCHOTIC DISTURBANCE, MOOD DISTURBANCE, OR ANXIETY, UNSPECIFIED DEMENTIA SEVERITY, UNSPECIFIED DEMENTIA TYPE (HCC): ICD-10-CM

## 2024-10-31 DIAGNOSIS — G62.9 NEUROPATHY: ICD-10-CM

## 2024-10-31 DIAGNOSIS — N17.9 AKI (ACUTE KIDNEY INJURY) (HCC): ICD-10-CM

## 2024-10-31 PROCEDURE — 99495 TRANSJ CARE MGMT MOD F2F 14D: CPT | Performed by: FAMILY MEDICINE

## 2024-10-31 RX ORDER — PHENAZOPYRIDINE HYDROCHLORIDE 200 MG/1
200 TABLET, FILM COATED ORAL
Qty: 60 TABLET | Refills: 3 | Status: SHIPPED | OUTPATIENT
Start: 2024-10-31

## 2024-10-31 RX ORDER — GABAPENTIN 300 MG/1
300 CAPSULE ORAL 2 TIMES DAILY
Qty: 180 CAPSULE | Refills: 3 | Status: SHIPPED | OUTPATIENT
Start: 2024-10-31

## 2024-10-31 NOTE — PROGRESS NOTES
Assessment/Plan:    No problem-specific Assessment & Plan notes found for this encounter.       Diagnoses and all orders for this visit:    Primary hypertension    Dementia without behavioral disturbance, psychotic disturbance, mood disturbance, or anxiety, unspecified dementia severity, unspecified dementia type (HCC)    SHERLYN (acute kidney injury) ([facility])    Neuropathy  -     gabapentin (NEURONTIN) 300 mg capsule; Take 1 capsule (300 mg total) by mouth 2 (two) times a day    Dysuria  -     phenazopyridine (PYRIDIUM) 200 mg tablet; Take 1 tablet (200 mg total) by mouth 3 (three) times a day with meals          Subjective:   Chief Complaint   Patient presents with    tcm        Patient ID: Janice Yung is a 89 y.o. female.    HPI    The following portions of the patient's history were reviewed and updated as appropriate: allergies, current medications, past family history, past medical history, past social history, past surgical history and problem list.    Review of Systems   Constitutional:  Negative for fatigue, fever and unexpected weight change.   HENT:  Negative for congestion, sinus pain and sore throat.    Eyes:  Negative for visual disturbance.   Respiratory:  Negative for shortness of breath and wheezing.    Cardiovascular:  Negative for chest pain and palpitations.   Gastrointestinal:  Negative for abdominal pain, nausea and vomiting.   Musculoskeletal: Negative.  Negative for arthralgias and myalgias.   Neurological:  Negative for syncope, weakness and numbness.   Psychiatric/Behavioral: Negative.  Negative for confusion, dysphoric mood and suicidal ideas.          Objective:  Vitals:    10/31/24 1438   SpO2: 99%   Weight: 69.9 kg (154 lb)      Physical Exam  Constitutional:       Appearance: She is well-developed.   HENT:      Right Ear: Ear canal normal. Tympanic membrane is not injected.      Left Ear: Ear canal normal. Tympanic membrane is not injected.      Nose: Nose normal.   Eyes:      General:          Right eye: No discharge.         Left eye: No discharge.      Conjunctiva/sclera: Conjunctivae normal.      Pupils: Pupils are equal, round, and reactive to light.   Neck:      Thyroid: No thyromegaly.   Cardiovascular:      Rate and Rhythm: Normal rate and regular rhythm.      Heart sounds: Normal heart sounds. No murmur heard.  Pulmonary:      Effort: Pulmonary effort is normal. No respiratory distress.      Breath sounds: Normal breath sounds. No wheezing.   Abdominal:      General: Bowel sounds are normal. There is no distension.      Palpations: Abdomen is soft.      Tenderness: There is no abdominal tenderness.   Musculoskeletal:         General: Normal range of motion.      Cervical back: Normal range of motion and neck supple.   Lymphadenopathy:      Cervical: No cervical adenopathy.   Skin:     General: Skin is warm and dry.   Neurological:      Mental Status: She is alert and oriented to person, place, and time. She is not disoriented.      Sensory: No sensory deficit.      Motor: No weakness.      Coordination: Coordination normal.      Gait: Gait normal.      Deep Tendon Reflexes: Reflexes are normal and symmetric.   Psychiatric:         Speech: Speech normal.         Behavior: Behavior normal.         Thought Content: Thought content normal.         Judgment: Judgment normal.

## 2025-01-01 ENCOUNTER — HOME CARE VISIT (OUTPATIENT)
Dept: HOME HEALTH SERVICES | Facility: HOME HEALTHCARE | Age: OVER 89
End: 2025-01-01
Payer: MEDICARE

## 2025-01-01 ENCOUNTER — RESULTS FOLLOW-UP (OUTPATIENT)
Dept: EMERGENCY DEPT | Facility: HOSPITAL | Age: OVER 89
End: 2025-01-01

## 2025-01-01 ENCOUNTER — HOME CARE VISIT (OUTPATIENT)
Dept: HOME HOSPICE | Facility: HOSPICE | Age: OVER 89
End: 2025-01-01
Payer: MEDICARE

## 2025-01-01 ENCOUNTER — RESULTS FOLLOW-UP (OUTPATIENT)
Dept: FAMILY MEDICINE CLINIC | Facility: CLINIC | Age: OVER 89
End: 2025-01-01

## 2025-01-01 ENCOUNTER — TELEPHONE (OUTPATIENT)
Dept: HOME HEALTH SERVICES | Facility: HOME HEALTHCARE | Age: OVER 89
End: 2025-01-01

## 2025-01-01 VITALS — HEART RATE: 80 BPM

## 2025-01-01 VITALS
RESPIRATION RATE: 16 BRPM | OXYGEN SATURATION: 94 % | SYSTOLIC BLOOD PRESSURE: 132 MMHG | HEART RATE: 78 BPM | TEMPERATURE: 97.7 F | DIASTOLIC BLOOD PRESSURE: 60 MMHG

## 2025-01-01 VITALS — RESPIRATION RATE: 15 BRPM

## 2025-01-01 VITALS — RESPIRATION RATE: 10 BRPM

## 2025-01-01 PROCEDURE — G0299 HHS/HOSPICE OF RN EA 15 MIN: HCPCS

## 2025-01-01 PROCEDURE — G0156 HHCP-SVS OF AIDE,EA 15 MIN: HCPCS

## 2025-01-01 PROCEDURE — G0155 HHCP-SVS OF CSW,EA 15 MIN: HCPCS

## 2025-01-01 PROCEDURE — G0300 HHS/HOSPICE OF LPN EA 15 MIN: HCPCS

## 2025-01-08 DIAGNOSIS — I10 PRIMARY HYPERTENSION: ICD-10-CM

## 2025-01-08 DIAGNOSIS — I50.33 ACUTE ON CHRONIC DIASTOLIC HEART FAILURE (HCC): ICD-10-CM

## 2025-01-08 RX ORDER — ATORVASTATIN CALCIUM 10 MG/1
10 TABLET, FILM COATED ORAL DAILY
Qty: 90 TABLET | Refills: 1 | Status: SHIPPED | OUTPATIENT
Start: 2025-01-08

## 2025-01-08 NOTE — TELEPHONE ENCOUNTER
Patient called to request a refill for their Atorvastatin advised a refill was requested on 01/08/2025 and is pending approval. Patient verbalized understanding and is in agreement.

## 2025-01-30 ENCOUNTER — TELEPHONE (OUTPATIENT)
Age: OVER 89
End: 2025-01-30

## 2025-01-30 DIAGNOSIS — R39.9 UTI SYMPTOMS: Primary | ICD-10-CM

## 2025-01-30 DIAGNOSIS — N30.00 ACUTE CYSTITIS WITHOUT HEMATURIA: ICD-10-CM

## 2025-01-30 RX ORDER — NITROFURANTOIN 25; 75 MG/1; MG/1
100 CAPSULE ORAL 2 TIMES DAILY
Qty: 14 CAPSULE | Refills: 2 | Status: SHIPPED | OUTPATIENT
Start: 2025-01-30

## 2025-01-30 NOTE — TELEPHONE ENCOUNTER
daughter aware, labs ordered and that medication was sent and to start. Daughter states she has been taking the macrobid daily for preventative.

## 2025-01-30 NOTE — TELEPHONE ENCOUNTER
Patient's daughter called stating patient has UTI and would like to know if urine test can be ordered.  Please advise and contact daughter Deyanira.

## 2025-01-31 ENCOUNTER — APPOINTMENT (OUTPATIENT)
Dept: LAB | Facility: HOSPITAL | Age: OVER 89
End: 2025-01-31
Payer: MEDICARE

## 2025-01-31 DIAGNOSIS — R39.9 UTI SYMPTOMS: ICD-10-CM

## 2025-01-31 LAB
BACTERIA UR QL AUTO: ABNORMAL /HPF
BACTERIA UR QL AUTO: ABNORMAL /HPF
BILIRUB UR QL STRIP: NEGATIVE
BILIRUB UR QL STRIP: NEGATIVE
CLARITY UR: ABNORMAL
CLARITY UR: ABNORMAL
COLOR UR: ABNORMAL
COLOR UR: ABNORMAL
GLUCOSE UR STRIP-MCNC: ABNORMAL MG/DL
GLUCOSE UR STRIP-MCNC: ABNORMAL MG/DL
HGB UR QL STRIP.AUTO: ABNORMAL
HGB UR QL STRIP.AUTO: ABNORMAL
KETONES UR STRIP-MCNC: NEGATIVE MG/DL
KETONES UR STRIP-MCNC: NEGATIVE MG/DL
LEUKOCYTE ESTERASE UR QL STRIP: ABNORMAL
LEUKOCYTE ESTERASE UR QL STRIP: ABNORMAL
MUCOUS THREADS UR QL AUTO: ABNORMAL
MUCOUS THREADS UR QL AUTO: ABNORMAL
NITRITE UR QL STRIP: NEGATIVE
NITRITE UR QL STRIP: NEGATIVE
NON-SQ EPI CELLS URNS QL MICRO: ABNORMAL /HPF
NON-SQ EPI CELLS URNS QL MICRO: ABNORMAL /HPF
PH UR STRIP.AUTO: 5.5 [PH]
PH UR STRIP.AUTO: 5.5 [PH]
PROT UR STRIP-MCNC: ABNORMAL MG/DL
PROT UR STRIP-MCNC: ABNORMAL MG/DL
RBC #/AREA URNS AUTO: ABNORMAL /HPF
RBC #/AREA URNS AUTO: ABNORMAL /HPF
SP GR UR STRIP.AUTO: 1.01 (ref 1–1.03)
SP GR UR STRIP.AUTO: 1.01 (ref 1–1.03)
UROBILINOGEN UR STRIP-ACNC: <2 MG/DL
UROBILINOGEN UR STRIP-ACNC: <2 MG/DL
WBC #/AREA URNS AUTO: ABNORMAL /HPF
WBC #/AREA URNS AUTO: ABNORMAL /HPF

## 2025-01-31 PROCEDURE — 87077 CULTURE AEROBIC IDENTIFY: CPT

## 2025-01-31 PROCEDURE — 87181 SC STD AGAR DILUTION PER AGT: CPT

## 2025-01-31 PROCEDURE — 81001 URINALYSIS AUTO W/SCOPE: CPT

## 2025-01-31 PROCEDURE — 87086 URINE CULTURE/COLONY COUNT: CPT

## 2025-02-02 LAB
BACTERIA UR CULT: ABNORMAL
BACTERIA UR CULT: ABNORMAL

## 2025-02-03 NOTE — TELEPHONE ENCOUNTER
Daughter called and advised that script was called in. Daughter Brianna verbalized understanding.

## 2025-02-03 NOTE — TELEPHONE ENCOUNTER
Pt daughter called requesting urine results and recommendations.         Please review and advise.  Thank you

## 2025-02-04 DIAGNOSIS — K21.9 GASTROESOPHAGEAL REFLUX DISEASE: ICD-10-CM

## 2025-02-04 LAB
BACTERIA UR CULT: ABNORMAL
BACTERIA UR CULT: ABNORMAL

## 2025-02-10 ENCOUNTER — APPOINTMENT (INPATIENT)
Dept: CT IMAGING | Facility: HOSPITAL | Age: OVER 89
DRG: 690 | End: 2025-02-10
Payer: MEDICARE

## 2025-02-10 ENCOUNTER — TELEPHONE (OUTPATIENT)
Age: OVER 89
End: 2025-02-10

## 2025-02-10 ENCOUNTER — APPOINTMENT (EMERGENCY)
Dept: CT IMAGING | Facility: HOSPITAL | Age: OVER 89
DRG: 690 | End: 2025-02-10
Payer: MEDICARE

## 2025-02-10 ENCOUNTER — TELEPHONE (OUTPATIENT)
Dept: OTHER | Facility: HOSPITAL | Age: OVER 89
End: 2025-02-10

## 2025-02-10 ENCOUNTER — HOSPITAL ENCOUNTER (INPATIENT)
Facility: HOSPITAL | Age: OVER 89
LOS: 2 days | Discharge: HOME | DRG: 690 | End: 2025-02-12
Attending: EMERGENCY MEDICINE | Admitting: INTERNAL MEDICINE
Payer: MEDICARE

## 2025-02-10 DIAGNOSIS — R39.9 UTI SYMPTOMS: Primary | ICD-10-CM

## 2025-02-10 DIAGNOSIS — N30.01 ACUTE CYSTITIS WITH HEMATURIA: ICD-10-CM

## 2025-02-10 DIAGNOSIS — F03.90 DEMENTIA WITHOUT BEHAVIORAL DISTURBANCE, PSYCHOTIC DISTURBANCE, MOOD DISTURBANCE, OR ANXIETY, UNSPECIFIED DEMENTIA SEVERITY, UNSPECIFIED DEMENTIA TYPE (HCC): ICD-10-CM

## 2025-02-10 DIAGNOSIS — E86.0 DEHYDRATION: ICD-10-CM

## 2025-02-10 DIAGNOSIS — N32.89 BLADDER MASS: ICD-10-CM

## 2025-02-10 DIAGNOSIS — R39.9 UTI SYMPTOMS: ICD-10-CM

## 2025-02-10 DIAGNOSIS — N17.9 AKI (ACUTE KIDNEY INJURY) (HCC): ICD-10-CM

## 2025-02-10 DIAGNOSIS — R91.1 PULMONARY NODULE: ICD-10-CM

## 2025-02-10 DIAGNOSIS — N12 PYELONEPHRITIS: Primary | ICD-10-CM

## 2025-02-10 DIAGNOSIS — N39.0 UTI (URINARY TRACT INFECTION): ICD-10-CM

## 2025-02-10 DIAGNOSIS — N30.90 CYSTITIS: ICD-10-CM

## 2025-02-10 PROBLEM — N18.30 CHRONIC RENAL DISEASE, STAGE III (HCC): Status: ACTIVE | Noted: 2024-10-19

## 2025-02-10 PROBLEM — N18.9 ACUTE KIDNEY INJURY SUPERIMPOSED ON CHRONIC KIDNEY DISEASE  (HCC): Status: ACTIVE | Noted: 2024-10-19

## 2025-02-10 LAB
ALBUMIN SERPL BCG-MCNC: 3.9 G/DL (ref 3.5–5)
ALP SERPL-CCNC: 54 U/L (ref 34–104)
ALT SERPL W P-5'-P-CCNC: 9 U/L (ref 7–52)
AMORPH URATE CRY URNS QL MICRO: ABNORMAL
ANION GAP SERPL CALCULATED.3IONS-SCNC: 7 MMOL/L (ref 4–13)
AST SERPL W P-5'-P-CCNC: 13 U/L (ref 13–39)
BACTERIA UR QL AUTO: ABNORMAL /HPF
BASOPHILS # BLD AUTO: 0.05 THOUSANDS/ÂΜL (ref 0–0.1)
BASOPHILS NFR BLD AUTO: 0 % (ref 0–1)
BILIRUB SERPL-MCNC: 0.59 MG/DL (ref 0.2–1)
BILIRUB UR QL STRIP: ABNORMAL
BUN SERPL-MCNC: 35 MG/DL (ref 5–25)
CALCIUM SERPL-MCNC: 9.6 MG/DL (ref 8.4–10.2)
CHLORIDE SERPL-SCNC: 107 MMOL/L (ref 96–108)
CLARITY UR: ABNORMAL
CO2 SERPL-SCNC: 25 MMOL/L (ref 21–32)
COLOR UR: ABNORMAL
CREAT SERPL-MCNC: 1.46 MG/DL (ref 0.6–1.3)
EOSINOPHIL # BLD AUTO: 0.07 THOUSAND/ÂΜL (ref 0–0.61)
EOSINOPHIL NFR BLD AUTO: 1 % (ref 0–6)
ERYTHROCYTE [DISTWIDTH] IN BLOOD BY AUTOMATED COUNT: 13.2 % (ref 11.6–15.1)
FLUAV RNA RESP QL NAA+PROBE: NEGATIVE
FLUBV RNA RESP QL NAA+PROBE: NEGATIVE
GFR SERPL CREATININE-BSD FRML MDRD: 30 ML/MIN/1.73SQ M
GLUCOSE SERPL-MCNC: 99 MG/DL (ref 65–140)
GLUCOSE UR STRIP-MCNC: ABNORMAL MG/DL
HCT VFR BLD AUTO: 36.4 % (ref 34.8–46.1)
HGB BLD-MCNC: 10.8 G/DL (ref 11.5–15.4)
HGB UR QL STRIP.AUTO: ABNORMAL
IMM GRANULOCYTES # BLD AUTO: 0.13 THOUSAND/UL (ref 0–0.2)
IMM GRANULOCYTES NFR BLD AUTO: 1 % (ref 0–2)
KETONES UR STRIP-MCNC: NEGATIVE MG/DL
LEUKOCYTE ESTERASE UR QL STRIP: ABNORMAL
LYMPHOCYTES # BLD AUTO: 2.03 THOUSANDS/ÂΜL (ref 0.6–4.47)
LYMPHOCYTES NFR BLD AUTO: 15 % (ref 14–44)
MCH RBC QN AUTO: 30.5 PG (ref 26.8–34.3)
MCHC RBC AUTO-ENTMCNC: 29.7 G/DL (ref 31.4–37.4)
MCV RBC AUTO: 103 FL (ref 82–98)
MONOCYTES # BLD AUTO: 0.79 THOUSAND/ÂΜL (ref 0.17–1.22)
MONOCYTES NFR BLD AUTO: 6 % (ref 4–12)
NEUTROPHILS # BLD AUTO: 10.25 THOUSANDS/ÂΜL (ref 1.85–7.62)
NEUTS SEG NFR BLD AUTO: 77 % (ref 43–75)
NITRITE UR QL STRIP: POSITIVE
NON-SQ EPI CELLS URNS QL MICRO: ABNORMAL /HPF
NRBC BLD AUTO-RTO: 0 /100 WBCS
OTHER STN SPEC: ABNORMAL
PH UR STRIP.AUTO: 5.5 [PH]
PLATELET # BLD AUTO: 238 THOUSANDS/UL (ref 149–390)
PMV BLD AUTO: 8.8 FL (ref 8.9–12.7)
POTASSIUM SERPL-SCNC: 5 MMOL/L (ref 3.5–5.3)
PROT SERPL-MCNC: 7.2 G/DL (ref 6.4–8.4)
PROT UR STRIP-MCNC: ABNORMAL MG/DL
RBC # BLD AUTO: 3.54 MILLION/UL (ref 3.81–5.12)
RBC #/AREA URNS AUTO: ABNORMAL /HPF
RSV RNA RESP QL NAA+PROBE: NEGATIVE
SARS-COV-2 RNA RESP QL NAA+PROBE: NEGATIVE
SODIUM SERPL-SCNC: 139 MMOL/L (ref 135–147)
SP GR UR STRIP.AUTO: 1.02 (ref 1–1.03)
UROBILINOGEN UR STRIP-ACNC: 2 MG/DL
WBC # BLD AUTO: 13.32 THOUSAND/UL (ref 4.31–10.16)
WBC #/AREA URNS AUTO: ABNORMAL /HPF

## 2025-02-10 PROCEDURE — 80053 COMPREHEN METABOLIC PANEL: CPT | Performed by: EMERGENCY MEDICINE

## 2025-02-10 PROCEDURE — 71250 CT THORAX DX C-: CPT

## 2025-02-10 PROCEDURE — 96360 HYDRATION IV INFUSION INIT: CPT

## 2025-02-10 PROCEDURE — 99223 1ST HOSP IP/OBS HIGH 75: CPT | Performed by: INTERNAL MEDICINE

## 2025-02-10 PROCEDURE — 81001 URINALYSIS AUTO W/SCOPE: CPT | Performed by: EMERGENCY MEDICINE

## 2025-02-10 PROCEDURE — 87086 URINE CULTURE/COLONY COUNT: CPT | Performed by: EMERGENCY MEDICINE

## 2025-02-10 PROCEDURE — 99285 EMERGENCY DEPT VISIT HI MDM: CPT | Performed by: EMERGENCY MEDICINE

## 2025-02-10 PROCEDURE — 74176 CT ABD & PELVIS W/O CONTRAST: CPT

## 2025-02-10 PROCEDURE — 0241U HB NFCT DS VIR RESP RNA 4 TRGT: CPT | Performed by: INTERNAL MEDICINE

## 2025-02-10 PROCEDURE — 36415 COLL VENOUS BLD VENIPUNCTURE: CPT | Performed by: EMERGENCY MEDICINE

## 2025-02-10 PROCEDURE — 87106 FUNGI IDENTIFICATION YEAST: CPT | Performed by: EMERGENCY MEDICINE

## 2025-02-10 PROCEDURE — 94664 DEMO&/EVAL PT USE INHALER: CPT

## 2025-02-10 PROCEDURE — 85025 COMPLETE CBC W/AUTO DIFF WBC: CPT | Performed by: EMERGENCY MEDICINE

## 2025-02-10 PROCEDURE — 99284 EMERGENCY DEPT VISIT MOD MDM: CPT

## 2025-02-10 PROCEDURE — 96361 HYDRATE IV INFUSION ADD-ON: CPT

## 2025-02-10 RX ORDER — PANTOPRAZOLE SODIUM 40 MG/1
40 TABLET, DELAYED RELEASE ORAL
Status: DISCONTINUED | OUTPATIENT
Start: 2025-02-11 | End: 2025-02-12 | Stop reason: HOSPADM

## 2025-02-10 RX ORDER — CLOTRIMAZOLE 1 %
CREAM (GRAM) TOPICAL 2 TIMES DAILY
Status: DISCONTINUED | OUTPATIENT
Start: 2025-02-10 | End: 2025-02-12 | Stop reason: ALTCHOICE

## 2025-02-10 RX ORDER — ACETAMINOPHEN 325 MG/1
650 TABLET ORAL EVERY 6 HOURS PRN
Status: DISCONTINUED | OUTPATIENT
Start: 2025-02-10 | End: 2025-02-12 | Stop reason: HOSPADM

## 2025-02-10 RX ORDER — ATORVASTATIN CALCIUM 10 MG/1
10 TABLET, FILM COATED ORAL DAILY
Status: DISCONTINUED | OUTPATIENT
Start: 2025-02-11 | End: 2025-02-12 | Stop reason: HOSPADM

## 2025-02-10 RX ORDER — CEFTRIAXONE 1 G/50ML
1000 INJECTION, SOLUTION INTRAVENOUS ONCE
Status: COMPLETED | OUTPATIENT
Start: 2025-02-10 | End: 2025-02-10

## 2025-02-10 RX ORDER — ACETAMINOPHEN 10 MG/ML
1000 INJECTION, SOLUTION INTRAVENOUS ONCE
Status: COMPLETED | OUTPATIENT
Start: 2025-02-10 | End: 2025-02-10

## 2025-02-10 RX ORDER — SODIUM CHLORIDE 9 MG/ML
75 INJECTION, SOLUTION INTRAVENOUS CONTINUOUS
Status: DISCONTINUED | OUTPATIENT
Start: 2025-02-10 | End: 2025-02-11

## 2025-02-10 RX ORDER — CEFTRIAXONE 1 G/50ML
1000 INJECTION, SOLUTION INTRAVENOUS EVERY 24 HOURS
Status: DISCONTINUED | OUTPATIENT
Start: 2025-02-11 | End: 2025-02-12

## 2025-02-10 RX ORDER — ENOXAPARIN SODIUM 100 MG/ML
30 INJECTION SUBCUTANEOUS DAILY
Status: DISCONTINUED | OUTPATIENT
Start: 2025-02-11 | End: 2025-02-12 | Stop reason: HOSPADM

## 2025-02-10 RX ORDER — ASPIRIN 81 MG/1
81 TABLET ORAL DAILY
Status: DISCONTINUED | OUTPATIENT
Start: 2025-02-11 | End: 2025-02-12 | Stop reason: HOSPADM

## 2025-02-10 RX ORDER — LIDOCAINE HYDROCHLORIDE 20 MG/ML
1 JELLY TOPICAL ONCE
Status: COMPLETED | OUTPATIENT
Start: 2025-02-10 | End: 2025-02-10

## 2025-02-10 RX ORDER — SODIUM CHLORIDE 9 MG/ML
75 INJECTION, SOLUTION INTRAVENOUS CONTINUOUS
Status: DISCONTINUED | OUTPATIENT
Start: 2025-02-10 | End: 2025-02-10

## 2025-02-10 RX ORDER — GABAPENTIN 300 MG/1
300 CAPSULE ORAL 2 TIMES DAILY
Status: DISCONTINUED | OUTPATIENT
Start: 2025-02-10 | End: 2025-02-12 | Stop reason: HOSPADM

## 2025-02-10 RX ORDER — CIPROFLOXACIN 250 MG/1
250 TABLET, FILM COATED ORAL EVERY 12 HOURS SCHEDULED
Qty: 14 TABLET | Refills: 1 | Status: ON HOLD | OUTPATIENT
Start: 2025-02-10 | End: 2025-02-12

## 2025-02-10 RX ORDER — ONDANSETRON 2 MG/ML
4 INJECTION INTRAMUSCULAR; INTRAVENOUS EVERY 6 HOURS PRN
Status: DISCONTINUED | OUTPATIENT
Start: 2025-02-10 | End: 2025-02-10

## 2025-02-10 RX ADMIN — GABAPENTIN 300 MG: 300 CAPSULE ORAL at 22:01

## 2025-02-10 RX ADMIN — ACETAMINOPHEN 1000 MG: 10 INJECTION INTRAVENOUS at 17:54

## 2025-02-10 RX ADMIN — CLOTRIMAZOLE 1 APPLICATION: 10 CREAM TOPICAL at 21:33

## 2025-02-10 RX ADMIN — SODIUM CHLORIDE 75 ML/HR: 0.9 INJECTION, SOLUTION INTRAVENOUS at 18:10

## 2025-02-10 RX ADMIN — SODIUM CHLORIDE 1000 ML: 0.9 INJECTION, SOLUTION INTRAVENOUS at 11:33

## 2025-02-10 RX ADMIN — LIDOCAINE HYDROCHLORIDE 1 APPLICATION: 20 JELLY TOPICAL at 20:10

## 2025-02-10 RX ADMIN — CEFTRIAXONE 1000 MG: 1 INJECTION, SOLUTION INTRAVENOUS at 16:18

## 2025-02-10 NOTE — ED NOTES
Patient resting comfortably - provided warm blanket and nourishment offered to family at bedside      Cherelle Norman RN  02/10/25 [id number]

## 2025-02-10 NOTE — ASSESSMENT & PLAN NOTE
Lab Results   Component Value Date    EGFR 30 02/10/2025    EGFR 41 10/24/2024    EGFR 31 10/23/2024    CREATININE 1.46 (H) 02/10/2025    CREATININE 1.14 10/24/2024    CREATININE 1.41 (H) 10/23/2024   Has history of CKD stage III  Admitted with creatinine of 1.46  Gentle IV fluids till a.m.  Avoid hypotension/nephrotoxic medication

## 2025-02-10 NOTE — CONSULTS
Consultation - Urology  Name: Janice Yung 89 y.o. female I MRN: [id number]  Unit/Bed#: Z2 H2 I Date of Admission: 2/10/2025   Date of Service: 2/10/2025 I Hospital Day: 0   Inpatient consult to Urology  Consult performed by: Cherelle Talbert PA-C  Consult ordered by: Cheng Lane MD        Physician Requesting Evaluation: Cheng Lane MD   Reason for Evaluation / Principal Problem: UTI, Cystitis, bladder mass     Assessment & Plan  Cystitis    Pt is 89 y/o F pmhx recurrent UTIs, diastolic heart failure, paroxysmal A-fib, and pulmonary nodules who presented to [facility] ED for concerns of generalized weakness x 1 month and dysuria x 1 week. Urology consulted for Cystitis and CT A/P findings of a bladder mass.     Afebrile, VSS   WBC 13.32   Cr 1.46 (Baseline (1.15-1.25)  UA positive for nitrates and large leukocytes, small amount of occult blood     Plan:   Cont IVF, IV abx for treatment of urinary tract infection   Trend Cr, avoid nephrotoxic agents.   UA culture trending, follow. Tailor abx to culture results.     Bladder mass    - CT A/P - Asymmetric bladder wall thickening along the left lateral and posterior wall, which may be due to underlying malignancy given pulmonary findings. Bilateral perinephric inflammatory stranding, increased from prior. Superimposed cystitis and possible pyelonephritis not excluded. Left nonobstructing nephrolithiasis.     Recommend poss outpt discussion and cysto for further evaluation.   Goals of care discussion prior to any procedure.     Chronic renal disease, stage III (HCC)  Lab Results   Component Value Date    EGFR 30 02/10/2025    EGFR 41 10/24/2024    EGFR 31 10/23/2024    CREATININE 1.46 (H) 02/10/2025    CREATININE 1.14 10/24/2024    CREATININE 1.41 (H) 10/23/2024     Cr 1.46 (baseline 1.15-1.25), trend   Gentle IVF resuscitation   Avoid hypotension, nephrotoxic medication   Generalized weakness  Per daughter pt with hx of increased generalized weakness, lethargy,  and loss of appetite over the past month.   Pt with 6 lb weight loss within 2 weeks.   Chronic diastolic heart failure (HCC)  Wt Readings from Last 3 Encounters:   10/31/24 69.9 kg (154 lb)   10/24/24 70 kg (154 lb 5.2 oz)   10/09/24 68.9 kg (152 lb)     Not in acute exacerbation   Appreciate SLIM management     Urology service will follow.    History of Present Illness   Janice Yung is a 89 y.o. female with pmhx recurrent UTIs, diastolic heart failure, paroxysmal A-fib, and pulmonary nodules who presented to  ED for concerns of generalized weakness x 1 month and dysuria x 1 week. Urology consulted for Cystitis and CT A/P findings of a bladder mass. Per daughter, pt recently completed a 7 day course of Augmentin for a UA culture positive for streptococcus salivarius. Patient has also been having increased generalized weakness, lethargy, and weight loss over the past month. Daughter admits to increased confusion and unsteady gait over the past 2 weeks. Pt admits to dysuria, increased urgency and frequency x1 week. Denies hematuria. Denies headaches, fever, chills, SOB, chest pain, flank pain or other changes in her bladder or bowel habits not previously mentioned. Last BM was yesterday. Denies further questions or concerns.       Review of Systems   Constitutional:  Positive for activity change and appetite change. Negative for chills and fever.   Respiratory:  Negative for chest tightness, shortness of breath and wheezing.    Cardiovascular:  Negative for chest pain.   Gastrointestinal:  Positive for abdominal pain. Negative for diarrhea, nausea and vomiting.   Genitourinary:  Positive for dysuria and urgency. Negative for difficulty urinating, flank pain and hematuria.   Musculoskeletal:  Positive for gait problem.   Neurological:  Positive for weakness. Negative for dizziness.   Psychiatric/Behavioral:  Positive for confusion.    All other systems reviewed and are negative.    I have reviewed the patient's  PMH, PSH, Social History, Family History, Meds, and Allergies    Objective :  Temp:  [97.8 °F (36.6 °C)-98 °F (36.7 °C)] 97.8 °F (36.6 °C)  HR:  [68-72] 72  BP: (124-146)/(61-67) 124/61  Resp:  [18-20] 20  SpO2:  [95 %-96 %] 95 %  O2 Device: None (Room air)      Physical Exam  Vitals and nursing note reviewed.   HENT:      Head: Normocephalic and atraumatic.      Right Ear: External ear normal.      Left Ear: External ear normal.      Nose: Nose normal.      Mouth/Throat:      Mouth: Mucous membranes are dry.   Eyes:      Pupils: Pupils are equal, round, and reactive to light.   Cardiovascular:      Rate and Rhythm: Normal rate and regular rhythm.   Pulmonary:      Effort: Pulmonary effort is normal. No respiratory distress.      Breath sounds: Normal breath sounds.   Abdominal:      General: Abdomen is flat. Bowel sounds are normal. There is no distension.      Palpations: Abdomen is soft.      Tenderness: There is abdominal tenderness in the suprapubic area. There is no right CVA tenderness, left CVA tenderness, guarding or rebound.   Skin:     General: Skin is warm and dry.      Coloration: Skin is pale.   Neurological:      Mental Status: She is alert. Mental status is at baseline.   Psychiatric:         Mood and Affect: Mood normal.         Behavior: Behavior normal.         Thought Content: Thought content normal.         Judgment: Judgment normal.         Lab Results: I have reviewed the following results:  Recent Labs     02/10/25  1135   WBC 13.32*   HGB 10.8*   HCT 36.4      SODIUM 139   K 5.0      CO2 25   BUN 35*   CREATININE 1.46*   GLUC 99   AST 13   ALT 9   ALB 3.9   TBILI 0.59   ALKPHOS 54       Imaging Results Review: I personally reviewed the following image studies in PACS and associated radiology reports: CT chest and CT abdomen/pelvis. My interpretation of the radiology images/reports is:  .  Other Study Results Review: No additional pertinent studies reviewed.    VTE Pharmacologic  Prophylaxis: Heparin  VTE Mechanical Prophylaxis: sequential compression device and foot pump applied    Cherelle Talbert PA-C

## 2025-02-10 NOTE — ASSESSMENT & PLAN NOTE
Wt Readings from Last 3 Encounters:   10/31/24 69.9 kg (154 lb)   10/24/24 70 kg (154 lb 5.2 oz)   10/09/24 68.9 kg (152 lb)     Patient has history of chronic diastolic heart failure  Not in acute exacerbation  Hold torsemide  Reassess in a.m. to restart

## 2025-02-10 NOTE — ASSESSMENT & PLAN NOTE
"Patient presented with cystitis as evidenced by positive UA, suprapubic tenderness.  Denies any CVA tenderness  CT abdomen pelvis showed-\"-Asymmetric bladder wall thickening along the left lateral and posterior wall, which may be due to underlying malignancy given pulmonary findings. Bilateral perinephric inflammatory stranding, increased from prior. Superimposed cystitis and possible   pyelonephritis not excluded. \"  Urology consult  Continue on Rocephin  Will order COVID/flu for completion  Follow-up urine culture results  "

## 2025-02-10 NOTE — ED NOTES
Patient unable to use bedside commode in ER, unable to complete orders, patient in is hallway bed and requires wheelchair to bathroom by staff.      Gricel Merchant RN  02/10/25 [id number]

## 2025-02-10 NOTE — ASSESSMENT & PLAN NOTE
"CT abdomen pelvis showed \"Increased size of pulmonary nodules measuring up to 2.8 cm within the visualized lungs compared to 10/19/2024, suspicious for metastases. Correlate with dedicated CT chest for complete evaluation as clinically indicated.  ER ordered CT chest to further evaluate  "

## 2025-02-10 NOTE — ASSESSMENT & PLAN NOTE
Hold torsemide and spironolactone in setting of elevated creatinine  Gentle IV hydration and reassess in a.m. to restart

## 2025-02-10 NOTE — ED PROVIDER NOTES
Time reflects when diagnosis was documented in both MDM as applicable and the Disposition within this note       Time User Action Codes Description Comment    2/10/2025  1:16 PM Ferdous, Komaira Add [N39.0] UTI (urinary tract infection)     2/10/2025  1:16 PM Ferdous, Komaira Add [R53.1] Generalized weakness     2/10/2025  4:13 PM Ferdous, Komaira Modify [R53.1] Generalized weakness     2/10/2025  4:13 PM Ferdous, Komaira Remove [N39.0] UTI (urinary tract infection)     2/10/2025  4:13 PM Ferdous, Komaira Remove [R53.1] Generalized weakness     2/10/2025  4:14 PM Ferdous, Komaira Add [N12] Pyelonephritis     2/10/2025  4:14 PM Ferdous, Komaira Add [N39.0] UTI (urinary tract infection)     2/10/2025  4:14 PM Ferdous, Komaira Add [N30.90] Cystitis     2/10/2025  4:14 PM Ferdous, Komaira Add [E86.0] Dehydration     2/10/2025  4:14 PM Ferdous, Komaira Add [N17.9] SHERLYN (acute kidney injury) ([facility])     2/10/2025  4:14 PM Ferdous, Komaira Add [N32.89] Bladder mass     2/10/2025  4:14 PM Ferdous, Komaira Add [R91.1] Pulmonary nodule           ED Disposition       ED Disposition   Admit    Condition   Stable    Date/Time   Mon Feb 10, 2025  4:14 PM    Comment   Case was discussed with Dr. John and the patient's admission status was agreed to be Admission Status: inpatient status to the service of Dr. John.               Assessment & Plan       Medical Decision Making  Obtain blood work, UA, CT abdomen/pelvis  If IV fluids and continue to monitor patient for any worsening symptoms    Patient is UA showed concern for UTI.  CT scan showed concern for possible bladder mass, cystitis as well as pyelonephritis.  Antibiotics started based on previous urine culture growth and sensitivities.  CT scan also showed a worsening pulmonary nodule suggesting cancer.  CT chest is ordered that inpatient team will follow-up with.  I spoke with daughter and daughter states that she is aware of the pulmonary nodule and at this time  patient does not want anything done.  Daughter is requesting for the infection to be treated.  Blood work also shows concern for dehydration.  IV fluid hydration started in the ED.  At this time patient will be admitted for further evaluation and management.  Patient and family agrees with admission plans.    Amount and/or Complexity of Data Reviewed  Labs: ordered. Decision-making details documented in ED Course.  Radiology: ordered.    Risk  Prescription drug management.  Decision regarding hospitalization.        ED Course as of 02/10/25 1629   Mon Feb 10, 2025   1615 Leukocytes, UA(!): Large   1615 Nitrite, UA(!): Positive   1615 WBC, UA(!): Innumerable   1615 Bacteria, UA(!): Innumerable  Urine culture from 1/31/2025 grew Streptococcus salivarius Abnormal susceptible to cephalosporins. Patient placed on Rocephin.       Medications   cefTRIAXone (ROCEPHIN) IVPB (premix in dextrose) 1,000 mg 50 mL (1,000 mg Intravenous New Bag 2/10/25 1618)   lidocaine (URO-JET) 2 % urethral/mucosal gel 1 Application (has no administration in time range)   acetaminophen (Ofirmev) injection 1,000 mg (has no administration in time range)   sodium chloride 0.9 % bolus 1,000 mL (1,000 mL Intravenous New Bag 2/10/25 1133)       ED Risk Strat Scores                          SBIRT 20yo+      Flowsheet Row Most Recent Value   Initial Alcohol Screen: US AUDIT-C     1. How often do you have a drink containing alcohol? 0 Filed at: 02/10/2025 1122   2. How many drinks containing alcohol do you have on a typical day you are drinking?  0 Filed at: 02/10/2025 1122   3a. Male UNDER 65: How often do you have five or more drinks on one occasion? 0 Filed at: 02/10/2025 1122   3b. FEMALE Any Age, or MALE 65+: How often do you have 4 or more drinks on one occassion? 0 Filed at: 02/10/2025 1122   Audit-C Score 0 Filed at: 02/10/2025 1122   KRISTY: How many times in the past year have you...    Used an illegal drug or used a prescription medication for  non-medical reasons? Never Filed at: 02/10/2025 1122                            History of Present Illness       Chief Complaint   Patient presents with    Possible UTI     Patient presents to the ED with c/o burning with urination, had UTI and completed dose of abx yesterday.        Past Medical History:   Diagnosis Date    SHERLYN (acute kidney injury) ([facility]) 05/08/2022    Balance disorder     CHF (congestive heart failure) ([facility])     Chronic pain     GERD (gastroesophageal reflux disease)     Hard of hearing     Hyperlipidemia     Hypertension     Hyponatremia 12/05/2020    Low serum cortisol level 12/07/2020    Neuropathy     Pneumonia     Pneumonia due to COVID-19 virus 12/05/2020    Sepsis ([facility]) 05/22/2018    Tinnitus     Unspecified adrenocortical insufficiency ([facility]) 02/28/2023    Felt to be related to COVID-19 infection.  No current symptoms.    UTI (urinary tract infection)       Past Surgical History:   Procedure Laterality Date    EYE SURGERY      HYSTERECTOMY      TONSILLECTOMY        Family History   Problem Relation Age of Onset    Stroke Mother     Stroke Father     Heart disease Daughter     Substance Abuse Neg Hx     Mental illness Neg Hx       Social History     Tobacco Use    Smoking status: Never     Passive exposure: Never    Smokeless tobacco: Never   Vaping Use    Vaping status: Never Used   Substance Use Topics    Alcohol use: Not Currently     Comment: rarely    Drug use: No      E-Cigarette/Vaping    E-Cigarette Use Never User       E-Cigarette/Vaping Substances    Nicotine No     THC No     CBD No     Flavoring No     Other No     Unknown No       I have reviewed and agree with the history as documented.     89-year-old female with past history of hard of hearing, neuropathy, hyperlipidemia, hypertension, GERD, recurrent UTI, SHERLYN, hyponatremia, presents to the ED for evaluation of ongoing dysuria and weakness over the past few weeks.  Patient was recently treated for UTI with antibiotics.   Patient completed antibiotics course.  Patient felt so weak today that she could get out of bed.  Patient lives with her daughter.  Subsequently daughter brought patient to the ED for further evaluation.  Patient states that every time she urinates, she continues to have dysuria and lower abdominal pressure.  Patient also complains of having chills.      History provided by:  Patient      Review of Systems   Constitutional:  Positive for chills. Negative for fever.   HENT:  Negative for ear pain and sore throat.    Eyes:  Negative for pain and visual disturbance.   Respiratory:  Negative for cough and shortness of breath.    Cardiovascular:  Negative for chest pain and palpitations.   Gastrointestinal:  Positive for abdominal pain. Negative for vomiting.   Genitourinary:  Positive for dysuria. Negative for hematuria.   Musculoskeletal:  Negative for arthralgias and back pain.   Skin:  Negative for color change and rash.   Neurological:  Negative for seizures and syncope.   All other systems reviewed and are negative.          Objective       ED Triage Vitals   Temperature Pulse Blood Pressure Respirations SpO2 Patient Position - Orthostatic VS   02/10/25 1119 02/10/25 1119 02/10/25 1119 02/10/25 1119 02/10/25 1121 02/10/25 1119   98 °F (36.7 °C) 68 146/67 18 96 % Sitting      Temp Source Heart Rate Source BP Location FiO2 (%) Pain Score    02/10/25 1119 02/10/25 1119 02/10/25 1119 -- 02/10/25 1119    Oral Monitor Right arm  No Pain      Vitals      Date and Time Temp Pulse SpO2 Resp BP Pain Score FACES Pain Rating User   02/10/25 1522 97.8 °F (36.6 °C) 72 95 % 20 124/61 -- -- BM   02/10/25 1121 -- -- 96 % -- -- -- -- JW   02/10/25 1119 98 °F (36.7 °C) 68 -- 18 146/67 No Pain -- JW            Physical Exam  Vitals and nursing note reviewed.   Constitutional:       General: She is not in acute distress.     Appearance: She is well-developed.   HENT:      Head: Normocephalic and atraumatic.   Eyes:       Conjunctiva/sclera: Conjunctivae normal.   Cardiovascular:      Rate and Rhythm: Normal rate and regular rhythm.      Heart sounds: No murmur heard.  Pulmonary:      Effort: Pulmonary effort is normal. No respiratory distress.      Breath sounds: Normal breath sounds.   Abdominal:      General: Abdomen is flat. Bowel sounds are normal. There is no distension.      Palpations: Abdomen is soft.      Tenderness: There is abdominal tenderness.      Comments: Abdomen is soft, nondistended, with bowel sound present all 4 quadrants.  Generalized discomfort noted to lower abdomen to palpation   Musculoskeletal:         General: No swelling.      Cervical back: Neck supple.   Skin:     General: Skin is warm and dry.      Capillary Refill: Capillary refill takes less than 2 seconds.   Neurological:      Mental Status: She is alert.   Psychiatric:         Mood and Affect: Mood normal.         Results Reviewed       Procedure Component Value Units Date/Time    COVID19, Influenza A/B, RSV PCR, [facility] [[id number]]     Lab Status: No result Specimen: Nares from Nose     Urine Microscopic [[id number]]  (Abnormal) Collected: 02/10/25 1239    Lab Status: Final result Specimen: Urine, Clean Catch Updated: 02/10/25 1256     RBC, UA 2-4 /hpf      WBC, UA Innumerable /hpf      Epithelial Cells Occasional /hpf      Bacteria, UA Innumerable /hpf      Amorphous Crystals, UA Occasional     OTHER OBSERVATIONS Yeast Cells Present  Renal Epithelial Cells Present    UA (URINE) with reflex to Scope [[id number]]  (Abnormal) Collected: 02/10/25 1239    Lab Status: Final result Specimen: Urine, Clean Catch Updated: 02/10/25 1249     Color, UA Dark Orange     Clarity, UA Extra Turbid     Specific Gravity, UA 1.020     pH, UA 5.5     Leukocytes, UA Large     Nitrite, UA Positive     Protein, UA 30 (1+) mg/dl      Glucose,  (3/10%) mg/dl      Ketones, UA Negative mg/dl      Urobilinogen, UA 2.0 mg/dl      Bilirubin, UA Small     Occult Blood, UA  Small    Urine culture [[id number]] Collected: 02/10/25 1240    Lab Status: In process Specimen: Urine, Clean Catch Updated: 02/10/25 1243    Comprehensive metabolic panel [[id number]]  (Abnormal) Collected: 02/10/25 1135    Lab Status: Final result Specimen: Blood from Arm, Right Updated: 02/10/25 1156     Sodium 139 mmol/L      Potassium 5.0 mmol/L      Chloride 107 mmol/L      CO2 25 mmol/L      ANION GAP 7 mmol/L      BUN 35 mg/dL      Creatinine 1.46 mg/dL      Glucose 99 mg/dL      Calcium 9.6 mg/dL      AST 13 U/L      ALT 9 U/L      Alkaline Phosphatase 54 U/L      Total Protein 7.2 g/dL      Albumin 3.9 g/dL      Total Bilirubin 0.59 mg/dL      eGFR 30 ml/min/1.73sq m     Narrative:      National Kid[employer] Disease Foundation guidelines for Chronic Kidney Disease (CKD):     Stage 1 with normal or high GFR (GFR > 90 mL/min/1.73 square meters)    Stage 2 Mild CKD (GFR = 60-89 mL/min/1.73 square meters)    Stage 3A Moderate CKD (GFR = 45-59 mL/min/1.73 square meters)    Stage 3B Moderate CKD (GFR = 30-44 mL/min/1.73 square meters)    Stage 4 Severe CKD (GFR = 15-29 mL/min/1.73 square meters)    Stage 5 End Stage CKD (GFR <15 mL/min/1.73 square meters)  Note: GFR calculation is accurate only with a steady state creatinine    CBC and differential [[id number]]  (Abnormal) Collected: 02/10/25 1135    Lab Status: Final result Specimen: Blood from Arm, Right Updated: 02/10/25 1141     WBC 13.32 Thousand/uL      RBC 3.54 Million/uL      Hemoglobin 10.8 g/dL      Hematocrit 36.4 %       fL      MCH 30.5 pg      MCHC 29.7 g/dL      RDW 13.2 %      MPV 8.8 fL      Platelets 238 Thousands/uL      nRBC 0 /100 WBCs      Segmented % 77 %      Immature Grans % 1 %      Lymphocytes % 15 %      Monocytes % 6 %      Eosinophils Relative 1 %      Basophils Relative 0 %      Absolute Neutrophils 10.25 Thousands/µL      Absolute Immature Grans 0.13 Thousand/uL      Absolute Lymphocytes 2.03 Thousands/µL      Absolute Monocytes  0.79 Thousand/µL      Eosinophils Absolute 0.07 Thousand/µL      Basophils Absolute 0.05 Thousands/µL             CT abdomen pelvis wo contrast   Final Interpretation by Abdulkadir Christopher MD (02/10 [id number])      -Increased size of pulmonary nodules measuring up to 2.8 cm within the visualized lungs compared to 10/19/2024, suspicious for metastases. Correlate with dedicated CT chest for complete evaluation as clinically indicated.      -Asymmetric bladder wall thickening along the left lateral and posterior wall, which may be due to underlying malignancy given pulmonary findings. Bilateral perinephric inflammatory stranding, increased from prior. Superimposed cystitis and possible    pyelonephritis not excluded. Correlate with urinalysis, urology consultation and cystoscopy. Visceral organs within the abdomen/pelvis are incompletely evaluated for primary malignancy or metastatic disease given lack of IV contrast.      -Additional chronic findings as above including large hiatal hernia, left nonobstructing nephrolithiasis, colonic diverticulosis and aortic atherosclerotic disease.      Upper sacrum indicated the findings via telephone with Wendy Briseno at 3:36 pm. on 2/10/2025.      Workstation performed: [id number]         CT chest without contrast    (Results Pending)       Procedures    ED Medication and Procedure Management   Prior to Admission Medications   Prescriptions Last Dose Informant Patient Reported? Taking?   Artificial Tears 0.2-0.2-1 % SOLN  Child Yes No   Sig: Administer 1 drop to both eyes as needed (dry eyes ) 1 drop to both eyes prn daily for dry eyes   Cholecalciferol (VITAMIN D3) 1000 units CAPS  Child Yes No   Sig: Take 1,000 Units by mouth daily    Cyanocobalamin (VITAMIN B12 PO)  Child Yes No   Sig: Take 1,000 mcg by mouth in the morning   Empagliflozin (Jardiance) 10 MG TABS tablet  Child No No   Sig: Take 1 tablet (10 mg total) by mouth every morning   Multiple Vitamins-Minerals (PRESERVISION  AREDS PO)  Child Yes No   Sig: Take by mouth   amoxicillin-clavulanate (AUGMENTIN) 875-125 mg per tablet   No No   Sig: Take 1 tablet by mouth every 12 (twelve) hours for 7 days   aspirin (ECOTRIN LOW STRENGTH) 81 mg EC tablet  Child No No   Sig: Take 1 tablet (81 mg total) by mouth daily   atorvastatin (LIPITOR) 10 mg tablet   No No   Sig: Take 1 tablet by mouth once daily   ciprofloxacin (CIPRO) 250 mg tablet   No No   Sig: Take 1 tablet (250 mg total) by mouth every 12 (twelve) hours for 7 days   clotrimazole (LOTRIMIN) 1 % cream   No No   Sig: Apply topically 2 (two) times a day   gabapentin (NEURONTIN) 300 mg capsule   No No   Sig: Take 1 capsule (300 mg total) by mouth 2 (two) times a day   nitrofurantoin (MACROBID) 100 mg capsule  Child No No   Sig: Take 1 capsule (100 mg total) by mouth daily 1 cap PO daily for UTI prevention   nitrofurantoin (MACROBID) 100 mg capsule   No No   Sig: Take 1 capsule (100 mg total) by mouth 2 (two) times a day   omeprazole (PriLOSEC) 20 mg delayed release capsule   No No   Sig: Take 1 capsule by mouth once daily   phenazopyridine (PYRIDIUM) 200 mg tablet   No No   Sig: Take 1 tablet (200 mg total) by mouth 3 (three) times a day with meals   potassium chloride (Klor-Con M10) 10 mEq tablet  Child No No   Sig: Take 1 tablet (10 mEq total) by mouth daily   spironolactone (ALDACTONE) 25 mg tablet   No No   Sig: Take 1 tablet (25 mg total) by mouth daily   torsemide (DEMADEX) 20 mg tablet   No No   Sig: Take 1 tablet (20 mg total) by mouth daily DO NOT START BEFORE FOLLOW UP WITH CARDIOLOGY OR PCP      Facility-Administered Medications: None     Patient's Medications   Discharge Prescriptions    No medications on file     No discharge procedures on file.  ED SEPSIS DOCUMENTATION   Time reflects when diagnosis was documented in both MDM as applicable and the Disposition within this note       Time User Action Codes Description Comment    2/10/2025  1:16 PM Corby Barber Add [N39.0]  UTI (urinary tract infection)     2/10/2025  1:16 PM Ferdous, Komaira Add [R53.1] Generalized weakness     2/10/2025  4:13 PM Ferdous, Komaira Modify [R53.1] Generalized weakness     2/10/2025  4:13 PM Ferdous, Komaira Remove [N39.0] UTI (urinary tract infection)     2/10/2025  4:13 PM Ferdous, Komaira Remove [R53.1] Generalized weakness     2/10/2025  4:14 PM Ferdous, Komaira Add [N12] Pyelonephritis     2/10/2025  4:14 PM Ferdous, Komaira Add [N39.0] UTI (urinary tract infection)     2/10/2025  4:14 PM Ferdous, Komaira Add [N30.90] Cystitis     2/10/2025  4:14 PM Ferdous, Komaira Add [E86.0] Dehydration     2/10/2025  4:14 PM Ferdous, Komaira Add [N17.9] SHERLYN (acute kidney injury) ([facility])     2/10/2025  4:14 PM Ferdous, Komaira Add [N32.89] Bladder mass     2/10/2025  4:14 PM Ferdous, Komaira Add [R91.1] Pulmonary nodule                  Reimaira Ferdous, DO  02/10/25 1630

## 2025-02-10 NOTE — H&P
"H&P - Hospitalist   Name: Janice Yugn 89 y.o. female I MRN: [id number]  Unit/Bed#: Z2 H2 I Date of Admission: 2/10/2025   Date of Service: 2/10/2025 I Hospital Day: 0     Assessment & Plan  Cystitis  Patient presented with cystitis as evidenced by positive UA, suprapubic tenderness.  Denies any CVA tenderness  CT abdomen pelvis showed-\"-Asymmetric bladder wall thickening along the left lateral and posterior wall, which may be due to underlying malignancy given pulmonary findings. Bilateral perinephric inflammatory stranding, increased from prior. Superimposed cystitis and possible   pyelonephritis not excluded. \"  Urology consult  Continue on Rocephin  Will order COVID/flu for completion  Follow-up urine culture results  Hypertension  Hold torsemide and spironolactone in setting of elevated creatinine  Gentle IV hydration and reassess in a.m. to restart  Neuropathy  Continue gabapentin  Gastroesophageal reflux disease without esophagitis  On Protonix  Generalized weakness  Admitted with generalized weakness likely in setting of UTI  Continue Rocephin  PT/OT consult  Chronic diastolic heart failure (HCC)  Wt Readings from Last 3 Encounters:   10/31/24 69.9 kg (154 lb)   10/24/24 70 kg (154 lb 5.2 oz)   10/09/24 68.9 kg (152 lb)     Patient has history of chronic diastolic heart failure  Not in acute exacerbation  Hold torsemide  Reassess in a.m. to restart  Paroxysmal atrial fibrillation (HCC)  Not on anticoagulation.  On aspirin  Rate controlled  Pulmonary nodules  CT abdomen pelvis showed \"Increased size of pulmonary nodules measuring up to 2.8 cm within the visualized lungs compared to 10/19/2024, suspicious for metastases. Correlate with dedicated CT chest for complete evaluation as clinically indicated.  ER ordered CT chest to further evaluate  Mixed hyperlipidemia  Continue on statin  Chronic renal disease, stage III (HCC)  Lab Results   Component Value Date    EGFR 30 02/10/2025    EGFR 41 10/24/2024    EGFR " 31 10/23/2024    CREATININE 1.46 (H) 02/10/2025    CREATININE 1.14 10/24/2024    CREATININE 1.41 (H) 10/23/2024   Has history of CKD stage III  Admitted with creatinine of 1.46  Gentle IV fluids till a.m.  Avoid hypotension/nephrotoxic medication    Chief Complaint   Patient presents with    Possible UTI     Patient presents to the ED with c/o burning with urination, had UTI and completed dose of abx yesterday.         HPI:  Janice Yung is a 89 y.o. female with past medical history of recurrent UTIs, diastolic heart failure, paroxysmal A-fib, pulmonary nodules who presented to emergency department with generalized weakness and dysuria.  Patient completed 7-day course of Augmentin yesterday for urine culture positive for Streptococcus salivarius.  Patient states that she has been having generalized weakness and dysuria along with frequency for the past few days.  Patient denies any fever, chills, chest pain, shortness of breath, nausea, vomiting, dizziness or any other complaints    Historical Information   Past Medical History:   Diagnosis Date    SHERLYN (acute kidney injury) ([facility]) 05/08/2022    Balance disorder     CHF (congestive heart failure) ([facility])     Chronic pain     GERD (gastroesophageal reflux disease)     Hard of hearing     Hyperlipidemia     Hypertension     Hyponatremia 12/05/2020    Low serum cortisol level 12/07/2020    Neuropathy     Pneumonia     Pneumonia due to COVID-19 virus 12/05/2020    Sepsis ([facility]) 05/22/2018    Tinnitus     Unspecified adrenocortical insufficiency ([facility]) 02/28/2023    Felt to be related to COVID-19 infection.  No current symptoms.    UTI (urinary tract infection)      Past Surgical History:   Procedure Laterality Date    EYE SURGERY      HYSTERECTOMY      TONSILLECTOMY       Social History   Social History     Substance and Sexual Activity   Alcohol Use Not Currently    Comment: rarely     Social History     Substance and Sexual Activity   Drug Use No     Social History      Tobacco Use   Smoking Status Never    Passive exposure: Never   Smokeless Tobacco Never     Family History   Problem Relation Age of Onset    Stroke Mother     Stroke Father     Heart disease Daughter     Substance Abuse Neg Hx     Mental illness Neg Hx        Meds/Allergies   No Known Allergies    Meds:    Current Facility-Administered Medications:     acetaminophen (Ofirmev) injection 1,000 mg, 1,000 mg, Intravenous, Once, Cheng Lane MD    acetaminophen (TYLENOL) tablet 650 mg, 650 mg, Oral, Q6H PRN, Cheng Lane MD    Artificial Tears Op Soln 1 drop, 1 drop, Both Eyes, Q6H PRN, Cheng Lane MD    [START ON 2/11/2025] aspirin (ECOTRIN LOW STRENGTH) EC tablet 81 mg, 81 mg, Oral, Daily, Cheng Lane MD    [START ON 2/11/2025] atorvastatin (LIPITOR) tablet 10 mg, 10 mg, Oral, Daily, Cheng Lane MD    cefTRIAXone (ROCEPHIN) IVPB (premix in dextrose) 1,000 mg 50 mL, 1,000 mg, Intravenous, Once, Corby Barber DO, Last Rate: 100 mL/hr at 02/10/25 1618, 1,000 mg at 02/10/25 1618    [START ON 2/11/2025] cefTRIAXone (ROCEPHIN) IVPB (premix in dextrose) 1,000 mg 50 mL, 1,000 mg, Intravenous, Q24H, Cheng Lane MD    clotrimazole (LOTRIMIN) 1 % cream, , Topical, BID, Cheng Lane MD    [START ON 2/11/2025] Empagliflozin (JARDIANCE) tablet 10 mg, 10 mg, Oral, QAM, Cheng Lane MD    [START ON 2/11/2025] enoxaparin (LOVENOX) subcutaneous injection 40 mg, 40 mg, Subcutaneous, Daily, Cheng Lane MD    gabapentin (NEURONTIN) capsule 300 mg, 300 mg, Oral, BID, Cheng Lane MD    lidocaine (URO-JET) 2 % urethral/mucosal gel 1 Application, 1 Application, Urethral, Once, Cheng Lane MD    ondansetron (ZOFRAN) injection 4 mg, 4 mg, Intravenous, Q6H PRN, Cheng Lane MD    [START ON 2/11/2025] pantoprazole (PROTONIX) EC tablet 40 mg, 40 mg, Oral, Early Morning, Cheng Lane MD    sodium chloride 0.9 % infusion, 75 mL/hr, Intravenous, Continuous, Cheng JIMENEZ  MD Domingo    Current Outpatient Medications:     amoxicillin-clavulanate (AUGMENTIN) 875-125 mg per tablet, Take 1 tablet by mouth every 12 (twelve) hours for 7 days, Disp: 14 tablet, Rfl: 0    Artificial Tears 0.2-0.2-1 % SOLN, Administer 1 drop to both eyes as needed (dry eyes ) 1 drop to both eyes prn daily for dry eyes, Disp: , Rfl:     aspirin (ECOTRIN LOW STRENGTH) 81 mg EC tablet, Take 1 tablet (81 mg total) by mouth daily, Disp: , Rfl: 0    atorvastatin (LIPITOR) 10 mg tablet, Take 1 tablet by mouth once daily, Disp: 90 tablet, Rfl: 1    Cholecalciferol (VITAMIN D3) 1000 units CAPS, Take 1,000 Units by mouth daily , Disp: , Rfl:     ciprofloxacin (CIPRO) 250 mg tablet, Take 1 tablet (250 mg total) by mouth every 12 (twelve) hours for 7 days, Disp: 14 tablet, Rfl: 1    clotrimazole (LOTRIMIN) 1 % cream, Apply topically 2 (two) times a day, Disp: 14 g, Rfl: 0    Cyanocobalamin (VITAMIN B12 PO), Take 1,000 mcg by mouth in the morning, Disp: , Rfl:     Empagliflozin (Jardiance) 10 MG TABS tablet, Take 1 tablet (10 mg total) by mouth every morning, Disp: 30 tablet, Rfl: 5    gabapentin (NEURONTIN) 300 mg capsule, Take 1 capsule (300 mg total) by mouth 2 (two) times a day, Disp: 180 capsule, Rfl: 3    Multiple Vitamins-Minerals (PRESERVISION AREDS PO), Take by mouth, Disp: , Rfl:     nitrofurantoin (MACROBID) 100 mg capsule, Take 1 capsule (100 mg total) by mouth daily 1 cap PO daily for UTI prevention, Disp: 90 capsule, Rfl: 3    nitrofurantoin (MACROBID) 100 mg capsule, Take 1 capsule (100 mg total) by mouth 2 (two) times a day, Disp: 14 capsule, Rfl: 2    omeprazole (PriLOSEC) 20 mg delayed release capsule, Take 1 capsule by mouth once daily, Disp: 90 capsule, Rfl: 1    phenazopyridine (PYRIDIUM) 200 mg tablet, Take 1 tablet (200 mg total) by mouth 3 (three) times a day with meals, Disp: 60 tablet, Rfl: 3    potassium chloride (Klor-Con M10) 10 mEq tablet, Take 1 tablet (10 mEq total) by mouth daily, Disp: 90  tablet, Rfl: 3    spironolactone (ALDACTONE) 25 mg tablet, Take 1 tablet (25 mg total) by mouth daily, Disp: , Rfl:     torsemide (DEMADEX) 20 mg tablet, Take 1 tablet (20 mg total) by mouth daily DO NOT START BEFORE FOLLOW UP WITH CARDIOLOGY OR PCP, Disp: , Rfl:     Not in a hospital admission.      Review of Systems   Constitutional:  Positive for activity change and fatigue.   HENT: Negative.     Eyes: Negative.    Respiratory: Negative.     Cardiovascular: Negative.    Gastrointestinal: Negative.    Endocrine: Negative.    Genitourinary:  Positive for dysuria and frequency.   Musculoskeletal: Negative.    Skin: Negative.    Allergic/Immunologic: Negative.    Neurological:  Positive for weakness.   Hematological: Negative.    Psychiatric/Behavioral: Negative.         Current Vitals:   Blood Pressure: 124/61 (02/10/25 1522)  Pulse: 72 (02/10/25 1522)  Temperature: 97.8 °F (36.6 °C) (02/10/25 1522)  Temp Source: Oral (02/10/25 1522)  Respirations: 20 (02/10/25 1522)  SpO2: 95 % (02/10/25 1522)  SPO2 RA Rest      Flowsheet Row ED to Hosp-Admission (Current) from 2/10/2025 in  [facility] Emergency Department   SpO2 95 %   SpO2 Activity At Rest   O2 Device None (Room air)   O2 Flow Rate --          No intake or output data in the 24 hours ending 02/10/25 1636  There is no height or weight on file to calculate BMI.     Physical Exam  Vitals and nursing note reviewed.   Constitutional:       General: She is not in acute distress.     Appearance: She is well-developed.   HENT:      Head: Normocephalic and atraumatic.      Nose: Nose normal.   Eyes:      General: No scleral icterus.     Conjunctiva/sclera: Conjunctivae normal.      Pupils: Pupils are equal, round, and reactive to light.   Neck:      Thyroid: No thyromegaly.      Vascular: No JVD.      Trachea: No tracheal deviation.   Cardiovascular:      Rate and Rhythm: Normal rate and regular rhythm.      Heart sounds: Normal heart sounds. No murmur  heard.  Pulmonary:      Effort: Pulmonary effort is normal. No respiratory distress.      Breath sounds: Normal breath sounds. No wheezing or rales.   Chest:      Chest wall: No tenderness.   Abdominal:      General: Bowel sounds are normal. There is no distension.      Palpations: Abdomen is soft. There is no mass.      Tenderness: There is no abdominal tenderness. There is no guarding or rebound.   Musculoskeletal:         General: No tenderness or deformity. Normal range of motion.      Cervical back: Normal range of motion and neck supple.   Skin:     General: Skin is warm.      Coloration: Skin is not pale.      Findings: No erythema or rash.   Neurological:      General: No focal deficit present.      Mental Status: She is alert and oriented to person, place, and time. Mental status is at baseline.      Cranial Nerves: No cranial nerve deficit.      Coordination: Coordination normal.   Psychiatric:         Behavior: Behavior normal.         Thought Content: Thought content normal.         Judgment: Judgment normal.         Lab Results:   CBC:   Lab Results   Component Value Date    WBC 13.32 (H) 02/10/2025    HGB 10.8 (L) 02/10/2025    HCT 36.4 02/10/2025     (H) 02/10/2025     02/10/2025    RBC 3.54 (L) 02/10/2025    MCH 30.5 02/10/2025    MCHC 29.7 (L) 02/10/2025    RDW 13.2 02/10/2025    MPV 8.8 (L) 02/10/2025    NRBC 0 02/10/2025     CMP:  Lab Results   Component Value Date     12/20/2016     02/10/2025    CL 97 (L) 04/05/2023    CO2 25 02/10/2025    CO2 34 (H) 04/05/2023    ANIONGAP 7 05/18/2015    BUN 35 (H) 02/10/2025    BUN 18 04/05/2023    CREATININE 1.46 (H) 02/10/2025    CREATININE 0.91 (H) 12/20/2016    GLUCOSE 95 04/08/2019    GLUCOSE 93 05/18/2015    CALCIUM 9.6 02/10/2025    CALCIUM 9.6 04/05/2023    AST 13 02/10/2025    AST 13 04/05/2023    ALT 9 02/10/2025    ALT 7 04/05/2023    ALKPHOS 54 02/10/2025    ALKPHOS 68 04/05/2023    PROT 6.9 12/20/2016    BILITOT 0.4  "12/20/2016    EGFR 30 02/10/2025    EGFR 66 04/08/2019     Lab Results   Component Value Date    TROPONINI 0.04 12/06/2020    CKTOTAL 46 12/05/2020    CKTOTAL 112 01/26/2014     Coagulation:   Lab Results   Component Value Date    INR 1.18 05/04/2024    Urinalysis:  Lab Results   Component Value Date    COLORU Dark Orange 02/10/2025    CLARITYU Extra Turbid 02/10/2025    SPECGRAV 1.020 02/10/2025    PHUR 5.5 02/10/2025    PHUR 6.0 11/26/2018    LEUKOCYTESUR Large (A) 02/10/2025    NITRITE Positive (A) 02/10/2025    GLUCOSEU 300 (3/10%) (A) 02/10/2025    KETONESU Negative 02/10/2025    BILIRUBINUR Small (A) 02/10/2025    BLOODU Small (A) 02/10/2025      Amylase: No results found for: \"AMYLASE\"  Lipase:   Lab Results   Component Value Date    LIPASE 29 10/19/2024        Imaging: CT abdomen pelvis wo contrast  Result Date: 2/10/2025  Narrative: CT ABDOMEN AND PELVIS WITHOUT IV CONTRAST INDICATION: Denies, dysuria, abdominal pain. COMPARISON: None. TECHNIQUE: CT examination of the abdomen and pelvis was performed without intravenous contrast. Multiplanar 2D reformatted images were created from the source data. This examination, like all CT scans performed in the [facility], was performed utilizing techniques to minimize radiation dose exposure, including the use of iterative reconstruction and automated exposure control. Radiation dose length product (DLP) for this visit: 415.28 mGy-cm Enteric Contrast: Not administered. FINDINGS: ABDOMEN LOWER CHEST: Increased size and number of pulmonary nodules within the lung bases measuring up to 2.8 x 2.4 cm in the right lower lobe (2:12). Redemonstrated right retrocrural soft tissue measuring up to 1.5 x 1.3 cm in the axial plane and 2.4 cm in the craniocaudal dimension (601:77), grossly unchanged. Large hiatal hernia. LIVER/BILIARY TREE: Suboptimal evaluation for hepatic lesions given lack of IV contrast. No definite intra or extrahepatic biliary ductal " dilatation. GALLBLADDER: No calcified gallstones. No pericholecystic inflammatory change. SPLEEN: Unremarkable unenhanced appearance. PANCREAS: No definite abnormality within limitations of lack of IV contrast. ADRENAL GLANDS: Unremarkable. KIDNEYS/URETERS: Nonobstructing left-sided nephrolithiasis measuring up to 3 mm. Bilateral extrarenal pelvis. No hydroureteronephrosis. Increased bilateral perinephric inflammatory stranding compared to prior. Lack of IV contrast limits evaluation of renal parenchyma. STOMACH AND BOWEL: Large hiatal hernia. No dilated or thickened bowel loops. Colonic diverticulosis without evidence of diverticulitis. APPENDIX: No findings to suggest appendicitis. ABDOMINOPELVIC CAVITY: No ascites. No pneumoperitoneum. Right retrocrural possible lymphadenopathy as above. Otherwise no lymphadenopathy per size criteria. Prominent subcentimeter retroperitoneal lymph nodes measuring up to 6 mm in the short axis. VESSELS: Atherosclerotic calcifications of the aorta and its branch vessels. Otherwise limited evaluation due to lack of IV contrast. PELVIS REPRODUCTIVE ORGANS: Post hysterectomy. URINARY BLADDER: Asymmetric bladder wall thickening along the left and posterior wall measuring up to 7 mm in thickness. There is mild inflammatory stranding inferiorly along the left. ABDOMINAL WALL/INGUINAL REGIONS: Postsurgical changes in the ventral abdominal wall. BONES: No acute fracture or suspicious osseous lesion. Degenerative changes of the spine including progressive grade 1 anterolisthesis of L4 on L5.     Impression: -Increased size of pulmonary nodules measuring up to 2.8 cm within the visualized lungs compared to 10/19/2024, suspicious for metastases. Correlate with dedicated CT chest for complete evaluation as clinically indicated. -Asymmetric bladder wall thickening along the left lateral and posterior wall, which may be due to underlying malignancy given pulmonary findings. Bilateral perinephric  "inflammatory stranding, increased from prior. Superimposed cystitis and possible pyelonephritis not excluded. Correlate with urinalysis, urology consultation and cystoscopy. Visceral organs within the abdomen/pelvis are incompletely evaluated for primary malignancy or metastatic disease given lack of IV contrast. -Additional chronic findings as above including large hiatal hernia, left nonobstructing nephrolithiasis, colonic diverticulosis and aortic atherosclerotic disease. Upper sacrum indicated the findings via telephone with Wendy Briseno at 3:36 pm. on 2/10/2025. Workstation performed: [id number]     EKG, Pathology, and Other Studies: I have personally reviewed the results.  VTE Pharmacologic Prophylaxis: Lovenox  VTE Mechanical Prophylaxis: sequential compression device    Code Status: Level 3 - DNAR and DNI    Anticipated Length of Stay:  Patient will be admitted on an Inpatient basis with an anticipated length of stay of greater than 2 midnights.     Counseling / Coordination of Care  Total floor / unit time spent today 75 minutes.  Greater than 50% of total time was spent with the patient and / or family counseling and / or coordination of care.     \"This note has been constructed using a voice recognition system\"      Cheng Lane MD  2/10/2025, 4:36 PM        "

## 2025-02-10 NOTE — TELEPHONE ENCOUNTER
Ayaz is still having UTI symptoms.  Her daughter said her mom just finished a round of antibiotic that were for 7 days     The daughter thought maybe her mom should have another round of antibiotics different from the previous antibiotic.  They didn't seem to work much because the ayaz is still experiencing burning and irritation.  Please advise.

## 2025-02-10 NOTE — ASSESSMENT & PLAN NOTE
Lab Results   Component Value Date    EGFR 30 02/10/2025    EGFR 41 10/24/2024    EGFR 31 10/23/2024    CREATININE 1.46 (H) 02/10/2025    CREATININE 1.14 10/24/2024    CREATININE 1.41 (H) 10/23/2024     Cr 1.46 (baseline 1.15-1.25), trend   Gentle IVF resuscitation   Avoid hypotension, nephrotoxic medication

## 2025-02-11 ENCOUNTER — TELEPHONE (OUTPATIENT)
Dept: OTHER | Facility: HOSPITAL | Age: OVER 89
End: 2025-02-11

## 2025-02-11 ENCOUNTER — APPOINTMENT (INPATIENT)
Dept: NON INVASIVE DIAGNOSTICS | Facility: HOSPITAL | Age: OVER 89
DRG: 690 | End: 2025-02-11
Payer: MEDICARE

## 2025-02-11 PROBLEM — M79.661 BILATERAL CALF PAIN: Status: ACTIVE | Noted: 2025-02-11

## 2025-02-11 PROBLEM — D72.829 LEUKOCYTOSIS: Status: ACTIVE | Noted: 2025-02-11

## 2025-02-11 PROBLEM — N30.01 ACUTE CYSTITIS WITH HEMATURIA: Status: ACTIVE | Noted: 2025-02-10

## 2025-02-11 PROBLEM — M79.662 BILATERAL CALF PAIN: Status: ACTIVE | Noted: 2025-02-11

## 2025-02-11 LAB
ALBUMIN SERPL BCG-MCNC: 3.9 G/DL (ref 3.5–5)
ALP SERPL-CCNC: 53 U/L (ref 34–104)
ALT SERPL W P-5'-P-CCNC: 9 U/L (ref 7–52)
ANION GAP SERPL CALCULATED.3IONS-SCNC: 9 MMOL/L (ref 4–13)
AST SERPL W P-5'-P-CCNC: 14 U/L (ref 13–39)
BASOPHILS # BLD AUTO: 0.03 THOUSANDS/ÂΜL (ref 0–0.1)
BASOPHILS NFR BLD AUTO: 0 % (ref 0–1)
BILIRUB SERPL-MCNC: 0.58 MG/DL (ref 0.2–1)
BUN SERPL-MCNC: 30 MG/DL (ref 5–25)
CALCIUM SERPL-MCNC: 9.3 MG/DL (ref 8.4–10.2)
CHLORIDE SERPL-SCNC: 110 MMOL/L (ref 96–108)
CO2 SERPL-SCNC: 22 MMOL/L (ref 21–32)
CREAT SERPL-MCNC: 1.19 MG/DL (ref 0.6–1.3)
EOSINOPHIL # BLD AUTO: 0.03 THOUSAND/ÂΜL (ref 0–0.61)
EOSINOPHIL NFR BLD AUTO: 0 % (ref 0–6)
ERYTHROCYTE [DISTWIDTH] IN BLOOD BY AUTOMATED COUNT: 13.2 % (ref 11.6–15.1)
GFR SERPL CREATININE-BSD FRML MDRD: 38 ML/MIN/1.73SQ M
GLUCOSE SERPL-MCNC: 96 MG/DL (ref 65–140)
HCT VFR BLD AUTO: 38.1 % (ref 34.8–46.1)
HGB BLD-MCNC: 11.3 G/DL (ref 11.5–15.4)
IMM GRANULOCYTES # BLD AUTO: 0.12 THOUSAND/UL (ref 0–0.2)
IMM GRANULOCYTES NFR BLD AUTO: 1 % (ref 0–2)
LYMPHOCYTES # BLD AUTO: 2.01 THOUSANDS/ÂΜL (ref 0.6–4.47)
LYMPHOCYTES NFR BLD AUTO: 15 % (ref 14–44)
MAGNESIUM SERPL-MCNC: 2.3 MG/DL (ref 1.9–2.7)
MCH RBC QN AUTO: 30.6 PG (ref 26.8–34.3)
MCHC RBC AUTO-ENTMCNC: 29.7 G/DL (ref 31.4–37.4)
MCV RBC AUTO: 103 FL (ref 82–98)
MONOCYTES # BLD AUTO: 0.73 THOUSAND/ÂΜL (ref 0.17–1.22)
MONOCYTES NFR BLD AUTO: 6 % (ref 4–12)
NEUTROPHILS # BLD AUTO: 10.33 THOUSANDS/ÂΜL (ref 1.85–7.62)
NEUTS SEG NFR BLD AUTO: 78 % (ref 43–75)
NRBC BLD AUTO-RTO: 0 /100 WBCS
PHOSPHATE SERPL-MCNC: 3.1 MG/DL (ref 2.3–4.1)
PLATELET # BLD AUTO: 260 THOUSANDS/UL (ref 149–390)
PMV BLD AUTO: 9.1 FL (ref 8.9–12.7)
POTASSIUM SERPL-SCNC: 4.6 MMOL/L (ref 3.5–5.3)
PROCALCITONIN SERPL-MCNC: 0.16 NG/ML
PROT SERPL-MCNC: 7.1 G/DL (ref 6.4–8.4)
RBC # BLD AUTO: 3.69 MILLION/UL (ref 3.81–5.12)
SODIUM SERPL-SCNC: 141 MMOL/L (ref 135–147)
WBC # BLD AUTO: 13.25 THOUSAND/UL (ref 4.31–10.16)

## 2025-02-11 PROCEDURE — 85025 COMPLETE CBC W/AUTO DIFF WBC: CPT | Performed by: INTERNAL MEDICINE

## 2025-02-11 PROCEDURE — 93970 EXTREMITY STUDY: CPT

## 2025-02-11 PROCEDURE — 83735 ASSAY OF MAGNESIUM: CPT | Performed by: INTERNAL MEDICINE

## 2025-02-11 PROCEDURE — 99232 SBSQ HOSP IP/OBS MODERATE 35: CPT

## 2025-02-11 PROCEDURE — 84100 ASSAY OF PHOSPHORUS: CPT | Performed by: INTERNAL MEDICINE

## 2025-02-11 PROCEDURE — 36415 COLL VENOUS BLD VENIPUNCTURE: CPT | Performed by: INTERNAL MEDICINE

## 2025-02-11 PROCEDURE — 84145 PROCALCITONIN (PCT): CPT | Performed by: INTERNAL MEDICINE

## 2025-02-11 PROCEDURE — 93970 EXTREMITY STUDY: CPT | Performed by: SURGERY

## 2025-02-11 PROCEDURE — 80053 COMPREHEN METABOLIC PANEL: CPT | Performed by: INTERNAL MEDICINE

## 2025-02-11 RX ORDER — LIDOCAINE HYDROCHLORIDE 20 MG/ML
1 JELLY TOPICAL 2 TIMES DAILY PRN
Status: DISCONTINUED | OUTPATIENT
Start: 2025-02-11 | End: 2025-02-12 | Stop reason: HOSPADM

## 2025-02-11 RX ORDER — SPIRONOLACTONE 25 MG/1
25 TABLET ORAL DAILY
Status: DISCONTINUED | OUTPATIENT
Start: 2025-02-11 | End: 2025-02-12 | Stop reason: HOSPADM

## 2025-02-11 RX ORDER — TORSEMIDE 20 MG/1
20 TABLET ORAL DAILY
Status: DISCONTINUED | OUTPATIENT
Start: 2025-02-11 | End: 2025-02-12 | Stop reason: HOSPADM

## 2025-02-11 RX ADMIN — CEFTRIAXONE 1000 MG: 1 INJECTION, SOLUTION INTRAVENOUS at 15:22

## 2025-02-11 RX ADMIN — ASPIRIN 81 MG: 81 TABLET ORAL at 09:22

## 2025-02-11 RX ADMIN — PANTOPRAZOLE SODIUM 40 MG: 40 TABLET, DELAYED RELEASE ORAL at 05:55

## 2025-02-11 RX ADMIN — TORSEMIDE 20 MG: 20 TABLET ORAL at 13:25

## 2025-02-11 RX ADMIN — ATORVASTATIN CALCIUM 10 MG: 10 TABLET, FILM COATED ORAL at 09:22

## 2025-02-11 RX ADMIN — ENOXAPARIN SODIUM 30 MG: 30 INJECTION SUBCUTANEOUS at 09:22

## 2025-02-11 RX ADMIN — LIDOCAINE HYDROCHLORIDE 1 APPLICATION: 20 JELLY TOPICAL at 04:08

## 2025-02-11 RX ADMIN — EMPAGLIFLOZIN 10 MG: 10 TABLET, FILM COATED ORAL at 09:22

## 2025-02-11 RX ADMIN — SPIRONOLACTONE 25 MG: 25 TABLET ORAL at 13:25

## 2025-02-11 RX ADMIN — GABAPENTIN 300 MG: 300 CAPSULE ORAL at 09:22

## 2025-02-11 RX ADMIN — GABAPENTIN 300 MG: 300 CAPSULE ORAL at 20:16

## 2025-02-11 NOTE — ASSESSMENT & PLAN NOTE
2/11 patient with significant calf pain with palpation on physical exam.   Due to findings of likely metastasis on CT chest/abd/pelvis must r/o DVT  US vasc BLE showing no acute/chronic thrombosis bilaterally.  Patient currently on renally dosed lovenox for DVT ppx.

## 2025-02-11 NOTE — ASSESSMENT & PLAN NOTE
- CT A/P - Asymmetric bladder wall thickening along the left lateral and posterior wall, which may be due to underlying malignancy given pulmonary findings. Bilateral perinephric inflammatory stranding, increased from prior. Superimposed cystitis and possible pyelonephritis not excluded. Left nonobstructing nephrolithiasis.     Recommend poss outpt discussion and cysto for further evaluation.   Goals of care discussion prior to any procedure.

## 2025-02-11 NOTE — PHYSICAL THERAPY NOTE
PHYSICAL THERAPY SCREEN NOTE      Patient Name: Janice Yung  Today's Date: 2/11/2025 02/11/25 0069   Note Type   Note type Screen   Additional Comments PT orders received, chart reivew performed. Spoke w/ RN Smitha. Pt w/ AMPAC of 23, -HLM achieved of 7. Per Smitha, pt is ambulating w/ supervision at her baseline. Pt w/ no acute PT needs, will DC PT     Andie Martines, PT, DPT

## 2025-02-11 NOTE — ASSESSMENT & PLAN NOTE
Admitted with generalized weakness likely in setting of UTI vs malignancy.  CT chest shows enlargement of prior pulmonary nodules, asymmetric bladder wall thickening. Results reviewed with patient and daughter who wish to pursue palliative measures.  Continue Rocephin for UTI treatment.  PT/OT consult

## 2025-02-11 NOTE — ASSESSMENT & PLAN NOTE
Recent Labs     02/10/25  1135 02/11/25  0439   WBC 13.32* 13.25*   Patient with history of leukocytosis with range of WBC 10-25.  Currently with acute cystitis and possible pyelo,.  Will monitor white count and fever curve.

## 2025-02-11 NOTE — ASSESSMENT & PLAN NOTE
"CT abdomen pelvis showed \"Asymmetric bladder wall thickening along the left lateral and posterior wall, which may be due to underlying malignancy given pulmonary findings\"  Urology consulted. Recommends outpatient cystoscopy if proceeding with treatment.  Discussed results of CT chest and CT abdomen with patient and daughter at bedside who wished to not proceed with treatment or diagnostics, wishing for palliative care consult.  Palliative care consulted.  "

## 2025-02-11 NOTE — PLAN OF CARE
Problem: Potential for Falls  Goal: Patient will remain free of falls  Description: INTERVENTIONS:  - Educate patient/family on patient safety including physical limitations  - Instruct patient to call for assistance with activity   - Consult OT/PT to assist with strengthening/mobility   - Keep Call bell within reach  - Keep bed low and locked with side rails adjusted as appropriate  - Keep care items and personal belongings within reach  - Initiate and maintain comfort rounds  - Make Fall Risk Sign visible to staff  - Offer Toileting every 2 Hours, in advance of need  - Initiate/Maintain bed alarm  - Obtain necessary fall risk management equipment:   - Apply yellow socks and bracelet for high fall risk patients  - Consider moving patient to room near nurses station  Outcome: Progressing     Problem: PAIN - ADULT  Goal: Verbalizes/displays adequate comfort level or baseline comfort level  Description: Interventions:  - Encourage patient to monitor pain and request assistance  - Assess pain using appropriate pain scale  - Administer analgesics based on type and severity of pain and evaluate response  - Implement non-pharmacological measures as appropriate and evaluate response  - Consider cultural and social influences on pain and pain management  - Notify physician/advanced practitioner if interventions unsuccessful or patient reports new pain  Outcome: Progressing     Problem: INFECTION - ADULT  Goal: Absence or prevention of progression during hospitalization  Description: INTERVENTIONS:  - Assess and monitor for signs and symptoms of infection  - Monitor lab/diagnostic results  - Monitor all insertion sites, i.e. indwelling lines, tubes, and drains  - Monitor endotracheal if appropriate and nasal secretions for changes in amount and color  - [place] appropriate cooling/warming therapies per order  - Administer medications as ordered  - Instruct and encourage patient and family to use good hand hygiene  technique  - Identify and instruct in appropriate isolation precautions for identified infection/condition  Outcome: Progressing  Goal: Absence of fever/infection during neutropenic period  Description: INTERVENTIONS:  - Monitor WBC    Outcome: Progressing     Problem: SAFETY ADULT  Goal: Patient will remain free of falls  Description: INTERVENTIONS:  - Educate patient/family on patient safety including physical limitations  - Instruct patient to call for assistance with activity   - Consult OT/PT to assist with strengthening/mobility   - Keep Call bell within reach  - Keep bed low and locked with side rails adjusted as appropriate  - Keep care items and personal belongings within reach  - Initiate and maintain comfort rounds  - Make Fall Risk Sign visible to staff  - Offer Toileting every 2 Hours, in advance of need  - Initiate/Maintain bed alarm  - Obtain necessary fall risk management equipment:   - Apply yellow socks and bracelet for high fall risk patients  - Consider moving patient to room near nurses station  Outcome: Progressing

## 2025-02-11 NOTE — NURSING NOTE
Patient states daughter has been giving her medications at home in pudding to swallow. Bedside dysphagia/ swallowing screen done. Passed. Pills given in pudding. Either crushed or pills split in half. Patient swallowed medication in pudding with no problems.

## 2025-02-11 NOTE — CASE MANAGEMENT
Case Management Assessment & Discharge Planning Note    Patient name Janice Yung  Location /-01 MRN [id number]  : 1930 Date 2025       Current Admission Date: 2/10/2025  Current Admission Diagnosis:Acute cystitis with hematuria   Patient Active Problem List    Diagnosis Date Noted Date Diagnosed    Leukocytosis 2025     Bilateral calf pain 2025     Acute cystitis with hematuria 02/10/2025     Bladder mass 02/10/2025     Acute renal failure superimposed on chronic kidney disease  (HCC) 10/19/2024     Urinary retention 10/19/2024     OAB (overactive bladder) 10/17/2024     Dysuria 10/17/2024     CHF (congestive heart failure) ([facility]) 2024     Bilateral leg edema 2024     Dementia without behavioral disturbance, psychotic disturbance, mood disturbance, or anxiety, unspecified dementia severity, unspecified dementia type ([facility]) 2024     Peripheral vascular disease, unspecified ([facility]) 2024     Stage 3a chronic kidney disease ([facility]) 2023     Earache 2023     Diarrhea 2023     Pulmonary nodules 2023     Abnormal urinalysis 2023     Paroxysmal atrial fibrillation ([facility]) 2022     Generalized weakness 2022     Chronic diastolic heart failure ([facility]) 2022     Hyponatremia 2020     Acute pain of left shoulder 2020     History of recurrent UTI (urinary tract infection) 2020     Change in mental status      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) 2019     Fall 2018     Sepsis without acute organ dysfunction ([facility]) 2018     Neuropathy 2016     Vitamin B 12 deficiency 2016     Venous insufficiency 2016     Hypertension 2015     Gastroesophageal reflux disease without esophagitis 2015     Arthropathy 2015     Mixed hyperlipidemia 2015       LOS (days): 1  Geometric Mean LOS (GMLOS) (days):  2.8  Days to GMLOS:1.8     OBJECTIVE:    Risk of Unplanned Readmission Score: 25.54         Current admission status: Inpatient  Referral Reason: Other (Routine)    Preferred Pharmacy:   [facility]  [place] - [address]  [address] [place] [address]  Phone: [phone] Fax: [phone]    Primary Care Provider: Edgar Mustafa MD    Primary Insurance: MEDICARE  Secondary Insurance:     ASSESSMENT:  Active Health Care Proxies       Deyanira Lemus [facility] Representative - Child   Primary Phone: [phone] (Mobile)  Home Phone: [phone]                 Advance Directives  Does patient have a Health Care POA?: No  Was patient offered paperwork?: Yes (declined)  Does patient currently have a Health Care decision maker?: Yes, please see Health Care Proxy section  Does patient have Advance Directives?: No  Was patient offered paperwork?: Yes (declined)  Primary Contact: Deyanira Lemus         Readmission Root Cause  30 Day Readmission: No    Patient Information  Admitted from:: Home  Mental Status: Alert  During Assessment patient was accompanied by: Daughter  Assessment information provided by:: Daughter  Primary Caregiver: Family  Caregiver's Name:: Deyanira Lemus  Caregiver's Relationship to Patient:: Family Member  Caregiver's Telephone Number:: [phone]  Support Systems: Self, Spouse/significant other  County of Residence: [place]  What city do you live in?: [place]  Home entry access options. Select all that apply.: Stairs  Number of steps to enter home.: 1  Do the steps have railings?: No  Type of Current Residence: [place] home  Upon entering residence, is there a bedroom on the main floor (no further steps)?: Yes  Upon entering residence, is there a bathroom on the main floor (no further steps)?: Yes  Living Arrangements: Lives w/ Daughter  Is patient a ?: No    Activities of Daily Living Prior to Admission  Functional Status:  Independent  Completes ADLs independently?: Yes  Ambulates independently?: Yes  Does patient use assisted devices?: Yes  Assisted Devices (DME) used: Rollator  Does patient currently own DME?: Yes  What DME does the patient currently own?: Rollator  Does patient have a history of Outpatient Therapy (PT/OT)?: No  Does the patient have a history of Short-Term Rehab?: Yes  Does patient have a history of HHC?: Yes  Does patient currently have HHC?: No         Patient Information Continued  Income Source: Pension/group home  Does patient have prescription coverage?: No  Does patient receive dialysis treatments?: No  Does patient have a history of substance abuse?: No  Does patient have a history of Mental Health Diagnosis?: No         Means of Transportation  Means of Transport to Appts:: Family transport          DISCHARGE DETAILS:    Discharge planning discussed with:: patient and dtr in ED  [place] of Choice: Yes  Comments - [place] of Choice: disucssed dc planning and role of CM.  CM contacted family/caregiver?: Yes  Were Treatment Team discharge recommendations reviewed with patient/caregiver?: Yes  Did patient/caregiver verbalize understanding of patient care needs?: Yes       Contacts  Patient Contacts: Deyanira Lemus (Child) [phone] (Mobile)  Relationship to Patient:: Family  Contact Method: In Person  Reason/Outcome: Discharge Planning, Continuity of Care, Emergency Contact, Referral    Requested Home Health Care         Is the patient interested in HHC at discharge?: No    DME Referral Provided  Referral made for DME?: No    Other Referral/Resources/Interventions Provided:  Interventions: Other (Specify) (routine)  Referral Comments: needs pending plan of care. Can return home if able to get into home with 2 lillian.            Discharge Destination Plan:: Home with Home Health Care, Home, Short Term Rehab  Transport at Discharge : Family, Wheelchair van                                      Additional  Comments: CM met with Pt and her dtr in the ED. Pt lives with her dtr is a 2sth with a stair to enter with a landing and a high threshold that is smilar to another step. Pt needs to be able to get into the home to return home. Pt uses a rollaotr at baseline. She is supervison level with her ADLs. Pts dtr provides medcations and meals. Pt is able to dress and feed herself. She stays on the first floor of the home where she sleeps on a recliner and uses a powder room to wash up. Pt recently purchased coushions for her recliner chair to add as she has a pressure wound to ankle and beginnings of a sore to her bottom. [facility]. pt sees [facility] PCP and uses [facility] in [place]. Pts dtr is her primary transportation. Pt here with UTI symptoms. Urology consulted for cystitis . Incidental findings noted. Pt on rocephin and IV hydration. CT chest ordered. Pt/OT to see. Palliative consult placed as well. Cm following .

## 2025-02-11 NOTE — INCIDENTAL FINDINGS
The following findings require follow up:  Radiographic finding   Finding: Asymmetric bladder wall thickening along the left lateral and posterior wall, which may be due to underlying malignancy given pulmonary findings    Follow up required: Urology   Follow up should be done within 2-4 weeks     Incidental finding results were discussed with the Patient by Lucy Watkins PA-C on 02/11/25.   They expressed understanding and all questions answered.

## 2025-02-11 NOTE — ASSESSMENT & PLAN NOTE
Per daughter pt with hx of increased generalized weakness, lethargy, and loss of appetite over the past month.   Pt with 6 lb weight loss within 2 weeks.

## 2025-02-11 NOTE — OCCUPATIONAL THERAPY NOTE
Occupational Therapy screen Note     Patient Name: Janice Yung  Today's Date: 2/11/2025  Problem List  Principal Problem:    Acute cystitis with hematuria  Active Problems:    Hypertension    Neuropathy    Gastroesophageal reflux disease without esophagitis    Mixed hyperlipidemia    Generalized weakness    Chronic diastolic heart failure (HCC)    Paroxysmal atrial fibrillation (HCC)    Pulmonary nodules    Acute renal failure superimposed on chronic kidney disease  (HCC)    Bladder mass    Leukocytosis    Bilateral calf pain            02/11/25 1535   OT Last Visit   OT Visit Date 02/11/25   Note Type   Note type Screen   Additional Comments OT orders received, chart reivew performed. PT spoke w/ RN Smitha. Pt w/ AMPAC of 23 basic & 24 daily, -HLM achieved of 7. Per RN, pt is mobilizing w/ supervision at her baseline. No acute OT needs, DC OT orders.       Alexsandra Gilbert, OTR/L

## 2025-02-11 NOTE — TELEPHONE ENCOUNTER
Please have patient set up for outpatient follow for discussion on bladder mass and goals of care. Thank you

## 2025-02-11 NOTE — ASSESSMENT & PLAN NOTE
"Results from last 7 days   Lab Units 02/10/25  1239   LEUKOCYTES UA  Large*   NITRITE UA  Positive*   GLUCOSE UA mg/dl 300 (3/10%)*   KETONES UA mg/dl Negative   BLOOD UA  Small*   WBC UA /hpf Innumerable*   RBC UA /hpf 2-4   BACTERIA UA /hpf Innumerable*   Patient presented with cystitis, suprapubic tenderness, denies any CVA tenderness  CT abdomen pelvis 2/11: Bilateral perinephric inflammatory stranding, increased from prior. Superimposed cystitis and possible pyelonephritis not excluded.\"   Clinically patient examines without CVA tenderness, fever, or significant abdominal pain, n/v  Ucx pending. Most recently growing strep sp. susceptible to ceftriaxone.  Continue IV ceftriaxone.  Follow-up urine culture results  "

## 2025-02-11 NOTE — PROGRESS NOTES
"Progress Note - Hospitalist   Name: Janice Yung 89 y.o. female I MRN: [id number]  Unit/Bed#: OVR 01 I Date of Admission: 2/10/2025   Date of Service: 2/11/2025 I Hospital Day: 1    Assessment & Plan  Acute cystitis with hematuria  Results from last 7 days   Lab Units 02/10/25  1239   LEUKOCYTES UA  Large*   NITRITE UA  Positive*   GLUCOSE UA mg/dl 300 (3/10%)*   KETONES UA mg/dl Negative   BLOOD UA  Small*   WBC UA /hpf Innumerable*   RBC UA /hpf 2-4   BACTERIA UA /hpf Innumerable*   Patient presented with cystitis, suprapubic tenderness, denies any CVA tenderness  CT abdomen pelvis 2/11: Bilateral perinephric inflammatory stranding, increased from prior. Superimposed cystitis and possible pyelonephritis not excluded.\"   Clinically patient examines without CVA tenderness, fever, or significant abdominal pain, n/v  Ucx pending. Most recently growing strep sp. susceptible to ceftriaxone.  Continue IV ceftriaxone.  Follow-up urine culture results  Bladder mass  CT abdomen pelvis showed \"Asymmetric bladder wall thickening along the left lateral and posterior wall, which may be due to underlying malignancy given pulmonary findings\"  Urology consulted. Recommends outpatient cystoscopy if proceeding with treatment.  Discussed results of CT chest and CT abdomen with patient and daughter at bedside who wished to not proceed with treatment or diagnostics, wishing for palliative care consult.  Palliative care consulted.  Pulmonary nodules  CT abdomen pelvis 2/10: showed \"Increased size of pulmonary nodules measuring up to 2.8 cm within the visualized lungs compared to 10/19/2024, suspicious for metastases. Correlate with  dedicated CT chest for complete evaluation as clinically indicated.  CT chest 2/10: \"Multiple masses of varying size scattered throughout lung, too numerous to count. Suspicious for metastatic disease.\"  Resulted reviewed with patient and daughter who expressed understanding.  Pursuing palliative care at this " time.  Bilateral calf pain  2/11 patient with significant calf pain with palpation on physical exam.   Due to findings of likely metastasis on CT chest/abd/pelvis must r/o DVT  US vasc BLE showing no acute/chronic thrombosis bilaterally.  Patient currently on renally dosed lovenox for DVT ppx.  Acute renal failure superimposed on chronic kidney disease  (HCC)  Recent Labs     02/10/25  1135 02/11/25  0604   CREATININE 1.46* 1.19   EGFR 30 38     Estimated Creatinine Clearance: 25.9 mL/min (by C-G formula based on SCr of 1.19 mg/dL).  Has history of CKD stage III  Baseline Cr 0.9-1.1  Admitted with creatinine of 1.46 --> 1.14 after IVF.  Received gentle IV fluids @ 75mL/hr for 14 hours.  Held torsemide/aldactone on admission. Will resume 2/11 with resolution of Cr.  Monitor K+ and Cr.  Avoid hypotension/nephrotoxic medication.  Chronic diastolic heart failure (HCC)  Wt Readings from Last 3 Encounters:   10/31/24 69.9 kg (154 lb)   10/24/24 70 kg (154 lb 5.2 oz)   10/09/24 68.9 kg (152 lb)   Patient has history of chronic diastolic heart failure  Home meds include aldactone 25mg, torsemide 20mg, jardiance 20mg.  Not in acute exacerbation euvolemic on exam, continued with jardiance on admission.  Held diuretics on admission will resume aldactone and torsemide 2/11 due to improvement of Cr.   Daily weights, Is&Os  Generalized weakness  Admitted with generalized weakness likely in setting of UTI vs malignancy.  CT chest shows enlargement of prior pulmonary nodules, asymmetric bladder wall thickening. Results reviewed with patient and daughter who wish to pursue palliative measures.  Continue Rocephin for UTI treatment.  PT/OT consult  Leukocytosis  Recent Labs     02/10/25  1135 02/11/25  0439   WBC 13.32* 13.25*   Patient with history of leukocytosis with range of WBC 10-25.  Currently with acute cystitis and possible pyelo,.  Will monitor white count and fever curve.  Hypertension  Pressure stable.  Not on home meds  aside from diuretics for CHF.  Neuropathy  Continue gabapentin  Gastroesophageal reflux disease without esophagitis  On Protonix  Paroxysmal atrial fibrillation (HCC)  Not on anticoagulation.  On aspirin  Rate controlled  Mixed hyperlipidemia  Continue on statin    VTE Pharmacologic Prophylaxis: VTE Score: 6 High Risk (Score >/= 5) - Pharmacological DVT Prophylaxis Ordered: renally dosed lovenox. Sequential Compression Devices Ordered.    Mobility:   Basic Mobility Inpatient Raw Score: 23  JH-HLM Goal: 7: Walk 25 feet or more  JH-HLM Achieved: 7: Walk 25 feet or more  JH-HLM Goal achieved. Continue to encourage appropriate mobility.    Patient Centered Rounds: I performed bedside rounds with nursing staff today.   Discussions with Specialists or Other Care Team Provider: case management, urology    Education and Discussions with Family / Patient: Updated  (daughter) via phone.    Current Length of Stay: 1 day(s)  Current Patient Status: Inpatient   Certification Statement: The patient will continue to require additional inpatient hospital stay due to IV abx, improvement of weakness  Discharge Plan: Anticipate discharge in 24-48 hrs to home.    Code Status: Level 3 - DNAR and DNI    Subjective   Patient reports no significant change in symptoms. No flank pain, no nausea, no vomiting.    Objective :  Temp:  [97.8 °F (36.6 °C)-98.1 °F (36.7 °C)] 97.9 °F (36.6 °C)  HR:  [72-88] 77  BP: (124-161)/(61-82) 152/70  Resp:  [18-20] 18  SpO2:  [94 %-97 %] 97 %  O2 Device: None (Room air)    There is no height or weight on file to calculate BMI.     Input and Output Summary (last 24 hours):     Intake/Output Summary (Last 24 hours) at 2/11/2025 1415  Last data filed at 2/11/2025 0920  Gross per 24 hour   Intake 2877.5 ml   Output --   Net 2877.5 ml       Physical Exam  Vitals reviewed.   Constitutional:       General: She is not in acute distress.     Appearance: Normal appearance. She is not ill-appearing.    HENT:      Head: Normocephalic and atraumatic.   Eyes:      General: No scleral icterus.  Cardiovascular:      Rate and Rhythm: Normal rate and regular rhythm.      Heart sounds: Normal heart sounds. No murmur heard.  Pulmonary:      Effort: Pulmonary effort is normal. No respiratory distress.      Breath sounds: Normal breath sounds. No stridor. No rales.   Abdominal:      General: Abdomen is flat. Bowel sounds are normal. There is no distension.      Palpations: Abdomen is soft.      Tenderness: There is abdominal tenderness (suprapubic). There is no right CVA tenderness or left CVA tenderness.   Musculoskeletal:         General: Normal range of motion.      Cervical back: Normal range of motion.      Right lower leg: Tenderness present. No swelling. No edema.      Left lower leg: Tenderness present. No swelling. No edema.   Skin:     Coloration: Skin is not jaundiced or pale.   Neurological:      General: No focal deficit present.      Mental Status: She is alert and oriented to person, place, and time. Mental status is at baseline.             Lab Results: I have reviewed the following results:   Results from last 7 days   Lab Units 02/11/25  0439   WBC Thousand/uL 13.25*   HEMOGLOBIN g/dL 11.3*   HEMATOCRIT % 38.1   PLATELETS Thousands/uL 260   SEGS PCT % 78*   LYMPHO PCT % 15   MONO PCT % 6   EOS PCT % 0     Results from last 7 days   Lab Units 02/11/25  0604   SODIUM mmol/L 141   POTASSIUM mmol/L 4.6   CHLORIDE mmol/L 110*   CO2 mmol/L 22   BUN mg/dL 30*   CREATININE mg/dL 1.19   ANION GAP mmol/L 9   CALCIUM mg/dL 9.3   ALBUMIN g/dL 3.9   TOTAL BILIRUBIN mg/dL 0.58   ALK PHOS U/L 53   ALT U/L 9   AST U/L 14   GLUCOSE RANDOM mg/dL 96                 Results from last 7 days   Lab Units 02/11/25  0604   PROCALCITONIN ng/ml 0.16       Recent Cultures (last 7 days):   Results from last 7 days   Lab Units 02/10/25  1240   URINE CULTURE  Culture too young- will reincubate       Imaging Results Review: I reviewed  radiology reports from this admission including: CT chest and CT abdomen/pelvis.  Other Study Results Review: Pathology reports reviewed.    Last 24 Hours Medication List:     Current Facility-Administered Medications:     acetaminophen (TYLENOL) tablet 650 mg, Q6H PRN    Artificial Tears Op Soln 1 drop, Q6H PRN    aspirin (ECOTRIN LOW STRENGTH) EC tablet 81 mg, Daily    atorvastatin (LIPITOR) tablet 10 mg, Daily    cefTRIAXone (ROCEPHIN) IVPB (premix in dextrose) 1,000 mg 50 mL, Q24H    clotrimazole (LOTRIMIN) 1 % cream, BID    Empagliflozin (JARDIANCE) tablet 10 mg, QAM    enoxaparin (LOVENOX) subcutaneous injection 30 mg, Daily    gabapentin (NEURONTIN) capsule 300 mg, BID    lidocaine (URO-JET) 2 % urethral/mucosal gel 1 Application, BID PRN    pantoprazole (PROTONIX) EC tablet 40 mg, Early Morning    spironolactone (ALDACTONE) tablet 25 mg, Daily    torsemide (DEMADEX) tablet 20 mg, Daily    trimethobenzamide (TIGAN) IM injection 100 mg, Q6H PRN    Administrative Statements   Today, Patient Was Seen By: Lucy Watkins PA-C    **Please Note: This note may have been constructed using a voice recognition system.**

## 2025-02-11 NOTE — ASSESSMENT & PLAN NOTE
Recent Labs     02/10/25  1135 02/11/25  0604   CREATININE 1.46* 1.19   EGFR 30 38     Estimated Creatinine Clearance: 25.9 mL/min (by C-G formula based on SCr of 1.19 mg/dL).  Has history of CKD stage III  Baseline Cr 0.9-1.1  Admitted with creatinine of 1.46 --> 1.14 after IVF.  Received gentle IV fluids @ 75mL/hr for 14 hours.  Held torsemide/aldactone on admission. Will resume 2/11 with resolution of Cr.  Monitor K+ and Cr.  Avoid hypotension/nephrotoxic medication.

## 2025-02-11 NOTE — ASSESSMENT & PLAN NOTE
Pt is 89 y/o F pmhx recurrent UTIs, diastolic heart failure, paroxysmal A-fib, and pulmonary nodules who presented to [facility] for concerns of generalized weakness x 1 month and dysuria x 1 week. Urology consulted for Cystitis and CT A/P findings of a bladder mass.     Afebrile, VSS   WBC 13.32   Cr 1.46 (Baseline (1.15-1.25)  UA positive for nitrates and large leukocytes, small amount of occult blood     Plan:   Cont IVF, IV abx for treatment of urinary tract infection   Trend Cr, avoid nephrotoxic agents.   UA culture trending, follow. Tailor abx to culture results.

## 2025-02-11 NOTE — TELEPHONE ENCOUNTER
Patient seen in consultation for cystitis and possible bladder mass.  Please schedule outpatient follow-up for discussion of cystoscopy versus no intervention and ongoing monitoring

## 2025-02-11 NOTE — ASSESSMENT & PLAN NOTE
"CT abdomen pelvis 2/10: showed \"Increased size of pulmonary nodules measuring up to 2.8 cm within the visualized lungs compared to 10/19/2024, suspicious for metastases. Correlate with  dedicated CT chest for complete evaluation as clinically indicated.  CT chest 2/10: \"Multiple masses of varying size scattered throughout lung, too numerous to count. Suspicious for metastatic disease.\"  Resulted reviewed with patient and daughter who expressed understanding.  Pursuing palliative care at this time.  "

## 2025-02-11 NOTE — ASSESSMENT & PLAN NOTE
Wt Readings from Last 3 Encounters:   10/31/24 69.9 kg (154 lb)   10/24/24 70 kg (154 lb 5.2 oz)   10/09/24 68.9 kg (152 lb)   Patient has history of chronic diastolic heart failure  Home meds include aldactone 25mg, torsemide 20mg, jardiance 20mg.  Not in acute exacerbation euvolemic on exam, continued with jardiance on admission.  Held diuretics on admission will resume aldactone and torsemide 2/11 due to improvement of Cr.   Daily weights, Is&Os

## 2025-02-12 VITALS
HEART RATE: 66 BPM | OXYGEN SATURATION: 92 % | DIASTOLIC BLOOD PRESSURE: 57 MMHG | HEIGHT: 62 IN | SYSTOLIC BLOOD PRESSURE: 120 MMHG | TEMPERATURE: 97.5 F | RESPIRATION RATE: 18 BRPM | BODY MASS INDEX: 26.15 KG/M2 | WEIGHT: 142.1 LBS

## 2025-02-12 PROBLEM — D72.829 LEUKOCYTOSIS: Status: RESOLVED | Noted: 2025-02-11 | Resolved: 2025-02-12

## 2025-02-12 LAB
ANION GAP SERPL CALCULATED.3IONS-SCNC: 7 MMOL/L (ref 4–13)
BUN SERPL-MCNC: 29 MG/DL (ref 5–25)
CALCIUM SERPL-MCNC: 9.5 MG/DL (ref 8.4–10.2)
CHLORIDE SERPL-SCNC: 107 MMOL/L (ref 96–108)
CO2 SERPL-SCNC: 25 MMOL/L (ref 21–32)
CREAT SERPL-MCNC: 1.2 MG/DL (ref 0.6–1.3)
ERYTHROCYTE [DISTWIDTH] IN BLOOD BY AUTOMATED COUNT: 13.1 % (ref 11.6–15.1)
GFR SERPL CREATININE-BSD FRML MDRD: 38 ML/MIN/1.73SQ M
GLUCOSE SERPL-MCNC: 92 MG/DL (ref 65–140)
HCT VFR BLD AUTO: 35.2 % (ref 34.8–46.1)
HGB BLD-MCNC: 10.7 G/DL (ref 11.5–15.4)
MAGNESIUM SERPL-MCNC: 2.3 MG/DL (ref 1.9–2.7)
MCH RBC QN AUTO: 31.2 PG (ref 26.8–34.3)
MCHC RBC AUTO-ENTMCNC: 30.4 G/DL (ref 31.4–37.4)
MCV RBC AUTO: 103 FL (ref 82–98)
PLATELET # BLD AUTO: 250 THOUSANDS/UL (ref 149–390)
PMV BLD AUTO: 9 FL (ref 8.9–12.7)
POTASSIUM SERPL-SCNC: 4.5 MMOL/L (ref 3.5–5.3)
RBC # BLD AUTO: 3.43 MILLION/UL (ref 3.81–5.12)
SODIUM SERPL-SCNC: 139 MMOL/L (ref 135–147)
WBC # BLD AUTO: 10.01 THOUSAND/UL (ref 4.31–10.16)

## 2025-02-12 PROCEDURE — 99239 HOSP IP/OBS DSCHRG MGMT >30: CPT | Performed by: PHYSICIAN ASSISTANT

## 2025-02-12 PROCEDURE — 83735 ASSAY OF MAGNESIUM: CPT | Performed by: INTERNAL MEDICINE

## 2025-02-12 PROCEDURE — 85027 COMPLETE CBC AUTOMATED: CPT | Performed by: INTERNAL MEDICINE

## 2025-02-12 PROCEDURE — 97163 PT EVAL HIGH COMPLEX 45 MIN: CPT

## 2025-02-12 PROCEDURE — 80048 BASIC METABOLIC PNL TOTAL CA: CPT | Performed by: INTERNAL MEDICINE

## 2025-02-12 RX ORDER — CIPROFLOXACIN 250 MG/1
250 TABLET, FILM COATED ORAL EVERY 24 HOURS
Qty: 4 TABLET | Refills: 0 | Status: SHIPPED | OUTPATIENT
Start: 2025-02-13 | End: 2025-02-17 | Stop reason: SDUPTHER

## 2025-02-12 RX ORDER — LIDOCAINE HYDROCHLORIDE 20 MG/ML
JELLY TOPICAL AS NEEDED
Qty: 5 ML | Refills: 0 | Status: SHIPPED | OUTPATIENT
Start: 2025-02-12 | End: 2025-02-13

## 2025-02-12 RX ORDER — CEFTRIAXONE 1 G/50ML
1000 INJECTION, SOLUTION INTRAVENOUS ONCE
Status: COMPLETED | OUTPATIENT
Start: 2025-02-12 | End: 2025-02-12

## 2025-02-12 RX ADMIN — EMPAGLIFLOZIN 10 MG: 10 TABLET, FILM COATED ORAL at 08:47

## 2025-02-12 RX ADMIN — PANTOPRAZOLE SODIUM 40 MG: 40 TABLET, DELAYED RELEASE ORAL at 05:59

## 2025-02-12 RX ADMIN — CEFTRIAXONE 1000 MG: 1 INJECTION, SOLUTION INTRAVENOUS at 14:16

## 2025-02-12 RX ADMIN — SPIRONOLACTONE 25 MG: 25 TABLET ORAL at 08:48

## 2025-02-12 RX ADMIN — ENOXAPARIN SODIUM 30 MG: 30 INJECTION SUBCUTANEOUS at 08:48

## 2025-02-12 RX ADMIN — ASPIRIN 81 MG: 81 TABLET ORAL at 08:47

## 2025-02-12 RX ADMIN — TORSEMIDE 20 MG: 20 TABLET ORAL at 08:46

## 2025-02-12 RX ADMIN — ATORVASTATIN CALCIUM 10 MG: 10 TABLET, FILM COATED ORAL at 08:48

## 2025-02-12 RX ADMIN — GABAPENTIN 300 MG: 300 CAPSULE ORAL at 08:47

## 2025-02-12 NOTE — PLAN OF CARE
Problem: Potential for Falls  Goal: Patient will remain free of falls  Description: INTERVENTIONS:  - Educate patient/family on patient safety including physical limitations  - Instruct patient to call for assistance with activity   - Consult OT/PT to assist with strengthening/mobility   - Keep Call bell within reach  - Keep bed low and locked with side rails adjusted as appropriate  - Keep care items and personal belongings within reach  - Initiate and maintain comfort rounds  - Make Fall Risk Sign visible to staff  - Offer Toileting every 2 Hours, in advance of need  - Initiate/Maintain bed alarm  - Obtain necessary fall risk management equipment:   - Apply yellow socks and bracelet for high fall risk patients  - Consider moving patient to room near nurses station  Outcome: Progressing     Problem: PAIN - ADULT  Goal: Verbalizes/displays adequate comfort level or baseline comfort level  Description: Interventions:  - Encourage patient to monitor pain and request assistance  - Assess pain using appropriate pain scale  - Administer analgesics based on type and severity of pain and evaluate response  - Implement non-pharmacological measures as appropriate and evaluate response  - Consider cultural and social influences on pain and pain management  - Notify physician/advanced practitioner if interventions unsuccessful or patient reports new pain  Outcome: Progressing     Problem: SAFETY ADULT  Goal: Patient will remain free of falls  Description: INTERVENTIONS:  - Educate patient/family on patient safety including physical limitations  - Instruct patient to call for assistance with activity   - Consult OT/PT to assist with strengthening/mobility   - Keep Call bell within reach  - Keep bed low and locked with side rails adjusted as appropriate  - Keep care items and personal belongings within reach  - Initiate and maintain comfort rounds  - Make Fall Risk Sign visible to staff  - Offer Toileting every 2 Hours, in  advance of need  - Initiate/Maintain bed alarm  - Obtain necessary fall risk management equipment:   - Apply yellow socks and bracelet for high fall risk patients  - Consider moving patient to room near nurses station  Outcome: Progressing

## 2025-02-12 NOTE — ASSESSMENT & PLAN NOTE
"Results from last 7 days   Lab Units 02/10/25  1239   LEUKOCYTES UA  Large*   NITRITE UA  Positive*   GLUCOSE UA mg/dl 300 (3/10%)*   KETONES UA mg/dl Negative   BLOOD UA  Small*   WBC UA /hpf Innumerable*   RBC UA /hpf 2-4   BACTERIA UA /hpf Innumerable*   Patient presented with cystitis, suprapubic tenderness, denies any CVA tenderness  CT abdomen pelvis 2/11: Bilateral perinephric inflammatory stranding, increased from prior. Superimposed cystitis and possible pyelonephritis not excluded.\"   Clinically patient examines without CVA tenderness, fever, or significant abdominal pain, n/v  Patient received 3 days of IV ceftriaxone, transition to p.o. Cipro for 4 more days to complete 1 week of antibiotic therapy  Continue outpatient follow-up with urology as well as family doctor  "

## 2025-02-12 NOTE — ASSESSMENT & PLAN NOTE
Admitted with generalized weakness likely in setting of UTI vs malignancy.  CT chest shows enlargement of prior pulmonary nodules, asymmetric bladder wall thickening. Results reviewed with patient and daughter who wish to pursue palliative measures.

## 2025-02-12 NOTE — PLAN OF CARE
Problem: Potential for Falls  Goal: Patient will remain free of falls  Description: INTERVENTIONS:  - Educate patient/family on patient safety including physical limitations  - Instruct patient to call for assistance with activity   - Consult OT/PT to assist with strengthening/mobility   - Keep Call bell within reach  - Keep bed low and locked with side rails adjusted as appropriate  - Keep care items and personal belongings within reach  - Initiate and maintain comfort rounds  - Make Fall Risk Sign visible to staff  - Offer Toileting every 2 Hours, in advance of need  - Initiate/Maintain bed alarm  - Obtain necessary fall risk management equipment: rails  - Apply yellow socks and bracelet for high fall risk patients  - Consider moving patient to room near nurses station  Outcome: Progressing     Problem: PAIN - ADULT  Goal: Verbalizes/displays adequate comfort level or baseline comfort level  Description: Interventions:  - Encourage patient to monitor pain and request assistance  - Assess pain using appropriate pain scale  - Administer analgesics based on type and severity of pain and evaluate response  - Implement non-pharmacological measures as appropriate and evaluate response  - Consider cultural and social influences on pain and pain management  - Notify physician/advanced practitioner if interventions unsuccessful or patient reports new pain  Outcome: Progressing

## 2025-02-12 NOTE — PHYSICAL THERAPY NOTE
PHYSICAL THERAPY EVALUATION NOTE    Patient Name: Janice Yung  Today's Date: 2025    AGE:   89 y.o.  Mrn:   [id number]  ADMIT DX:  Dehydration [E86.0]  UTI (urinary tract infection) [N39.0]  Pyelonephritis [N12]  Cystitis [N30.90]  Pulmonary nodule [R91.1]  Bladder mass [N32.89]  SHERLYN (acute kidney injury) ([facility]) [N17.9]  UTI symptoms [R39.9]    Past Medical History:   Diagnosis Date    SHERLYN (acute kidney injury) ([facility]) 2022    Balance disorder     CHF (congestive heart failure) ([facility])     Chronic pain     GERD (gastroesophageal reflux disease)     Hard of hearing     Hyperlipidemia     Hypertension     Hyponatremia 2020    Low serum cortisol level 2020    Neuropathy     Pneumonia     Pneumonia due to COVID-19 virus 2020    Sepsis ([facility]) 2018    Tinnitus     Unspecified adrenocortical insufficiency ([facility]) 2023    Felt to be related to COVID-19 infection.  No current symptoms.    UTI (urinary tract infection)      Length Of Stay: 2  PHYSICAL THERAPY EVALUATION :   Patient's identity confirmed via 2 patient identifiers (full name and ) at start of session       25 1147   PT Last Visit   PT Visit Date 25   Note Type   Note type Evaluation   Additional Comments Pt screened yesterday as she is mobilizing at baseline, per CM family w/ concern over steps   Pain Assessment   Pain Assessment Tool 0-10   Pain Score No Pain   Home Living   Type of Home House   Home Layout Two level;Able to live on main level with bedroom/bathroom;Stairs to enter with rails  (1+1 BRENDA, FFSU)   Home Equipment Walker  (rollator)   Prior Function   Level of [place] Independent with ADLs;Independent with functional mobility   Lives With Daughter   IADLs Family/Friend/Other provides medication management;Family/Friend/Other provides meals;Family/Friend/Other provides transportation   Comments Pt reports having  difficult time w/ BRENDA PTA   General   Family/Caregiver Present No   Cognition   Overall Cognitive Status WFL   Arousal/Participation Alert   Orientation Level Oriented X4   Memory Within functional limits   Following Commands Follows all commands and directions without difficulty   Comments Pt is [place], agreeable to participate in PT evaluation   RLE Assessment   RLE Assessment WFL   Strength RLE   RLE Overall Strength 4-/5   LLE Assessment   LLE Assessment WFL   Strength LLE   LLE Overall Strength 4-/5   Bed Mobility   Supine to Sit 6  Modified independent   Transfers   Sit to Stand 6  Modified independent   Stand to Sit 6  Modified independent   Toilet transfer 5  Supervision   Additional Comments Pt seated OOB in bathroom on toilet w/ KATY Bone present   Ambulation/Elevation   Gait pattern Decreased foot clearance;Forward Flexion   Gait Assistance 5  Supervision   Assistive Device Rolling walker   Distance 100 ft, 25 ft   Stair Management Assistance 5  Supervision   Additional items Assist x 1;Verbal cues   Number of Stairs 2   Activity Tolerance   Activity Tolerance Patient tolerated treatment well   Medical Staff Made Aware LINDY Day   Nurse Made Aware KATY Bone   Assessment   Problem List Impaired balance;Decreased mobility;Impaired hearing   Assessment Janice Yung is a 89 y.o. Female who presents to [facility] on 2/10/2025 from home w/ c/o UTI and diagnosis of acute cystitis w/ hematuria. Orders for PT eval and treat received. Pt presents w/ comorbidities of HTN, neuropathy, CHF, Afib, dementia. At baseline, pt mobilizes modified I  w/ rollator. Upon evaluation, pt presents w/ the following deficits: weakness, impaired balance, decreased endurance, and impaired cognition . Upon eval, pt requires modified I for bed mobility, modified I for transfers, and supervision for gait and steps. Based on this PT evaluation today, patient's discharge recommendation is for No rehabilitation needs. During this admission, pt would  benefit from continued skilled inpatient PT in the acute care setting in order to address the abovementioned deficits to maximize function and mobility before DC from acute care.   Barriers to Discharge None   Goals   Patient Goals to go home   Discharge Recommendation   Rehab Resource Intensity Level, PT No post-acute rehabilitation needs   AM-PAC Basic Mobility Inpatient   Turning in Flat Bed Without Bedrails 4   Lying on Back to Sitting on Edge of Flat Bed Without Bedrails 4   Moving Bed to Chair 4   Standing Up From Chair Using Arms 4   Walk in Room 3   Climb 3-5 Stairs With Railing 3   Basic Mobility Inpatient Raw Score 22   Basic Mobility Standardized Score 47.4   [facility] Highest Level Of Mobility   -HL Goal 7: Walk 25 feet or more   -HLM Achieved 7: Walk 25 feet or more   End of Consult   Patient Position at End of Consult Other (comment)  (OOB in bathroom w/ RN Smitha present)           The patient's AM-PAC Basic Mobility Inpatient Short Form Raw Score is 22, Standardized Score is 47.4. A standardized score greater than 38.32 (raw score of 16) suggests the patient may benefit from discharge to home which may not coincide with above PT recommendations. However please refer to therapist recommendation for discharge planning given other factors that may influence destination.    Given the above findings from this evaluation, at this time this patient does not require skilled inpatient PT for the remainder of this admission. Will D/C patient from PT caseload, please reconsult if any changes or needs arise.      Andie Martines PT, DPT

## 2025-02-12 NOTE — ASSESSMENT & PLAN NOTE
Wt Readings from Last 3 Encounters:   02/11/25 64.5 kg (142 lb 1.6 oz)   10/31/24 69.9 kg (154 lb)   10/24/24 70 kg (154 lb 5.2 oz)   Patient has history of chronic diastolic heart failure  Home meds include aldactone 25mg, torsemide 20mg, jardiance 20mg.  Not in acute exacerbation euvolemic on exam, continued with jardiance on admission.  Held diuretics on admission will resume aldactone and torsemide 2/11 due to improvement of Cr.   Daily weights, Is&Os

## 2025-02-12 NOTE — ASSESSMENT & PLAN NOTE
"CT abdomen pelvis showed \"Asymmetric bladder wall thickening along the left lateral and posterior wall, which may be due to underlying malignancy given pulmonary findings\"  Urology consulted. Recommends outpatient cystoscopy if proceeding with treatment.  Discussed results of CT chest and CT abdomen with patient and daughter at bedside who wished to not proceed with treatment or diagnostics, wishing for palliative care consult - placed as outpatient   "

## 2025-02-12 NOTE — ASSESSMENT & PLAN NOTE
2/11 patient with significant calf pain with palpation on physical exam.   Due to findings of likely metastasis on CT chest/abd/pelvis must r/o DVT  US vasc BLE showing no acute/chronic thrombosis bilaterally.  Patient currently on renally dosed lovenox for DVT ppx.  Improved, patient was able to ambulate and go up stairs with PT today

## 2025-02-12 NOTE — DISCHARGE SUMMARY
"Discharge Summary - Hospitalist   Name: Janice Yung 89 y.o. female I MRN: [id number]  Unit/Bed#: -01 I Date of Admission: 2/10/2025   Date of Service: 2/12/2025 I Hospital Day: 2     Assessment & Plan  Acute cystitis with hematuria  Results from last 7 days   Lab Units 02/10/25  1239   LEUKOCYTES UA  Large*   NITRITE UA  Positive*   GLUCOSE UA mg/dl 300 (3/10%)*   KETONES UA mg/dl Negative   BLOOD UA  Small*   WBC UA /hpf Innumerable*   RBC UA /hpf 2-4   BACTERIA UA /hpf Innumerable*   Patient presented with cystitis, suprapubic tenderness, denies any CVA tenderness  CT abdomen pelvis 2/11: Bilateral perinephric inflammatory stranding, increased from prior. Superimposed cystitis and possible pyelonephritis not excluded.\"   Clinically patient examines without CVA tenderness, fever, or significant abdominal pain, n/v  Patient received 3 days of IV ceftriaxone, transition to p.o. Cipro for 4 more days to complete 1 week of antibiotic therapy  Continue outpatient follow-up with urology as well as family doctor  Bladder mass  CT abdomen pelvis showed \"Asymmetric bladder wall thickening along the left lateral and posterior wall, which may be due to underlying malignancy given pulmonary findings\"  Urology consulted. Recommends outpatient cystoscopy if proceeding with treatment.  Discussed results of CT chest and CT abdomen with patient and daughter at bedside who wished to not proceed with treatment or diagnostics, wishing for palliative care consult - placed as outpatient   Pulmonary nodules  CT abdomen pelvis 2/10: showed \"Increased size of pulmonary nodules measuring up to 2.8 cm within the visualized lungs compared to 10/19/2024, suspicious for metastases. Correlate with  dedicated CT chest for complete evaluation as clinically indicated.  CT chest 2/10: \"Multiple masses of varying size scattered throughout lung, too numerous to count. Suspicious for metastatic disease.\"  Resulted reviewed with patient and " daughter who expressed understanding.  Pursuing palliative care at this time.  Bilateral calf pain  2/11 patient with significant calf pain with palpation on physical exam.   Due to findings of likely metastasis on CT chest/abd/pelvis must r/o DVT  US vasc BLE showing no acute/chronic thrombosis bilaterally.  Patient currently on renally dosed lovenox for DVT ppx.  Improved, patient was able to ambulate and go up stairs with PT today  Acute renal failure superimposed on chronic kidney disease  (HCC)  Recent Labs     02/10/25  1135 02/11/25  0604 02/12/25  0346   CREATININE 1.46* 1.19 1.20   EGFR 30 38 38     Estimated Creatinine Clearance: 24.7 mL/min (by C-G formula based on SCr of 1.2 mg/dL).  Has history of CKD stage III  Baseline Cr 0.9-1.1  Admitted with creatinine of 1.46 --> 1.14 after IVF.  Received gentle IV fluids @ 75mL/hr for 14 hours.  Held torsemide/aldactone on admission. Will resume 2/11 with resolution of Cr.  Monitor K+ and Cr.  Avoid hypotension/nephrotoxic medication.  Chronic diastolic heart failure (HCC)  Wt Readings from Last 3 Encounters:   02/11/25 64.5 kg (142 lb 1.6 oz)   10/31/24 69.9 kg (154 lb)   10/24/24 70 kg (154 lb 5.2 oz)   Patient has history of chronic diastolic heart failure  Home meds include aldactone 25mg, torsemide 20mg, jardiance 20mg.  Not in acute exacerbation euvolemic on exam, continued with jardiance on admission.  Held diuretics on admission will resume aldactone and torsemide 2/11 due to improvement of Cr.   Daily weights, Is&Os  Generalized weakness  Admitted with generalized weakness likely in setting of UTI vs malignancy.  CT chest shows enlargement of prior pulmonary nodules, asymmetric bladder wall thickening. Results reviewed with patient and daughter who wish to pursue palliative measures.    Leukocytosis (Resolved: 2/12/2025)  Recent Labs     02/10/25  1135 02/11/25  0439 02/12/25  0346   WBC 13.32* 13.25* 10.01   Patient with history of leukocytosis with  range of WBC 10-25.  Currently with acute cystitis and possible pyelo,.  Will monitor white count and fever curve.  Hypertension  Pressure stable.  Not on home meds aside from diuretics for CHF.  Neuropathy  Continue gabapentin  Gastroesophageal reflux disease without esophagitis  On Protonix  Paroxysmal atrial fibrillation (HCC)  Not on anticoagulation.  On aspirin  Rate controlled  Mixed hyperlipidemia  Continue on statin     Medical Problems       Resolved Problems  Date Reviewed: 2/10/2025   None       Discharging Physician / Practitioner: Kris Mishra PA-C  PCP: Edgar Mustafa MD  Admission Date:   Admission Orders (From admission, onward)       Ordered        02/10/25 1615  INPATIENT ADMISSION  Once                          Discharge Date: 02/12/25    Consultations During Hospital Stay:  Urology    Procedures Performed:   None     Significant Findings / Test Results:   UA suspicious for UTI  Mild SHERLYN creatinine 1.4 on admission, down to 1.1 today  CT chest with multiple masses of varying sizes, suspicious for metastatic disease  CT abdomen pelvis with asymmetric bladder wall thickening possible underlying malignancy given possible pulmonary metastasis    Incidental Findings:   none     Test Results Pending at Discharge (will require follow up):   none     Outpatient Tests Requested:  none    Complications:  none    Reason for Admission: UTI    Hospital Course:   Janice Yung is a 89 y.o. female patient who originally presented to the hospital on 2/10/2025 due to generalized weakness, leg pain, urinary frequency and UTI symptoms.  Patient was found to have a UTI in the ER, started on IV ceftriaxone.  Imaging findings suspicious for underlying bladder malignancy with possible pulmonary metastasis for which urology evaluated the patient, goals of care briefly discussed and patient will follow-up with palliative care as an outpatient.  Clinically improved with IV ceftriaxone, received IV hydration which  "improved creatinine to baseline.  Will be discharged with p.o. ciprofloxacin for 4 more days to complete antibiotic therapy for 1 week for UTI.  Message sent to family doctor for continued outpatient follow-up.    Please see above list of diagnoses and related plan for additional information.     Condition at Discharge: stable    Discharge Day Visit / Exam:   Subjective: Patient was able to ambulate with physical therapy, no overnight events.  Her legs feel significantly improved and she does not have any pain today.  Vitals: Blood Pressure: 120/57 (02/12/25 0720)  Pulse: 66 (02/12/25 0720)  Temperature: 97.5 °F (36.4 °C) (02/12/25 0720)  Temp Source: Oral (02/12/25 0720)  Respirations: 18 (02/12/25 0720)  Height: 5' 2\" (157.5 cm) (02/11/25 1448)  Weight - Scale: 64.5 kg (142 lb 1.6 oz) (02/11/25 1448)  SpO2: 92 % (02/12/25 0720)  Exam:   Physical Exam  Vitals and nursing note reviewed.   Constitutional:       General: She is not in acute distress.     Appearance: Normal appearance. She is not ill-appearing.   HENT:      Head: Normocephalic and atraumatic.      Mouth/Throat:      Mouth: Mucous membranes are moist.   Eyes:      General: No scleral icterus.        Right eye: No discharge.         Left eye: No discharge.      Pupils: Pupils are equal, round, and reactive to light.   Cardiovascular:      Rate and Rhythm: Normal rate and regular rhythm.      Heart sounds: No murmur heard.     No friction rub. No gallop.   Pulmonary:      Effort: No respiratory distress.      Breath sounds: No wheezing, rhonchi or rales.   Abdominal:      General: Bowel sounds are normal. There is no distension.      Palpations: Abdomen is soft.      Tenderness: There is no abdominal tenderness. There is no guarding or rebound.   Musculoskeletal:         General: No swelling or deformity.      Cervical back: Neck supple.      Right lower leg: No edema.      Left lower leg: No edema.   Skin:     General: Skin is warm and dry.      " Capillary Refill: Capillary refill takes less than 2 seconds.      Coloration: Skin is not jaundiced or pale.      Findings: No erythema or rash.   Neurological:      General: No focal deficit present.      Mental Status: She is alert and oriented to person, place, and time. Mental status is at baseline.   Psychiatric:         Mood and Affect: Mood normal.         Behavior: Behavior normal.          Discussion with Family: Updated  (daughter) at bedside.    Discharge instructions/Information to patient and family:   See after visit summary for information provided to patient and family.      Provisions for Follow-Up Care:  See after visit summary for information related to follow-up care and any pertinent home health orders.       Disposition:   Home    Planned Readmission: no     Discharge Statement:  I spent 45 minutes discharging the patient. This time was spent on the day of discharge. I had direct contact with the patient on the day of discharge. Greater than 50% of the total time was spent examining patient, answering all patient questions, arranging and discussing plan of care with patient as well as directly providing post-discharge instructions.  Additional time then spent on discharge activities.    Discharge Medications:  See after visit summary for reconciled discharge medications provided to patient and/or family.      **Please Note: This note may have been constructed using a voice recognition system**

## 2025-02-12 NOTE — ASSESSMENT & PLAN NOTE
Recent Labs     02/10/25  1135 02/11/25  0439 02/12/25  0346   WBC 13.32* 13.25* 10.01   Patient with history of leukocytosis with range of WBC 10-25.  Currently with acute cystitis and possible pyelo,.  Will monitor white count and fever curve.

## 2025-02-12 NOTE — ASSESSMENT & PLAN NOTE
Recent Labs     02/10/25  1135 02/11/25  0604 02/12/25  0346   CREATININE 1.46* 1.19 1.20   EGFR 30 38 38     Estimated Creatinine Clearance: 24.7 mL/min (by C-G formula based on SCr of 1.2 mg/dL).  Has history of CKD stage III  Baseline Cr 0.9-1.1  Admitted with creatinine of 1.46 --> 1.14 after IVF.  Received gentle IV fluids @ 75mL/hr for 14 hours.  Held torsemide/aldactone on admission. Will resume 2/11 with resolution of Cr.  Monitor K+ and Cr.  Avoid hypotension/nephrotoxic medication.

## 2025-02-13 ENCOUNTER — TRANSITIONAL CARE MANAGEMENT (OUTPATIENT)
Dept: FAMILY MEDICINE CLINIC | Facility: CLINIC | Age: OVER 89
End: 2025-02-13

## 2025-02-13 LAB — BACTERIA UR CULT: ABNORMAL

## 2025-02-13 RX ORDER — LIDOCAINE HYDROCHLORIDE 20 MG/ML
JELLY TOPICAL DAILY PRN
Qty: 5 ML | Refills: 0 | Status: SHIPPED | OUTPATIENT
Start: 2025-02-13

## 2025-02-17 ENCOUNTER — TELEMEDICINE (OUTPATIENT)
Dept: FAMILY MEDICINE CLINIC | Facility: CLINIC | Age: OVER 89
End: 2025-02-17
Payer: MEDICARE

## 2025-02-17 ENCOUNTER — TELEPHONE (OUTPATIENT)
Age: OVER 89
End: 2025-02-17

## 2025-02-17 DIAGNOSIS — C67.0 MALIGNANT NEOPLASM OF TRIGONE OF URINARY BLADDER (HCC): ICD-10-CM

## 2025-02-17 DIAGNOSIS — N32.89 BLADDER MASS: ICD-10-CM

## 2025-02-17 DIAGNOSIS — G89.29 OTHER CHRONIC PAIN: Primary | ICD-10-CM

## 2025-02-17 DIAGNOSIS — R39.9 UTI SYMPTOMS: ICD-10-CM

## 2025-02-17 DIAGNOSIS — R91.8 PULMONARY NODULES: ICD-10-CM

## 2025-02-17 DIAGNOSIS — C34.00 MALIGNANT NEOPLASM OF HILUS OF LUNG, UNSPECIFIED LATERALITY (HCC): ICD-10-CM

## 2025-02-17 PROCEDURE — 99496 TRANSJ CARE MGMT HIGH F2F 7D: CPT | Performed by: FAMILY MEDICINE

## 2025-02-17 RX ORDER — CIPROFLOXACIN 250 MG/1
250 TABLET, FILM COATED ORAL EVERY 24 HOURS
Qty: 10 TABLET | Refills: 2 | Status: SHIPPED | OUTPATIENT
Start: 2025-02-17 | End: 2025-02-18 | Stop reason: SDUPTHER

## 2025-02-17 RX ORDER — OXYCODONE AND ACETAMINOPHEN 2.5; 325 MG/1; MG/1
1 TABLET ORAL EVERY 4 HOURS PRN
Qty: 30 TABLET | Refills: 0 | Status: SHIPPED | OUTPATIENT
Start: 2025-02-17

## 2025-02-17 NOTE — TELEPHONE ENCOUNTER
Pharmacist at [facility] called to confirm the script for ciprofloxacin (CIPRO) 250 mg tablet was written correctly. He would like a call back to clarify at [phone]. Please advise

## 2025-02-17 NOTE — PROGRESS NOTES
Virtual TCM Visit:Name: Janice Yung      : 1930      MRN: [id number]  Encounter Provider: Edgar Mustafa MD  Encounter Date: 2025   Encounter department: [facility]  :  Assessment & Plan  Other chronic pain    Orders:    oxyCODONE-acetaminophen (Percocet) 2.5-325 MG per tablet; Take 1 tablet by mouth every 4 (four) hours as needed for moderate pain Max Daily Amount: 6 tablets    Malignant neoplasm of hilus of lung, unspecified laterality (HCC)         Malignant neoplasm of trigone of urinary bladder (HCC)         UTI symptoms         Bladder mass         Pulmonary nodules         Other chronic pain         Malignant neoplasm of hilus of lung, unspecified laterality (HCC)         Malignant neoplasm of trigone of urinary bladder (HCC)         UTI symptoms                    History of Present Illness     Transitional Care Management Review:   Janiec Yung is a 89 y.o. female here for TCM follow up.    During the TCM phone call patient stated:  TCM Call       Date and time call was made  2025 11:05 AM    Hospital care reviewed  Records reviewed    Patient [facility] hospitial[facility] at  [facility]    Comment  [facility]    Date of Admission  02/10/25    Date of discharge  25    Diagnosis  Acute cystitis with hematuria    Disposition  Home    Were the patients medications reviewed and updated  Yes    Current Symptoms  Weakness    Weakness severity  Mild  comes and goes per daughter    Fatigue severity  Mild          TCM Call       Clinical progress swelling  Improving    Post hospital issues  Poor ADL (Activities of Daily Living) performance    Should patient be enrolled in anticoag monitoring?  No    Scheduled for follow up?  Yes    Patients specialists  Urologist    Did you obtain your prescribed medications  Yes    Do you need help managing your prescriptions or medications  No    Why type of assitance do you need  Daughter sets out medications    Is  transportation to your appointment needed  Yes    Specify why  age    I have advised the patient to call PCP with any new or worsening symptoms  DESI Braxton.    Living Arrangements  Children    Support System  Family    The type of support provided  Emotional; Financial; Physical    Do you have social support  Yes, as much as I need    Are you recieving any outpatient services  No    Are you recieving home care services  Yes    Types of home care services  Nurse visit; Home PT    Are you using any community resources  No    Current waiver services  No    Have you fallen in the last 12 months  Yes    How many times  1    Interperter language line needed  No    Counseling  Patient; Family          HPI  Review of Systems  Objective   There were no vitals taken for this visit.    Physical Exam  Medications have been reviewed by provider in current encounter    Administrative Statements   Encounter provider Edgar Mustafa MD    The Patient is located at Home and in the following state in which I hold an active license [place].    The patient was identified by name and date of birth. Janice Yung was informed that this is a telemedicine visit and that the visit is being conducted through the Epic Embedded platform. She agrees to proceed..  My office door was closed. No one else was in the room.  She acknowledged consent and understanding of privacy and security of the video platform. The patient has agreed to participate and understands they can discontinue the visit at any time.    I have spent a total time of 15 minutes in caring for this patient on the day of the visit/encounter including Diagnostic results.    Edgar Mustafa MD

## 2025-02-17 NOTE — ASSESSMENT & PLAN NOTE
Orders:    oxyCODONE-acetaminophen (Percocet) 2.5-325 MG per tablet; Take 1 tablet by mouth every 4 (four) hours as needed for moderate pain Max Daily Amount: 6 tablets

## 2025-02-17 NOTE — PROGRESS NOTES
Virtual TCM Visit:Name: Janice Yung      : 1930      MRN: [id number]  Encounter Provider: Edgar Mustafa MD  Encounter Date: 2025   Encounter department: [facility]  :  Assessment & Plan  Other chronic pain    Orders:    oxyCODONE-acetaminophen (Percocet) 2.5-325 MG per tablet; Take 1 tablet by mouth every 4 (four) hours as needed for moderate pain Max Daily Amount: 6 tablets    Malignant neoplasm of hilus of lung, unspecified laterality (HCC)         Malignant neoplasm of trigone of urinary bladder (HCC)         UTI symptoms         Bladder mass  Possible primary w/ lung mets---pt does not want further eval         Pulmonary nodules  Possibly mets from bladder---pt does not want further eval                History of Present Illness     Transitional Care Management Review:   Janice Yung is a 89 y.o. female here for TCM follow up.    During the TCM phone call patient stated:  TCM Call       Date and time call was made  2025 11:05 AM    Hospital care reviewed  Records reviewed    Patient was hospitialized at  [facility]    Comment  [facility]    Date of Admission  02/10/25    Date of discharge  25    Diagnosis  Acute cystitis with hematuria    Disposition  Home    Were the patients medications reviewed and updated  Yes    Current Symptoms  Weakness    Weakness severity  Mild  comes and goes per daughter    Fatigue severity  Mild          TCM Call       Clinical progress swelling  Improving    Post hospital issues  Poor ADL (Activities of Daily Living) performance    Should patient be enrolled in anticoag monitoring?  No    Scheduled for follow up?  Yes    Patients specialists  Urologist    Did you obtain your prescribed medications  Yes    Do you need help managing your prescriptions or medications  No    Why type of assitance do you need  Daughter sets out medications    Is transportation to your appointment needed  Yes    Specify why  age    I  have advised the patient to call PCP with any new or worsening symptoms  DESI Braxton.    Living Arrangements  Children    Support System  Family    The type of support provided  Emotional; Financial; Physical    Do you have social support  Yes, as much as I need    Are you recieving any outpatient services  No    Are you recieving home care services  Yes    Types of home care services  Nurse visit; Home PT    Are you using any community resources  No    Current waiver services  No    Have you fallen in the last 12 months  Yes    How many times  1    Interperter language line needed  No    Counseling  Patient; Family          HPI  Review of Systems  Objective   There were no vitals taken for this visit.    Physical Exam  Medications have been reviewed by provider in current encounter    Administrative Statements   Encounter provider Edgar Mustafa MD    The Patient is located at Home and in the following state in which I hold an active license [place].    The patient was identified by name and date of birth. Janice Yung was informed that this is a telemedicine visit and that the visit is being conducted through the Epic Embedded platform. She agrees to proceed..  My office door was closed. No one else was in the room.  She acknowledged consent and understanding of privacy and security of the video platform. The patient has agreed to participate and understands they can discontinue the visit at any time.    I have spent a total time of 15 minutes in caring for this patient on the day of the visit/encounter including Diagnostic results      Edgar Mustafa MD

## 2025-02-18 DIAGNOSIS — R39.9 UTI SYMPTOMS: ICD-10-CM

## 2025-02-18 RX ORDER — CIPROFLOXACIN 250 MG/1
250 TABLET, FILM COATED ORAL EVERY 24 HOURS
Qty: 5 TABLET | Refills: 2 | Status: SHIPPED | OUTPATIENT
Start: 2025-02-18 | End: 2025-02-23

## 2025-02-21 ENCOUNTER — TELEPHONE (OUTPATIENT)
Age: OVER 89
End: 2025-02-21

## 2025-02-21 NOTE — TELEPHONE ENCOUNTER
Patient's daughter called and stated patient no longer needs rx.  She found medication on [employer] and ordered it.

## 2025-02-21 NOTE — TELEPHONE ENCOUNTER
Patient's daughter called asking if Dr. Mustafa can write a script for Clotrimazole that patient was prescribed for the patient while in the hospital.  It might be the lidocaine (XYLOCAINE) 2 % topical gel?  She stated it was by Leia gr and said it was a topical that is used for pain relief for painful urination.  Please advise.

## 2025-02-24 ENCOUNTER — HOSPICE ADMISSION (OUTPATIENT)
Dept: HOME HOSPICE | Facility: HOSPICE | Age: OVER 89
End: 2025-02-24
Payer: MEDICARE

## 2025-02-24 ENCOUNTER — HOME CARE VISIT (OUTPATIENT)
Dept: HOME HEALTH SERVICES | Facility: HOME HEALTHCARE | Age: OVER 89
End: 2025-02-24
Payer: MEDICARE

## 2025-02-24 ENCOUNTER — TELEPHONE (OUTPATIENT)
Age: OVER 89
End: 2025-02-24

## 2025-02-24 DIAGNOSIS — R62.7 FAILURE TO THRIVE IN ADULT: Primary | ICD-10-CM

## 2025-02-24 DIAGNOSIS — C34.00 MALIGNANT NEOPLASM OF HILUS OF LUNG, UNSPECIFIED LATERALITY (HCC): ICD-10-CM

## 2025-02-24 NOTE — TELEPHONE ENCOUNTER
Hospice referral placed.     Called daughter Brianna and advised hospice order placed and they will reach out. Daughter thankful.     Daughter states she wanted to let Dr Mustafa know she had to cancel urology visit for 2/25/2025 as she will not be able to get mom out of the house.

## 2025-02-24 NOTE — TELEPHONE ENCOUNTER
Pt daughter called to request an order for hospice for pt. Deyanira states her mother is very weak, no appetite. Please call daughter on next steps as she is asking for direction. Thank you

## 2025-02-25 ENCOUNTER — HOME CARE VISIT (OUTPATIENT)
Dept: HOME HEALTH SERVICES | Facility: HOME HEALTHCARE | Age: OVER 89
End: 2025-02-25
Payer: MEDICARE

## 2025-02-25 VITALS
SYSTOLIC BLOOD PRESSURE: 110 MMHG | BODY MASS INDEX: 25.51 KG/M2 | HEART RATE: 72 BPM | TEMPERATURE: 98.3 F | DIASTOLIC BLOOD PRESSURE: 50 MMHG | RESPIRATION RATE: 20 BRPM | HEIGHT: 62 IN | WEIGHT: 138.63 LBS

## 2025-02-25 PROCEDURE — G0299 HHS/HOSPICE OF RN EA 15 MIN: HCPCS

## 2025-02-26 ENCOUNTER — HOME CARE VISIT (OUTPATIENT)
Dept: HOME HEALTH SERVICES | Facility: HOME HEALTHCARE | Age: OVER 89
End: 2025-02-26
Payer: MEDICARE

## 2025-02-26 VITALS — HEART RATE: 70 BPM | RESPIRATION RATE: 18 BRPM

## 2025-02-26 PROCEDURE — G0299 HHS/HOSPICE OF RN EA 15 MIN: HCPCS

## 2025-02-27 ENCOUNTER — HOME CARE VISIT (OUTPATIENT)
Dept: HOME HOSPICE | Facility: HOSPICE | Age: OVER 89
End: 2025-02-27
Payer: MEDICARE

## 2025-02-27 ENCOUNTER — HOME CARE VISIT (OUTPATIENT)
Dept: HOME HEALTH SERVICES | Facility: HOME HEALTHCARE | Age: OVER 89
End: 2025-02-27
Payer: MEDICARE

## 2025-02-27 PROCEDURE — G0156 HHCP-SVS OF AIDE,EA 15 MIN: HCPCS

## 2025-02-28 ENCOUNTER — HOME CARE VISIT (OUTPATIENT)
Dept: HOME HEALTH SERVICES | Facility: HOME HEALTHCARE | Age: OVER 89
End: 2025-02-28
Payer: MEDICARE

## 2025-02-28 ENCOUNTER — HOME CARE VISIT (OUTPATIENT)
Dept: HOME HOSPICE | Facility: HOSPICE | Age: OVER 89
End: 2025-02-28
Payer: MEDICARE

## 2025-02-28 PROCEDURE — G0155 HHCP-SVS OF CSW,EA 15 MIN: HCPCS

## 2025-02-28 PROCEDURE — G0156 HHCP-SVS OF AIDE,EA 15 MIN: HCPCS

## 2025-02-28 PROCEDURE — G0299 HHS/HOSPICE OF RN EA 15 MIN: HCPCS

## 2025-03-01 ENCOUNTER — HOME CARE VISIT (OUTPATIENT)
Dept: HOME HOSPICE | Facility: HOSPICE | Age: OVER 89
End: 2025-03-01
Payer: MEDICARE

## 2025-03-08 PROCEDURE — 10330087 HSPC SERVICE INTENSITY ADD-ON

## 2025-03-21 ENCOUNTER — HOME CARE VISIT (OUTPATIENT)
Dept: HOME HOSPICE | Facility: HOSPICE | Age: OVER 89
End: 2025-03-21
Payer: MEDICARE

## 2025-03-27 NOTE — PROGRESS NOTES
Progress Note - Hospitalist   Name: Janice Yung 89 y.o. female I MRN: [id number]  Unit/Bed#: -01 I Date of Admission: 9/9/2024   Date of Service: 9/10/2024 I Hospital Day: 0    Assessment & Plan  Acute on chronic diastolic heart failure (HCC)  Wt Readings from Last 3 Encounters:   09/10/24 74 kg (163 lb 3.2 oz)   09/09/24 74.4 kg (164 lb)   07/10/24 72.1 kg (159 lb)     Presents with 2 day hx of acute on chronic bilateral LE edema + increased SOB/BRIAN and audible wheeze per daughter  Stable on RA on admission however with significant pitting edema    BNP 98   Prior Echo 6/2023: EF 60-65%  Home diuretic: torsemide 20 mg BID (daughter denies missed doses)  Unclear baseline weight (159 lbs during outpatient visit in July 2024)  165 lbs on admission   S/P IV 40 mg x 1 in ED  Consult cardiology  On IV lasix 80 mg BID --> edema significantly improved, hopeful transition to PO tomorrow  Strict I/O + daily weights --> (-1900 cc this am)  Venous duplex to r/o DVT  Ace wrap for compression   Update Echo  Salt and fluid restriction   Hypertension  Monitor SBP on IV diuresis   Holding home torsemide   Elevated in ED  Gastroesophageal reflux disease without esophagitis  Continue PPI  Mixed hyperlipidemia  Continue statin  History of recurrent UTI (urinary tract infection)  Chronically maintained on macrobid 100 mg daily as prophylaxis for UTI however this is contraindicated based on her renal function per pharmacy  Will HOLD for now as we will be diuresing  Recommend outpatient follow up with urology for further adjustment  Not meeting SIRS, no urinary sx currently   Paroxysmal atrial fibrillation (HCC)  Not on AC at baseline, aspirin only   Not on any BB  NSR on admission   Stage 3a chronic kidney disease (HCC)  Lab Results   Component Value Date    EGFR 60 09/10/2024    EGFR 58 09/09/2024    EGFR 50 05/07/2024    CREATININE 0.83 09/10/2024    CREATININE 0.86 09/09/2024    CREATININE 0.97 05/07/2024     Cr is currently at  baseline   Monitor BMP closely with diuresis    VTE Pharmacologic Prophylaxis:   Moderate Risk (Score 3-4) - Pharmacological DVT Prophylaxis Ordered: heparin.    Mobility:   Basic Mobility Inpatient Raw Score: 17  JH-HLM Goal: 5: Stand one or more mins  JH-HLM Achieved: 5: Stand (1 or more minutes)  JH-HLM Goal achieved. Continue to encourage appropriate mobility.    Patient Centered Rounds: I performed bedside rounds with nursing staff today.   Discussions with Specialists or Other Care Team Provider: Cardiology     Education and Discussions with Family / Patient: Attempted to update  (daughter) via phone. Left voicemail.     Current Length of Stay: 0 day(s)  Current Patient Status: Observation   Certification Statement: The patient will continue to require additional inpatient hospital stay due to diuresis   Discharge Plan: Anticipate discharge in 24-48 hrs to home.    Code Status: Level 3 - DNAR and DNI    Subjective   Patient at MS baseline, states she is feeling much better today. Breathing feels at baseline, states her legs are significantly better. Edema does appear markedly improved. No additional complaints. Poor appetite.     Objective     Vitals:   Temp (24hrs), Av.6 °F (36.4 °C), Min:96.4 °F (35.8 °C), Max:98.7 °F (37.1 °C)    Temp:  [96.4 °F (35.8 °C)-98.7 °F (37.1 °C)] 97.7 °F (36.5 °C)  HR:  [68-91] 79  Resp:  [18-22] 18  BP: (132-196)/(62-88) 141/62  SpO2:  [94 %-98 %] 94 %  Body mass index is 31.82 kg/m².     Input and Output Summary (last 24 hours):     Intake/Output Summary (Last 24 hours) at 9/10/2024 1121  Last data filed at 9/10/2024 0905  Gross per 24 hour   Intake 570 ml   Output 2470 ml   Net -1900 ml       Physical Exam  Vitals and nursing note reviewed.   Constitutional:       General: She is not in acute distress.     Appearance: She is not ill-appearing.   HENT:      Head: Normocephalic and atraumatic.      Nose: Nose normal.   Eyes:      General:         Right eye: No  discharge.         Left eye: No discharge.      Extraocular Movements: Extraocular movements intact.      Conjunctiva/sclera: Conjunctivae normal.   Cardiovascular:      Rate and Rhythm: Normal rate and regular rhythm.      Heart sounds: Normal heart sounds. No murmur heard.  Pulmonary:      Effort: Pulmonary effort is normal. No respiratory distress.      Breath sounds: Normal breath sounds. No wheezing, rhonchi or rales.      Comments: 92% on RA. Course BS, wheeze resolved  Abdominal:      General: Bowel sounds are normal.      Palpations: Abdomen is soft.      Tenderness: There is no abdominal tenderness. There is no guarding.   Musculoskeletal:      Right lower leg: Edema present.      Left lower leg: Edema present.   Skin:     General: Skin is warm and dry.   Neurological:      General: No focal deficit present.      Mental Status: She is alert and oriented to person, place, and time.      Cranial Nerves: No cranial nerve deficit.   Psychiatric:         Mood and Affect: Mood normal.         Behavior: Behavior normal.          Lines/Drains:  Lines/Drains/Airways       Active Status       None                            Lab Results: I have reviewed the following results: CBC/BMP:   .     09/10/24  0511   WBC 9.21   HGB 11.7   HCT 37.1      SODIUM 145   K 3.5      CO2 41*   BUN 16   CREATININE 0.83   GLUC 94       Results from last 7 days   Lab Units 09/10/24  0511   WBC Thousand/uL 9.21   HEMOGLOBIN g/dL 11.7   HEMATOCRIT % 37.1   PLATELETS Thousands/uL 196   SEGS PCT % 68   LYMPHO PCT % 19   MONO PCT % 11   EOS PCT % 2     Results from last 7 days   Lab Units 09/10/24  0511   SODIUM mmol/L 145   POTASSIUM mmol/L 3.5   CHLORIDE mmol/L 101   CO2 mmol/L 41*   BUN mg/dL 16   CREATININE mg/dL 0.83   ANION GAP mmol/L 3*   CALCIUM mg/dL 9.3   ALBUMIN g/dL 3.5   TOTAL BILIRUBIN mg/dL 0.52   ALK PHOS U/L 76   ALT U/L 6*   AST U/L 15   GLUCOSE RANDOM mg/dL 94                       Recent Cultures (last 7  days):         Imaging Review: No pertinent imaging studies reviewed.  Other Studies: No additional pertinent studies reviewed.    Last 24 Hours Medication List:     Current Facility-Administered Medications:     acetaminophen (TYLENOL) tablet 650 mg, Q6H PRN    Artificial Tears Op Soln 1 drop, TID    aspirin (ECOTRIN LOW STRENGTH) EC tablet 81 mg, Daily    atorvastatin (LIPITOR) tablet 10 mg, Daily    furosemide (LASIX) injection 80 mg, BID (diuretic)    gabapentin (NEURONTIN) capsule 300 mg, TID    heparin (porcine) subcutaneous injection 5,000 Units, Q8H DONNA    labetalol (NORMODYNE) injection 10 mg, Q6H PRN    pantoprazole (PROTONIX) EC tablet 40 mg, Early Morning    Administrative Statements   Today, Patient Was Seen By: Shilpi Spicer PA-C  I have spent a total time of 35 minutes in caring for this patient on the day of the visit/encounter including Diagnostic results, Instructions for management, Documenting in the medical record, and Reviewing / ordering tests, medicine, procedures  .    **Please Note: This note may have been constructed using a voice recognition system.**   Home

## 2025-04-16 ENCOUNTER — DOCUMENTATION (OUTPATIENT)
Dept: ADMINISTRATIVE | Facility: OTHER | Age: OVER 89
End: 2025-04-16

## 2025-04-16 NOTE — PROGRESS NOTES
25 1:50 PM    Annual Wellness Visit outreach is not required, patient is .     Thank you.  Eneida Blair MA  [facility]